# Patient Record
Sex: MALE | Race: WHITE | Employment: FULL TIME | URBAN - METROPOLITAN AREA
[De-identification: names, ages, dates, MRNs, and addresses within clinical notes are randomized per-mention and may not be internally consistent; named-entity substitution may affect disease eponyms.]

---

## 2017-12-14 ENCOUNTER — HOSPITAL ENCOUNTER (OUTPATIENT)
Age: 57
Discharge: HOME | End: 2017-12-14
Attending: EMERGENCY MEDICINE
Payer: COMMERCIAL

## 2017-12-14 VITALS
OXYGEN SATURATION: 93 % | RESPIRATION RATE: 16 BRPM | HEART RATE: 96 BPM | TEMPERATURE: 98 F | DIASTOLIC BLOOD PRESSURE: 95 MMHG | WEIGHT: 315 LBS | SYSTOLIC BLOOD PRESSURE: 170 MMHG

## 2017-12-14 DIAGNOSIS — J40 BRONCHITIS: Primary | ICD-10-CM

## 2017-12-14 PROCEDURE — 99203 OFFICE O/P NEW LOW 30 MIN: CPT

## 2017-12-14 PROCEDURE — 99204 OFFICE O/P NEW MOD 45 MIN: CPT

## 2017-12-14 RX ORDER — AZITHROMYCIN 250 MG/1
TABLET, FILM COATED ORAL
Qty: 1 PACKAGE | Refills: 0 | Status: SHIPPED | OUTPATIENT
Start: 2017-12-14 | End: 2019-10-11 | Stop reason: ALTCHOICE

## 2017-12-14 RX ORDER — ALBUTEROL SULFATE 90 UG/1
2 AEROSOL, METERED RESPIRATORY (INHALATION) EVERY 4 HOURS PRN
Qty: 1 INHALER | Refills: 0 | Status: SHIPPED | OUTPATIENT
Start: 2017-12-14 | End: 2018-01-13

## 2017-12-14 RX ORDER — LEVOTHYROXINE SODIUM 0.05 MG/1
50 TABLET ORAL
COMMUNITY
End: 2022-01-25

## 2017-12-15 NOTE — ED PROVIDER NOTES
Patient Seen in: [facility] Immediate Care In [address] [place] [address]    History   Patient presents with:  Cough/URI    Stated Complaint: cough    HPI    Patient is a 53-year-old male he is a ex-smoker who complains of cough for the last 10-12 days.   States the Soft. Bowel sounds are normal. Exhibits no distension and no mass. There is no tenderness. There is no rebound and no guarding. Musculoskeletal: Normal range of motion. Exhibits no edema or tenderness. Lymphadenopathy: No cervical adenopathy.    Neurolo

## 2019-11-18 PROBLEM — K57.92 DIVERTICULITIS: Status: ACTIVE | Noted: 2019-11-18

## 2019-11-18 PROBLEM — Z90.49 STATUS POST LAPAROSCOPIC COLECTOMY: Status: ACTIVE | Noted: 2019-11-18

## 2019-12-05 PROBLEM — E66.01 MORBID OBESITY (HCC): Status: ACTIVE | Noted: 2019-12-05

## 2021-08-10 ENCOUNTER — HOSPITAL ENCOUNTER (OUTPATIENT)
Dept: GENERAL RADIOLOGY | Age: 61
Discharge: HOME | End: 2021-08-10
Attending: INTERNAL MEDICINE
Payer: COMMERCIAL

## 2021-08-10 ENCOUNTER — LAB ENCOUNTER (OUTPATIENT)
Dept: LAB | Age: 61
End: 2021-08-10
Attending: INTERNAL MEDICINE
Payer: COMMERCIAL

## 2021-08-10 ENCOUNTER — OFFICE VISIT (OUTPATIENT)
Dept: INTERNAL MEDICINE CLINIC | Facility: CLINIC | Age: 61
End: 2021-08-10
Payer: COMMERCIAL

## 2021-08-10 VITALS
SYSTOLIC BLOOD PRESSURE: 123 MMHG | BODY MASS INDEX: 38.63 KG/M2 | DIASTOLIC BLOOD PRESSURE: 70 MMHG | HEART RATE: 76 BPM | WEIGHT: 301 LBS | HEIGHT: 74 IN | TEMPERATURE: 98 F

## 2021-08-10 DIAGNOSIS — Z23 NEED FOR SHINGLES VACCINE: ICD-10-CM

## 2021-08-10 DIAGNOSIS — K21.00 GASTROESOPHAGEAL REFLUX DISEASE WITH ESOPHAGITIS, UNSPECIFIED WHETHER HEMORRHAGE: ICD-10-CM

## 2021-08-10 DIAGNOSIS — E11.9 TYPE 2 DIABETES MELLITUS WITHOUT COMPLICATION, WITHOUT LONG-TERM CURRENT USE OF INSULIN (HCC): ICD-10-CM

## 2021-08-10 DIAGNOSIS — E78.5 HYPERLIPIDEMIA ASSOCIATED WITH TYPE 2 DIABETES MELLITUS (HCC): ICD-10-CM

## 2021-08-10 DIAGNOSIS — M17.11 PRIMARY OSTEOARTHRITIS OF RIGHT KNEE: ICD-10-CM

## 2021-08-10 DIAGNOSIS — E03.9 HYPOTHYROIDISM, UNSPECIFIED TYPE: ICD-10-CM

## 2021-08-10 DIAGNOSIS — Z98.890 HISTORY OF CAROTID ENDARTERECTOMY: ICD-10-CM

## 2021-08-10 DIAGNOSIS — I15.2 HYPERTENSION ASSOCIATED WITH TYPE 2 DIABETES MELLITUS (HCC): Primary | ICD-10-CM

## 2021-08-10 DIAGNOSIS — E11.59 HYPERTENSION ASSOCIATED WITH TYPE 2 DIABETES MELLITUS (HCC): ICD-10-CM

## 2021-08-10 DIAGNOSIS — N32.81 DETRUSOR OVERACTIVITY: ICD-10-CM

## 2021-08-10 DIAGNOSIS — E11.69 HYPERLIPIDEMIA ASSOCIATED WITH TYPE 2 DIABETES MELLITUS (HCC): ICD-10-CM

## 2021-08-10 DIAGNOSIS — I15.2 HYPERTENSION ASSOCIATED WITH TYPE 2 DIABETES MELLITUS (HCC): ICD-10-CM

## 2021-08-10 DIAGNOSIS — E11.59 HYPERTENSION ASSOCIATED WITH TYPE 2 DIABETES MELLITUS (HCC): Primary | ICD-10-CM

## 2021-08-10 PROBLEM — K57.92 DIVERTICULITIS: Status: RESOLVED | Noted: 2019-11-18 | Resolved: 2021-08-10

## 2021-08-10 PROBLEM — Z90.49 STATUS POST LAPAROSCOPIC COLECTOMY: Status: RESOLVED | Noted: 2019-11-18 | Resolved: 2021-08-10

## 2021-08-10 LAB
ALBUMIN SERPL-MCNC: 3.5 G/DL (ref 3.4–5)
ALBUMIN/GLOB SERPL: 0.9 {RATIO} (ref 1–2)
ALP LIVER SERPL-CCNC: 91 U/L
ALT SERPL-CCNC: 37 U/L
ANION GAP SERPL CALC-SCNC: 6 MMOL/L (ref 0–18)
AST SERPL-CCNC: 21 U/L (ref 15–37)
BILIRUB SERPL-MCNC: 0.4 MG/DL (ref 0.1–2)
BUN BLD-MCNC: 17 MG/DL (ref 7–18)
BUN/CREAT SERPL: 18.9 (ref 10–20)
CALCIUM BLD-MCNC: 9.1 MG/DL (ref 8.5–10.1)
CHLORIDE SERPL-SCNC: 107 MMOL/L (ref 98–112)
CHOLEST SMN-MCNC: 146 MG/DL (ref ?–200)
CO2 SERPL-SCNC: 29 MMOL/L (ref 21–32)
CREAT BLD-MCNC: 0.9 MG/DL
CREAT UR-SCNC: 77.1 MG/DL
EST. AVERAGE GLUCOSE BLD GHB EST-MCNC: 192 MG/DL (ref 68–126)
GLOBULIN PLAS-MCNC: 3.8 G/DL (ref 2.8–4.4)
GLUCOSE BLD-MCNC: 112 MG/DL (ref 70–99)
HBA1C MFR BLD HPLC: 8.3 % (ref ?–5.7)
HDLC SERPL-MCNC: 47 MG/DL (ref 40–59)
LDLC SERPL CALC-MCNC: 81 MG/DL (ref ?–100)
M PROTEIN MFR SERPL ELPH: 7.3 G/DL (ref 6.4–8.2)
MICROALBUMIN UR-MCNC: 1.55 MG/DL
MICROALBUMIN/CREAT 24H UR-RTO: 20.1 UG/MG (ref ?–30)
NONHDLC SERPL-MCNC: 99 MG/DL (ref ?–130)
OSMOLALITY SERPL CALC.SUM OF ELEC: 296 MOSM/KG (ref 275–295)
PATIENT FASTING Y/N/NP: YES
PATIENT FASTING Y/N/NP: YES
POTASSIUM SERPL-SCNC: 4 MMOL/L (ref 3.5–5.1)
SODIUM SERPL-SCNC: 142 MMOL/L (ref 136–145)
TRIGL SERPL-MCNC: 99 MG/DL (ref 30–149)
TSI SER-ACNC: 2.36 MIU/ML (ref 0.36–3.74)
VLDLC SERPL CALC-MCNC: 16 MG/DL (ref 0–30)

## 2021-08-10 PROCEDURE — 84443 ASSAY THYROID STIM HORMONE: CPT

## 2021-08-10 PROCEDURE — 3052F HG A1C>EQUAL 8.0%<EQUAL 9.0%: CPT | Performed by: INTERNAL MEDICINE

## 2021-08-10 PROCEDURE — 83036 HEMOGLOBIN GLYCOSYLATED A1C: CPT

## 2021-08-10 PROCEDURE — 90472 IMMUNIZATION ADMIN EACH ADD: CPT | Performed by: INTERNAL MEDICINE

## 2021-08-10 PROCEDURE — 80061 LIPID PANEL: CPT

## 2021-08-10 PROCEDURE — 3078F DIAST BP <80 MM HG: CPT | Performed by: INTERNAL MEDICINE

## 2021-08-10 PROCEDURE — 36415 COLL VENOUS BLD VENIPUNCTURE: CPT

## 2021-08-10 PROCEDURE — 73560 X-RAY EXAM OF KNEE 1 OR 2: CPT | Performed by: INTERNAL MEDICINE

## 2021-08-10 PROCEDURE — 3074F SYST BP LT 130 MM HG: CPT | Performed by: INTERNAL MEDICINE

## 2021-08-10 PROCEDURE — 90471 IMMUNIZATION ADMIN: CPT | Performed by: INTERNAL MEDICINE

## 2021-08-10 PROCEDURE — 99205 OFFICE O/P NEW HI 60 MIN: CPT | Performed by: INTERNAL MEDICINE

## 2021-08-10 PROCEDURE — 3061F NEG MICROALBUMINURIA REV: CPT | Performed by: INTERNAL MEDICINE

## 2021-08-10 PROCEDURE — 3008F BODY MASS INDEX DOCD: CPT | Performed by: INTERNAL MEDICINE

## 2021-08-10 PROCEDURE — 82043 UR ALBUMIN QUANTITATIVE: CPT

## 2021-08-10 PROCEDURE — 82570 ASSAY OF URINE CREATININE: CPT

## 2021-08-10 PROCEDURE — 90750 HZV VACC RECOMBINANT IM: CPT | Performed by: INTERNAL MEDICINE

## 2021-08-10 PROCEDURE — 90732 PPSV23 VACC 2 YRS+ SUBQ/IM: CPT | Performed by: INTERNAL MEDICINE

## 2021-08-10 PROCEDURE — 80053 COMPREHEN METABOLIC PANEL: CPT

## 2021-08-10 RX ORDER — LEVOTHYROXINE SODIUM 175 MCG
TABLET ORAL
COMMUNITY
Start: 2021-07-31 | End: 2021-08-10

## 2021-08-10 RX ORDER — GLYBURIDE-METFORMIN HYDROCHLORIDE 5; 500 MG/1; MG/1
1 TABLET ORAL 2 TIMES DAILY WITH MEALS
Qty: 90 TABLET | Refills: 3 | Status: SHIPPED | OUTPATIENT
Start: 2021-08-10 | End: 2021-11-30

## 2021-08-10 RX ORDER — PANTOPRAZOLE SODIUM 20 MG/1
TABLET, DELAYED RELEASE ORAL
Qty: 104 TABLET | Refills: 0 | Status: SHIPPED | OUTPATIENT
Start: 2021-08-10 | End: 2021-09-10 | Stop reason: ALTCHOICE

## 2021-08-10 RX ORDER — ATORVASTATIN CALCIUM 40 MG/1
40 TABLET, FILM COATED ORAL NIGHTLY
Qty: 90 TABLET | Refills: 3 | Status: SHIPPED | OUTPATIENT
Start: 2021-08-10

## 2021-08-10 RX ORDER — TROSPIUM CHLORIDE ER 60 MG/1
CAPSULE ORAL
COMMUNITY
Start: 2021-07-27 | End: 2021-09-29 | Stop reason: ALTCHOICE

## 2021-08-10 RX ORDER — EMPAGLIFLOZIN AND LINAGLIPTIN 25; 5 MG/1; MG/1
TABLET, FILM COATED ORAL
COMMUNITY
End: 2021-08-10

## 2021-08-10 RX ORDER — LEVOTHYROXINE SODIUM 175 MCG
175 TABLET ORAL
Qty: 90 TABLET | Refills: 3 | Status: SHIPPED | OUTPATIENT
Start: 2021-08-10

## 2021-08-10 RX ORDER — ATORVASTATIN CALCIUM 40 MG/1
TABLET, FILM COATED ORAL
COMMUNITY
Start: 2021-07-31 | End: 2021-08-10

## 2021-08-10 RX ORDER — VALSARTAN 160 MG/1
160 TABLET ORAL DAILY
Qty: 90 TABLET | Refills: 3 | Status: SHIPPED | OUTPATIENT
Start: 2021-08-10

## 2021-08-10 RX ORDER — EMPAGLIFLOZIN AND LINAGLIPTIN 25; 5 MG/1; MG/1
1 TABLET, FILM COATED ORAL DAILY
Qty: 90 TABLET | Refills: 3 | Status: SHIPPED | OUTPATIENT
Start: 2021-08-10 | End: 2021-11-30

## 2021-08-10 RX ORDER — VALSARTAN 160 MG/1
TABLET ORAL
COMMUNITY
End: 2021-08-10

## 2021-08-10 NOTE — PATIENT INSTRUCTIONS
Gastritis (Adult)    Gastritis is inflammation and irritation of the stomach lining. You can have it for a short time (acute) or be long lasting (chronic). Infection with bacteria called H pylori most often causes gastritis.  More than a third of people i with your healthcare provider, or as advised by our staff. You may need testing to check for inflammation or an ulcer.   When to seek medical advice  Call your healthcare provider for any of the following:  · Stomach pain that gets worse or moves to the low Relieving your discomfort  You and your healthcare provider can work together to find the treatment options that best ease your symptoms. These may include lifestyle changes, medicine, and possibly surgery.   Many people find their GERD symptoms decrease wh tomatoes  · Peppermint  · Any other foods that seem to irritate your stomach or cause you pain  Relieve the pressure  Tips include the following:  · Eat smaller meals, even if you have to eat more often. · Don’t lie down right after you eat.  Wait a few ho control GERD symptoms. Side effects: Belly (abdominal) pain, diarrhea, upset stomach (nausea). Possible other side effects linked to long-term use and high doses. Prokinetics  These medicines affect the movement of the digestive tract.  They may be recomm regarding the use of this medicine in children. Special care may be needed. What side effects may I notice from receiving this medicine?   Side effects that you should report to your doctor or health care professional as soon as possible:  · allergic react degrees C (68 and 77 degrees F). Protect from light and moisture. Throw away any unused medicine after the expiration date. What should I tell my health care provider before I take this medicine?   They need to know if you have any of these conditions:  ·

## 2021-08-10 NOTE — ASSESSMENT & PLAN NOTE
New problem. Continue with Glyxambi and glyburide/metformin pending repeat A1c, may benefit from endocrine evaluation depending on A1c.

## 2021-08-10 NOTE — ASSESSMENT & PLAN NOTE
Has not had any formal testing, states Vesicare and trospium do not work, recommend he follow-up with urology to discuss further. He can stop trospium for now, if he notices worsening of his symptoms then resume pending evaluation.

## 2021-08-10 NOTE — PROGRESS NOTES
History of Present Illness   Patient ID: Eri Valle is a 63year old male. Today's Date: 08/10/21  Chief Complaint: Establish Care      HPI   Pleasant 65-year-old gentleman who presents for establish care. He is new to our system.   I am only able Vitals and nursing note reviewed. Constitutional:       General: He is not in acute distress. Appearance: Normal appearance. HENT:      Head: Normocephalic.       Right Ear: External ear normal.      Left Ear: External ear normal.   Eyes:      Extra before meals for 14 days, THEN 1 tablet (20 mg total) every morning before breakfast. Before meal. 104 tablet 0   • Levothyroxine Sodium 50 MCG Oral Tab Take 50 mcg by mouth before breakfast.         Assessment & Plan    1.  Hypertension associated with typ Hypothyroidism, unspecified type  Assessment & Plan:  New problem. Continue with levothyroxine pending repeat TSH. Orders:  -     Assay, Thyroid Stim Hormone; Future; Expected date: 08/10/2021  -     Synthroid;  Take 1 tablet (175 mcg total) by mouth befo meal.  Dispense: 104 tablet; Refill: 0  9.  Need for shingles vaccine  -     ZOSTER VACC RECOMBINANT IM NJX    I spent 65 minutes obtaining pertitent medical history, reviewing pertinent imaging/labs and specialists notes, evaluating patient, discussing dif Infection with bacteria called H pylori most often causes gastritis. More than a third of people in the [place] have these bacteria in their bodies. In many cases, H pylori causes no problems or symptoms.  In some people, though, the infection irritates the stom healthcare provider for any of the following:  · Stomach pain that gets worse or moves to the lower right belly (appendix area)  · Chest pain that appears or gets worse, or spreads to the back, neck, shoulder, or arm  · Frequent vomiting (can’t keep down l lifestyle changes, medicine, and possibly surgery. Many people find their GERD symptoms decrease when they eat small frequent meals instead of 3 large ones. Reducing the amount of fatty foods in your diet will also help.    The following foods tend to caus smaller meals, even if you have to eat more often. · Don’t lie down right after you eat. Wait a few hours for your stomach to empty. · Avoid tight belts and tight-fitting clothes. · Lose excess weight.   Tobacco and alcohol  Avoid smoking tobacco and dri high doses. Prokinetics  These medicines affect the movement of the digestive tract. They may be recommended if your stomach is emptying too slowly. But in most cases they are not recommended for treating GERD.    Side effects: Tiredness, depression, anxie you should report to your doctor or health care professional as soon as possible:  · allergic reactions like skin rash, itching or hives, swelling of the face, lips, or tongue  · bone, muscle or joint pain  · breathing problems  · chest pain or chest tight care provider before I take this medicine?   They need to know if you have any of these conditions:  · black or bloody stools  · chest pain  · difficulty swallowing  · have had heartburn for over 3 months  · have heartburn with dizziness, lightheadedness or

## 2021-08-10 NOTE — ASSESSMENT & PLAN NOTE
Plan  Chronic, well controlled on current medications, denies adverse effects, continue with present management.

## 2021-08-10 NOTE — ASSESSMENT & PLAN NOTE
Stable. Continue with aspirin and atorvastatin, follow-up with [facility] cardiology in November, for any further cardiac issues we will have him follow-up with [facility] cardiology.

## 2021-08-12 ENCOUNTER — TELEPHONE (OUTPATIENT)
Dept: INTERNAL MEDICINE CLINIC | Facility: CLINIC | Age: 61
End: 2021-08-12

## 2021-08-24 ENCOUNTER — HOSPITAL ENCOUNTER (OUTPATIENT)
Age: 61
Discharge: HOME | End: 2021-08-24
Attending: PHYSICIAN ASSISTANT
Payer: COMMERCIAL

## 2021-08-24 ENCOUNTER — TELEPHONE (OUTPATIENT)
Dept: INTERNAL MEDICINE CLINIC | Facility: CLINIC | Age: 61
End: 2021-08-24

## 2021-08-24 VITALS
HEART RATE: 84 BPM | BODY MASS INDEX: 38.12 KG/M2 | HEIGHT: 74 IN | DIASTOLIC BLOOD PRESSURE: 69 MMHG | WEIGHT: 297 LBS | OXYGEN SATURATION: 95 % | RESPIRATION RATE: 18 BRPM | TEMPERATURE: 97 F | SYSTOLIC BLOOD PRESSURE: 147 MMHG

## 2021-08-24 DIAGNOSIS — L03.116 CELLULITIS OF LEFT LOWER LEG: Primary | ICD-10-CM

## 2021-08-24 DIAGNOSIS — R03.0 ELEVATED BLOOD PRESSURE READING: ICD-10-CM

## 2021-08-24 DIAGNOSIS — S81.802A OPEN WOUND OF LEFT LOWER LEG, INITIAL ENCOUNTER: ICD-10-CM

## 2021-08-24 DIAGNOSIS — L03.119 CELLULITIS OF LOWER EXTREMITY, UNSPECIFIED LATERALITY: Primary | ICD-10-CM

## 2021-08-24 PROCEDURE — 99203 OFFICE O/P NEW LOW 30 MIN: CPT | Performed by: PHYSICIAN ASSISTANT

## 2021-08-24 RX ORDER — SULFAMETHOXAZOLE AND TRIMETHOPRIM 800; 160 MG/1; MG/1
1 TABLET ORAL 2 TIMES DAILY
Qty: 14 TABLET | Refills: 0 | Status: SHIPPED | OUTPATIENT
Start: 2021-08-24 | End: 2021-08-31

## 2021-08-24 RX ORDER — CLINDAMYCIN HYDROCHLORIDE 150 MG/1
450 CAPSULE ORAL 3 TIMES DAILY
Qty: 90 CAPSULE | Refills: 0 | Status: SHIPPED | OUTPATIENT
Start: 2021-08-24 | End: 2021-09-03

## 2021-08-24 NOTE — ED INITIAL ASSESSMENT (HPI)
C/o redness and swelling Lt lower leg started yesterday  Has abrasion Lt lower leg 8 days ago  Denies fever

## 2021-08-24 NOTE — TELEPHONE ENCOUNTER
Advised patient of Dr. Angeli Rios note. Patient verbalized understanding  Patient states he already took 3 capsules of clindamycin today. Patient wants to know if he should still take Bactrim today. Spoke to Dr. Brady Ponce.    Per Dr. Brady Ponce, okay for patient to

## 2021-08-24 NOTE — ED PROVIDER NOTES
Patient Seen in: [facility] [place]    History   Patient presents with:  Cellulitis    Stated Complaint: inflammation lt lower leg    HPI    44-year-old male presents with chief complaint of redness and swelling of left lower leg. Onset yesterday. Sodium 1 % External Gel,  Apply 2 g topically 4 (four) times daily. Apply thin layer to affected joint.    pantoprazole 20 MG Oral Tab EC,  Take 1 tablet (20 mg total) by mouth 2 (two) times daily before meals for 14 days, THEN 1 tablet (20 mg total) every normal.  There is no rales, wheezes, or rhonchi. There is no stridor. Air entry is equal.  Cardiovascular: Regular rate and rhythm, no murmurs, gallops, rubs. Capillary refill is brisk. No extremity edema.   Gastrointestinal: Abdomen soft, nontender, no of 18 minutes during chart review, obtaining history, performing a physical exam, bedside monitoring of interventions, collecting and independently interpreting tests, discussion with consultants, patient communication/counseling, and chart documentation b

## 2021-08-24 NOTE — TELEPHONE ENCOUNTER
Dr. Yamilet Akers, please see patient's message below and images and please advise. Patient has appointment to see you tomorrow morning.

## 2021-08-24 NOTE — TELEPHONE ENCOUNTER
Chart reviewed. Stop clindamycin, this is a high incidence of C. difficile. We will switch him to Bactrim. I will also send in topical mupirocin which he should use 3 times daily for the next 5 to 7 days.   Purchase nonadherent dressing call Bhakti Kincaid from t

## 2021-08-24 NOTE — TELEPHONE ENCOUNTER
----- Message from Sue Burnett sent at 8/24/2021  1:02 PM CDT -----  Regarding: Other  Contact: [phone]  Went to immediate care re:infection on ankle. Attached: Photo taken 9am and photo taken 12:30pm today. It seems to be getting worse.  Maged Esparza

## 2021-08-25 ENCOUNTER — HOSPITAL ENCOUNTER (OUTPATIENT)
Dept: ULTRASOUND IMAGING | Facility: HOSPITAL | Age: 61
Discharge: HOME | End: 2021-08-25
Attending: INTERNAL MEDICINE
Payer: COMMERCIAL

## 2021-08-25 ENCOUNTER — OFFICE VISIT (OUTPATIENT)
Dept: INTERNAL MEDICINE CLINIC | Facility: CLINIC | Age: 61
End: 2021-08-25
Payer: COMMERCIAL

## 2021-08-25 VITALS
DIASTOLIC BLOOD PRESSURE: 65 MMHG | SYSTOLIC BLOOD PRESSURE: 114 MMHG | HEIGHT: 74 IN | BODY MASS INDEX: 38.2 KG/M2 | HEART RATE: 78 BPM | WEIGHT: 297.63 LBS | TEMPERATURE: 99 F

## 2021-08-25 DIAGNOSIS — M79.89 SWELLING OF CALF: ICD-10-CM

## 2021-08-25 DIAGNOSIS — L03.116 CELLULITIS OF LEFT ANKLE: Primary | ICD-10-CM

## 2021-08-25 DIAGNOSIS — L03.116 CELLULITIS OF LEFT ANKLE: ICD-10-CM

## 2021-08-25 DIAGNOSIS — I83.223 VARICOSE VEINS OF LEFT LOWER EXTREMITY WITH INFLAMMATION, WITH ULCER OF ANKLE LIMITED TO BREAKDOWN OF SKIN (HCC): ICD-10-CM

## 2021-08-25 DIAGNOSIS — L97.321 VARICOSE VEINS OF LEFT LOWER EXTREMITY WITH INFLAMMATION, WITH ULCER OF ANKLE LIMITED TO BREAKDOWN OF SKIN (HCC): ICD-10-CM

## 2021-08-25 PROCEDURE — 3074F SYST BP LT 130 MM HG: CPT | Performed by: INTERNAL MEDICINE

## 2021-08-25 PROCEDURE — 3078F DIAST BP <80 MM HG: CPT | Performed by: INTERNAL MEDICINE

## 2021-08-25 PROCEDURE — 3008F BODY MASS INDEX DOCD: CPT | Performed by: INTERNAL MEDICINE

## 2021-08-25 PROCEDURE — 93971 EXTREMITY STUDY: CPT | Performed by: INTERNAL MEDICINE

## 2021-08-25 PROCEDURE — 99214 OFFICE O/P EST MOD 30 MIN: CPT | Performed by: INTERNAL MEDICINE

## 2021-08-25 NOTE — PROGRESS NOTES
History of Present Illness   Patient ID: Fan Solis is a 63year old male.   Today's Date: 08/25/21  Chief Complaint: Infection (L ankle) and Pain (b/l feet when working 8hrs a day)      HPI  Very pleasant 82-year-old gentleman who presents for urge General: He is not in acute distress. Appearance: Normal appearance. HENT:      Head: Normocephalic. Right Ear: External ear normal.      Left Ear: External ear normal.   Eyes:      Extraocular Movements: Extraocular movements intact.       Conju total) by mouth before breakfast. 90 tablet 3   • Diclofenac Sodium 1 % External Gel Apply 2 g topically 4 (four) times daily. Apply thin layer to affected joint.  100 g 3   • pantoprazole 20 MG Oral Tab EC Take 1 tablet (20 mg total) by mouth 2 (two) times of ankle limited to breakdown of skin ([address]      Hypertension associated with type 2 diabetes mellitus ([address]      Hyperlipidemia associated with type 2 diabetes mellitus ([address]      Type 2 diabetes mellitus without complication, without long-term current us

## 2021-08-26 ENCOUNTER — TELEPHONE (OUTPATIENT)
Dept: INTERNAL MEDICINE CLINIC | Facility: CLINIC | Age: 61
End: 2021-08-26

## 2021-08-26 NOTE — TELEPHONE ENCOUNTER
Pt requesting for information regarding Podiatrist Dr. Brady Ponce had referred him to. Chart reviewed with Pt. Provided him with Podiatrist phone number and provider name.       To Vendor Referred By By Location/Place of Service By Department   TAQUERIA Chávez

## 2021-08-27 ENCOUNTER — TELEPHONE (OUTPATIENT)
Dept: INTERNAL MEDICINE CLINIC | Facility: CLINIC | Age: 61
End: 2021-08-27

## 2021-08-27 NOTE — TELEPHONE ENCOUNTER
Patient states that he is calling back, states that someone called him few minutes ago and left a message to his voice mail,did not check his voice mail yet but he calls back right away.   Informed that  per Dr Sally Garcia, his ultrasound was negative for any bl

## 2021-08-27 NOTE — TELEPHONE ENCOUNTER
The patient returned the call and stated he was aware of the results. He will send a picture of his leg later today as instructed by Dr. Joey Woody later today when his son comes home.     Casimiro Carpenter MD   8/25/2021  2:43 PM CDT       Please let patient kn

## 2021-09-10 ENCOUNTER — OFFICE VISIT (OUTPATIENT)
Dept: PODIATRY CLINIC | Facility: CLINIC | Age: 61
End: 2021-09-10
Payer: COMMERCIAL

## 2021-09-10 DIAGNOSIS — I87.2 VENOUS INSUFFICIENCY OF BOTH LOWER EXTREMITIES: ICD-10-CM

## 2021-09-10 DIAGNOSIS — E08.42 DIABETES MELLITUS DUE TO UNDERLYING CONDITION WITH DIABETIC POLYNEUROPATHY, UNSPECIFIED WHETHER LONG TERM INSULIN USE (HCC): Primary | ICD-10-CM

## 2021-09-10 DIAGNOSIS — M20.41 HAMMERTOE, BILATERAL: ICD-10-CM

## 2021-09-10 DIAGNOSIS — M20.42 HAMMERTOE, BILATERAL: ICD-10-CM

## 2021-09-10 DIAGNOSIS — I83.12 VARICOSE VEINS OF BOTH LOWER EXTREMITIES WITH INFLAMMATION: ICD-10-CM

## 2021-09-10 DIAGNOSIS — I83.11 VARICOSE VEINS OF BOTH LOWER EXTREMITIES WITH INFLAMMATION: ICD-10-CM

## 2021-09-10 PROCEDURE — 99204 OFFICE O/P NEW MOD 45 MIN: CPT | Performed by: PODIATRIST

## 2021-09-10 RX ORDER — GABAPENTIN 300 MG/1
300 CAPSULE ORAL NIGHTLY
Qty: 30 CAPSULE | Refills: 2 | Status: SHIPPED | OUTPATIENT
Start: 2021-09-10 | End: 2021-11-03

## 2021-09-10 NOTE — PROGRESS NOTES
[address] [facility] Podiatry  Progress Note    Sean Cortes is a 63year old male. Patient presents with:  Consult: Patient c/o R foot pain. Pain is across foot and on the side. Pain scale 7-8/10. Also states pain on L foot, pain scale 1-2/10.  Denies any HISTORY  2017    Small bowel Res.    • OTHER SURGICAL HISTORY  11/12/2019    Colectomy      Family History   Problem Relation Age of Onset   • Cancer Father    • Heart Disorder Father    • Cancer Mother       Social History    Socioeconomic History      Mar ANIONGAP 6 08/10/2021    GFRAA 108 08/10/2021    GFRNAA 93 08/10/2021    CA 9.1 08/10/2021     08/10/2021    K 4.0 08/10/2021     08/10/2021    CO2 29.0 08/10/2021    OSMOCALC 296 (H) 08/10/2021        Lab Results   Component Value Date    EAG

## 2021-09-14 ENCOUNTER — OFFICE VISIT (OUTPATIENT)
Dept: PHYSICAL MEDICINE AND REHAB | Facility: CLINIC | Age: 61
End: 2021-09-14
Payer: COMMERCIAL

## 2021-09-14 ENCOUNTER — TELEPHONE (OUTPATIENT)
Dept: NEUROLOGY | Facility: CLINIC | Age: 61
End: 2021-09-14

## 2021-09-14 VITALS
WEIGHT: 298.63 LBS | BODY MASS INDEX: 38.33 KG/M2 | DIASTOLIC BLOOD PRESSURE: 68 MMHG | SYSTOLIC BLOOD PRESSURE: 140 MMHG | HEART RATE: 77 BPM | OXYGEN SATURATION: 95 % | HEIGHT: 74 IN

## 2021-09-14 DIAGNOSIS — E11.59 HYPERTENSION ASSOCIATED WITH TYPE 2 DIABETES MELLITUS (HCC): ICD-10-CM

## 2021-09-14 DIAGNOSIS — E11.42 DIABETIC POLYNEUROPATHY ASSOCIATED WITH TYPE 2 DIABETES MELLITUS (HCC): ICD-10-CM

## 2021-09-14 DIAGNOSIS — M22.2X9 PATELLOFEMORAL PAIN SYNDROME, UNSPECIFIED LATERALITY: Primary | ICD-10-CM

## 2021-09-14 DIAGNOSIS — E03.9 HYPOTHYROIDISM, UNSPECIFIED TYPE: ICD-10-CM

## 2021-09-14 DIAGNOSIS — I15.2 HYPERTENSION ASSOCIATED WITH TYPE 2 DIABETES MELLITUS (HCC): ICD-10-CM

## 2021-09-14 DIAGNOSIS — E78.5 HYPERLIPIDEMIA ASSOCIATED WITH TYPE 2 DIABETES MELLITUS (HCC): ICD-10-CM

## 2021-09-14 DIAGNOSIS — M17.11 PRIMARY OSTEOARTHRITIS OF RIGHT KNEE: ICD-10-CM

## 2021-09-14 DIAGNOSIS — E11.69 HYPERLIPIDEMIA ASSOCIATED WITH TYPE 2 DIABETES MELLITUS (HCC): ICD-10-CM

## 2021-09-14 DIAGNOSIS — E66.01 CLASS 2 SEVERE OBESITY DUE TO EXCESS CALORIES WITH SERIOUS COMORBIDITY AND BODY MASS INDEX (BMI) OF 38.0 TO 38.9 IN ADULT (HCC): ICD-10-CM

## 2021-09-14 PROCEDURE — 20611 DRAIN/INJ JOINT/BURSA W/US: CPT | Performed by: PHYSICAL MEDICINE & REHABILITATION

## 2021-09-14 PROCEDURE — 3078F DIAST BP <80 MM HG: CPT | Performed by: PHYSICAL MEDICINE & REHABILITATION

## 2021-09-14 PROCEDURE — 99204 OFFICE O/P NEW MOD 45 MIN: CPT | Performed by: PHYSICAL MEDICINE & REHABILITATION

## 2021-09-14 PROCEDURE — 3077F SYST BP >= 140 MM HG: CPT | Performed by: PHYSICAL MEDICINE & REHABILITATION

## 2021-09-14 PROCEDURE — 3008F BODY MASS INDEX DOCD: CPT | Performed by: PHYSICAL MEDICINE & REHABILITATION

## 2021-09-14 RX ORDER — TRIAMCINOLONE ACETONIDE 40 MG/ML
40 INJECTION, SUSPENSION INTRA-ARTICULAR; INTRAMUSCULAR ONCE
Status: COMPLETED | OUTPATIENT
Start: 2021-09-14 | End: 2021-09-14

## 2021-09-14 RX ORDER — LIDOCAINE HYDROCHLORIDE 10 MG/ML
1 INJECTION, SOLUTION INFILTRATION; PERINEURAL ONCE
Status: COMPLETED | OUTPATIENT
Start: 2021-09-14 | End: 2021-09-14

## 2021-09-14 RX ADMIN — TRIAMCINOLONE ACETONIDE 40 MG: 40 INJECTION, SUSPENSION INTRA-ARTICULAR; INTRAMUSCULAR at 16:55:00

## 2021-09-14 RX ADMIN — LIDOCAINE HYDROCHLORIDE 1 ML: 10 INJECTION, SOLUTION INFILTRATION; PERINEURAL at 16:55:00

## 2021-09-14 NOTE — H&P
[address] H&P    Requesting Physician: Tiffanie Pan MD    Chief Complaint (Reason for Visit):  Patient presents with:  Knee Pain: New right handed patient comes in for localized R knee pain, denies Occupational History      Not on file    Tobacco Use      Smoking status: Former Smoker        Quit date:         Years since quittin.7      Smokeless tobacco: Never Used    Vaping Use      Vaping Use: Never used    Substance and Sexual Activity and are negative. Pertinent positives and negatives noted in the HPI. PHYSICAL EXAM:   /68   Pulse 77   Ht 74\"   Wt 298 lb 9.6 oz (135.4 kg)   SpO2 95%   BMI 38.34 kg/m²     Body mass index is 38.34 kg/m².       General: No immediate distress 18 mmol/L Final   • BUN 08/10/2021 17  7 - 18 mg/dL Final   • Creatinine 08/10/2021 0.90  0.70 - 1.30 mg/dL Final   • BUN/CREA Ratio 08/10/2021 18.9  10.0 - 20.0 Final   • Calcium, Total 08/10/2021 9.1  8.5 - 10.1 mg/dL Final   • Calculated Osmolality 08/1 minimal osteophytic spurring along the lateral compartment.  Minimal osteophytic spurring noted along the posterior superior    and inferior patella.  No evidence of fractures or erosions. SOFT TISSUES: Negative. No visible soft tissue swelling.    EFFUSI type 2 diabetes mellitus (HCC)  Class 2 severe obesity due to excess calories with serious comorbidity and body mass index (BMI) of 38.0 to 38.9 in adult [facility])    Brandan Cantrell MD, [facility]  Physical Medicine and Rehabilitation/Sports Medicine  [facility]

## 2021-09-14 NOTE — PROCEDURES
[address]   Knee Joint Injection Procedure Note    CHIEF COMPLAINT:  Patient presents with:  Knee Pain: New right handed patient comes in for localized R knee pain, denies N/T.   Pain started yrs ago with no Final   • HgbA1C 08/10/2021 8.3* <5.7 % Final   • Estimated Average Glucose 08/10/2021 192* 68 - 126 mg/dL Final   • Cholesterol, Total 08/10/2021 146  <200 mg/dL Final   • HDL Cholesterol 08/10/2021 47  40 - 59 mg/dL Final   • Triglycerides 08/10/2021 99 dressing was placed. Patient tolerated the procedure well and no immediate complications noted. The patients pre procedure pain score was 2/10. The post procedure pain score was 0/10. Patient verbalized understanding of assessment and plan.   Patien

## 2021-09-14 NOTE — PATIENT INSTRUCTIONS
1) Tylenol 500-1000 mg every 6-8 hours as needed for pain. No more than 3000 mg daily. 2) I am not recommending NSAIDS given your co-morbidities  3) Start formal physical therapy as soon as possible.   4) Make an appointment to see the weight loss center

## 2021-09-14 NOTE — TELEPHONE ENCOUNTER
RIGHT knee HA and CSI under ultrasound guidance CPT CODE: 23407, ( [employer]) -Approved     Called [employer] spoke to Derrick Grullon who states no authorization is required for   CPT CODE: 09932,.      Call transferred to Medication precert department

## 2021-09-29 ENCOUNTER — LAB ENCOUNTER (OUTPATIENT)
Dept: LAB | Facility: HOSPITAL | Age: 61
End: 2021-09-29
Attending: UROLOGY
Payer: COMMERCIAL

## 2021-09-29 ENCOUNTER — OFFICE VISIT (OUTPATIENT)
Dept: SURGERY | Facility: CLINIC | Age: 61
End: 2021-09-29
Payer: COMMERCIAL

## 2021-09-29 VITALS
DIASTOLIC BLOOD PRESSURE: 68 MMHG | SYSTOLIC BLOOD PRESSURE: 120 MMHG | HEIGHT: 74 IN | HEART RATE: 88 BPM | BODY MASS INDEX: 38.24 KG/M2 | WEIGHT: 298 LBS

## 2021-09-29 DIAGNOSIS — R39.14 BENIGN PROSTATIC HYPERPLASIA WITH INCOMPLETE BLADDER EMPTYING: ICD-10-CM

## 2021-09-29 DIAGNOSIS — N40.1 BENIGN PROSTATIC HYPERPLASIA WITH INCOMPLETE BLADDER EMPTYING: ICD-10-CM

## 2021-09-29 DIAGNOSIS — F15.10 CAFFEINE ABUSE (HCC): ICD-10-CM

## 2021-09-29 DIAGNOSIS — Z87.891 FORMER SMOKER: ICD-10-CM

## 2021-09-29 DIAGNOSIS — R35.1 NOCTURIA: ICD-10-CM

## 2021-09-29 DIAGNOSIS — N52.01 ERECTILE DYSFUNCTION DUE TO ARTERIAL INSUFFICIENCY: ICD-10-CM

## 2021-09-29 DIAGNOSIS — Z12.5 PROSTATE CANCER SCREENING: ICD-10-CM

## 2021-09-29 DIAGNOSIS — N39.41 URGE INCONTINENCE: Primary | ICD-10-CM

## 2021-09-29 DIAGNOSIS — E13.40 OTHER SPECIFIED DIABETES MELLITUS WITH DIABETIC NEUROPATHY, UNSPECIFIED WHETHER LONG TERM INSULIN USE (HCC): ICD-10-CM

## 2021-09-29 DIAGNOSIS — Z80.42 FAMILY HISTORY OF PROSTATE CANCER: ICD-10-CM

## 2021-09-29 DIAGNOSIS — N39.41 URGE INCONTINENCE: ICD-10-CM

## 2021-09-29 PROCEDURE — 84146 ASSAY OF PROLACTIN: CPT

## 2021-09-29 PROCEDURE — 3078F DIAST BP <80 MM HG: CPT | Performed by: UROLOGY

## 2021-09-29 PROCEDURE — 3074F SYST BP LT 130 MM HG: CPT | Performed by: UROLOGY

## 2021-09-29 PROCEDURE — 84403 ASSAY OF TOTAL TESTOSTERONE: CPT

## 2021-09-29 PROCEDURE — 82670 ASSAY OF TOTAL ESTRADIOL: CPT

## 2021-09-29 PROCEDURE — 84402 ASSAY OF FREE TESTOSTERONE: CPT

## 2021-09-29 PROCEDURE — 36415 COLL VENOUS BLD VENIPUNCTURE: CPT

## 2021-09-29 PROCEDURE — 3008F BODY MASS INDEX DOCD: CPT | Performed by: UROLOGY

## 2021-09-29 PROCEDURE — 99244 OFF/OP CNSLTJ NEW/EST MOD 40: CPT | Performed by: UROLOGY

## 2021-09-29 PROCEDURE — 81003 URINALYSIS AUTO W/O SCOPE: CPT

## 2021-09-29 PROCEDURE — 88108 CYTOPATH CONCENTRATE TECH: CPT

## 2021-09-29 RX ORDER — TAMSULOSIN HYDROCHLORIDE 0.4 MG/1
0.4 CAPSULE ORAL DAILY
Qty: 90 CAPSULE | Refills: 3 | Status: SHIPPED | OUTPATIENT
Start: 2021-09-29 | End: 2022-09-29

## 2021-09-29 NOTE — PATIENT INSTRUCTIONS
Donavan Stafford M.D.      1.  Urine specimen  for complete urinalysis and urine for cytology--we will notify you of the results    2.   Blood draw  for PSA prostate cancer screening, especially for your family history

## 2021-09-29 NOTE — PROGRESS NOTES
Christianne Mai is a 63year old male. HPI:   Patient presents with:  Urinary Frequency: New patient here for consult, pt reports urinary frequency and accidental loss of urine, problem for many years          History provided by Patient.  Referred by CARISA years, 2 pack a day; 40 pack year history.  quit 1996    Family History--Father had prostate cancer; mother had uterus cancer         HISTORY:  Past Medical History:   Diagnosis Date   • Diabetes [address]    • Diverticulitis 10/2019   • Obesity    • Thyroid di thin layer to affected joint.  100 g 3   • Levothyroxine Sodium 50 MCG Oral Tab Take 50 mcg by mouth before breakfast.         Allergies:    Penicillins                   ROS:   Genitourinary:  Negative for dysuria and hematuria    Gastrointestinal:  Rosaleen Apgar distribution. INGUINAL no hernia  TESTES  =  descended bilaterally without nodules and no orchitis or epididymitis. EPIDIDYMIS  normal     SCROTUM normal  URETHRAL MEATUS noraml.     PENIS  Circumcised; coronal Hypospadia in the penis   Perineum unrem patient the benefits, risks, and alternatives to this treatment option and I answered patient's questions; patient decides to undergo Bladder US.  I also give patient behavioral measures to improve his urge incontinence-- please see below.    (N40.1,  R39.1 years. He is currently 298 lbs with a BMI of 38.26. I explain to patient that his pronounce pelvic obesity is likely mechanical effecting bladder capacity and is likely indirectly contributing to his urge incontinence.  We discussed bariatric weight loss p [phone]    4. Please start tamsulosin 0.4 mg daily--to improve urination problem with your large prostate on digital rectal exam    5.   Besides your enlarged prostate, the following factors are also contributing to your urinary urge incontinence (za Staci Walker MD.   Electronically Signed: Pedro England, 9/29/2021, 11:25 AM.      I, Camelia Hillman MD,  personally performed the services described in this documentation.  All medical record entries made by the scribe were at my direction and in my prese

## 2021-10-08 ENCOUNTER — TELEPHONE (OUTPATIENT)
Dept: INTERNAL MEDICINE CLINIC | Facility: CLINIC | Age: 61
End: 2021-10-08

## 2021-10-08 ENCOUNTER — OFFICE VISIT (OUTPATIENT)
Dept: PODIATRY CLINIC | Facility: CLINIC | Age: 61
End: 2021-10-08
Payer: COMMERCIAL

## 2021-10-08 ENCOUNTER — TELEPHONE (OUTPATIENT)
Dept: PODIATRY CLINIC | Facility: CLINIC | Age: 61
End: 2021-10-08

## 2021-10-08 DIAGNOSIS — I83.11 VARICOSE VEINS OF BOTH LOWER EXTREMITIES WITH INFLAMMATION: ICD-10-CM

## 2021-10-08 DIAGNOSIS — E11.9 TYPE 2 DIABETES MELLITUS WITHOUT COMPLICATION, WITHOUT LONG-TERM CURRENT USE OF INSULIN (HCC): Primary | ICD-10-CM

## 2021-10-08 DIAGNOSIS — E08.42 DIABETES MELLITUS DUE TO UNDERLYING CONDITION WITH DIABETIC POLYNEUROPATHY, UNSPECIFIED WHETHER LONG TERM INSULIN USE (HCC): Primary | ICD-10-CM

## 2021-10-08 DIAGNOSIS — M20.42 HAMMERTOE, BILATERAL: ICD-10-CM

## 2021-10-08 DIAGNOSIS — M20.41 HAMMERTOE, BILATERAL: ICD-10-CM

## 2021-10-08 DIAGNOSIS — I83.12 VARICOSE VEINS OF BOTH LOWER EXTREMITIES WITH INFLAMMATION: ICD-10-CM

## 2021-10-08 DIAGNOSIS — I87.2 VENOUS INSUFFICIENCY OF BOTH LOWER EXTREMITIES: ICD-10-CM

## 2021-10-08 PROCEDURE — 99213 OFFICE O/P EST LOW 20 MIN: CPT | Performed by: PODIATRIST

## 2021-10-08 NOTE — PROGRESS NOTES
[facility] Podiatry  Progress Note    Kasey Brown is a 63year old male. Patient presents with:  Diabetic Foot Care: FBS this am 110, A1C was done one 8/10 result of 8.3, LOV with PCP was 8/25.  Patient started Gabapentin with success for the righ Diagnosis Date   • Diabetes [facility])    • Diverticulitis 10/2019   • Obesity    • Thyroid disease       Past Surgical History:   Procedure Laterality Date   • OTHER SURGICAL HISTORY  2017    Small bowel Res.    • OTHER SURGICAL HISTORY  11/12/2019    Colecto Component Value Date     (H) 08/10/2021    BUN 17 08/10/2021    CREATSERUM 0.90 08/10/2021    BUNCREA 18.9 08/10/2021    ANIONGAP 6 08/10/2021    GFRAA 108 08/10/2021    GFRNAA 93 08/10/2021    CA 9.1 08/10/2021     08/10/2021    K 4.0 08/10 DPM  10/8/21

## 2021-10-08 NOTE — TELEPHONE ENCOUNTER
Patient wants to know when he should schedule a diabetic follow up visit with Dr. Ashley Estrella. He schedule a visit for 11/3 but stated \"my wife is upset that I did not speak with the office first for clarification on a follow up date\".     Please advise

## 2021-10-10 DIAGNOSIS — R79.89 LOW TESTOSTERONE: Primary | ICD-10-CM

## 2021-10-11 NOTE — TELEPHONE ENCOUNTER
Called pt no answer, left detailed message    Please contact patient and advise to schedule appt with endocrine regarding diabetes. Referral already placed. See below.  If patient has no other concerns and does not need to be seen please cancel appt with D

## 2021-10-11 NOTE — TELEPHONE ENCOUNTER
please see below,  last Hemo A1c done on 8/10 and was 8.3    Germain Parnell MD   8/10/2021  8:28 PM CDT   Labs are normal except for A1c which is uncontrolled, please have him follow-up with next available endocrine for further evaluation via central sche

## 2021-10-12 NOTE — TELEPHONE ENCOUNTER
Just checking on the lab orders. Pt states that he had a normal lipid panel, CMP and urine in Aug 2021. Does he need to repeat these or will the A1c suffice?

## 2021-10-12 NOTE — TELEPHONE ENCOUNTER
Called pt no answer, lvm  Ok to keep appt 11/3, labs have been ordered and to get done few days prior to appt so results will be review at appt.   Will get flu and 2nd dose shingles vaccine at visit as well

## 2021-10-12 NOTE — TELEPHONE ENCOUNTER
Noted.  That is fine, keep appointment with me. We will recheck his A1c and provide recommendations at that time. Labs before visit is preferred.

## 2021-10-12 NOTE — TELEPHONE ENCOUNTER
Patient returning call and has further questions in regards to 11/3 appointment before he wants cancel.

## 2021-10-12 NOTE — TELEPHONE ENCOUNTER
If he is coming to the office that he should come 15 minutes early and we will get a point-of-care A1c.   If he prefers a virtual visit then he only needs the A1c

## 2021-10-13 NOTE — TELEPHONE ENCOUNTER
Spoke with pt, advised on message below  Pt prefers to get labs done instead.  Lab appt already scheduled 10/28

## 2021-10-14 ENCOUNTER — PATIENT MESSAGE (OUTPATIENT)
Dept: PODIATRY CLINIC | Facility: CLINIC | Age: 61
End: 2021-10-14

## 2021-10-14 NOTE — TELEPHONE ENCOUNTER
Marcus Little,     Please see patient's message below. From your last office visit from 10/8/21 you stated the following:      RTC 1 month, will perform [facility] and review reflux US. Will consider referral to IR.         Would you like to refer this galina

## 2021-10-14 NOTE — TELEPHONE ENCOUNTER
From: Everett Molina  To: April Cast DPM  Sent: 10/14/2021 12:28 AM CDT  Subject: Follow Up Question    Hi Doctor,    Per our last visit I thought I recall you saying I should visit a doctor but cannot remember if you specified someone.  I see I a

## 2021-10-14 NOTE — TELEPHONE ENCOUNTER
Spoke to patient. Patient inquiring if he would be seeing a doctor to review US results. Informed patient per 10/8/21 OV Dr. Radha Ambrosio wanted him to follow up in 1 month for diabetic foot care and to review US results with him.  Patient verbalized understand

## 2021-10-28 ENCOUNTER — TELEPHONE (OUTPATIENT)
Dept: PHYSICAL THERAPY | Facility: HOSPITAL | Age: 61
End: 2021-10-28

## 2021-10-28 ENCOUNTER — HOSPITAL ENCOUNTER (OUTPATIENT)
Dept: ULTRASOUND IMAGING | Facility: HOSPITAL | Age: 61
Discharge: HOME | End: 2021-10-28
Attending: UROLOGY
Payer: COMMERCIAL

## 2021-10-28 ENCOUNTER — LAB ENCOUNTER (OUTPATIENT)
Dept: LAB | Age: 61
End: 2021-10-28
Attending: INTERNAL MEDICINE
Payer: COMMERCIAL

## 2021-10-28 DIAGNOSIS — N39.41 URGE INCONTINENCE: ICD-10-CM

## 2021-10-28 DIAGNOSIS — E11.9 TYPE 2 DIABETES MELLITUS WITHOUT COMPLICATION, WITHOUT LONG-TERM CURRENT USE OF INSULIN (HCC): ICD-10-CM

## 2021-10-28 PROCEDURE — 80061 LIPID PANEL: CPT

## 2021-10-28 PROCEDURE — 80053 COMPREHEN METABOLIC PANEL: CPT

## 2021-10-28 PROCEDURE — 3061F NEG MICROALBUMINURIA REV: CPT | Performed by: INTERNAL MEDICINE

## 2021-10-28 PROCEDURE — 36415 COLL VENOUS BLD VENIPUNCTURE: CPT

## 2021-10-28 PROCEDURE — 76857 US EXAM PELVIC LIMITED: CPT | Performed by: UROLOGY

## 2021-10-28 PROCEDURE — 82043 UR ALBUMIN QUANTITATIVE: CPT

## 2021-10-28 PROCEDURE — 83036 HEMOGLOBIN GLYCOSYLATED A1C: CPT

## 2021-10-28 PROCEDURE — 82570 ASSAY OF URINE CREATININE: CPT

## 2021-10-28 PROCEDURE — 3051F HG A1C>EQUAL 7.0%<8.0%: CPT | Performed by: INTERNAL MEDICINE

## 2021-10-29 ENCOUNTER — OFFICE VISIT (OUTPATIENT)
Dept: PHYSICAL THERAPY | Age: 61
End: 2021-10-29
Attending: PHYSICAL MEDICINE & REHABILITATION
Payer: COMMERCIAL

## 2021-10-29 DIAGNOSIS — M17.11 PRIMARY OSTEOARTHRITIS OF RIGHT KNEE: ICD-10-CM

## 2021-10-29 DIAGNOSIS — E11.69 HYPERLIPIDEMIA ASSOCIATED WITH TYPE 2 DIABETES MELLITUS (HCC): ICD-10-CM

## 2021-10-29 DIAGNOSIS — E11.42 DIABETIC POLYNEUROPATHY ASSOCIATED WITH TYPE 2 DIABETES MELLITUS (HCC): ICD-10-CM

## 2021-10-29 DIAGNOSIS — E78.5 HYPERLIPIDEMIA ASSOCIATED WITH TYPE 2 DIABETES MELLITUS (HCC): ICD-10-CM

## 2021-10-29 DIAGNOSIS — E11.59 HYPERTENSION ASSOCIATED WITH TYPE 2 DIABETES MELLITUS (HCC): ICD-10-CM

## 2021-10-29 DIAGNOSIS — I15.2 HYPERTENSION ASSOCIATED WITH TYPE 2 DIABETES MELLITUS (HCC): ICD-10-CM

## 2021-10-29 DIAGNOSIS — E03.9 HYPOTHYROIDISM, UNSPECIFIED TYPE: ICD-10-CM

## 2021-10-29 DIAGNOSIS — M22.2X9 PATELLOFEMORAL PAIN SYNDROME, UNSPECIFIED LATERALITY: ICD-10-CM

## 2021-10-29 PROCEDURE — 97161 PT EVAL LOW COMPLEX 20 MIN: CPT | Performed by: PHYSICAL THERAPIST

## 2021-10-29 PROCEDURE — 97110 THERAPEUTIC EXERCISES: CPT | Performed by: PHYSICAL THERAPIST

## 2021-10-29 NOTE — PROGRESS NOTES
P.T. EVALUATION:   Referring Physician: Dr. Isis Alford  Diagnosis: Patellofemoral pain syndrome, unspecified laterality (M22.2X9)  Primary osteoarthritis of right knee (M17.11)  Hyperlipidemia associated with type 2 diabetes mellitus ([facility] (E11.69,E78.5)  Hypo R knee 0-120, L knee 0-110  Trunk ROM is WFL     Accessory motion: WFL patellar mobility    Flexibility: (+) tightness B hamstrings    Strength/MMT: Trunk strength grossly 3/5. B hip abduction, extension grossly 4/5.     Special tests: (-) SLR, (-) anterior letter via fax as soon as possible to [phone].  If you have any questions, please contact me at Dept: [phone]    Sincerely,  Electronically signed by therapist: Sheila Mccann, PT    Certification From: 48/12/4855  To:1/27/2022

## 2021-11-02 ENCOUNTER — OFFICE VISIT (OUTPATIENT)
Dept: PHYSICAL MEDICINE AND REHAB | Facility: CLINIC | Age: 61
End: 2021-11-02
Payer: COMMERCIAL

## 2021-11-02 VITALS
HEIGHT: 74 IN | WEIGHT: 297 LBS | DIASTOLIC BLOOD PRESSURE: 78 MMHG | SYSTOLIC BLOOD PRESSURE: 122 MMHG | BODY MASS INDEX: 38.12 KG/M2 | HEART RATE: 72 BPM | OXYGEN SATURATION: 94 %

## 2021-11-02 DIAGNOSIS — M17.11 PRIMARY OSTEOARTHRITIS OF RIGHT KNEE: ICD-10-CM

## 2021-11-02 DIAGNOSIS — E78.5 HYPERLIPIDEMIA ASSOCIATED WITH TYPE 2 DIABETES MELLITUS (HCC): ICD-10-CM

## 2021-11-02 DIAGNOSIS — E03.9 HYPOTHYROIDISM, UNSPECIFIED TYPE: ICD-10-CM

## 2021-11-02 DIAGNOSIS — I15.2 HYPERTENSION ASSOCIATED WITH TYPE 2 DIABETES MELLITUS (HCC): ICD-10-CM

## 2021-11-02 DIAGNOSIS — E11.42 DIABETIC POLYNEUROPATHY ASSOCIATED WITH TYPE 2 DIABETES MELLITUS (HCC): ICD-10-CM

## 2021-11-02 DIAGNOSIS — M22.2X9 PATELLOFEMORAL PAIN SYNDROME, UNSPECIFIED LATERALITY: Primary | ICD-10-CM

## 2021-11-02 DIAGNOSIS — E11.69 HYPERLIPIDEMIA ASSOCIATED WITH TYPE 2 DIABETES MELLITUS (HCC): ICD-10-CM

## 2021-11-02 DIAGNOSIS — E11.59 HYPERTENSION ASSOCIATED WITH TYPE 2 DIABETES MELLITUS (HCC): ICD-10-CM

## 2021-11-02 DIAGNOSIS — E66.01 CLASS 2 SEVERE OBESITY DUE TO EXCESS CALORIES WITH SERIOUS COMORBIDITY AND BODY MASS INDEX (BMI) OF 38.0 TO 38.9 IN ADULT (HCC): ICD-10-CM

## 2021-11-02 PROBLEM — E66.812 CLASS 2 SEVERE OBESITY DUE TO EXCESS CALORIES WITH SERIOUS COMORBIDITY AND BODY MASS INDEX (BMI) OF 38.0 TO 38.9 IN ADULT (HCC): Status: ACTIVE | Noted: 2019-12-05

## 2021-11-02 PROCEDURE — 3008F BODY MASS INDEX DOCD: CPT | Performed by: PHYSICAL MEDICINE & REHABILITATION

## 2021-11-02 PROCEDURE — 99214 OFFICE O/P EST MOD 30 MIN: CPT | Performed by: PHYSICAL MEDICINE & REHABILITATION

## 2021-11-02 PROCEDURE — 3074F SYST BP LT 130 MM HG: CPT | Performed by: PHYSICAL MEDICINE & REHABILITATION

## 2021-11-02 PROCEDURE — 3078F DIAST BP <80 MM HG: CPT | Performed by: PHYSICAL MEDICINE & REHABILITATION

## 2021-11-02 NOTE — PROGRESS NOTES
[address]  Progress Note    CHIEF COMPLAINT:  Patient presents with:  Knee Pain: LOV: 9/14/21 Patient f/u on HA/CSI with 100% improvement presently. C/o R knee pain. Denies rx. Rates pain 0/10.       Hist each day 90 capsule 3   • gabapentin 300 MG Oral Cap Take 1 capsule (300 mg total) by mouth nightly. 30 capsule 2   • Multiple Vitamins-Minerals (CENTRUM SILVER ULTRA MENS OR) Take by mouth. • ASPIRIN 81 OR Take by mouth one time.      • Empagliflozin-l Lungs: Non-labored respirations  Abdomen: No abdominal guarding  Extremities: No lower extremity edema bilaterally   Skin: No lesions noted.    Cognition: alert & oriented x 3, attentive, able to follow 2 step commands, comprehention intact, spontaneous s FASTING 10/28/2021 Yes   Final   • Microalbumin, Urine 10/28/2021 1.31  mg/dL Final   • Creatinine Ur Random 10/28/2021 71.30  mg/dL Final   • Malb/Cre Calc 10/28/2021 18.4  <=30.0 ug/mg Final   [facility] Lab Encounter on 09/29/2021   Component Date Value Ref Ra Specimen:    Urine, clean catch                                                                        • General Categorization 09/29/2021 Benign, inflammatory, reactive or reparative changes     Final   • Final Diagnosis: 09/29/2021    Final 08/10/2021 0.9* 1.0 - 2.0 Final   • FASTING 08/10/2021 Yes   Final   • HgbA1C 08/10/2021 8.3* <5.7 % Final   • Estimated Average Glucose 08/10/2021 192* 68 - 126 mg/dL Final   • Cholesterol, Total 08/10/2021 146  <200 mg/dL Final   • HDL Cholesterol 08/10/ syndrome. He has done very well after the right knee hyaluronic acid and corticosteroid injection in September 2021. I am recommending he continue with physical therapy and follow-up with me in about 6 weeks.   If his symptoms return, then I would recomme

## 2021-11-02 NOTE — PATIENT INSTRUCTIONS
1) Continue with physical therapy  2) Continue Advil as needed for pain  3) Use ice around the knee if needed to decrease inflammation  4) Follow up with me in about 6 weeks. We can repeat the steroid component of the injection after 12/14/21.  We can repea

## 2021-11-03 ENCOUNTER — OFFICE VISIT (OUTPATIENT)
Dept: INTERNAL MEDICINE CLINIC | Facility: CLINIC | Age: 61
End: 2021-11-03
Payer: COMMERCIAL

## 2021-11-03 VITALS
SYSTOLIC BLOOD PRESSURE: 120 MMHG | BODY MASS INDEX: 39.27 KG/M2 | HEART RATE: 77 BPM | DIASTOLIC BLOOD PRESSURE: 65 MMHG | WEIGHT: 306 LBS | HEIGHT: 74 IN

## 2021-11-03 DIAGNOSIS — E78.5 HYPERLIPIDEMIA ASSOCIATED WITH TYPE 2 DIABETES MELLITUS (HCC): ICD-10-CM

## 2021-11-03 DIAGNOSIS — E11.59 HYPERTENSION ASSOCIATED WITH TYPE 2 DIABETES MELLITUS (HCC): ICD-10-CM

## 2021-11-03 DIAGNOSIS — E11.69 HYPERLIPIDEMIA ASSOCIATED WITH TYPE 2 DIABETES MELLITUS (HCC): ICD-10-CM

## 2021-11-03 DIAGNOSIS — I15.2 HYPERTENSION ASSOCIATED WITH TYPE 2 DIABETES MELLITUS (HCC): ICD-10-CM

## 2021-11-03 DIAGNOSIS — M17.10 PATELLOFEMORAL ARTHRITIS: ICD-10-CM

## 2021-11-03 DIAGNOSIS — Z13.6 SCREENING, ISCHEMIC HEART DISEASE: ICD-10-CM

## 2021-11-03 DIAGNOSIS — L02.91 ABSCESS: ICD-10-CM

## 2021-11-03 DIAGNOSIS — E11.9 TYPE 2 DIABETES MELLITUS WITHOUT COMPLICATION, WITHOUT LONG-TERM CURRENT USE OF INSULIN (HCC): Primary | ICD-10-CM

## 2021-11-03 DIAGNOSIS — Z23 NEED FOR INFLUENZA VACCINATION: ICD-10-CM

## 2021-11-03 DIAGNOSIS — Z23 NEED FOR SHINGLES VACCINE: ICD-10-CM

## 2021-11-03 DIAGNOSIS — Z12.11 COLON CANCER SCREENING: ICD-10-CM

## 2021-11-03 PROBLEM — M22.2X9 PATELLOFEMORAL PAIN SYNDROME, UNSPECIFIED LATERALITY: Status: RESOLVED | Noted: 2021-11-02 | Resolved: 2021-11-03

## 2021-11-03 PROBLEM — E11.42 DIABETIC POLYNEUROPATHY ASSOCIATED WITH TYPE 2 DIABETES MELLITUS (HCC): Status: RESOLVED | Noted: 2021-11-02 | Resolved: 2021-11-03

## 2021-11-03 PROCEDURE — 90472 IMMUNIZATION ADMIN EACH ADD: CPT | Performed by: INTERNAL MEDICINE

## 2021-11-03 PROCEDURE — 99214 OFFICE O/P EST MOD 30 MIN: CPT | Performed by: INTERNAL MEDICINE

## 2021-11-03 PROCEDURE — 90471 IMMUNIZATION ADMIN: CPT | Performed by: INTERNAL MEDICINE

## 2021-11-03 PROCEDURE — 90686 IIV4 VACC NO PRSV 0.5 ML IM: CPT | Performed by: INTERNAL MEDICINE

## 2021-11-03 PROCEDURE — 90750 HZV VACC RECOMBINANT IM: CPT | Performed by: INTERNAL MEDICINE

## 2021-11-03 PROCEDURE — 3008F BODY MASS INDEX DOCD: CPT | Performed by: INTERNAL MEDICINE

## 2021-11-03 PROCEDURE — 3078F DIAST BP <80 MM HG: CPT | Performed by: INTERNAL MEDICINE

## 2021-11-03 PROCEDURE — 3074F SYST BP LT 130 MM HG: CPT | Performed by: INTERNAL MEDICINE

## 2021-11-03 RX ORDER — SULFAMETHOXAZOLE AND TRIMETHOPRIM 800; 160 MG/1; MG/1
1 TABLET ORAL 2 TIMES DAILY
Qty: 10 TABLET | Refills: 0 | Status: SHIPPED | OUTPATIENT
Start: 2021-11-03 | End: 2021-11-08

## 2021-11-03 RX ORDER — GABAPENTIN 300 MG/1
300 CAPSULE ORAL
Qty: 270 CAPSULE | Refills: 3 | Status: SHIPPED | OUTPATIENT
Start: 2021-11-03 | End: 2021-12-14

## 2021-11-03 NOTE — PROGRESS NOTES
History of Present Illness   Patient ID: Claude Gustin is a 63year old male. Today's Date: 11/03/21  Chief Complaint: Test Results      HPI    Pleasant 72-year-old gentleman who presents for:   1.   He is type 2 diabetes currently on quadruple therap total cholesterol range is 130 to 320 mg/dL    Physical Exam  Vitals and nursing note reviewed. Constitutional:       General: He is not in acute distress. Appearance: Normal appearance. HENT:      Head: Normocephalic.       Right Ear: External ear mouth nightly. 90 tablet 3   • metoprolol tartrate 25 MG Oral Tab Take 1 tablet (25 mg total) by mouth 2 (two) times daily. 90 tablet 3   • valsartan 160 MG Oral Tab Take 1 tablet (160 mg total) by mouth daily.  90 tablet 3   • SYNTHROID 175 MCG Oral Tab Ta Application topically 3 (three) times daily for 5 days. Dispense: 1 g; Refill: 0  -     Sulfamethoxazole-Trimethoprim; Take 1 tablet by mouth 2 (two) times daily for 5 days. TAKE WITH FOOD. Dispense: 10 tablet; Refill: 0  Plan  As above    9.  Screening, every attempt is made to correct errors during dictation discrepancies may still exist.    ----------------------------------------- PATIENT INSTRUCTIONS-----------------------------------------     There are no Patient Instructions on file for this visit.

## 2021-11-05 ENCOUNTER — LAB ENCOUNTER (OUTPATIENT)
Dept: LAB | Age: 61
End: 2021-11-05
Attending: INTERNAL MEDICINE
Payer: COMMERCIAL

## 2021-11-05 ENCOUNTER — OFFICE VISIT (OUTPATIENT)
Dept: PHYSICAL THERAPY | Age: 61
End: 2021-11-05
Attending: PHYSICAL MEDICINE & REHABILITATION
Payer: COMMERCIAL

## 2021-11-05 DIAGNOSIS — M22.2X9 PATELLOFEMORAL PAIN SYNDROME, UNSPECIFIED LATERALITY: ICD-10-CM

## 2021-11-05 DIAGNOSIS — M17.11 PRIMARY OSTEOARTHRITIS OF RIGHT KNEE: ICD-10-CM

## 2021-11-05 DIAGNOSIS — E78.5 HYPERLIPIDEMIA ASSOCIATED WITH TYPE 2 DIABETES MELLITUS (HCC): ICD-10-CM

## 2021-11-05 DIAGNOSIS — E11.69 HYPERLIPIDEMIA ASSOCIATED WITH TYPE 2 DIABETES MELLITUS (HCC): ICD-10-CM

## 2021-11-05 DIAGNOSIS — E03.9 HYPOTHYROIDISM, UNSPECIFIED TYPE: ICD-10-CM

## 2021-11-05 DIAGNOSIS — I15.2 HYPERTENSION ASSOCIATED WITH TYPE 2 DIABETES MELLITUS (HCC): ICD-10-CM

## 2021-11-05 DIAGNOSIS — R79.89 LOW TESTOSTERONE: ICD-10-CM

## 2021-11-05 DIAGNOSIS — E11.59 HYPERTENSION ASSOCIATED WITH TYPE 2 DIABETES MELLITUS (HCC): ICD-10-CM

## 2021-11-05 DIAGNOSIS — E11.42 DIABETIC POLYNEUROPATHY ASSOCIATED WITH TYPE 2 DIABETES MELLITUS (HCC): ICD-10-CM

## 2021-11-05 PROCEDURE — 97110 THERAPEUTIC EXERCISES: CPT | Performed by: PHYSICAL THERAPIST

## 2021-11-05 PROCEDURE — 84402 ASSAY OF FREE TESTOSTERONE: CPT

## 2021-11-05 PROCEDURE — 84403 ASSAY OF TOTAL TESTOSTERONE: CPT

## 2021-11-05 PROCEDURE — 36415 COLL VENOUS BLD VENIPUNCTURE: CPT

## 2021-11-05 PROCEDURE — 84270 ASSAY OF SEX HORMONE GLOBUL: CPT

## 2021-11-05 PROCEDURE — 83002 ASSAY OF GONADOTROPIN (LH): CPT

## 2021-11-05 NOTE — PROGRESS NOTES
Diagnosis: Patellofemoral pain syndrome, unspecified laterality (M22.2X9)  Primary osteoarthritis of right knee (M17.11)  Hyperlipidemia associated with type 2 [facility] ([facility] (E11.69,E78.5)  Hypothyroidism, unspecified type (E03.9)  Hypertension

## 2021-11-08 ENCOUNTER — OFFICE VISIT (OUTPATIENT)
Dept: PODIATRY CLINIC | Facility: CLINIC | Age: 61
End: 2021-11-08
Payer: COMMERCIAL

## 2021-11-08 ENCOUNTER — LAB ENCOUNTER (OUTPATIENT)
Dept: LAB | Age: 61
End: 2021-11-08
Attending: INTERNAL MEDICINE
Payer: COMMERCIAL

## 2021-11-08 DIAGNOSIS — I83.11 VARICOSE VEINS OF BOTH LOWER EXTREMITIES WITH INFLAMMATION: ICD-10-CM

## 2021-11-08 DIAGNOSIS — I87.2 VENOUS INSUFFICIENCY OF BOTH LOWER EXTREMITIES: ICD-10-CM

## 2021-11-08 DIAGNOSIS — I83.12 VARICOSE VEINS OF BOTH LOWER EXTREMITIES WITH INFLAMMATION: ICD-10-CM

## 2021-11-08 DIAGNOSIS — E08.42 DIABETES MELLITUS DUE TO UNDERLYING CONDITION WITH DIABETIC POLYNEUROPATHY, UNSPECIFIED WHETHER LONG TERM INSULIN USE (HCC): Primary | ICD-10-CM

## 2021-11-08 DIAGNOSIS — Z12.11 COLON CANCER SCREENING: ICD-10-CM

## 2021-11-08 DIAGNOSIS — M20.42 HAMMERTOE, BILATERAL: ICD-10-CM

## 2021-11-08 DIAGNOSIS — M20.41 HAMMERTOE, BILATERAL: ICD-10-CM

## 2021-11-08 DIAGNOSIS — B35.1 ONYCHOMYCOSIS: ICD-10-CM

## 2021-11-08 PROCEDURE — 82274 ASSAY TEST FOR BLOOD FECAL: CPT

## 2021-11-08 PROCEDURE — 11721 DEBRIDE NAIL 6 OR MORE: CPT | Performed by: PODIATRIST

## 2021-11-08 NOTE — PROGRESS NOTES
[address] [facility] Podiatry  Progress Note    Deacon Mendosa is a 63year old male.  Patient presents with:  Diabetic Foot Care        HPI:     This is a pleasant poorly controlled Diabetic male with PMH of varicose veins that presents to clinic today for [employer] Medical History:   Diagnosis Date   • Diabetes [facility])    • Diverticulitis 10/2019   • Obesity    • Thyroid disease       Past Surgical History:   Procedure Laterality Date   • OTHER SURGICAL HISTORY  2017    Small bowel Res.    • OTHER SURGICAL HISTORY  11/1 Lab Results   Component Value Date    GLU 98 10/28/2021    BUN 21 (H) 10/28/2021    CREATSERUM 0.76 10/28/2021    BUNCREA 27.6 (H) 10/28/2021    ANIONGAP 7 10/28/2021    GFRAA 115 10/28/2021    GFRNAA 100 10/28/2021    CA 8.9 10/28/2021     10/28 discuss continuation of this medicine with Dr. Ariana Gibson. Prescribed venous reflux US to evaluate the varicose veins since he did have a wound on the left leg which has now healed. He has appt on 11/15/21.     Pt refuses to wear compression stockings, but w

## 2021-11-10 ENCOUNTER — OFFICE VISIT (OUTPATIENT)
Dept: PHYSICAL THERAPY | Age: 61
End: 2021-11-10
Attending: PHYSICAL MEDICINE & REHABILITATION
Payer: COMMERCIAL

## 2021-11-10 DIAGNOSIS — E78.5 HYPERLIPIDEMIA ASSOCIATED WITH TYPE 2 DIABETES MELLITUS (HCC): ICD-10-CM

## 2021-11-10 DIAGNOSIS — I15.2 HYPERTENSION ASSOCIATED WITH TYPE 2 DIABETES MELLITUS (HCC): ICD-10-CM

## 2021-11-10 DIAGNOSIS — M17.11 PRIMARY OSTEOARTHRITIS OF RIGHT KNEE: ICD-10-CM

## 2021-11-10 DIAGNOSIS — M22.2X9 PATELLOFEMORAL PAIN SYNDROME, UNSPECIFIED LATERALITY: ICD-10-CM

## 2021-11-10 DIAGNOSIS — E11.42 DIABETIC POLYNEUROPATHY ASSOCIATED WITH TYPE 2 DIABETES MELLITUS (HCC): ICD-10-CM

## 2021-11-10 DIAGNOSIS — E11.69 HYPERLIPIDEMIA ASSOCIATED WITH TYPE 2 DIABETES MELLITUS (HCC): ICD-10-CM

## 2021-11-10 DIAGNOSIS — E11.59 HYPERTENSION ASSOCIATED WITH TYPE 2 DIABETES MELLITUS (HCC): ICD-10-CM

## 2021-11-10 DIAGNOSIS — E03.9 HYPOTHYROIDISM, UNSPECIFIED TYPE: ICD-10-CM

## 2021-11-10 PROCEDURE — 97110 THERAPEUTIC EXERCISES: CPT | Performed by: PHYSICAL THERAPIST

## 2021-11-10 NOTE — PROGRESS NOTES
Diagnosis: Patellofemoral pain syndrome, unspecified laterality (M22.2X9)  Primary osteoarthritis of right knee (M17.11)  Hyperlipidemia associated with type 2 diabetes mellitus ([facility] (E11.69,E78.5)  Hypothyroidism, unspecified type (E03.9)  Hypertension mechanics, and joint mechanics during household and work activities. Plan: Continue PT. Progress therapeutic exercises to improve mobility.       Charges: EX3       Total Timed Treatment: 40 min  Total Treatment Time: 40 min

## 2021-11-12 ENCOUNTER — OFFICE VISIT (OUTPATIENT)
Dept: PHYSICAL THERAPY | Age: 61
End: 2021-11-12
Attending: PHYSICAL MEDICINE & REHABILITATION
Payer: COMMERCIAL

## 2021-11-12 DIAGNOSIS — E11.69 HYPERLIPIDEMIA ASSOCIATED WITH TYPE 2 DIABETES MELLITUS (HCC): ICD-10-CM

## 2021-11-12 DIAGNOSIS — E03.9 HYPOTHYROIDISM, UNSPECIFIED TYPE: ICD-10-CM

## 2021-11-12 DIAGNOSIS — I15.2 HYPERTENSION ASSOCIATED WITH TYPE 2 DIABETES MELLITUS (HCC): ICD-10-CM

## 2021-11-12 DIAGNOSIS — E11.42 DIABETIC POLYNEUROPATHY ASSOCIATED WITH TYPE 2 DIABETES MELLITUS (HCC): ICD-10-CM

## 2021-11-12 DIAGNOSIS — M22.2X9 PATELLOFEMORAL PAIN SYNDROME, UNSPECIFIED LATERALITY: ICD-10-CM

## 2021-11-12 DIAGNOSIS — E11.59 HYPERTENSION ASSOCIATED WITH TYPE 2 DIABETES MELLITUS (HCC): ICD-10-CM

## 2021-11-12 DIAGNOSIS — E78.5 HYPERLIPIDEMIA ASSOCIATED WITH TYPE 2 DIABETES MELLITUS (HCC): ICD-10-CM

## 2021-11-12 DIAGNOSIS — M17.11 PRIMARY OSTEOARTHRITIS OF RIGHT KNEE: ICD-10-CM

## 2021-11-12 PROCEDURE — 97110 THERAPEUTIC EXERCISES: CPT | Performed by: PHYSICAL THERAPIST

## 2021-11-12 NOTE — PROGRESS NOTES
Diagnosis: Patellofemoral pain syndrome, unspecified laterality (M22.2X9)  Primary osteoarthritis of right knee (M17.11)  Hyperlipidemia associated with type 2 diabetes mellitus ([facility] (E11.69,E78.5)  Hypothyroidism, unspecified type (E03.9)  Hypertension goals are in progress. Goals     • Therapy Goals      1. Patient will be independent with HEP, its progression, and management of residual symptoms. 2. Patient will report knee pain 0/10 during activity.   3. Increase LE strength to 5/5 throughout to be

## 2021-11-15 ENCOUNTER — HOSPITAL ENCOUNTER (OUTPATIENT)
Dept: ULTRASOUND IMAGING | Facility: HOSPITAL | Age: 61
Discharge: HOME | End: 2021-11-15
Attending: PODIATRIST
Payer: COMMERCIAL

## 2021-11-15 DIAGNOSIS — I87.2 VENOUS INSUFFICIENCY OF BOTH LOWER EXTREMITIES: ICD-10-CM

## 2021-11-15 DIAGNOSIS — I83.12 VARICOSE VEINS OF BOTH LOWER EXTREMITIES WITH INFLAMMATION: ICD-10-CM

## 2021-11-15 DIAGNOSIS — I83.11 VARICOSE VEINS OF BOTH LOWER EXTREMITIES WITH INFLAMMATION: ICD-10-CM

## 2021-11-15 PROCEDURE — 93970 EXTREMITY STUDY: CPT | Performed by: PODIATRIST

## 2021-11-17 ENCOUNTER — OFFICE VISIT (OUTPATIENT)
Dept: PHYSICAL THERAPY | Age: 61
End: 2021-11-17
Attending: PHYSICAL MEDICINE & REHABILITATION
Payer: COMMERCIAL

## 2021-11-17 DIAGNOSIS — E11.69 HYPERLIPIDEMIA ASSOCIATED WITH TYPE 2 DIABETES MELLITUS (HCC): ICD-10-CM

## 2021-11-17 DIAGNOSIS — M17.11 PRIMARY OSTEOARTHRITIS OF RIGHT KNEE: ICD-10-CM

## 2021-11-17 DIAGNOSIS — E11.59 HYPERTENSION ASSOCIATED WITH TYPE 2 DIABETES MELLITUS (HCC): ICD-10-CM

## 2021-11-17 DIAGNOSIS — E11.42 DIABETIC POLYNEUROPATHY ASSOCIATED WITH TYPE 2 DIABETES MELLITUS (HCC): ICD-10-CM

## 2021-11-17 DIAGNOSIS — E78.5 HYPERLIPIDEMIA ASSOCIATED WITH TYPE 2 DIABETES MELLITUS (HCC): ICD-10-CM

## 2021-11-17 DIAGNOSIS — I15.2 HYPERTENSION ASSOCIATED WITH TYPE 2 DIABETES MELLITUS (HCC): ICD-10-CM

## 2021-11-17 DIAGNOSIS — E03.9 HYPOTHYROIDISM, UNSPECIFIED TYPE: ICD-10-CM

## 2021-11-17 DIAGNOSIS — M22.2X9 PATELLOFEMORAL PAIN SYNDROME, UNSPECIFIED LATERALITY: ICD-10-CM

## 2021-11-17 PROCEDURE — 97110 THERAPEUTIC EXERCISES: CPT | Performed by: PHYSICAL THERAPIST

## 2021-11-17 NOTE — PROGRESS NOTES
Diagnosis: Patellofemoral pain syndrome, unspecified laterality (M22.2X9)  Primary osteoarthritis of right knee (M17.11)  Hyperlipidemia associated with type 2 diabetes mellitus ([facility] (E11.69,E78.5)  Hypothyroidism, unspecified type (E03.9)  Hypertension end range R knee flexion. Patient and PT goals are in progress. Goals     • Therapy Goals      1. Patient will be independent with HEP, its progression, and management of residual symptoms. 2. Patient will report knee pain 0/10 during activity.   [address] [facility] [address] [place]

## 2021-11-19 ENCOUNTER — APPOINTMENT (OUTPATIENT)
Dept: PHYSICAL THERAPY | Age: 61
End: 2021-11-19
Attending: PHYSICAL MEDICINE & REHABILITATION
Payer: COMMERCIAL

## 2021-11-24 ENCOUNTER — OFFICE VISIT (OUTPATIENT)
Dept: PHYSICAL THERAPY | Age: 61
End: 2021-11-24
Attending: PHYSICAL MEDICINE & REHABILITATION
Payer: COMMERCIAL

## 2021-11-24 ENCOUNTER — TELEPHONE (OUTPATIENT)
Dept: INTERNAL MEDICINE CLINIC | Facility: CLINIC | Age: 61
End: 2021-11-24

## 2021-11-24 DIAGNOSIS — E11.59 HYPERTENSION ASSOCIATED WITH TYPE 2 DIABETES MELLITUS (HCC): ICD-10-CM

## 2021-11-24 DIAGNOSIS — E78.5 HYPERLIPIDEMIA ASSOCIATED WITH TYPE 2 DIABETES MELLITUS (HCC): ICD-10-CM

## 2021-11-24 DIAGNOSIS — M22.2X9 PATELLOFEMORAL PAIN SYNDROME, UNSPECIFIED LATERALITY: ICD-10-CM

## 2021-11-24 DIAGNOSIS — M17.11 PRIMARY OSTEOARTHRITIS OF RIGHT KNEE: ICD-10-CM

## 2021-11-24 DIAGNOSIS — E11.69 HYPERLIPIDEMIA ASSOCIATED WITH TYPE 2 DIABETES MELLITUS (HCC): ICD-10-CM

## 2021-11-24 DIAGNOSIS — I15.2 HYPERTENSION ASSOCIATED WITH TYPE 2 DIABETES MELLITUS (HCC): ICD-10-CM

## 2021-11-24 DIAGNOSIS — E03.9 HYPOTHYROIDISM, UNSPECIFIED TYPE: ICD-10-CM

## 2021-11-24 DIAGNOSIS — E11.42 DIABETIC POLYNEUROPATHY ASSOCIATED WITH TYPE 2 DIABETES MELLITUS (HCC): ICD-10-CM

## 2021-11-24 PROCEDURE — 97110 THERAPEUTIC EXERCISES: CPT | Performed by: PHYSICAL THERAPIST

## 2021-11-24 NOTE — PROGRESS NOTES
Progress Summary  Pt has attended 6 visits in Physical Therapy.        Diagnosis: Patellofemoral pain syndrome, unspecified laterality (M22.2X9)  Primary osteoarthritis of right knee (M17.11)  Hyperlipidemia associated with type 2 diabetes mellitus ([facility] (E prolonged standing and walking activities. R knee and L knee ROM is WFL with pain at end range of R knee flexion. Patient recently strained his back while trying to lift at work. Presents with decreased trunk mobility.  Patient is independent with his home

## 2021-11-24 NOTE — TELEPHONE ENCOUNTER
Continue daily iron supplementation  Is having BMs   Pt dropped off parking placard renewal form.  Please call pt when ready for

## 2021-11-26 NOTE — TELEPHONE ENCOUNTER
Spoke with pt name and  veriied  Parking placard ready for  at [facility] 2nd floor recpetion  Pt needs to sign his signature on form pt verbalized understanding

## 2021-11-29 ENCOUNTER — OFFICE VISIT (OUTPATIENT)
Dept: SURGERY | Facility: CLINIC | Age: 61
End: 2021-11-29
Payer: COMMERCIAL

## 2021-11-29 DIAGNOSIS — R35.1 NOCTURIA: ICD-10-CM

## 2021-11-29 DIAGNOSIS — N52.01 ERECTILE DYSFUNCTION DUE TO ARTERIAL INSUFFICIENCY: ICD-10-CM

## 2021-11-29 DIAGNOSIS — N39.41 URGE INCONTINENCE: Primary | ICD-10-CM

## 2021-11-29 DIAGNOSIS — Z87.891 FORMER SMOKER: ICD-10-CM

## 2021-11-29 DIAGNOSIS — N40.1 BENIGN PROSTATIC HYPERPLASIA WITH INCOMPLETE BLADDER EMPTYING: ICD-10-CM

## 2021-11-29 DIAGNOSIS — R39.14 BENIGN PROSTATIC HYPERPLASIA WITH INCOMPLETE BLADDER EMPTYING: ICD-10-CM

## 2021-11-29 DIAGNOSIS — E13.40 OTHER SPECIFIED DIABETES MELLITUS WITH DIABETIC NEUROPATHY, UNSPECIFIED WHETHER LONG TERM INSULIN USE (HCC): ICD-10-CM

## 2021-11-29 DIAGNOSIS — Z12.5 PROSTATE CANCER SCREENING: ICD-10-CM

## 2021-11-29 DIAGNOSIS — F15.10 CAFFEINE ABUSE (HCC): ICD-10-CM

## 2021-11-29 DIAGNOSIS — Z80.42 FAMILY HISTORY OF PROSTATE CANCER: ICD-10-CM

## 2021-11-29 PROCEDURE — 99215 OFFICE O/P EST HI 40 MIN: CPT | Performed by: UROLOGY

## 2021-11-29 RX ORDER — FINASTERIDE 5 MG/1
5 TABLET, FILM COATED ORAL DAILY
Qty: 90 TABLET | Refills: 3 | Status: SHIPPED | OUTPATIENT
Start: 2021-11-29 | End: 2022-11-29

## 2021-11-29 NOTE — PATIENT INSTRUCTIONS
Ector Larsen M.D.    . 1.    At least some of your urinating problems are due to the  [facility] (  [address] [facility] ) that  you are taking for your diabetes because the medication works by causing dumping of urine an

## 2021-11-29 NOTE — PROGRESS NOTES
HPI:    Patient ID: Lise Perkins is a 63year old male. HPI        Urge Incontinence   Problem started 10 years ago, gradually getting worse. Denies nocturnal incontinence, dysuria, gross hematuria, or stool incontinence.  The patient changes his u mother had uterus cancer; On STEVEN, prostate 4+ enlarged, 50-75g, no palpable nodules or indurations;  Patient complains of urge incontinence, 2x pads daily;  drinks >3.3 L of fluid a day including >80 oz of caffeine; diabetes neuropathy (08/10/87864 HgbA1C = • metoprolol tartrate 25 MG Oral Tab Take 1 tablet (25 mg total) by mouth 2 (two) times daily. 90 tablet 3   • valsartan 160 MG Oral Tab Take 1 tablet (160 mg total) by mouth daily.  90 tablet 3   • SYNTHROID 175 MCG Oral Tab Take 1 tablet (175 mcg total) month, how many times per night did you most typically get up to urinate from the time you went to bed at night until the time you got up in the morning?: Less than half the time  Total Symptom Score: 11         PHYSICAL EXAM:    Physical Exam  Nursing not diagnosis)  Benign prostatic hyperplasia with incomplete bladder emptying  Erectile dysfunction due to arterial insufficiency  Other specified diabetes mellitus with diabetic neuropathy, unspecified whether long term insulin use (hcc)  Nocturia  Bmi 38.0-3 improvment; denies side effect.  We discussed stopping tamsulosin 0.4 mg vs continuing tamsulosin 0.4 mg. I fully explained to patient the benefits, risks, and alternatives to this treatment option and I answered patient's questions; patient decides to stop mykel.     (Z12.5) Prostate cancer screening  09/29/2021 PSA = 1.60. Patient will continue yearly PSA screening with his PCP, Dr. Tessa Gomez.              I explained to patient the risks, side effects, and alternatives, and I answered questions concerning any urge incontinence (leaking urine because you do not make it to the washroom in time) worse. I recommend that you decrease the consumption of Powerade by at least 20 ounces--drink 20 ounces less    6. Visit  in 6 months.   Please submit urine specimen

## 2021-11-30 ENCOUNTER — OFFICE VISIT (OUTPATIENT)
Dept: INTERNAL MEDICINE CLINIC | Facility: CLINIC | Age: 61
End: 2021-11-30
Payer: COMMERCIAL

## 2021-11-30 ENCOUNTER — TELEPHONE (OUTPATIENT)
Dept: INTERNAL MEDICINE CLINIC | Facility: CLINIC | Age: 61
End: 2021-11-30

## 2021-11-30 VITALS
SYSTOLIC BLOOD PRESSURE: 135 MMHG | HEIGHT: 74 IN | DIASTOLIC BLOOD PRESSURE: 73 MMHG | HEART RATE: 73 BPM | BODY MASS INDEX: 39.78 KG/M2 | WEIGHT: 310 LBS

## 2021-11-30 DIAGNOSIS — R35.0 FREQUENT URINATION: ICD-10-CM

## 2021-11-30 DIAGNOSIS — L03.115 CELLULITIS OF RIGHT LOWER EXTREMITY: ICD-10-CM

## 2021-11-30 DIAGNOSIS — E11.9 TYPE 2 DIABETES MELLITUS WITHOUT COMPLICATION, WITHOUT LONG-TERM CURRENT USE OF INSULIN (HCC): Primary | ICD-10-CM

## 2021-11-30 DIAGNOSIS — L98.491 SKIN ULCERATION, LIMITED TO BREAKDOWN OF SKIN (HCC): ICD-10-CM

## 2021-11-30 PROCEDURE — 99214 OFFICE O/P EST MOD 30 MIN: CPT | Performed by: INTERNAL MEDICINE

## 2021-11-30 PROCEDURE — 3078F DIAST BP <80 MM HG: CPT | Performed by: INTERNAL MEDICINE

## 2021-11-30 PROCEDURE — 3008F BODY MASS INDEX DOCD: CPT | Performed by: INTERNAL MEDICINE

## 2021-11-30 PROCEDURE — 3075F SYST BP GE 130 - 139MM HG: CPT | Performed by: INTERNAL MEDICINE

## 2021-11-30 RX ORDER — DOXYCYCLINE HYCLATE 100 MG/1
100 CAPSULE ORAL 2 TIMES DAILY WITH MEALS
Qty: 20 CAPSULE | Refills: 0 | Status: SHIPPED | OUTPATIENT
Start: 2021-11-30 | End: 2021-12-10

## 2021-11-30 RX ORDER — METFORMIN HYDROCHLORIDE 500 MG/1
1000 TABLET, EXTENDED RELEASE ORAL 2 TIMES DAILY WITH MEALS
Qty: 360 TABLET | Refills: 3 | Status: SHIPPED | OUTPATIENT
Start: 2021-11-30 | End: 2022-01-25

## 2021-11-30 RX ORDER — GLYBURIDE 2.5 MG/1
2.5 TABLET ORAL
Qty: 90 TABLET | Refills: 3 | Status: SHIPPED | OUTPATIENT
Start: 2021-11-30 | End: 2022-01-17

## 2021-11-30 RX ORDER — GLYBURIDE 2.5 MG/1
2.5 TABLET ORAL 2 TIMES DAILY WITH MEALS
Qty: 90 TABLET | Refills: 3 | Status: SHIPPED | OUTPATIENT
Start: 2021-11-30 | End: 2021-11-30

## 2021-11-30 NOTE — PATIENT INSTRUCTIONS
Due to the frequent urination and the fact that we cannot find any urologic reason for this I do agree with trying an alternative to empagliflozin which is one of the medications you are taking in [place].   At this time I will redo all of your diabetes me in the office with a point of care test, arrive 15 minutes early. PLEASE STOP GYLXAMBI AND GLYBURIDE-METFORMIN WHICH YOU CURRENTLY HAVE, I HAVE GIVEN YOU NEW MEDICATIONS.

## 2021-11-30 NOTE — TELEPHONE ENCOUNTER
Patient calling, confirmed name and . Patient inquiring about instructions for Metformin and Glyburide. Instructions reviewed with patient. Patient verbalized understanding and agrees.

## 2021-11-30 NOTE — PROGRESS NOTES
History of Present Illness   Patient ID: Dione Patel is a 63year old male. Today's Date: 11/30/21  Chief Complaint: Urinary Symptoms (pt states still having leakage) and Derm Problem (R lower leg, scabing)      HPI   1.   He is type 2 diabetes curr not in acute distress. Appearance: Normal appearance. HENT:      Head: Normocephalic. Right Ear: External ear normal.      Left Ear: External ear normal.   Eyes:      Extraocular Movements: Extraocular movements intact.       Conjunctiva/sclera: capsule (0.4 mg total) by mouth daily. Take 1/2 hour following the same meal each day (Patient not taking: Reported on 11/30/2021) 90 capsule 3   • Multiple Vitamins-Minerals (CENTRUM SILVER ULTRA MENS OR) Take by mouth.      • ASPIRIN 81 OR Take by mouth o Metformin from 500 mg twice daily to 1000 mg twice daily, will separate his glyburide component, will switch linagliptin to sitagliptin as linagliptin is not covered, I will also send Ozempic for insurance coverage as he would benefit from weight loss mike Former Smoker        Quit date:         Years since quittin.9      Smokeless tobacco: Never Used    Vaping Use      Vaping Use: Never used    Alcohol use: Never    Drug use: Never       Liz Pedro MD  Internal Medicine       Call office with an morning as well as anytime you feel unwell or if you think your blood sugars are too high. Your blood sugar goal fasting or random is anywhere from 100-1 50 for now and we will get a tighter control once we see how these medications work.     The medicat

## 2021-12-01 ENCOUNTER — APPOINTMENT (OUTPATIENT)
Dept: PHYSICAL THERAPY | Age: 61
End: 2021-12-01
Attending: PHYSICAL MEDICINE & REHABILITATION
Payer: COMMERCIAL

## 2021-12-02 ENCOUNTER — TELEPHONE (OUTPATIENT)
Dept: INTERNAL MEDICINE CLINIC | Facility: CLINIC | Age: 61
End: 2021-12-02

## 2021-12-02 NOTE — TELEPHONE ENCOUNTER
Prior authorization has been denied for Ozempic. Denial was faxed to onsite clinic by insurance plan.     11/30/2021  9:04 AM  Close reason: Other   Note from payer: Drug is not covered by plan   Payer:  Karen Perry 19    [phone]

## 2021-12-03 NOTE — TELEPHONE ENCOUNTER
Noted, patient should call his insurance and ask for a list of approved diabetes medications, specifically \"GLP-1 agonists\", ie alternative to ozempic, should be provided.

## 2021-12-03 NOTE — TELEPHONE ENCOUNTER
Spoke with pt name and  verified,  Relayed message below. Pt requested mychart msg with information and will call back later with recommendations.     Will look out for incoming fax

## 2021-12-06 ENCOUNTER — TELEPHONE (OUTPATIENT)
Dept: FAMILY MEDICINE CLINIC | Facility: CLINIC | Age: 61
End: 2021-12-06

## 2021-12-06 DIAGNOSIS — E11.9 TYPE 2 DIABETES MELLITUS WITHOUT COMPLICATION, WITHOUT LONG-TERM CURRENT USE OF INSULIN (HCC): ICD-10-CM

## 2021-12-06 NOTE — TELEPHONE ENCOUNTER
Well, the pharmacist would not actually supposed to dispense the medication per the instructions- instructions states this is an insurance inquiry and not to dispense-- there is no way to know the cost without sending them a prescription.       Samir Gonsalves is

## 2021-12-06 NOTE — TELEPHONE ENCOUNTER
Verified name and . Patient states that he was able to  Ozempic, however, that pharmacist advised him that he cannot take Januvia while taking Ozempic. Patient is asking for clarification.     This RN called pharmacy to clarify- spoke with Christy beltran

## 2021-12-07 ENCOUNTER — OFFICE VISIT (OUTPATIENT)
Dept: WOUND CARE | Facility: HOSPITAL | Age: 61
End: 2021-12-07
Attending: NURSE PRACTITIONER
Payer: COMMERCIAL

## 2021-12-07 VITALS
WEIGHT: 300 LBS | TEMPERATURE: 98 F | OXYGEN SATURATION: 94 % | HEART RATE: 92 BPM | BODY MASS INDEX: 38.5 KG/M2 | DIASTOLIC BLOOD PRESSURE: 71 MMHG | SYSTOLIC BLOOD PRESSURE: 124 MMHG | HEIGHT: 74 IN | RESPIRATION RATE: 16 BRPM

## 2021-12-07 DIAGNOSIS — E11.9 TYPE 2 DIABETES MELLITUS WITHOUT COMPLICATION, WITHOUT LONG-TERM CURRENT USE OF INSULIN (HCC): ICD-10-CM

## 2021-12-07 DIAGNOSIS — L97.321 VARICOSE VEINS OF LEFT LOWER EXTREMITY WITH INFLAMMATION, WITH ULCER OF ANKLE LIMITED TO BREAKDOWN OF SKIN (HCC): Primary | ICD-10-CM

## 2021-12-07 DIAGNOSIS — I87.2 VENOUS STASIS DERMATITIS OF BOTH LOWER EXTREMITIES: ICD-10-CM

## 2021-12-07 DIAGNOSIS — L98.491 SKIN ULCERATION, LIMITED TO BREAKDOWN OF SKIN (HCC): ICD-10-CM

## 2021-12-07 DIAGNOSIS — L97.921 NON-PRESSURE CHRONIC ULCER OF LEFT LOWER LEG, LIMITED TO BREAKDOWN OF SKIN (HCC): ICD-10-CM

## 2021-12-07 DIAGNOSIS — L03.115 CELLULITIS OF RIGHT LOWER EXTREMITY: ICD-10-CM

## 2021-12-07 DIAGNOSIS — L97.911 NON-PRESSURE CHRONIC ULCER OF RIGHT LOWER LEG, LIMITED TO BREAKDOWN OF SKIN (HCC): ICD-10-CM

## 2021-12-07 DIAGNOSIS — I83.223 VARICOSE VEINS OF LEFT LOWER EXTREMITY WITH INFLAMMATION, WITH ULCER OF ANKLE LIMITED TO BREAKDOWN OF SKIN (HCC): Primary | ICD-10-CM

## 2021-12-07 PROCEDURE — 99214 OFFICE O/P EST MOD 30 MIN: CPT | Performed by: NURSE PRACTITIONER

## 2021-12-07 NOTE — TELEPHONE ENCOUNTER
Incoming call from patient. Went over details below. NO Horaceuvia. Patient agreed with Option 1.  \"We can use Ozempic plus Metformin plus glyburide and he needs to monitor blood sugars and stop glyburide if blood sugars are less than 100; goal of b

## 2021-12-07 NOTE — TELEPHONE ENCOUNTER
Patient called back. Went over [place] instructions again. He verbalized understanding. He will monitor blood sugars. Remember to hold glyburide if glucose is below 100. Patient to call office if any further questions or concerns.  Patient verbalize

## 2021-12-07 NOTE — PROGRESS NOTES
Subjective   Fan Solis is a 63year old male.     Patient presents with:  Wound Care: right leg    HPI  12/7/2021: Patient is 63year old male with medical history significant for type two diabetes with neuropathy, hypertension, arthritis of right kn back: Normal range of motion and neck supple. Right lower leg: Edema present. Left lower leg: Edema present. Right foot: Deformity present. Left foot: Deformity present.    Feet:      Right foot:      Skin integrity: Callus and dry skin 9705   Wound Granulation Tissue Pink 12/07/21 0803   Wound Bed Granulation (%) 20 % 12/07/21 0803   Wound Bed Epithelium (%) 80 % 12/07/21 0803   Wound Bed Slough (%) 0 % 12/07/21 0803   Wound Bed Eschar (%) 0 % 12/07/21 0803   Wound Odor None 12/07/21 080 ulceration. Bilateral feet, with diabetic neuropathy:  -callus formation on right medial hallux, 3rd left toes, and bilateral heels  -toe deformities related to diabetes    Reviewed recent labs:10/28/2021: A1c 7.6, BUN 21, Creatinine 0.76, albumin 3. 1(L these wounds may including advanced dressing, compression therapy, negative pressure and series of debridement. Discussed lifelong compression therapy as the main management of venous insufficiency and prevention of the ulcerations.   Discussed leg elevati

## 2021-12-14 ENCOUNTER — OFFICE VISIT (OUTPATIENT)
Dept: INTERNAL MEDICINE CLINIC | Facility: CLINIC | Age: 61
End: 2021-12-14
Payer: COMMERCIAL

## 2021-12-14 ENCOUNTER — OFFICE VISIT (OUTPATIENT)
Dept: PHYSICAL MEDICINE AND REHAB | Facility: CLINIC | Age: 61
End: 2021-12-14
Payer: COMMERCIAL

## 2021-12-14 ENCOUNTER — TELEPHONE (OUTPATIENT)
Dept: NEUROLOGY | Facility: CLINIC | Age: 61
End: 2021-12-14

## 2021-12-14 VITALS — DIASTOLIC BLOOD PRESSURE: 64 MMHG | HEART RATE: 65 BPM | SYSTOLIC BLOOD PRESSURE: 107 MMHG

## 2021-12-14 VITALS
BODY MASS INDEX: 38.5 KG/M2 | OXYGEN SATURATION: 95 % | DIASTOLIC BLOOD PRESSURE: 70 MMHG | SYSTOLIC BLOOD PRESSURE: 130 MMHG | WEIGHT: 300 LBS | HEIGHT: 74 IN | HEART RATE: 87 BPM

## 2021-12-14 DIAGNOSIS — E78.5 HYPERLIPIDEMIA ASSOCIATED WITH TYPE 2 DIABETES MELLITUS (HCC): Primary | ICD-10-CM

## 2021-12-14 DIAGNOSIS — E11.59 HYPERTENSION ASSOCIATED WITH TYPE 2 DIABETES MELLITUS (HCC): ICD-10-CM

## 2021-12-14 DIAGNOSIS — I15.2 HYPERTENSION ASSOCIATED WITH TYPE 2 DIABETES MELLITUS (HCC): ICD-10-CM

## 2021-12-14 DIAGNOSIS — M17.11 PRIMARY OSTEOARTHRITIS OF RIGHT KNEE: ICD-10-CM

## 2021-12-14 DIAGNOSIS — E11.69 HYPERLIPIDEMIA ASSOCIATED WITH TYPE 2 DIABETES MELLITUS (HCC): Primary | ICD-10-CM

## 2021-12-14 DIAGNOSIS — E03.9 HYPOTHYROIDISM, UNSPECIFIED TYPE: ICD-10-CM

## 2021-12-14 DIAGNOSIS — E11.9 TYPE 2 DIABETES MELLITUS WITHOUT COMPLICATION, WITHOUT LONG-TERM CURRENT USE OF INSULIN (HCC): Primary | ICD-10-CM

## 2021-12-14 DIAGNOSIS — G62.9 NEUROPATHY: ICD-10-CM

## 2021-12-14 DIAGNOSIS — E11.42 DIABETIC POLYNEUROPATHY ASSOCIATED WITH TYPE 2 DIABETES MELLITUS (HCC): ICD-10-CM

## 2021-12-14 DIAGNOSIS — M22.2X9 PATELLOFEMORAL PAIN SYNDROME, UNSPECIFIED LATERALITY: ICD-10-CM

## 2021-12-14 DIAGNOSIS — E66.01 CLASS 2 SEVERE OBESITY DUE TO EXCESS CALORIES WITH SERIOUS COMORBIDITY AND BODY MASS INDEX (BMI) OF 38.0 TO 38.9 IN ADULT (HCC): ICD-10-CM

## 2021-12-14 PROCEDURE — 3078F DIAST BP <80 MM HG: CPT | Performed by: INTERNAL MEDICINE

## 2021-12-14 PROCEDURE — 99214 OFFICE O/P EST MOD 30 MIN: CPT | Performed by: INTERNAL MEDICINE

## 2021-12-14 PROCEDURE — 20611 DRAIN/INJ JOINT/BURSA W/US: CPT | Performed by: PHYSICAL MEDICINE & REHABILITATION

## 2021-12-14 PROCEDURE — 3074F SYST BP LT 130 MM HG: CPT | Performed by: INTERNAL MEDICINE

## 2021-12-14 PROCEDURE — 83036 HEMOGLOBIN GLYCOSYLATED A1C: CPT | Performed by: INTERNAL MEDICINE

## 2021-12-14 PROCEDURE — 99214 OFFICE O/P EST MOD 30 MIN: CPT | Performed by: PHYSICAL MEDICINE & REHABILITATION

## 2021-12-14 PROCEDURE — 3051F HG A1C>EQUAL 7.0%<8.0%: CPT | Performed by: INTERNAL MEDICINE

## 2021-12-14 PROCEDURE — 3075F SYST BP GE 130 - 139MM HG: CPT | Performed by: PHYSICAL MEDICINE & REHABILITATION

## 2021-12-14 PROCEDURE — 3078F DIAST BP <80 MM HG: CPT | Performed by: PHYSICAL MEDICINE & REHABILITATION

## 2021-12-14 PROCEDURE — 3008F BODY MASS INDEX DOCD: CPT | Performed by: PHYSICAL MEDICINE & REHABILITATION

## 2021-12-14 RX ORDER — LIDOCAINE HYDROCHLORIDE 10 MG/ML
9 INJECTION, SOLUTION INFILTRATION; PERINEURAL ONCE
Status: COMPLETED | OUTPATIENT
Start: 2021-12-14 | End: 2021-12-14

## 2021-12-14 RX ORDER — PREGABALIN 100 MG/1
100 CAPSULE ORAL 3 TIMES DAILY
Qty: 270 CAPSULE | Refills: 3 | Status: SHIPPED | OUTPATIENT
Start: 2021-12-14 | End: 2022-12-09

## 2021-12-14 RX ORDER — TRIAMCINOLONE ACETONIDE 40 MG/ML
40 INJECTION, SUSPENSION INTRA-ARTICULAR; INTRAMUSCULAR ONCE
Status: COMPLETED | OUTPATIENT
Start: 2021-12-14 | End: 2021-12-14

## 2021-12-14 RX ADMIN — TRIAMCINOLONE ACETONIDE 40 MG: 40 INJECTION, SUSPENSION INTRA-ARTICULAR; INTRAMUSCULAR at 16:49:00

## 2021-12-14 RX ADMIN — LIDOCAINE HYDROCHLORIDE 9 ML: 10 INJECTION, SOLUTION INFILTRATION; PERINEURAL at 16:48:00

## 2021-12-14 NOTE — TELEPHONE ENCOUNTER
Right knee CSI under ultrasound guidance CPT CODE: 96144, -approved     Called [place], call transferred to [place] [facility]  spoke to Jose R Pittman  who states no authorization is required. Reference # [id number] Juares 12/14/21     Notified nursing for scheduling

## 2021-12-14 NOTE — PROGRESS NOTES
History of Present Illness   Patient ID: Layo Maharaj is a 63year old male. Today's Date: 12/14/21  Chief Complaint: Diabetes and Rash Skin Problem      HPI   1.   He has type 2 diabetes with an A1c of 7.7 and he was recently started on Ozempic as J Eyes:      Extraocular Movements: Extraocular movements intact. Conjunctiva/sclera: Conjunctivae normal.   Cardiovascular:      Pulses:           Dorsalis pedis pulses are 2+ on the right side and 2+ on the left side.    Pulmonary:      Effort: Pulmo valsartan 160 MG Oral Tab Take 1 tablet (160 mg total) by mouth daily.  90 tablet 3   • SYNTHROID 175 MCG Oral Tab Take 1 tablet (175 mcg total) by mouth before breakfast. 90 tablet 3   • Levothyroxine Sodium 50 MCG Oral Tab Take 50 mcg by mouth before roderick Reviewed Active Problems:  Patient Active Problem List    Venous stasis dermatitis of both lower extremities      Non-pressure chronic ulcer of right lower leg, limited to breakdown of skin (HCC)      Non-pressure chronic ulcer of left lower leg, limit to 0.5 mg weekly and we will see you back in March to repeat your A1c and check your progress.   You will also continue with Metformin and glyburide but if you are fasting or random blood sugar is less than 100 or you feel unwell i.e. hypoglycemic then stop you take. Include both prescription and over-the-counter medicines. Also tell them about any vitamins, herbal medicines, or anything else you take for your health. Do not suddenly stop taking this medicine. Check with your doctor before stopping.   Caution (FDA) is available from your pharmacist. Please review it carefully with your pharmacist to understand the risks associated with this medicine. Side Effects  The following is a list of some common side effects from this medicine.  Please speak with your do

## 2021-12-14 NOTE — PROGRESS NOTES
[address]  Progress Note    CHIEF COMPLAINT:  Patient presents with:  Knee Pain: LOV: 11/2/21 Patient f/u on R knee pain, denies N/T. Patient requesting R knee CSI. Takes Advil. Rates pain 0/10.       His week. 3 each 3   • metFORMIN HCl  MG Oral Tablet 24 Hr Take 2 tablets (1,000 mg total) by mouth 2 (two) times daily with meals.  FOR DIABETES. 360 tablet 3   • glyBURIDE 2.5 MG Oral Tab Take 1 tablet (2.5 mg total) by mouth daily with breakfast. FOR D Psychiatric/Behavioral: Negative. All other systems reviewed and are negative. Pertinent positives and negatives noted in the HPI.         PHYSICAL EXAM:   /70   Pulse 87   Ht 74\"   Wt 300 lb (136.1 kg)   SpO2 95%   BMI 38.52 kg/m²     Body ma Creatinine 10/28/2021 0.76  0.70 - 1.30 mg/dL Final   • BUN/CREA Ratio 10/28/2021 27.6* 10.0 - 20.0 Final   • Calcium, Total 10/28/2021 8.9  8.5 - 10.1 mg/dL Final   • Calculated Osmolality 10/28/2021 297* 275 - 295 mOsm/kg Final   • GFR, Non-African Ameri Urine 09/29/2021 Negative  Negative mg/dL Final   • Blood Urine 09/29/2021 Negative  Negative Final   • pH Urine 09/29/2021 7.0  5.0 - 8.0 Final   • Protein Urine 09/29/2021 Negative  Negative mg/dL Final   • Urobilinogen Urine 09/29/2021 <2.0  <2.0 Final Component Date Value Ref Range Status   • Glucose 08/10/2021 112* 70 - 99 mg/dL Final   • Sodium 08/10/2021 142  136 - 145 mmol/L Final   • Potassium 08/10/2021 4.0  3.5 - 5.1 mmol/L Final   • Chloride 08/10/2021 107  98 - 112 mmol/L Final   • CO2 08/10/ (1 OR 2 VIEWS), RIGHT (CPT=73560)       COMPARISON: None.       INDICATIONS: Chronic anteiror right knee pain.       TECHNIQUE: 2 views were obtained with weightbearing technique.     FINDINGS:   BONES: There is moderate narrowing of the medial compartment laterality  Diabetic polyneuropathy associated with type 2 diabetes mellitus (HCC)  Hypothyroidism, unspecified type  Primary osteoarthritis of right knee  Hypertension associated with type 2 diabetes mellitus (HCC)  Class 2 severe obesity due to excess ca

## 2021-12-14 NOTE — PATIENT INSTRUCTIONS
1.  For your diabetes we will continue with Ozempic but at the beginning of January please make sure you increase your dose to 0.5 mg weekly and we will see you back in March to repeat your A1c and check your progress.   You will also continue with Metformi schedule. Do not take 2 doses of this medicine at one time. Please tell your doctor and pharmacist about all the medicines you take. Include both prescription and over-the-counter medicines.  Also tell them about any vitamins, herbal medicines, or anything medicine can cause serious side effects in some patients. Important information from the [place]  Food and Drug Administration (FDA) is available from your pharmacist. Please review it carefully with your pharmacist to understand the risks associated with this

## 2021-12-14 NOTE — PROCEDURES
[address] [facility]   Knee Joint Injection Procedure Note    CHIEF COMPLAINT:  Patient presents with:  Knee Pain: LOV: 11/2/21 Patient f/u on R knee pain, denies N/T. Patient requesting R knee CSI. Takes Advil.   Ra 297* 275 - 295 mOsm/kg Final   • GFR, Non- 10/28/2021 100  >=60 Final   • GFR, -American 10/28/2021 115  >=60 Final   • ALT 10/28/2021 42  16 - 61 U/L Final   • AST 10/28/2021 28  15 - 37 U/L Final   • Alkaline Phosphatase 10/28/2021 • Urobilinogen Urine 09/29/2021 <2.0  <2.0 Final   • Nitrite Urine 09/29/2021 Negative  Negative Final   • Leukocyte Esterase Urine 09/29/2021 Negative  Negative Final   • Microscopic 09/29/2021 Microscopic not indicated   Final   • Case Report 09/29/202 08/10/2021 107  98 - 112 mmol/L Final   • CO2 08/10/2021 29.0  21.0 - 32.0 mmol/L Final   • Anion Gap 08/10/2021 6  0 - 18 mmol/L Final   • BUN 08/10/2021 17  7 - 18 mg/dL Final   • Creatinine 08/10/2021 0.90  0.70 - 1.30 mg/dL Final   • BUN/CREA Ratio 08/ bursa was visualized. A 18-gauge 1.5 inch needle with a 5 cc syringe containing 5 cc of 1% lidocaine was introduced through the superolateral approach and was seen entering the the joint capsule to anesthetize the skin and soft tissue.  3 cc of 1% lidocaine

## 2021-12-14 NOTE — PATIENT INSTRUCTIONS
1 Continue weight loss program  2) continue home exercise program  3) My office will call you to schedule the RIGHT knee CSI under ultrasound guidance once the procedure is approved by your insurance carrier.     4) Follow up in 2 months

## 2021-12-15 ENCOUNTER — OFFICE VISIT (OUTPATIENT)
Dept: WOUND CARE | Facility: HOSPITAL | Age: 61
End: 2021-12-15
Attending: NURSE PRACTITIONER
Payer: COMMERCIAL

## 2021-12-15 ENCOUNTER — TELEPHONE (OUTPATIENT)
Dept: PHYSICAL MEDICINE AND REHAB | Facility: CLINIC | Age: 61
End: 2021-12-15

## 2021-12-15 VITALS
RESPIRATION RATE: 18 BRPM | SYSTOLIC BLOOD PRESSURE: 168 MMHG | TEMPERATURE: 98 F | DIASTOLIC BLOOD PRESSURE: 78 MMHG | HEART RATE: 83 BPM | OXYGEN SATURATION: 93 %

## 2021-12-15 DIAGNOSIS — I87.2 VENOUS STASIS DERMATITIS OF BOTH LOWER EXTREMITIES: ICD-10-CM

## 2021-12-15 PROCEDURE — 99213 OFFICE O/P EST LOW 20 MIN: CPT | Performed by: NURSE PRACTITIONER

## 2021-12-15 NOTE — TELEPHONE ENCOUNTER
Patient came the office by and said he had injection done yesterday and his legs are buckling while standing at work. Patient is requesting to see Dr. Annelise badillo. He also is asking for a note for work.  [phone]

## 2021-12-15 NOTE — TELEPHONE ENCOUNTER
Spoke with patient states that the injection wasn't working like before, informed patient that the lidocaine works right away and the cortisone takes some time for full effect. Patient states he only went to work for a few hours last night due to his knees buckling. Would like a note to be off yesterday, also requesting a note stating he's able to sit for a few minutes while working. Also wondering what he can do to relief the buckling? Can he wear a brace? Please advise--works at 3pm would like an answer before then.     Note pended

## 2021-12-20 ENCOUNTER — TELEPHONE (OUTPATIENT)
Dept: INTERNAL MEDICINE CLINIC | Facility: CLINIC | Age: 61
End: 2021-12-20

## 2021-12-20 NOTE — TELEPHONE ENCOUNTER
Pt stated BS ranging around 218-227 before medication, after meds 170, Dr goal is 150  Advised to monitor what he's eating regarding Carbs, rice ,pasta,breads    2)Not sure if he's  receiving the semaglutide weekly,plans to go the Pharmacy to see if workin

## 2021-12-20 NOTE — TELEPHONE ENCOUNTER
Agree with checking with pharmacy/make sure working properly; he may not notice too much of a decrease in glucose with 0.25mg semaglutide weekly, typically the 0.5mg weekly dosing provides the glucose reduction however his sugar after meds of 170 is fine,

## 2021-12-21 ENCOUNTER — APPOINTMENT (OUTPATIENT)
Dept: WOUND CARE | Facility: HOSPITAL | Age: 61
End: 2021-12-21
Attending: NURSE PRACTITIONER
Payer: COMMERCIAL

## 2021-12-21 NOTE — TELEPHONE ENCOUNTER
Spoke with patient ( verified) and relayed Dr. Avtar Parker message below--patient verbalizes understanding and agreement. He did speak with pharmacist and was told to call  if pen if not working.  After further discussion, patient was not holding

## 2021-12-22 ENCOUNTER — TELEPHONE (OUTPATIENT)
Dept: INTERNAL MEDICINE CLINIC | Facility: CLINIC | Age: 61
End: 2021-12-22

## 2021-12-22 NOTE — TELEPHONE ENCOUNTER
Pt called to find out when he picked up the parking placard form. Informed pt he was notified to  form on 11/26/21. Pt wanted doctor to know today he was able to use the Ozempic pen properly. Pt is going to continue to monitor his blood sugars.

## 2021-12-29 ENCOUNTER — OFFICE VISIT (OUTPATIENT)
Dept: WOUND CARE | Facility: HOSPITAL | Age: 61
End: 2021-12-29
Attending: NURSE PRACTITIONER
Payer: COMMERCIAL

## 2021-12-29 VITALS
TEMPERATURE: 98 F | HEART RATE: 77 BPM | SYSTOLIC BLOOD PRESSURE: 130 MMHG | RESPIRATION RATE: 17 BRPM | OXYGEN SATURATION: 96 % | DIASTOLIC BLOOD PRESSURE: 67 MMHG

## 2021-12-29 DIAGNOSIS — I87.2 VENOUS STASIS DERMATITIS OF BOTH LOWER EXTREMITIES: ICD-10-CM

## 2021-12-29 DIAGNOSIS — L97.911 NON-PRESSURE CHRONIC ULCER OF RIGHT LOWER LEG, LIMITED TO BREAKDOWN OF SKIN (HCC): ICD-10-CM

## 2021-12-29 DIAGNOSIS — L97.921 NON-PRESSURE CHRONIC ULCER OF LEFT LOWER LEG, LIMITED TO BREAKDOWN OF SKIN (HCC): Primary | ICD-10-CM

## 2021-12-29 PROCEDURE — 99213 OFFICE O/P EST LOW 20 MIN: CPT | Performed by: NURSE PRACTITIONER

## 2021-12-29 NOTE — PROGRESS NOTES
Subjective   Fan Solis is a 63year old male. Patient presents with:  Wound Care: bilateral legs    HPI  12/29/2021: Patient stated he hit his leg after fall at work and acquired left lateral lower leg wound.  Patient received one compression garm Musculoskeletal:      Cervical back: Normal range of motion and neck supple.      Right lower leg: Edema present.      Left lower leg: Edema present.      Right foot: Deformity present.      Left foot: Deformity present.    Feet:      Right foot:      Ski 0653   Peggy-wound Assessment Clean;Dry; Other (Comment) 12/29/21 0843   Wound Granulation Tissue Pink 12/07/21 0803   Wound Bed Granulation (%) 0 % 12/29/21 0843   Wound Bed Epithelium (%) 100 % 12/29/21 0843   Wound Bed Slough (%) 0 % 12/29/21 0843   Wound knee     Gastroesophageal reflux disease with esophagitis     Varicose veins of left lower extremity with inflammation, with ulcer of ankle limited to breakdown of skin (HCC)     Patellofemoral arthritis     Venous stasis dermatitis of both lower extremiti

## 2022-01-04 ENCOUNTER — NURSE TRIAGE (OUTPATIENT)
Dept: INTERNAL MEDICINE CLINIC | Facility: CLINIC | Age: 62
End: 2022-01-04

## 2022-01-04 ENCOUNTER — LAB ENCOUNTER (OUTPATIENT)
Dept: LAB | Age: 62
End: 2022-01-04
Attending: INTERNAL MEDICINE
Payer: COMMERCIAL

## 2022-01-04 ENCOUNTER — TELEPHONE (OUTPATIENT)
Dept: INTERNAL MEDICINE CLINIC | Facility: CLINIC | Age: 62
End: 2022-01-04

## 2022-01-04 DIAGNOSIS — Z20.822 ENCOUNTER FOR LABORATORY TESTING FOR COVID-19 VIRUS: Primary | ICD-10-CM

## 2022-01-04 DIAGNOSIS — Z20.822 ENCOUNTER FOR LABORATORY TESTING FOR COVID-19 VIRUS: ICD-10-CM

## 2022-01-04 NOTE — TELEPHONE ENCOUNTER
Action Requested: Summary for Provider     []  Critical Lab, Recommendations Needed  [] Need Additional Advice  [x]   FYI    []   Need Orders  [] Need Medications Sent to Pharmacy  []  Other     SUMMARY: Patient initially called to give log of blood sugar

## 2022-01-04 NOTE — TELEPHONE ENCOUNTER
Noted, thanks. Assume covid, testing should be performed 5 days after last contact or symptom onset.

## 2022-01-04 NOTE — TELEPHONE ENCOUNTER
Patient calling with blood sugar log.      151 today  1/3/22  164  2 hours after lunch 249     Patient states he can't find other readings, but states highest was 288 at night, lowest was 111, average blood sugar 181      Patient states he is taking insulin

## 2022-01-05 ENCOUNTER — NURSE TRIAGE (OUTPATIENT)
Dept: INTERNAL MEDICINE CLINIC | Facility: CLINIC | Age: 62
End: 2022-01-05

## 2022-01-05 ENCOUNTER — APPOINTMENT (OUTPATIENT)
Dept: WOUND CARE | Facility: HOSPITAL | Age: 62
End: 2022-01-05
Attending: NURSE PRACTITIONER
Payer: COMMERCIAL

## 2022-01-05 LAB — AMB EXT COVID-19 RESULT: DETECTED

## 2022-01-05 NOTE — TELEPHONE ENCOUNTER
Noted, thanks. Friday is fine. Monitor oxygen.  Wife who is also my patient came to the office today with covid symptoms, she has been asked to get tested/quarantine, even if her test is negative she should assume its a false negative; I have given her a wo

## 2022-01-05 NOTE — TELEPHONE ENCOUNTER
Dr. Nick Alvarenga - Patient has productive cough, yellow-green phlegm. Denies wheezing and chest pressure/pain. Appt is not until Friday (does not want to see another provider). Is it okay for patient to wait until then?    Please reply to pool: EM RN TRIAGE      P

## 2022-01-06 LAB — SARS-COV-2 RNA RESP QL NAA+PROBE: DETECTED

## 2022-01-07 ENCOUNTER — TELEMEDICINE (OUTPATIENT)
Dept: INTERNAL MEDICINE CLINIC | Facility: CLINIC | Age: 62
End: 2022-01-07
Payer: COMMERCIAL

## 2022-01-07 DIAGNOSIS — U07.1 RESPIRATORY TRACT INFECTION DUE TO COVID-19 VIRUS: Primary | ICD-10-CM

## 2022-01-07 DIAGNOSIS — J98.8 RESPIRATORY TRACT INFECTION DUE TO COVID-19 VIRUS: Primary | ICD-10-CM

## 2022-01-07 PROCEDURE — 99213 OFFICE O/P EST LOW 20 MIN: CPT | Performed by: INTERNAL MEDICINE

## 2022-01-07 RX ORDER — CODEINE PHOSPHATE AND GUAIFENESIN 10; 100 MG/5ML; MG/5ML
5 SOLUTION ORAL EVERY 6 HOURS PRN
Qty: 240 ML | Refills: 1 | Status: SHIPPED | OUTPATIENT
Start: 2022-01-07

## 2022-01-07 NOTE — PROGRESS NOTES
Telehealth Visit      I spoke with Maddi Fagan by secure [employer] video service on 01/07/22, verified date of birth, patient stated they are in the state of [place], and discussed their concerns as below:     Upper Respiratory Infection   This i appearance. HENT:      Head: Normocephalic and atraumatic.       Right Ear: External ear normal.      Left Ear: External ear normal.      Nose: Nose normal.   Eyes:      Conjunctiva/sclera: Conjunctivae normal.   Pulmonary:      Effort: Pulmonary effort i tablet (40 mg total) by mouth nightly. 90 tablet 3   • metoprolol tartrate 25 MG Oral Tab Take 1 tablet (25 mg total) by mouth 2 (two) times daily. 90 tablet 3   • valsartan 160 MG Oral Tab Take 1 tablet (160 mg total) by mouth daily.  90 tablet 3   • SYNTH esophagitis      Class 2 severe obesity due to excess calories with serious comorbidity and body mass index (BMI) of 38.0 to 38.9 in adult [facility])       Reviewed Social History:  Social History    Tobacco Use      Smoking status: Former [address] [place] [address] telehealth consent form, related consents and the risks discussed. Lastly, the patient confirmed that they were in [place]. Included in this visit, time may have been spent reviewing labs, medications, radiology tests and decision making.  Appropriate med

## 2022-01-14 ENCOUNTER — OFFICE VISIT (OUTPATIENT)
Dept: PODIATRY CLINIC | Facility: CLINIC | Age: 62
End: 2022-01-14
Payer: COMMERCIAL

## 2022-01-14 ENCOUNTER — TELEPHONE (OUTPATIENT)
Dept: INTERNAL MEDICINE CLINIC | Facility: CLINIC | Age: 62
End: 2022-01-14

## 2022-01-14 DIAGNOSIS — I83.11 VARICOSE VEINS OF BOTH LOWER EXTREMITIES WITH INFLAMMATION: ICD-10-CM

## 2022-01-14 DIAGNOSIS — I87.2 VENOUS INSUFFICIENCY OF BOTH LOWER EXTREMITIES: ICD-10-CM

## 2022-01-14 DIAGNOSIS — L84 FOOT CALLUS: ICD-10-CM

## 2022-01-14 DIAGNOSIS — E08.42 DIABETES MELLITUS DUE TO UNDERLYING CONDITION WITH DIABETIC POLYNEUROPATHY, UNSPECIFIED WHETHER LONG TERM INSULIN USE (HCC): Primary | ICD-10-CM

## 2022-01-14 DIAGNOSIS — M20.41 HAMMERTOE, BILATERAL: ICD-10-CM

## 2022-01-14 DIAGNOSIS — E11.9 TYPE 2 DIABETES MELLITUS WITHOUT COMPLICATION, WITHOUT LONG-TERM CURRENT USE OF INSULIN (HCC): ICD-10-CM

## 2022-01-14 DIAGNOSIS — I83.12 VARICOSE VEINS OF BOTH LOWER EXTREMITIES WITH INFLAMMATION: ICD-10-CM

## 2022-01-14 DIAGNOSIS — M20.42 HAMMERTOE, BILATERAL: ICD-10-CM

## 2022-01-14 DIAGNOSIS — B35.1 ONYCHOMYCOSIS: ICD-10-CM

## 2022-01-14 PROCEDURE — 11721 DEBRIDE NAIL 6 OR MORE: CPT | Performed by: PODIATRIST

## 2022-01-14 NOTE — TELEPHONE ENCOUNTER
Current Outpatient Medications:   ••  glyBURIDE 2.5 MG Oral Tab, Take 1 tablet (2.5 mg total) by mouth daily with breakfast. FOR DIABETES.  STOP IF FASTING BLOOD SUGAR IS LESS THAN 100 AND CALL MY OFFICE., Disp: 90 tablet, Rfl: 3

## 2022-01-14 NOTE — PROGRESS NOTES
[facility], [facility] Podiatry  Progress Note    Adriel Kumar is a 63year old male. Patient presents with:  Diabetic Foot Care: Routine DFC. Denies any pain, numbness or tingling associated with feet. BS this morning was 115. A1C 12/14/21, 7.7.         HPI: cough or congestion. (Patient not taking: Reported on 1/14/2022) 240 mL 1   • tamsulosin (FLOMAX) cap Take 1 capsule (0.4 mg total) by mouth daily.  Take 1/2 hour following the same meal each day (Patient not taking: No sig reported) 90 capsule 3   • SYNTHR bilateral  Hemosiderin deposits Present bilateral  Integumentary Examination:   The patient's nails appear incurvated, thickened, elongated, dystrophic, discolored with subungual debris 1-5 right, 1-5  left nails.     Left distal 3rd toe callus    Digital h were reviewed and stressed prevention. Evaluated the patient. Discussed treatment options with the patient. Discussed with patient proper care and hygiene for their feet. Patient tolerated procedures well, without incident.     Instrumentation used

## 2022-01-17 RX ORDER — GLYBURIDE 2.5 MG/1
2.5 TABLET ORAL 2 TIMES DAILY WITH MEALS
Qty: 180 TABLET | Refills: 3 | Status: SHIPPED | OUTPATIENT
Start: 2022-01-17 | End: 2022-01-25

## 2022-01-17 NOTE — TELEPHONE ENCOUNTER
Patient requesting refill of glyburide 2.5mg. He states he is taking it twice a day now. Please advise.

## 2022-01-25 ENCOUNTER — TELEPHONE (OUTPATIENT)
Dept: INTERNAL MEDICINE CLINIC | Facility: CLINIC | Age: 62
End: 2022-01-25

## 2022-01-25 DIAGNOSIS — E03.9 HYPOTHYROIDISM, UNSPECIFIED TYPE: Primary | ICD-10-CM

## 2022-01-25 DIAGNOSIS — E11.9 TYPE 2 DIABETES MELLITUS WITHOUT COMPLICATION, WITHOUT LONG-TERM CURRENT USE OF INSULIN (HCC): ICD-10-CM

## 2022-01-25 RX ORDER — LEVOTHYROXINE SODIUM 175 UG/1
175 TABLET ORAL
Qty: 90 TABLET | Refills: 3 | Status: SHIPPED | OUTPATIENT
Start: 2022-01-25

## 2022-01-25 RX ORDER — METFORMIN HYDROCHLORIDE 500 MG/1
1000 TABLET, EXTENDED RELEASE ORAL 2 TIMES DAILY WITH MEALS
Qty: 360 TABLET | Refills: 3 | Status: SHIPPED | OUTPATIENT
Start: 2022-01-25

## 2022-01-25 RX ORDER — GLYBURIDE 2.5 MG/1
2.5 TABLET ORAL 2 TIMES DAILY WITH MEALS
Qty: 180 TABLET | Refills: 3 | Status: SHIPPED | OUTPATIENT
Start: 2022-01-25

## 2022-01-25 NOTE — TELEPHONE ENCOUNTER
Spoke with pt,  verified. Pt stated he was only given a month supply of med ref from local pharm as he was advised to get his meds [facility] [place].    Pt is looking for semaglutide 0.5 mg weekly, metformin  mg 2 tabs BID, glyburide 2.5

## 2022-01-31 NOTE — TELEPHONE ENCOUNTER
Raven Royal at [facility] needs a verbal order for the [employer]. [phone]. Order was written originally for :Semaglutide, 1 MG/DOSE, 2 MG/1.5ML    Pharmacist said they are fixed doses,  So if patient needs to inject . 5 mg dose under t

## 2022-02-06 ENCOUNTER — HOSPITAL ENCOUNTER (EMERGENCY)
Facility: HOSPITAL | Age: 62
Discharge: HOME | End: 2022-02-06
Attending: EMERGENCY MEDICINE
Payer: COMMERCIAL

## 2022-02-06 ENCOUNTER — APPOINTMENT (OUTPATIENT)
Dept: GENERAL RADIOLOGY | Facility: HOSPITAL | Age: 62
End: 2022-02-06
Attending: EMERGENCY MEDICINE
Payer: COMMERCIAL

## 2022-02-06 VITALS
DIASTOLIC BLOOD PRESSURE: 71 MMHG | OXYGEN SATURATION: 94 % | WEIGHT: 300 LBS | TEMPERATURE: 99 F | SYSTOLIC BLOOD PRESSURE: 135 MMHG | HEART RATE: 80 BPM | RESPIRATION RATE: 18 BRPM | BODY MASS INDEX: 39 KG/M2
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
## 2022-02-06 DIAGNOSIS — S20.212A CONTUSION OF LEFT CHEST WALL, INITIAL ENCOUNTER: Primary | ICD-10-CM

## 2022-02-06 PROCEDURE — 71046 X-RAY EXAM CHEST 2 VIEWS: CPT | Performed by: EMERGENCY MEDICINE

## 2022-02-06 PROCEDURE — 99283 EMERGENCY DEPT VISIT LOW MDM: CPT

## 2022-02-06 RX ORDER — IBUPROFEN 600 MG/1
600 TABLET ORAL ONCE
Status: COMPLETED | OUTPATIENT
Start: 2022-02-06 | End: 2022-02-06

## 2022-02-06 RX ORDER — HYDROCODONE BITARTRATE AND ACETAMINOPHEN 5; 325 MG/1; MG/1
1-2 TABLET ORAL EVERY 6 HOURS PRN
Qty: 15 TABLET | Refills: 0 | Status: SHIPPED | OUTPATIENT
Start: 2022-02-06

## 2022-02-07 NOTE — ED INITIAL ASSESSMENT (HPI)
Patient complains of lower back pain 2ndary to mechanical fall, slipped on ice tonight. Denies head injury/LOC.  Takes aspirin

## 2022-02-08 ENCOUNTER — TELEPHONE (OUTPATIENT)
Dept: SURGERY | Facility: CLINIC | Age: 62
End: 2022-02-08

## 2022-02-08 RX ORDER — FINASTERIDE 5 MG/1
5 TABLET, FILM COATED ORAL DAILY
Qty: 90 TABLET | Refills: 3 | Status: SHIPPED | OUTPATIENT
Start: 2022-02-08 | End: 2023-02-08

## 2022-02-09 ENCOUNTER — NURSE TRIAGE (OUTPATIENT)
Dept: INTERNAL MEDICINE CLINIC | Facility: CLINIC | Age: 62
End: 2022-02-09

## 2022-02-09 ENCOUNTER — OFFICE VISIT (OUTPATIENT)
Dept: INTERNAL MEDICINE CLINIC | Facility: CLINIC | Age: 62
End: 2022-02-09
Payer: COMMERCIAL

## 2022-02-09 VITALS
HEART RATE: 87 BPM | WEIGHT: 300 LBS | SYSTOLIC BLOOD PRESSURE: 144 MMHG | DIASTOLIC BLOOD PRESSURE: 67 MMHG | HEIGHT: 74 IN | BODY MASS INDEX: 38.5 KG/M2

## 2022-02-09 DIAGNOSIS — R09.89 PULMONARY VASCULAR CONGESTION: ICD-10-CM
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
## 2022-02-09 PROCEDURE — 3078F DIAST BP <80 MM HG: CPT | Performed by: INTERNAL MEDICINE

## 2022-02-09 PROCEDURE — 99213 OFFICE O/P EST LOW 20 MIN: CPT | Performed by: INTERNAL MEDICINE

## 2022-02-09 PROCEDURE — 3008F BODY MASS INDEX DOCD: CPT | Performed by: INTERNAL MEDICINE

## 2022-02-09 PROCEDURE — 3077F SYST BP >= 140 MM HG: CPT | Performed by: INTERNAL MEDICINE

## 2022-02-09 NOTE — PATIENT INSTRUCTIONS
Take 10 deep breaths while standing 3 times per day, ie before meals. This helps to open the lungs, allow for better aeration of the lungs, and reduces the risk of developing a pneumonia.

## 2022-02-21 PROBLEM — F32.2 MAJOR DEPRESSIVE DISORDER, SINGLE EPISODE, SEVERE (HCC): Status: ACTIVE | Noted: 2022-02-21

## 2022-02-21 PROBLEM — R45.89 SUICIDAL BEHAVIOR: Status: ACTIVE | Noted: 2022-02-21

## 2022-02-21 NOTE — ED INITIAL ASSESSMENT (HPI)
Pt to ed for psych eval. Pt sts, \"Today is my birthday and my wife and I got into an argument. She was talking about getting a divorce, I'm upset because I haven't talked to my son in years. I was begging her not to get  because we've been together for 26 years. I told her I would take it serious and work on myself. I took 3 extra pills, glyburide and metformin. She was yelling at me to 'kill yourself,' so I took 3 extra pills just to shut her up. It wasn't intentional I just did it to shut her up. \"  Denies SI/HI

## 2022-02-21 NOTE — PROGRESS NOTES
02/21/22 1305   Geriatric Functioning   Dementia Symptoms Observed/Expressed N   Current Living Situation   Current Living Situation Private Residence   Sight and Sound   Is the patient verbal? Yes   Vision or hearing correction? Eye Glasses   Patient able to understand and follow directions? Yes   Patient able to express needs? Yes   Feeding and Swallowing   History of aspiration or choking? No   Feeding assistance needed? No   Patient have any chewing or swallowing problems? No  (pt has dentures, but has no concerns about them and sleeps with them in.)   Special Diet No   Mobility/Activity & Assistive Devices   Current/recent injuries or surgeries that affect mobility? No   Physical Limitations Present None   Independent in ambulation? Yes   Transfer Assist No Assistance Needed   Assistive Device Used: None   History of falls? No   Grooming   Level of independence in dressing Fully Independent   Level of independence to shower/bathe/wash Fully Independent   Level of independence in personal grooming tasks (e.g., brushing teeth, brushing hair, applying hearing aids, shaving, etc.) Fully Independent   Toileting   Patient Incontinence None   Special Considerations   Patient has pressures sores, surgical wounds, bandages/dressings? No   Patient uses any kind of pump? No   Does the patient need a BiPAP or CPAP? No   Intellectual disability reported? Intellectual Disability? No   Reported Social/Emotional Functioning Level   Describe Pt functions idependently. Denies need for assistive devises. Pt is scheduled for vertical vein surgery on his leg on 3/4/22 d/t \"excessive standing. \"

## 2022-02-21 NOTE — BH LEVEL OF CARE ASSESSMENT
Crisis Evaluation Assessment    Inder Esquivel YOB: 1960   Age 63year old MRN [id number]   Location [address] Attending No att. providers found      Patient's legal sex: male  Patient identifies as: male  Patient's birth sex: male  Preferred pronouns: He/him    Date of Service: 2/21/2022    Referral Source:  Referral Source  Referral Source: Legal  Legal: Other  Organization Name: EMS     Reason for Crisis Evaluation   Patient presents to the ED for psychiatric evaluation arriving via EMS after his family had contacted 911. Patient reports that he got into some verbal altercations with his wife and his son which led to further arguments. He states that he had to kick his youngest son out of the house because he thought it was causing a lot more conflict between him and his wife. Patient reports that he has not spoken to his son in the past 3 months. As a result during the argument patient's wife reported that she wanted to get a divorce. Patient reports that he intentionally took extra pills of his diabetes medication but states that \"that is all I can remember \". Patient was asked numerous times what his intent was and he repeatedly stated that he was not sure. \"I just took a couple extra pills - metaformin, glyburide and that's all I can remember. They thought I was committing suicide but I wasn't. The next thing I know I am here. Because I took extra pills at the house, we had an argument. it was my birthday and my wife told me she wanted a divorce, its because of my son. My son wasn't talking to me for 3 months, yesterday afternoon, we got into an argument and led onto an argument with my wife. I told my son to leave, I dont want him here, its causing friction between me and my wife and so he left. I think that's why my wife wants a divorce. I was taking my pills anyways at night, I tried to make it, I just took em, not thinking, not sure why, just I really don't know. \"     Patient states that he intentionally took the medication from of his wife and reports that she had told him \"wider to take everything \". Patient told her that he did not want to commit suicide and then eventually police were called after the patient had gone up to bed to sleep. Nora Wilkerson, spouse, [phone]    Wife reports that there have been some issues at home that eventually led to the patient kicking her son out of the house. She stated that that led to a disagreement and states that the short story is that they have been arguing a lot lately. Wife reported that she decided to tell him that she would divorce him and she stated that the patient was \"trying to talk her out of it \". Patient's wife stated that she had decided. Initially she said that the patient had mentione that he was going to see if he could convince her to not divorce him and she witnessed him taking a double dose of his medication for the night. She said that she has tried to stop him but that he did not want to stop and he put it in his mouth. She states that he wanted to overdose on the medication. However when this writer asked about any specific things that the patient said to her she denied that the patient said anything. Patient's wife had called her other son Sandeep Alfaro. Patient's wife said \"I know that he is upset and he is trying to get my attention. Wife understands that this is not enough medication to actually hurt himself \". She reports that about 15 years ago they had had a similar argument about divorce where the patient's wife had left the house and the patient had attempted to overdose on medication. She states that the patient son had contacted the police because he stated he could not breathe. As a result the patient had to go see a psychiatrist to talk about what had happened. Wife does not specifically endorse a previous hospitalization.   Wife reports that she is concerned about what happened tonight however she is not sure what to do about it anymore. She also states that she does not think that the patient is serious and that he is only doing it for attention to \"get his way \". In general the wife reports that the patient does find that these arguments and fights were just happening from today. Overall patient's wife believes that she saw him take maybe 3 to 4 pills in total.          Risk to Self or Others  Patient denies any history of psychosis, agitation or aggression. Patient denies any issues with completing his ADLs. Suicide Risk Assessments:    Source of information for CSSR: Patient;Collateral  In what setting is the screener performed?:  (Both in person, telephone)  1. Have you wished you were dead or wished you could go to sleep and not wake up? (past 30 days): No  2. Have you actually had any thoughts of killing yourself? (past 30 days): Yes  3. Have you been thinking about how you might kill yourself? (past 30 days): No  4. Have you had these thoughts and had some intention of acting on them? (past 30 days): No  5a. Have you started to work out or worked out the details of how to kill yourself? (past 30 days): No  5b. Do you intend to carry out this plan? (past 30 days): Yes  6. Have you ever done anything, started to do anything, or prepared to do anything to end your life? (lifetime): Yes  7. How long ago did you do any of these?: Within the last three months  Score -  OV: 7 - High Risk   Describe : Patient denies any passive or active thoughts/plans or recent attempts. Patient was asked numerous times to explain his intent for taking the double dose of his medication intentionally. Patient expressed no insight into this behavior but continued to state that he was not trying to harm himself. Eventually the patient stated that he might of been trying to get attention from his wife to stop her from  him.   Patient's wife reports that the patient had overdosed on the medication when asked if the patient had done or said, his wife said that she observed him just taking the double dose of medication. She states that he has done this in the past and was concerned about the behavior. Is your experience of thoughts of dying by suicide: A Solution to a Problem  Protective Factors: Friends  Past Suicidal Ideation: Rehersal/Research;Method/Plan;Intention  Describe: Patient denies any previous suicidal behaviors however patient's wife reports that 15 years ago when they had discussed divorce the first time that the patient had overdosed on medication and had to see a psychiatrist.         Family History or Personal Lived Experience of Loss or Near Loss by Suicide:  (SALMA)        Patient reports that he intentionally took a double dose of his medication for initially an unknown reason but denies that it was trying to harm himself. Patient then stated that he was doing it to possibly get attention from his wife. Patient's wife also reports that this was intentional but that the patient never said anything about wanting to hurt himself or end his life. However the wife took this as a gesture to get her attention as this patient has done this in the past for a previous incident maybe about 15 years ago. While the patient's wife is concerned about what happened tonight in general she denies having overall safety concerns about the patient and again still believes that the patient was doing this for attention. However patient seems to lack insight into the situation and is displaying poor coping skills for difficult situation of pending divorce. Patient denies having any sort of anxiety/depression however reports having a lot of stress recently due to discussing divorce with his wife, not speaking with his son for months and taking him out tonight and having various arguments with his wife and his sons.   He states that he has been in pain due to a recent fall and needs to have surgery in March for a wound on his leg. Patient also reports that he had had COVID recently and missed some time at his jobs which made him fearful. Patient reports that this possible pending divorce that he does not have enough money to move out and is fearful about what he is going to do next. Non-Suicidal Self-Injury:   Denies. Access to Means:  Access to Means  Has access to means to attempt suicide or harm others or property: Yes  Description of Access: Household items  Discussion of Removal of Access to Means: Patient denies active SI/HI  Access to Firearm/Weapon: No  Discussion of Removal of Firearm/Weapon: Patient denies access to guns or weapons  Do you have a firearm owner ID card?: No    Protective Factors:   Protective Factors: Friends    Review of Psychiatric Systems:  Patient denies hallucinations, delusions, juliana, depression, anxiety and trauma. Patient does endorse various stressors including having COVID last month, missing work which led to him being worried about his job due to missing so much work. He reports that there is been various conflict with his wife over the past 26 years of their marriage and recently there is been a lot more arguments/disagreements. Tonight in specific the patient had kicked out of her youngest son which triggered the conversation of divorce. Patient's wife reported that she is final about her decision to divorce the patient. He reports that he is feeling like everyone in his family is against him and was upset about how he has been on his birthday yesterday. He states that he would do anything to get his wife to come back. Patient denies any issues with sleep or appetite. Substance Use:  Denies. 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   Patient denies any issues with completing his ADLs at this time            Current Treatment and Treatment History:  Patient denies formal mental health history including medications, hospitalizations or meeting with a mental health provider. Patient's wife reports that about 15 years ago the patient had a similar incident in which he overdosed on medication intentionally and needed to go see a psychiatrist.  It is unclear if this was on the inpatient side. Relevant Social History:  Denies family mental health history. Patient denies a history of abuse/trauma. Patient is  and has 2 children with his wife whom all live with him as well as his wife's oldest son. Patient is  however reports that there is a likely pending divorce. Patient is working 2 part-time jobs. Patient has 1 previous legal history of domestic with his wife about 4 to 5 years ago. Patient reports that he is emotionally supported by his friend and sister. Hal and Complex (as applicable):                                    EDP Assessment (as applicable):  IBW Calculations  Weight: 300 lb  BMI (Calculated): 38.5  IBW LBS [place]: 190 LBS  IBW %: 157.89 %  IBW + 10%: 209 LBS  IBW - 10%: 171 LBS                                                                    Abuse Assessment:  Abuse Assessment  Physical Abuse: Denies  Verbal Abuse: Denies  Sexual Abuse: Denies  Neglect: Denies  Does anyone say or do something to you that makes you feel unsafe?: No  Have You Ever Been Harmed by a Partner/Caregiver?: No  Health Concerns r/t Abuse: No  Possible Abuse Reportable to[de-identified] Not appropriate for reporting to authorities    Mental Status Exam:   General Appearance  Characteristics: Other (comment); Disheveled (Wearing hospital gown)  Eye Contact: Indirect  Psychomotor Behavior  Gait/Movement: Normal  Abnormal movements: None  Posture: Relaxed  Rate of Movement: Normal  Mood and Affect  Mood or Feelings: Stressed;Irritability  Appropriateness of Affect: Congruent to mood  Range of Affect: Blunted  Stability of Affect: Stable  Attitude toward staff: Guarded;Evasive  Speech  Rate of Speech: Appropriate  Flow of Speech: Hesitant  Intensity of Volume: Ordinary  Clarity: Clear  Cognition  Concentration: Unimpaired  Memory: Recent memory intact; Remote memory intact  Orientation Level: Oriented X4  Insight: Poor  Judgment: Poor  Thought Patterns  Clarity/Relevance: Coherent  Flow: Organized  Content: Ordinary  Level of Consciousness: Alert  Level of Consciousness: Alert  Behavior  Exhibited behavior: Participated      Disposition:    Assessment Summary:   Patient is a 70-year-old male who presents to the ED for psychiatric evaluation arriving via EMS. Patient's family likely was the one who contacted 911 after the patient had intentionally took 2 doses of his medication for diabetes. Patient did not specifically say anything at the time of taking this overdose however the patient's wife was concerned that he was trying to do something to at least get her attention. Patient initially had denied this was an attempt to hurt himself however was unable to give a clear reason to his intent for intentionally taking double the dose of his medication. Eventually the patient did say that he was trying to get her attention. Patient's wife reports that the patient had done this about 15 years ago when there was a discussion of divorce and did have to see a psychiatrist in the past as well. Patient's wife is concerned about the patient's behavior but states that she does not think that he is going to hurt himself. Patient continues to deny SI/HI/AVH/self-harm/substance abuse. Patient denies any previous mental health history, use of medication or meeting with any mental health provider.   Patient is denying all major mental health symptoms including depression, anxiety, PTSD, mood disorder etc.  Patient reports that he has been having a lot of stress recently due to the arguments with his wife and his children in his home, issues with work and with his recent health/surgeries that are coming up. This writer is concerned because while discussing that behavior patient had limited insight into his true intent behind this behavior. Patient also displays some impulsive/risk-taking behavior as a way to cope with divorce. Patient states that he would \"do anything to get his wife to come back to hell \". Patient believes that his family is against him and that all of his sons are wanting his wife to divorce him. Patient displayed poor insight and poor judgment during the situation. Consulted with Dr. Celso Chino. This writer believes that further evaluation from a psychiatrist is needed to determine the final disposition.           Risk/Protective Factors  Protective Factors: Friends    Level of Care Recommendations  Consulted with: Dr. Celso Chino  Level of Care Recommendation: Inpatient Acute Care  Unit: Adult  Reason for Unit Assigned: Age, symptoms  Inpatient Criteria: Suicidal/homicidal risk;24 hr behavior monitoring  Behavioral Precautions: Suicide  Medical Precautions: None  Refused Treatment: Yes  Can an [facility] Staff Call You in 24-72 Hours to discuss care?: No  Refused Treatment Reason: Other  Refused Treatment Other Reason: Is concerned about losing his job           Diagnoses:  Primary Psychiatric Diagnosis  F 43.0 Acute Stress Disorder     Secondary Psychiatric Diagnoses  deferred  Pervasive Diagnoses  Deferred   Pertinent Non-Psychiatric Diagnoses  deferred        Brady BROOKS, LPC

## 2022-02-21 NOTE — ED PROVIDER NOTES
Signout taken with disposition pending crisis evaluation in setting of admitted pill ingestion as seeming retaliatory gesture in setting of familial discord. Patient evaluated by crisis and with now seemingly unclear intentions as described to crisis with family reporting prior history of similar ingestion behavior requiring psychiatric evaluation but without reported hospitalization. Given aforementioned, await formal psychiatric evaluation by Dr. Winton Cushing with disposition pending same and signed out to oncoming physician with patient medically cleared for psychiatric disposition. Boarder orders entered with FOID notification initiated and certificate completed based on aforementioned and ultimate disposition pending formal psychiatric evaluation.

## 2022-02-21 NOTE — CERTIFICATION
Ref: [id number] 5/3-403, 5/3-602, 5/3-607, 5/3-610    5/3-702, 5/3-813, 5/4-306, 5/4-402, 5/4-403    5/4-405, 5/4-501, 5/4-611, 7/8-646   Inpatient Certificate  Re: Inder Esquivel    (name)     I personally informed the above-named individual of the purpose of this examination and that he or she did not have to speak to me, and that any statements made might be related in court as to the individual's clinical condition or need for services. Additionally, if this examination was for the purpose of determining that the above-named individual is developmentally disabled and dangerous, I informed the individual of his or her right to speak with a relative, friend or  before the examination, and of his or her right to have an  appointed for him or her if he or she so desired. Electronically signed by Oracio Aceves MD    Signature of Examiner     On  2/21 , 2022 , at  12: 15  [] a.m.  [x] p.m.,  I personally examined the    (date)  (year)  (time)    above-named individual. The examination was conducted at [facility] AND [facility].  Based on the foregoing examination, it is my opinion that he or she is:  [x]  A person with mental illness who, because of his or her illness is reasonably expected, unless treated on an inpatient basis, to engage in conduct placing such person or another in physical harm or in reasonable expectation of being physically harmed;   []  A person with mental illness who, because of his or her illness is unable to provide for his or her basic physical needs so as to guard himself or herself from serious harm, without the assistance of family or others, unless treated on an inpatient basis;   []  A person with mental illness who: refuses treatment or is not adhering adequately to prescribed treatment; because of the nature of his or her illness is unable to understand his or her need for treatment; and if not treated on an inpatient basis, is reasonably expected based on his or her behavioral history, to suffer mental or emotional deterioration and is reasonably expected, after such deterioration, to meet the criteria of either paragraph one or paragraph two above;   []  An individual who is developmentally disabled and unless treated on an in-patient basis is reasonably expected to inflict serious physical harm upon himself or herself or others in the near future, and/or   [x]  Is in need of immediate hospitalization for the prevention of such harm. I base my opinion on the following (including clinical observations, factual information):  I examined the patient in person. The patient with presentation of severe depression with agitation and anger who has been demonstrating impulsive behavior, hopelessness, and helplessness and recent overdose has been demonstrating high risk indicating the need for inpatient admission for safety and stabilization.   I believe that the individual is subject to: []  Involuntary inpatient admission and is not in need of immediate hospitalization   (check one) [x]  Involuntary inpatient admission and is in need of immediate hospitalization    []  Judicial inpatient admission and is not in need of immediate hospitalization    []  Judicial inpatient admission and is in need of immediate hospitalization     Date: 2/21/2022 Signature: Electronically signed by Daysi Garcia MD   Title: MD Printed Name: Karan Prieto [phone] ([id number]) Inpatient Certificate    Printed by [employer]

## 2022-02-22 PROBLEM — F32.9 MAJOR DEPRESSIVE DISORDER: Status: ACTIVE | Noted: 2022-02-22

## 2022-02-22 NOTE — ED QUICK NOTES
[facility] called at this time  time will be at 1130 to take the pt [facility]

## 2022-02-22 NOTE — ED NOTES
Spoke w/ pt again about voluntary admission. Pt states he thought he signed in earlier, despite protesting. Provided voluntary admission form. Pt completed. Scanned into chart.

## 2022-03-01 ENCOUNTER — TELEPHONE (OUTPATIENT)
Dept: FAMILY MEDICINE CLINIC | Facility: CLINIC | Age: 62
End: 2022-03-01

## 2022-03-01 NOTE — TELEPHONE ENCOUNTER
Spoke to patient. He asked if Dr. Leslie Fuentes knew that he was in [facility] last week. Relayed that writer unsure if doctor was aware or not. He asked for a work letter excusing him for the week he was off work and give ok for him to return. He did return to work yesterday. He said he has tried to call the psychiatrist but they aren't returning his call or sending letter as requested. Relayed that patient should schedule a video visit to thoroughly discuss this because provider. Patient scheduled for video visit today at 2:20 PM with Dr. Leslie Fuentes. Patient advised to complete the e-check in in [employer], if active. Understands to follow the prompts and links to complete the visit. Patient advised that there may be a co-pay involved in this type of visit. Patient agreed to proceed, they understand the provider may be calling from a blocked, or unknown phone number on their caller ID and they know to answer the phone.     Best call back: [phone]    Future Appointments   Date Time Provider Jose R Peacock   3/1/2022  2:20 PM Braden Johnson MD ECADOIM EC ADO   3/4/2022  1:00 PM [facility] IVS RM4  [address]   3/11/2022  1:00 PM [address] [address]   3/15/2022  9:00 AM Braden Johnson MD ECADOIM EC ADO   3/18/2022  1:00 PM [address] [place] [address] IVS RM4  [address]   3/21/2022  9:30 AM Yayo Muse DPM [facility]ADOPOCARISA EC ADO   3/25/2022  1:00 PM [facility] [facility] [address]   6/1/2022  9:00 AM Dominique Eric MD [facility]

## 2022-03-04 ENCOUNTER — HOSPITAL ENCOUNTER (OUTPATIENT)
Dept: INTERVENTIONAL RADIOLOGY/VASCULAR | Facility: HOSPITAL | Age: 62
Discharge: HOME | End: 2022-03-04
Attending: RADIOLOGY | Admitting: RADIOLOGY
Payer: COMMERCIAL

## 2022-03-04 VITALS
TEMPERATURE: 98 F | WEIGHT: 300 LBS | RESPIRATION RATE: 12 BRPM | SYSTOLIC BLOOD PRESSURE: 127 MMHG | HEART RATE: 62 BPM | OXYGEN SATURATION: 100 % | DIASTOLIC BLOOD PRESSURE: 60 MMHG | BODY MASS INDEX: 39 KG/M2

## 2022-03-04 DIAGNOSIS — I83.893 VARICOSE VEINS OF LEG WITH SWELLING, BILATERAL: ICD-10-CM

## 2022-03-04 DIAGNOSIS — I87.2 VENOUS INSUFFICIENCY OF BOTH LOWER EXTREMITIES: ICD-10-CM

## 2022-03-04 LAB — GLUCOSE BLDC GLUCOMTR-MCNC: 132 MG/DL (ref 70–99)

## 2022-03-04 PROCEDURE — 99152 MOD SED SAME PHYS/QHP 5/>YRS: CPT

## 2022-03-04 PROCEDURE — 82962 GLUCOSE BLOOD TEST: CPT

## 2022-03-04 PROCEDURE — 36415 COLL VENOUS BLD VENIPUNCTURE: CPT

## 2022-03-04 PROCEDURE — 99153 MOD SED SAME PHYS/QHP EA: CPT

## 2022-03-04 PROCEDURE — 36482 ENDOVEN THER CHEM ADHES 1ST: CPT

## 2022-03-04 PROCEDURE — 3E033TZ INTRODUCTION OF DESTRUCTIVE AGENT INTO PERIPHERAL VEIN, PERCUTANEOUS APPROACH: ICD-10-PCS | Performed by: RADIOLOGY

## 2022-03-04 RX ORDER — SODIUM TETRADECYL SULFATE 30 MG/ML
INJECTION, SOLUTION INTRAVENOUS
Status: COMPLETED
Start: 2022-03-04 | End: 2022-03-04

## 2022-03-04 RX ORDER — MIDAZOLAM HYDROCHLORIDE 1 MG/ML
INJECTION INTRAMUSCULAR; INTRAVENOUS
Status: DISCONTINUED
Start: 2022-03-04 | End: 2022-03-04 | Stop reason: WASHOUT

## 2022-03-04 RX ORDER — SODIUM CHLORIDE 9 MG/ML
INJECTION, SOLUTION INTRAVENOUS CONTINUOUS
Status: DISCONTINUED | OUTPATIENT
Start: 2022-03-04 | End: 2022-03-04

## 2022-03-04 RX ORDER — MIDAZOLAM HYDROCHLORIDE 1 MG/ML
INJECTION INTRAMUSCULAR; INTRAVENOUS
Status: COMPLETED
Start: 2022-03-04 | End: 2022-03-04

## 2022-03-04 RX ORDER — LIDOCAINE HYDROCHLORIDE 20 MG/ML
INJECTION, SOLUTION EPIDURAL; INFILTRATION; INTRACAUDAL; PERINEURAL
Status: COMPLETED
Start: 2022-03-04 | End: 2022-03-04

## 2022-03-04 NOTE — PRE-SEDATION ASSESSMENT
[facility]  IR Pre-Procedure Sedation Assessment    History of snoring or sleep or apnea? No    History of previous problems with anesthesia or sedation  No    Physical Findings:  Neck: nl ROM  CV: RRR  PULM: normal respiratory rate/effort    Mallampati Score:  II (hard and soft palate, upper portion of tonsils anduvula visible)    ASA Classification:   2.  Patient with mild systemic disease    Plan:   -IV moderate sedation    Paola San MD

## 2022-03-04 NOTE — PROCEDURES
[facility]  Procedure Note    Usama Armand Patient Status:  Outpatient in a Bed    1960 MRN [id number]   Location 1 [facility] Attending Graciela Lyman MD   Hosp Day # 0 PCP Jonel Mario MD     Procedure: Nonthermal ablation of right great and anterior accessory saphenous veins    Pre-Procedure Diagnosis: Varicose veins of leg with swelling, bilateral [I83.893]  Venous insufficiency of both lower extremities [I87.2]      Post-Procedure Diagnosis: Varicose veins of leg with swelling, bilateral [I83.893]  Venous insufficiency of both lower extremities [I87.2]    Anesthesia:  Local and Sedation    Findings:  Right GSV accessed at ankle. Nonthermal ablation performed. Right AAV accessed at mid thigh. Nonthermal ablation performed. Specimens: None    Blood Loss:  5mL      Complications:  None    Drains:  None    Plan:  Observe for 1 hour. F/U ultrasound in one week.     Prince Avitia MD  3/4/2022

## 2022-03-04 NOTE — IVS NOTE
DISCHARGE NOTE     Pt is able to sit up and ambulate without difficulty. Pt voided and tolerated fluids and food. Procedural site remains dry and intact with good circulation, motion, and sensation. No signs and symptoms of bleeding/hematoma noted. IV access removed  Instruction provided, patient/family verbalizes understanding. Dr. Ramirez Roberson spoke with patient/family post procedure. Pt discharge via wheelchair to [address] [place] [address] B       Follow up Appointment: 03/11 US with PABLO. New Prescription: none.

## 2022-03-07 ENCOUNTER — TELEPHONE (OUTPATIENT)
Dept: PHYSICAL MEDICINE AND REHAB | Facility: CLINIC | Age: 62
End: 2022-03-07

## 2022-03-07 ENCOUNTER — OFFICE VISIT (OUTPATIENT)
Dept: PHYSICAL MEDICINE AND REHAB | Facility: CLINIC | Age: 62
End: 2022-03-07
Payer: COMMERCIAL

## 2022-03-07 VITALS
OXYGEN SATURATION: 96 % | SYSTOLIC BLOOD PRESSURE: 144 MMHG | HEART RATE: 94 BPM | BODY MASS INDEX: 38.76 KG/M2 | DIASTOLIC BLOOD PRESSURE: 78 MMHG | HEIGHT: 74 IN | WEIGHT: 302 LBS

## 2022-03-07 DIAGNOSIS — E11.59 HYPERTENSION ASSOCIATED WITH TYPE 2 DIABETES MELLITUS (HCC): ICD-10-CM

## 2022-03-07 DIAGNOSIS — E78.5 HYPERLIPIDEMIA ASSOCIATED WITH TYPE 2 DIABETES MELLITUS (HCC): ICD-10-CM

## 2022-03-07 DIAGNOSIS — M22.2X9 PATELLOFEMORAL PAIN SYNDROME, UNSPECIFIED LATERALITY: Primary | ICD-10-CM

## 2022-03-07 DIAGNOSIS — I15.2 HYPERTENSION ASSOCIATED WITH TYPE 2 DIABETES MELLITUS (HCC): ICD-10-CM

## 2022-03-07 DIAGNOSIS — E03.9 HYPOTHYROIDISM, UNSPECIFIED TYPE: ICD-10-CM

## 2022-03-07 DIAGNOSIS — E11.42 DIABETIC POLYNEUROPATHY ASSOCIATED WITH TYPE 2 DIABETES MELLITUS (HCC): ICD-10-CM

## 2022-03-07 DIAGNOSIS — M17.11 PRIMARY OSTEOARTHRITIS OF RIGHT KNEE: ICD-10-CM

## 2022-03-07 DIAGNOSIS — E11.69 HYPERLIPIDEMIA ASSOCIATED WITH TYPE 2 DIABETES MELLITUS (HCC): ICD-10-CM

## 2022-03-07 DIAGNOSIS — E66.01 CLASS 2 SEVERE OBESITY DUE TO EXCESS CALORIES WITH SERIOUS COMORBIDITY AND BODY MASS INDEX (BMI) OF 38.0 TO 38.9 IN ADULT (HCC): ICD-10-CM

## 2022-03-07 PROCEDURE — 3077F SYST BP >= 140 MM HG: CPT | Performed by: PHYSICAL MEDICINE & REHABILITATION

## 2022-03-07 PROCEDURE — 3008F BODY MASS INDEX DOCD: CPT | Performed by: PHYSICAL MEDICINE & REHABILITATION

## 2022-03-07 PROCEDURE — 99214 OFFICE O/P EST MOD 30 MIN: CPT | Performed by: PHYSICAL MEDICINE & REHABILITATION

## 2022-03-07 PROCEDURE — 3078F DIAST BP <80 MM HG: CPT | Performed by: PHYSICAL MEDICINE & REHABILITATION

## 2022-03-07 NOTE — TELEPHONE ENCOUNTER
Initiated authorization for  RIGHT knee Orthovisc and CSI under ultrasound guidance CPT 11316++ dx:M17.11 with Vini Troy at [place]   Case #[id number] 10:47. 40139+ does not require authorization  Orthovisc requires authorization through [address] at [address] [facility] and completed prior authorization for Orthovisc Case ID [id number]. [facility] states [facility] does not do buy and bill and medication must come from [place] [address]  Transferred to [facility], pharmacy technician and provided [employer]. [facility] will contact patient with consent and copay info. Orthovisc Medication will be delivered to office once consent and copay is obtained from patient.     Status: Approved-valid 2/5/22-4/11/22 however patient is unable to get repeat injection until 3/13/22  fyi-patient had Orthovisc 9/14/2021    Awaiting Orthovisc to be delivered    Preferred HA meds for patients plan are Euflexxa, Orthovisc, Monovisc

## 2022-03-07 NOTE — PATIENT INSTRUCTIONS
My office will call you to schedule the RIGHT knee HA and CSI under ultrasound guidance once the procedure is approved by your insurance carrier. Follow up in 2 months after the injection    Tylenol 500-1000 mg every 6-8 hours as needed for pain. No more than 3000 mg daily.

## 2022-03-08 ENCOUNTER — TELEPHONE (OUTPATIENT)
Dept: INTERNAL MEDICINE CLINIC | Facility: CLINIC | Age: 62
End: 2022-03-08

## 2022-03-10 ENCOUNTER — TELEPHONE (OUTPATIENT)
Dept: PHYSICAL MEDICINE AND REHAB | Facility: CLINIC | Age: 62
End: 2022-03-10

## 2022-03-11 ENCOUNTER — HOSPITAL ENCOUNTER (OUTPATIENT)
Dept: ULTRASOUND IMAGING | Facility: HOSPITAL | Age: 62
Discharge: HOME | End: 2022-03-11
Attending: RADIOLOGY
Payer: COMMERCIAL

## 2022-03-11 DIAGNOSIS — M79.604 LEG PAIN, RIGHT: ICD-10-CM

## 2022-03-11 DIAGNOSIS — M79.89 RIGHT LEG SWELLING: ICD-10-CM

## 2022-03-11 PROCEDURE — 93971 EXTREMITY STUDY: CPT | Performed by: RADIOLOGY

## 2022-03-14 DIAGNOSIS — E11.9 TYPE 2 DIABETES MELLITUS WITHOUT COMPLICATION, WITHOUT LONG-TERM CURRENT USE OF INSULIN (HCC): ICD-10-CM

## 2022-03-14 DIAGNOSIS — G62.9 NEUROPATHY: ICD-10-CM

## 2022-03-14 RX ORDER — PREGABALIN 100 MG/1
100 CAPSULE ORAL 3 TIMES DAILY
Qty: 270 CAPSULE | Refills: 2 | Status: CANCELLED | OUTPATIENT
Start: 2022-03-14

## 2022-03-15 ENCOUNTER — OFFICE VISIT (OUTPATIENT)
Dept: INTERNAL MEDICINE CLINIC | Facility: CLINIC | Age: 62
End: 2022-03-15
Payer: COMMERCIAL

## 2022-03-15 VITALS
DIASTOLIC BLOOD PRESSURE: 62 MMHG | WEIGHT: 315 LBS | HEART RATE: 77 BPM | SYSTOLIC BLOOD PRESSURE: 118 MMHG | HEIGHT: 74 IN | BODY MASS INDEX: 40.43 KG/M2

## 2022-03-15 DIAGNOSIS — F32.A ANXIETY AND DEPRESSION: ICD-10-CM

## 2022-03-15 DIAGNOSIS — Z98.890 HISTORY OF CAROTID ENDARTERECTOMY: ICD-10-CM

## 2022-03-15 DIAGNOSIS — F41.9 ANXIETY AND DEPRESSION: ICD-10-CM

## 2022-03-15 DIAGNOSIS — E78.5 HYPERLIPIDEMIA ASSOCIATED WITH TYPE 2 DIABETES MELLITUS (HCC): ICD-10-CM

## 2022-03-15 DIAGNOSIS — I15.2 HYPERTENSION ASSOCIATED WITH TYPE 2 DIABETES MELLITUS (HCC): ICD-10-CM

## 2022-03-15 DIAGNOSIS — E11.59 HYPERTENSION ASSOCIATED WITH TYPE 2 DIABETES MELLITUS (HCC): ICD-10-CM

## 2022-03-15 DIAGNOSIS — E11.69 HYPERLIPIDEMIA ASSOCIATED WITH TYPE 2 DIABETES MELLITUS (HCC): ICD-10-CM

## 2022-03-15 DIAGNOSIS — E11.9 TYPE 2 DIABETES MELLITUS WITHOUT COMPLICATION, WITHOUT LONG-TERM CURRENT USE OF INSULIN (HCC): Primary | ICD-10-CM

## 2022-03-15 LAB
CARTRIDGE EXPIRATION DATE: ABNORMAL DATE
CARTRIDGE LOT#: ABNORMAL NUMERIC
HEMOGLOBIN A1C: 7.8 % (ref 4.3–5.6)

## 2022-03-15 PROCEDURE — 3078F DIAST BP <80 MM HG: CPT | Performed by: INTERNAL MEDICINE

## 2022-03-15 PROCEDURE — 3008F BODY MASS INDEX DOCD: CPT | Performed by: INTERNAL MEDICINE

## 2022-03-15 PROCEDURE — 83036 HEMOGLOBIN GLYCOSYLATED A1C: CPT | Performed by: INTERNAL MEDICINE

## 2022-03-15 PROCEDURE — 3051F HG A1C>EQUAL 7.0%<8.0%: CPT | Performed by: INTERNAL MEDICINE

## 2022-03-15 PROCEDURE — 3074F SYST BP LT 130 MM HG: CPT | Performed by: INTERNAL MEDICINE

## 2022-03-15 PROCEDURE — 99214 OFFICE O/P EST MOD 30 MIN: CPT | Performed by: INTERNAL MEDICINE

## 2022-03-15 RX ORDER — VALSARTAN 160 MG/1
160 TABLET ORAL DAILY
Qty: 90 TABLET | Refills: 3 | Status: SHIPPED | OUTPATIENT
Start: 2022-03-15

## 2022-03-15 RX ORDER — GLYBURIDE 2.5 MG/1
2.5 TABLET ORAL 2 TIMES DAILY WITH MEALS
Qty: 180 TABLET | Refills: 3 | Status: SHIPPED | OUTPATIENT
Start: 2022-03-15

## 2022-03-15 RX ORDER — ATORVASTATIN CALCIUM 40 MG/1
40 TABLET, FILM COATED ORAL NIGHTLY
Qty: 90 TABLET | Refills: 3 | Status: SHIPPED | OUTPATIENT
Start: 2022-03-15

## 2022-03-15 RX ORDER — ASPIRIN 81 MG/1
81 TABLET ORAL DAILY
Qty: 90 TABLET | Refills: 3 | Status: SHIPPED | OUTPATIENT
Start: 2022-03-15

## 2022-03-15 RX ORDER — METFORMIN HYDROCHLORIDE 500 MG/1
1000 TABLET, EXTENDED RELEASE ORAL 2 TIMES DAILY WITH MEALS
Qty: 360 TABLET | Refills: 3 | Status: SHIPPED | OUTPATIENT
Start: 2022-03-15

## 2022-03-15 NOTE — PATIENT INSTRUCTIONS
1.  We will continue with metoprolol and valsartan for your blood pressure  2. We will continue with atorvastatin for your cholesterol, if you are not taking this please start. 3.  We will continue with Ozempic, we will continue with Metformin but over the next few weeks as you start to lose weight you will notice that your blood sugars are less than 100 fasting in the morning. This may occur more frequently and may result in you feeling a bit unwell. As soon as your blood sugars fasting are below 100 do not take the morning dose of glyburide. Check periodically throughout the day and you can always take 1 dose of glyburide if needed for blood sugar greater than 180. Ultimately I do not think you will need to go this route and over the next few weeks you will probably stop glyburide entirely. Keep some hard candy such as where there is originals or anything made with real sugar in your pockets or close by in case you have low blood sugars. If you stop taking glyburide send me a message so I know.

## 2022-03-17 NOTE — TELEPHONE ENCOUNTER
Contacted Lilian SOLARES at [facility] who states estimated delivery date is 3/18/22 for Orthovisc

## 2022-03-18 ENCOUNTER — HOSPITAL ENCOUNTER (OUTPATIENT)
Dept: INTERVENTIONAL RADIOLOGY/VASCULAR | Facility: HOSPITAL | Age: 62
Discharge: HOME | End: 2022-03-18
Attending: RADIOLOGY | Admitting: RADIOLOGY
Payer: COMMERCIAL

## 2022-03-18 VITALS
TEMPERATURE: 98 F | DIASTOLIC BLOOD PRESSURE: 83 MMHG | WEIGHT: 315 LBS | RESPIRATION RATE: 16 BRPM | BODY MASS INDEX: 40.43 KG/M2 | HEIGHT: 74 IN | HEART RATE: 62 BPM | SYSTOLIC BLOOD PRESSURE: 141 MMHG | OXYGEN SATURATION: 92 %

## 2022-03-18 DIAGNOSIS — I87.2 VENOUS INSUFFICIENCY OF BOTH LOWER EXTREMITIES: ICD-10-CM

## 2022-03-18 LAB — GLUCOSE BLDC GLUCOMTR-MCNC: 105 MG/DL (ref 70–99)

## 2022-03-18 PROCEDURE — 82962 GLUCOSE BLOOD TEST: CPT

## 2022-03-18 PROCEDURE — 99152 MOD SED SAME PHYS/QHP 5/>YRS: CPT

## 2022-03-18 PROCEDURE — 99153 MOD SED SAME PHYS/QHP EA: CPT

## 2022-03-18 PROCEDURE — 76942 ECHO GUIDE FOR BIOPSY: CPT

## 2022-03-18 PROCEDURE — 36470 NJX SCLRSNT 1 INCMPTNT VEIN: CPT

## 2022-03-18 PROCEDURE — 36482 ENDOVEN THER CHEM ADHES 1ST: CPT

## 2022-03-18 PROCEDURE — 3E033TZ INTRODUCTION OF DESTRUCTIVE AGENT INTO PERIPHERAL VEIN, PERCUTANEOUS APPROACH: ICD-10-PCS | Performed by: RADIOLOGY

## 2022-03-18 PROCEDURE — 36415 COLL VENOUS BLD VENIPUNCTURE: CPT

## 2022-03-18 PROCEDURE — 36483 ENDOVEN THER CHEM ADHES SBSQ: CPT

## 2022-03-18 RX ORDER — LIDOCAINE HYDROCHLORIDE 20 MG/ML
INJECTION, SOLUTION EPIDURAL; INFILTRATION; INTRACAUDAL; PERINEURAL
Status: COMPLETED
Start: 2022-03-18 | End: 2022-03-18

## 2022-03-18 RX ORDER — MIDAZOLAM HYDROCHLORIDE 1 MG/ML
INJECTION INTRAMUSCULAR; INTRAVENOUS
Status: COMPLETED
Start: 2022-03-18 | End: 2022-03-18

## 2022-03-18 RX ORDER — SODIUM CHLORIDE 9 MG/ML
INJECTION, SOLUTION INTRAVENOUS CONTINUOUS
Status: DISCONTINUED | OUTPATIENT
Start: 2022-03-18 | End: 2022-03-18

## 2022-03-18 RX ORDER — SODIUM TETRADECYL SULFATE 30 MG/ML
INJECTION, SOLUTION INTRAVENOUS
Status: COMPLETED
Start: 2022-03-18 | End: 2022-03-18

## 2022-03-18 RX ADMIN — SODIUM CHLORIDE: 9 INJECTION, SOLUTION INTRAVENOUS at 11:45:00

## 2022-03-18 NOTE — PRE-SEDATION ASSESSMENT
[facility]  IR Pre-Procedure Sedation Assessment    History of snoring or sleep or apnea? No    History of previous problems with anesthesia or sedation  No    Physical Findings:  Neck: nl ROM  CV: RRR  PULM: normal respiratory rate/effort    Mallampati Score:  II (hard and soft palate, upper portion of tonsils anduvula visible)    ASA Classification:   2.  Patient with mild systemic disease    Plan:   -IV moderate sedation    Ivett Tirado MD

## 2022-03-18 NOTE — IVS NOTE
DISCHARGE NOTE     Pt is able to sit up and ambulate without difficulty. Pt voided and tolerated fluids and food. Procedural site remains dry and intact with no signs and symptoms of bleeding/hematoma noted. IV access removed  Instruction provided, patient/family verbalizes understanding. Dr. Carmencita Putnam spoke with patient/family post procedure. Pt discharge via wheelchair to [address] [place] [address] B   Follow up Appointment: as scheduled. New Prescription: None.

## 2022-03-18 NOTE — PROCEDURES
[facility]  Procedure Note    Lena Coulter Patient Status:  Outpatient in a Bed    1960 MRN [id number]   Location [facility] Attending Lexis Camarillo MD   Hosp Day # 0 PCP Marijean Harada, MD     Procedure: Nonthermal ablation of left great saphenous vein and posterior accessory saphenous vein    Pre-Procedure Diagnosis: Venous insufficiency of both lower extremities [I87.2]      Post-Procedure Diagnosis: Venous insufficiency of both lower extremities [I87.2]    Anesthesia:  Local and Sedation    Findings:  Left GSV accessed at ankle. Nonthermal ablation over 73cm to junction. Left PASV accessed at distal calf. Ablation performed over 10cm. 1mL 3% STS injected into 5mm right medial calf  deep to prior ulcer bed. Specimens: None    Blood Loss:  2mL      Complications:  None    Drains:  None    Plan:  Observe for 1 hour. F/U ultrasound in one week.     Lara Roberson MD  3/18/2022

## 2022-03-22 NOTE — TELEPHONE ENCOUNTER
Orthovisc is a series of 3 injections. If his insurance does not approve Monovisc, then he might schedule III consecutive weeks for the Orthovisc injection and the last one will also include corticosteroids. We can try to get him in sooner as he is having severe right-sided knee pain. Cam Barba.  Sudhakar Holder MD, [facility]  Physical Medicine and Rehabilitation/Sports Medicine  MEDICAL [facility]

## 2022-03-22 NOTE — TELEPHONE ENCOUNTER
We received 3 boxes of the Orthovisc, however the order only says \"RIGHT knee Orthovisc and CSI under ultrasound guidance. \"

## 2022-03-24 DIAGNOSIS — E11.9 TYPE 2 DIABETES MELLITUS WITHOUT COMPLICATION, WITHOUT LONG-TERM CURRENT USE OF INSULIN (HCC): ICD-10-CM

## 2022-03-24 DIAGNOSIS — G62.9 NEUROPATHY: ICD-10-CM

## 2022-03-24 NOTE — TELEPHONE ENCOUNTER
Protocol failed / No protocol. Patient is requesting refills through [facility]. Requested Prescriptions   Pending Prescriptions Disp Refills    pregabalin (LYRICA) 100 MG Oral Cap 270 capsule 1     Sig: Take 1 capsule (100 mg total) by mouth 3 (three) times daily. STOP GABAPENTIN.         There is no refill protocol information for this order          Recent Outpatient Visits              1 week ago Type 2 diabetes mellitus without complication, without long-term current use of insulin [facility])    [facility], Ritesh Germain MD    Office Visit    2 weeks ago Patellofemoral pain syndrome, unspecified laterality    [facility] [place] Luis Sanchez MD    Office Visit    3 weeks ago Anxiety and depression    [facility], Gudelia Sky, Jw Brown MD    Telemedicine    1 month ago Fall due to slipping on ice or snow, initial encounter    [facility], Gudelia Sky, Jw Brown MD    Office Visit    2 months ago Diabetes mellitus due to underlying condition with diabetic polyneuropathy, unspecified whether long term insulin use [facility])    [facility], Darinbelinda , [address] [place] [address]    Office Visit           Future Appointments         Provider Department Appt Notes    Tomorrow Katya Nava, APRN; [address] Ultrasound QA-KW  Benefits met    In 4 days Luis Sanchez  [facility] [place] Right knee Orthovisc 1/3    In 1 week Luis Sanchez  [facility] [place] Right knee Orthovisc 2/3    In [facility], [address] [place] [address], [facility], Ritesh [facility]     In 2 weeks Luis Sanchez  [facility] [place] Right knee Orthovisc and CSI 3/3    In 2 months Adeline Luong MD [facility] for [facility], [address], [place] 6 month      In 2 months Rob Wilson MD [address], [place] [address], Luana POC A1c 3m diabetes fu

## 2022-03-25 ENCOUNTER — HOSPITAL ENCOUNTER (OUTPATIENT)
Dept: ULTRASOUND IMAGING | Facility: HOSPITAL | Age: 62
Discharge: HOME | End: 2022-03-25
Attending: RADIOLOGY
Payer: COMMERCIAL

## 2022-03-25 DIAGNOSIS — M79.89 LEFT LEG SWELLING: ICD-10-CM

## 2022-03-25 DIAGNOSIS — M79.605 LEFT LEG PAIN: ICD-10-CM

## 2022-03-25 PROCEDURE — 93971 EXTREMITY STUDY: CPT | Performed by: RADIOLOGY

## 2022-03-25 RX ORDER — PREGABALIN 100 MG/1
100 CAPSULE ORAL 3 TIMES DAILY
Qty: 270 CAPSULE | Refills: 3 | Status: SHIPPED | OUTPATIENT
Start: 2022-03-25 | End: 2022-09-19

## 2022-03-28 ENCOUNTER — OFFICE VISIT (OUTPATIENT)
Dept: PHYSICAL MEDICINE AND REHAB | Facility: CLINIC | Age: 62
End: 2022-03-28
Payer: COMMERCIAL

## 2022-03-28 DIAGNOSIS — M17.11 PRIMARY OSTEOARTHRITIS OF RIGHT KNEE: ICD-10-CM

## 2022-03-28 DIAGNOSIS — E11.69 HYPERLIPIDEMIA ASSOCIATED WITH TYPE 2 DIABETES MELLITUS (HCC): ICD-10-CM

## 2022-03-28 DIAGNOSIS — I15.2 HYPERTENSION ASSOCIATED WITH TYPE 2 DIABETES MELLITUS (HCC): ICD-10-CM

## 2022-03-28 DIAGNOSIS — E03.9 HYPOTHYROIDISM, UNSPECIFIED TYPE: ICD-10-CM

## 2022-03-28 DIAGNOSIS — M22.2X9 PATELLOFEMORAL PAIN SYNDROME, UNSPECIFIED LATERALITY: Primary | ICD-10-CM

## 2022-03-28 DIAGNOSIS — E11.59 HYPERTENSION ASSOCIATED WITH TYPE 2 DIABETES MELLITUS (HCC): ICD-10-CM

## 2022-03-28 DIAGNOSIS — E66.01 CLASS 2 SEVERE OBESITY DUE TO EXCESS CALORIES WITH SERIOUS COMORBIDITY AND BODY MASS INDEX (BMI) OF 38.0 TO 38.9 IN ADULT (HCC): ICD-10-CM

## 2022-03-28 DIAGNOSIS — E11.42 DIABETIC POLYNEUROPATHY ASSOCIATED WITH TYPE 2 DIABETES MELLITUS (HCC): ICD-10-CM

## 2022-03-28 DIAGNOSIS — E78.5 HYPERLIPIDEMIA ASSOCIATED WITH TYPE 2 DIABETES MELLITUS (HCC): ICD-10-CM

## 2022-03-28 PROCEDURE — 20611 DRAIN/INJ JOINT/BURSA W/US: CPT | Performed by: PHYSICAL MEDICINE & REHABILITATION

## 2022-03-28 NOTE — PATIENT INSTRUCTIONS
Post Injection Instructions     1. Please do not do anything strenuous over the next two days (if you had a knee injection do not walk more than 2 city blocks, do not attend any aerobic classes, do not run, no heavy lifting, no prolong standing). 2. You may resume your day to day activities after your injection. 3. You may experience some mild amount of swelling after the procedure. 4. Please ice your joint that was injected at least 5-6 times a day (15 minutes) for two days after (this will help prevent worsening pain that sometimes occurs after an injection). 5. Only take tylenol if needed for pain for the first few days. 6. Watch for signs of infection which include redness, warmth, worsening pain, fevers or chills. If you develop any of these signs call the office immediately at [phone]    Everyone responds differently to injections, but you can expect your peak effects a few weeks after your last injection. Lupillo Milan.  Martha Peralta MD  Physical Medicine and Rehabilitation/Sports Medicine  [facility]

## 2022-04-04 ENCOUNTER — OFFICE VISIT (OUTPATIENT)
Dept: PHYSICAL MEDICINE AND REHAB | Facility: CLINIC | Age: 62
End: 2022-04-04
Payer: COMMERCIAL

## 2022-04-04 DIAGNOSIS — E11.69 HYPERLIPIDEMIA ASSOCIATED WITH TYPE 2 DIABETES MELLITUS (HCC): ICD-10-CM

## 2022-04-04 DIAGNOSIS — M22.2X9 PATELLOFEMORAL PAIN SYNDROME, UNSPECIFIED LATERALITY: Primary | ICD-10-CM

## 2022-04-04 DIAGNOSIS — E11.59 HYPERTENSION ASSOCIATED WITH TYPE 2 DIABETES MELLITUS (HCC): ICD-10-CM

## 2022-04-04 DIAGNOSIS — M17.11 PRIMARY OSTEOARTHRITIS OF RIGHT KNEE: ICD-10-CM

## 2022-04-04 DIAGNOSIS — E66.01 CLASS 2 SEVERE OBESITY DUE TO EXCESS CALORIES WITH SERIOUS COMORBIDITY AND BODY MASS INDEX (BMI) OF 38.0 TO 38.9 IN ADULT (HCC): ICD-10-CM

## 2022-04-04 DIAGNOSIS — I15.2 HYPERTENSION ASSOCIATED WITH TYPE 2 DIABETES MELLITUS (HCC): ICD-10-CM

## 2022-04-04 DIAGNOSIS — E11.42 DIABETIC POLYNEUROPATHY ASSOCIATED WITH TYPE 2 DIABETES MELLITUS (HCC): ICD-10-CM

## 2022-04-04 DIAGNOSIS — E03.9 HYPOTHYROIDISM, UNSPECIFIED TYPE: ICD-10-CM

## 2022-04-04 DIAGNOSIS — E78.5 HYPERLIPIDEMIA ASSOCIATED WITH TYPE 2 DIABETES MELLITUS (HCC): ICD-10-CM

## 2022-04-04 PROCEDURE — 20611 DRAIN/INJ JOINT/BURSA W/US: CPT | Performed by: PHYSICAL MEDICINE & REHABILITATION

## 2022-04-04 RX ORDER — LIDOCAINE HYDROCHLORIDE 10 MG/ML
5 INJECTION, SOLUTION INFILTRATION; PERINEURAL ONCE
Status: COMPLETED | OUTPATIENT
Start: 2022-04-04 | End: 2022-04-04

## 2022-04-04 RX ADMIN — LIDOCAINE HYDROCHLORIDE 5 ML: 10 INJECTION, SOLUTION INFILTRATION; PERINEURAL at 17:40:00

## 2022-04-08 ENCOUNTER — OFFICE VISIT (OUTPATIENT)
Dept: PODIATRY CLINIC | Facility: CLINIC | Age: 62
End: 2022-04-08
Payer: COMMERCIAL

## 2022-04-08 DIAGNOSIS — E08.42 DIABETES MELLITUS DUE TO UNDERLYING CONDITION WITH DIABETIC POLYNEUROPATHY, UNSPECIFIED WHETHER LONG TERM INSULIN USE (HCC): ICD-10-CM

## 2022-04-08 DIAGNOSIS — L84 FOOT CALLUS: ICD-10-CM

## 2022-04-08 DIAGNOSIS — B35.3 TINEA PEDIS OF BOTH FEET: Primary | ICD-10-CM

## 2022-04-08 DIAGNOSIS — M20.42 HAMMERTOE, BILATERAL: ICD-10-CM

## 2022-04-08 DIAGNOSIS — B35.1 ONYCHOMYCOSIS: ICD-10-CM

## 2022-04-08 DIAGNOSIS — M20.41 HAMMERTOE, BILATERAL: ICD-10-CM

## 2022-04-08 DIAGNOSIS — I87.2 VENOUS INSUFFICIENCY OF BOTH LOWER EXTREMITIES: ICD-10-CM

## 2022-04-08 PROCEDURE — 99213 OFFICE O/P EST LOW 20 MIN: CPT | Performed by: PODIATRIST

## 2022-04-08 PROCEDURE — 11721 DEBRIDE NAIL 6 OR MORE: CPT | Performed by: PODIATRIST

## 2022-04-08 RX ORDER — KETOCONAZOLE 20 MG/G
CREAM TOPICAL
Qty: 60 G | Refills: 1 | Status: SHIPPED | OUTPATIENT
Start: 2022-04-08

## 2022-04-11 ENCOUNTER — OFFICE VISIT (OUTPATIENT)
Dept: PHYSICAL MEDICINE AND REHAB | Facility: CLINIC | Age: 62
End: 2022-04-11
Payer: COMMERCIAL

## 2022-04-11 DIAGNOSIS — M22.2X9 PATELLOFEMORAL PAIN SYNDROME, UNSPECIFIED LATERALITY: Primary | ICD-10-CM

## 2022-04-11 DIAGNOSIS — M17.11 PRIMARY OSTEOARTHRITIS OF RIGHT KNEE: ICD-10-CM

## 2022-04-11 DIAGNOSIS — E03.9 HYPOTHYROIDISM, UNSPECIFIED TYPE: ICD-10-CM

## 2022-04-11 DIAGNOSIS — E78.5 HYPERLIPIDEMIA ASSOCIATED WITH TYPE 2 DIABETES MELLITUS (HCC): ICD-10-CM

## 2022-04-11 DIAGNOSIS — I15.2 HYPERTENSION ASSOCIATED WITH TYPE 2 DIABETES MELLITUS (HCC): ICD-10-CM

## 2022-04-11 DIAGNOSIS — E11.42 DIABETIC POLYNEUROPATHY ASSOCIATED WITH TYPE 2 DIABETES MELLITUS (HCC): ICD-10-CM

## 2022-04-11 DIAGNOSIS — E11.69 HYPERLIPIDEMIA ASSOCIATED WITH TYPE 2 DIABETES MELLITUS (HCC): ICD-10-CM

## 2022-04-11 DIAGNOSIS — E66.01 CLASS 2 SEVERE OBESITY DUE TO EXCESS CALORIES WITH SERIOUS COMORBIDITY AND BODY MASS INDEX (BMI) OF 38.0 TO 38.9 IN ADULT (HCC): ICD-10-CM

## 2022-04-11 DIAGNOSIS — E11.59 HYPERTENSION ASSOCIATED WITH TYPE 2 DIABETES MELLITUS (HCC): ICD-10-CM

## 2022-04-11 PROCEDURE — 20611 DRAIN/INJ JOINT/BURSA W/US: CPT | Performed by: PHYSICAL MEDICINE & REHABILITATION

## 2022-04-11 RX ORDER — LIDOCAINE HYDROCHLORIDE 10 MG/ML
7 INJECTION, SOLUTION INFILTRATION; PERINEURAL ONCE
Status: COMPLETED | OUTPATIENT
Start: 2022-04-11 | End: 2022-04-11

## 2022-04-11 RX ORDER — TRIAMCINOLONE ACETONIDE 40 MG/ML
40 INJECTION, SUSPENSION INTRA-ARTICULAR; INTRAMUSCULAR ONCE
Status: COMPLETED | OUTPATIENT
Start: 2022-04-11 | End: 2022-04-11

## 2022-04-11 NOTE — PATIENT INSTRUCTIONS
Post Injection Instructions     1. Please do not do anything strenuous over the next two days (if you had a knee injection do not walk more than 2 city blocks, do not attend any aerobic classes, do not run, no heavy lifting, no prolong standing). 2. You may resume your day to day activities after your injection. 3. You may experience some mild amount of swelling after the procedure. 4. Please ice your joint that was injected at least 5-6 times a day (15 minutes) for two days after (this will help prevent worsening pain that sometimes occurs after an injection). 5. Only take tylenol if needed for pain for the first few days. 6. Watch for signs of infection which include redness, warmth, worsening pain, fevers or chills. If you develop any of these signs call the office immediately at [phone]    Everyone responds differently to injections, but you can expect your peak effects a few weeks after your last injection. Flaquita Simms.  Tyronne Spatz MD  Physical Medicine and Rehabilitation/Sports Medicine  MEDICAL CENTER [facility]

## 2022-05-05 ENCOUNTER — TELEMEDICINE (OUTPATIENT)
Dept: INTERNAL MEDICINE CLINIC | Facility: CLINIC | Age: 62
End: 2022-05-05

## 2022-05-05 DIAGNOSIS — R25.2 SPASM: Primary | ICD-10-CM

## 2022-05-05 DIAGNOSIS — M16.0 BILATERAL HIP JOINT ARTHRITIS: ICD-10-CM

## 2022-05-05 PROBLEM — F32.2 MAJOR DEPRESSIVE DISORDER, SINGLE EPISODE, SEVERE (HCC): Status: RESOLVED | Noted: 2022-02-21 | Resolved: 2022-05-05

## 2022-05-05 PROCEDURE — 99213 OFFICE O/P EST LOW 20 MIN: CPT | Performed by: INTERNAL MEDICINE

## 2022-05-05 NOTE — PATIENT INSTRUCTIONS
1. Start OTC magnesium 400mg daily for muscle spasms. 2. Look up Ema Boas and Edwin Speaker the physical therapy guys on youtube and please perform their exercises for hip and knee pain as well as hip flexor stretches.

## 2022-05-13 ENCOUNTER — APPOINTMENT (OUTPATIENT)
Dept: GENERAL RADIOLOGY | Age: 62
End: 2022-05-13
Attending: PHYSICIAN ASSISTANT
Payer: COMMERCIAL

## 2022-05-13 ENCOUNTER — HOSPITAL ENCOUNTER (OUTPATIENT)
Age: 62
Discharge: HOME | End: 2022-05-13
Payer: COMMERCIAL

## 2022-05-13 VITALS
HEIGHT: 74 IN | TEMPERATURE: 98 F | RESPIRATION RATE: 18 BRPM | DIASTOLIC BLOOD PRESSURE: 40 MMHG | SYSTOLIC BLOOD PRESSURE: 119 MMHG | HEART RATE: 91 BPM | OXYGEN SATURATION: 96 % | BODY MASS INDEX: 40.43 KG/M2 | WEIGHT: 315 LBS

## 2022-05-13 DIAGNOSIS — M79.672 LEFT FOOT PAIN: Primary | ICD-10-CM

## 2022-05-13 PROCEDURE — 73630 X-RAY EXAM OF FOOT: CPT | Performed by: PHYSICIAN ASSISTANT

## 2022-05-13 PROCEDURE — 99213 OFFICE O/P EST LOW 20 MIN: CPT | Performed by: PHYSICIAN ASSISTANT

## 2022-06-01 ENCOUNTER — OFFICE VISIT (OUTPATIENT)
Dept: SURGERY | Facility: CLINIC | Age: 62
End: 2022-06-01
Payer: COMMERCIAL

## 2022-06-01 DIAGNOSIS — F15.10 CAFFEINE ABUSE (HCC): ICD-10-CM

## 2022-06-01 DIAGNOSIS — Z87.891 FORMER SMOKER: ICD-10-CM

## 2022-06-01 DIAGNOSIS — Z80.42 FAMILY HISTORY OF PROSTATE CANCER: ICD-10-CM

## 2022-06-01 DIAGNOSIS — N52.01 ERECTILE DYSFUNCTION DUE TO ARTERIAL INSUFFICIENCY: ICD-10-CM

## 2022-06-01 DIAGNOSIS — N39.41 URGE INCONTINENCE: ICD-10-CM

## 2022-06-01 DIAGNOSIS — R39.14 BENIGN PROSTATIC HYPERPLASIA WITH INCOMPLETE BLADDER EMPTYING: Primary | ICD-10-CM

## 2022-06-01 DIAGNOSIS — N40.1 BENIGN PROSTATIC HYPERPLASIA WITH INCOMPLETE BLADDER EMPTYING: Primary | ICD-10-CM

## 2022-06-01 DIAGNOSIS — E13.40 OTHER SPECIFIED DIABETES MELLITUS WITH DIABETIC NEUROPATHY, UNSPECIFIED WHETHER LONG TERM INSULIN USE (HCC): ICD-10-CM

## 2022-06-01 DIAGNOSIS — Z12.5 PROSTATE CANCER SCREENING: ICD-10-CM

## 2022-06-01 DIAGNOSIS — R35.1 NOCTURIA: ICD-10-CM

## 2022-06-01 LAB
BILIRUB UR QL: NEGATIVE
CLARITY UR: CLEAR
COLOR UR: YELLOW
GLUCOSE UR-MCNC: >=500 MG/DL
HGB UR QL STRIP.AUTO: NEGATIVE
LEUKOCYTE ESTERASE UR QL STRIP.AUTO: NEGATIVE
NITRITE UR QL STRIP.AUTO: NEGATIVE
PH UR: 6 [PH] (ref 5–8)
PROT UR-MCNC: NEGATIVE MG/DL
SP GR UR STRIP: 1.03 (ref 1–1.03)
UROBILINOGEN UR STRIP-ACNC: <2
VIT C UR-MCNC: NEGATIVE MG/DL

## 2022-06-01 PROCEDURE — 99214 OFFICE O/P EST MOD 30 MIN: CPT | Performed by: UROLOGY

## 2022-06-01 RX ORDER — TAMSULOSIN HYDROCHLORIDE 0.4 MG/1
0.4 CAPSULE ORAL DAILY
Qty: 90 CAPSULE | Refills: 3 | Status: SHIPPED | OUTPATIENT
Start: 2022-06-01 | End: 2023-06-01

## 2022-06-01 NOTE — PATIENT INSTRUCTIONS
Tasha Kline M.D.    1.  Continue finasteride 5 mg daily-very important to take it because your prostate is very large  There is no reason to take 2 of these finasteride--1 is more than enough    2. Please cut back on consumption of water by at least 1 bottle 16.9 fluid ounces. This is because you are still very much bothered by urinary urge and leakage of urine when it will make it to the bathroom in time; presently you are consuming about 3 L of liquids a day which will cause frequent urination. 3.  Urine specimen today for complete urinalysis and urine for cytology--we will notify you of the results. 4.  Restart tamsulosin 0.4 mg daily to try to improve the way you urinate; works completely differently than finasteride    5. Return visit in 6 months with urology nurse practitioner RAHEEL Haywood. Please get blood draw for PSA prostate cancer screening and also submit another urine specimen 1--10 days just before the visit.   Remember NO sexual stimulation whatsoever for  5 days before the blood test

## 2022-06-03 ENCOUNTER — OFFICE VISIT (OUTPATIENT)
Dept: PODIATRY CLINIC | Facility: CLINIC | Age: 62
End: 2022-06-03
Payer: COMMERCIAL

## 2022-06-03 VITALS — HEART RATE: 73 BPM | DIASTOLIC BLOOD PRESSURE: 69 MMHG | SYSTOLIC BLOOD PRESSURE: 124 MMHG

## 2022-06-03 DIAGNOSIS — M79.672 LEFT FOOT PAIN: ICD-10-CM

## 2022-06-03 DIAGNOSIS — L84 FOOT CALLUS: ICD-10-CM

## 2022-06-03 DIAGNOSIS — S90.852A FOREIGN BODY IN LEFT FOOT, INITIAL ENCOUNTER: Primary | ICD-10-CM

## 2022-06-03 DIAGNOSIS — E08.42 DIABETES MELLITUS DUE TO UNDERLYING CONDITION WITH DIABETIC POLYNEUROPATHY, UNSPECIFIED WHETHER LONG TERM INSULIN USE (HCC): ICD-10-CM

## 2022-06-03 NOTE — PROGRESS NOTES
Per Verbal orders from Dr. Patito Singh, draw up 3ml of 0.5% Marcaine with Epinephrine for injection to Left foot.  Yan Mchugh

## 2022-06-13 ENCOUNTER — TELEPHONE (OUTPATIENT)
Dept: NEUROLOGY | Facility: CLINIC | Age: 62
End: 2022-06-13

## 2022-06-13 ENCOUNTER — OFFICE VISIT (OUTPATIENT)
Dept: PHYSICAL MEDICINE AND REHAB | Facility: CLINIC | Age: 62
End: 2022-06-13
Payer: COMMERCIAL

## 2022-06-13 VITALS
WEIGHT: 315 LBS | OXYGEN SATURATION: 94 % | HEIGHT: 74 IN | HEART RATE: 79 BPM | DIASTOLIC BLOOD PRESSURE: 64 MMHG | BODY MASS INDEX: 40.43 KG/M2 | SYSTOLIC BLOOD PRESSURE: 112 MMHG

## 2022-06-13 DIAGNOSIS — E11.59 HYPERTENSION ASSOCIATED WITH TYPE 2 DIABETES MELLITUS (HCC): ICD-10-CM

## 2022-06-13 DIAGNOSIS — E11.42 DIABETIC POLYNEUROPATHY ASSOCIATED WITH TYPE 2 DIABETES MELLITUS (HCC): ICD-10-CM

## 2022-06-13 DIAGNOSIS — E11.69 HYPERLIPIDEMIA ASSOCIATED WITH TYPE 2 DIABETES MELLITUS (HCC): ICD-10-CM

## 2022-06-13 DIAGNOSIS — M17.11 PRIMARY OSTEOARTHRITIS OF RIGHT KNEE: ICD-10-CM

## 2022-06-13 DIAGNOSIS — M22.2X9 PATELLOFEMORAL PAIN SYNDROME, UNSPECIFIED LATERALITY: Primary | ICD-10-CM

## 2022-06-13 DIAGNOSIS — E78.5 HYPERLIPIDEMIA ASSOCIATED WITH TYPE 2 DIABETES MELLITUS (HCC): ICD-10-CM

## 2022-06-13 DIAGNOSIS — E03.9 HYPOTHYROIDISM, UNSPECIFIED TYPE: ICD-10-CM

## 2022-06-13 DIAGNOSIS — E66.01 CLASS 2 SEVERE OBESITY DUE TO EXCESS CALORIES WITH SERIOUS COMORBIDITY AND BODY MASS INDEX (BMI) OF 38.0 TO 38.9 IN ADULT (HCC): ICD-10-CM

## 2022-06-13 DIAGNOSIS — I15.2 HYPERTENSION ASSOCIATED WITH TYPE 2 DIABETES MELLITUS (HCC): ICD-10-CM

## 2022-06-13 PROCEDURE — 3074F SYST BP LT 130 MM HG: CPT | Performed by: PHYSICAL MEDICINE & REHABILITATION

## 2022-06-13 PROCEDURE — 3008F BODY MASS INDEX DOCD: CPT | Performed by: PHYSICAL MEDICINE & REHABILITATION

## 2022-06-13 PROCEDURE — 3078F DIAST BP <80 MM HG: CPT | Performed by: PHYSICAL MEDICINE & REHABILITATION

## 2022-06-13 PROCEDURE — 99214 OFFICE O/P EST MOD 30 MIN: CPT | Performed by: PHYSICAL MEDICINE & REHABILITATION

## 2022-06-13 RX ORDER — NAPROXEN 500 MG/1
500 TABLET ORAL 2 TIMES DAILY WITH MEALS
Qty: 60 TABLET | Refills: 0 | Status: SHIPPED | OUTPATIENT
Start: 2022-06-13

## 2022-06-13 NOTE — PATIENT INSTRUCTIONS
1 My office will call you to schedule the RIGHT knee CSI under ultrasound guidance once the procedure is approved by your insurance carrier. This can be done after jUly 11, 2022  2) Take Naprosyn 500 mg 1 tablet twice per day with food for the next two weeks and then as needed but no more than 2 tablets per day. Do not take with any other NSAIDS (Ibuprofen, Advil, Aleve, etc). OK to take Tylenol 500 mg every 6 hours as needed for pain. If you develop any side effects including stomach aches, nausea, vomiting, or other gastrointestinal symptoms, stop the medication and call my office. 3) Tylenol 500-1000 mg every 6-8 hours as needed for pain. No more than 3000 mg daily.   4) Continue home exercises and weight loss program

## 2022-06-13 NOTE — TELEPHONE ENCOUNTER
Called [facility] for authorization of approval of RIGHT knee CSI under ultrasound guidance after July 11, 2022 cpt codes 52163, . Dx: M17.11, M22.2X9. S/w Helga Loco. Authorization is not required. Reference # [id number]. Will  inform Nursing.

## 2022-06-16 ENCOUNTER — LAB ENCOUNTER (OUTPATIENT)
Dept: LAB | Age: 62
End: 2022-06-16
Attending: INTERNAL MEDICINE
Payer: COMMERCIAL

## 2022-06-16 ENCOUNTER — OFFICE VISIT (OUTPATIENT)
Dept: INTERNAL MEDICINE CLINIC | Facility: CLINIC | Age: 62
End: 2022-06-16
Payer: COMMERCIAL

## 2022-06-16 VITALS
DIASTOLIC BLOOD PRESSURE: 76 MMHG | WEIGHT: 315 LBS | HEIGHT: 74 IN | HEART RATE: 75 BPM | SYSTOLIC BLOOD PRESSURE: 154 MMHG | BODY MASS INDEX: 40.43 KG/M2

## 2022-06-16 DIAGNOSIS — E11.51 TYPE 2 DIABETES MELLITUS WITH DIABETIC PERIPHERAL ANGIOPATHY WITHOUT GANGRENE, WITHOUT LONG-TERM CURRENT USE OF INSULIN (HCC): ICD-10-CM

## 2022-06-16 DIAGNOSIS — E11.59 HYPERTENSION ASSOCIATED WITH TYPE 2 DIABETES MELLITUS (HCC): ICD-10-CM

## 2022-06-16 DIAGNOSIS — E11.59 HYPERTENSION ASSOCIATED WITH TYPE 2 DIABETES MELLITUS (HCC): Primary | ICD-10-CM

## 2022-06-16 DIAGNOSIS — E78.5 HYPERLIPIDEMIA ASSOCIATED WITH TYPE 2 DIABETES MELLITUS (HCC): ICD-10-CM

## 2022-06-16 DIAGNOSIS — E11.69 HYPERLIPIDEMIA ASSOCIATED WITH TYPE 2 DIABETES MELLITUS (HCC): ICD-10-CM

## 2022-06-16 DIAGNOSIS — I15.2 HYPERTENSION ASSOCIATED WITH TYPE 2 DIABETES MELLITUS (HCC): ICD-10-CM

## 2022-06-16 DIAGNOSIS — I15.2 HYPERTENSION ASSOCIATED WITH TYPE 2 DIABETES MELLITUS (HCC): Primary | ICD-10-CM

## 2022-06-16 DIAGNOSIS — R25.2 SPASM: ICD-10-CM

## 2022-06-16 DIAGNOSIS — L24.A9 WOUND DRAINAGE: ICD-10-CM

## 2022-06-16 DIAGNOSIS — Z98.890 HISTORY OF CAROTID ENDARTERECTOMY: ICD-10-CM

## 2022-06-16 LAB
ALBUMIN SERPL-MCNC: 3.3 G/DL (ref 3.4–5)
ALBUMIN/GLOB SERPL: 0.8 {RATIO} (ref 1–2)
ALP LIVER SERPL-CCNC: 125 U/L
ALT SERPL-CCNC: 43 U/L
ANION GAP SERPL CALC-SCNC: 6 MMOL/L (ref 0–18)
AST SERPL-CCNC: 21 U/L (ref 15–37)
BILIRUB SERPL-MCNC: 0.4 MG/DL (ref 0.1–2)
BUN BLD-MCNC: 20 MG/DL (ref 7–18)
BUN/CREAT SERPL: 21.7 (ref 10–20)
CALCIUM BLD-MCNC: 9.4 MG/DL (ref 8.5–10.1)
CHLORIDE SERPL-SCNC: 105 MMOL/L (ref 98–112)
CHOLEST SERPL-MCNC: 127 MG/DL (ref ?–200)
CO2 SERPL-SCNC: 30 MMOL/L (ref 21–32)
CREAT BLD-MCNC: 0.92 MG/DL
CREAT UR-SCNC: 83.5 MG/DL
EST. AVERAGE GLUCOSE BLD GHB EST-MCNC: 200 MG/DL (ref 68–126)
FASTING PATIENT LIPID ANSWER: NO
FASTING STATUS PATIENT QL REPORTED: NO
GLOBULIN PLAS-MCNC: 3.9 G/DL (ref 2.8–4.4)
GLUCOSE BLD-MCNC: 285 MG/DL (ref 70–99)
HBA1C MFR BLD: 8.6 % (ref ?–5.7)
HDLC SERPL-MCNC: 45 MG/DL (ref 40–59)
LDLC SERPL CALC-MCNC: 55 MG/DL (ref ?–100)
MAGNESIUM SERPL-MCNC: 2.2 MG/DL (ref 1.6–2.6)
MICROALBUMIN UR-MCNC: 3.52 MG/DL
MICROALBUMIN/CREAT 24H UR-RTO: 42.2 UG/MG (ref ?–30)
NONHDLC SERPL-MCNC: 82 MG/DL (ref ?–130)
OSMOLALITY SERPL CALC.SUM OF ELEC: 305 MOSM/KG (ref 275–295)
POTASSIUM SERPL-SCNC: 3.9 MMOL/L (ref 3.5–5.1)
PROT SERPL-MCNC: 7.2 G/DL (ref 6.4–8.2)
SODIUM SERPL-SCNC: 141 MMOL/L (ref 136–145)
TRIGL SERPL-MCNC: 158 MG/DL (ref 30–149)
VLDLC SERPL CALC-MCNC: 23 MG/DL (ref 0–30)

## 2022-06-16 PROCEDURE — 83036 HEMOGLOBIN GLYCOSYLATED A1C: CPT

## 2022-06-16 PROCEDURE — 3052F HG A1C>EQUAL 8.0%<EQUAL 9.0%: CPT | Performed by: INTERNAL MEDICINE

## 2022-06-16 PROCEDURE — 82570 ASSAY OF URINE CREATININE: CPT

## 2022-06-16 PROCEDURE — 99214 OFFICE O/P EST MOD 30 MIN: CPT | Performed by: INTERNAL MEDICINE

## 2022-06-16 PROCEDURE — 82043 UR ALBUMIN QUANTITATIVE: CPT

## 2022-06-16 PROCEDURE — 36415 COLL VENOUS BLD VENIPUNCTURE: CPT

## 2022-06-16 PROCEDURE — 80061 LIPID PANEL: CPT

## 2022-06-16 PROCEDURE — 3008F BODY MASS INDEX DOCD: CPT | Performed by: INTERNAL MEDICINE

## 2022-06-16 PROCEDURE — 3078F DIAST BP <80 MM HG: CPT | Performed by: INTERNAL MEDICINE

## 2022-06-16 PROCEDURE — 83735 ASSAY OF MAGNESIUM: CPT

## 2022-06-16 PROCEDURE — 3077F SYST BP >= 140 MM HG: CPT | Performed by: INTERNAL MEDICINE

## 2022-06-16 PROCEDURE — 80053 COMPREHEN METABOLIC PANEL: CPT

## 2022-06-16 RX ORDER — ATORVASTATIN CALCIUM 40 MG/1
40 TABLET, FILM COATED ORAL NIGHTLY
Qty: 90 TABLET | Refills: 3 | Status: SHIPPED | OUTPATIENT
Start: 2022-06-16

## 2022-06-16 RX ORDER — AMLODIPINE BESYLATE 5 MG/1
5 TABLET ORAL DAILY
Qty: 90 TABLET | Refills: 3 | Status: SHIPPED | OUTPATIENT
Start: 2022-06-16 | End: 2023-06-11

## 2022-06-16 RX ORDER — VALSARTAN 160 MG/1
160 TABLET ORAL DAILY
Qty: 90 TABLET | Refills: 3 | Status: SHIPPED | OUTPATIENT
Start: 2022-06-16

## 2022-06-16 RX ORDER — GLYBURIDE 2.5 MG/1
2.5 TABLET ORAL
Qty: 90 TABLET | Refills: 3 | Status: SHIPPED | OUTPATIENT
Start: 2022-06-16

## 2022-06-16 RX ORDER — METFORMIN HYDROCHLORIDE 500 MG/1
1000 TABLET, EXTENDED RELEASE ORAL 2 TIMES DAILY WITH MEALS
Qty: 360 TABLET | Refills: 3 | Status: SHIPPED | OUTPATIENT
Start: 2022-06-16

## 2022-06-17 ENCOUNTER — OFFICE VISIT (OUTPATIENT)
Dept: PODIATRY CLINIC | Facility: CLINIC | Age: 62
End: 2022-06-17
Payer: COMMERCIAL

## 2022-06-17 ENCOUNTER — TELEPHONE (OUTPATIENT)
Dept: INTERNAL MEDICINE CLINIC | Facility: CLINIC | Age: 62
End: 2022-06-17

## 2022-06-17 DIAGNOSIS — M20.41 HAMMERTOE, BILATERAL: ICD-10-CM

## 2022-06-17 DIAGNOSIS — L84 FOOT CALLUS: ICD-10-CM

## 2022-06-17 DIAGNOSIS — E08.42 DIABETES MELLITUS DUE TO UNDERLYING CONDITION WITH DIABETIC POLYNEUROPATHY, UNSPECIFIED WHETHER LONG TERM INSULIN USE (HCC): ICD-10-CM

## 2022-06-17 DIAGNOSIS — I87.2 VENOUS INSUFFICIENCY OF BOTH LOWER EXTREMITIES: ICD-10-CM

## 2022-06-17 DIAGNOSIS — M20.42 HAMMERTOE, BILATERAL: ICD-10-CM

## 2022-06-17 DIAGNOSIS — B35.1 ONYCHOMYCOSIS: ICD-10-CM

## 2022-06-17 DIAGNOSIS — S90.852D FOREIGN BODY IN LEFT FOOT, SUBSEQUENT ENCOUNTER: Primary | ICD-10-CM

## 2022-06-17 NOTE — TELEPHONE ENCOUNTER
Called [facility] on [place] for Semaglutide,0.25 or 0.5MG/DOS, 2 MG/1.5ML Subcutaneous Solution Pen-injector. After being transferred to multiple people, was informed that medication does not need a PA. Total phone time was 46 minutes.

## 2022-06-21 RX ORDER — KETOCONAZOLE 20 MG/G
CREAM TOPICAL
Qty: 60 G | Refills: 1 | Status: SHIPPED | OUTPATIENT
Start: 2022-06-21

## 2022-06-29 ENCOUNTER — TELEPHONE (OUTPATIENT)
Dept: SURGERY | Facility: CLINIC | Age: 62
End: 2022-06-29

## 2022-06-29 DIAGNOSIS — N13.8 BPH WITH OBSTRUCTION/LOWER URINARY TRACT SYMPTOMS: Primary | ICD-10-CM

## 2022-06-29 DIAGNOSIS — N40.1 BPH WITH OBSTRUCTION/LOWER URINARY TRACT SYMPTOMS: Primary | ICD-10-CM

## 2022-07-01 NOTE — TELEPHONE ENCOUNTER
Spoke with pt and pt states he has been taking tamsulosin BID and states he has noticed improvement in urge incontinence. Denies side effects such as dizziness, lightheadness. Pharmacy requesting updated order. Notified pt that dr is out of office today but will check with him next week to verify that it is ok to increase dose. Verbalized understanding.

## 2022-07-03 ENCOUNTER — HOSPITAL ENCOUNTER (EMERGENCY)
Facility: HOSPITAL | Age: 62
Discharge: HOME | End: 2022-07-03
Attending: EMERGENCY MEDICINE
Payer: COMMERCIAL

## 2022-07-03 VITALS
SYSTOLIC BLOOD PRESSURE: 124 MMHG | RESPIRATION RATE: 20 BRPM | OXYGEN SATURATION: 97 % | DIASTOLIC BLOOD PRESSURE: 69 MMHG | HEART RATE: 63 BPM | TEMPERATURE: 99 F

## 2022-07-03 DIAGNOSIS — E11.622 DIABETIC ULCER OF LOWER LEG (HCC): Primary | ICD-10-CM

## 2022-07-03 DIAGNOSIS — L97.909 DIABETIC ULCER OF LOWER LEG (HCC): Primary | ICD-10-CM

## 2022-07-03 LAB
ANION GAP SERPL CALC-SCNC: 2 MMOL/L (ref 0–18)
BASOPHILS # BLD AUTO: 0.06 X10(3) UL (ref 0–0.2)
BASOPHILS NFR BLD AUTO: 0.6 %
BUN BLD-MCNC: 12 MG/DL (ref 7–18)
BUN/CREAT SERPL: 13.5 (ref 10–20)
CALCIUM BLD-MCNC: 9.3 MG/DL (ref 8.5–10.1)
CHLORIDE SERPL-SCNC: 105 MMOL/L (ref 98–112)
CO2 SERPL-SCNC: 34 MMOL/L (ref 21–32)
CREAT BLD-MCNC: 0.89 MG/DL
DEPRECATED RDW RBC AUTO: 48.5 FL (ref 35.1–46.3)
EOSINOPHIL # BLD AUTO: 0.37 X10(3) UL (ref 0–0.7)
EOSINOPHIL NFR BLD AUTO: 3.8 %
ERYTHROCYTE [DISTWIDTH] IN BLOOD BY AUTOMATED COUNT: 13.9 % (ref 11–15)
GLUCOSE BLD-MCNC: 123 MG/DL (ref 70–99)
HCT VFR BLD AUTO: 46.6 %
HGB BLD-MCNC: 14.2 G/DL
IMM GRANULOCYTES # BLD AUTO: 0.03 X10(3) UL (ref 0–1)
IMM GRANULOCYTES NFR BLD: 0.3 %
LYMPHOCYTES # BLD AUTO: 2.52 X10(3) UL (ref 1–4)
LYMPHOCYTES NFR BLD AUTO: 25.6 %
MCH RBC QN AUTO: 28.9 PG (ref 26–34)
MCHC RBC AUTO-ENTMCNC: 30.5 G/DL (ref 31–37)
MCV RBC AUTO: 94.9 FL
MONOCYTES # BLD AUTO: 1.08 X10(3) UL (ref 0.1–1)
MONOCYTES NFR BLD AUTO: 11 %
NEUTROPHILS # BLD AUTO: 5.8 X10 (3) UL (ref 1.5–7.7)
NEUTROPHILS # BLD AUTO: 5.8 X10(3) UL (ref 1.5–7.7)
NEUTROPHILS NFR BLD AUTO: 58.7 %
OSMOLALITY SERPL CALC.SUM OF ELEC: 293 MOSM/KG (ref 275–295)
PLATELET # BLD AUTO: 257 10(3)UL (ref 150–450)
POTASSIUM SERPL-SCNC: 4.2 MMOL/L (ref 3.5–5.1)
RBC # BLD AUTO: 4.91 X10(6)UL
SODIUM SERPL-SCNC: 141 MMOL/L (ref 136–145)
WBC # BLD AUTO: 9.9 X10(3) UL (ref 4–11)

## 2022-07-03 PROCEDURE — 96365 THER/PROPH/DIAG IV INF INIT: CPT

## 2022-07-03 PROCEDURE — 99284 EMERGENCY DEPT VISIT MOD MDM: CPT

## 2022-07-03 PROCEDURE — 87070 CULTURE OTHR SPECIMN AEROBIC: CPT | Performed by: EMERGENCY MEDICINE

## 2022-07-03 PROCEDURE — 87077 CULTURE AEROBIC IDENTIFY: CPT | Performed by: EMERGENCY MEDICINE

## 2022-07-03 PROCEDURE — 80048 BASIC METABOLIC PNL TOTAL CA: CPT | Performed by: EMERGENCY MEDICINE

## 2022-07-03 PROCEDURE — 87205 SMEAR GRAM STAIN: CPT | Performed by: EMERGENCY MEDICINE

## 2022-07-03 PROCEDURE — 85025 COMPLETE CBC W/AUTO DIFF WBC: CPT | Performed by: EMERGENCY MEDICINE

## 2022-07-03 RX ORDER — DOXYCYCLINE HYCLATE 100 MG/1
100 CAPSULE ORAL 2 TIMES DAILY
Qty: 14 CAPSULE | Refills: 0 | Status: SHIPPED | OUTPATIENT
Start: 2022-07-03 | End: 2022-07-03

## 2022-07-03 RX ORDER — LEVOFLOXACIN 750 MG/1
750 TABLET ORAL ONCE
Status: COMPLETED | OUTPATIENT
Start: 2022-07-03 | End: 2022-07-03

## 2022-07-03 RX ORDER — LEVOFLOXACIN 750 MG/1
750 TABLET ORAL DAILY
Qty: 7 TABLET | Refills: 0 | Status: SHIPPED | OUTPATIENT
Start: 2022-07-03 | End: 2022-07-03

## 2022-07-03 RX ORDER — DOXYCYCLINE HYCLATE 100 MG/1
100 CAPSULE ORAL 2 TIMES DAILY
Qty: 14 CAPSULE | Refills: 0 | Status: SHIPPED | OUTPATIENT
Start: 2022-07-03 | End: 2022-07-10

## 2022-07-03 RX ORDER — LEVOFLOXACIN 750 MG/1
750 TABLET ORAL DAILY
Qty: 7 TABLET | Refills: 0 | Status: SHIPPED | OUTPATIENT
Start: 2022-07-03 | End: 2022-07-10

## 2022-07-03 NOTE — ED INITIAL ASSESSMENT (HPI)
Pt to ED by self with concerns for bilateral lower leg redness/weeping for about one month. Pt states he saw PCP and given RX for bacitracin ointment. Pt denies fever. Pt with hx of diabetes. Pt ambulating by self with steady gait.

## 2022-07-05 RX ORDER — TAMSULOSIN HYDROCHLORIDE 0.4 MG/1
0.8 CAPSULE ORAL DAILY
Qty: 180 CAPSULE | Refills: 3 | Status: SHIPPED | OUTPATIENT
Start: 2022-07-05 | End: 2022-09-20

## 2022-07-05 NOTE — TELEPHONE ENCOUNTER
Urology nurses,    I electronically signed and sent order to [facility] [id number], refill 3.     Thank you,    Dr. Mobley

## 2022-07-11 ENCOUNTER — OFFICE VISIT (OUTPATIENT)
Dept: PHYSICAL MEDICINE AND REHAB | Facility: CLINIC | Age: 62
End: 2022-07-11
Payer: COMMERCIAL

## 2022-07-11 ENCOUNTER — TELEPHONE (OUTPATIENT)
Dept: NEUROLOGY | Facility: CLINIC | Age: 62
End: 2022-07-11

## 2022-07-11 VITALS
WEIGHT: 315 LBS | SYSTOLIC BLOOD PRESSURE: 128 MMHG | BODY MASS INDEX: 40.43 KG/M2 | HEIGHT: 74 IN | DIASTOLIC BLOOD PRESSURE: 72 MMHG

## 2022-07-11 DIAGNOSIS — E11.59 HYPERTENSION ASSOCIATED WITH TYPE 2 DIABETES MELLITUS (HCC): ICD-10-CM

## 2022-07-11 DIAGNOSIS — E11.42 DIABETIC POLYNEUROPATHY ASSOCIATED WITH TYPE 2 DIABETES MELLITUS (HCC): ICD-10-CM

## 2022-07-11 DIAGNOSIS — M22.2X9 PATELLOFEMORAL PAIN SYNDROME, UNSPECIFIED LATERALITY: Primary | ICD-10-CM

## 2022-07-11 DIAGNOSIS — E11.69 HYPERLIPIDEMIA ASSOCIATED WITH TYPE 2 DIABETES MELLITUS (HCC): ICD-10-CM

## 2022-07-11 DIAGNOSIS — E66.01 CLASS 2 SEVERE OBESITY DUE TO EXCESS CALORIES WITH SERIOUS COMORBIDITY AND BODY MASS INDEX (BMI) OF 38.0 TO 38.9 IN ADULT (HCC): ICD-10-CM

## 2022-07-11 DIAGNOSIS — I15.2 HYPERTENSION ASSOCIATED WITH TYPE 2 DIABETES MELLITUS (HCC): ICD-10-CM

## 2022-07-11 DIAGNOSIS — E78.5 HYPERLIPIDEMIA ASSOCIATED WITH TYPE 2 DIABETES MELLITUS (HCC): ICD-10-CM

## 2022-07-11 DIAGNOSIS — M17.11 PRIMARY OSTEOARTHRITIS OF RIGHT KNEE: ICD-10-CM

## 2022-07-11 DIAGNOSIS — E03.9 HYPOTHYROIDISM, UNSPECIFIED TYPE: ICD-10-CM

## 2022-07-11 RX ORDER — TRIAMCINOLONE ACETONIDE 40 MG/ML
40 INJECTION, SUSPENSION INTRA-ARTICULAR; INTRAMUSCULAR ONCE
Status: COMPLETED | OUTPATIENT
Start: 2022-07-11 | End: 2022-07-11

## 2022-07-11 RX ORDER — LIDOCAINE HYDROCHLORIDE 10 MG/ML
9 INJECTION, SOLUTION INFILTRATION; PERINEURAL ONCE
Status: COMPLETED | OUTPATIENT
Start: 2022-07-11 | End: 2022-07-11

## 2022-07-11 NOTE — TELEPHONE ENCOUNTER
RIGHT knee CSi under ultrasound guidance cpt code: 01750, j3301. STATUS: APPROVED -NO PRE-CERTIFICAITON IS REQUIRED     Called [employer] for authorization of approval for above. Spoke to [address] [place] [address] who states no authorization is required for CPT CODE: 75963. Call transferred to [address] [place] [address] to verify benefits for CPT CDE     Spoke to Juan who states, patient does not have [place] pharmacy benefits under [place], call transferred to  [employer] Reference call # [id number]       Spoke to Kieran estrada who state PA is not required     Reference call# Nan Gary 12:44p 07/11/22.     Notified KAMALJIT Approved Referral for scheduling

## 2022-07-11 NOTE — PATIENT INSTRUCTIONS
1) My office will call you to schedule the RIGHT knee CSI under ultrasound guidance once the procedure is approved by your insurance carrier. 2) Start home exercise program   3) Make an appointment with the wound clinic and Dr. Clint Looney to discuss next steps on veins   4) Follow up with me in 2 months       Post Injection Instructions     1. Please do not do anything strenuous over the next two days (if you had a knee injection do not walk more than 2 city blocks, do not attend any aerobic classes, do not run, no heavy lifting, no prolong standing). 2. You may resume your day to day activities after your injection. 3. You may experience some mild amount of swelling after the procedure. 4. Please ice your joint that was injected at least 5-6 times a day (15 minutes) for two days after (this will help prevent worsening pain that sometimes occurs after an injection). 5. Only take tylenol if needed for pain for the first few days. 6. Watch for signs of infection which include redness, warmth, worsening pain, fevers or chills. If you develop any of these signs call the office immediately at [phone]    Everyone responds differently to injections, but you can expect your peak effects a few weeks after your last injection. Kvng Knutson.  Wu Rees MD  Physical Medicine and Rehabilitation/Sports Medicine  MEDICAL CENTER [facility]

## 2022-07-20 ENCOUNTER — OFFICE VISIT (OUTPATIENT)
Dept: INTERNAL MEDICINE CLINIC | Facility: CLINIC | Age: 62
End: 2022-07-20
Payer: COMMERCIAL

## 2022-07-20 VITALS
WEIGHT: 315 LBS | HEIGHT: 74 IN | SYSTOLIC BLOOD PRESSURE: 124 MMHG | HEART RATE: 73 BPM | DIASTOLIC BLOOD PRESSURE: 72 MMHG | BODY MASS INDEX: 40.43 KG/M2

## 2022-07-20 DIAGNOSIS — E78.5 HYPERLIPIDEMIA ASSOCIATED WITH TYPE 2 DIABETES MELLITUS (HCC): ICD-10-CM

## 2022-07-20 DIAGNOSIS — I15.2 HYPERTENSION ASSOCIATED WITH TYPE 2 DIABETES MELLITUS (HCC): ICD-10-CM

## 2022-07-20 DIAGNOSIS — E11.59 HYPERTENSION ASSOCIATED WITH TYPE 2 DIABETES MELLITUS (HCC): ICD-10-CM

## 2022-07-20 DIAGNOSIS — M19.90 ARTHRITIS: Primary | ICD-10-CM

## 2022-07-20 DIAGNOSIS — E11.51 TYPE 2 DIABETES MELLITUS WITH DIABETIC PERIPHERAL ANGIOPATHY WITHOUT GANGRENE, WITHOUT LONG-TERM CURRENT USE OF INSULIN (HCC): ICD-10-CM

## 2022-07-20 DIAGNOSIS — E11.69 HYPERLIPIDEMIA ASSOCIATED WITH TYPE 2 DIABETES MELLITUS (HCC): ICD-10-CM

## 2022-07-20 PROBLEM — R45.89 SUICIDAL BEHAVIOR: Status: RESOLVED | Noted: 2022-02-21 | Resolved: 2022-07-20

## 2022-07-20 PROBLEM — R09.89 PULMONARY VASCULAR CONGESTION: Status: RESOLVED | Noted: 2022-02-09 | Resolved: 2022-07-20

## 2022-07-20 PROCEDURE — 99214 OFFICE O/P EST MOD 30 MIN: CPT | Performed by: INTERNAL MEDICINE

## 2022-07-20 PROCEDURE — 3078F DIAST BP <80 MM HG: CPT | Performed by: INTERNAL MEDICINE

## 2022-07-20 PROCEDURE — 3074F SYST BP LT 130 MM HG: CPT | Performed by: INTERNAL MEDICINE

## 2022-07-20 PROCEDURE — 3008F BODY MASS INDEX DOCD: CPT | Performed by: INTERNAL MEDICINE

## 2022-07-20 RX ORDER — DULOXETIN HYDROCHLORIDE 30 MG/1
30 CAPSULE, DELAYED RELEASE ORAL DAILY
Qty: 90 CAPSULE | Refills: 3 | Status: SHIPPED | OUTPATIENT
Start: 2022-07-20

## 2022-07-20 NOTE — PATIENT INSTRUCTIONS
We will continue with your current diabetes, cholesterol, blood pressure medications. For your arthritis pain we will start you on duloxetine 30 mg which is considered an antidepressant but works to reduce pain, use this for the next month but if you do not have any relief we can go up to 60 mg. Do not take sertraline i.e. Zoloft, this has the same effect as duloxetine. If you feel unwell on duloxetine or you have any suicidal or homicidal thoughts stop immediately and go to the ER.

## 2022-07-25 ENCOUNTER — OFFICE VISIT (OUTPATIENT)
Dept: WOUND CARE | Facility: HOSPITAL | Age: 62
End: 2022-07-25
Attending: CLINICAL NURSE SPECIALIST
Payer: COMMERCIAL

## 2022-07-25 VITALS
DIASTOLIC BLOOD PRESSURE: 76 MMHG | HEIGHT: 74 IN | HEART RATE: 77 BPM | WEIGHT: 300 LBS | RESPIRATION RATE: 17 BRPM | OXYGEN SATURATION: 95 % | SYSTOLIC BLOOD PRESSURE: 148 MMHG | TEMPERATURE: 99 F | BODY MASS INDEX: 38.5 KG/M2

## 2022-07-25 DIAGNOSIS — L97.221 NON-PRESSURE CHRONIC ULCER OF LEFT CALF, LIMITED TO BREAKDOWN OF SKIN (HCC): ICD-10-CM

## 2022-07-25 DIAGNOSIS — L97.911 NON-PRESSURE CHRONIC ULCER OF RIGHT LOWER LEG, LIMITED TO BREAKDOWN OF SKIN (HCC): Primary | ICD-10-CM

## 2022-07-25 DIAGNOSIS — L03.115 CELLULITIS OF RIGHT LEG: ICD-10-CM

## 2022-07-25 LAB — AMB EXT GLUCOSE: 198 MG/DL

## 2022-07-25 PROCEDURE — 99215 OFFICE O/P EST HI 40 MIN: CPT | Performed by: NURSE PRACTITIONER

## 2022-07-25 RX ORDER — LEVOFLOXACIN 750 MG/1
750 TABLET ORAL DAILY
Qty: 7 TABLET | Refills: 0 | Status: SHIPPED | OUTPATIENT
Start: 2022-07-25 | End: 2022-08-01

## 2022-08-01 ENCOUNTER — OFFICE VISIT (OUTPATIENT)
Dept: WOUND CARE | Facility: HOSPITAL | Age: 62
End: 2022-08-01
Attending: NURSE PRACTITIONER
Payer: COMMERCIAL

## 2022-08-01 VITALS
DIASTOLIC BLOOD PRESSURE: 75 MMHG | OXYGEN SATURATION: 93 % | TEMPERATURE: 97 F | RESPIRATION RATE: 18 BRPM | HEART RATE: 84 BPM | SYSTOLIC BLOOD PRESSURE: 154 MMHG

## 2022-08-01 DIAGNOSIS — L97.921 NON-PRESSURE CHRONIC ULCER OF LEFT LOWER LEG, LIMITED TO BREAKDOWN OF SKIN (HCC): ICD-10-CM

## 2022-08-01 DIAGNOSIS — L97.911 NON-PRESSURE CHRONIC ULCER OF RIGHT LOWER LEG, LIMITED TO BREAKDOWN OF SKIN (HCC): Primary | ICD-10-CM

## 2022-08-01 LAB — AMB EXT GLUCOSE: 130 MG/DL

## 2022-08-01 PROCEDURE — 99213 OFFICE O/P EST LOW 20 MIN: CPT | Performed by: NURSE PRACTITIONER

## 2022-08-09 ENCOUNTER — NURSE ONLY (OUTPATIENT)
Dept: WOUND CARE | Facility: HOSPITAL | Age: 62
End: 2022-08-09
Attending: NURSE PRACTITIONER
Payer: COMMERCIAL

## 2022-08-09 VITALS
TEMPERATURE: 98 F | RESPIRATION RATE: 18 BRPM | SYSTOLIC BLOOD PRESSURE: 156 MMHG | DIASTOLIC BLOOD PRESSURE: 78 MMHG | HEART RATE: 82 BPM | OXYGEN SATURATION: 94 %

## 2022-08-09 DIAGNOSIS — L97.911 NON-PRESSURE CHRONIC ULCER OF RIGHT LOWER LEG, LIMITED TO BREAKDOWN OF SKIN (HCC): ICD-10-CM

## 2022-08-09 DIAGNOSIS — L03.032 CELLULITIS OF GREAT TOE, LEFT: Primary | ICD-10-CM

## 2022-08-09 DIAGNOSIS — I87.2 VENOUS STASIS DERMATITIS OF BOTH LOWER EXTREMITIES: ICD-10-CM

## 2022-08-09 DIAGNOSIS — L97.921 NON-PRESSURE CHRONIC ULCER OF LEFT LOWER LEG, LIMITED TO BREAKDOWN OF SKIN (HCC): ICD-10-CM

## 2022-08-09 PROCEDURE — 99213 OFFICE O/P EST LOW 20 MIN: CPT | Performed by: NURSE PRACTITIONER

## 2022-08-09 RX ORDER — LEVOFLOXACIN 750 MG/1
750 TABLET ORAL DAILY
Qty: 7 TABLET | Refills: 0 | Status: SHIPPED | OUTPATIENT
Start: 2022-08-09 | End: 2022-08-09 | Stop reason: RX

## 2022-08-09 RX ORDER — LEVOFLOXACIN 750 MG/1
750 TABLET ORAL DAILY
Qty: 7 TABLET | Refills: 0 | Status: SHIPPED | OUTPATIENT
Start: 2022-08-09 | End: 2022-08-16

## 2022-08-15 ENCOUNTER — HOSPITAL ENCOUNTER (OUTPATIENT)
Dept: GENERAL RADIOLOGY | Facility: HOSPITAL | Age: 62
Discharge: HOME | End: 2022-08-15
Attending: INTERNAL MEDICINE
Payer: COMMERCIAL

## 2022-08-15 ENCOUNTER — NURSE ONLY (OUTPATIENT)
Dept: WOUND CARE | Facility: HOSPITAL | Age: 62
End: 2022-08-15
Attending: NURSE PRACTITIONER
Payer: COMMERCIAL

## 2022-08-15 VITALS
SYSTOLIC BLOOD PRESSURE: 149 MMHG | DIASTOLIC BLOOD PRESSURE: 74 MMHG | OXYGEN SATURATION: 95 % | TEMPERATURE: 98 F | RESPIRATION RATE: 17 BRPM | HEART RATE: 91 BPM

## 2022-08-15 DIAGNOSIS — M16.0 BILATERAL HIP JOINT ARTHRITIS: ICD-10-CM

## 2022-08-15 DIAGNOSIS — L97.911 NON-PRESSURE CHRONIC ULCER OF RIGHT LOWER LEG, LIMITED TO BREAKDOWN OF SKIN (HCC): ICD-10-CM

## 2022-08-15 DIAGNOSIS — L97.921 NON-PRESSURE CHRONIC ULCER OF LEFT LOWER LEG, LIMITED TO BREAKDOWN OF SKIN (HCC): Primary | ICD-10-CM

## 2022-08-15 PROCEDURE — 99212 OFFICE O/P EST SF 10 MIN: CPT | Performed by: NURSE PRACTITIONER

## 2022-08-15 PROCEDURE — 72190 X-RAY EXAM OF PELVIS: CPT | Performed by: INTERNAL MEDICINE

## 2022-08-22 ENCOUNTER — APPOINTMENT (OUTPATIENT)
Dept: WOUND CARE | Facility: HOSPITAL | Age: 62
End: 2022-08-22
Attending: NURSE PRACTITIONER
Payer: COMMERCIAL

## 2022-08-26 ENCOUNTER — OFFICE VISIT (OUTPATIENT)
Dept: PODIATRY CLINIC | Facility: CLINIC | Age: 62
End: 2022-08-26
Payer: COMMERCIAL

## 2022-08-26 DIAGNOSIS — M20.42 HAMMERTOE, BILATERAL: ICD-10-CM

## 2022-08-26 DIAGNOSIS — B35.1 ONYCHOMYCOSIS: ICD-10-CM

## 2022-08-26 DIAGNOSIS — L84 FOOT CALLUS: ICD-10-CM

## 2022-08-26 DIAGNOSIS — E08.42 DIABETES MELLITUS DUE TO UNDERLYING CONDITION WITH DIABETIC POLYNEUROPATHY, UNSPECIFIED WHETHER LONG TERM INSULIN USE (HCC): ICD-10-CM

## 2022-08-26 DIAGNOSIS — M79.672 LEFT FOOT PAIN: ICD-10-CM

## 2022-08-26 DIAGNOSIS — M20.41 HAMMERTOE, BILATERAL: ICD-10-CM

## 2022-08-26 DIAGNOSIS — B07.0 PLANTAR WART OF LEFT FOOT: Primary | ICD-10-CM

## 2022-08-26 PROCEDURE — 11721 DEBRIDE NAIL 6 OR MORE: CPT | Performed by: PODIATRIST

## 2022-08-26 PROCEDURE — 17110 DESTRUCTION B9 LES UP TO 14: CPT | Performed by: PODIATRIST

## 2022-08-29 ENCOUNTER — APPOINTMENT (OUTPATIENT)
Dept: WOUND CARE | Facility: HOSPITAL | Age: 62
End: 2022-08-29
Attending: NURSE PRACTITIONER
Payer: COMMERCIAL

## 2022-09-07 ENCOUNTER — HOSPITAL ENCOUNTER (OUTPATIENT)
Age: 62
Discharge: HOME | End: 2022-09-07
Payer: COMMERCIAL

## 2022-09-07 ENCOUNTER — NURSE TRIAGE (OUTPATIENT)
Dept: INTERNAL MEDICINE CLINIC | Facility: CLINIC | Age: 62
End: 2022-09-07

## 2022-09-07 VITALS
HEART RATE: 94 BPM | SYSTOLIC BLOOD PRESSURE: 160 MMHG | HEIGHT: 74 IN | DIASTOLIC BLOOD PRESSURE: 59 MMHG | OXYGEN SATURATION: 95 % | TEMPERATURE: 100 F | BODY MASS INDEX: 38.5 KG/M2 | RESPIRATION RATE: 22 BRPM | WEIGHT: 300 LBS

## 2022-09-07 DIAGNOSIS — J06.9 VIRAL URI WITH COUGH: ICD-10-CM

## 2022-09-07 DIAGNOSIS — Z20.822 ENCOUNTER FOR LABORATORY TESTING FOR COVID-19 VIRUS: Primary | ICD-10-CM

## 2022-09-07 LAB — SARS-COV-2 RNA RESP QL NAA+PROBE: NOT DETECTED

## 2022-09-07 PROCEDURE — U0002 COVID-19 LAB TEST NON-CDC: HCPCS | Performed by: PHYSICIAN ASSISTANT

## 2022-09-07 PROCEDURE — 99213 OFFICE O/P EST LOW 20 MIN: CPT | Performed by: PHYSICIAN ASSISTANT

## 2022-09-07 RX ORDER — BENZONATATE 100 MG/1
100 CAPSULE ORAL 3 TIMES DAILY PRN
Qty: 21 CAPSULE | Refills: 0 | Status: SHIPPED | OUTPATIENT
Start: 2022-09-07 | End: 2022-09-14

## 2022-09-12 ENCOUNTER — OFFICE VISIT (OUTPATIENT)
Dept: PHYSICAL MEDICINE AND REHAB | Facility: CLINIC | Age: 62
End: 2022-09-12
Payer: COMMERCIAL

## 2022-09-12 ENCOUNTER — TELEPHONE (OUTPATIENT)
Dept: PHYSICAL MEDICINE AND REHAB | Facility: CLINIC | Age: 62
End: 2022-09-12

## 2022-09-12 VITALS — BODY MASS INDEX: 42 KG/M2 | WEIGHT: 315 LBS | HEART RATE: 75 BPM | OXYGEN SATURATION: 91 %

## 2022-09-12 DIAGNOSIS — E11.69 HYPERLIPIDEMIA ASSOCIATED WITH TYPE 2 DIABETES MELLITUS (HCC): ICD-10-CM

## 2022-09-12 DIAGNOSIS — E03.9 HYPOTHYROIDISM, UNSPECIFIED TYPE: ICD-10-CM

## 2022-09-12 DIAGNOSIS — M22.2X9 PATELLOFEMORAL PAIN SYNDROME, UNSPECIFIED LATERALITY: Primary | ICD-10-CM

## 2022-09-12 DIAGNOSIS — E11.42 DIABETIC POLYNEUROPATHY ASSOCIATED WITH TYPE 2 DIABETES MELLITUS (HCC): ICD-10-CM

## 2022-09-12 DIAGNOSIS — E11.59 HYPERTENSION ASSOCIATED WITH TYPE 2 DIABETES MELLITUS (HCC): ICD-10-CM

## 2022-09-12 DIAGNOSIS — M17.11 PRIMARY OSTEOARTHRITIS OF RIGHT KNEE: ICD-10-CM

## 2022-09-12 DIAGNOSIS — E66.01 CLASS 2 SEVERE OBESITY DUE TO EXCESS CALORIES WITH SERIOUS COMORBIDITY AND BODY MASS INDEX (BMI) OF 38.0 TO 38.9 IN ADULT (HCC): ICD-10-CM

## 2022-09-12 DIAGNOSIS — E78.5 HYPERLIPIDEMIA ASSOCIATED WITH TYPE 2 DIABETES MELLITUS (HCC): ICD-10-CM

## 2022-09-12 DIAGNOSIS — I15.2 HYPERTENSION ASSOCIATED WITH TYPE 2 DIABETES MELLITUS (HCC): ICD-10-CM

## 2022-09-12 NOTE — PATIENT INSTRUCTIONS
1) Start Duloxetine 30 mg nightly  2) Tylenol 500-1000 mg every 6-8 hours as needed for pain. No more than 3000 mg daily. 3) Continue Ibuprofen 600 mg every 8 hours as needed for pain  4) My office will call you to schedule the RIGHT knee Orthovisc series of 3 and CSI under ultrasound guidance once the procedure is approved by your insurance carrier.   This can be performed after 9/27/22  5) Follow up with me for injection  6) Continue weight loss program and home exercise program

## 2022-09-12 NOTE — TELEPHONE ENCOUNTER
Initiated authorization for RIGHT Orthovisc series of 3 and CSI under ultrasound guidance CPT [id number] dx:M17.11 with Venkata Ty at [facility] reference #[id number]  Authorization is not required for [address]+  Authorization is required for Orthovisc through [facility] at [address] who states precertification form must be completed for Orthovisc-Reference #[id number]  Form completed and faxed to [facility] at [phone]  Coverage is active  Status: pending      Last Orthovisc series x3 on 3/28/22, 4/4/22+4/11/22

## 2022-09-13 NOTE — TELEPHONE ENCOUNTER
Received notification from [facility] that patient has carve out plan and [facility] will initiate authorization for Germania Benicia at [address] and initiated authorization for Orthovisc x3 Case #[id number]  Status: Orthovisc Approved with Case #[id number] valid 9/28/22-12/13/22  Annika Patton, pharmacist at [address] [facility] who states there is one refill of Orthovisc remaining for quantity of 3 syringes which can be delivered anytime after 9/17/22-Anjelica transferred call to schedulers.   Delivery scheduled by Guillermina Carty for 9/27/22 via UPS w/ signature required   Written approval sent to scanning    Awaiting medication delivery

## 2022-09-18 DIAGNOSIS — E66.01 CLASS 2 SEVERE OBESITY DUE TO EXCESS CALORIES WITH SERIOUS COMORBIDITY AND BODY MASS INDEX (BMI) OF 38.0 TO 38.9 IN ADULT (HCC): ICD-10-CM

## 2022-09-18 DIAGNOSIS — M17.11 PRIMARY OSTEOARTHRITIS OF RIGHT KNEE: ICD-10-CM

## 2022-09-18 DIAGNOSIS — E11.9 TYPE 2 DIABETES MELLITUS WITHOUT COMPLICATION, WITHOUT LONG-TERM CURRENT USE OF INSULIN (HCC): ICD-10-CM

## 2022-09-18 DIAGNOSIS — G62.9 NEUROPATHY: ICD-10-CM

## 2022-09-18 DIAGNOSIS — E11.42 DIABETIC POLYNEUROPATHY ASSOCIATED WITH TYPE 2 DIABETES MELLITUS (HCC): ICD-10-CM

## 2022-09-18 DIAGNOSIS — E11.69 HYPERLIPIDEMIA ASSOCIATED WITH TYPE 2 DIABETES MELLITUS (HCC): ICD-10-CM

## 2022-09-18 DIAGNOSIS — E78.5 HYPERLIPIDEMIA ASSOCIATED WITH TYPE 2 DIABETES MELLITUS (HCC): ICD-10-CM

## 2022-09-18 DIAGNOSIS — E11.59 HYPERTENSION ASSOCIATED WITH TYPE 2 DIABETES MELLITUS (HCC): ICD-10-CM

## 2022-09-18 DIAGNOSIS — M22.2X9 PATELLOFEMORAL PAIN SYNDROME, UNSPECIFIED LATERALITY: ICD-10-CM

## 2022-09-18 DIAGNOSIS — E03.9 HYPOTHYROIDISM, UNSPECIFIED TYPE: ICD-10-CM

## 2022-09-18 DIAGNOSIS — I15.2 HYPERTENSION ASSOCIATED WITH TYPE 2 DIABETES MELLITUS (HCC): ICD-10-CM

## 2022-09-19 RX ORDER — PREGABALIN 100 MG/1
100 CAPSULE ORAL 3 TIMES DAILY
Qty: 90 CAPSULE | Refills: 0 | Status: SHIPPED | OUTPATIENT
Start: 2022-09-19

## 2022-09-19 RX ORDER — NAPROXEN 500 MG/1
500 TABLET ORAL 2 TIMES DAILY WITH MEALS
Qty: 60 TABLET | Refills: 0 | OUTPATIENT
Start: 2022-09-19

## 2022-09-19 NOTE — TELEPHONE ENCOUNTER
Patient has not received his mail order of pregabalin. Out of medication. Requesting a 30 days supply to [facility] pharmacy until gets his mail order. Rx pended. Please advise.

## 2022-09-19 NOTE — TELEPHONE ENCOUNTER
Dr. Ang Bocanegra discontinued Naproxen at [facility] (09/12/2022). Per Dr. Morton Sees instructions: \"1) Start Duloxetine 30 mg nightly  2) Tylenol 500-1000 mg every 6-8 hours as needed for pain. No more than 3000 mg daily. 3) Continue Ibuprofen 600 mg every 8 hours as needed for pain  4) My office will call you to schedule the RIGHT knee Orthovisc series of 3 and CSI under ultrasound guidance once the procedure is approved by your insurance carrier. This can be performed after 9/27/22  5) Follow up with me for injection. \"    No mention of discontinuing Naproxen - only the order for Naproxen was discontinued at this time.

## 2022-09-20 ENCOUNTER — OFFICE VISIT (OUTPATIENT)
Dept: INTERNAL MEDICINE CLINIC | Facility: CLINIC | Age: 62
End: 2022-09-20

## 2022-09-20 VITALS
DIASTOLIC BLOOD PRESSURE: 75 MMHG | HEART RATE: 80 BPM | SYSTOLIC BLOOD PRESSURE: 135 MMHG | BODY MASS INDEX: 40.43 KG/M2 | WEIGHT: 315 LBS | HEIGHT: 74 IN

## 2022-09-20 DIAGNOSIS — I15.2 HYPERTENSION ASSOCIATED WITH TYPE 2 DIABETES MELLITUS (HCC): ICD-10-CM

## 2022-09-20 DIAGNOSIS — E78.5 HYPERLIPIDEMIA ASSOCIATED WITH TYPE 2 DIABETES MELLITUS (HCC): ICD-10-CM

## 2022-09-20 DIAGNOSIS — N13.8 BPH WITH OBSTRUCTION/LOWER URINARY TRACT SYMPTOMS: ICD-10-CM

## 2022-09-20 DIAGNOSIS — E11.69 HYPERLIPIDEMIA ASSOCIATED WITH TYPE 2 DIABETES MELLITUS (HCC): ICD-10-CM

## 2022-09-20 DIAGNOSIS — E11.51 TYPE 2 DIABETES MELLITUS WITH DIABETIC PERIPHERAL ANGIOPATHY WITHOUT GANGRENE, WITHOUT LONG-TERM CURRENT USE OF INSULIN (HCC): Primary | ICD-10-CM

## 2022-09-20 DIAGNOSIS — N40.1 BPH WITH OBSTRUCTION/LOWER URINARY TRACT SYMPTOMS: ICD-10-CM

## 2022-09-20 DIAGNOSIS — E11.59 HYPERTENSION ASSOCIATED WITH TYPE 2 DIABETES MELLITUS (HCC): ICD-10-CM

## 2022-09-20 DIAGNOSIS — J45.998 POST VIRAL ASTHMA: ICD-10-CM

## 2022-09-20 DIAGNOSIS — Z98.890 HISTORY OF CAROTID ENDARTERECTOMY: ICD-10-CM

## 2022-09-20 LAB
CARTRIDGE EXPIRATION DATE: ABNORMAL DATE
CARTRIDGE LOT#: ABNORMAL NUMERIC
HEMOGLOBIN A1C: 8.8 % (ref 4.3–5.6)

## 2022-09-20 PROCEDURE — 3078F DIAST BP <80 MM HG: CPT | Performed by: INTERNAL MEDICINE

## 2022-09-20 PROCEDURE — 99214 OFFICE O/P EST MOD 30 MIN: CPT | Performed by: INTERNAL MEDICINE

## 2022-09-20 PROCEDURE — 83036 HEMOGLOBIN GLYCOSYLATED A1C: CPT | Performed by: INTERNAL MEDICINE

## 2022-09-20 PROCEDURE — 3008F BODY MASS INDEX DOCD: CPT | Performed by: INTERNAL MEDICINE

## 2022-09-20 PROCEDURE — 3075F SYST BP GE 130 - 139MM HG: CPT | Performed by: INTERNAL MEDICINE

## 2022-09-20 PROCEDURE — 3052F HG A1C>EQUAL 8.0%<EQUAL 9.0%: CPT | Performed by: INTERNAL MEDICINE

## 2022-09-20 RX ORDER — SEMAGLUTIDE 2.68 MG/ML
2 INJECTION, SOLUTION SUBCUTANEOUS WEEKLY
Qty: 12 PEN | Refills: 3 | Status: SHIPPED | OUTPATIENT
Start: 2022-09-20

## 2022-09-20 RX ORDER — METHYLPREDNISOLONE 4 MG/1
TABLET ORAL
Qty: 1 EACH | Refills: 0 | Status: SHIPPED | OUTPATIENT
Start: 2022-09-20

## 2022-09-20 RX ORDER — ASPIRIN 81 MG/1
81 TABLET ORAL DAILY
Qty: 90 TABLET | Refills: 3 | Status: SHIPPED | OUTPATIENT
Start: 2022-09-20

## 2022-09-20 RX ORDER — ATORVASTATIN CALCIUM 40 MG/1
40 TABLET, FILM COATED ORAL NIGHTLY
Qty: 90 TABLET | Refills: 3 | Status: SHIPPED | OUTPATIENT
Start: 2022-09-20

## 2022-09-20 RX ORDER — VALSARTAN 160 MG/1
160 TABLET ORAL DAILY
Qty: 90 TABLET | Refills: 3 | Status: SHIPPED | OUTPATIENT
Start: 2022-09-20

## 2022-09-20 RX ORDER — METFORMIN HYDROCHLORIDE 500 MG/1
1000 TABLET, EXTENDED RELEASE ORAL 2 TIMES DAILY WITH MEALS
Qty: 360 TABLET | Refills: 3 | Status: SHIPPED | OUTPATIENT
Start: 2022-09-20

## 2022-09-20 RX ORDER — HYDROCHLOROTHIAZIDE 12.5 MG/1
12.5 TABLET ORAL DAILY
Qty: 90 TABLET | Refills: 3 | Status: SHIPPED | OUTPATIENT
Start: 2022-09-20

## 2022-09-20 RX ORDER — TAMSULOSIN HYDROCHLORIDE 0.4 MG/1
0.8 CAPSULE ORAL DAILY
Qty: 180 CAPSULE | Refills: 3 | Status: SHIPPED | OUTPATIENT
Start: 2022-09-20 | End: 2023-09-20

## 2022-09-20 RX ORDER — FINASTERIDE 5 MG/1
5 TABLET, FILM COATED ORAL DAILY
Qty: 90 TABLET | Refills: 3 | Status: SHIPPED | OUTPATIENT
Start: 2022-09-20 | End: 2023-09-20

## 2022-09-27 NOTE — TELEPHONE ENCOUNTER
Received message from [facility] stating medication was received and patient has been scheduled

## 2022-09-27 NOTE — TELEPHONE ENCOUNTER
Contacted June head island, PSR at [facility] who states as of now there have been no deliveries  Awaiting medication

## 2022-09-29 ENCOUNTER — TELEPHONE (OUTPATIENT)
Dept: INTERNAL MEDICINE CLINIC | Facility: CLINIC | Age: 62
End: 2022-09-29

## 2022-09-29 DIAGNOSIS — E11.51 TYPE 2 DIABETES MELLITUS WITH DIABETIC PERIPHERAL ANGIOPATHY WITHOUT GANGRENE, WITHOUT LONG-TERM CURRENT USE OF INSULIN (HCC): ICD-10-CM

## 2022-09-29 NOTE — TELEPHONE ENCOUNTER
Per patient he has not been able to find a pharmacy that has the 2mg dose pens. He would like to know if we can send 1 mg pens and he takes 2? Pended for review.      Triage: Okay to leave detailed message or mychart

## 2022-09-29 NOTE — TELEPHONE ENCOUNTER
Recommend he discuss this with the pharmacist.  The only issue is if he is double dosing a 1 mg pen that he is going to run out sooner than appropriate and the insurance will potentially deny due to quantity limits. He should look around for other pharmacies or [facility] can check other areas. They may need to order it for him, I have had patients do that. He can double up in the meantime but it is better to find a supply of 2 mg dosage pens.

## 2022-10-10 ENCOUNTER — HOSPITAL ENCOUNTER (OUTPATIENT)
Age: 62
Discharge: HOME | End: 2022-10-10
Payer: COMMERCIAL

## 2022-10-10 ENCOUNTER — APPOINTMENT (OUTPATIENT)
Dept: GENERAL RADIOLOGY | Age: 62
End: 2022-10-10
Attending: NURSE PRACTITIONER
Payer: COMMERCIAL

## 2022-10-10 VITALS
RESPIRATION RATE: 20 BRPM | DIASTOLIC BLOOD PRESSURE: 73 MMHG | TEMPERATURE: 97 F | SYSTOLIC BLOOD PRESSURE: 147 MMHG | HEART RATE: 88 BPM | OXYGEN SATURATION: 94 %

## 2022-10-10 DIAGNOSIS — L03.116 CELLULITIS OF LEFT LOWER LEG: ICD-10-CM

## 2022-10-10 DIAGNOSIS — S81.802A LEG WOUND, LEFT, INITIAL ENCOUNTER: Primary | ICD-10-CM

## 2022-10-10 DIAGNOSIS — E11.65 TYPE 2 DIABETES MELLITUS WITH HYPERGLYCEMIA, UNSPECIFIED WHETHER LONG TERM INSULIN USE (HCC): ICD-10-CM

## 2022-10-10 LAB
#MXD IC: 0.9 X10ˆ3/UL (ref 0.1–1)
BUN BLD-MCNC: 17 MG/DL (ref 7–18)
CHLORIDE BLD-SCNC: 101 MMOL/L (ref 98–112)
CO2 BLD-SCNC: 28 MMOL/L (ref 21–32)
CREAT BLD-MCNC: 0.8 MG/DL
GFR SERPLBLD BASED ON 1.73 SQ M-ARVRAT: 101 ML/MIN/1.73M2 (ref 60–?)
GLUCOSE BLD-MCNC: 231 MG/DL (ref 70–99)
HCT VFR BLD AUTO: 41.7 %
HCT VFR BLD CALC: 43 %
HGB BLD-MCNC: 13 G/DL
ISTAT IONIZED CALCIUM FOR CHEM 8: 1.17 MMOL/L (ref 1.12–1.32)
LYMPHOCYTES # BLD AUTO: 1.9 X10ˆ3/UL (ref 1–4)
LYMPHOCYTES NFR BLD AUTO: 23 %
MCH RBC QN AUTO: 28.5 PG (ref 26–34)
MCHC RBC AUTO-ENTMCNC: 31.2 G/DL (ref 31–37)
MCV RBC AUTO: 91.4 FL (ref 80–100)
MIXED CELL %: 11.1 %
NEUTROPHILS # BLD AUTO: 5.6 X10ˆ3/UL (ref 1.5–7.7)
NEUTROPHILS NFR BLD AUTO: 65.9 %
PLATELET # BLD AUTO: 177 X10ˆ3/UL (ref 150–450)
POTASSIUM BLD-SCNC: 4 MMOL/L (ref 3.6–5.1)
RBC # BLD AUTO: 4.56 X10ˆ6/UL
SODIUM BLD-SCNC: 140 MMOL/L (ref 136–145)
WBC # BLD AUTO: 8.4 X10ˆ3/UL (ref 4–11)

## 2022-10-10 PROCEDURE — 99214 OFFICE O/P EST MOD 30 MIN: CPT | Performed by: NURSE PRACTITIONER

## 2022-10-10 PROCEDURE — 73590 X-RAY EXAM OF LOWER LEG: CPT | Performed by: NURSE PRACTITIONER

## 2022-10-10 PROCEDURE — 85025 COMPLETE CBC W/AUTO DIFF WBC: CPT | Performed by: NURSE PRACTITIONER

## 2022-10-10 PROCEDURE — 36415 COLL VENOUS BLD VENIPUNCTURE: CPT | Performed by: NURSE PRACTITIONER

## 2022-10-10 PROCEDURE — 80047 BASIC METABLC PNL IONIZED CA: CPT | Performed by: NURSE PRACTITIONER

## 2022-10-10 RX ORDER — HYDROCODONE BITARTRATE AND ACETAMINOPHEN 5; 325 MG/1; MG/1
1 TABLET ORAL ONCE
Status: COMPLETED | OUTPATIENT
Start: 2022-10-10 | End: 2022-10-10

## 2022-10-10 RX ORDER — TRAMADOL HYDROCHLORIDE 50 MG/1
50 TABLET ORAL EVERY 6 HOURS PRN
Qty: 10 TABLET | Refills: 0 | Status: SHIPPED | OUTPATIENT
Start: 2022-10-10

## 2022-10-10 RX ORDER — CEPHALEXIN 500 MG/1
500 CAPSULE ORAL 4 TIMES DAILY
Qty: 28 CAPSULE | Refills: 0 | Status: SHIPPED | OUTPATIENT
Start: 2022-10-10 | End: 2022-10-17

## 2022-10-10 NOTE — ED INITIAL ASSESSMENT (HPI)
Pt has diabetes. Wound to left leg. Pt has appt with wound doctor tomorrow, but has work tonight and doesn't think he can go. Denies fevers.  C/o burning pain 8/10

## 2022-10-11 ENCOUNTER — OFFICE VISIT (OUTPATIENT)
Dept: WOUND CARE | Facility: HOSPITAL | Age: 62
End: 2022-10-11
Attending: NURSE PRACTITIONER
Payer: COMMERCIAL

## 2022-10-11 ENCOUNTER — OFFICE VISIT (OUTPATIENT)
Dept: ENDOCRINOLOGY CLINIC | Facility: CLINIC | Age: 62
End: 2022-10-11
Payer: COMMERCIAL

## 2022-10-11 VITALS
WEIGHT: 315 LBS | DIASTOLIC BLOOD PRESSURE: 67 MMHG | HEART RATE: 75 BPM | SYSTOLIC BLOOD PRESSURE: 110 MMHG | BODY MASS INDEX: 41 KG/M2

## 2022-10-11 VITALS
BODY MASS INDEX: 40.43 KG/M2 | HEIGHT: 74 IN | SYSTOLIC BLOOD PRESSURE: 122 MMHG | OXYGEN SATURATION: 92 % | WEIGHT: 315 LBS | HEART RATE: 82 BPM | DIASTOLIC BLOOD PRESSURE: 72 MMHG | RESPIRATION RATE: 16 BRPM | TEMPERATURE: 97 F

## 2022-10-11 DIAGNOSIS — I87.2 VENOUS STASIS DERMATITIS OF BOTH LOWER EXTREMITIES: Primary | ICD-10-CM

## 2022-10-11 DIAGNOSIS — L97.321 VARICOSE VEINS OF LEFT LOWER EXTREMITY WITH INFLAMMATION, WITH ULCER OF ANKLE LIMITED TO BREAKDOWN OF SKIN (HCC): ICD-10-CM

## 2022-10-11 DIAGNOSIS — L97.921 NON-PRESSURE CHRONIC ULCER OF LEFT LOWER LEG, LIMITED TO BREAKDOWN OF SKIN (HCC): ICD-10-CM

## 2022-10-11 DIAGNOSIS — E11.65 UNCONTROLLED TYPE 2 DIABETES MELLITUS WITH HYPERGLYCEMIA (HCC): Primary | ICD-10-CM

## 2022-10-11 DIAGNOSIS — I83.223 VARICOSE VEINS OF LEFT LOWER EXTREMITY WITH INFLAMMATION, WITH ULCER OF ANKLE LIMITED TO BREAKDOWN OF SKIN (HCC): ICD-10-CM

## 2022-10-11 PROBLEM — L97.911 NON-PRESSURE CHRONIC ULCER OF RIGHT LOWER LEG, LIMITED TO BREAKDOWN OF SKIN (HCC): Status: RESOLVED | Noted: 2021-12-07 | Resolved: 2022-10-11

## 2022-10-11 LAB
GLUCOSE BLOOD: 151
TEST STRIP LOT #: NORMAL NUMERIC

## 2022-10-11 PROCEDURE — 82947 ASSAY GLUCOSE BLOOD QUANT: CPT | Performed by: NURSE PRACTITIONER

## 2022-10-11 PROCEDURE — 3078F DIAST BP <80 MM HG: CPT | Performed by: NURSE PRACTITIONER

## 2022-10-11 PROCEDURE — 3074F SYST BP LT 130 MM HG: CPT | Performed by: NURSE PRACTITIONER

## 2022-10-11 PROCEDURE — 99213 OFFICE O/P EST LOW 20 MIN: CPT | Performed by: NURSE PRACTITIONER

## 2022-10-11 PROCEDURE — 99204 OFFICE O/P NEW MOD 45 MIN: CPT | Performed by: NURSE PRACTITIONER

## 2022-10-11 RX ORDER — GLIPIZIDE 5 MG/1
2.5 TABLET ORAL
Qty: 45 TABLET | Refills: 0 | Status: SHIPPED | OUTPATIENT
Start: 2022-10-11 | End: 2023-01-09

## 2022-10-11 RX ORDER — TIRZEPATIDE 10 MG/.5ML
10 INJECTION, SOLUTION SUBCUTANEOUS WEEKLY
Qty: 6 ML | Refills: 0 | Status: SHIPPED | OUTPATIENT
Start: 2022-10-11 | End: 2022-10-17

## 2022-10-11 NOTE — PATIENT INSTRUCTIONS
A1C: 8.8% on 9/20/2022  Blood glucose: 151 in clinic today    Medications:   -continue with Ozempic 2mg once weekly for another 2 weeks, then start Mounjaro   -continue with Metformin ER 1,000mg twice daily   -stop Glyburide   -start Glipizide 2.5mg once daily with breakfast   -start Moujaro 10mg once weekly   Common side effects:    Nausea or diarrhea    These effects usually go away over time as your body gets used to the medicine      Here are some things that might help your nausea go away:   Eat small amounts of food instrad of few large meals   Eat plain, bland non greasy foods    Drink plenty of fluids (sugar free)    Avoid foods and smells that might make you sick    Eat slowly and listen to your hunger    -follow up with Radha De La Cruz (diabetes educator) as soon as able     -lets work on limiting carbohydrates to 45-60gm per meal/ max 180gm per day  -avoid eating snacks between meals  -avoid eating sweets or soda/fruit juices or Gatorade or energy drinks   -eat dinner at least 2 hours prior to going to bed at night    Weight:  Wt Readings from Last 6 Encounters:  10/11/22 : (!) 320 lb (145.2 kg)  10/11/22 : (!) 325 lb (147.4 kg)  09/20/22 : (!) 328 lb (148.8 kg)  09/12/22 : (!) 325 lb (147.4 kg)  09/07/22 : 300 lb (136.1 kg)  07/25/22 : 300 lb (136.1 kg)    A1C goal:   <7.0%    Blood sugar testing:  Test your blood sugar 1 time daily   Recommended times to test: Before breakfast (fasting) or 2hrs after meals     Blood sugar targets:  Before breakfast:   (preferably < 110)  2 hours after meals: <180 (preferably <150)     Call for persistent blood sugars < 75 or > 200.

## 2022-10-12 ENCOUNTER — TELEPHONE (OUTPATIENT)
Dept: ENDOCRINOLOGY CLINIC | Facility: CLINIC | Age: 62
End: 2022-10-12

## 2022-10-12 NOTE — TELEPHONE ENCOUNTER
Patient is calling to get the coupon code for mounjaro.  Please call or submit it through [facility]

## 2022-10-14 NOTE — PROGRESS NOTES
Nutrition    Start Time: 9:24am  End Time: 10:20am    Indication: Pt is T2D followed by Danny Han. At last appointment with MI 10/11/2022, advised to continue Ozempic for 2 weeks then switch to [facility] 10mg/weekly, stop glyburide and start glipizide, and reviewed nutrition. Appointment today to follow up on nutrition. Other:  - Pt has not started regimen that Danny Han advised; states insurance needs Prior Authorization  - Pt has started cutting back on carbohydrates since appointment (fries, sweets)    A1C: 8.8% (2022)    Current Diabetes Medication  Ozempic 2mg/weekly --> Mounjaro 10mg/weekly (has not started yet)  Glipizide 2.5mg daily with breakfast (has not started yet)  Metformin 1000mg BID    Blood Sugar Intake  Checking 2-3 times daily    Fastin-140 mg/dl  2Hr Dinner: <150 mg/dl    Nutrition Intake  Eating 3 meals with 1-2 snacks  Breakfast (10am): Pandasel with 15 peanut-butter pretzel or cup of special K with 2% milk  Lunch (1pm): [facility]) sandwich or PBJ sandwich with no sides  Dinner (7:30pm): Chicken/Pork adoba; spaghetti; chicken sandwich; subway or [employer]: 6-7 chocolate chip cookies (sugar free); sugar free jello/pudding   Beverages: Water; sugar free powerade; unsweetened ice tea with splenda    Nutrition Topics Discussed   [x] Discussed healthy weight management, glucose management, lipid management, and BP management as related to diabetes   [x] Discussed basic meal planning guidelines for diabetes regular mealtime, limited concentrated sweets.  Worked on establishing eating pattern/timing of meals and snacks    [x] Discussed in depth meal planning using the healthy eating with diabetes plate method with focus on balanced macronutrient consumption, including identifying foods that are carbohydrates, lean protein, non-starchy vegetables, and heart healthy fats   [x] Stressed importance of carbohydrate consistency in each meal   [x] Recommended carbohydrate targets of 30-45 grams at meals and <45 grams at snacks   [x] Educated on label reading   [x] Educated on portion control; including use of measuring cups, spoons, or food scale   [x] Provided suggestions for lower carb, healthy snacks   [x] Educated on importance of food monitoring and how to use food logs   [x] Discussed how to handle special occasions, dining out, and eating on the go   [x] Discussed menu planning, healthy food shopping, cooking tips, and need to pre prepare foods    [x] Educated on emotional eating and being mindful at meals   [x] Reviewed other behavior modification strategies    [x] Emphasized the need for small, sustainable changes and working on [employer] goals to encourage sustainable behaviors    [x] Instructions on how to use helpful websites or nutrition/tracking apps reviewed    [x] Discussed barriers and overcoming barriers to best achieve meal planning goals    [x] Discussed Mediterranean diet/DASH diet, as appropriate, to address lipids or BP   [x] Reviewed renal diet components and how to incorporate this with diabetes meal planning     Recommendations  - Continue to cut back down on carbohydrates  - Pt is hesitant to make food changes due to his pickiness in eating, diverticulitis, and willingness  - Encouraged pt that in order to see changes (blood sugar improvement, weight loss), pt would have to make changes   - Discussed meal by meal options   --> Breakfast: eggs, breakfast sandwich on low carb bread or low carb wrap, greek yogurt   --> Lunch/Dinner: crock pot meals (veggies with meat and small amount of potatoes/rice)   --> Reviewed healthier fast food options (avoid hasbrown at [employer] if consuming breakfast sandwich)   --> Limit the amount of cookies to 1-2 if having it as a snack  - Reviewed in depth healthy and low carb snacks to have if pt is hungry in between meals  - Reinforced for food that sugar free does not carb free  - Will follow up on Mounjaro (see TE 10/17/2022)  - Continue working on weight loss and activity; pt has lost 10 lbs already    Follow Up  12/14/2022 Jason

## 2022-10-17 ENCOUNTER — NURSE ONLY (OUTPATIENT)
Dept: ENDOCRINOLOGY CLINIC | Facility: CLINIC | Age: 62
End: 2022-10-17
Payer: COMMERCIAL

## 2022-10-17 ENCOUNTER — TELEPHONE (OUTPATIENT)
Dept: ENDOCRINOLOGY CLINIC | Facility: CLINIC | Age: 62
End: 2022-10-17

## 2022-10-17 VITALS — BODY MASS INDEX: 41 KG/M2 | WEIGHT: 315 LBS

## 2022-10-17 DIAGNOSIS — E11.65 UNCONTROLLED TYPE 2 DIABETES MELLITUS WITH HYPERGLYCEMIA (HCC): Primary | ICD-10-CM

## 2022-10-17 RX ORDER — TIRZEPATIDE 10 MG/.5ML
10 INJECTION, SOLUTION SUBCUTANEOUS WEEKLY
Qty: 6 ML | Refills: 0 | Status: SHIPPED | OUTPATIENT
Start: 2022-10-17

## 2022-10-17 NOTE — TELEPHONE ENCOUNTER
Pt was seen for [address] appointment today where he stated he has not started his [employer] yet because insurance was requiring a PA    Called and spoke with Viji Stephenson. States because it is [facility] (mail order), there are two options. First option, pt can pay the out of pocket cost upfront for 90 day supply (around $3k) and then submit for reimbursement for [facility] where they would reimburse $2,925 for 90 days. Copay card cannot be used for [facility]. Second option, prescription can be sent to local pharmacy where copay card can be used and pt can pay $25 a month. Called and spoke to pt. Would like to go with option 2. Mounjaro sent in to [facility] off of [address] in [place]. Pt will use copay card attached to Turn message sent on 10/13. Should only pay $75 for 3 months. Reviewed side effects and administration with pt.

## 2022-10-18 ENCOUNTER — OFFICE VISIT (OUTPATIENT)
Dept: PHYSICAL MEDICINE AND REHAB | Facility: CLINIC | Age: 62
End: 2022-10-18
Payer: COMMERCIAL

## 2022-10-18 DIAGNOSIS — M22.2X9 PATELLOFEMORAL PAIN SYNDROME, UNSPECIFIED LATERALITY: Primary | ICD-10-CM

## 2022-10-18 DIAGNOSIS — E11.42 DIABETIC POLYNEUROPATHY ASSOCIATED WITH TYPE 2 DIABETES MELLITUS (HCC): ICD-10-CM

## 2022-10-18 DIAGNOSIS — E11.59 HYPERTENSION ASSOCIATED WITH TYPE 2 DIABETES MELLITUS (HCC): ICD-10-CM

## 2022-10-18 DIAGNOSIS — E03.9 HYPOTHYROIDISM, UNSPECIFIED TYPE: ICD-10-CM

## 2022-10-18 DIAGNOSIS — E66.01 CLASS 2 SEVERE OBESITY DUE TO EXCESS CALORIES WITH SERIOUS COMORBIDITY AND BODY MASS INDEX (BMI) OF 38.0 TO 38.9 IN ADULT (HCC): ICD-10-CM

## 2022-10-18 DIAGNOSIS — E11.69 HYPERLIPIDEMIA ASSOCIATED WITH TYPE 2 DIABETES MELLITUS (HCC): ICD-10-CM

## 2022-10-18 DIAGNOSIS — I15.2 HYPERTENSION ASSOCIATED WITH TYPE 2 DIABETES MELLITUS (HCC): ICD-10-CM

## 2022-10-18 DIAGNOSIS — M17.11 PRIMARY OSTEOARTHRITIS OF RIGHT KNEE: ICD-10-CM

## 2022-10-18 DIAGNOSIS — E78.5 HYPERLIPIDEMIA ASSOCIATED WITH TYPE 2 DIABETES MELLITUS (HCC): ICD-10-CM

## 2022-10-18 PROCEDURE — 20611 DRAIN/INJ JOINT/BURSA W/US: CPT | Performed by: PHYSICAL MEDICINE & REHABILITATION

## 2022-10-18 NOTE — TELEPHONE ENCOUNTER
[id number] Linda Matamoros noted. Thank you for looking into [facility] and resending to a local pharmacy.

## 2022-10-18 NOTE — PATIENT INSTRUCTIONS
Post Injection Instructions     1. Please do not do anything strenuous over the next two days (if you had a knee injection do not walk more than 2 city blocks, do not attend any aerobic classes, do not run, no heavy lifting, no prolong standing). 2. You may resume your day to day activities after your injection. 3. You may experience some mild amount of swelling after the procedure. 4. Please ice your joint that was injected at least 5-6 times a day (15 minutes) for two days after (this will help prevent worsening pain that sometimes occurs after an injection). 5. Only take tylenol if needed for pain for the first few days. 6. Watch for signs of infection which include redness, warmth, worsening pain, fevers or chills. If you develop any of these signs call the office immediately at [phone]    Everyone responds differently to injections, but you can expect your peak effects a few weeks after your last injection. Mando Pham.  Dom Estrada MD  Physical Medicine and Rehabilitation/Sports Medicine  [facility]

## 2022-10-19 ENCOUNTER — APPOINTMENT (OUTPATIENT)
Dept: CT IMAGING | Facility: HOSPITAL | Age: 62
End: 2022-10-19
Attending: EMERGENCY MEDICINE
Payer: COMMERCIAL

## 2022-10-19 ENCOUNTER — TELEPHONE (OUTPATIENT)
Dept: ENDOCRINOLOGY CLINIC | Facility: CLINIC | Age: 62
End: 2022-10-19

## 2022-10-19 ENCOUNTER — OFFICE VISIT (OUTPATIENT)
Dept: WOUND CARE | Facility: HOSPITAL | Age: 62
End: 2022-10-19
Attending: NURSE PRACTITIONER
Payer: COMMERCIAL

## 2022-10-19 ENCOUNTER — HOSPITAL ENCOUNTER (OUTPATIENT)
Facility: HOSPITAL | Age: 62
Setting detail: OBSERVATION
Discharge: HOME | End: 2022-10-21
Attending: EMERGENCY MEDICINE | Admitting: HOSPITALIST
Payer: COMMERCIAL

## 2022-10-19 VITALS
OXYGEN SATURATION: 93 % | RESPIRATION RATE: 17 BRPM | SYSTOLIC BLOOD PRESSURE: 109 MMHG | DIASTOLIC BLOOD PRESSURE: 64 MMHG | TEMPERATURE: 98 F | HEART RATE: 81 BPM

## 2022-10-19 DIAGNOSIS — L97.921 NON-PRESSURE CHRONIC ULCER OF LEFT LOWER LEG, LIMITED TO BREAKDOWN OF SKIN (HCC): ICD-10-CM

## 2022-10-19 DIAGNOSIS — G45.9 TRANSIENT ISCHEMIC ATTACK (TIA): Primary | ICD-10-CM

## 2022-10-19 DIAGNOSIS — I87.2 VENOUS STASIS DERMATITIS OF BOTH LOWER EXTREMITIES: Primary | ICD-10-CM

## 2022-10-19 PROBLEM — Z98.890 HISTORY OF RIGHT-SIDED CAROTID ENDARTERECTOMY: Status: ACTIVE | Noted: 2021-08-10

## 2022-10-19 LAB
ANION GAP SERPL CALC-SCNC: 6 MMOL/L (ref 0–18)
BASOPHILS # BLD AUTO: 0.04 X10(3) UL (ref 0–0.2)
BASOPHILS NFR BLD AUTO: 0.4 %
BUN BLD-MCNC: 31 MG/DL (ref 7–18)
BUN/CREAT SERPL: 22.8 (ref 10–20)
CALCIUM BLD-MCNC: 9.3 MG/DL (ref 8.5–10.1)
CHLORIDE SERPL-SCNC: 105 MMOL/L (ref 98–112)
CO2 SERPL-SCNC: 31 MMOL/L (ref 21–32)
CREAT BLD-MCNC: 1.36 MG/DL
DEPRECATED RDW RBC AUTO: 48.4 FL (ref 35.1–46.3)
EOSINOPHIL # BLD AUTO: 0.19 X10(3) UL (ref 0–0.7)
EOSINOPHIL NFR BLD AUTO: 1.9 %
ERYTHROCYTE [DISTWIDTH] IN BLOOD BY AUTOMATED COUNT: 14.2 % (ref 11–15)
GFR SERPLBLD BASED ON 1.73 SQ M-ARVRAT: 60 ML/MIN/1.73M2 (ref 60–?)
GLUCOSE BLD-MCNC: 87 MG/DL (ref 70–99)
HCT VFR BLD AUTO: 42.8 %
HGB BLD-MCNC: 13.1 G/DL
IMM GRANULOCYTES # BLD AUTO: 0.02 X10(3) UL (ref 0–1)
IMM GRANULOCYTES NFR BLD: 0.2 %
LYMPHOCYTES # BLD AUTO: 2.96 X10(3) UL (ref 1–4)
LYMPHOCYTES NFR BLD AUTO: 29.2 %
MCH RBC QN AUTO: 28.4 PG (ref 26–34)
MCHC RBC AUTO-ENTMCNC: 30.6 G/DL (ref 31–37)
MCV RBC AUTO: 92.6 FL
MONOCYTES # BLD AUTO: 0.99 X10(3) UL (ref 0.1–1)
MONOCYTES NFR BLD AUTO: 9.8 %
NEUTROPHILS # BLD AUTO: 5.93 X10 (3) UL (ref 1.5–7.7)
NEUTROPHILS # BLD AUTO: 5.93 X10(3) UL (ref 1.5–7.7)
NEUTROPHILS NFR BLD AUTO: 58.5 %
OSMOLALITY SERPL CALC.SUM OF ELEC: 300 MOSM/KG (ref 275–295)
PLATELET # BLD AUTO: 226 10(3)UL (ref 150–450)
POTASSIUM SERPL-SCNC: 3.7 MMOL/L (ref 3.5–5.1)
RBC # BLD AUTO: 4.62 X10(6)UL
SARS-COV-2 RNA RESP QL NAA+PROBE: NOT DETECTED
SODIUM SERPL-SCNC: 142 MMOL/L (ref 136–145)
TROPONIN I HIGH SENSITIVITY: 12 NG/L
WBC # BLD AUTO: 10.1 X10(3) UL (ref 4–11)

## 2022-10-19 PROCEDURE — 99204 OFFICE O/P NEW MOD 45 MIN: CPT | Performed by: OTHER

## 2022-10-19 PROCEDURE — 70498 CT ANGIOGRAPHY NECK: CPT | Performed by: EMERGENCY MEDICINE

## 2022-10-19 PROCEDURE — 99213 OFFICE O/P EST LOW 20 MIN: CPT | Performed by: NURSE PRACTITIONER

## 2022-10-19 PROCEDURE — 70496 CT ANGIOGRAPHY HEAD: CPT | Performed by: EMERGENCY MEDICINE

## 2022-10-19 PROCEDURE — 99220 INITIAL OBSERVATION CARE,LEVL III: CPT | Performed by: HOSPITALIST

## 2022-10-19 RX ORDER — ASPIRIN 81 MG/1
224 TABLET, CHEWABLE ORAL ONCE
Status: COMPLETED | OUTPATIENT
Start: 2022-10-19 | End: 2022-10-19

## 2022-10-19 NOTE — TELEPHONE ENCOUNTER
Patient is calling to ask about the glipizide medication and patient was wondering if he could just take the 5mg .

## 2022-10-19 NOTE — ED INITIAL ASSESSMENT (HPI)
Pt reports blurry vision to both eyes last night, woke today w/o complaints then had another episode of blurriness to both eyes (now resolved). Denies total loss of vision. Denies focal weakness, numbness. No further complaints. A/ox4, respirations unlabored, speech full/clear, gait steady, no acute distress noted.

## 2022-10-20 ENCOUNTER — TELEPHONE (OUTPATIENT)
Dept: NEUROLOGY | Facility: CLINIC | Age: 62
End: 2022-10-20

## 2022-10-20 ENCOUNTER — APPOINTMENT (OUTPATIENT)
Dept: CV DIAGNOSTICS | Facility: HOSPITAL | Age: 62
End: 2022-10-20
Attending: Other
Payer: COMMERCIAL

## 2022-10-20 ENCOUNTER — APPOINTMENT (OUTPATIENT)
Dept: MRI IMAGING | Facility: HOSPITAL | Age: 62
End: 2022-10-20
Attending: Other
Payer: COMMERCIAL

## 2022-10-20 ENCOUNTER — TELEPHONE (OUTPATIENT)
Dept: ENDOCRINOLOGY CLINIC | Facility: CLINIC | Age: 62
End: 2022-10-20

## 2022-10-20 LAB
CHOLEST SERPL-MCNC: 110 MG/DL (ref ?–200)
GLUCOSE BLDC GLUCOMTR-MCNC: 111 MG/DL (ref 70–99)
GLUCOSE BLDC GLUCOMTR-MCNC: 115 MG/DL (ref 70–99)
GLUCOSE BLDC GLUCOMTR-MCNC: 148 MG/DL (ref 70–99)
GLUCOSE BLDC GLUCOMTR-MCNC: 155 MG/DL (ref 70–99)
HDLC SERPL-MCNC: 41 MG/DL (ref 40–59)
LDLC SERPL CALC-MCNC: 46 MG/DL (ref ?–100)
NONHDLC SERPL-MCNC: 69 MG/DL (ref ?–130)
TRIGL SERPL-MCNC: 134 MG/DL (ref 30–149)
VLDLC SERPL CALC-MCNC: 19 MG/DL (ref 0–30)

## 2022-10-20 PROCEDURE — 93306 TTE W/DOPPLER COMPLETE: CPT | Performed by: OTHER

## 2022-10-20 PROCEDURE — 99217 OBSERVATION CARE DISCHARGE: CPT | Performed by: HOSPITALIST

## 2022-10-20 PROCEDURE — 99213 OFFICE O/P EST LOW 20 MIN: CPT | Performed by: OTHER

## 2022-10-20 RX ORDER — CLOPIDOGREL BISULFATE 75 MG/1
300 TABLET ORAL ONCE
Status: COMPLETED | OUTPATIENT
Start: 2022-10-20 | End: 2022-10-20

## 2022-10-20 RX ORDER — PROCHLORPERAZINE EDISYLATE 5 MG/ML
5 INJECTION INTRAMUSCULAR; INTRAVENOUS EVERY 8 HOURS PRN
Status: DISCONTINUED | OUTPATIENT
Start: 2022-10-20 | End: 2022-10-21

## 2022-10-20 RX ORDER — NICOTINE POLACRILEX 4 MG
15 LOZENGE BUCCAL
Status: DISCONTINUED | OUTPATIENT
Start: 2022-10-20 | End: 2022-10-21

## 2022-10-20 RX ORDER — CLOPIDOGREL BISULFATE 75 MG/1
300 TABLET ORAL DAILY
Status: DISCONTINUED | OUTPATIENT
Start: 2022-10-20 | End: 2022-10-20

## 2022-10-20 RX ORDER — PREGABALIN 50 MG/1
100 CAPSULE ORAL 3 TIMES DAILY
Status: DISCONTINUED | OUTPATIENT
Start: 2022-10-20 | End: 2022-10-21

## 2022-10-20 RX ORDER — DEXTROSE MONOHYDRATE 25 G/50ML
50 INJECTION, SOLUTION INTRAVENOUS
Status: DISCONTINUED | OUTPATIENT
Start: 2022-10-20 | End: 2022-10-21

## 2022-10-20 RX ORDER — LORAZEPAM 1 MG/1
2 TABLET ORAL ONCE
Status: DISCONTINUED | OUTPATIENT
Start: 2022-10-20 | End: 2022-10-20

## 2022-10-20 RX ORDER — DIAZEPAM 5 MG/ML
2.5 INJECTION, SOLUTION INTRAMUSCULAR; INTRAVENOUS ONCE
Status: COMPLETED | OUTPATIENT
Start: 2022-10-20 | End: 2022-10-20

## 2022-10-20 RX ORDER — ACETAMINOPHEN 325 MG/1
650 TABLET ORAL EVERY 4 HOURS PRN
Status: DISCONTINUED | OUTPATIENT
Start: 2022-10-20 | End: 2022-10-21

## 2022-10-20 RX ORDER — SODIUM CHLORIDE 9 MG/ML
INJECTION, SOLUTION INTRAVENOUS CONTINUOUS
Status: DISCONTINUED | OUTPATIENT
Start: 2022-10-20 | End: 2022-10-20

## 2022-10-20 RX ORDER — LORAZEPAM 1 MG/1
2 TABLET ORAL ONCE AS NEEDED
Status: ACTIVE | OUTPATIENT
Start: 2022-10-20 | End: 2022-10-20

## 2022-10-20 RX ORDER — ATORVASTATIN CALCIUM 80 MG/1
80 TABLET, FILM COATED ORAL NIGHTLY
Status: DISCONTINUED | OUTPATIENT
Start: 2022-10-20 | End: 2022-10-21

## 2022-10-20 RX ORDER — ONDANSETRON 2 MG/ML
4 INJECTION INTRAMUSCULAR; INTRAVENOUS EVERY 6 HOURS PRN
Status: DISCONTINUED | OUTPATIENT
Start: 2022-10-20 | End: 2022-10-21

## 2022-10-20 RX ORDER — ASPIRIN 300 MG/1
300 SUPPOSITORY RECTAL DAILY
Status: DISCONTINUED | OUTPATIENT
Start: 2022-10-20 | End: 2022-10-21

## 2022-10-20 RX ORDER — HEPARIN SODIUM 5000 [USP'U]/ML
5000 INJECTION, SOLUTION INTRAVENOUS; SUBCUTANEOUS EVERY 12 HOURS SCHEDULED
Status: DISCONTINUED | OUTPATIENT
Start: 2022-10-20 | End: 2022-10-21

## 2022-10-20 RX ORDER — ASPIRIN 325 MG
325 TABLET ORAL DAILY
Status: DISCONTINUED | OUTPATIENT
Start: 2022-10-20 | End: 2022-10-21

## 2022-10-20 RX ORDER — FINASTERIDE 5 MG/1
5 TABLET, FILM COATED ORAL DAILY
Status: DISCONTINUED | OUTPATIENT
Start: 2022-10-20 | End: 2022-10-21

## 2022-10-20 RX ORDER — NICOTINE POLACRILEX 4 MG
30 LOZENGE BUCCAL
Status: DISCONTINUED | OUTPATIENT
Start: 2022-10-20 | End: 2022-10-21

## 2022-10-20 RX ORDER — TAMSULOSIN HYDROCHLORIDE 0.4 MG/1
0.8 CAPSULE ORAL DAILY
Status: DISCONTINUED | OUTPATIENT
Start: 2022-10-20 | End: 2022-10-21

## 2022-10-20 RX ORDER — LABETALOL HYDROCHLORIDE 5 MG/ML
10 INJECTION, SOLUTION INTRAVENOUS EVERY 10 MIN PRN
Status: DISCONTINUED | OUTPATIENT
Start: 2022-10-20 | End: 2022-10-21

## 2022-10-20 RX ORDER — HYDRALAZINE HYDROCHLORIDE 20 MG/ML
10 INJECTION INTRAMUSCULAR; INTRAVENOUS EVERY 2 HOUR PRN
Status: DISCONTINUED | OUTPATIENT
Start: 2022-10-20 | End: 2022-10-21

## 2022-10-20 RX ORDER — ACETAMINOPHEN 650 MG/1
650 SUPPOSITORY RECTAL EVERY 4 HOURS PRN
Status: DISCONTINUED | OUTPATIENT
Start: 2022-10-20 | End: 2022-10-21

## 2022-10-20 RX ORDER — TETRACAINE HYDROCHLORIDE 5 MG/ML
1 SOLUTION OPHTHALMIC ONCE
Status: COMPLETED | OUTPATIENT
Start: 2022-10-20 | End: 2022-10-20

## 2022-10-20 NOTE — DISCHARGE PLANNING
Patient chart reviewed for discharge: Medication Reconciliation completed, Specialist/PCP follow up listed, and disease specific Instructions/Education included in After Visit Summary. MRI called, estimated for 3pm. Discharge pending MRI results and neurology clearance. Primary RN updated on AVS completion and MRI estimated time.     Isabela De La Rosa RN, Discharge Leader [id number]

## 2022-10-20 NOTE — TELEPHONE ENCOUNTER
Alessandra from In patient ED call to [facility] a f/u  pt with  . ( TIA F/U)  Did explain there is nothing available until January but we can ask him if he can double book pt .     Her ext is [phone] , but pt  will be discharge today so is best to contact pt

## 2022-10-20 NOTE — PLAN OF CARE
RA. Afebrile. NC/VS Q4H. Echo pending. MRI pending, iv valium given prior to test. Slp recommending regular/thin no straw. Ivf stopped. Plan for home possibly later today or tonight pending test results. Problem: PAIN - ADULT  Goal: Verbalizes/displays adequate comfort level or patient's stated pain goal  Description: INTERVENTIONS:  - Encourage pt to monitor pain and request assistance  - Assess pain using appropriate pain scale  - Administer analgesics based on type and severity of pain and evaluate response  - Implement non-pharmacological measures as appropriate and evaluate response  - Consider cultural and social influences on pain and pain management  - Manage/alleviate anxiety  - Utilize distraction and/or relaxation techniques  - Monitor for opioid side effects  - Notify MD/LIP if interventions unsuccessful or patient reports new pain  - Anticipate increased pain with activity and pre-medicate as appropriate  Outcome: Progressing     Problem: SAFETY ADULT - FALL  Goal: Free from fall injury  Description: INTERVENTIONS:  - Assess pt frequently for physical needs  - Identify cognitive and physical deficits and behaviors that affect risk of falls.   - [place] fall precautions as indicated by assessment.  - Educate pt/family on patient safety including physical limitations  - Instruct pt to call for assistance with activity based on assessment  - Modify environment to reduce risk of injury  - Provide assistive devices as appropriate  - Consider OT/PT consult to assist with strengthening/mobility  - Encourage toileting schedule  Outcome: Progressing     Problem: DISCHARGE PLANNING  Goal: Discharge to home or other facility with appropriate resources  Description: INTERVENTIONS:  - Identify barriers to discharge w/pt and caregiver  - Include patient/family/discharge partner in discharge planning  - Arrange for needed discharge resources and transportation as appropriate  - Identify discharge learning needs (meds, wound care, etc)  - Arrange for interpreters to assist at discharge as needed  - Consider post-discharge preferences of patient/family/discharge partner  - Complete POLST form as appropriate  - Assess patient's ability to be responsible for managing their own health  - Refer to Case Management Department for coordinating discharge planning if the patient needs post-hospital services based on physician/LIP order or complex needs related to functional status, cognitive ability or social support system  Outcome: Progressing     Problem: NEUROLOGICAL - ADULT  Goal: Achieves stable or improved neurological status  Description: INTERVENTIONS  - Assess for and report changes in neurological status  - Initiate measures to prevent increased intracranial pressure  - Maintain blood pressure and fluid volume within ordered parameters to optimize cerebral perfusion and minimize risk of hemorrhage  - Monitor temperature, glucose, and sodium.  Initiate appropriate interventions as ordered  Outcome: Progressing  Goal: Absence of seizures  Description: INTERVENTIONS  - Monitor for seizure activity  - Administer anti-seizure medications as ordered  - Monitor neurological status  Outcome: Progressing  Goal: Remains free of injury related to seizure activity  Description: INTERVENTIONS:  - Maintain airway, patient safety  and administer oxygen as ordered  - Monitor patient for seizure activity, document and report duration and description of seizure to MD/LIP  - If seizure occurs, turn patient to side and suction secretions as needed  - Reorient patient post seizure  - Seizure pads on all 4 side rails  - Instruct patient/family to notify RN of any seizure activity  - Instruct patient/family to call for assistance with activity based on assessment  Outcome: Progressing  Goal: Achieves maximal functionality and self care  Description: INTERVENTIONS  - Monitor swallowing and airway patency with patient fatigue and changes in neurological status  - Encourage and assist patient to increase activity and self care with guidance from PT/OT  - Encourage visually impaired, hearing impaired and aphasic patients to use assistive/communication devices  Outcome: Progressing

## 2022-10-20 NOTE — PLAN OF CARE
Pt resting comfortably in bed. No complaints over night. Fluids started. Plan for MRI, 2D echo, PT/OT/SLP eval. Pt is aware of plan of care. Fall precautions in place, call light within reach.         Problem: Patient Centered Care  Goal: Patient preferences are identified and integrated in the patient's plan of care  Description: Interventions:  - What would you like us to know as we care for you?   - Provide timely, complete, and accurate information to patient/family  - Incorporate patient and family knowledge, values, beliefs, and cultural backgrounds into the planning and delivery of care  - Encourage patient/family to participate in care and decision-making at the level they choose  - Honor patient and family perspectives and choices  Outcome: Progressing     Problem: Patient/Family Goals  Goal: Patient/Family Long Term Goal  Description: Patient's Long Term Goal:     Interventions:  -   - See additional Care Plan goals for specific interventions  Outcome: Progressing  Goal: Patient/Family Short Term Goal  Description: Patient's Short Term Goal:     Interventions:   -   - See additional Care Plan goals for specific interventions  Outcome: Progressing     Problem: PAIN - ADULT  Goal: Verbalizes/displays adequate comfort level or patient's stated pain goal  Description: INTERVENTIONS:  - Encourage pt to monitor pain and request assistance  - Assess pain using appropriate pain scale  - Administer analgesics based on type and severity of pain and evaluate response  - Implement non-pharmacological measures as appropriate and evaluate response  - Consider cultural and social influences on pain and pain management  - Manage/alleviate anxiety  - Utilize distraction and/or relaxation techniques  - Monitor for opioid side effects  - Notify MD/LIP if interventions unsuccessful or patient reports new pain  - Anticipate increased pain with activity and pre-medicate as appropriate  Outcome: Progressing     Problem: SAFETY ADULT - FALL  Goal: Free from fall injury  Description: INTERVENTIONS:  - Assess pt frequently for physical needs  - Identify cognitive and physical deficits and behaviors that affect risk of falls. - [place] fall precautions as indicated by assessment.  - Educate pt/family on patient safety including physical limitations  - Instruct pt to call for assistance with activity based on assessment  - Modify environment to reduce risk of injury  - Provide assistive devices as appropriate  - Consider OT/PT consult to assist with strengthening/mobility  - Encourage toileting schedule  Outcome: Progressing     Problem: DISCHARGE PLANNING  Goal: Discharge to home or other facility with appropriate resources  Description: INTERVENTIONS:  - Identify barriers to discharge w/pt and caregiver  - Include patient/family/discharge partner in discharge planning  - Arrange for needed discharge resources and transportation as appropriate  - Identify discharge learning needs (meds, wound care, etc)  - Arrange for interpreters to assist at discharge as needed  - Consider post-discharge preferences of patient/family/discharge partner  - Complete POLST form as appropriate  - Assess patient's ability to be responsible for managing their own health  - Refer to Case Management Department for coordinating discharge planning if the patient needs post-hospital services based on physician/LIP order or complex needs related to functional status, cognitive ability or social support system  Outcome: Progressing     Problem: NEUROLOGICAL - ADULT  Goal: Achieves stable or improved neurological status  Description: INTERVENTIONS  - Assess for and report changes in neurological status  - Initiate measures to prevent increased intracranial pressure  - Maintain blood pressure and fluid volume within ordered parameters to optimize cerebral perfusion and minimize risk of hemorrhage  - Monitor temperature, glucose, and sodium.  Initiate appropriate interventions as ordered  Outcome: Progressing  Goal: Absence of seizures  Description: INTERVENTIONS  - Monitor for seizure activity  - Administer anti-seizure medications as ordered  - Monitor neurological status  Outcome: Progressing  Goal: Remains free of injury related to seizure activity  Description: INTERVENTIONS:  - Maintain airway, patient safety  and administer oxygen as ordered  - Monitor patient for seizure activity, document and report duration and description of seizure to MD/LIP  - If seizure occurs, turn patient to side and suction secretions as needed  - Reorient patient post seizure  - Seizure pads on all 4 side rails  - Instruct patient/family to notify RN of any seizure activity  - Instruct patient/family to call for assistance with activity based on assessment  Outcome: Progressing  Goal: Achieves maximal functionality and self care  Description: INTERVENTIONS  - Monitor swallowing and airway patency with patient fatigue and changes in neurological status  - Encourage and assist patient to increase activity and self care with guidance from PT/OT  - Encourage visually impaired, hearing impaired and aphasic patients to use assistive/communication devices  Outcome: Progressing

## 2022-10-20 NOTE — TELEPHONE ENCOUNTER
Prior Authorization     Not Listed - Mounjaro 10g/0.5ml Pen Injectors    KEY:  [id number]  Patient last name:  Suhas Lo  :  1960      No current outpatient medications on file.

## 2022-10-20 NOTE — DISCHARGE SUMMARY
[facility]   Patient ID:  Michael Antonio  [id number]  77 year old  2/20/1960    Admit date: 10/19/2022  Discharge date: 10/21/2022  Primary Care Physician: Guanako Mcdonnell MD   Attending Physician: Donn Vogel MD   Consults:   Consultants  Chat With All Active Members    Provider Role Specialty    Jennifer Ramesh DO  Consulting Physician  NEUROLOGY          Discharge Diagnoses:   Transient ischemic attack (TIA)    Reason for admission  As per H&P: Per Dr Knox Asmita is a(n) 63year old male,with a past medical history significant for hypertension hyperlipidemia presents with a complaint of intermittent blurry vision over the past 2 days and prior to that numbness and tingling in his right lower extremity lasted some time before spontaneously resolving. He denies any significant weakness slurred speech or dizziness. Denies any palpitations or chest pain. Denies having experienced similar symptoms in the past.    Hospital Course:    Recurrent transient visual obscurations   Possible TIA  - Neuro on consult  - ASA 325mg given in ER. Plavix load being given per neuro. - MRI brain pending if negative ok to discharage on ASA 81mg daily   - LDL < 100, Hba1c 8.8 needs better control goal < 7.0   - CTA brain neck reviewed. - ECHO pending   - Seen by PT/OT rec outpt PT   - symptoms resolved on admit.   - needs to see opthalmology outpt     Essential HTN   - cont home meds     DM II  - Hba1c 8.8 goal < 7.0  - Novolog CF here.      Morbid obesity   - counseled on diet exercise and weight loss     EXAM:   GENERAL: no apparent distress, comfortable  NEURO: A/A Ox3, no focal deficits  RESP: non labored, CTAB/L  CARDIO: Regular, no murmur  ABD: soft, NT, ND  EXTREMITIES: no edema, no calf tenderness    Operative Procedures:     Discharge Instructions     Medication List      CHANGE how you take these medications    Mounjaro 10 MG/0.5ML Sopn  Generic drug: Tirzepatide  Inject 10 mg into the skin once a week. What changed: additional instructions        CONTINUE taking these medications    aspirin 81 MG Tbec  Commonly known as: Aspirin 81  Take 1 tablet (81 mg total) by mouth daily. FOR HEART. atorvastatin 40 MG Tabs  Commonly known as: Lipitor  Take 1 tablet (40 mg total) by mouth nightly. FOR CHOLESTEROL. CENTRUM SILVER ULTRA MENS OR     DULoxetine 30 MG Cpep  Commonly known as: Cymbalta  Take 1 capsule (30 mg total) by mouth daily. FOR ANXIETY/DEPRESSION/ARTHRITIS PAIN. finasteride 5 MG Tabs  Commonly known as: Proscar  Take 1 tablet (5 mg total) by mouth daily. FOR PROSTATE.     glipiZIDE 5 MG Tabs  Commonly known as: Glucotrol  Take 0.5 tablets (2.5 mg total) by mouth every morning before breakfast.     hydroCHLOROthiazide 12.5 MG Tabs  Commonly known as: HYDRODIURIL  Take 1 tablet (12.5 mg total) by mouth daily. FOR BLOOD PRESSURE.     ketoconazole 2 % Crea  Commonly known as: Nizoral  APPLY THIN FILM TO BOTTOM OF FEET TWICE DAILY. DO NOT APPLY IN BETWEEN TOES.     metFORMIN  MG Tb24  Commonly known as: Glucophage XR  Take 2 tablets (1,000 mg total) by mouth 2 (two) times daily with meals. FOR DIABETES.     metoprolol tartrate 25 MG Tabs  Commonly known as: Lopressor  Take 1 tablet (25 mg total) by mouth 2 (two) times daily. FOR HEART. pregabalin 100 MG Caps  Commonly known as: Lyrica  TAKE 1 CAPSULE (100 MG TOTAL) BY MOUTH 3 (THREE) TIMES DAILY. STOP GABAPENTIN. Synthroid 175 MCG Tabs  Generic drug: levothyroxine  Take 1 tablet (175 mcg total) by mouth before breakfast.     tamsulosin 0.4 MG Caps  Commonly known as: Flomax  Take 2 capsules (0.8 mg total) by mouth daily. Take 1/2 hour following the same meal each day     traMADol 50 MG Tabs  Commonly known as: Ultram  Take 1 tablet (50 mg total) by mouth every 6 (six) hours as needed for Pain.     valsartan 160 MG Tabs  Commonly known as: Diovan  Take 1 tablet (160 mg total) by mouth daily. FOR BLOOD PRESSURE. STOP taking these medications    benzonatate 100 MG Caps  Commonly known as: Tessalon     cephalexin 500 MG Caps  Commonly known as: Keflex     levoFLOXacin 750 MG Tabs  Commonly known as: Levaquin     methylPREDNISolone 4 MG Tbpk  Commonly known as: Medrol            Activity: activity as tolerated  Diet: cardiac diet  Wound Care: NA  Code Status: Full Code        Discharge Instructions       FU with PCP in 1 week  FU with Dr Toño Kim in 1 month   outpt physical therapy   Cont to take ASA           Important follow up: Follow-up Information     Gabriela Morrison MD In 1 week. Specialty: Internal Medicine  Contact information:  [address]  [phone]             Naz Canales DO In 1 month. Specialty: NEUROLOGY  Contact information:  [address] [place] [address]  [phone]                         -PCP in [] within 7 days [] within 14 days [] other     Disposition: home  Discharged Condition: good    Hospital Discharge Diagnoses: possible TIA    Lace+ Score: 66  59-90 High Risk  29-58 Medium Risk  0-28   Low Risk. TCM Follow-Up Recommendation:  LACE > 58:  High Risk of readmission after discharge from the hospital.            Total Time Coordinating Care: Greater than 30 minutes    Patient had opportunity to ask questions, state understanding, and agree with therapeutic plan as outlined    Eddie Cano MD  Hospitalist  10/20/2022

## 2022-10-20 NOTE — ED QUICK NOTES
Orders for admission, patient is aware of plan and ready to go upstairs. Any questions, please call ED RN Indu HARTMAN at extension [phone].      Patient Covid vaccination status: Fully vaccinated     COVID Test Ordered in ED: Rapid SARS-CoV-2 by PCR    COVID Suspicion at Admission: N/A    Running Infusions:  None    Mental Status/LOC at time of transport: A&O x 4    Other pertinent information:   CIWA score: N/A   NIH score:  N/A

## 2022-10-20 NOTE — CM/SW NOTE
10/20/22 1000   CM/SW Referral Data   Referral Source Physician;   Pertinent Medical Hx   Does patient have an established PCP? Yes   Patient Info   Number of Stair in Home   (6 stairs tri-level)   Patient lives with Spouse/Significant other; Son   Patient Status Prior to Admission   Independent with ADLs and Mobility No     Do not anticipate post-acute service needs at [facility]. Possible home today. / to remain available for support and/or discharge planning.      Geovanna DAWNN RN [address]  RN Case Manager  [phone]

## 2022-10-20 NOTE — OCCUPATIONAL THERAPY NOTE
Spoke to nurse- pt is off floor for multiple tests. Plan is for pt to be discharged pending results of MRI.

## 2022-10-21 ENCOUNTER — APPOINTMENT (OUTPATIENT)
Dept: MRI IMAGING | Facility: HOSPITAL | Age: 62
End: 2022-10-21
Attending: Other
Payer: COMMERCIAL

## 2022-10-21 VITALS
TEMPERATURE: 97 F | DIASTOLIC BLOOD PRESSURE: 63 MMHG | RESPIRATION RATE: 19 BRPM | OXYGEN SATURATION: 93 % | WEIGHT: 311 LBS | SYSTOLIC BLOOD PRESSURE: 140 MMHG | HEART RATE: 78 BPM | BODY MASS INDEX: 40 KG/M2

## 2022-10-21 LAB
GLUCOSE BLDC GLUCOMTR-MCNC: 135 MG/DL (ref 70–99)
GLUCOSE BLDC GLUCOMTR-MCNC: 137 MG/DL (ref 70–99)
GLUCOSE BLDC GLUCOMTR-MCNC: 157 MG/DL (ref 70–99)

## 2022-10-21 PROCEDURE — 99225 SUBSEQUENT OBSERVATION CARE: CPT | Performed by: HOSPITALIST

## 2022-10-21 PROCEDURE — 70551 MRI BRAIN STEM W/O DYE: CPT | Performed by: OTHER

## 2022-10-21 RX ORDER — CLOPIDOGREL BISULFATE 75 MG/1
75 TABLET ORAL DAILY
Qty: 21 TABLET | Refills: 0 | Status: SHIPPED | OUTPATIENT
Start: 2022-10-21 | End: 2022-11-11

## 2022-10-21 RX ORDER — LORAZEPAM 2 MG/ML
2 INJECTION INTRAMUSCULAR ONCE
Status: COMPLETED | OUTPATIENT
Start: 2022-10-21 | End: 2022-10-21

## 2022-10-21 RX ORDER — CLOPIDOGREL BISULFATE 75 MG/1
75 TABLET ORAL DAILY
Status: DISCONTINUED | OUTPATIENT
Start: 2022-10-21 | End: 2022-10-21

## 2022-10-21 NOTE — PLAN OF CARE
Problem: Patient Centered Care  Goal: Patient preferences are identified and integrated in the patient's plan of care  Description: Interventions:  - What would you like us to know as we care for you?  My wife is my poa  - Provide timely, complete, and accurate information to patient/family  - Incorporate patient and family knowledge, values, beliefs, and cultural backgrounds into the planning and delivery of care  - Encourage patient/family to participate in care and decision-making at the level they choose  - Honor patient and family perspectives and choices  Outcome: Progressing     Problem: Patient/Family Goals  Goal: Patient/Family Long Term Goal  Description: Patient's Long Term Goal: to not have TIA again     Interventions:  - medication  - See additional Care Plan goals for specific interventions  Outcome: Progressing  Goal: Patient/Family Short Term Goal  Description: Patient's Short Term Goal: go home safetly     Interventions:   - walker provided  - See additional Care Plan goals for specific interventions  Outcome: Progressing     Problem: PAIN - ADULT  Goal: Verbalizes/displays adequate comfort level or patient's stated pain goal  Description: INTERVENTIONS:  - Encourage pt to monitor pain and request assistance  - Assess pain using appropriate pain scale  - Administer analgesics based on type and severity of pain and evaluate response  - Implement non-pharmacological measures as appropriate and evaluate response  - Consider cultural and social influences on pain and pain management  - Manage/alleviate anxiety  - Utilize distraction and/or relaxation techniques  - Monitor for opioid side effects  - Notify MD/LIP if interventions unsuccessful or patient reports new pain  - Anticipate increased pain with activity and pre-medicate as appropriate  Outcome: Progressing     Problem: SAFETY ADULT - FALL  Goal: Free from fall injury  Description: INTERVENTIONS:  - Assess pt frequently for physical needs  - Identify cognitive and physical deficits and behaviors that affect risk of falls. - [place] fall precautions as indicated by assessment.  - Educate pt/family on patient safety including physical limitations  - Instruct pt to call for assistance with activity based on assessment  - Modify environment to reduce risk of injury  - Provide assistive devices as appropriate  - Consider OT/PT consult to assist with strengthening/mobility  - Encourage toileting schedule  Outcome: Progressing     Problem: DISCHARGE PLANNING  Goal: Discharge to home or other facility with appropriate resources  Description: INTERVENTIONS:  - Identify barriers to discharge w/pt and caregiver  - Include patient/family/discharge partner in discharge planning  - Arrange for needed discharge resources and transportation as appropriate  - Identify discharge learning needs (meds, wound care, etc)  - Arrange for interpreters to assist at discharge as needed  - Consider post-discharge preferences of patient/family/discharge partner  - Complete POLST form as appropriate  - Assess patient's ability to be responsible for managing their own health  - Refer to Case Management Department for coordinating discharge planning if the patient needs post-hospital services based on physician/LIP order or complex needs related to functional status, cognitive ability or social support system  Outcome: Progressing     Problem: NEUROLOGICAL - ADULT  Goal: Achieves stable or improved neurological status  Description: INTERVENTIONS  - Assess for and report changes in neurological status  - Initiate measures to prevent increased intracranial pressure  - Maintain blood pressure and fluid volume within ordered parameters to optimize cerebral perfusion and minimize risk of hemorrhage  - Monitor temperature, glucose, and sodium.  Initiate appropriate interventions as ordered  Outcome: Progressing  Goal: Absence of seizures  Description: INTERVENTIONS  - Monitor for seizure activity  - Administer anti-seizure medications as ordered  - Monitor neurological status  Outcome: Progressing  Goal: Remains free of injury related to seizure activity  Description: INTERVENTIONS:  - Maintain airway, patient safety  and administer oxygen as ordered  - Monitor patient for seizure activity, document and report duration and description of seizure to MD/LIP  - If seizure occurs, turn patient to side and suction secretions as needed  - Reorient patient post seizure  - Seizure pads on all 4 side rails  - Instruct patient/family to notify RN of any seizure activity  - Instruct patient/family to call for assistance with activity based on assessment  Outcome: Progressing  Goal: Achieves maximal functionality and self care  Description: INTERVENTIONS  - Monitor swallowing and airway patency with patient fatigue and changes in neurological status  - Encourage and assist patient to increase activity and self care with guidance from PT/OT  - Encourage visually impaired, hearing impaired and aphasic patients to use assistive/communication devices  Outcome: Progressing     Patient denies pain or discomfort. Mri results in patient ok to dc home. Walker ordered.  Patient to discharge home

## 2022-10-21 NOTE — DISCHARGE PLANNING
Patient chart reviewed for discharge: Medication details verified for accuracy, Specialist/PCP follow up listed, and disease specific Instructions/Education included in After Visit Summary. Discharge pending MRI and neurology clearance. MRI called--patient scheduled for 12:30pm. Patient's RN to notify DC RN if discharge status changes.         Lacey Elliott RN, Discharge Leader [id number]

## 2022-10-21 NOTE — SLP NOTE
SLP f/u for ongoing meal assessment. RN reports patient will receive valium and go for MRI. Will defer meal assessment at this time as patient will be sedated and laying flat for MRI exam within the next hour. Will monitor MRI results for potential need for SLE. RN alerted to attempt. Thank you.   Jose Tovar M.S [id number] [facility]   Speech Language Pathologist   Ext #[phone]

## 2022-10-21 NOTE — CONSULTS
[facility]    PATIENT'S NAME: LAURA ARANDA   ATTENDING PHYSICIAN: Maria Elena James MD   CONSULTING PHYSICIAN: Gee Chavarria. Tess Edwards MD   PATIENT ACCOUNT#:   [id number]    LOCATION:  [address] [place] [address] RECORD #:   [id number]       YOB: 1960  ADMISSION DATE:       10/19/2022      CONSULT DATE:  10/20/2022    REPORT OF CONSULTATION    ####EDITING MAY BE REQUIRED#####    HISTORY OF PRESENT ILLNESS:  This 82-year-old gentleman was seen by me after the call from Dr. Daniel Penn, the neurologist.  This gentleman is a known diabetic for 25 years and he is on oral hypoglycemics. He has past history of high blood pressure also and right carotid endarterectomy 2 years ago, which was 90% blocked. Recently, he had 2 episodes of blurred vision. Last Tuesday, he had blurred vision for 4 to 5 minutes. The vision was blurred. He could see the lights and other things, but could not make out details, so yesterday he had another episode similar, lasting for 5 minutes. He is on also baby aspirin. When I examined him, he does wear glasses. His visual acuity was 20/40 in each eye with glasses. PHYSICAL EXAMINATION:  The examination of the eyes, the eyelids were normal.  The conjunctivae was normal.  There was a corneal fluorescein staining both corneas, left more than the right eye. Positive fluorescein staining, so he has dry eyes. His anterior chambers were clear. Pupils were round and reacting to direct and indirect light. He has lenticular opacities both eyes. I dilated his pupil with 1% Mydriacyl and after dilation, the optic discs appeared to be normal.  The nasal margins were clear. The temporal margins were a little hazy, but there was no definite evidence of papilledema. His arterioles were narrow. His veins were very dilated. There were AV crossing changes, AV nicking. The macular areas did not show any microaneurysms.   There were occasional microaneurysms in the periphery, but there is no classic background diabetic retinopathy _____. His intra-ocular pressure was 17 mmHg in each eye with Goldmann applanation tonometer. The visual fields on confrontation were normal.     ASSESSMENT AND PLAN:  It is difficult to explain what was the cause of his attacks of blurred vision,was it that he had very severe dry eyes and after blinking it gradually cleared, or was it TIAs. He had studies of the circulation studies in the neck. He is scheduled for MRI scan tomorrow. I advised him to use Artificial Tears 4 times a day, either Systane Refresh or Blink eye drops, and use the gel drops at night. I talked to Dr. Bang Garcia and apprised him of my findings. If you discharge tomorrow, then I will arrange for a retina consult with Dr. Sameer leigh in the hospital.    Thanks for the consult. Dictated By Anirudh Clarke MD  d: 10/20/2022 17:47:36  t: 10/20/2022 19:01:52  [facility] [id number]  WGQ/

## 2022-10-21 NOTE — PLAN OF CARE
A&O x4. Room air. No c/o pain throughout the night. Q4hr neuro checks. Plan for MRI in the morning. Call light within reach, bed is locked and in lowest position.      Problem: Patient Centered Care  Goal: Patient preferences are identified and integrated in the patient's plan of care  Description: Interventions:  - What would you like us to know as we care for you?   - Provide timely, complete, and accurate information to patient/family  - Incorporate patient and family knowledge, values, beliefs, and cultural backgrounds into the planning and delivery of care  - Encourage patient/family to participate in care and decision-making at the level they choose  - Honor patient and family perspectives and choices  Outcome: Progressing     Problem: Patient/Family Goals  Goal: Patient/Family Long Term Goal  Description: Patient's Long Term Goal: to feel better     Interventions:  - follow medication regimen, follow treatment plan    - See additional Care Plan goals for specific interventions  Outcome: Progressing  Goal: Patient/Family Short Term Goal  Description: Patient's Short Term Goal: to return home     Interventions:   - follow medication regimen, follow treatment plan   - See additional Care Plan goals for specific interventions  Outcome: Progressing     Problem: PAIN - ADULT  Goal: Verbalizes/displays adequate comfort level or patient's stated pain goal  Description: INTERVENTIONS:  - Encourage pt to monitor pain and request assistance  - Assess pain using appropriate pain scale  - Administer analgesics based on type and severity of pain and evaluate response  - Implement non-pharmacological measures as appropriate and evaluate response  - Consider cultural and social influences on pain and pain management  - Manage/alleviate anxiety  - Utilize distraction and/or relaxation techniques  - Monitor for opioid side effects  - Notify MD/LIP if interventions unsuccessful or patient reports new pain  - Anticipate increased pain with activity and pre-medicate as appropriate  Outcome: Progressing     Problem: SAFETY ADULT - FALL  Goal: Free from fall injury  Description: INTERVENTIONS:  - Assess pt frequently for physical needs  - Identify cognitive and physical deficits and behaviors that affect risk of falls.   - [place] fall precautions as indicated by assessment.  - Educate pt/family on patient safety including physical limitations  - Instruct pt to call for assistance with activity based on assessment  - Modify environment to reduce risk of injury  - Provide assistive devices as appropriate  - Consider OT/PT consult to assist with strengthening/mobility  - Encourage toileting schedule  Outcome: Progressing     Problem: DISCHARGE PLANNING  Goal: Discharge to home or other facility with appropriate resources  Description: INTERVENTIONS:  - Identify barriers to discharge w/pt and caregiver  - Include patient/family/discharge partner in discharge planning  - Arrange for needed discharge resources and transportation as appropriate  - Identify discharge learning needs (meds, wound care, etc)  - Arrange for interpreters to assist at discharge as needed  - Consider post-discharge preferences of patient/family/discharge partner  - Complete POLST form as appropriate  - Assess patient's ability to be responsible for managing their own health  - Refer to Case Management Department for coordinating discharge planning if the patient needs post-hospital services based on physician/LIP order or complex needs related to functional status, cognitive ability or social support system  Outcome: Progressing     Problem: NEUROLOGICAL - ADULT  Goal: Achieves stable or improved neurological status  Description: INTERVENTIONS  - Assess for and report changes in neurological status  - Initiate measures to prevent increased intracranial pressure  - Maintain blood pressure and fluid volume within ordered parameters to optimize cerebral perfusion and minimize risk of hemorrhage  - Monitor temperature, glucose, and sodium.  Initiate appropriate interventions as ordered  Outcome: Progressing  Goal: Absence of seizures  Description: INTERVENTIONS  - Monitor for seizure activity  - Administer anti-seizure medications as ordered  - Monitor neurological status  Outcome: Progressing  Goal: Remains free of injury related to seizure activity  Description: INTERVENTIONS:  - Maintain airway, patient safety  and administer oxygen as ordered  - Monitor patient for seizure activity, document and report duration and description of seizure to MD/LIP  - If seizure occurs, turn patient to side and suction secretions as needed  - Reorient patient post seizure  - Seizure pads on all 4 side rails  - Instruct patient/family to notify RN of any seizure activity  - Instruct patient/family to call for assistance with activity based on assessment  Outcome: Progressing  Goal: Achieves maximal functionality and self care  Description: INTERVENTIONS  - Monitor swallowing and airway patency with patient fatigue and changes in neurological status  - Encourage and assist patient to increase activity and self care with guidance from PT/OT  - Encourage visually impaired, hearing impaired and aphasic patients to use assistive/communication devices  Outcome: Progressing

## 2022-10-24 ENCOUNTER — PATIENT OUTREACH (OUTPATIENT)
Dept: CASE MANAGEMENT | Age: 62
End: 2022-10-24

## 2022-10-24 DIAGNOSIS — Z02.9 ENCOUNTERS FOR UNSPECIFIED ADMINISTRATIVE PURPOSE: ICD-10-CM

## 2022-10-24 RX ORDER — POLYETHYLENE GLYCOL 400 AND PROPYLENE GLYCOL 4; 3 MG/ML; MG/ML
1 SOLUTION/ DROPS OPHTHALMIC
COMMUNITY

## 2022-10-24 RX ORDER — MAGNESIUM OXIDE 400 MG/1
400 TABLET ORAL DAILY
COMMUNITY

## 2022-10-24 RX ORDER — POLYETHYLENE GLYCOL 400 AND PROPYLENE GLYCOL 4; 3 MG/ML; MG/ML
GEL OPHTHALMIC NIGHTLY
COMMUNITY

## 2022-10-25 ENCOUNTER — OFFICE VISIT (OUTPATIENT)
Dept: PHYSICAL MEDICINE AND REHAB | Facility: CLINIC | Age: 62
End: 2022-10-25
Payer: COMMERCIAL

## 2022-10-25 DIAGNOSIS — I15.2 HYPERTENSION ASSOCIATED WITH TYPE 2 DIABETES MELLITUS (HCC): ICD-10-CM

## 2022-10-25 DIAGNOSIS — M22.2X9 PATELLOFEMORAL PAIN SYNDROME, UNSPECIFIED LATERALITY: Primary | ICD-10-CM

## 2022-10-25 DIAGNOSIS — M17.11 PRIMARY OSTEOARTHRITIS OF RIGHT KNEE: ICD-10-CM

## 2022-10-25 DIAGNOSIS — E11.42 DIABETIC POLYNEUROPATHY ASSOCIATED WITH TYPE 2 DIABETES MELLITUS (HCC): ICD-10-CM

## 2022-10-25 DIAGNOSIS — E78.5 HYPERLIPIDEMIA ASSOCIATED WITH TYPE 2 DIABETES MELLITUS (HCC): ICD-10-CM

## 2022-10-25 DIAGNOSIS — E11.59 HYPERTENSION ASSOCIATED WITH TYPE 2 DIABETES MELLITUS (HCC): ICD-10-CM

## 2022-10-25 DIAGNOSIS — E03.9 HYPOTHYROIDISM, UNSPECIFIED TYPE: ICD-10-CM

## 2022-10-25 DIAGNOSIS — E11.69 HYPERLIPIDEMIA ASSOCIATED WITH TYPE 2 DIABETES MELLITUS (HCC): ICD-10-CM

## 2022-10-25 DIAGNOSIS — E66.01 CLASS 2 SEVERE OBESITY DUE TO EXCESS CALORIES WITH SERIOUS COMORBIDITY AND BODY MASS INDEX (BMI) OF 38.0 TO 38.9 IN ADULT (HCC): ICD-10-CM

## 2022-10-25 PROCEDURE — 20611 DRAIN/INJ JOINT/BURSA W/US: CPT | Performed by: PHYSICAL MEDICINE & REHABILITATION

## 2022-10-25 RX ORDER — LIDOCAINE HYDROCHLORIDE 10 MG/ML
5 INJECTION, SOLUTION INFILTRATION; PERINEURAL ONCE
Status: COMPLETED | OUTPATIENT
Start: 2022-10-25 | End: 2022-10-25

## 2022-10-25 NOTE — TELEPHONE ENCOUNTER
Patient had trouble splitting medication. Patient bought pill cutter from store and it taking the prescribed dose 2.5mg of glipizide.

## 2022-10-25 NOTE — PATIENT INSTRUCTIONS
Post Injection Instructions     1. Please do not do anything strenuous over the next two days (if you had a knee injection do not walk more than 2 city blocks, do not attend any aerobic classes, do not run, no heavy lifting, no prolong standing). 2. You may resume your day to day activities after your injection. 3. You may experience some mild amount of swelling after the procedure. 4. Please ice your joint that was injected at least 5-6 times a day (15 minutes) for two days after (this will help prevent worsening pain that sometimes occurs after an injection). 5. Only take tylenol if needed for pain for the first few days. 6. Watch for signs of infection which include redness, warmth, worsening pain, fevers or chills. If you develop any of these signs call the office immediately at [phone]    Everyone responds differently to injections, but you can expect your peak effects a few weeks after your last injection. Erica Wheat.  Madonna Perdue MD  Physical Medicine and Rehabilitation/Sports Medicine  [facility]

## 2022-10-26 ENCOUNTER — APPOINTMENT (OUTPATIENT)
Dept: WOUND CARE | Facility: HOSPITAL | Age: 62
End: 2022-10-26
Attending: NURSE PRACTITIONER
Payer: COMMERCIAL

## 2022-10-27 ENCOUNTER — OFFICE VISIT (OUTPATIENT)
Dept: INTERNAL MEDICINE CLINIC | Facility: CLINIC | Age: 62
End: 2022-10-27
Payer: COMMERCIAL

## 2022-10-27 VITALS
HEIGHT: 74 IN | SYSTOLIC BLOOD PRESSURE: 110 MMHG | WEIGHT: 315 LBS | BODY MASS INDEX: 40.43 KG/M2 | DIASTOLIC BLOOD PRESSURE: 63 MMHG | HEART RATE: 85 BPM

## 2022-10-27 DIAGNOSIS — E11.51 TYPE 2 DIABETES MELLITUS WITH DIABETIC PERIPHERAL ANGIOPATHY WITHOUT GANGRENE, WITHOUT LONG-TERM CURRENT USE OF INSULIN (HCC): ICD-10-CM

## 2022-10-27 DIAGNOSIS — I65.22 LEFT CAROTID ARTERY STENOSIS: ICD-10-CM

## 2022-10-27 DIAGNOSIS — G45.9 TRANSIENT ISCHEMIC ATTACK (TIA): ICD-10-CM

## 2022-10-27 DIAGNOSIS — E11.69 HYPERLIPIDEMIA ASSOCIATED WITH TYPE 2 DIABETES MELLITUS (HCC): ICD-10-CM

## 2022-10-27 DIAGNOSIS — Z98.890 HISTORY OF RIGHT-SIDED CAROTID ENDARTERECTOMY: ICD-10-CM

## 2022-10-27 DIAGNOSIS — I63.9 CEREBELLAR INFARCT (HCC): ICD-10-CM

## 2022-10-27 DIAGNOSIS — E78.5 HYPERLIPIDEMIA ASSOCIATED WITH TYPE 2 DIABETES MELLITUS (HCC): ICD-10-CM

## 2022-10-27 DIAGNOSIS — I15.2 HYPERTENSION ASSOCIATED WITH TYPE 2 DIABETES MELLITUS (HCC): ICD-10-CM

## 2022-10-27 DIAGNOSIS — I95.2 HYPOTENSION DUE TO DRUGS: ICD-10-CM

## 2022-10-27 DIAGNOSIS — E11.59 HYPERTENSION ASSOCIATED WITH TYPE 2 DIABETES MELLITUS (HCC): ICD-10-CM

## 2022-10-27 DIAGNOSIS — Z09 HOSPITAL DISCHARGE FOLLOW-UP: Primary | ICD-10-CM

## 2022-10-27 PROCEDURE — 3078F DIAST BP <80 MM HG: CPT | Performed by: INTERNAL MEDICINE

## 2022-10-27 PROCEDURE — 3008F BODY MASS INDEX DOCD: CPT | Performed by: INTERNAL MEDICINE

## 2022-10-27 PROCEDURE — 3074F SYST BP LT 130 MM HG: CPT | Performed by: INTERNAL MEDICINE

## 2022-10-27 PROCEDURE — 99496 TRANSJ CARE MGMT HIGH F2F 7D: CPT | Performed by: INTERNAL MEDICINE

## 2022-10-27 NOTE — PATIENT INSTRUCTIONS
We will continue with all of your current medications however as you start to lose weight please be aware that your blood pressure will also come down. He will continue with metoprolol and valsartan but if your blood pressure is consistently less than 110/60 or you feel lightheaded/dizzy then please stop your hydrochlorothiazide and let me know. As you lose weight the medications for blood pressure will need to be reduced. Be aware that low blood pressure can cause very similar symptoms to a \"stroke \". I will order a Holter monitor, this is a 3 to 7-day electronic EKG that you will wear continuously to determine if you are having any arrhythmias that may be causing your symptoms.

## 2022-10-28 ENCOUNTER — OFFICE VISIT (OUTPATIENT)
Dept: PODIATRY CLINIC | Facility: CLINIC | Age: 62
End: 2022-10-28
Payer: COMMERCIAL

## 2022-10-28 DIAGNOSIS — M20.41 HAMMERTOE, BILATERAL: ICD-10-CM

## 2022-10-28 DIAGNOSIS — M20.42 HAMMERTOE, BILATERAL: ICD-10-CM

## 2022-10-28 DIAGNOSIS — L84 FOOT CALLUS: ICD-10-CM

## 2022-10-28 DIAGNOSIS — E11.42 TYPE 2 DIABETES MELLITUS WITH POLYNEUROPATHY (HCC): Primary | ICD-10-CM

## 2022-10-28 DIAGNOSIS — B35.1 ONYCHOMYCOSIS: ICD-10-CM

## 2022-10-28 PROCEDURE — 11721 DEBRIDE NAIL 6 OR MORE: CPT | Performed by: PODIATRIST

## 2022-10-28 PROCEDURE — 11056 PARNG/CUTG B9 HYPRKR LES 2-4: CPT | Performed by: PODIATRIST

## 2022-11-01 ENCOUNTER — OFFICE VISIT (OUTPATIENT)
Dept: PHYSICAL MEDICINE AND REHAB | Facility: CLINIC | Age: 62
End: 2022-11-01
Payer: COMMERCIAL

## 2022-11-01 DIAGNOSIS — E78.5 HYPERLIPIDEMIA ASSOCIATED WITH TYPE 2 DIABETES MELLITUS (HCC): ICD-10-CM

## 2022-11-01 DIAGNOSIS — M22.2X9 PATELLOFEMORAL PAIN SYNDROME, UNSPECIFIED LATERALITY: Primary | ICD-10-CM

## 2022-11-01 DIAGNOSIS — I15.2 HYPERTENSION ASSOCIATED WITH TYPE 2 DIABETES MELLITUS (HCC): ICD-10-CM

## 2022-11-01 DIAGNOSIS — E11.42 DIABETIC POLYNEUROPATHY ASSOCIATED WITH TYPE 2 DIABETES MELLITUS (HCC): ICD-10-CM

## 2022-11-01 DIAGNOSIS — E11.69 HYPERLIPIDEMIA ASSOCIATED WITH TYPE 2 DIABETES MELLITUS (HCC): ICD-10-CM

## 2022-11-01 DIAGNOSIS — M17.11 PRIMARY OSTEOARTHRITIS OF RIGHT KNEE: ICD-10-CM

## 2022-11-01 DIAGNOSIS — E03.9 HYPOTHYROIDISM, UNSPECIFIED TYPE: ICD-10-CM

## 2022-11-01 DIAGNOSIS — E11.59 HYPERTENSION ASSOCIATED WITH TYPE 2 DIABETES MELLITUS (HCC): ICD-10-CM

## 2022-11-01 DIAGNOSIS — E66.01 CLASS 2 SEVERE OBESITY DUE TO EXCESS CALORIES WITH SERIOUS COMORBIDITY AND BODY MASS INDEX (BMI) OF 38.0 TO 38.9 IN ADULT (HCC): ICD-10-CM

## 2022-11-01 PROCEDURE — 20611 DRAIN/INJ JOINT/BURSA W/US: CPT | Performed by: PHYSICAL MEDICINE & REHABILITATION

## 2022-11-01 NOTE — PATIENT INSTRUCTIONS
Post Injection Instructions     Please do not do anything strenuous over the next two days (if you had a knee injection do not walk more than 2 city blocks, do not attend any aerobic classes, do not run, no heavy lifting, no prolong standing). You may resume your day to day activities after your injection. You may experience some mild amount of swelling after the procedure. Please ice your joint that was injected at least 5-6 times a day (15 minutes) for two days after (this will help prevent worsening pain that sometimes occurs after an injection). Only take tylenol if needed for pain for the first few days. Watch for signs of infection which include redness, warmth, worsening pain, fevers or chills. If you develop any of these signs call the office immediately at [phone]    Everyone responds differently to injections, but you can expect your peak effects a few weeks after your last injection. Yovani Ovalle.  Kandice Anne MD  Physical Medicine and Rehabilitation/Sports Medicine  [facility]

## 2022-11-02 ENCOUNTER — HOSPITAL ENCOUNTER (OUTPATIENT)
Dept: CV DIAGNOSTICS | Facility: HOSPITAL | Age: 62
Discharge: HOME | End: 2022-11-02
Attending: INTERNAL MEDICINE
Payer: COMMERCIAL

## 2022-11-02 DIAGNOSIS — I63.9 CEREBELLAR INFARCT (HCC): ICD-10-CM

## 2022-11-02 DIAGNOSIS — Z98.890 HISTORY OF RIGHT-SIDED CAROTID ENDARTERECTOMY: ICD-10-CM

## 2022-11-02 DIAGNOSIS — G45.9 TRANSIENT ISCHEMIC ATTACK (TIA): ICD-10-CM

## 2022-11-02 PROCEDURE — 93243 EXT ECG>48HR<7D SCAN A/R: CPT | Performed by: INTERNAL MEDICINE

## 2022-11-02 PROCEDURE — 93242 EXT ECG>48HR<7D RECORDING: CPT | Performed by: INTERNAL MEDICINE

## 2022-11-03 ENCOUNTER — OFFICE VISIT (OUTPATIENT)
Dept: ENDOCRINOLOGY CLINIC | Facility: CLINIC | Age: 62
End: 2022-11-03
Payer: COMMERCIAL

## 2022-11-03 VITALS
DIASTOLIC BLOOD PRESSURE: 72 MMHG | HEART RATE: 65 BPM | WEIGHT: 312 LBS | SYSTOLIC BLOOD PRESSURE: 123 MMHG | BODY MASS INDEX: 40 KG/M2

## 2022-11-03 DIAGNOSIS — E11.65 UNCONTROLLED TYPE 2 DIABETES MELLITUS WITH HYPERGLYCEMIA (HCC): Primary | ICD-10-CM

## 2022-11-03 LAB
CARTRIDGE LOT#: NORMAL NUMERIC
GLUCOSE BLOOD: 130
TEST STRIP LOT #: NORMAL NUMERIC

## 2022-11-03 PROCEDURE — 3074F SYST BP LT 130 MM HG: CPT | Performed by: NURSE PRACTITIONER

## 2022-11-03 PROCEDURE — 82947 ASSAY GLUCOSE BLOOD QUANT: CPT | Performed by: NURSE PRACTITIONER

## 2022-11-03 PROCEDURE — 3078F DIAST BP <80 MM HG: CPT | Performed by: NURSE PRACTITIONER

## 2022-11-03 PROCEDURE — 99214 OFFICE O/P EST MOD 30 MIN: CPT | Performed by: NURSE PRACTITIONER

## 2022-11-03 PROCEDURE — 3044F HG A1C LEVEL LT 7.0%: CPT | Performed by: NURSE PRACTITIONER

## 2022-11-03 PROCEDURE — 83036 HEMOGLOBIN GLYCOSYLATED A1C: CPT | Performed by: NURSE PRACTITIONER

## 2022-11-03 RX ORDER — TIRZEPATIDE 12.5 MG/.5ML
12.5 INJECTION, SOLUTION SUBCUTANEOUS WEEKLY
Qty: 2 ML | Refills: 0 | Status: SHIPPED | OUTPATIENT
Start: 2022-11-03

## 2022-11-03 NOTE — PATIENT INSTRUCTIONS
A1C:7.6% today -->decrease from 8.8% on 9/20/2022  Blood glucose: 130 in clinic today   Endocrinology fax# [phone]    Medications:   -continue with Metformin ER 1,000mg twice daily   - increase Mounjaro 10-->12.5mg once weekly on wednesdays   -stop Glipizide 2.5mg once daily with breakfast (once starting higher dose of Mounjaro)    Please give me an update on your blood glucose readings in 3 weeks    Weight:  Wt Readings from Last 6 Encounters:  11/03/22 : (!) 312 lb (141.5 kg)  10/27/22 : (!) 317 lb (143.8 kg)  10/21/22 : (!) 311 lb (141.1 kg)  10/20/22 : (!) 317 lb 7.4 oz (144 kg)  10/17/22 : (!) 315 lb 9.6 oz (143.2 kg)  10/11/22 : (!) 320 lb (145.2 kg)    A1C goal:  <7.0%    Blood sugar testing:  Test your blood sugar 1 time daily   Recommended times to test: Before breakfast (fasting) or before dinner or bedtime     Blood sugar targets:  Before breakfast:   (preferably < 110)  Before meals OR 2 hours after meals: <150    Call for persistent blood sugars < 75 or > 200

## 2022-11-07 ENCOUNTER — TELEPHONE (OUTPATIENT)
Dept: INTERNAL MEDICINE CLINIC | Facility: CLINIC | Age: 62
End: 2022-11-07

## 2022-11-07 DIAGNOSIS — I15.2 HYPERTENSION ASSOCIATED WITH TYPE 2 DIABETES MELLITUS (HCC): ICD-10-CM

## 2022-11-07 DIAGNOSIS — E11.51 TYPE 2 DIABETES MELLITUS WITH DIABETIC PERIPHERAL ANGIOPATHY WITHOUT GANGRENE, WITHOUT LONG-TERM CURRENT USE OF INSULIN (HCC): ICD-10-CM

## 2022-11-07 DIAGNOSIS — M19.90 ARTHRITIS: ICD-10-CM

## 2022-11-07 DIAGNOSIS — E11.59 HYPERTENSION ASSOCIATED WITH TYPE 2 DIABETES MELLITUS (HCC): ICD-10-CM

## 2022-11-07 RX ORDER — DULOXETIN HYDROCHLORIDE 30 MG/1
30 CAPSULE, DELAYED RELEASE ORAL DAILY
Qty: 90 CAPSULE | Refills: 2 | Status: SHIPPED | OUTPATIENT
Start: 2022-11-07

## 2022-11-07 RX ORDER — HYDROCHLOROTHIAZIDE 12.5 MG/1
12.5 TABLET ORAL DAILY
Qty: 90 TABLET | Refills: 3 | Status: SHIPPED | OUTPATIENT
Start: 2022-11-07

## 2022-11-07 NOTE — TELEPHONE ENCOUNTER
Pt [facility]  Rx's sent to pt preferred pharmacy.

## 2022-11-08 RX ORDER — CLOPIDOGREL BISULFATE 75 MG/1
75 TABLET ORAL DAILY
Qty: 21 TABLET | Refills: 0 | Status: SHIPPED | OUTPATIENT
Start: 2022-11-08 | End: 2022-11-08

## 2022-11-08 NOTE — TELEPHONE ENCOUNTER
Medication: CLOPIDOGREL 75 MG Oral Tab     Date of last refill: 10/21/22-11/11/22 (#21/0)     Last office visit: N/A  Date next office visit scheduled:    Future Appointments   Date Time Provider Jose R Peacock   11/18/2022  9:00 AM DO ALENA GamboaCHACHO [facility] OF THE [facility]       Medication states no refills after 21 days. Will route to covering provider to review and advise.

## 2022-11-08 NOTE — TELEPHONE ENCOUNTER
According to Dr. Dana Fritz note, there was no Plavix to be continued. Can you clarify how the Plavix was prescribed?

## 2022-11-10 ENCOUNTER — PATIENT MESSAGE (OUTPATIENT)
Dept: INTERNAL MEDICINE CLINIC | Facility: CLINIC | Age: 62
End: 2022-11-10

## 2022-11-10 NOTE — TELEPHONE ENCOUNTER
From: Ej Milan  To: Long Perez MD  Sent: 11/10/2022 12:25 PM CST  Subject: Signing up for Disability    Hello Doctor,    You once mentioned you would approve any paperwork related to me signing up for disability. I wanted your office to know that I will be starting the process soon. Could you please provide me with any assistance on what we will need to turn in related to my medical records? Please let me know if there is anything you have which will be a resource to me at this time and moving forward.  Thank you for your time,    Gina Christensen

## 2022-11-15 ENCOUNTER — TELEPHONE (OUTPATIENT)
Dept: INTERNAL MEDICINE CLINIC | Facility: CLINIC | Age: 62
End: 2022-11-15

## 2022-11-15 NOTE — TELEPHONE ENCOUNTER
Spoke with pt,  verified. Pt was informed 2d echo result is seen on file, test was ordered by Dr Naty Palacio (cardio). Pt has appt with cardio on  to discuss results and recommendations.      FYI    Future Appointments   Date Time Provider Jose R Peacock   2022  9:00 AM DO [facility]   2022 10:00 AM Ja [facility]ICKERING [facility]   2022 10:00 AM Sonja Barron MD [facility] ADO   2023  9:45 AM Anisa Muse  [facility] 150   1/10/2023 10:40 AM Kamilla Stein MD [facility] ADO  [facility] [place]   2023 11:00 AM Marylynn Duverney, APRN [facility]

## 2022-11-18 ENCOUNTER — OFFICE VISIT (OUTPATIENT)
Dept: NEUROLOGY | Facility: CLINIC | Age: 62
End: 2022-11-18
Payer: COMMERCIAL

## 2022-11-18 VITALS
HEIGHT: 74 IN | DIASTOLIC BLOOD PRESSURE: 72 MMHG | BODY MASS INDEX: 39.91 KG/M2 | SYSTOLIC BLOOD PRESSURE: 120 MMHG | WEIGHT: 311 LBS

## 2022-11-18 DIAGNOSIS — Z86.69 HISTORY OF BLURRED VISION: Primary | ICD-10-CM

## 2022-11-18 PROCEDURE — 3074F SYST BP LT 130 MM HG: CPT | Performed by: OTHER

## 2022-11-18 PROCEDURE — 3078F DIAST BP <80 MM HG: CPT | Performed by: OTHER

## 2022-11-18 PROCEDURE — 3008F BODY MASS INDEX DOCD: CPT | Performed by: OTHER

## 2022-11-18 PROCEDURE — 99214 OFFICE O/P EST MOD 30 MIN: CPT | Performed by: OTHER

## 2022-11-18 NOTE — PATIENT INSTRUCTIONS
I think  your symptoms were most likely related to disease in the eye (?macular degeneration) which you are getting shots for. You did not have a stroke. I think it is unlikely these were TIAs (transient ischemic attacks). A Stroke is when part of your brain does not get blood because of blockage in an artery, leading to that part of the brain dying. This is known as an ischemic stroke, because the brain does not get enough blood. To lower your risk of another stroke, you need to take your  Aspirin 81mg daily as previously prescribed. If for any reason you have difficulty taking the medication as directed, please call our office and explain your difficulty. Do not stop taking your medications until you have been instructed to do so as this can increase your stroke risk. Risk factors that can be controlled with medications and lifestyle changes include:     High blood pressure (hypertension): It is recommended that your blood pressure be less than  130/80 to lower your risk of stroke. Check your blood pressure twice a day. Once in the morning, right when you wake up and before you take your morning medications. Check it again once in the evening after you have taken any evening medications. Write down these values for 2 weeks. Bring them to your primary care physician and your next stroke follow-up visit. High cholesterol: It is recommended that your low-density lipoprotein cholesterol (LDL-C) should be less than 70. You have been prescribed Atorvastatin to be taken at bedtime to manage your cholesterol. Diabetes: To prevent further strokes, your fasting blood sugar should be , and your HbA1c should be less than 6.5. Smoking: It is recommended that you ABSTAIN from smoking. If necessary, there are resources available to assist you with smoking cessation. If you smoke or use tobacco products, discuss alternatives with your doctor.   [facility] Tobacco Quit Line: [phone]  [place] Tobacco Quit Line: [phone]     Diet: We recommend the Mediterranean/DASH diet -- high in fresh fruits (apples, blueberries) and vegetables (dark green such as spinach, kale). Fish and nuts (walnuts, almonds), olive oil or canola oil. Reduced red meat, cheese/dairy, and eggs. Also recommended is moderate sodium intake. Sedentary lifestyle: Try to engage in routine exercise (3-5 sessions of aerobic exercise per week, 30 minutes per session). Talk with your primary care physician about what type of exercise might be best for you. Snoring: If snoring, consider referral for possible obstructive sleep apnea (JUAN A). Treating obstructive sleep apnea will decrease your risk of developing dementia, can lower your blood pressure, will improve your energy levels, may treat headache, and may decrease your risk of stroke. Stroke symptoms include the following, and 911 should be called immediately if you experience any of these:       B.E. F.A.S.T.    B: Balance-- sudden loss  of balance, staggering gait, severe vertigo        E: Eyes-- sudden loss of vision in one or both eyes, onset of double vision        F: Face-- uneven or drooping face, drooling, ask the patient to smile        A: Arm (leg)-- loss of strength or sensation on one side of the body in the arm and/or leg        S: Speech-- slurring of speech, difficulty  saying words or understanding what is being said, sudden confusion        T: Terrible headache (time*)-- very severe headache which has maximum intensity within seconds to a minute        * Time of symptom onset and last known well times are important when determining what treatment is appropriate for an individual's stroke, particularly since treatment is time limited. Time is not a symptom or sign of stroke. Traditionally, Time was used for the \"T\" in the FAST and BEFAST acronyms for stroke awareness.  Since terrible headache can be a symptom of stroke this is substituted. However, as the acronym B.E. F.A.S.T. suggests, acting quickly is of critical importance after stroke is suspected. Knowing the symptom onset time or time when last well will have an impact on what treatments can be offered safely.

## 2022-12-08 ENCOUNTER — OFFICE VISIT (OUTPATIENT)
Dept: SURGERY | Facility: CLINIC | Age: 62
End: 2022-12-08
Payer: COMMERCIAL

## 2022-12-08 VITALS — SYSTOLIC BLOOD PRESSURE: 121 MMHG | DIASTOLIC BLOOD PRESSURE: 71 MMHG | HEART RATE: 78 BPM

## 2022-12-08 DIAGNOSIS — N39.41 URGE INCONTINENCE: ICD-10-CM

## 2022-12-08 DIAGNOSIS — Z12.5 PROSTATE CANCER SCREENING: ICD-10-CM

## 2022-12-08 DIAGNOSIS — R35.1 BENIGN PROSTATIC HYPERPLASIA WITH NOCTURIA: Primary | ICD-10-CM

## 2022-12-08 DIAGNOSIS — N40.1 BENIGN PROSTATIC HYPERPLASIA WITH NOCTURIA: Primary | ICD-10-CM

## 2022-12-08 PROCEDURE — 3074F SYST BP LT 130 MM HG: CPT | Performed by: NURSE PRACTITIONER

## 2022-12-08 PROCEDURE — 99213 OFFICE O/P EST LOW 20 MIN: CPT | Performed by: NURSE PRACTITIONER

## 2022-12-08 PROCEDURE — 3078F DIAST BP <80 MM HG: CPT | Performed by: NURSE PRACTITIONER

## 2022-12-08 RX ORDER — TADALAFIL 10 MG/1
TABLET ORAL
Qty: 30 TABLET | Refills: 0 | Status: SHIPPED | OUTPATIENT
Start: 2022-12-08

## 2022-12-09 RX ORDER — TIRZEPATIDE 12.5 MG/.5ML
12.5 INJECTION, SOLUTION SUBCUTANEOUS WEEKLY
Qty: 6 ML | Refills: 0 | Status: SHIPPED | OUTPATIENT
Start: 2022-12-09

## 2022-12-09 NOTE — TELEPHONE ENCOUNTER
LOV: 11/03/22    Future Appointments   Date Time Provider Jose R Peacock   2/2/2023 11:00 AM RAHEEL Richardson [facility]     Refilled per protocol.

## 2022-12-13 ENCOUNTER — TELEPHONE (OUTPATIENT)
Dept: INTERNAL MEDICINE CLINIC | Facility: CLINIC | Age: 62
End: 2022-12-13

## 2022-12-13 NOTE — TELEPHONE ENCOUNTER
Onsite Clinical Staff/ =   Can you Please print copy of COVID vaccination Record for patient to ? Tomorrow 12/14/22 - sometime after 9:30am.       Patient called office. Patient's date of birth and full name both confirmed. Asking if COVID Vaccination Record Card can be provided. Chart reviewed and IDPH Query utilized. RN advised that we have a record of his immunizations. Advised to print it from his [employer]. Instructions provided in detail. But then patient asking if he can  a copy at the office.    He will be at the [place] location tomorrow , sometimes after 9:30am.     Future Appointments   Date Time Provider Jose R Peacock   12/20/2022 10:00 AM Britt Casas MD [facility] ADO   1/6/2023  9:45 AM Brissa Muse DPM [facility] OF THE [facility]   1/10/2023 10:40 AM [facility]   2/2/2023 11:00 AM RAHEEL [facility]

## 2022-12-20 ENCOUNTER — LAB ENCOUNTER (OUTPATIENT)
Dept: LAB | Age: 62
End: 2022-12-20
Attending: INTERNAL MEDICINE
Payer: COMMERCIAL

## 2022-12-20 ENCOUNTER — OFFICE VISIT (OUTPATIENT)
Dept: INTERNAL MEDICINE CLINIC | Facility: CLINIC | Age: 62
End: 2022-12-20
Payer: COMMERCIAL

## 2022-12-20 VITALS
SYSTOLIC BLOOD PRESSURE: 126 MMHG | DIASTOLIC BLOOD PRESSURE: 73 MMHG | BODY MASS INDEX: 38.63 KG/M2 | WEIGHT: 301 LBS | HEART RATE: 98 BPM | HEIGHT: 74 IN

## 2022-12-20 DIAGNOSIS — R94.31 ABNORMAL HOLTER MONITOR FINDING: ICD-10-CM

## 2022-12-20 DIAGNOSIS — Z00.00 ANNUAL PHYSICAL EXAM: Primary | ICD-10-CM

## 2022-12-20 DIAGNOSIS — Z23 NEED FOR VACCINATION: ICD-10-CM

## 2022-12-20 DIAGNOSIS — Z12.5 ENCOUNTER FOR SCREENING FOR MALIGNANT NEOPLASM OF PROSTATE: ICD-10-CM

## 2022-12-20 DIAGNOSIS — E55.9 VITAMIN D DEFICIENCY: ICD-10-CM

## 2022-12-20 DIAGNOSIS — M16.0 PRIMARY OSTEOARTHRITIS OF BOTH HIPS: ICD-10-CM

## 2022-12-20 LAB
ALBUMIN SERPL-MCNC: 3.4 G/DL (ref 3.4–5)
ALBUMIN/GLOB SERPL: 1 {RATIO} (ref 1–2)
ALP LIVER SERPL-CCNC: 104 U/L
ALT SERPL-CCNC: 32 U/L
ANION GAP SERPL CALC-SCNC: 5 MMOL/L (ref 0–18)
AST SERPL-CCNC: 17 U/L (ref 15–37)
BILIRUB SERPL-MCNC: 0.3 MG/DL (ref 0.1–2)
BUN BLD-MCNC: 23 MG/DL (ref 7–18)
BUN/CREAT SERPL: 19.8 (ref 10–20)
CALCIUM BLD-MCNC: 9.7 MG/DL (ref 8.5–10.1)
CHLORIDE SERPL-SCNC: 106 MMOL/L (ref 98–112)
CHOLEST SERPL-MCNC: 137 MG/DL (ref ?–200)
CO2 SERPL-SCNC: 30 MMOL/L (ref 21–32)
COMPLEXED PSA SERPL-MCNC: 1.44 NG/ML (ref ?–4)
CREAT BLD-MCNC: 1.16 MG/DL
DEPRECATED RDW RBC AUTO: 46.6 FL (ref 35.1–46.3)
ERYTHROCYTE [DISTWIDTH] IN BLOOD BY AUTOMATED COUNT: 13.8 % (ref 11–15)
EST. AVERAGE GLUCOSE BLD GHB EST-MCNC: 186 MG/DL (ref 68–126)
FASTING PATIENT LIPID ANSWER: YES
FASTING STATUS PATIENT QL REPORTED: YES
GFR SERPLBLD BASED ON 1.73 SQ M-ARVRAT: 72 ML/MIN/1.73M2 (ref 60–?)
GLOBULIN PLAS-MCNC: 3.4 G/DL (ref 2.8–4.4)
GLUCOSE BLD-MCNC: 212 MG/DL (ref 70–99)
HBA1C MFR BLD: 8.1 % (ref ?–5.7)
HCT VFR BLD AUTO: 43.6 %
HDLC SERPL-MCNC: 44 MG/DL (ref 40–59)
HGB BLD-MCNC: 13.5 G/DL
LDLC SERPL CALC-MCNC: 68 MG/DL (ref ?–100)
MCH RBC QN AUTO: 28.2 PG (ref 26–34)
MCHC RBC AUTO-ENTMCNC: 31 G/DL (ref 31–37)
MCV RBC AUTO: 91 FL
NONHDLC SERPL-MCNC: 93 MG/DL (ref ?–130)
OSMOLALITY SERPL CALC.SUM OF ELEC: 302 MOSM/KG (ref 275–295)
PLATELET # BLD AUTO: 181 10(3)UL (ref 150–450)
POTASSIUM SERPL-SCNC: 4.4 MMOL/L (ref 3.5–5.1)
PROT SERPL-MCNC: 6.8 G/DL (ref 6.4–8.2)
RBC # BLD AUTO: 4.79 X10(6)UL
SODIUM SERPL-SCNC: 141 MMOL/L (ref 136–145)
T3FREE SERPL-MCNC: 2.34 PG/ML (ref 2.4–4.2)
T4 FREE SERPL-MCNC: 1.3 NG/DL (ref 0.8–1.7)
TRIGL SERPL-MCNC: 144 MG/DL (ref 30–149)
TSI SER-ACNC: 0.17 MIU/ML (ref 0.36–3.74)
VIT D+METAB SERPL-MCNC: 23 NG/ML (ref 30–100)
VLDLC SERPL CALC-MCNC: 22 MG/DL (ref 0–30)
WBC # BLD AUTO: 9.2 X10(3) UL (ref 4–11)

## 2022-12-20 PROCEDURE — 83036 HEMOGLOBIN GLYCOSYLATED A1C: CPT | Performed by: INTERNAL MEDICINE

## 2022-12-20 PROCEDURE — 85027 COMPLETE CBC AUTOMATED: CPT | Performed by: INTERNAL MEDICINE

## 2022-12-20 PROCEDURE — 80053 COMPREHEN METABOLIC PANEL: CPT | Performed by: INTERNAL MEDICINE

## 2022-12-20 PROCEDURE — 84443 ASSAY THYROID STIM HORMONE: CPT | Performed by: INTERNAL MEDICINE

## 2022-12-20 PROCEDURE — 3074F SYST BP LT 130 MM HG: CPT | Performed by: INTERNAL MEDICINE

## 2022-12-20 PROCEDURE — 36415 COLL VENOUS BLD VENIPUNCTURE: CPT | Performed by: INTERNAL MEDICINE

## 2022-12-20 PROCEDURE — 80061 LIPID PANEL: CPT | Performed by: INTERNAL MEDICINE

## 2022-12-20 PROCEDURE — 82306 VITAMIN D 25 HYDROXY: CPT | Performed by: INTERNAL MEDICINE

## 2022-12-20 PROCEDURE — 3008F BODY MASS INDEX DOCD: CPT | Performed by: INTERNAL MEDICINE

## 2022-12-20 PROCEDURE — 84481 FREE ASSAY (FT-3): CPT | Performed by: INTERNAL MEDICINE

## 2022-12-20 PROCEDURE — 84439 ASSAY OF FREE THYROXINE: CPT | Performed by: INTERNAL MEDICINE

## 2022-12-20 PROCEDURE — 3078F DIAST BP <80 MM HG: CPT | Performed by: INTERNAL MEDICINE

## 2022-12-20 PROCEDURE — 99396 PREV VISIT EST AGE 40-64: CPT | Performed by: INTERNAL MEDICINE

## 2023-01-05 DIAGNOSIS — E11.9 TYPE 2 DIABETES MELLITUS WITHOUT COMPLICATION, WITHOUT LONG-TERM CURRENT USE OF INSULIN (HCC): ICD-10-CM

## 2023-01-05 DIAGNOSIS — G62.9 NEUROPATHY: ICD-10-CM

## 2023-01-05 NOTE — TELEPHONE ENCOUNTER
Dr. Veronica Alonzo - Pregabalin Refill  Request:   30 day supply to  [address] . Pended. 90-day supply to [facility]. Pended. He says he was using some leftover pregabalin , to explain reason for 9/19/22 30-day supply lasting so long.

## 2023-01-06 ENCOUNTER — OFFICE VISIT (OUTPATIENT)
Dept: PODIATRY CLINIC | Facility: CLINIC | Age: 63
End: 2023-01-06
Payer: COMMERCIAL

## 2023-01-06 DIAGNOSIS — L84 FOOT CALLUS: ICD-10-CM

## 2023-01-06 DIAGNOSIS — M20.42 HAMMERTOE, BILATERAL: ICD-10-CM

## 2023-01-06 DIAGNOSIS — B35.1 ONYCHOMYCOSIS: ICD-10-CM

## 2023-01-06 DIAGNOSIS — M20.41 HAMMERTOE, BILATERAL: ICD-10-CM

## 2023-01-06 DIAGNOSIS — E11.42 TYPE 2 DIABETES MELLITUS WITH POLYNEUROPATHY (HCC): Primary | ICD-10-CM

## 2023-01-06 PROCEDURE — 11056 PARNG/CUTG B9 HYPRKR LES 2-4: CPT | Performed by: PODIATRIST

## 2023-01-06 PROCEDURE — 11721 DEBRIDE NAIL 6 OR MORE: CPT | Performed by: PODIATRIST

## 2023-01-07 RX ORDER — PREGABALIN 100 MG/1
100 CAPSULE ORAL 3 TIMES DAILY
Qty: 90 CAPSULE | Refills: 0 | Status: SHIPPED | OUTPATIENT
Start: 2023-01-07

## 2023-01-07 RX ORDER — PREGABALIN 100 MG/1
100 CAPSULE ORAL 3 TIMES DAILY
Qty: 270 CAPSULE | Refills: 0 | Status: SHIPPED | OUTPATIENT
Start: 2023-01-07

## 2023-01-10 ENCOUNTER — OFFICE VISIT (OUTPATIENT)
Dept: PHYSICAL MEDICINE AND REHAB | Facility: CLINIC | Age: 63
End: 2023-01-10
Payer: COMMERCIAL

## 2023-01-10 VITALS — SYSTOLIC BLOOD PRESSURE: 130 MMHG | HEART RATE: 52 BPM | OXYGEN SATURATION: 94 % | DIASTOLIC BLOOD PRESSURE: 86 MMHG

## 2023-01-10 DIAGNOSIS — E11.42 DIABETIC POLYNEUROPATHY ASSOCIATED WITH TYPE 2 DIABETES MELLITUS (HCC): ICD-10-CM

## 2023-01-10 DIAGNOSIS — E03.9 HYPOTHYROIDISM, UNSPECIFIED TYPE: ICD-10-CM

## 2023-01-10 DIAGNOSIS — M17.11 PRIMARY OSTEOARTHRITIS OF RIGHT KNEE: ICD-10-CM

## 2023-01-10 DIAGNOSIS — I15.2 HYPERTENSION ASSOCIATED WITH TYPE 2 DIABETES MELLITUS (HCC): ICD-10-CM

## 2023-01-10 DIAGNOSIS — E11.59 HYPERTENSION ASSOCIATED WITH TYPE 2 DIABETES MELLITUS (HCC): ICD-10-CM

## 2023-01-10 DIAGNOSIS — E78.5 HYPERLIPIDEMIA ASSOCIATED WITH TYPE 2 DIABETES MELLITUS (HCC): ICD-10-CM

## 2023-01-10 DIAGNOSIS — E66.01 CLASS 2 SEVERE OBESITY DUE TO EXCESS CALORIES WITH SERIOUS COMORBIDITY AND BODY MASS INDEX (BMI) OF 38.0 TO 38.9 IN ADULT (HCC): ICD-10-CM

## 2023-01-10 DIAGNOSIS — M22.2X9 PATELLOFEMORAL PAIN SYNDROME, UNSPECIFIED LATERALITY: Primary | ICD-10-CM

## 2023-01-10 DIAGNOSIS — Z74.09 LIMITED MOBILITY: ICD-10-CM

## 2023-01-10 DIAGNOSIS — E11.69 HYPERLIPIDEMIA ASSOCIATED WITH TYPE 2 DIABETES MELLITUS (HCC): ICD-10-CM

## 2023-01-10 PROCEDURE — 3079F DIAST BP 80-89 MM HG: CPT | Performed by: PHYSICAL MEDICINE & REHABILITATION

## 2023-01-10 PROCEDURE — 99214 OFFICE O/P EST MOD 30 MIN: CPT | Performed by: PHYSICAL MEDICINE & REHABILITATION

## 2023-01-10 PROCEDURE — 3075F SYST BP GE 130 - 139MM HG: CPT | Performed by: PHYSICAL MEDICINE & REHABILITATION

## 2023-01-10 NOTE — PATIENT INSTRUCTIONS
1) Continue weight loss program  2) My office will call you to schedule the RIGHT knee Csi under ultrasound guidance once the procedure is approved by your insurance carrier. 3) Continue home exercise program  4) Continue Ibuprofen 600 mg twice per day  5) Tylenol 500-1000 mg every 6-8 hours as needed for pain. No more than 3000 mg daily. 6) OK to send me disability paperwork.

## 2023-01-12 ENCOUNTER — TELEPHONE (OUTPATIENT)
Dept: NEUROLOGY | Facility: CLINIC | Age: 63
End: 2023-01-12

## 2023-01-12 NOTE — TELEPHONE ENCOUNTER
RIGHT knee CSI under ultrasound guidance    CPT RYIE:[id number],     Status:NO PA required     Called [employer]  for authorization for above. Spoke to Marita Abad who states no authorization is required. Reference call# Isabell Blizzard s 01/12/2023 11:52.     Notified KAMALJIT Approved Referral for scheduling

## 2023-01-17 RX ORDER — CHLORHEXIDINE GLUCONATE 4 G/100ML
30 SOLUTION TOPICAL
OUTPATIENT
Start: 2023-01-18 | End: 2023-01-18

## 2023-01-17 RX ORDER — SODIUM CHLORIDE 9 MG/ML
INJECTION, SOLUTION INTRAVENOUS
OUTPATIENT
Start: 2023-01-18 | End: 2023-01-18

## 2023-01-23 VITALS — WEIGHT: 293 LBS | BODY MASS INDEX: 37.6 KG/M2 | HEIGHT: 74 IN

## 2023-01-27 ENCOUNTER — ORDER TRANSCRIPTION (OUTPATIENT)
Dept: ADMINISTRATIVE | Facility: HOSPITAL | Age: 63
End: 2023-01-27

## 2023-01-27 DIAGNOSIS — E11.9 DM (DIABETES MELLITUS) (HCC): ICD-10-CM

## 2023-01-27 DIAGNOSIS — I47.1 PAROXYSMAL ATRIAL TACHYCARDIA (HCC): ICD-10-CM

## 2023-01-27 DIAGNOSIS — R94.39 ABNORMAL NUCLEAR STRESS TEST: Primary | ICD-10-CM

## 2023-01-27 DIAGNOSIS — I47.29 NSVT (NONSUSTAINED VENTRICULAR TACHYCARDIA): ICD-10-CM

## 2023-01-27 DIAGNOSIS — E78.00 HYPERCHOLESTEREMIA: ICD-10-CM

## 2023-01-27 DIAGNOSIS — I49.3 PVC'S (PREMATURE VENTRICULAR CONTRACTIONS): ICD-10-CM

## 2023-01-30 ENCOUNTER — TELEPHONE (OUTPATIENT)
Dept: INTERNAL MEDICINE CLINIC | Facility: CLINIC | Age: 63
End: 2023-01-30

## 2023-01-30 RX ORDER — TIRZEPATIDE 12.5 MG/.5ML
12.5 INJECTION, SOLUTION SUBCUTANEOUS
Qty: 6 ML | Refills: 0 | Status: SHIPPED | OUTPATIENT
Start: 2023-02-01

## 2023-01-31 ENCOUNTER — HOSPITAL ENCOUNTER (OUTPATIENT)
Dept: INTERVENTIONAL RADIOLOGY/VASCULAR | Facility: HOSPITAL | Age: 63
Discharge: HOME | End: 2023-01-31
Attending: INTERNAL MEDICINE
Payer: COMMERCIAL

## 2023-01-31 DIAGNOSIS — Z01.818 PRE-OP TESTING: Primary | ICD-10-CM

## 2023-02-09 ENCOUNTER — HOSPITAL ENCOUNTER (OUTPATIENT)
Dept: CT IMAGING | Facility: HOSPITAL | Age: 63
Discharge: HOME | End: 2023-02-09
Attending: INTERNAL MEDICINE
Payer: COMMERCIAL

## 2023-02-09 VITALS
BODY MASS INDEX: 40.04 KG/M2 | HEIGHT: 74 IN | WEIGHT: 312 LBS | HEART RATE: 68 BPM | RESPIRATION RATE: 13 BRPM | SYSTOLIC BLOOD PRESSURE: 110 MMHG | DIASTOLIC BLOOD PRESSURE: 62 MMHG

## 2023-02-09 DIAGNOSIS — E11.9 DM (DIABETES MELLITUS) (HCC): ICD-10-CM

## 2023-02-09 DIAGNOSIS — I47.1 PAROXYSMAL ATRIAL TACHYCARDIA (HCC): ICD-10-CM

## 2023-02-09 DIAGNOSIS — I47.29 NSVT (NONSUSTAINED VENTRICULAR TACHYCARDIA): ICD-10-CM

## 2023-02-09 DIAGNOSIS — R94.39 ABNORMAL NUCLEAR STRESS TEST: ICD-10-CM

## 2023-02-09 DIAGNOSIS — I49.3 PVC'S (PREMATURE VENTRICULAR CONTRACTIONS): ICD-10-CM

## 2023-02-09 DIAGNOSIS — R00.0 TACHYCARDIA: ICD-10-CM

## 2023-02-09 DIAGNOSIS — E78.00 HYPERCHOLESTEREMIA: ICD-10-CM

## 2023-02-09 DIAGNOSIS — I49.3 PVC (PREMATURE VENTRICULAR CONTRACTION): ICD-10-CM

## 2023-02-09 LAB
CREAT BLD-MCNC: 1.3 MG/DL
GFR SERPLBLD BASED ON 1.73 SQ M-ARVRAT: 63 ML/MIN/1.73M2 (ref 60–?)

## 2023-02-09 PROCEDURE — 0503T CTA FRACTIONAL FLOW RESERVE ANALYSIS (CPT=0503T/0502T): CPT | Performed by: INTERNAL MEDICINE

## 2023-02-09 PROCEDURE — 75574 CT ANGIO HRT W/3D IMAGE: CPT | Performed by: INTERNAL MEDICINE

## 2023-02-09 PROCEDURE — 82565 ASSAY OF CREATININE: CPT

## 2023-02-09 PROCEDURE — 0502T CTA FRACTIONAL FLOW RESERVE ANALYSIS (CPT=0503T/0502T): CPT | Performed by: INTERNAL MEDICINE

## 2023-02-09 RX ORDER — METOPROLOL TARTRATE 5 MG/5ML
INJECTION INTRAVENOUS
Status: COMPLETED
Start: 2023-02-09 | End: 2023-02-09

## 2023-02-09 RX ORDER — NITROGLYCERIN 0.4 MG/1
TABLET SUBLINGUAL
Status: COMPLETED
Start: 2023-02-09 | End: 2023-02-09

## 2023-02-09 RX ORDER — SODIUM CHLORIDE 9 MG/ML
250 INJECTION, SOLUTION INTRAVENOUS ONCE
Status: COMPLETED | OUTPATIENT
Start: 2023-02-09 | End: 2023-02-09

## 2023-02-09 RX ORDER — NITROGLYCERIN 0.4 MG/1
0.4 TABLET SUBLINGUAL ONCE
Status: COMPLETED | OUTPATIENT
Start: 2023-02-09 | End: 2023-02-09

## 2023-02-09 RX ORDER — DILTIAZEM HYDROCHLORIDE 5 MG/ML
5 INJECTION INTRAVENOUS SEE ADMIN INSTRUCTIONS
Status: DISCONTINUED | OUTPATIENT
Start: 2023-02-09 | End: 2023-02-11

## 2023-02-09 RX ORDER — METOPROLOL TARTRATE 5 MG/5ML
5 INJECTION INTRAVENOUS SEE ADMIN INSTRUCTIONS
Status: DISCONTINUED | OUTPATIENT
Start: 2023-02-09 | End: 2023-02-11

## 2023-02-09 RX ORDER — METOPROLOL TARTRATE 100 MG/1
1 TABLET ORAL AS DIRECTED
COMMUNITY
Start: 2023-01-30

## 2023-02-09 RX ORDER — ALPRAZOLAM 0.25 MG/1
0.25 TABLET ORAL ONCE AS NEEDED
Status: COMPLETED | OUTPATIENT
Start: 2023-02-09 | End: 2023-02-09

## 2023-02-09 RX ORDER — ALPRAZOLAM 0.25 MG/1
TABLET ORAL
Status: COMPLETED
Start: 2023-02-09 | End: 2023-02-09

## 2023-02-09 RX ADMIN — METOPROLOL TARTRATE 5 MG: 5 INJECTION INTRAVENOUS at 11:42:00

## 2023-02-09 RX ADMIN — METOPROLOL TARTRATE 5 MG: 5 INJECTION INTRAVENOUS at 11:47:00

## 2023-02-09 RX ADMIN — Medication 50 MG: at 09:31:00

## 2023-02-09 RX ADMIN — NITROGLYCERIN 0.4 MG: 0.4 TABLET SUBLINGUAL at 11:27:00

## 2023-02-09 RX ADMIN — METOPROLOL TARTRATE 5 MG: 5 INJECTION INTRAVENOUS at 11:37:00

## 2023-02-09 RX ADMIN — METOPROLOL TARTRATE 5 MG: 5 INJECTION INTRAVENOUS at 11:55:00

## 2023-02-09 RX ADMIN — SODIUM CHLORIDE 250 ML: 9 INJECTION, SOLUTION INTRAVENOUS at 10:31:00

## 2023-02-09 RX ADMIN — ALPRAZOLAM 0.25 MG: 0.25 TABLET ORAL at 09:31:00

## 2023-02-09 NOTE — IMAGING NOTE
TO RAD HOLDING AT 80     HX TAKEN:PT STATES HE HAD AN ABNORMAL HEART MONITOR READING, MD ORDERED A CARDIAC PET SCAN, REFERRED TO DR MEDLEY, CARDIAC CATH RECOMMENDED BUT INSURANCE DICTATED HAVING CTA CORONARY ARTERY TEST 1ST. PER [facility] NOTE PT HAD VT ON MONITOR. PT CONSENTED AT 0921     BASELINE VITAL SIGNS   HR 68  /72 BMI 40/ LB    CTA ORDERED BY KIARA MEDLEY, ME WAS PT GIVEN CTA  PREMEDS  YES METOPROLOL 100 MG X2, LAST DOSE AT 0800, AT WHICH TIME HE ALSO TOOK HIS USUAL 25 MG METOPROLOL TARTRATE DOSE. METOPROLOL PO GIVEN 50 MILLIGRAMS AND 0.25 MG XANAX AT 0931    18 GAUGE IV STARTED AT 0942, POC TESTING COMPLETED GFR = 63   CREATINE = 1.3    1018 VS HR 64 BP 85/34 (50), PT ASYMPTOMATIC, HAS BEEN SLEEPING.  GIVEN BOTTLE OF WATER TO DRINK    1025 VS HR 64 BP 99/46 (63), 250 ML NS STARTED, PT GIVEN ADDITIONAL BOTTLE OF WATER    1040 VS HR 61 /51 (70)    1102 VS HR 63, /63    1115 VS HR 59, /59    TO CT TABLE @ 1122    CONNECT TO MONITOR  VS HR 59 /60; HR UP TO MID 70'S, PT DENIES DISCOMFORT OR ANXIETY      NITROGLYCERIN 0.4 MILLIGRAMS SUBLINGUAL GIVEN AT 1127     CALCIUM SCORE COMPLETED AT  1133; HR PERSISTENTLY >65; PT AGAIN DENIES DISCOMFORT OR ANXIETY    1136 HR 68 /57, METOPROLOL 5 MILLIGRAMS GIVEN IV PUSH  SEE  PROTOCOL    1142 HR 66 /60, METOPROLOL 5 MILLIGRAMS GIVEN IV PUSH     1147 HR 62 /51, METOPROLOL 5 MILLIGRAMS  GIVEN IV PUSH    1155 HR 62 /58, METOPROLOL 5 MILLIGRAMS  GIVEN IV PUSH    INJECTION STARTED AT 1202 HR 57 (AVG) DURING SCAN, PROCEDURE COMPLETE    POST SCAN VS HR 62 /62 AT 1209    PT TO HOLDING AREA  VS HR 61 /63 AT 1215    VS HR 63 /72 AT 1230     AVS  PROVIDED       1248 DISCHARGED HOME

## 2023-03-08 ENCOUNTER — OFFICE VISIT (OUTPATIENT)
Dept: ENDOCRINOLOGY CLINIC | Facility: CLINIC | Age: 63
End: 2023-03-08

## 2023-03-08 VITALS
DIASTOLIC BLOOD PRESSURE: 63 MMHG | HEART RATE: 69 BPM | WEIGHT: 306 LBS | SYSTOLIC BLOOD PRESSURE: 100 MMHG | BODY MASS INDEX: 39 KG/M2

## 2023-03-08 DIAGNOSIS — E11.65 UNCONTROLLED TYPE 2 DIABETES MELLITUS WITH HYPERGLYCEMIA (HCC): Primary | ICD-10-CM

## 2023-03-08 LAB
CARTRIDGE LOT#: ABNORMAL NUMERIC
GLUCOSE BLOOD: 104
HEMOGLOBIN A1C: 8 % (ref 4.3–5.6)
TEST STRIP LOT #: NORMAL NUMERIC

## 2023-03-08 PROCEDURE — 82947 ASSAY GLUCOSE BLOOD QUANT: CPT | Performed by: NURSE PRACTITIONER

## 2023-03-08 PROCEDURE — 3078F DIAST BP <80 MM HG: CPT | Performed by: NURSE PRACTITIONER

## 2023-03-08 PROCEDURE — 3052F HG A1C>EQUAL 8.0%<EQUAL 9.0%: CPT | Performed by: NURSE PRACTITIONER

## 2023-03-08 PROCEDURE — 83036 HEMOGLOBIN GLYCOSYLATED A1C: CPT | Performed by: NURSE PRACTITIONER

## 2023-03-08 PROCEDURE — 99214 OFFICE O/P EST MOD 30 MIN: CPT | Performed by: NURSE PRACTITIONER

## 2023-03-08 PROCEDURE — 3074F SYST BP LT 130 MM HG: CPT | Performed by: NURSE PRACTITIONER

## 2023-03-08 RX ORDER — TIRZEPATIDE 15 MG/.5ML
15 INJECTION, SOLUTION SUBCUTANEOUS WEEKLY
Qty: 6 ML | Refills: 1 | Status: SHIPPED | OUTPATIENT
Start: 2023-03-08

## 2023-03-08 RX ORDER — TIRZEPATIDE 15 MG/.5ML
15 INJECTION, SOLUTION SUBCUTANEOUS WEEKLY
Qty: 6 ML | Refills: 1 | Status: SHIPPED
Start: 2023-03-08 | End: 2023-03-08

## 2023-03-08 NOTE — PATIENT INSTRUCTIONS
A1C: 8.0% today --> increased from 7.6% on 11/3/2022  Blood glucose: 104 in clinic today    Medications:   -increase Mounjaro 12.5 -->15mg once weekly  -continue with Metformin ER 2,000mg twice daily     Chandler -- sugar free chocolate     Weight:  Wt Readings from Last 6 Encounters:  03/08/23 : (!) 306 lb (138.8 kg)  02/09/23 : (!) 312 lb (141.5 kg)  01/23/23 : 293 lb (132.9 kg)  12/20/22 : (!) 301 lb (136.5 kg)  11/18/22 : (!) 311 lb (141.1 kg)  11/03/22 : (!) 312 lb (141.5 kg)    A1C goal:   <7.0%    Blood sugar testing:  Test your blood sugar 1 time daily   Recommended times to test: alternate with before breakfast (fasting) or before dinner     Blood sugar targets:  Before breakfast:   (preferably < 110)  Before meals OR 2 hours after meals: <150    Call for persistent blood sugars < 75 or > 200 Principal Discharge DX:	Flu-like symptoms

## 2023-03-14 ENCOUNTER — TELEPHONE (OUTPATIENT)
Dept: PHYSICAL MEDICINE AND REHAB | Facility: CLINIC | Age: 63
End: 2023-03-14

## 2023-03-15 ENCOUNTER — TELEPHONE (OUTPATIENT)
Dept: ENDOCRINOLOGY CLINIC | Facility: CLINIC | Age: 63
End: 2023-03-15

## 2023-03-18 ENCOUNTER — NURSE ONLY (OUTPATIENT)
Dept: LAB | Facility: HOSPITAL | Age: 63
End: 2023-03-18
Attending: INTERNAL MEDICINE
Payer: COMMERCIAL

## 2023-03-18 DIAGNOSIS — Z01.818 PRE-OP TESTING: ICD-10-CM

## 2023-03-18 LAB
ANION GAP SERPL CALC-SCNC: 6 MMOL/L (ref 0–18)
ATRIAL RATE: 70 BPM
BUN BLD-MCNC: 25 MG/DL (ref 7–18)
BUN/CREAT SERPL: 19.7 (ref 10–20)
CALCIUM BLD-MCNC: 9.1 MG/DL (ref 8.5–10.1)
CHLORIDE SERPL-SCNC: 107 MMOL/L (ref 98–112)
CO2 SERPL-SCNC: 30 MMOL/L (ref 21–32)
CREAT BLD-MCNC: 1.27 MG/DL
DEPRECATED RDW RBC AUTO: 49.1 FL (ref 35.1–46.3)
ERYTHROCYTE [DISTWIDTH] IN BLOOD BY AUTOMATED COUNT: 14.5 % (ref 11–15)
GFR SERPLBLD BASED ON 1.73 SQ M-ARVRAT: 64 ML/MIN/1.73M2 (ref 60–?)
GLUCOSE BLD-MCNC: 178 MG/DL (ref 70–99)
HCT VFR BLD AUTO: 39.4 %
HGB BLD-MCNC: 12.2 G/DL
INR BLD: 0.96 (ref 0.85–1.16)
INR BLD: 0.99 (ref 0.85–1.16)
MCH RBC QN AUTO: 28.6 PG (ref 26–34)
MCHC RBC AUTO-ENTMCNC: 31 G/DL (ref 31–37)
MCV RBC AUTO: 92.5 FL
OSMOLALITY SERPL CALC.SUM OF ELEC: 305 MOSM/KG (ref 275–295)
P AXIS: 3 DEGREES
P-R INTERVAL: 138 MS
PLATELET # BLD AUTO: 214 10(3)UL (ref 150–450)
POTASSIUM SERPL-SCNC: 4 MMOL/L (ref 3.5–5.1)
PROTHROMBIN TIME: 12.7 SECONDS (ref 11.6–14.8)
PROTHROMBIN TIME: 13 SECONDS (ref 11.6–14.8)
Q-T INTERVAL: 388 MS
QRS DURATION: 104 MS
QTC CALCULATION (BEZET): 419 MS
R AXIS: -8 DEGREES
RBC # BLD AUTO: 4.26 X10(6)UL
SARS-COV-2 RNA RESP QL NAA+PROBE: NOT DETECTED
SODIUM SERPL-SCNC: 143 MMOL/L (ref 136–145)
T AXIS: 0 DEGREES
VENTRICULAR RATE: 70 BPM
WBC # BLD AUTO: 9.3 X10(3) UL (ref 4–11)

## 2023-03-18 PROCEDURE — 85610 PROTHROMBIN TIME: CPT

## 2023-03-18 PROCEDURE — 80048 BASIC METABOLIC PNL TOTAL CA: CPT

## 2023-03-18 PROCEDURE — 93010 ELECTROCARDIOGRAM REPORT: CPT | Performed by: INTERNAL MEDICINE

## 2023-03-18 PROCEDURE — 93005 ELECTROCARDIOGRAM TRACING: CPT

## 2023-03-18 PROCEDURE — 85027 COMPLETE CBC AUTOMATED: CPT

## 2023-03-18 PROCEDURE — 36415 COLL VENOUS BLD VENIPUNCTURE: CPT

## 2023-03-21 ENCOUNTER — HOSPITAL ENCOUNTER (OUTPATIENT)
Dept: INTERVENTIONAL RADIOLOGY/VASCULAR | Facility: HOSPITAL | Age: 63
Discharge: HOME | End: 2023-03-21
Attending: INTERNAL MEDICINE | Admitting: INTERNAL MEDICINE
Payer: COMMERCIAL

## 2023-03-21 VITALS
OXYGEN SATURATION: 96 % | DIASTOLIC BLOOD PRESSURE: 54 MMHG | WEIGHT: 307 LBS | HEIGHT: 74 IN | TEMPERATURE: 97 F | RESPIRATION RATE: 11 BRPM | BODY MASS INDEX: 39.4 KG/M2 | SYSTOLIC BLOOD PRESSURE: 135 MMHG | HEART RATE: 65 BPM

## 2023-03-21 DIAGNOSIS — R94.39 ABNORMAL STRESS TEST: ICD-10-CM

## 2023-03-21 DIAGNOSIS — I47.29 VENTRICULAR TACHYCARDIA, NON-SUSTAINED (HCC): ICD-10-CM

## 2023-03-21 DIAGNOSIS — R93.1 ABNORMAL CARDIAC CT ANGIOGRAPHY: ICD-10-CM

## 2023-03-21 DIAGNOSIS — Z01.818 PRE-OP TESTING: Primary | ICD-10-CM

## 2023-03-21 DIAGNOSIS — R06.09 DOE (DYSPNEA ON EXERTION): ICD-10-CM

## 2023-03-21 LAB
ATRIAL RATE: 52 BPM
ATRIAL RATE: 52 BPM
GLUCOSE BLDC GLUCOMTR-MCNC: 194 MG/DL (ref 70–99)
ISTAT ACTIVATED CLOTTING TIME: 227 SECONDS (ref 125–137)
ISTAT ACTIVATED CLOTTING TIME: 245 SECONDS (ref 125–137)
ISTAT ACTIVATED CLOTTING TIME: 293 SECONDS (ref 125–137)
P AXIS: 26 DEGREES
P AXIS: 26 DEGREES
P-R INTERVAL: 136 MS
P-R INTERVAL: 136 MS
Q-T INTERVAL: 450 MS
Q-T INTERVAL: 450 MS
QRS DURATION: 106 MS
QRS DURATION: 106 MS
QTC CALCULATION (BEZET): 418 MS
QTC CALCULATION (BEZET): 418 MS
R AXIS: -2 DEGREES
R AXIS: -2 DEGREES
T AXIS: 3 DEGREES
T AXIS: 3 DEGREES
VENTRICULAR RATE: 52 BPM
VENTRICULAR RATE: 52 BPM

## 2023-03-21 PROCEDURE — 82962 GLUCOSE BLOOD TEST: CPT

## 2023-03-21 PROCEDURE — B2111ZZ FLUOROSCOPY OF MULTIPLE CORONARY ARTERIES USING LOW OSMOLAR CONTRAST: ICD-10-PCS | Performed by: INTERNAL MEDICINE

## 2023-03-21 PROCEDURE — 02F03ZZ FRAGMENTATION IN CORONARY ARTERY, ONE ARTERY, PERCUTANEOUS APPROACH: ICD-10-PCS | Performed by: INTERNAL MEDICINE

## 2023-03-21 PROCEDURE — 93458 L HRT ARTERY/VENTRICLE ANGIO: CPT | Performed by: INTERNAL MEDICINE

## 2023-03-21 PROCEDURE — 92978 ENDOLUMINL IVUS OCT C 1ST: CPT | Performed by: INTERNAL MEDICINE

## 2023-03-21 PROCEDURE — 93010 ELECTROCARDIOGRAM REPORT: CPT | Performed by: STUDENT IN AN ORGANIZED HEALTH CARE EDUCATION/TRAINING PROGRAM

## 2023-03-21 PROCEDURE — 99152 MOD SED SAME PHYS/QHP 5/>YRS: CPT | Performed by: INTERNAL MEDICINE

## 2023-03-21 PROCEDURE — B240ZZ3 ULTRASONOGRAPHY OF SINGLE CORONARY ARTERY, INTRAVASCULAR: ICD-10-PCS | Performed by: INTERNAL MEDICINE

## 2023-03-21 PROCEDURE — 36415 COLL VENOUS BLD VENIPUNCTURE: CPT

## 2023-03-21 PROCEDURE — 85347 COAGULATION TIME ACTIVATED: CPT

## 2023-03-21 PROCEDURE — 0715T HC PERCUTANEOUS TRANSLUMINAL CORONARY LITHOTRIPSY: CPT | Performed by: INTERNAL MEDICINE

## 2023-03-21 PROCEDURE — 93005 ELECTROCARDIOGRAM TRACING: CPT

## 2023-03-21 PROCEDURE — 99153 MOD SED SAME PHYS/QHP EA: CPT | Performed by: INTERNAL MEDICINE

## 2023-03-21 PROCEDURE — 4A023N7 MEASUREMENT OF CARDIAC SAMPLING AND PRESSURE, LEFT HEART, PERCUTANEOUS APPROACH: ICD-10-PCS | Performed by: INTERNAL MEDICINE

## 2023-03-21 PROCEDURE — 92979 ENDOLUMINL IVUS OCT C EA: CPT | Performed by: INTERNAL MEDICINE

## 2023-03-21 PROCEDURE — 027135Z DILATION OF CORONARY ARTERY, TWO ARTERIES WITH TWO DRUG-ELUTING INTRALUMINAL DEVICES, PERCUTANEOUS APPROACH: ICD-10-PCS | Performed by: INTERNAL MEDICINE

## 2023-03-21 RX ORDER — ROSUVASTATIN CALCIUM 20 MG/1
20 TABLET, COATED ORAL NIGHTLY
Qty: 90 TABLET | Refills: 3 | Status: SHIPPED | OUTPATIENT
Start: 2023-03-21

## 2023-03-21 RX ORDER — MIDAZOLAM HYDROCHLORIDE 1 MG/ML
INJECTION INTRAMUSCULAR; INTRAVENOUS
Status: COMPLETED
Start: 2023-03-21 | End: 2023-03-21

## 2023-03-21 RX ORDER — SODIUM CHLORIDE 9 MG/ML
INJECTION, SOLUTION INTRAVENOUS
Status: COMPLETED | OUTPATIENT
Start: 2023-03-21 | End: 2023-03-21

## 2023-03-21 RX ORDER — HEPARIN SODIUM 1000 [USP'U]/ML
INJECTION, SOLUTION INTRAVENOUS; SUBCUTANEOUS
Status: COMPLETED
Start: 2023-03-21 | End: 2023-03-21

## 2023-03-21 RX ORDER — ASPIRIN 81 MG/1
81 TABLET ORAL DAILY
Status: DISCONTINUED | OUTPATIENT
Start: 2023-03-22 | End: 2023-03-21

## 2023-03-21 RX ORDER — SODIUM CHLORIDE 9 MG/ML
INJECTION, SOLUTION INTRAVENOUS CONTINUOUS
Status: ACTIVE | OUTPATIENT
Start: 2023-03-21 | End: 2023-03-21

## 2023-03-21 RX ORDER — NITROGLYCERIN 20 MG/100ML
INJECTION INTRAVENOUS
Status: COMPLETED
Start: 2023-03-21 | End: 2023-03-21

## 2023-03-21 RX ORDER — LIDOCAINE HYDROCHLORIDE 20 MG/ML
INJECTION, SOLUTION EPIDURAL; INFILTRATION; INTRACAUDAL; PERINEURAL
Status: COMPLETED
Start: 2023-03-21 | End: 2023-03-21

## 2023-03-21 RX ADMIN — SODIUM CHLORIDE: 9 INJECTION, SOLUTION INTRAVENOUS at 10:15:00

## 2023-03-21 RX ADMIN — SODIUM CHLORIDE: 9 INJECTION, SOLUTION INTRAVENOUS at 12:15:00

## 2023-03-21 NOTE — IVS NOTE
Hand-Off     Procedure hand off report given to [facility]. Pt's vital signs are stable. Right radial and right femoral procedural access sites are dry and intact with no signs and symptoms of bleeding and hematoma.

## 2023-03-21 NOTE — PROCEDURES
[facility]    Cardiac Cath Procedure Note  Claudio Smith [facility] Patient Status:  Outpatient    1960 MRN [id number]   Location [facility] Attending Adams Randall MD   Hosp Day # 0 PCP Darlene Contreras MD       Cardiologist: Clya Mejia MD  Primary Proceduralist: Clay Mejia MD  Procedure Performed: LHC, LV and IVUS, shockwave and PCI LAD, PCI PDA  Date of Procedure: 3/21/2023   Indication: positive stress test    Summary of procedure:     Successful IVUS and PCI, shockwave mid LAD. Residual compressed stent based on intravascular ultrasound despite aggressive post dilation. Successful PCI distal RCA into PDA      Assessment:  CAD: Successful IVUS guided PCI heavily calcified mid LAD, despite shockwave and aggressive post dilation still with compressed stent from eccentric calcium. Successful PCI PDA. Vascular disease: History of TIA, right carotid endarterectomy . Nonobstructive peripheral vascular disease to the legs. Ventricular tachycardia, followed by Dr. Rene Gottron      Recommendations:  DAPT: Aspirin and Brilinta  Aggressive risk factor modification, start Crestor 20 mg and titrate aggressively      Left Ventriculography and hemodynamics:   LV EF not done  LV EDP 12 mmHg  No gradient across aortic valve        Coronary Angiography  RCA:  Dominant and free of obstructive disease, supplies PDA and PL. Ostial PDA disease 95%. Left main:  Free of obstructive disease    Left anterior descending: Eccentric, heavily calcified 95% mid LAD stenosis. Remainder of the LAD is free of obstructive disease, supplies large diagonal to the lateral wall which is nonobstructive. Circumflex:  Free of obstructive disease, supplies multiple OM branches which are patent      LAD intervention  Lesion Characteristics-not torturous, heavily calcified. Type non-C lesion. Pre-intervention stenosis 95%, Post intervention stenosis 0%. Pre SUZAN 3, Post SUZAN 3. Guide Catheter: EBU 3.75  Wire: Palomo blue  Pre-dil: None  Stent: 3.5 x 16 mm Synergy LEE  Proximal portion underexpanded due to eccentric calcium  Post-dil: 3.5 x 12 mm NC balloon at 20 axel, unchanged angiographically  Intravascular ultrasound: Compressed stent from eccentric calcium  Shockwave lithotripsy: 3.5 x 12 mm shockwave, multiple treatments to the proximal portion of the stent  Post dilation: 3.5 and 4 mm NC balloon at 22 axel  Intravascular ultrasound: Mildly improved compress stent but still residual compression with ovoid appearing stent. RCA intervention  Lesion Characteristics-not torturous, not calcified. Type non-C lesion. Pre-intervention stenosis 90%, Post intervention stenosis 0%. Pre SUZAN 3, Post SUZAN 3.      Guide Catheter: JR4  Wire: Palomo blue  Pre-dil: None  Stent: 3.5 x 20 mm Synergy LEE at 14 axel  Post-dil: None  Dampening with JR4 guide, improved with nitro, no proximal or ostial vessel disease        Summary of Case: After written informed consent was obtained from the patient, patient was brought to the cardiac catheterization laboratory. Patient was prepped and draped in the usual sterile fashion. Lidocaine 1% was used to infiltrate the right radial artery for local anesthesia and a 6 Congolese introducer sheath was inserted into the right radial artery. Selective coronary angiography performed with JR4 catheter for RCA and JL3.0 catheter for LCA. Angiography performed in standard projections. Difficult to engage in maneuver guide from the radial approach, switched to femoral approach. Lidocaine used to infiltrate the right femoral space, access gained via palpation and micropuncture kit, 6 Congolese sheath placed for intervention above. 6 Cypriot JR4 catheter placed in LV for hemodynamics.     Specimen sent to: No specimen collected  Estimated blood loss: 10 cc  Closure:  TR band      IV was maintained by RN and moderate conscious sedation of versed and fentanyl was given. Patient was assessed and monitoring of oxygen, heart rate and blood pressure by nurse and myself during the exam 60 minutes.       Elizabeth Crooks MD  03/21/23

## 2023-03-21 NOTE — CARDIAC REHAB
Cardiac Rehab Phase I    Activity:  Distance   Assistance needed   Patient tolerated activity . Education:  Handouts provided and reviewed: [address] Diet: Healthy Cardiac diet reviewed. Disease Process: Disease process reviewed. Reviewed the following:       RISK FACTORS: Reviewed      SMOKING CESSATION: Reviewed      HOME EXERCISE ACTIVITY: Reviewed      OUTPATIENT CARDIAC REHAB: Referred to Cardiac Rehabilitation Phase 2.

## 2023-03-21 NOTE — DIETARY NOTE
NUTRITION EDUCATION NOTE     Received consult for cardiac nutrition education. Verbally reviewed basic cardiac diet restrictions. Provided with Eating Heart-Healthy and NCM handout to reinforce. Receptive to instruction. Would benefit from outpt f/u. Expect fair compliance.         Jana Galvez RD, LDN  Clinical Dietitian  P: [phone]

## 2023-03-21 NOTE — IVS NOTE
DISCHARGE NOTE     Pt is able to sit up and ambulate without difficulty. Pt voided and tolerated fluids and food. Procedural site remains dry and intact with good circulation, motion, and sensation. No signs and symptoms of bleeding/hematoma noted. IV access removed  Instruction provided, patient/family verbalizes understanding. Dr. Angelia England spoke with patient/family post procedure.      Pt discharge via wheelchair to [address] [place] [address] B     Follow up Appointment: 03/28 at 11am      New Prescription: brilinta 180mg, crestor 20mg,

## 2023-03-22 ENCOUNTER — TELEPHONE (OUTPATIENT)
Dept: PHYSICAL MEDICINE AND REHAB | Facility: CLINIC | Age: 63
End: 2023-03-22

## 2023-03-27 ENCOUNTER — TELEPHONE (OUTPATIENT)
Dept: PHYSICAL MEDICINE AND REHAB | Facility: CLINIC | Age: 63
End: 2023-03-27

## 2023-03-27 DIAGNOSIS — M17.11 PRIMARY OSTEOARTHRITIS OF RIGHT KNEE: Primary | ICD-10-CM

## 2023-03-27 DIAGNOSIS — M22.2X9 PATELLOFEMORAL PAIN SYNDROME, UNSPECIFIED LATERALITY: ICD-10-CM

## 2023-03-27 NOTE — TELEPHONE ENCOUNTER
[email or web address] Authorization is calling to initiate authorization for Orthovisc cpt code . Authorization/Case ID # [id number] valid 02/25/23 - 05/01/23. Next dose Due on 5/1/2023 - 3 series. Call [address] [place] [address] to request medication. Order pended for RIGHT knee Orthovisc series of 3 and CSI injection under ultrasound guidance.  Will inform Jayde Blackwell Referral Specialist.

## 2023-03-31 ENCOUNTER — TELEPHONE (OUTPATIENT)
Dept: INTERNAL MEDICINE CLINIC | Facility: CLINIC | Age: 63
End: 2023-03-31

## 2023-03-31 NOTE — TELEPHONE ENCOUNTER
The patient calling asking if the Metformin can be changed to 1000 mg. He takes Metformin 500 mg 2 tablets 2 times a day. I stated to call us back when he needs a new script and we can at that time. He also called to ask if Dr. Dariel Gonzalez can send a new [facility] to [facility] for a cardiology medication ordered by Dr. Reny Perez. I instructed to contact Dr. Catalina Kovacs office and they will need to send the scrip.

## 2023-04-03 NOTE — TELEPHONE ENCOUNTER
Approved from 02/25/23-05/01/23. [facility]: [id number]    Paperwork sent to scanning.

## 2023-04-14 ENCOUNTER — OFFICE VISIT (OUTPATIENT)
Dept: PODIATRY CLINIC | Facility: CLINIC | Age: 63
End: 2023-04-14

## 2023-04-14 DIAGNOSIS — M20.41 HAMMERTOE, BILATERAL: ICD-10-CM

## 2023-04-14 DIAGNOSIS — B35.1 ONYCHOMYCOSIS: ICD-10-CM

## 2023-04-14 DIAGNOSIS — M20.42 HAMMERTOE, BILATERAL: ICD-10-CM

## 2023-04-14 DIAGNOSIS — L84 FOOT CALLUS: ICD-10-CM

## 2023-04-14 DIAGNOSIS — E11.42 TYPE 2 DIABETES MELLITUS WITH POLYNEUROPATHY (HCC): Primary | ICD-10-CM

## 2023-04-14 PROCEDURE — 99213 OFFICE O/P EST LOW 20 MIN: CPT | Performed by: PODIATRIST

## 2023-04-19 ENCOUNTER — TELEPHONE (OUTPATIENT)
Dept: PHYSICAL MEDICINE AND REHAB | Facility: CLINIC | Age: 63
End: 2023-04-19

## 2023-04-19 NOTE — TELEPHONE ENCOUNTER
fax from [facility] stating that pt cannot stop ASA or Brilinta due to previous cardiac stent in march

## 2023-04-24 DIAGNOSIS — E03.9 HYPOTHYROIDISM, UNSPECIFIED TYPE: Primary | ICD-10-CM

## 2023-04-24 DIAGNOSIS — E03.9 HYPOTHYROIDISM, UNSPECIFIED TYPE: ICD-10-CM

## 2023-04-24 RX ORDER — PREGABALIN 100 MG/1
CAPSULE ORAL
Qty: 270 CAPSULE | Refills: 0 | Status: SHIPPED | OUTPATIENT
Start: 2023-04-24

## 2023-04-24 RX ORDER — LEVOTHYROXINE SODIUM 175 UG/1
TABLET ORAL
Qty: 90 TABLET | Refills: 3 | Status: SHIPPED | OUTPATIENT
Start: 2023-04-24

## 2023-04-24 NOTE — TELEPHONE ENCOUNTER
Please review. Protocol failed/ No protocol      Requested Prescriptions   Pending Prescriptions Disp Refills    LEVOTHYROXINE 175 MCG Oral Tab [Pharmacy Med Name: L-THYROXINE (SYNTHROID) TABS 175MCG] 90 tablet 3     Sig: TAKE 1 TABLET DAILY       Thyroid Medication Protocol Failed - 4/24/2023  1:57 PM        Failed - Last TSH value is normal     Lab Results   Component Value Date    TSH 0.169 (L) 12/20/2022                 Passed - TSH in past 12 months        Passed - In person appointment or virtual visit in the past 12 mos or appointment in next 3 mos     Recent Outpatient Visits              1 week ago Type 2 diabetes mellitus with polyneuropathy ([facility] [address]    Pernell Costello [place], Rudi Persons, DPM    Office Visit    1 month ago Pre-op testing    [facility]    Nurse Only    1 month ago Pre-op testing    [address] Only    1 month ago Uncontrolled type 2 diabetes mellitus with hyperglycemia ([facility] [address]    [address] [facility]    Office Visit    3 months ago Patellofemoral pain syndrome, unspecified laterality    [facility] Pernell Decker Jolan Hasty, MD    Office Visit          Future Appointments         Provider Department Appt Notes    In 1 month [place], APRN Lyanne Harrow, [place] fu 3 months    In 2 months Neto Muse DPM [facility] [place] [facility]                 PREGABALIN 100 MG Oral Cap [facility] Med Name: PREGABALIN CAPS 100MG] 270 capsule 0     Sig: TAKE 1 CAPSULE THREE TIMES A DAY       There is no refill protocol information for this order          Future Appointments         Provider Department Appt Notes    In 1 month Saint F[place], [address], Ritesh fu 3 months    In 2 months Sterling Red Luis, DPM [facility], [address], [place] [address] [facility]             Recent Outpatient Visits              1 week ago Type 2 diabetes mellitus with polyneuropathy [facility]    [address], [address], [place] Brissa Espinosa, LUIS    Office Visit    1 month ago Pre-op testing    N[facility] Chang    Nurse Only    1 month ago Pre-op testing    [address]uart [address]    Nurse Only    1 month ago Uncontrolled type 2 diabetes mellitus with hyperglycemia [facility]    [address], [address], [place] Earl Cervantes, APRN    Office Visit    3 months ago Patellofemoral pain syndrome, unspecified laterality    [address] [facility] Kev, Ju Dennison MD    Office Visit

## 2023-05-01 ENCOUNTER — HOSPITAL ENCOUNTER (OUTPATIENT)
Age: 63
Discharge: HOME | End: 2023-05-01
Payer: COMMERCIAL

## 2023-05-01 ENCOUNTER — NURSE TRIAGE (OUTPATIENT)
Dept: INTERNAL MEDICINE CLINIC | Facility: CLINIC | Age: 63
End: 2023-05-01

## 2023-05-01 VITALS
HEART RATE: 88 BPM | RESPIRATION RATE: 18 BRPM | OXYGEN SATURATION: 96 % | TEMPERATURE: 98 F | DIASTOLIC BLOOD PRESSURE: 64 MMHG | SYSTOLIC BLOOD PRESSURE: 142 MMHG

## 2023-05-01 DIAGNOSIS — S30.1XXA CONTUSION OF ABDOMINAL WALL, INITIAL ENCOUNTER: Primary | ICD-10-CM

## 2023-05-01 LAB
#MXD IC: 1 X10ˆ3/UL (ref 0.1–1)
HCT VFR BLD AUTO: 40.2 %
HGB BLD-MCNC: 12.9 G/DL
INR BLDC: 1 (ref 0.9–1.1)
LYMPHOCYTES # BLD AUTO: 2.3 X10ˆ3/UL (ref 1–4)
LYMPHOCYTES NFR BLD AUTO: 22.4 %
MCH RBC QN AUTO: 29.2 PG (ref 26–34)
MCHC RBC AUTO-ENTMCNC: 32.1 G/DL (ref 31–37)
MCV RBC AUTO: 91 FL (ref 80–100)
MIXED CELL %: 9.8 %
NEUTROPHILS # BLD AUTO: 6.8 X10ˆ3/UL (ref 1.5–7.7)
NEUTROPHILS NFR BLD AUTO: 67.8 %
PLATELET # BLD AUTO: 204 X10ˆ3/UL (ref 150–450)
RBC # BLD AUTO: 4.42 X10ˆ6/UL
WBC # BLD AUTO: 10.1 X10ˆ3/UL (ref 4–11)

## 2023-05-01 PROCEDURE — 99213 OFFICE O/P EST LOW 20 MIN: CPT | Performed by: NURSE PRACTITIONER

## 2023-05-01 PROCEDURE — 85025 COMPLETE CBC W/AUTO DIFF WBC: CPT | Performed by: NURSE PRACTITIONER

## 2023-05-01 PROCEDURE — 36415 COLL VENOUS BLD VENIPUNCTURE: CPT | Performed by: NURSE PRACTITIONER

## 2023-05-01 PROCEDURE — 85610 PROTHROMBIN TIME: CPT | Performed by: NURSE PRACTITIONER

## 2023-05-01 RX ORDER — TAMSULOSIN HYDROCHLORIDE 0.4 MG/1
CAPSULE ORAL
COMMUNITY
Start: 2023-03-31

## 2023-05-01 RX ORDER — GLIPIZIDE 5 MG/1
TABLET ORAL
COMMUNITY
Start: 2022-12-16

## 2023-05-01 RX ORDER — ATORVASTATIN CALCIUM 40 MG/1
TABLET, FILM COATED ORAL
COMMUNITY
Start: 2023-02-10

## 2023-05-19 ENCOUNTER — HOSPITAL ENCOUNTER (EMERGENCY)
Facility: HOSPITAL | Age: 63
Discharge: HOME | End: 2023-05-19
Attending: EMERGENCY MEDICINE
Payer: COMMERCIAL

## 2023-05-19 ENCOUNTER — APPOINTMENT (OUTPATIENT)
Dept: GENERAL RADIOLOGY | Facility: HOSPITAL | Age: 63
End: 2023-05-19
Payer: COMMERCIAL

## 2023-05-19 ENCOUNTER — NURSE TRIAGE (OUTPATIENT)
Dept: INTERNAL MEDICINE CLINIC | Facility: CLINIC | Age: 63
End: 2023-05-19

## 2023-05-19 ENCOUNTER — HOSPITAL ENCOUNTER (OUTPATIENT)
Age: 63
Discharge: ANOTHER HOSPITAL | End: 2023-05-19
Payer: COMMERCIAL

## 2023-05-19 ENCOUNTER — TELEPHONE (OUTPATIENT)
Dept: PHYSICAL MEDICINE AND REHAB | Facility: CLINIC | Age: 63
End: 2023-05-19

## 2023-05-19 VITALS
BODY MASS INDEX: 39 KG/M2 | WEIGHT: 307.13 LBS | DIASTOLIC BLOOD PRESSURE: 45 MMHG | TEMPERATURE: 98 F | SYSTOLIC BLOOD PRESSURE: 113 MMHG | RESPIRATION RATE: 19 BRPM | HEART RATE: 69 BPM | OXYGEN SATURATION: 92 %

## 2023-05-19 VITALS
SYSTOLIC BLOOD PRESSURE: 125 MMHG | OXYGEN SATURATION: 96 % | RESPIRATION RATE: 18 BRPM | DIASTOLIC BLOOD PRESSURE: 59 MMHG | TEMPERATURE: 98 F | HEART RATE: 80 BPM

## 2023-05-19 DIAGNOSIS — R05.1 ACUTE COUGH: ICD-10-CM

## 2023-05-19 DIAGNOSIS — R07.9 CHEST PAIN OF UNCERTAIN ETIOLOGY: Primary | ICD-10-CM

## 2023-05-19 DIAGNOSIS — R06.2 WHEEZING: ICD-10-CM

## 2023-05-19 LAB
ALBUMIN SERPL-MCNC: 3.1 G/DL (ref 3.4–5)
ALBUMIN/GLOB SERPL: 0.9 {RATIO} (ref 1–2)
ALP LIVER SERPL-CCNC: 87 U/L
ALT SERPL-CCNC: 30 U/L
ANION GAP SERPL CALC-SCNC: 5 MMOL/L (ref 0–18)
AST SERPL-CCNC: 21 U/L (ref 15–37)
BASOPHILS # BLD AUTO: 0.07 X10(3) UL (ref 0–0.2)
BASOPHILS NFR BLD AUTO: 0.5 %
BILIRUB SERPL-MCNC: 0.3 MG/DL (ref 0.1–2)
BUN BLD-MCNC: 30 MG/DL (ref 7–18)
BUN/CREAT SERPL: 24.2 (ref 10–20)
CALCIUM BLD-MCNC: 9.2 MG/DL (ref 8.5–10.1)
CHLORIDE SERPL-SCNC: 106 MMOL/L (ref 98–112)
CO2 SERPL-SCNC: 29 MMOL/L (ref 21–32)
CREAT BLD-MCNC: 1.24 MG/DL
DEPRECATED RDW RBC AUTO: 45.5 FL (ref 35.1–46.3)
EOSINOPHIL # BLD AUTO: 0.32 X10(3) UL (ref 0–0.7)
EOSINOPHIL NFR BLD AUTO: 2.4 %
ERYTHROCYTE [DISTWIDTH] IN BLOOD BY AUTOMATED COUNT: 13.8 % (ref 11–15)
FLUAV + FLUBV RNA SPEC NAA+PROBE: NEGATIVE
FLUAV + FLUBV RNA SPEC NAA+PROBE: NEGATIVE
GFR SERPLBLD BASED ON 1.73 SQ M-ARVRAT: 66 ML/MIN/1.73M2 (ref 60–?)
GLOBULIN PLAS-MCNC: 3.5 G/DL (ref 2.8–4.4)
GLUCOSE BLD-MCNC: 154 MG/DL (ref 70–99)
HCT VFR BLD AUTO: 37.2 %
HGB BLD-MCNC: 11.9 G/DL
IMM GRANULOCYTES # BLD AUTO: 0.05 X10(3) UL (ref 0–1)
IMM GRANULOCYTES NFR BLD: 0.4 %
LYMPHOCYTES # BLD AUTO: 2.35 X10(3) UL (ref 1–4)
LYMPHOCYTES NFR BLD AUTO: 17.3 %
MCH RBC QN AUTO: 28.8 PG (ref 26–34)
MCHC RBC AUTO-ENTMCNC: 32 G/DL (ref 31–37)
MCV RBC AUTO: 90.1 FL
MONOCYTES # BLD AUTO: 1.12 X10(3) UL (ref 0.1–1)
MONOCYTES NFR BLD AUTO: 8.2 %
NEUTROPHILS # BLD AUTO: 9.67 X10 (3) UL (ref 1.5–7.7)
NEUTROPHILS # BLD AUTO: 9.67 X10(3) UL (ref 1.5–7.7)
NEUTROPHILS NFR BLD AUTO: 71.2 %
NT-PROBNP SERPL-MCNC: 159 PG/ML (ref ?–125)
OSMOLALITY SERPL CALC.SUM OF ELEC: 299 MOSM/KG (ref 275–295)
PLATELET # BLD AUTO: 229 10(3)UL (ref 150–450)
POTASSIUM SERPL-SCNC: 4.1 MMOL/L (ref 3.5–5.1)
PROT SERPL-MCNC: 6.6 G/DL (ref 6.4–8.2)
RBC # BLD AUTO: 4.13 X10(6)UL
RSV RNA SPEC NAA+PROBE: NEGATIVE
SARS-COV-2 RNA RESP QL NAA+PROBE: NOT DETECTED
SODIUM SERPL-SCNC: 140 MMOL/L (ref 136–145)
TROPONIN I HIGH SENSITIVITY: 13 NG/L
TROPONIN I HIGH SENSITIVITY: 14 NG/L
WBC # BLD AUTO: 13.6 X10(3) UL (ref 4–11)

## 2023-05-19 PROCEDURE — 85025 COMPLETE CBC W/AUTO DIFF WBC: CPT | Performed by: EMERGENCY MEDICINE

## 2023-05-19 PROCEDURE — 80053 COMPREHEN METABOLIC PANEL: CPT | Performed by: EMERGENCY MEDICINE

## 2023-05-19 PROCEDURE — 99285 EMERGENCY DEPT VISIT HI MDM: CPT

## 2023-05-19 PROCEDURE — 93010 ELECTROCARDIOGRAM REPORT: CPT

## 2023-05-19 PROCEDURE — 85025 COMPLETE CBC W/AUTO DIFF WBC: CPT

## 2023-05-19 PROCEDURE — 84484 ASSAY OF TROPONIN QUANT: CPT

## 2023-05-19 PROCEDURE — 84484 ASSAY OF TROPONIN QUANT: CPT | Performed by: EMERGENCY MEDICINE

## 2023-05-19 PROCEDURE — 83880 ASSAY OF NATRIURETIC PEPTIDE: CPT | Performed by: EMERGENCY MEDICINE

## 2023-05-19 PROCEDURE — 99284 EMERGENCY DEPT VISIT MOD MDM: CPT

## 2023-05-19 PROCEDURE — 71045 X-RAY EXAM CHEST 1 VIEW: CPT | Performed by: EMERGENCY MEDICINE

## 2023-05-19 PROCEDURE — 80053 COMPREHEN METABOLIC PANEL: CPT

## 2023-05-19 PROCEDURE — 93005 ELECTROCARDIOGRAM TRACING: CPT

## 2023-05-19 PROCEDURE — 94640 AIRWAY INHALATION TREATMENT: CPT

## 2023-05-19 PROCEDURE — 36415 COLL VENOUS BLD VENIPUNCTURE: CPT

## 2023-05-19 PROCEDURE — 0241U SARS-COV-2/FLU A AND B/RSV BY PCR (GENEXPERT): CPT | Performed by: EMERGENCY MEDICINE

## 2023-05-19 RX ORDER — ALBUTEROL SULFATE 90 UG/1
2 AEROSOL, METERED RESPIRATORY (INHALATION) EVERY 4 HOURS PRN
Qty: 1 EACH | Refills: 0 | Status: SHIPPED | OUTPATIENT
Start: 2023-05-19 | End: 2023-06-18

## 2023-05-19 RX ORDER — BENZONATATE 100 MG/1
100 CAPSULE ORAL ONCE
Status: COMPLETED | OUTPATIENT
Start: 2023-05-19 | End: 2023-05-19

## 2023-05-19 RX ORDER — IPRATROPIUM BROMIDE AND ALBUTEROL SULFATE 2.5; .5 MG/3ML; MG/3ML
3 SOLUTION RESPIRATORY (INHALATION) ONCE
Status: COMPLETED | OUTPATIENT
Start: 2023-05-19 | End: 2023-05-19

## 2023-05-19 NOTE — ED INITIAL ASSESSMENT (HPI)
Pt here w c/o cough and wheezing that started last night. States had a cough 2 wks ago but resolved 1 wk ago. Also w complaint of heartburn in throat x 2-3 wks on/off, Prilosec for it that helps for it but did last night. No fever.  No N/V.

## 2023-05-19 NOTE — ED INITIAL ASSESSMENT (HPI)
Pt to ED from [facility] with c/o cough and heartburn. Pt states intermittent cough for about one month. Pt denies fall or trauma. Pt able to speak in full sentences. +dyspnea with exertion per patient. Pt had cardiac stents placed 3/2023 here. Pt on asa and ticagrelor. Pt states chest pressure started on ride over to ED from [facility]. Pt skin parameters WNL. EKG done at [facility] but will repeat here due to changes in chest pain.

## 2023-05-20 LAB
ATRIAL RATE: 70 BPM
ATRIAL RATE: 77 BPM
P AXIS: 44 DEGREES
P AXIS: 9 DEGREES
P-R INTERVAL: 134 MS
P-R INTERVAL: 166 MS
Q-T INTERVAL: 374 MS
Q-T INTERVAL: 380 MS
QRS DURATION: 102 MS
QRS DURATION: 94 MS
QTC CALCULATION (BEZET): 410 MS
QTC CALCULATION (BEZET): 423 MS
R AXIS: -5 DEGREES
R AXIS: -7 DEGREES
T AXIS: 4 DEGREES
T AXIS: 5 DEGREES
VENTRICULAR RATE: 70 BPM
VENTRICULAR RATE: 77 BPM

## 2023-06-06 NOTE — TELEPHONE ENCOUNTER
[facility] stated [facility] atorvastatin because  another statin was ordered, advised should only be on one , rosuvastatin was ordered by the cardiologist, he thought he should have been on both, verbalized understanding

## 2023-06-12 ENCOUNTER — TELEPHONE (OUTPATIENT)
Dept: INTERNAL MEDICINE CLINIC | Facility: CLINIC | Age: 63
End: 2023-06-12

## 2023-06-12 ENCOUNTER — OFFICE VISIT (OUTPATIENT)
Dept: INTERNAL MEDICINE CLINIC | Facility: CLINIC | Age: 63
End: 2023-06-12

## 2023-06-12 ENCOUNTER — OFFICE VISIT (OUTPATIENT)
Dept: ENDOCRINOLOGY CLINIC | Facility: CLINIC | Age: 63
End: 2023-06-12

## 2023-06-12 VITALS
SYSTOLIC BLOOD PRESSURE: 109 MMHG | DIASTOLIC BLOOD PRESSURE: 70 MMHG | BODY MASS INDEX: 38 KG/M2 | WEIGHT: 295 LBS | HEART RATE: 90 BPM

## 2023-06-12 VITALS
WEIGHT: 295.38 LBS | HEIGHT: 74 IN | DIASTOLIC BLOOD PRESSURE: 68 MMHG | BODY MASS INDEX: 37.91 KG/M2 | HEART RATE: 92 BPM | SYSTOLIC BLOOD PRESSURE: 115 MMHG

## 2023-06-12 DIAGNOSIS — E11.65 UNCONTROLLED TYPE 2 DIABETES MELLITUS WITH HYPERGLYCEMIA (HCC): Primary | ICD-10-CM

## 2023-06-12 DIAGNOSIS — S81.801A WOUND OF RIGHT LOWER EXTREMITY, INITIAL ENCOUNTER: Primary | ICD-10-CM

## 2023-06-12 LAB
CARTRIDGE LOT#: ABNORMAL NUMERIC
GLUCOSE BLOOD: 204
HEMOGLOBIN A1C: 8.4 % (ref 4.3–5.6)
TEST STRIP LOT #: NORMAL NUMERIC

## 2023-06-12 PROCEDURE — 82947 ASSAY GLUCOSE BLOOD QUANT: CPT | Performed by: NURSE PRACTITIONER

## 2023-06-12 PROCEDURE — 3078F DIAST BP <80 MM HG: CPT | Performed by: INTERNAL MEDICINE

## 2023-06-12 PROCEDURE — 3052F HG A1C>EQUAL 8.0%<EQUAL 9.0%: CPT | Performed by: INTERNAL MEDICINE

## 2023-06-12 PROCEDURE — 3078F DIAST BP <80 MM HG: CPT | Performed by: NURSE PRACTITIONER

## 2023-06-12 PROCEDURE — 99213 OFFICE O/P EST LOW 20 MIN: CPT | Performed by: INTERNAL MEDICINE

## 2023-06-12 PROCEDURE — 3008F BODY MASS INDEX DOCD: CPT | Performed by: INTERNAL MEDICINE

## 2023-06-12 PROCEDURE — 83036 HEMOGLOBIN GLYCOSYLATED A1C: CPT | Performed by: NURSE PRACTITIONER

## 2023-06-12 PROCEDURE — 99214 OFFICE O/P EST MOD 30 MIN: CPT | Performed by: NURSE PRACTITIONER

## 2023-06-12 PROCEDURE — 3074F SYST BP LT 130 MM HG: CPT | Performed by: NURSE PRACTITIONER

## 2023-06-12 PROCEDURE — 3074F SYST BP LT 130 MM HG: CPT | Performed by: INTERNAL MEDICINE

## 2023-06-12 RX ORDER — GLIPIZIDE 5 MG/1
TABLET ORAL
Qty: 135 TABLET | Refills: 0 | Status: SHIPPED | OUTPATIENT
Start: 2023-06-12 | End: 2023-09-10

## 2023-06-12 NOTE — PATIENT INSTRUCTIONS
Please apply Neosporin daily with a dry covering to your right leg wound. Try to avoid irritating the scab. Keep your Wound Clinic appointment if not healed by June 26.

## 2023-06-12 NOTE — TELEPHONE ENCOUNTER
Verified name and . Patient requesting that Metformin prescription be written as 1,000 mg pills instead of 500 mg pills so that he only needs to take 2 pills a day as opposed to 4 pills a day. Medication pended for your review and approval.    Routing to  on call, Sybil Shin as Dr. Kush Pedro is out of office. Patient requesting that [employer] message be sent or phone call be made to notify him once new prescription has been approved.

## 2023-06-12 NOTE — PATIENT INSTRUCTIONS
A1C: 8.4% today --> increased from 8.0% on 3/8/2023  Blood glucose: 204 in clinic today    Medications:   -continue with Mounjaro 15mg once weekly   - start glipizide 2.5mg with breakfast      5mg with dinner   -continue with Metformin ER 2,000mg twice daily     - call RAHEEL Ya (wound care clinic) [phone] to be seen within this week    A1C goal:   <7.0%    Blood sugar testing:  Test your blood sugar 1 time daily   Recommended times to test: alternate with before breakfast (fasting) or before lunch or before dinner     Blood sugar targets:  Before breakfast:   (preferably < 110)  Before meals OR 2 hours after meals: <180 (preferably <150)     Call for persistent blood sugars < 75 or > 200.

## 2023-06-16 ENCOUNTER — TELEPHONE (OUTPATIENT)
Dept: ENDOCRINOLOGY CLINIC | Facility: CLINIC | Age: 63
End: 2023-06-16

## 2023-06-16 NOTE — TELEPHONE ENCOUNTER
Patient is calling states the home delivery pharmacy states that [facility] is out of stock.  Please call

## 2023-06-19 RX ORDER — TIRZEPATIDE 7.5 MG/.5ML
7.5 INJECTION, SOLUTION SUBCUTANEOUS WEEKLY
Qty: 6 ML | Refills: 0 | Status: SHIPPED | OUTPATIENT
Start: 2023-06-19

## 2023-06-19 NOTE — TELEPHONE ENCOUNTER
Please see if Mounjaro 7.5mg dose is available. If yes, then on to send rx for 90 day supply. Patient should also give me an update on his BG readings after 2 weeks of this lowered dose. Thank you!

## 2023-06-22 ENCOUNTER — OFFICE VISIT (OUTPATIENT)
Dept: PHYSICAL MEDICINE AND REHAB | Facility: CLINIC | Age: 63
End: 2023-06-22
Payer: COMMERCIAL

## 2023-06-22 DIAGNOSIS — E11.69 HYPERLIPIDEMIA ASSOCIATED WITH TYPE 2 DIABETES MELLITUS (HCC): ICD-10-CM

## 2023-06-22 DIAGNOSIS — M22.2X9 PATELLOFEMORAL PAIN SYNDROME, UNSPECIFIED LATERALITY: ICD-10-CM

## 2023-06-22 DIAGNOSIS — I15.2 HYPERTENSION ASSOCIATED WITH TYPE 2 DIABETES MELLITUS (HCC): ICD-10-CM

## 2023-06-22 DIAGNOSIS — E11.59 HYPERTENSION ASSOCIATED WITH TYPE 2 DIABETES MELLITUS (HCC): ICD-10-CM

## 2023-06-22 DIAGNOSIS — M17.11 PRIMARY OSTEOARTHRITIS OF RIGHT KNEE: Primary | ICD-10-CM

## 2023-06-22 DIAGNOSIS — E66.01 CLASS 2 SEVERE OBESITY DUE TO EXCESS CALORIES WITH SERIOUS COMORBIDITY AND BODY MASS INDEX (BMI) OF 38.0 TO 38.9 IN ADULT (HCC): ICD-10-CM

## 2023-06-22 DIAGNOSIS — E03.9 HYPOTHYROIDISM, UNSPECIFIED TYPE: ICD-10-CM

## 2023-06-22 DIAGNOSIS — E78.5 HYPERLIPIDEMIA ASSOCIATED WITH TYPE 2 DIABETES MELLITUS (HCC): ICD-10-CM

## 2023-06-22 DIAGNOSIS — Z74.09 LIMITED MOBILITY: ICD-10-CM

## 2023-06-22 DIAGNOSIS — E11.42 DIABETIC POLYNEUROPATHY ASSOCIATED WITH TYPE 2 DIABETES MELLITUS (HCC): ICD-10-CM

## 2023-06-22 RX ORDER — LIDOCAINE HYDROCHLORIDE 10 MG/ML
5 INJECTION, SOLUTION INFILTRATION; PERINEURAL ONCE
Status: COMPLETED | OUTPATIENT
Start: 2023-06-22 | End: 2023-06-22

## 2023-06-22 NOTE — PATIENT INSTRUCTIONS
Post Injection Instructions     Please do not do anything strenuous over the next two days (if you had a knee injection do not walk more than 2 city blocks, do not attend any aerobic classes, do not run, no heavy lifting, no prolong standing). You may resume your day to day activities after your injection. You may experience some mild amount of swelling after the procedure. Please ice your joint that was injected at least 5-6 times a day (15 minutes) for two days after (this will help prevent worsening pain that sometimes occurs after an injection). Only take tylenol if needed for pain for the first few days. Watch for signs of infection which include redness, warmth, worsening pain, fevers or chills. If you develop any of these signs call the office immediately at [phone]    Everyone responds differently to injections, but you can expect your peak effects a few weeks after your last injection. Rozina Andersen.  Ata Wilkinson MD  Physical Medicine and Rehabilitation/Sports Medicine  MEDICAL CENTER [facility]

## 2023-06-26 ENCOUNTER — APPOINTMENT (OUTPATIENT)
Dept: WOUND CARE | Facility: HOSPITAL | Age: 63
End: 2023-06-26
Payer: COMMERCIAL

## 2023-06-29 ENCOUNTER — NURSE TRIAGE (OUTPATIENT)
Dept: INTERNAL MEDICINE CLINIC | Facility: CLINIC | Age: 63
End: 2023-06-29

## 2023-06-29 ENCOUNTER — OFFICE VISIT (OUTPATIENT)
Dept: INTERNAL MEDICINE CLINIC | Facility: CLINIC | Age: 63
End: 2023-06-29

## 2023-06-29 ENCOUNTER — OFFICE VISIT (OUTPATIENT)
Dept: PHYSICAL MEDICINE AND REHAB | Facility: CLINIC | Age: 63
End: 2023-06-29
Payer: COMMERCIAL

## 2023-06-29 ENCOUNTER — HOSPITAL ENCOUNTER (OUTPATIENT)
Dept: GENERAL RADIOLOGY | Facility: HOSPITAL | Age: 63
Discharge: HOME | End: 2023-06-29
Attending: NURSE PRACTITIONER
Payer: COMMERCIAL

## 2023-06-29 VITALS
HEIGHT: 74 IN | WEIGHT: 295 LBS | SYSTOLIC BLOOD PRESSURE: 106 MMHG | HEART RATE: 74 BPM | DIASTOLIC BLOOD PRESSURE: 64 MMHG | BODY MASS INDEX: 37.86 KG/M2

## 2023-06-29 DIAGNOSIS — B99.9 INFECTION: Primary | ICD-10-CM

## 2023-06-29 DIAGNOSIS — S81.802A WOUND OF LEFT LOWER EXTREMITY, INITIAL ENCOUNTER: ICD-10-CM

## 2023-06-29 DIAGNOSIS — B99.9 INFECTION: ICD-10-CM

## 2023-06-29 DIAGNOSIS — M17.11 PRIMARY OSTEOARTHRITIS OF RIGHT KNEE: Primary | ICD-10-CM

## 2023-06-29 PROCEDURE — 3008F BODY MASS INDEX DOCD: CPT | Performed by: NURSE PRACTITIONER

## 2023-06-29 PROCEDURE — 3074F SYST BP LT 130 MM HG: CPT | Performed by: NURSE PRACTITIONER

## 2023-06-29 PROCEDURE — 73590 X-RAY EXAM OF LOWER LEG: CPT | Performed by: NURSE PRACTITIONER

## 2023-06-29 PROCEDURE — 3078F DIAST BP <80 MM HG: CPT | Performed by: NURSE PRACTITIONER

## 2023-06-29 PROCEDURE — 99213 OFFICE O/P EST LOW 20 MIN: CPT | Performed by: NURSE PRACTITIONER

## 2023-06-29 PROCEDURE — 20611 DRAIN/INJ JOINT/BURSA W/US: CPT | Performed by: PHYSICAL MEDICINE & REHABILITATION

## 2023-06-29 RX ORDER — LIDOCAINE HYDROCHLORIDE 10 MG/ML
5 INJECTION, SOLUTION INFILTRATION; PERINEURAL ONCE
Status: COMPLETED | OUTPATIENT
Start: 2023-06-29 | End: 2023-06-29

## 2023-06-29 RX ORDER — DOXYCYCLINE 100 MG/1
100 CAPSULE ORAL 2 TIMES DAILY
Qty: 20 CAPSULE | Refills: 0 | Status: SHIPPED | OUTPATIENT
Start: 2023-06-29 | End: 2023-07-09

## 2023-07-06 ENCOUNTER — OFFICE VISIT (OUTPATIENT)
Dept: INTERNAL MEDICINE CLINIC | Facility: CLINIC | Age: 63
End: 2023-07-06

## 2023-07-06 ENCOUNTER — OFFICE VISIT (OUTPATIENT)
Dept: PHYSICAL MEDICINE AND REHAB | Facility: CLINIC | Age: 63
End: 2023-07-06
Payer: COMMERCIAL

## 2023-07-06 VITALS
OXYGEN SATURATION: 96 % | BODY MASS INDEX: 38.24 KG/M2 | DIASTOLIC BLOOD PRESSURE: 68 MMHG | HEART RATE: 76 BPM | SYSTOLIC BLOOD PRESSURE: 108 MMHG | WEIGHT: 298 LBS | RESPIRATION RATE: 14 BRPM | HEIGHT: 74 IN

## 2023-07-06 DIAGNOSIS — S81.802D WOUND OF LEFT LOWER EXTREMITY, SUBSEQUENT ENCOUNTER: Primary | ICD-10-CM

## 2023-07-06 DIAGNOSIS — M22.2X9 PATELLOFEMORAL PAIN SYNDROME, UNSPECIFIED LATERALITY: ICD-10-CM

## 2023-07-06 DIAGNOSIS — M17.11 PRIMARY OSTEOARTHRITIS OF RIGHT KNEE: Primary | ICD-10-CM

## 2023-07-06 PROCEDURE — 3074F SYST BP LT 130 MM HG: CPT | Performed by: NURSE PRACTITIONER

## 2023-07-06 PROCEDURE — 3008F BODY MASS INDEX DOCD: CPT | Performed by: NURSE PRACTITIONER

## 2023-07-06 PROCEDURE — 99213 OFFICE O/P EST LOW 20 MIN: CPT | Performed by: NURSE PRACTITIONER

## 2023-07-06 PROCEDURE — 20611 DRAIN/INJ JOINT/BURSA W/US: CPT | Performed by: PHYSICAL MEDICINE & REHABILITATION

## 2023-07-06 PROCEDURE — 3078F DIAST BP <80 MM HG: CPT | Performed by: NURSE PRACTITIONER

## 2023-07-06 RX ORDER — TRIAMCINOLONE ACETONIDE 40 MG/ML
40 INJECTION, SUSPENSION INTRA-ARTICULAR; INTRAMUSCULAR ONCE
Status: COMPLETED | OUTPATIENT
Start: 2023-07-06 | End: 2023-07-06

## 2023-07-06 RX ORDER — LIDOCAINE HYDROCHLORIDE 10 MG/ML
7 INJECTION, SOLUTION INFILTRATION; PERINEURAL ONCE
Status: COMPLETED | OUTPATIENT
Start: 2023-07-06 | End: 2023-07-06

## 2023-07-06 NOTE — PATIENT INSTRUCTIONS
Post Injection Instructions     Please do not do anything strenuous over the next two days (if you had a knee injection do not walk more than 2 city blocks, do not attend any aerobic classes, do not run, no heavy lifting, no prolong standing). You may resume your day to day activities after your injection. You may experience some mild amount of swelling after the procedure. Please ice your joint that was injected at least 5-6 times a day (15 minutes) for two days after (this will help prevent worsening pain that sometimes occurs after an injection). Only take tylenol if needed for pain for the first few days. Watch for signs of infection which include redness, warmth, worsening pain, fevers or chills. If you develop any of these signs call the office immediately at [phone]    Everyone responds differently to injections, but you can expect your peak effects a few weeks after your last injection. Lupillo Milan.  Martha Peralta MD  Physical Medicine and Rehabilitation/Sports Medicine  MEDICAL CENTER [facility]

## 2023-07-06 NOTE — ASSESSMENT & PLAN NOTE
Reassessment of left leg wound with improvement. Wound has a new scab and has decreased in size. His leg looks less red and is completed the doxycycline. Plan   instructed patient not to pick or touch the scab. Continue mupirocin ointment for 1 more week. If no improvement patient is to call. If symptoms worsen patient is to call.

## 2023-07-14 ENCOUNTER — OFFICE VISIT (OUTPATIENT)
Dept: PODIATRY CLINIC | Facility: CLINIC | Age: 63
End: 2023-07-14

## 2023-07-14 DIAGNOSIS — M20.41 HAMMERTOE, BILATERAL: ICD-10-CM

## 2023-07-14 DIAGNOSIS — M20.42 HAMMERTOE, BILATERAL: ICD-10-CM

## 2023-07-14 DIAGNOSIS — L84 FOOT CALLUS: ICD-10-CM

## 2023-07-14 DIAGNOSIS — E11.42 TYPE 2 DIABETES MELLITUS WITH POLYNEUROPATHY (HCC): Primary | ICD-10-CM

## 2023-07-14 DIAGNOSIS — B35.1 ONYCHOMYCOSIS: ICD-10-CM

## 2023-07-14 PROCEDURE — 99213 OFFICE O/P EST LOW 20 MIN: CPT | Performed by: PODIATRIST

## 2023-07-14 NOTE — PROGRESS NOTES
[facility], [facility] Podiatry  Progress Note    Radha Araujo is a 66year old male. Patient presents with:  Diabetic Foot Care: Did not check AM BS, A1C 6/12/23 was 8.4, denies pain        HPI:     This is a pleasant poorly controlled Diabetic male with PMH of Left venous stasis ulcer and B/L LE venous ablation. He is taking lyrica for the diabetic neuropathy which is not really helping. He presents to clinic for [facility]. He complains of long thick toenails and calluses which he is unable to trim himself. Allergies: Penicillins   Current Outpatient Medications   Medication Sig Dispense Refill    mupirocin 2 % External Ointment Apply 1 Application topically 3 (three) times daily. 15 g 1    Tirzepatide (MOUNJARO) 7.5 MG/0.5ML Subcutaneous Solution Pen-injector Inject 7.5 mg into the skin once a week. 6 mL 0    metFORMIN HCl 1000 MG Oral Tab Take 1 tablet (1,000 mg total) by mouth 2 (two) times daily with meals. 180 tablet 3    glipiZIDE 5 MG Oral Tab Take 0.5 tablets (2.5 mg total) by mouth daily with breakfast AND 1 tablet (5 mg total) daily with dinner. 135 tablet 0    tamsulosin 0.4 MG Oral Cap       LEVOTHYROXINE 175 MCG Oral Tab TAKE 1 TABLET DAILY 90 tablet 3    PREGABALIN 100 MG Oral Cap TAKE 1 CAPSULE THREE TIMES A  capsule 0    ticagrelor 90 MG Oral Tab Take 1 tablet (90 mg total) by mouth every 12 (twelve) hours. 180 tablet 3    rosuvastatin 20 MG Oral Tab Take 1 tablet (20 mg total) by mouth nightly. 90 tablet 3    Tirzepatide (MOUNJARO) 15 MG/0.5ML Subcutaneous Solution Pen-injector Inject 15 mg into the skin once a week. 6 mL 1    hydroCHLOROthiazide 12.5 MG Oral Tab Take 1 tablet (12.5 mg total) by mouth in the morning. FOR BLOOD PRESSURE. . 90 tablet 3    DULoxetine 30 MG Oral Cap DR Particles Take 1 capsule (30 mg total) by mouth in the morning. FOR ANXIETY/DEPRESSION/ARTHRITIS PAIN. Bonifacio Silvestre 90 capsule 2    magnesium oxide 400 MG Oral Tab Take 1 tablet (400 mg total) by mouth at bedtime. traMADol 50 MG Oral Tab Take 1 tablet (50 mg total) by mouth every 6 (six) hours as needed for Pain. (Patient taking differently: Take 1 tablet (50 mg total) by mouth every 6 (six) hours as needed for Pain. Takes 100 mg every morning) 10 tablet 0    aspirin (ASPIRIN 81) 81 MG Oral Tab EC Take 1 tablet (81 mg total) by mouth daily. FOR HEART. 90 tablet 3    valsartan 160 MG Oral Tab Take 1 tablet (160 mg total) by mouth daily. FOR BLOOD PRESSURE. 90 tablet 3    metoprolol tartrate 25 MG Oral Tab Take 1 tablet (25 mg total) by mouth 2 (two) times daily. FOR HEART. 90 tablet 3    finasteride 5 MG Oral Tab Take 1 tablet (5 mg total) by mouth daily. FOR PROSTATE. 90 tablet 3    Multiple Vitamins-Minerals (CENTRUM SILVER ULTRA MENS OR) Take by mouth Noon. Past Medical History:   Diagnosis Date    Benign head tremor     BPH (benign prostatic hyperplasia)     Diabetes ([address]     Diverticulitis 10/2019    Former smoker     Hyperlipidemia     Hypertension     Neuropathic pain     Non-pressure chronic ulcer of right lower leg, limited to breakdown of skin ([address] 2021    Obesity     Snoring     Thyroid disease       Past Surgical History:   Procedure Laterality Date    CAROTID ENDARTERECTOMY Right 2021    Surgeon: Dr. Shilo Menjivar at [facility]    NECK/CHEST [address]      per patient, a blockage was removed from the right side of his neck in 2021    OTHER SURGICAL HISTORY  2017    Small bowel Res.     OTHER SURGICAL HISTORY  2019    Colectomy      Family History   Problem Relation Age of Onset    Cancer Father         Prostate    Heart Disorder Father     Cancer Mother       Social History    Socioeconomic History      Marital status:       Number of children: 4    Tobacco Use      Smoking status: Former        Types: Cigarettes        Quit date:         Years since quittin.5      Smokeless tobacco: Never    Vaping Use      Vaping Use: Never used    Substance and Sexual Activity Alcohol use: Never      Drug use: Never    Other Topics      Concerns:        Caffeine Concern: Yes          coffee daily        Exercise: No          REVIEW OF SYSTEMS:   Denies nausea, fever, chills  No calf pain  No other muscle or joint aches  Denies chest pain or shortness of breath. EXAM:   There were no vitals taken for this visit. Constitutional:   Patient in no apparent distress. Well kept. Of normal body habitus. Alert and oriented to person, place, and time. Vascular Examination:  DP pulse is 2/4  PT pulse is 2/4  Capillary refill is immediate  Edema is present bilateral  Severe varicosities present bilateral  Temperature warm proximally to warm distally bilateral  Hemosiderin deposits Present bilateral  Integumentary Examination:   The patient's nails appear incurvated, thickened, elongated, dystrophic, discolored with subungual debris 1-5 right, 1-5  left nails. Left distal 2nd and 3rd toe callus    Digital hair growth is present left and is present right. Neurological Examination:  Monofilament (10-g) sensation is 2/5 to right and 2/5 to left. Parasthesias present  Musculoskeletal Examination:  Muscle Strength is 5/5. Hammer digit deformity present digits 2-5  bilateral.        LABS & IMAGING:     Lab Results   Component Value Date     (H) 05/19/2023    BUN 30 (H) 05/19/2023    CREATSERUM 1.24 05/19/2023    BUNCREA 24.2 (H) 05/19/2023    ANIONGAP 5 05/19/2023    GFRAA 107 07/03/2022    GFRNAA 93 07/03/2022    CA 9.2 05/19/2023     05/19/2023    K 4.1 05/19/2023     05/19/2023    CO2 29.0 05/19/2023    OSMOCALC 299 (H) 05/19/2023        Lab Results   Component Value Date     (H) 12/20/2022    A1C 8.4 (A) 06/12/2023        No results found.      ASSESSMENT AND PLAN:   Diagnoses and all orders for this visit:    Type 2 diabetes mellitus with polyneuropathy [address]    Foot callus    Onychomycosis    Hammertoe, bilateral          Plan:     Diabetic education and instructions have been provided. We reviewed and discussed the following:    -risk categories related to pts with diabetes and foot or lower extremity complications per ADA. -adherence to medication regimen and close monitoring or blood sugar control.   -daily monitoring/inspection of feet and shoes.   -proper management of diet and weight   -regular follow up with PCP and specialty providers as recommended   -Lower extremity complications related to DM were reviewed and stressed prevention. Evaluated the patient. Discussed treatment options with the patient. Discussed with patient proper care and hygiene for their feet. Patient tolerated procedures well, without incident. Instrumentation used includes nail nippers and electric  where appropriate. Procedure: (27731 Debridement of toenails 6-10) Surgically debrided and mechanically reduced 6 or more toenails. Hemmorhage occurred none. Nails that were debrided appeared dystrophic and caused the patient pain in shoe gear. Nails 5 Left & 5 Right. Evaluated patient. Discussed treatment options with patient. Discussed proper hygiene and care for feet as well as use of emollient creams (ie Urea based creams)  Answered all patient questions. Discussed offloading hyperkeratotic lesions with proper shoe gear, offloading pads, and insoles. Procedures: (CPT 18 Paring or cutting of benign hyperkeratotic lesion 2-4)  2 lesions pared utilizing #15 blade to Left foot without incident. Pt refuses to wear compression stockings  Pt did have B/L GSV ablation by Dr. Guillermina Walton in March of 2022. Pt cont wearing DM shoes with inserts. RTC  3 months for [facility]    No follow-ups on file.     Allie Terrazas DPM  7/14/23

## 2023-07-18 RX ORDER — GLIPIZIDE 5 MG/1
TABLET ORAL
Qty: 135 TABLET | Refills: 0 | Status: SHIPPED | OUTPATIENT
Start: 2023-07-18 | End: 2023-10-16

## 2023-07-18 RX ORDER — GLIPIZIDE 5 MG/1
2.5 TABLET ORAL
Qty: 45 TABLET | Refills: 3 | OUTPATIENT
Start: 2023-07-18

## 2023-07-18 NOTE — TELEPHONE ENCOUNTER
Future Appointments   Date Time Provider Jose R Peacock   8/21/2023 10:45 AM RAHEEL Richardson [facility]

## 2023-07-28 RX ORDER — PREGABALIN 100 MG/1
100 CAPSULE ORAL 3 TIMES DAILY
Qty: 270 CAPSULE | Refills: 3 | Status: SHIPPED | OUTPATIENT
Start: 2023-07-28

## 2023-07-28 NOTE — TELEPHONE ENCOUNTER
Please review. Protocol failed / No protocol. Requested Prescriptions   Pending Prescriptions Disp Refills    pregabalin 100 MG Oral Cap 270 capsule 3     Sig: Take 1 capsule (100 mg total) by mouth 3 (three) times daily.        There is no refill protocol information for this order           Recent Outpatient Visits              2 weeks ago Type 2 diabetes mellitus with polyneuropathy [facility])    Debra Vasquez, [address] [facility], [place], [place]    Office Visit    3 weeks ago Wound of left lower extremity, subsequent encounter    Debra Vasquez, [address] [facility], [facility]    Office Visit    3 weeks ago Primary osteoarthritis of right knee    Debra Vasquez, [address] [facility], Valentina Taylor MD    Office Visit    4 weeks ago Infection    [address] [facility], [facility], [facility]    Office Visit    4 weeks ago Primary osteoarthritis of right knee    Debra Vasquez, [address] [facility], Valentina Taylor MD    Office Visit             Future Appointments         Provider Department Appt Notes    In 3 weeks Ambika [address], [place] 2mo fu Bexarotene Counseling:  I discussed with the patient the risks of bexarotene including but not limited to hair loss, dry lips/skin/eyes, liver abnormalities, hyperlipidemia, pancreatitis, depression/suicidal ideation, photosensitivity, drug rash/allergic reactions, hypothyroidism, anemia, leukopenia, infection, cataracts, and teratogenicity.  Patient understands that they will need regular blood tests to check lipid profile, liver function tests, white blood cell count, thyroid function tests and pregnancy test if applicable.

## 2023-08-03 ENCOUNTER — MED REC SCAN ONLY (OUTPATIENT)
Dept: INTERNAL MEDICINE CLINIC | Facility: CLINIC | Age: 63
End: 2023-08-03

## 2023-08-03 DIAGNOSIS — M19.90 ARTHRITIS: ICD-10-CM

## 2023-08-03 RX ORDER — PREGABALIN 100 MG/1
100 CAPSULE ORAL 3 TIMES DAILY
Qty: 30 CAPSULE | Refills: 0 | Status: SHIPPED | OUTPATIENT
Start: 2023-08-03

## 2023-08-03 NOTE — TELEPHONE ENCOUNTER
Spoke to patient (name and  of patient verified). He is calling to request 1 week supply of pregabalin to local pharmacy. Patient reports mail-order supply should arrive Saturday, but he is going to [place]  and does not want to have an issue getting his medication. Requested Prescriptions     Pending Prescriptions Disp Refills    pregabalin 100 MG Oral Cap 30 capsule 0     Sig: Take 1 capsule (100 mg total) by mouth 3 (three) times daily. Last Office Visit with PCP: 2022      **patient requesting [employer] message with update**    Dr. Pedroza Kidney, please advise on 10-day prescription of pregabalin to local pharmacy, pended for review. Please route back to RN triage to notify patient. Thank you.

## 2023-08-04 RX ORDER — DULOXETIN HYDROCHLORIDE 30 MG/1
CAPSULE, DELAYED RELEASE ORAL
Qty: 90 CAPSULE | Refills: 3 | Status: SHIPPED | OUTPATIENT
Start: 2023-08-04

## 2023-08-04 NOTE — TELEPHONE ENCOUNTER
Please review. Protocol Failed or has No Protocol. Due for physical in December, has been seen for acute visits. Please advise on qty and refills. Thanks.     Requested Prescriptions   Pending Prescriptions Disp Refills    DULOXETINE 30 MG Oral Cap DR Particles [Pharmacy Med Name: DULOXETINE HCL DR CAPS 30MG] 90 capsule 3     Sig: TAKE 1 CAPSULE IN THE MORNING FOR ANXIETY/DEPRESSION/ARTHRITIS PAIN       Psychiatric Non-Scheduled (Anti-Anxiety) Failed - 8/3/2023  2:03 PM        Failed - In person appointment or virtual visit in the past 6 mos or appointment in next 3 mos     Recent Outpatient Visits              3 weeks ago Type 2 diabetes mellitus with polyneuropathy ([facility]    [address], [address], Jovita Almodovar [place]    Office Visit    4 weeks ago Wound of left lower extremity, subsequent encounter    Nishi Saldivar, RAHEEL    Office Visit    4 weeks ago Primary osteoarthritis of right knee    [employer], [address], Mike Colin MD    Office Visit    1 month ago Infection    [address], [address], [place], Rudy Ramal, APRN    Office Visit    1 month ago Primary osteoarthritis of right knee    [employer], [address], Mike Colin MD    Office Visit          Future Appointments         Provider Department Appt Notes    In 2 weeks Sonya Schafer, APRN [address], [address], [place] 2mo fu                    Recent Outpatient Visits              3 weeks ago Type 2 diabetes mellitus with polyneuropathy [facility])    [address], [address], Jovita Caceres [place]    Office Visit    4 weeks ago Wound of left lower extremity, subsequent encounter    Nishi Saldivar APRN    Office Visit    4 weeks ago Primary osteoarthritis of right knee Kei Jolley MD    Office Visit    1 month ago Infection    [facility], [address], Bhupendra, APRN    Office Visit    1 month ago Primary osteoarthritis of right knee    [facility], [address], Maddi Ponce MD    Office Visit            Future Appointments         Provider Department Appt Notes    In 2 weeks Jackie Anders, [address] [facility], [place] 2mo fu

## 2023-08-12 ENCOUNTER — HOSPITAL ENCOUNTER (EMERGENCY)
Facility: HOSPITAL | Age: 63
Discharge: HOME | End: 2023-08-12
Attending: EMERGENCY MEDICINE
Payer: COMMERCIAL

## 2023-08-12 VITALS
BODY MASS INDEX: 38.5 KG/M2 | RESPIRATION RATE: 20 BRPM | SYSTOLIC BLOOD PRESSURE: 105 MMHG | DIASTOLIC BLOOD PRESSURE: 56 MMHG | HEART RATE: 64 BPM | TEMPERATURE: 99 F | HEIGHT: 74 IN | OXYGEN SATURATION: 92 % | WEIGHT: 300 LBS

## 2023-08-12 DIAGNOSIS — R10.9 ACUTE RIGHT FLANK PAIN: Primary | ICD-10-CM

## 2023-08-12 LAB
ALBUMIN SERPL-MCNC: 3.2 G/DL (ref 3.4–5)
ALP LIVER SERPL-CCNC: 80 U/L
ALT SERPL-CCNC: 27 U/L
ANION GAP SERPL CALC-SCNC: 4 MMOL/L (ref 0–18)
AST SERPL-CCNC: 18 U/L (ref 15–37)
BASOPHILS # BLD AUTO: 0.04 X10(3) UL (ref 0–0.2)
BASOPHILS NFR BLD AUTO: 0.4 %
BILIRUB DIRECT SERPL-MCNC: <0.1 MG/DL (ref 0–0.2)
BILIRUB SERPL-MCNC: 0.2 MG/DL (ref 0.1–2)
BILIRUB UR QL: NEGATIVE
BUN BLD-MCNC: 27 MG/DL (ref 7–18)
BUN/CREAT SERPL: 18.2 (ref 10–20)
CALCIUM BLD-MCNC: 9.1 MG/DL (ref 8.5–10.1)
CHLORIDE SERPL-SCNC: 110 MMOL/L (ref 98–112)
CLARITY UR: CLEAR
CO2 SERPL-SCNC: 30 MMOL/L (ref 21–32)
COLOR UR: YELLOW
CREAT BLD-MCNC: 1.48 MG/DL
D DIMER PPP FEU-MCNC: 0.99 UG/ML FEU (ref ?–0.61)
DEPRECATED RDW RBC AUTO: 50.4 FL (ref 35.1–46.3)
EGFRCR SERPLBLD CKD-EPI 2021: 53 ML/MIN/1.73M2 (ref 60–?)
EOSINOPHIL # BLD AUTO: 0.14 X10(3) UL (ref 0–0.7)
EOSINOPHIL NFR BLD AUTO: 1.6 %
ERYTHROCYTE [DISTWIDTH] IN BLOOD BY AUTOMATED COUNT: 14.9 % (ref 11–15)
GLUCOSE BLD-MCNC: 164 MG/DL (ref 70–99)
GLUCOSE UR-MCNC: NORMAL MG/DL
HCT VFR BLD AUTO: 36.9 %
HGB BLD-MCNC: 11.6 G/DL
HGB UR QL STRIP.AUTO: NEGATIVE
HYALINE CASTS #/AREA URNS AUTO: PRESENT /LPF
IMM GRANULOCYTES # BLD AUTO: 0.02 X10(3) UL (ref 0–1)
IMM GRANULOCYTES NFR BLD: 0.2 %
LEUKOCYTE ESTERASE UR QL STRIP.AUTO: NEGATIVE
LYMPHOCYTES # BLD AUTO: 1.7 X10(3) UL (ref 1–4)
LYMPHOCYTES NFR BLD AUTO: 19.1 %
MCH RBC QN AUTO: 28.9 PG (ref 26–34)
MCHC RBC AUTO-ENTMCNC: 31.4 G/DL (ref 31–37)
MCV RBC AUTO: 91.8 FL
MONOCYTES # BLD AUTO: 0.83 X10(3) UL (ref 0.1–1)
MONOCYTES NFR BLD AUTO: 9.3 %
NEUTROPHILS # BLD AUTO: 6.19 X10 (3) UL (ref 1.5–7.7)
NEUTROPHILS # BLD AUTO: 6.19 X10(3) UL (ref 1.5–7.7)
NEUTROPHILS NFR BLD AUTO: 69.4 %
NITRITE UR QL STRIP.AUTO: NEGATIVE
OSMOLALITY SERPL CALC.SUM OF ELEC: 307 MOSM/KG (ref 275–295)
PH UR: 5.5 [PH] (ref 5–8)
PLATELET # BLD AUTO: 225 10(3)UL (ref 150–450)
POTASSIUM SERPL-SCNC: 4.3 MMOL/L (ref 3.5–5.1)
PROT SERPL-MCNC: 6.8 G/DL (ref 6.4–8.2)
PROT UR-MCNC: 20 MG/DL
RBC # BLD AUTO: 4.02 X10(6)UL
SODIUM SERPL-SCNC: 144 MMOL/L (ref 136–145)
SP GR UR STRIP: 1.03 (ref 1–1.03)
UROBILINOGEN UR STRIP-ACNC: 2
WBC # BLD AUTO: 8.9 X10(3) UL (ref 4–11)

## 2023-08-12 PROCEDURE — 80076 HEPATIC FUNCTION PANEL: CPT | Performed by: EMERGENCY MEDICINE

## 2023-08-12 PROCEDURE — 96374 THER/PROPH/DIAG INJ IV PUSH: CPT

## 2023-08-12 PROCEDURE — 85025 COMPLETE CBC W/AUTO DIFF WBC: CPT | Performed by: EMERGENCY MEDICINE

## 2023-08-12 PROCEDURE — 99284 EMERGENCY DEPT VISIT MOD MDM: CPT

## 2023-08-12 PROCEDURE — 80048 BASIC METABOLIC PNL TOTAL CA: CPT | Performed by: EMERGENCY MEDICINE

## 2023-08-12 PROCEDURE — 85379 FIBRIN DEGRADATION QUANT: CPT | Performed by: EMERGENCY MEDICINE

## 2023-08-12 PROCEDURE — 81001 URINALYSIS AUTO W/SCOPE: CPT | Performed by: EMERGENCY MEDICINE

## 2023-08-12 RX ORDER — KETOROLAC TROMETHAMINE 15 MG/ML
15 INJECTION, SOLUTION INTRAMUSCULAR; INTRAVENOUS ONCE
Status: COMPLETED | OUTPATIENT
Start: 2023-08-12 | End: 2023-08-12

## 2023-08-12 RX ORDER — HYDROCODONE BITARTRATE AND ACETAMINOPHEN 5; 325 MG/1; MG/1
1 TABLET ORAL ONCE
Status: COMPLETED | OUTPATIENT
Start: 2023-08-12 | End: 2023-08-12

## 2023-08-12 NOTE — ED QUICK NOTES
Patient reports right mid-back pain \"worse today, I was just dealing with it\", spouse states patient has been using Advil with some relief.   No respiratory distress

## 2023-08-12 NOTE — ED INITIAL ASSESSMENT (HPI)
Patient reports R sided flank pain x 1 month. Denies urinary complaints, states pain increased one hour ago after coming home from the store.  Denies n/v/d/f.

## 2023-08-13 NOTE — ED QUICK NOTES
Patient ambulated steady gait to bathroom, reports improvement in right side back pain following medications

## 2023-08-21 ENCOUNTER — OFFICE VISIT (OUTPATIENT)
Dept: ENDOCRINOLOGY CLINIC | Facility: CLINIC | Age: 63
End: 2023-08-21

## 2023-08-21 VITALS
DIASTOLIC BLOOD PRESSURE: 76 MMHG | WEIGHT: 300 LBS | BODY MASS INDEX: 39 KG/M2 | HEART RATE: 73 BPM | SYSTOLIC BLOOD PRESSURE: 128 MMHG

## 2023-08-21 DIAGNOSIS — E11.65 UNCONTROLLED TYPE 2 DIABETES MELLITUS WITH HYPERGLYCEMIA (HCC): Primary | ICD-10-CM

## 2023-08-21 LAB
CARTRIDGE LOT#: ABNORMAL NUMERIC
GLUCOSE BLOOD: 262
HEMOGLOBIN A1C: 6.6 % (ref 4.3–5.6)
TEST STRIP LOT #: NORMAL NUMERIC

## 2023-08-21 PROCEDURE — 82947 ASSAY GLUCOSE BLOOD QUANT: CPT | Performed by: NURSE PRACTITIONER

## 2023-08-21 PROCEDURE — 3044F HG A1C LEVEL LT 7.0%: CPT | Performed by: NURSE PRACTITIONER

## 2023-08-21 PROCEDURE — 83036 HEMOGLOBIN GLYCOSYLATED A1C: CPT | Performed by: NURSE PRACTITIONER

## 2023-08-21 PROCEDURE — 99213 OFFICE O/P EST LOW 20 MIN: CPT | Performed by: NURSE PRACTITIONER

## 2023-08-21 PROCEDURE — 3078F DIAST BP <80 MM HG: CPT | Performed by: NURSE PRACTITIONER

## 2023-08-21 PROCEDURE — 3074F SYST BP LT 130 MM HG: CPT | Performed by: NURSE PRACTITIONER

## 2023-08-21 NOTE — PATIENT INSTRUCTIONS
A1C: 6.6% today -->decreased from 8.4% on 6/12/2023  Blood glucose: 262 in clinic today    Medications:   -continue with Mounjaro 7.5mg once weekly   -continue with Metformin ER 1,000mg twice daily   - continue with Glipizide 2.5mg with breakfast        5mg with dinner     - increase water intake to 60 oz of water per day   - start eating 1 apple with peanut butter as a mid-day snack while at work     Weight:  Wt Readings from Last 6 Encounters:  08/21/23 : 300 lb (136.1 kg)  08/12/23 : 300 lb (136.1 kg)  07/06/23 : 298 lb (135.2 kg)  06/29/23 : 295 lb (133.8 kg)  06/12/23 : 295 lb 6.4 oz (134 kg)  06/12/23 : 295 lb (133.8 kg)    A1C goal:  <7.0%    Blood sugar testing:  Test your blood sugar 1 time daily   Recommended times to test: Before breakfast (fasting) or 2hrs after meals     Blood sugar targets:  Before breakfast:   (preferably < 110)  Before meals OR 2 hours after meals: <180 (preferably <150)     Call for persistent blood sugars < 75 or > 200

## 2023-08-27 DIAGNOSIS — I15.2 HYPERTENSION ASSOCIATED WITH TYPE 2 DIABETES MELLITUS: ICD-10-CM

## 2023-08-27 DIAGNOSIS — Z98.890 HISTORY OF CAROTID ENDARTERECTOMY: ICD-10-CM

## 2023-08-27 DIAGNOSIS — E11.59 HYPERTENSION ASSOCIATED WITH TYPE 2 DIABETES MELLITUS: ICD-10-CM

## 2023-08-28 ENCOUNTER — OFFICE VISIT (OUTPATIENT)
Dept: INTERNAL MEDICINE CLINIC | Facility: CLINIC | Age: 63
End: 2023-08-28

## 2023-08-28 VITALS
HEIGHT: 74 IN | HEART RATE: 86 BPM | DIASTOLIC BLOOD PRESSURE: 74 MMHG | SYSTOLIC BLOOD PRESSURE: 123 MMHG | WEIGHT: 301 LBS | BODY MASS INDEX: 38.63 KG/M2

## 2023-08-28 DIAGNOSIS — E11.59 HYPERTENSION ASSOCIATED WITH TYPE 2 DIABETES MELLITUS: ICD-10-CM

## 2023-08-28 DIAGNOSIS — F40.240 CLAUSTROPHOBIA: ICD-10-CM

## 2023-08-28 DIAGNOSIS — N13.8 BPH WITH OBSTRUCTION/LOWER URINARY TRACT SYMPTOMS: ICD-10-CM

## 2023-08-28 DIAGNOSIS — M19.90 ARTHRITIS: ICD-10-CM

## 2023-08-28 DIAGNOSIS — L98.9 SKIN LESION: ICD-10-CM

## 2023-08-28 DIAGNOSIS — E11.51 TYPE 2 DIABETES MELLITUS WITH DIABETIC PERIPHERAL ANGIOPATHY WITHOUT GANGRENE, WITHOUT LONG-TERM CURRENT USE OF INSULIN (HCC): ICD-10-CM

## 2023-08-28 DIAGNOSIS — E03.9 HYPOTHYROIDISM, UNSPECIFIED TYPE: ICD-10-CM

## 2023-08-28 DIAGNOSIS — I15.2 HYPERTENSION ASSOCIATED WITH TYPE 2 DIABETES MELLITUS: ICD-10-CM

## 2023-08-28 DIAGNOSIS — N40.1 BPH WITH OBSTRUCTION/LOWER URINARY TRACT SYMPTOMS: ICD-10-CM

## 2023-08-28 DIAGNOSIS — E78.5 HYPERLIPIDEMIA ASSOCIATED WITH TYPE 2 DIABETES MELLITUS: ICD-10-CM

## 2023-08-28 DIAGNOSIS — Z98.890 HISTORY OF CAROTID ENDARTERECTOMY: ICD-10-CM

## 2023-08-28 DIAGNOSIS — R29.818 NEUROGENIC CLAUDICATION: Primary | ICD-10-CM

## 2023-08-28 DIAGNOSIS — E11.69 HYPERLIPIDEMIA ASSOCIATED WITH TYPE 2 DIABETES MELLITUS: ICD-10-CM

## 2023-08-28 DIAGNOSIS — Z95.5 S/P DRUG ELUTING CORONARY STENT PLACEMENT: ICD-10-CM

## 2023-08-28 PROCEDURE — 99214 OFFICE O/P EST MOD 30 MIN: CPT | Performed by: INTERNAL MEDICINE

## 2023-08-28 PROCEDURE — 3078F DIAST BP <80 MM HG: CPT | Performed by: INTERNAL MEDICINE

## 2023-08-28 PROCEDURE — 3074F SYST BP LT 130 MM HG: CPT | Performed by: INTERNAL MEDICINE

## 2023-08-28 PROCEDURE — 3008F BODY MASS INDEX DOCD: CPT | Performed by: INTERNAL MEDICINE

## 2023-08-28 RX ORDER — HYDROCHLOROTHIAZIDE 12.5 MG/1
12.5 TABLET ORAL DAILY
Qty: 90 TABLET | Refills: 9 | Status: SHIPPED | OUTPATIENT
Start: 2023-08-28

## 2023-08-28 RX ORDER — LEVOTHYROXINE SODIUM 175 UG/1
175 TABLET ORAL DAILY
Qty: 90 TABLET | Refills: 9 | Status: SHIPPED | OUTPATIENT
Start: 2023-08-28

## 2023-08-28 RX ORDER — ROSUVASTATIN CALCIUM 20 MG/1
20 TABLET, COATED ORAL NIGHTLY
Qty: 90 TABLET | Refills: 9 | Status: SHIPPED | OUTPATIENT
Start: 2023-08-28

## 2023-08-28 RX ORDER — ASPIRIN 81 MG/1
81 TABLET ORAL DAILY
Qty: 90 TABLET | Refills: 9 | Status: SHIPPED | OUTPATIENT
Start: 2023-08-28

## 2023-08-28 RX ORDER — TAMSULOSIN HYDROCHLORIDE 0.4 MG/1
0.4 CAPSULE ORAL DAILY
Qty: 90 CAPSULE | Refills: 9 | Status: SHIPPED | OUTPATIENT
Start: 2023-08-28

## 2023-08-28 RX ORDER — VALSARTAN 160 MG/1
160 TABLET ORAL DAILY
Qty: 90 TABLET | Refills: 9 | Status: SHIPPED | OUTPATIENT
Start: 2023-08-28

## 2023-08-28 RX ORDER — DULOXETIN HYDROCHLORIDE 30 MG/1
30 CAPSULE, DELAYED RELEASE ORAL DAILY
Qty: 90 CAPSULE | Refills: 9 | Status: SHIPPED | OUTPATIENT
Start: 2023-08-28

## 2023-08-28 RX ORDER — LORAZEPAM 0.5 MG/1
TABLET ORAL 2 TIMES DAILY PRN
Qty: 10 TABLET | Refills: 0 | Status: SHIPPED | OUTPATIENT
Start: 2023-08-28

## 2023-08-28 RX ORDER — PREGABALIN 100 MG/1
100 CAPSULE ORAL 3 TIMES DAILY
Qty: 270 CAPSULE | Refills: 9 | Status: SHIPPED | OUTPATIENT
Start: 2023-08-28

## 2023-08-28 RX ORDER — FINASTERIDE 5 MG/1
5 TABLET, FILM COATED ORAL DAILY
Qty: 90 TABLET | Refills: 9 | Status: SHIPPED | OUTPATIENT
Start: 2023-08-28

## 2023-08-30 RX ORDER — TIRZEPATIDE 7.5 MG/.5ML
7.5 INJECTION, SOLUTION SUBCUTANEOUS WEEKLY
Qty: 6 ML | Refills: 3 | Status: SHIPPED | OUTPATIENT
Start: 2023-08-30

## 2023-09-11 ENCOUNTER — TELEPHONE (OUTPATIENT)
Dept: INTERNAL MEDICINE CLINIC | Facility: CLINIC | Age: 63
End: 2023-09-11

## 2023-09-11 RX ORDER — LORAZEPAM 1 MG/1
TABLET ORAL 2 TIMES DAILY PRN
Qty: 14 TABLET | Refills: 0 | Status: SHIPPED | OUTPATIENT
Start: 2023-09-11

## 2023-09-11 RX ORDER — LORAZEPAM 1 MG/1
1 TABLET ORAL EVERY 4 HOURS PRN
Refills: 0 | Status: CANCELLED | OUTPATIENT
Start: 2023-09-11

## 2023-09-11 NOTE — TELEPHONE ENCOUNTER
LORazepam 0.5 MG Oral Tab 10 tablet 0 8/28/2023     Sig - Route: Take 1-2 tablets (0.5-1 mg total) by mouth 2 (two) times daily as needed for Anxiety (1 hour prior to MRI. ). - Oral    Sent to pharmacy as: LORazepam 0.5 MG Oral Tablet (Ativan)    E-Prescribing Status: Receipt confirmed by pharmacy (8/28/2023 11:25 AM CDT)      Rimpal/pharmacist with [facility] called states they do not have dosage above, but do have 1 mg. Sig would have to be changed accordingly. I made her aware I will convey the above to Dr. Darlene Gentile .      Dr. Darlene Gentile to review and advise/ pended Rx - please complete accordingly, if appropriate  Rx Pended, authorize if appropriate

## 2023-09-21 ENCOUNTER — TELEPHONE (OUTPATIENT)
Dept: ADMINISTRATIVE | Age: 63
End: 2023-09-21

## 2023-09-21 NOTE — TELEPHONE ENCOUNTER
MRI SPINE LUMBAR ([id number])     Referral #: [id number]     Scheduled For: 10/03/2023    Status: DENIED     The reason is,       Use Reference Case Number: [id number]    Notified clinical staff for follow-up, a copy of the denial letter is filed under the MEDIA tab, un-check \" Clinical Info Only \" to view the determination letter for denial rational and appeal process. Patient notified of adverse determination via [employer].      Appt Desk > Noted

## 2023-10-01 ENCOUNTER — APPOINTMENT (OUTPATIENT)
Dept: GENERAL RADIOLOGY | Facility: HOSPITAL | Age: 63
End: 2023-10-01
Attending: EMERGENCY MEDICINE

## 2023-10-01 ENCOUNTER — HOSPITAL ENCOUNTER (EMERGENCY)
Facility: HOSPITAL | Age: 63
Discharge: HOME | End: 2023-10-01
Attending: EMERGENCY MEDICINE

## 2023-10-01 VITALS
TEMPERATURE: 99 F | HEIGHT: 74 IN | RESPIRATION RATE: 16 BRPM | SYSTOLIC BLOOD PRESSURE: 143 MMHG | DIASTOLIC BLOOD PRESSURE: 71 MMHG | OXYGEN SATURATION: 96 % | BODY MASS INDEX: 38.5 KG/M2 | HEART RATE: 88 BPM | WEIGHT: 300 LBS

## 2023-10-01 DIAGNOSIS — R07.9 CHEST PAIN OF UNCERTAIN ETIOLOGY: Primary | ICD-10-CM

## 2023-10-01 LAB
ANION GAP SERPL CALC-SCNC: 4 MMOL/L (ref 0–18)
ATRIAL RATE: 101 BPM
BASOPHILS # BLD AUTO: 0.06 X10(3) UL (ref 0–0.2)
BASOPHILS NFR BLD AUTO: 0.6 %
BUN BLD-MCNC: 16 MG/DL (ref 7–18)
BUN/CREAT SERPL: 11.9 (ref 10–20)
CALCIUM BLD-MCNC: 9.4 MG/DL (ref 8.5–10.1)
CHLORIDE SERPL-SCNC: 108 MMOL/L (ref 98–112)
CO2 SERPL-SCNC: 31 MMOL/L (ref 21–32)
CREAT BLD-MCNC: 1.34 MG/DL
DEPRECATED RDW RBC AUTO: 48.6 FL (ref 35.1–46.3)
EGFRCR SERPLBLD CKD-EPI 2021: 60 ML/MIN/1.73M2 (ref 60–?)
EOSINOPHIL # BLD AUTO: 0.16 X10(3) UL (ref 0–0.7)
EOSINOPHIL NFR BLD AUTO: 1.7 %
ERYTHROCYTE [DISTWIDTH] IN BLOOD BY AUTOMATED COUNT: 14.3 % (ref 11–15)
FLUAV + FLUBV RNA SPEC NAA+PROBE: NEGATIVE
FLUAV + FLUBV RNA SPEC NAA+PROBE: NEGATIVE
GLUCOSE BLD-MCNC: 168 MG/DL (ref 70–99)
HCT VFR BLD AUTO: 39.2 %
HGB BLD-MCNC: 12.5 G/DL
IMM GRANULOCYTES # BLD AUTO: 0.02 X10(3) UL (ref 0–1)
IMM GRANULOCYTES NFR BLD: 0.2 %
LYMPHOCYTES # BLD AUTO: 1.33 X10(3) UL (ref 1–4)
LYMPHOCYTES NFR BLD AUTO: 14.3 %
MCH RBC QN AUTO: 29.3 PG (ref 26–34)
MCHC RBC AUTO-ENTMCNC: 31.9 G/DL (ref 31–37)
MCV RBC AUTO: 91.8 FL
MONOCYTES # BLD AUTO: 1.07 X10(3) UL (ref 0.1–1)
MONOCYTES NFR BLD AUTO: 11.5 %
NEUTROPHILS # BLD AUTO: 6.63 X10 (3) UL (ref 1.5–7.7)
NEUTROPHILS # BLD AUTO: 6.63 X10(3) UL (ref 1.5–7.7)
NEUTROPHILS NFR BLD AUTO: 71.7 %
OSMOLALITY SERPL CALC.SUM OF ELEC: 301 MOSM/KG (ref 275–295)
P AXIS: 32 DEGREES
P-R INTERVAL: 176 MS
PLATELET # BLD AUTO: 213 10(3)UL (ref 150–450)
POTASSIUM SERPL-SCNC: 4.1 MMOL/L (ref 3.5–5.1)
Q-T INTERVAL: 338 MS
QRS DURATION: 96 MS
QTC CALCULATION (BEZET): 438 MS
R AXIS: 3 DEGREES
RBC # BLD AUTO: 4.27 X10(6)UL
RSV RNA SPEC NAA+PROBE: NEGATIVE
SARS-COV-2 RNA RESP QL NAA+PROBE: NOT DETECTED
SODIUM SERPL-SCNC: 143 MMOL/L (ref 136–145)
T AXIS: 23 DEGREES
TROPONIN I HIGH SENSITIVITY: 24 NG/L
VENTRICULAR RATE: 101 BPM
WBC # BLD AUTO: 9.3 X10(3) UL (ref 4–11)

## 2023-10-01 PROCEDURE — 36415 COLL VENOUS BLD VENIPUNCTURE: CPT

## 2023-10-01 PROCEDURE — 80048 BASIC METABOLIC PNL TOTAL CA: CPT | Performed by: EMERGENCY MEDICINE

## 2023-10-01 PROCEDURE — 93005 ELECTROCARDIOGRAM TRACING: CPT

## 2023-10-01 PROCEDURE — 99284 EMERGENCY DEPT VISIT MOD MDM: CPT

## 2023-10-01 PROCEDURE — 85025 COMPLETE CBC W/AUTO DIFF WBC: CPT | Performed by: EMERGENCY MEDICINE

## 2023-10-01 PROCEDURE — 84484 ASSAY OF TROPONIN QUANT: CPT | Performed by: EMERGENCY MEDICINE

## 2023-10-01 PROCEDURE — 93010 ELECTROCARDIOGRAM REPORT: CPT

## 2023-10-01 PROCEDURE — 71045 X-RAY EXAM CHEST 1 VIEW: CPT | Performed by: EMERGENCY MEDICINE

## 2023-10-01 PROCEDURE — 0241U SARS-COV-2/FLU A AND B/RSV BY PCR (GENEXPERT): CPT | Performed by: EMERGENCY MEDICINE

## 2023-10-01 RX ORDER — ACETAMINOPHEN 500 MG
1000 TABLET ORAL ONCE
Status: COMPLETED | OUTPATIENT
Start: 2023-10-01 | End: 2023-10-01

## 2023-10-01 RX ORDER — ASPIRIN 81 MG/1
324 TABLET, CHEWABLE ORAL ONCE
Status: COMPLETED | OUTPATIENT
Start: 2023-10-01 | End: 2023-10-01

## 2023-10-01 NOTE — ED INITIAL ASSESSMENT (HPI)
Patient presents to ER with complaints of chest and back pain x a few days and notes new cough/congestion.

## 2023-10-17 ENCOUNTER — OFFICE VISIT (OUTPATIENT)
Dept: PODIATRY CLINIC | Facility: CLINIC | Age: 63
End: 2023-10-17

## 2023-10-17 ENCOUNTER — TELEPHONE (OUTPATIENT)
Dept: PEDIATRICS CLINIC | Facility: CLINIC | Age: 63
End: 2023-10-17

## 2023-10-17 DIAGNOSIS — E11.42 TYPE 2 DIABETES MELLITUS WITH POLYNEUROPATHY (HCC): Primary | ICD-10-CM

## 2023-10-17 DIAGNOSIS — M20.41 HAMMERTOE, BILATERAL: ICD-10-CM

## 2023-10-17 DIAGNOSIS — B35.1 ONYCHOMYCOSIS: ICD-10-CM

## 2023-10-17 DIAGNOSIS — L84 FOOT CALLUS: ICD-10-CM

## 2023-10-17 DIAGNOSIS — M20.42 HAMMERTOE, BILATERAL: ICD-10-CM

## 2023-10-17 PROCEDURE — 99213 OFFICE O/P EST LOW 20 MIN: CPT | Performed by: PODIATRIST

## 2023-10-17 NOTE — TELEPHONE ENCOUNTER
Patient concerned that he is a diabetic and his toes are turning black/blue. Please call at [phone],thanks.

## 2023-10-17 NOTE — TELEPHONE ENCOUNTER
S/w patient- states that he noticed discoloration in 4 toes on his right foot. States that they have turned blue on his right foot. Denies trauma to foot or any open ulcer. Patient was seen on 7/14/23 for DFC. States that he feels that sensation is the same in the foot as it was yesterday, it is just the discoloration that he is concerned about. Patient is due for DFC and there was cancellation for this afternoon at 2:45. He accepted that appointment and will come to office this afternoon.     BONNY

## 2023-10-17 NOTE — PROGRESS NOTES
[facility] Podiatry  Progress Note    Neri Chavarria is a 66year old male. Patient presents with:  Diabetic Foot Care: 3 mo f/u - last [id number]=1.6 from 8/21/23 - on the R foot he has 4 black toes - dose not recall injury - has some pain on the top of the foot rated  6/10 with the shoes on - he states he noticed it today and he came in - this AM his [id number]        HPI:     This is a pleasant poorly controlled Diabetic male with PMH of Left venous stasis ulcer and B/L LE venous ablation. He is taking lyrica for the diabetic neuropathy which is not really helping. He presents to clinic for [facility]. He complains of long thick toenails and calluses which he is unable to trim himself. Allergies: Empagliflozin, Penicillins, and Other   Current Outpatient Medications   Medication Sig Dispense Refill    LORazepam 1 MG Oral Tab Take 0.5-1 tablets (0.5-1 mg total) by mouth 2 (two) times daily as needed for Anxiety. 14 tablet 0    Tirzepatide (MOUNJARO) 7.5 MG/0.5ML Subcutaneous Solution Pen-injector Inject 7.5 mg into the skin once a week. 6 mL 3    LORazepam 0.5 MG Oral Tab Take 1-2 tablets (0.5-1 mg total) by mouth 2 (two) times daily as needed for Anxiety (1 hour prior to MRI. ). 10 tablet 0    aspirin (ASPIRIN 81) 81 MG Oral Tab EC Take 1 tablet (81 mg total) by mouth daily. FOR HEART. 90 tablet 9    metoprolol tartrate 25 MG Oral Tab Take 1 tablet (25 mg total) by mouth 2 (two) times daily. FOR HEART. 90 tablet 9    valsartan 160 MG Oral Tab Take 1 tablet (160 mg total) by mouth daily. FOR BLOOD PRESSURE. 90 tablet 9    hydroCHLOROthiazide 12.5 MG Oral Tab Take 1 tablet (12.5 mg total) by mouth daily. FOR BLOOD PRESSURE. 90 tablet 9    rosuvastatin 20 MG Oral Tab Take 1 tablet (20 mg total) by mouth nightly. FOR CHOLESTEROL. 90 tablet 9    DULoxetine 30 MG Oral Cap DR Particles Take 1 capsule (30 mg total) by mouth daily.  TAKE 1 CAPSULE IN THE MORNING FOR ANXIETY/DEPRESSION/ARTHRITIS PAIN 90 capsule 9 levothyroxine 175 MCG Oral Tab Take 1 tablet (175 mcg total) by mouth daily. FOR THYROID. 90 tablet 9    finasteride 5 MG Oral Tab Take 1 tablet (5 mg total) by mouth daily. FOR PROSTATE. 90 tablet 9    tamsulosin 0.4 MG Oral Cap Take 1 capsule (0.4 mg total) by mouth daily. FOR PROSTATE. 90 capsule 9    ticagrelor 90 MG Oral Tab Take 1 tablet (90 mg total) by mouth every 12 (twelve) hours. FOR HEART STENTS. 180 tablet 9    pregabalin 100 MG Oral Cap Take 1 capsule (100 mg total) by mouth 3 (three) times daily. FOR NERVE PAIN. 270 capsule 9    mupirocin 2 % External Ointment Apply 1 Application topically 3 (three) times daily. 15 g 1    metFORMIN HCl 1000 MG Oral Tab Take 1 tablet (1,000 mg total) by mouth 2 (two) times daily with meals. 180 tablet 3    magnesium oxide 400 MG Oral Tab Take 1 tablet (400 mg total) by mouth at bedtime. traMADol 50 MG Oral Tab Take 1 tablet (50 mg total) by mouth every 6 (six) hours as needed for Pain. 10 tablet 0      Past Medical History:   Diagnosis Date    Benign head tremor     BPH (benign prostatic hyperplasia)     Diabetes ([address]     Diverticulitis 10/2019    Former smoker     Hyperlipidemia     Hypertension     Neuropathic pain     Non-pressure chronic ulcer of right lower leg, limited to breakdown of skin ([address] 12/7/2021    Obesity     Snoring     Thyroid disease       Past Surgical History:   Procedure Laterality Date    CAROTID ENDARTERECTOMY Right 04/29/2021    Surgeon: Dr. Shilo Menjivar at [facility]    NECK/CHEST [address]      per patient, a blockage was removed from the right side of his neck in 8/2021    OTHER SURGICAL HISTORY  2017    Small bowel Res.     OTHER SURGICAL HISTORY  11/12/2019    Colectomy      Family History   Problem Relation Age of Onset    Cancer Father         Prostate    Heart Disorder Father     Cancer Mother       Social History    Socioeconomic History      Marital status:       Number of children: 4    Tobacco Use      Smoking status: Former        Types: Cigarettes        Quit date:         Years since quittin.8        Passive exposure: Past      Smokeless tobacco: Never    Vaping Use      Vaping Use: Never used    Substance and Sexual Activity      Alcohol use: Never      Drug use: Never    Other Topics      Concerns:        Caffeine Concern: Yes          coffee daily        Exercise: No          REVIEW OF SYSTEMS:   Denies nausea, fever, chills  No calf pain  No other muscle or joint aches  Denies chest pain or shortness of breath. EXAM:   There were no vitals taken for this visit. Constitutional:   Patient in no apparent distress. Well kept. Of normal body habitus. Alert and oriented to person, place, and time. Vascular Examination:  DP pulse is 2/4  PT pulse is 2/4  Capillary refill is immediate  Edema is present bilateral  Severe varicosities present bilateral  Temperature warm proximally to warm distally bilateral  Hemosiderin deposits Present bilateral  Integumentary Examination:   The patient's nails appear incurvated, thickened, elongated, dystrophic, discolored with subungual debris 1-5 right, 1-5  left nails. Left distal 2nd and 3rd toe callus    Digital hair growth is present left and is present right. Neurological Examination:  Monofilament (10-g) sensation is 2/5 to right and 2/5 to left. Parasthesias present  Musculoskeletal Examination:  Muscle Strength is 5/5.   Hammer digit deformity present digits 2-5  bilateral.        LABS & IMAGING:     Lab Results   Component Value Date     (H) 10/01/2023    BUN 16 10/01/2023    CREATSERUM 1.34 (H) 10/01/2023    BUNCREA 11.9 10/01/2023    ANIONGAP 4 10/01/2023    GFRAA 107 2022    GFRNAA 93 2022    CA 9.4 10/01/2023     10/01/2023    K 4.1 10/01/2023     10/01/2023    CO2 31.0 10/01/2023    OSMOCALC 301 (H) 10/01/2023        Lab Results   Component Value Date     (H) 2022    A1C 6.6 (A) 2023        No results found.     ASSESSMENT AND PLAN:   Diagnoses and all orders for this visit:    Type 2 diabetes mellitus with polyneuropathy [address] [address] [facility]    Foot callus    Onychomycosis    Hammertoe, bilateral            Plan:     Diabetic education and instructions have been provided. We reviewed and discussed the following:    -risk categories related to pts with diabetes and foot or lower extremity complications per ADA. -adherence to medication regimen and close monitoring or blood sugar control.   -daily monitoring/inspection of feet and shoes.   -proper management of diet and weight   -regular follow up with PCP and specialty providers as recommended   -Lower extremity complications related to DM were reviewed and stressed prevention. Evaluated the patient. Discussed treatment options with the patient. Discussed with patient proper care and hygiene for their feet. Patient tolerated procedures well, without incident. Instrumentation used includes nail nippers and electric  where appropriate. Procedure: (49676 Debridement of toenails 6-10) Surgically debrided and mechanically reduced 6 or more toenails. Hemmorhage occurred none. Nails that were debrided appeared dystrophic and caused the patient pain in shoe gear. Nails 5 Left & 5 Right. Evaluated patient. Discussed treatment options with patient. Discussed proper hygiene and care for feet as well as use of emollient creams (ie Urea based creams)  Answered all patient questions. Discussed offloading hyperkeratotic lesions with proper shoe gear, offloading pads, and insoles. Procedures: (CPT 18 Paring or cutting of benign hyperkeratotic lesion 2-4)  2 lesions pared utilizing #15 blade to Left foot without incident. Pt refuses to wear compression stockings  Pt did have B/L GSV ablation by Dr. Colleen Menjivar in March of 2022. Pt cont wearing DM shoes with inserts. RTC  3 months for [facility]    No follow-ups on file.     Job Frances DPM  10/17/23

## 2023-10-18 DIAGNOSIS — E11.51 TYPE 2 DIABETES MELLITUS WITH DIABETIC PERIPHERAL ANGIOPATHY WITHOUT GANGRENE, WITHOUT LONG-TERM CURRENT USE OF INSULIN (HCC): Primary | ICD-10-CM

## 2023-10-23 RX ORDER — GLIPIZIDE 5 MG/1
TABLET ORAL
Qty: 135 TABLET | Refills: 0 | Status: SHIPPED | OUTPATIENT
Start: 2023-10-23

## 2023-11-01 ENCOUNTER — TELEPHONE (OUTPATIENT)
Dept: PHYSICAL MEDICINE AND REHAB | Facility: CLINIC | Age: 63
End: 2023-11-01

## 2023-11-01 NOTE — TELEPHONE ENCOUNTER
Completed & signed by Dr. Tyronne Spatz. Faxed to [address] [facility].    Placed into scanning

## 2023-12-06 ENCOUNTER — TELEPHONE (OUTPATIENT)
Dept: PHYSICAL MEDICINE AND REHAB | Facility: CLINIC | Age: 63
End: 2023-12-06

## 2023-12-06 NOTE — TELEPHONE ENCOUNTER
Contacted Eligio at [employer] who states Orthovisc series of 3 was Approved valid 10/22/23-23 Case #[id number]  [facility] Prim states [facility] is awaiting patient consent and copay before they can deliver Orthovisc    Sent mychart [facility] and [facility] and copay details    Patient last had right knee Orthovisc 23, 23, 23  Next series due to start around 23    Since valid dates will have , new authorization needed to be initiated since [facility] do not provide extensions    Initiated new authorization for RIGHT knee Orthovisc series of 3 and CSI injection under ultrasound guidance CPT/HCPCS 20611x3, [id number], M1322r2 with [facility] at [address] [place] [address]  valid 23-1/10/2024  Case #[id number]      Awaiting medication delivery from [facility]

## 2023-12-07 ENCOUNTER — TELEPHONE (OUTPATIENT)
Dept: PHYSICAL MEDICINE AND REHAB | Facility: CLINIC | Age: 63
End: 2023-12-07

## 2023-12-20 NOTE — TELEPHONE ENCOUNTER
Kofi Osborne at [address] to check status of Orthovisc delivery  Sarah Walker states the patient still has not provided consent for delivery. Attempt was made to contact patient again by [facility] and myself, no answer, left voicemail.

## 2023-12-27 ENCOUNTER — TELEPHONE (OUTPATIENT)
Dept: ENDOCRINOLOGY CLINIC | Facility: CLINIC | Age: 63
End: 2023-12-27

## 2023-12-27 ENCOUNTER — OFFICE VISIT (OUTPATIENT)
Facility: LOCATION | Age: 63
End: 2023-12-27

## 2023-12-27 VITALS
BODY MASS INDEX: 39.4 KG/M2 | DIASTOLIC BLOOD PRESSURE: 63 MMHG | HEIGHT: 74 IN | WEIGHT: 307 LBS | HEART RATE: 72 BPM | SYSTOLIC BLOOD PRESSURE: 88 MMHG | OXYGEN SATURATION: 97 %

## 2023-12-27 DIAGNOSIS — Z98.890 HISTORY OF CAROTID ENDARTERECTOMY: ICD-10-CM

## 2023-12-27 DIAGNOSIS — I95.2 HYPOTENSION DUE TO DRUGS: ICD-10-CM

## 2023-12-27 DIAGNOSIS — E11.59 HYPERTENSION ASSOCIATED WITH TYPE 2 DIABETES MELLITUS  (HCC): ICD-10-CM

## 2023-12-27 DIAGNOSIS — M19.90 ARTHRITIS: ICD-10-CM

## 2023-12-27 DIAGNOSIS — E11.51 TYPE 2 DIABETES MELLITUS WITH DIABETIC PERIPHERAL ANGIOPATHY WITHOUT GANGRENE, WITHOUT LONG-TERM CURRENT USE OF INSULIN (HCC): ICD-10-CM

## 2023-12-27 DIAGNOSIS — Z23 ENCOUNTER FOR IMMUNIZATION: ICD-10-CM

## 2023-12-27 DIAGNOSIS — I15.2 HYPERTENSION ASSOCIATED WITH TYPE 2 DIABETES MELLITUS  (HCC): ICD-10-CM

## 2023-12-27 DIAGNOSIS — Z95.5 S/P DRUG ELUTING CORONARY STENT PLACEMENT: ICD-10-CM

## 2023-12-27 DIAGNOSIS — N13.8 BPH WITH OBSTRUCTION/LOWER URINARY TRACT SYMPTOMS: ICD-10-CM

## 2023-12-27 DIAGNOSIS — E03.9 HYPOTHYROIDISM, UNSPECIFIED TYPE: ICD-10-CM

## 2023-12-27 DIAGNOSIS — E78.5 HYPERLIPIDEMIA ASSOCIATED WITH TYPE 2 DIABETES MELLITUS  (HCC): ICD-10-CM

## 2023-12-27 DIAGNOSIS — Z12.5 ENCOUNTER FOR SCREENING FOR MALIGNANT NEOPLASM OF PROSTATE: ICD-10-CM

## 2023-12-27 DIAGNOSIS — Z00.00 ANNUAL PHYSICAL EXAM: Primary | ICD-10-CM

## 2023-12-27 DIAGNOSIS — E11.69 HYPERLIPIDEMIA ASSOCIATED WITH TYPE 2 DIABETES MELLITUS  (HCC): ICD-10-CM

## 2023-12-27 DIAGNOSIS — R29.818 NEUROGENIC CLAUDICATION: ICD-10-CM

## 2023-12-27 DIAGNOSIS — N40.1 BPH WITH OBSTRUCTION/LOWER URINARY TRACT SYMPTOMS: ICD-10-CM

## 2023-12-27 DIAGNOSIS — E55.9 VITAMIN D DEFICIENCY: ICD-10-CM

## 2023-12-27 PROCEDURE — 3078F DIAST BP <80 MM HG: CPT | Performed by: INTERNAL MEDICINE

## 2023-12-27 PROCEDURE — 3008F BODY MASS INDEX DOCD: CPT | Performed by: INTERNAL MEDICINE

## 2023-12-27 PROCEDURE — 99213 OFFICE O/P EST LOW 20 MIN: CPT | Performed by: INTERNAL MEDICINE

## 2023-12-27 PROCEDURE — 3074F SYST BP LT 130 MM HG: CPT | Performed by: INTERNAL MEDICINE

## 2023-12-27 PROCEDURE — 99396 PREV VISIT EST AGE 40-64: CPT | Performed by: INTERNAL MEDICINE

## 2023-12-27 RX ORDER — LEVOTHYROXINE SODIUM 175 UG/1
175 TABLET ORAL DAILY
Qty: 90 TABLET | Refills: 9 | Status: SHIPPED | OUTPATIENT
Start: 2023-12-27

## 2023-12-27 RX ORDER — DULOXETIN HYDROCHLORIDE 30 MG/1
30 CAPSULE, DELAYED RELEASE ORAL DAILY
Qty: 90 CAPSULE | Refills: 9 | Status: SHIPPED | OUTPATIENT
Start: 2023-12-27

## 2023-12-27 RX ORDER — TIRZEPATIDE 10 MG/.5ML
10 INJECTION, SOLUTION SUBCUTANEOUS WEEKLY
Qty: 3 ML | Refills: 1 | Status: SHIPPED | OUTPATIENT
Start: 2023-12-27 | End: 2023-12-28

## 2023-12-27 RX ORDER — ROSUVASTATIN CALCIUM 20 MG/1
20 TABLET, COATED ORAL NIGHTLY
Qty: 90 TABLET | Refills: 9 | Status: SHIPPED | OUTPATIENT
Start: 2023-12-27

## 2023-12-27 RX ORDER — HYDROCHLOROTHIAZIDE 12.5 MG/1
12.5 TABLET ORAL DAILY
Qty: 90 TABLET | Refills: 9 | Status: SHIPPED | OUTPATIENT
Start: 2023-12-27 | End: 2023-12-27

## 2023-12-27 RX ORDER — VALSARTAN 160 MG/1
160 TABLET ORAL DAILY
Qty: 90 TABLET | Refills: 9 | Status: SHIPPED | OUTPATIENT
Start: 2023-12-27

## 2023-12-27 RX ORDER — ASPIRIN 81 MG/1
81 TABLET ORAL DAILY
Qty: 90 TABLET | Refills: 9 | Status: SHIPPED | OUTPATIENT
Start: 2023-12-27

## 2023-12-27 RX ORDER — TAMSULOSIN HYDROCHLORIDE 0.4 MG/1
0.4 CAPSULE ORAL DAILY
Qty: 90 CAPSULE | Refills: 9 | Status: SHIPPED | OUTPATIENT
Start: 2023-12-27

## 2023-12-27 RX ORDER — FINASTERIDE 5 MG/1
5 TABLET, FILM COATED ORAL DAILY
Qty: 90 TABLET | Refills: 9 | Status: SHIPPED | OUTPATIENT
Start: 2023-12-27

## 2023-12-27 RX ORDER — PREGABALIN 100 MG/1
100 CAPSULE ORAL 3 TIMES DAILY
Qty: 270 CAPSULE | Refills: 9 | Status: SHIPPED | OUTPATIENT
Start: 2023-12-27

## 2023-12-27 RX ORDER — TIRZEPATIDE 7.5 MG/.5ML
7.5 INJECTION, SOLUTION SUBCUTANEOUS
Qty: 6 ML | Refills: 0 | Status: CANCELLED | OUTPATIENT
Start: 2023-12-27

## 2023-12-27 NOTE — TELEPHONE ENCOUNTER
Received call from patient stating he spoke with Neha Sim who states that pharmacy said the medication will be delivered on 12/29/2023. Pt informed that once medication has been received, we will call to schedule. Pt wanted this RN to make a note that patient will be leaving for the [place] in a couple of weeks and will be gone for 2 weeks. Informed pt that this RN will make this note but cannot guarantee any sooner appointments; also informed pt that this injection will need 3 weeks to complete. Pt verbalized understanding.

## 2023-12-27 NOTE — TELEPHONE ENCOUNTER
Pt asking for refill for Mounjaro 7.5 to be sent to mail order pharmacy - please call when sent - he has f/u appt on 4/3

## 2023-12-27 NOTE — TELEPHONE ENCOUNTER
LOV: 8/21/23    Future Appointments   Date Time Provider Jose R Larkini   4/3/2024 10:15 AM RAHEEL Baer     Mounjaro 7.5 mg dose isn't on med list anymore, but the 10 mg dose is from PCP. RN called patient and states he didn't know that the dosage was changed. Per patient, he was on 15 mg weekly before until there was a shortage. Pt states he is doing fine with the 7,5 mg weekly dose and when he visited APRN in August, it was decided that he will stay on the 7.5 mg dose because of his improved A1C. RN pended mounjaro 7.5 mg dose. Berlin Allen - please route back to RN pool once RX is sent as he is asking for a call back (NO MYCHART). He only has 2 pens left and wants to make sure it gets sent soon. ENDO RN: per patient, if he does not answer, please leave a detailed message once RX was sent to [facility]. Thanks.

## 2023-12-27 NOTE — TELEPHONE ENCOUNTER
[id number] Linda Matamoros noted. Due to elevated BMI, I agree with Dr. Kush Pedro, we can go ahead and increase Mounjaro dose to 10mg weekly. Since he is also taking Metformin ER 1,000mg twice daily and Glipizide 2.5mg with breakfast and 5mg with dinner     Please review current BG readings to be able to make dose adjustments to allow for Mounjaro 10mg dose. Also confirm if he needs new rx sent to his pharmacy, given that Dr. Kush Pedro has already sent an rx. Thank you!

## 2023-12-27 NOTE — TELEPHONE ENCOUNTER
Patient called to check status of medication arrival.  Patient states he gave his consent to Sam Ascencio at [address] a couple of weeks ago. This RN advised that based on our notes, our referral specialist called Sam Ascencio on 12/20/23 and they stated that they were still waiting on consent from patient. This RN recommended the patient call Sam Ascencio at [address] (provided patient with phone number) to confirm if they still required anything from him.

## 2023-12-28 RX ORDER — TIRZEPATIDE 10 MG/.5ML
10 INJECTION, SOLUTION SUBCUTANEOUS WEEKLY
Qty: 2 ML | Refills: 0 | Status: SHIPPED | OUTPATIENT
Start: 2023-12-28

## 2023-12-28 NOTE — TELEPHONE ENCOUNTER
Lolis Laughter to patient to relay message below - patient stated understanding to increase mounjaro to 10mg weekly - RX sent to [facility] (RX from Dr. Kush Pedro was printed, not sent - patient stated he did not receive copy of RX)  Patient stated fasting BG today and yesterday was 120 - he was not at home to access meter for additional readings - he stated usually 2hrs after lunch BG is around 130-140 and the highest it's been in last week was 240 but wasn't sure what time of day that was.   Patient confirms:  MTF ER 1000mg BID  Glipizide 2.5mg with breakfast and 5mg with dinner    Please advise -thanks

## 2023-12-28 NOTE — TELEPHONE ENCOUNTER
[id number] Linda Matamoros noted. Rx for mounjaro 10mg weekly dose was sent to [facility] pharmacy. Once patient starts Mounjaro 10mg weekly dose, he should stop glipizide in AM and continue taking 5mg with dinner. Thank you!

## 2023-12-29 ENCOUNTER — TELEPHONE (OUTPATIENT)
Dept: PHYSICAL MEDICINE AND REHAB | Facility: CLINIC | Age: 63
End: 2023-12-29

## 2023-12-29 NOTE — TELEPHONE ENCOUNTER
3 Boxes of 2 ml syringes of Orthovisc delivered from [facility] - placed in medication Room- Please call to schedule when next injection is due

## 2023-12-29 NOTE — TELEPHONE ENCOUNTER
Contacted pt, and informed him of the message below from Herson Rodgesr:     Rx for mounjaro 10mg weekly dose was sent to [facility] pharmacy. Once patient starts Mounjaro 10mg weekly dose, he should stop glipizide in AM and continue taking 5mg with dinner. Pt stated understanding, and had no additional questions or concerns at this time. Pt was encouraged to call back with any questions or concerns re: these instructions.

## 2024-01-02 ENCOUNTER — TELEPHONE (OUTPATIENT)
Dept: INTERNAL MEDICINE CLINIC | Facility: CLINIC | Age: 64
End: 2024-01-02

## 2024-01-02 NOTE — TELEPHONE ENCOUNTER
Patient calling ( identified name and  ) patient is calling ,     1.need letter stating Dr. Wallace is his PCP and cares for him , writes the RX and a list of his medication    Letter pended for your approval     Leaving for the [facility] on       ( Patient will come to the office to  the letter)     2. Also Pregabalin is out of stock at [facility]     Asking if he can take Gabapentin in the mean times   ( needs 30 day supply )_   [id number] - [place], [place] - [address] [place] [address] AT [facility], [phone], [phone] [[id number]]       Please advise and thank you.    Please reply to pool: EM RN TRIAGE        Best call back number: Mando  at [phone]

## 2024-01-03 RX ORDER — GABAPENTIN 300 MG/1
300 CAPSULE ORAL 3 TIMES DAILY
Qty: 90 CAPSULE | Refills: 0 | Status: SHIPPED | OUTPATIENT
Start: 2024-01-03 | End: 2024-02-02

## 2024-01-03 NOTE — TELEPHONE ENCOUNTER
Routed back for clarification.  Dr. Alcantara, patient stated he would need rx for gabapentin of ok to take in interim.  Unsure if dose needs to be adjusted, please advise on rx.  Also, please advise if ok to provide patient the letter he requested.  Pended letter viewable under letters tab.

## 2024-01-03 NOTE — TELEPHONE ENCOUNTER
Gabapentin 300mg po TID PRN prescribed and letter should be ok. Please relay to patient, thank you very much

## 2024-01-03 NOTE — TELEPHONE ENCOUNTER
Per SANDOR, detailed message left of identified voice mail and mychart message sent.  Encouraged to call with questions or concerns.

## 2024-01-04 DIAGNOSIS — E11.51 TYPE 2 DIABETES MELLITUS WITH DIABETIC PERIPHERAL ANGIOPATHY WITHOUT GANGRENE, WITHOUT LONG-TERM CURRENT USE OF INSULIN (HCC): ICD-10-CM

## 2024-01-04 RX ORDER — TIRZEPATIDE 10 MG/.5ML
10 INJECTION, SOLUTION SUBCUTANEOUS WEEKLY
Qty: 2 ML | Refills: 0 | Status: SHIPPED | OUTPATIENT
Start: 2024-01-04 | End: 2024-01-04

## 2024-01-04 NOTE — TELEPHONE ENCOUNTER
Spoke with patient - states will be going to the [facility], at this time unable to determine return date.     Will reach out to office once ready to schedule.

## 2024-01-04 NOTE — TELEPHONE ENCOUNTER
Patient is unable to get Mounjaro from mail order pharmacy prior to leaving the country this weekend. Called mail order and he will receive 1/16/24    Patient calling around to a few pharmacies locally and no one seems to have the 10mg dose.     Patient is asking if he can stay on 7.5mg Monjaro for now, [facility] in [place] has this in stock and will hold it for the patient.     Pended if agreeable.     They have one box, has 4 doses in it.     Please advise

## 2024-01-05 RX ORDER — TIRZEPATIDE 7.5 MG/.5ML
7.5 INJECTION, SOLUTION SUBCUTANEOUS WEEKLY
Qty: 6 ML | Refills: 0 | Status: SHIPPED | OUTPATIENT
Start: 2024-01-05

## 2024-01-29 DIAGNOSIS — E11.51 TYPE 2 DIABETES MELLITUS WITH DIABETIC PERIPHERAL ANGIOPATHY WITHOUT GANGRENE, WITHOUT LONG-TERM CURRENT USE OF INSULIN (HCC): ICD-10-CM

## 2024-01-29 RX ORDER — TIRZEPATIDE 10 MG/.5ML
10 INJECTION, SOLUTION SUBCUTANEOUS WEEKLY
Qty: 6 ML | Refills: 0 | Status: SHIPPED | OUTPATIENT
Start: 2024-01-29

## 2024-02-05 DIAGNOSIS — E11.51 TYPE 2 DIABETES MELLITUS WITH DIABETIC PERIPHERAL ANGIOPATHY WITHOUT GANGRENE, WITHOUT LONG-TERM CURRENT USE OF INSULIN (HCC): ICD-10-CM

## 2024-02-06 ENCOUNTER — OFFICE VISIT (OUTPATIENT)
Facility: LOCATION | Age: 64
End: 2024-02-06

## 2024-02-06 VITALS
BODY MASS INDEX: 38.63 KG/M2 | SYSTOLIC BLOOD PRESSURE: 128 MMHG | DIASTOLIC BLOOD PRESSURE: 64 MMHG | WEIGHT: 301 LBS | OXYGEN SATURATION: 96 % | HEART RATE: 72 BPM | HEIGHT: 74 IN

## 2024-02-06 DIAGNOSIS — N40.1 BPH WITH OBSTRUCTION/LOWER URINARY TRACT SYMPTOMS: ICD-10-CM

## 2024-02-06 DIAGNOSIS — E03.9 HYPOTHYROIDISM, UNSPECIFIED TYPE: ICD-10-CM

## 2024-02-06 DIAGNOSIS — Z98.890 HISTORY OF CAROTID ENDARTERECTOMY: ICD-10-CM

## 2024-02-06 DIAGNOSIS — N13.8 BPH WITH OBSTRUCTION/LOWER URINARY TRACT SYMPTOMS: ICD-10-CM

## 2024-02-06 DIAGNOSIS — E11.65 HYPERGLYCEMIA DUE TO DIABETES MELLITUS (HCC): ICD-10-CM

## 2024-02-06 DIAGNOSIS — E11.51 TYPE 2 DIABETES MELLITUS WITH DIABETIC PERIPHERAL ANGIOPATHY WITHOUT GANGRENE, WITHOUT LONG-TERM CURRENT USE OF INSULIN (HCC): ICD-10-CM

## 2024-02-06 DIAGNOSIS — E11.65 UNCONTROLLED TYPE 2 DIABETES MELLITUS WITH HYPERGLYCEMIA (HCC): ICD-10-CM

## 2024-02-06 DIAGNOSIS — E11.59 HYPERTENSION ASSOCIATED WITH TYPE 2 DIABETES MELLITUS  (HCC): ICD-10-CM

## 2024-02-06 DIAGNOSIS — R73.09 HIGH GLUCOSE LEVEL: ICD-10-CM

## 2024-02-06 DIAGNOSIS — E11.51 TYPE 2 DIABETES MELLITUS WITH DIABETIC PERIPHERAL ANGIOPATHY WITHOUT GANGRENE, WITHOUT LONG-TERM CURRENT USE OF INSULIN (HCC): Primary | ICD-10-CM

## 2024-02-06 DIAGNOSIS — Z95.5 S/P DRUG ELUTING CORONARY STENT PLACEMENT: ICD-10-CM

## 2024-02-06 DIAGNOSIS — I10 PRIMARY HYPERTENSION: ICD-10-CM

## 2024-02-06 DIAGNOSIS — G45.9 TRANSIENT ISCHEMIC ATTACK (TIA): ICD-10-CM

## 2024-02-06 DIAGNOSIS — R79.89 LOW VITAMIN D LEVEL: ICD-10-CM

## 2024-02-06 DIAGNOSIS — E78.5 HYPERLIPIDEMIA ASSOCIATED WITH TYPE 2 DIABETES MELLITUS  (HCC): ICD-10-CM

## 2024-02-06 DIAGNOSIS — I15.2 HYPERTENSION ASSOCIATED WITH TYPE 2 DIABETES MELLITUS  (HCC): ICD-10-CM

## 2024-02-06 DIAGNOSIS — E11.69 HYPERLIPIDEMIA ASSOCIATED WITH TYPE 2 DIABETES MELLITUS  (HCC): ICD-10-CM

## 2024-02-06 DIAGNOSIS — I65.22 LEFT CAROTID ARTERY STENOSIS: ICD-10-CM

## 2024-02-06 LAB
CARTRIDGE EXPIRATION DATE: ABNORMAL DATE
CARTRIDGE LOT#: ABNORMAL NUMERIC
GLUCOSE BLOOD: 188
HEMOGLOBIN A1C: 6.8 % (ref 4.3–5.6)
TEST STRIP EXPIRATION DATE: NORMAL DATE
TEST STRIP LOT #: NORMAL NUMERIC

## 2024-02-06 RX ORDER — TAMSULOSIN HYDROCHLORIDE 0.4 MG/1
0.8 CAPSULE ORAL DAILY
Qty: 180 CAPSULE | Refills: 9 | Status: SHIPPED | OUTPATIENT
Start: 2024-02-06

## 2024-02-06 RX ORDER — BLOOD-GLUCOSE SENSOR
1 EACH MISCELLANEOUS
Qty: 6 EACH | Refills: 1 | Status: SHIPPED | OUTPATIENT
Start: 2024-02-06 | End: 2024-05-06

## 2024-02-06 RX ORDER — GLIPIZIDE 5 MG/1
TABLET ORAL
Qty: 135 TABLET | Refills: 3 | OUTPATIENT
Start: 2024-02-06

## 2024-02-06 RX ORDER — METFORMIN HYDROCHLORIDE 500 MG/1
1000 TABLET, EXTENDED RELEASE ORAL 2 TIMES DAILY WITH MEALS
Qty: 360 TABLET | Refills: 1 | Status: SHIPPED | OUTPATIENT
Start: 2024-02-06 | End: 2024-08-04

## 2024-02-06 RX ORDER — LEVOTHYROXINE SODIUM 112 UG/1
112 TABLET ORAL NIGHTLY
Qty: 30 TABLET | Refills: 5 | Status: SHIPPED | OUTPATIENT
Start: 2024-02-06 | End: 2024-08-04

## 2024-02-06 RX ORDER — ROSUVASTATIN CALCIUM 40 MG/1
40 TABLET, COATED ORAL NIGHTLY
Qty: 90 TABLET | Refills: 1 | Status: SHIPPED | OUTPATIENT
Start: 2024-02-06 | End: 2024-08-04

## 2024-02-06 RX ORDER — BLOOD-GLUCOSE,RECEIVER,CONT
1 EACH MISCELLANEOUS ONCE
Qty: 1 EACH | Refills: 0 | Status: SHIPPED | OUTPATIENT
Start: 2024-02-06 | End: 2024-02-06

## 2024-02-06 RX ORDER — TIRZEPATIDE 12.5 MG/.5ML
12.5 INJECTION, SOLUTION SUBCUTANEOUS
Qty: 2 ML | Refills: 0 | Status: SHIPPED | OUTPATIENT
Start: 2024-02-06 | End: 2024-03-05

## 2024-02-06 RX ORDER — GLIPIZIDE 5 MG/1
5 TABLET ORAL DAILY
Qty: 90 TABLET | Refills: 2 | Status: SHIPPED | OUTPATIENT
Start: 2024-02-06

## 2024-02-06 RX ORDER — TIRZEPATIDE 15 MG/.5ML
15 INJECTION, SOLUTION SUBCUTANEOUS
Qty: 6 ML | Refills: 0 | Status: SHIPPED | OUTPATIENT
Start: 2024-03-05 | End: 2024-06-03

## 2024-02-06 NOTE — PROGRESS NOTES
New Patient Evaluation - History and Physical    CONSULT - Reason for Visit:  DM.  Requesting Physician:   Macario Wallace MD      CHIEF COMPLAINT:    Chief Complaint   Patient presents with    Consult     Diabetes         HISTORY OF PRESENT ILLNESS:   Mando Puri is a 63 year old male who presents with complicated DM, CAD s/p 2 stents, carotid stenosis post surgery, obese, htn, dlp and hypothyroid     Was seen with his wife who helped in obtaining detailed history     2/6/2024 A1c 6.8%     DM Dx 1998  On metformin 1000 mg bid   Glipizide 5 daily   Mounjaro 10 mg  just increased last week from 7.5, started 2023  Was on ozempic before   Tried Jardiance but stopped d/t polyuria no uti or yeast infection    DLP on Crestor 20     Hypothyroid   Levothryoxine 175 mcg for many yrs   Takes it with other meds     HTN on valsartan       Lab Results   Component Value Date    A1C 6.8 (A) 02/06/2024    A1C 6.6 (A) 08/21/2023    A1C 8.4 (A) 06/12/2023    A1C 8.0 (A) 03/08/2023    A1C 8.1 (H) 12/20/2022        DM quality measures:  A1C/Blood pressure: as reported above   Nephropathy screening: needs labs , continue ace /arb rx.   LIPID screening: needs labs  . On statin rx.   Last dilated eye exam: No data recorded 2023  Exam shows retinopathy? No data recorded no  Last diabetic foot exam: No data recorded 2023  Dentist : recommend every 6m  Neuropathy: yes in LE   CAD/ASCVD/PAD/CVA: 2 stents, carotid stenosis s/p procedure,     GLP-1 agonists:  No personal or family history of MEN syndrome   No personal history pancreatitis   Patient counselled regarding side effects including injection site reactions, nausea, vomiting, diarrhea, pancreatitis, gastroparesis and rare side effect cynthia Reno syndrome.    SGLT2 inhibitors  No UTI or yeast infection   Discussed side effects including UTI and fungal infections.   Discussed the importance of hydration.    ssx     Door dash  No smoking  Etoh no   No  drugs        ASSESSMENT AND PLAN:  62 yo man with complicated DM, CAD s/p 2 stents, carotid stenosis post surgery, obese, htn, dlp and hypothyroid       Discussed plan with his wife   Will rechallenge SGLT2i since he did not get complications from it - he had polyuria only no infection   He does not have HF but has CAD so will benefit from SGLT2i and GLP-1 . Will stop SCHUMACHER when we add them     Will change LT4 to bedtime and decrease the dose - he is on high doses from taking it with other meds     plan  Fasting AM labs soon     Labs and f/u in 1 mo       Change levothyroxine from morning with other meds to at bedtime and by iteselt, no food , no other meds, water only, 2 hrs at least from last meal   Thyroid medication dose levothyroxine 175-->112 mcg   Take you medication on empty stomach, not with any other medication or food. Wait 60 minutes before eating. Wait 4 hours before taking Vitamins, Calcium or iron.  Wait 60 minutes before eating. Do not take the medication on the morning of the lab test. Do not take Biotin/Turmeric 1 week before the test.    This is a very helpful website [email or web address]      Will consult CDE, podiatry, and ophtho  Fasting AM labs   CGM - will send Rx  . Insurance might not cover it   Will give maren pro    Will send Rx Farxiga - tried Jardiance and had frequent urination.  When starting  Farxiga, stop glipizide     After 4 doses of mounjaro 10 mg increaser to 12.5 mg weekly     Continue  metformin 1000 mg twice a day     Continue valsartan     Increase  rosuvastatin 20->40 mg       Check blood sugar fasting, before meals and before bedtime.   If you have low blood sugar <70, take 15 grams of carb (8 oz juice or regular soda) and recheck in 15 minutes.    Follow up with podiatry and eye doctor annually.   Patients need to wear covered shoes all the time and check feet daily.   Bring your meter/BG log to the next visit.       We discussed these in  detail with the patient and negotiated which of numerous possible changes in the in the treatment plan that would be acceptable to them. The patient remains at ongoing is at high risk for complications related to uncontrolled diabetes and treatment.  The patient requires a great deal of self-management and support. We expect the patient's risk to be reduced with the changes to the treatment plan that we recommended today.     GLP-1 agonists:  No personal or family history of MEN syndrome   No personal history pancreatitis   Patient counselled regarding side effects including injection site reactions, nausea, vomiting, diarrhea, pancreatitis, gastroparesis and rare side effect cynthia Reno syndrome.    SGLT2 inhibitors  No UTI or yeast infection   Discussed side effects including UTI and fungal infections.   Discussed the importance of hydration.        PAST MEDICAL HISTORY:   Past Medical History:   Diagnosis Date    Benign head tremor     BPH (benign prostatic hyperplasia)     Diabetes (HCC)     Diverticulitis 10/2019    Former smoker     Hyperlipidemia     Hypertension     Neuropathic pain     Non-pressure chronic ulcer of right lower leg, limited to breakdown of skin (HCC) 12/7/2021    Obesity     Snoring     Thyroid disease        PAST SURGICAL HISTORY:   Past Surgical History:   Procedure Laterality Date    CAROTID ENDARTERECTOMY Right 04/29/2021    Surgeon: Dr. Rajiv Sol[facility] at [facility]    NECK/CHEST PROCEDURE UNLISTED      per patient, a blockage was removed from the right side of his neck in 8/2021    OTHER SURGICAL HISTORY  2017    Small bowel Res.    OTHER SURGICAL HISTORY  11/12/2019    Colectomy            CURRENT MEDICATIONS:    Current Outpatient Medications   Medication Sig Dispense Refill    Continuous Blood Gluc Sensor (FREESTYLE MARSHA 3 SENSOR) Does not apply Misc 1 each every 14 (fourteen) days. 6 each 1    Continuous Blood Gluc  (FREESTYLE MARSHA 3 READER) Does not apply Misc 1 each once for 1  dose. 1 each 0    metFORMIN  MG Oral Tablet 24 Hr Take 2 tablets (1,000 mg total) by mouth 2 (two) times daily with meals. 360 tablet 1    dapagliflozin (FARXIGA) 10 MG Oral Tab Take 1 tablet (10 mg total) by mouth daily. 30 tablet 2    Tirzepatide (MOUNJARO) 12.5 MG/0.5ML Subcutaneous Solution Pen-injector Inject 12.5 mg into the skin every 7 days for 28 days. 2 mL 0    [START ON 3/5/2024] Tirzepatide (MOUNJARO) 15 MG/0.5ML Subcutaneous Solution Pen-injector Inject 15 mg into the skin every 7 days. 6 mL 0    levothyroxine 112 MCG Oral Tab Take 1 tablet (112 mcg total) by mouth at bedtime. 30 tablet 5    rosuvastatin 40 MG Oral Tab Take 1 tablet (40 mg total) by mouth nightly. FOR CHOLESTEROL. 90 tablet 1    tamsulosin 0.4 MG Oral Cap Take 2 capsules (0.8 mg total) by mouth daily. FOR PROSTATE. 180 capsule 9    glipiZIDE 5 MG Oral Tab Take 1 tablet (5 mg total) by mouth daily. FOR DIABETES. STOP/DO NOT TAKE IF FASTING SUGAR IS LESS THAN 100. 90 tablet 2    aspirin (ASPIRIN 81) 81 MG Oral Tab EC Take 1 tablet (81 mg total) by mouth daily. FOR HEART. 90 tablet 9    DULoxetine 30 MG Oral Cap DR Particles Take 1 capsule (30 mg total) by mouth daily. TAKE 1 CAPSULE IN THE MORNING FOR ANXIETY/DEPRESSION/ARTHRITIS PAIN 90 capsule 9    finasteride 5 MG Oral Tab Take 1 tablet (5 mg total) by mouth daily. FOR PROSTATE. 90 tablet 9    metoprolol tartrate 25 MG Oral Tab Take 1 tablet (25 mg total) by mouth 2 (two) times daily. FOR HEART. 90 tablet 9    valsartan 160 MG Oral Tab Take 1 tablet (160 mg total) by mouth daily. FOR BLOOD PRESSURE. 90 tablet 9    LORazepam 1 MG Oral Tab Take 0.5-1 tablets (0.5-1 mg total) by mouth 2 (two) times daily as needed for Anxiety. 14 tablet 0    LORazepam 0.5 MG Oral Tab Take 1-2 tablets (0.5-1 mg total) by mouth 2 (two) times daily as needed for Anxiety (1 hour prior to MRI.). 10 tablet 0    ticagrelor 90 MG Oral Tab Take 1 tablet (90 mg total) by mouth every 12 (twelve) hours. FOR  HEART STENTS. 180 tablet 9    mupirocin 2 % External Ointment Apply 1 Application topically 3 (three) times daily. 15 g 1    magnesium oxide 400 MG Oral Tab Take 1 tablet (400 mg total) by mouth at bedtime.      traMADol 50 MG Oral Tab Take 1 tablet (50 mg total) by mouth every 6 (six) hours as needed for Pain. 10 tablet 0       ALLERGIES:  Allergies   Allergen Reactions    Empagliflozin OTHER (SEE COMMENTS)     Frequent urination, unbearable.    Penicillins UNKNOWN     Patient does not recall reaction    Other UNKNOWN       SOCIAL HISTORY:    Social History     Socioeconomic History    Marital status:     Number of children: 4   Tobacco Use    Smoking status: Former     Types: Cigarettes     Quit date:      Years since quittin.1     Passive exposure: Past    Smokeless tobacco: Never   Vaping Use    Vaping Use: Never used   Substance and Sexual Activity    Alcohol use: Never    Drug use: Never   Other Topics Concern    Caffeine Concern Yes     Comment: coffee daily    Exercise No       FAMILY HISTORY:   Family History   Problem Relation Age of Onset    Cancer Father         Prostate    Heart Disorder Father     Cancer Mother           REVIEW OF SYSTEMS:  All negative other than HPI      PHYSICAL EXAM:   Height: 6' 2\" (188 cm) (3)  Weight: 301 lb (136.5 kg) (1203)  BSA (Calculated - sq m): 2.59 sq meters ( 1203)  Pulse: 72 ( 1203)  BP: 128/64 ( 1203)  Temp: --  Do Not Use - Resp Rate: --  SpO2: 96 % (1203)    Body mass index is 38.65 kg/m².      No goiter   Scar on the neck from carotid surgery  CONSTITUTIONAL:  Awake and alert. Age appropriate, good hygiene not in acute distress. Well nourished and well developed. no acute distress   PSYCH:   Orientated to time, place, person & situation, Normal mood and affect, memory intact, normal insight and judgment, cooperative  Neuro: speech is clear. Awake, alert, no aphasia, no facial asymmetry, no nuchal rigidity  EYES:   No proptosis, no ptosis, conjunctiva normal  ENT:  Normocephalic, atraumatic  Eye: EOMI, normal lids, no discharge, no conjunctival erythema. No exophthalmos/proptosis, Ptosis negative   No rhinorrhea, moist oral mucosa  Neck: full range of motion  Neck/Thyroid: neck inspection: normal, No scar, No goiter   LUNGS:  No acute respiratory distress, non-labored respiration. Speaking full sentences  CARDIOVASCULAR:  regular rate   ABDOMEN:  Obese   Nontender,   SKIN:  no bruising or bleeding, no rashes and no lesions, Skin is dry, no obvious rashes or lesions  EXTREMITIES: no gross abnormality   MSK: Moves extremities spontaneously. full range of motion in all major joints      DATA:     Pertinent data reviewed           Latest Reference Range & Units 12/20/22 10:43   T4,Free (Direct) 0.8 - 1.7 ng/dL 1.3   TSH 0.358 - 3.740 mIU/mL 0.169 (L)   T3 FREE 2.40 - 4.20 pg/mL 2.34 (L)   (L): Data is abnormally low    Ct 2022    No evidence of acute intracranial abnormality.      Chronic right cerebellar lacunar infarct.      Nonspecific small metallic foreign density in the region of the right eyelid.      No evidence of intracranial large vessel arterial occlusion or proximal flow limiting stenosis.      Patent bilateral cervical carotid arteries without evidence of hemodynamically significant stenosis or evidence of dissection.  Diffusely diminutive vertebrobasilar system which which may reflect anatomic variation.          ASSAY QUANTITATIVE, GLUCOSE        Component Value Flag Ref Range Units Status    GLUCOSE BLOOD 188        Final    Test Strip Lot # [id number]       Numeric Final    Test Strip Expiration Date 80,424       Date Final                  HEMOGLOBIN A1C        Component Value Flag Ref Range Units Status    HEMOGLOBIN A1C 6.8      4.3 - 5.6 % Final    Cartridge Lot# [id number]       Numeric Final    Cartridge Expiration Date 113,025       Date Final                    Orders Placed This Encounter   Procedures     ASSAY QUANTITATIVE, GLUCOSE    HEMOGLOBIN A1C    Comp Metabolic Panel [E]    Lipid Panel [E]    Microalb/Creat Ratio, Random Urine [E]    TSH W Reflex To Free T4    TSH and Free T4     Orders Placed This Encounter    ASSAY QUANTITATIVE, GLUCOSE     Order Specific Question:   Release to patient     Answer:   Immediate    HEMOGLOBIN A1C     Order Specific Question:   Release to patient     Answer:   Immediate    Comp Metabolic Panel [E]     Order Specific Question:   Release to patient     Answer:   Immediate    Lipid Panel [E]     Order Specific Question:   Release to patient     Answer:   Immediate    Microalb/Creat Ratio, Random Urine [E]     Order Specific Question:   Release to patient     Answer:   Immediate    TSH W Reflex To Free T4     Order Specific Question:   Release to patient     Answer:   Immediate    TSH and Free T4     Standing Status:   Future     Number of Occurrences:   1     Standing Expiration Date:   2025    Continuous Blood Gluc Sensor (FREESTYLE MARSHA 3 SENSOR) Does not apply Misc     Si each every 14 (fourteen) days.     Dispense:  6 each     Refill:  1    Continuous Blood Gluc  (FREESTYLE MARSHA 3 READER) Does not apply Misc     Si each once for 1 dose.     Dispense:  1 each     Refill:  0    metFORMIN  MG Oral Tablet 24 Hr     Sig: Take 2 tablets (1,000 mg total) by mouth 2 (two) times daily with meals.     Dispense:  360 tablet     Refill:  1    dapagliflozin (FARXIGA) 10 MG Oral Tab     Sig: Take 1 tablet (10 mg total) by mouth daily.     Dispense:  30 tablet     Refill:  2    Tirzepatide (MOUNJARO) 12.5 MG/0.5ML Subcutaneous Solution Pen-injector     Sig: Inject 12.5 mg into the skin every 7 days for 28 days.     Dispense:  2 mL     Refill:  0    Tirzepatide (MOUNJARO) 15 MG/0.5ML Subcutaneous Solution Pen-injector     Sig: Inject 15 mg into the skin every 7 days.     Dispense:  6 mL     Refill:  0    levothyroxine 112 MCG Oral Tab     Sig: Take 1 tablet (112 mcg  total) by mouth at bedtime.     Dispense:  30 tablet     Refill:  5    rosuvastatin 40 MG Oral Tab     Sig: Take 1 tablet (40 mg total) by mouth nightly. FOR CHOLESTEROL.     Dispense:  90 tablet     Refill:  1          This is a specialized patient consultation in endocrinology and required comprehensive review of prior records, as well as current evaluation, with time required for consideration of complex endocrine issues and consultation. For this visit, I personally interviewed the patient, and family member if accompanied, performed the pertinent parts of the history and physical examination. ROS included screening for appropriate endocrine conditions.   Today's diagnosis and plan were reviewed in detail with the patient who states understanding and agrees with plan. I discussed with the patient possible diagnosis, differential diagnosis, need for work up , treatment options, alternatives and side effects.     Please see note for details about time spent which includes:   · pre-visit preparation  · reviewing records  · face to face time with the patient   · timely documentation of the encounter  · ordering medications/tests  · communication with care team  · care coordination    I appreciate the opportunity to be part of your patient's medical care and will keep you, as the referring and primary physicians, informed about the care of your patient, including possible future surgery and pathology findings. Please feel free to contact me should you have any questions.      Brittany Shah MD

## 2024-02-06 NOTE — PATIENT INSTRUCTIONS
Fasting AM labs soon     Labs and f/u in 1 mo       Change levothyroxine from morning with other meds to at bedtime and by iteselt, no food , no other meds, water only, 2 hrs at least from last meal   Thyroid medication dose levothyroxine 175-->112 mcg   Take you medication on empty stomach, not with any other medication or food. Wait 60 minutes before eating. Wait 4 hours before taking Vitamins, Calcium or iron.  Wait 60 minutes before eating. Do not take the medication on the morning of the lab test. Do not take Biotin/Turmeric 1 week before the test.    This is a very helpful website   [email or web address]      Will consult CDE, podiatry, and ophtho  Fasting AM labs   CGM - will send Rx  . Insurance might not cover it   Will give maren pro    Will send Rx Farxiga - tried Jardiance and had frequent urination.  When starting  Farxiga, stop glipizide     After 4 doses of mounjaro 10 mg increaser to 12.5 mg weekly     Continue  metformin 1000 mg twice a day     Continue valsartan     Increase  rosuvastatin 20->40 mg         GLP-1 agonists:  No personal or family history of MEN syndrome   No personal history pancreatitis   Patient counselled regarding side effects including injection site reactions, nausea, vomiting, diarrhea, pancreatitis, gastroparesis and rare side effect cynthia Reno syndrome.    SGLT2 inhibitors  No UTI or yeast infection   Discussed side effects including UTI and fungal infections.   Discussed the importance of hydration.

## 2024-02-07 ENCOUNTER — TELEPHONE (OUTPATIENT)
Dept: ENDOCRINOLOGY CLINIC | Facility: CLINIC | Age: 64
End: 2024-02-07

## 2024-02-07 NOTE — TELEPHONE ENCOUNTER
Pt is requesting to speak to Rn did not give much details please call before 2pm if possible thanks

## 2024-02-08 ENCOUNTER — TELEPHONE (OUTPATIENT)
Dept: ENDOCRINOLOGY CLINIC | Facility: CLINIC | Age: 64
End: 2024-02-08

## 2024-02-08 NOTE — TELEPHONE ENCOUNTER
Patient states that he is at [facility] in [place], and is requesting to get lab orders sent electronically to them right away.

## 2024-02-08 NOTE — TELEPHONE ENCOUNTER
Spoke with [place] (states patient is at [facility] in [place] by [address]) and asked her to tell patient that we're faxing order manually as the orders have been sent to [facility] electronically. RN faxed lab orders to [phone].

## 2024-02-09 ENCOUNTER — OFFICE VISIT (OUTPATIENT)
Dept: PODIATRY CLINIC | Facility: CLINIC | Age: 64
End: 2024-02-09

## 2024-02-09 DIAGNOSIS — M20.41 HAMMERTOE, BILATERAL: ICD-10-CM

## 2024-02-09 DIAGNOSIS — E11.42 TYPE 2 DIABETES MELLITUS WITH POLYNEUROPATHY (HCC): Primary | ICD-10-CM

## 2024-02-09 DIAGNOSIS — M20.42 HAMMERTOE, BILATERAL: ICD-10-CM

## 2024-02-09 DIAGNOSIS — B35.1 ONYCHOMYCOSIS: ICD-10-CM

## 2024-02-09 DIAGNOSIS — L84 FOOT CALLUS: ICD-10-CM

## 2024-02-09 PROCEDURE — 99213 OFFICE O/P EST LOW 20 MIN: CPT | Performed by: PODIATRIST

## 2024-02-09 NOTE — TELEPHONE ENCOUNTER
Patient returned call. He wanted to confirm appt date, time, location for CGM download. He already ha an appt scheduled with Lizette JONES. Provided appt date, time, [id number] location, parking directions.     Booked 1 month follow up with Dr. Shah. Provided appt date, time, location, parking directions.

## 2024-02-09 NOTE — PROGRESS NOTES
[facility] Podiatry  Progress Note    Mando Puri is a 63 year old male.   Chief Complaint   Patient presents with    Diabetic Foot Care     RNC, did not check AM BS, A1C 2/6 was 6.8, 8/10 pain scale         HPI:     This is a pleasant poorly controlled Diabetic male with PMH of Left venous stasis ulcer and B/L LE venous ablation.  He is taking lyrica for the diabetic neuropathy which is not really helping.     He presents to clinic for DFC.  He complains of long thick toenails and calluses which he is unable to trim himself.          Allergies: Empagliflozin, Penicillins, and Other   Current Outpatient Medications   Medication Sig Dispense Refill    tamsulosin 0.4 MG Oral Cap Take 2 capsules (0.8 mg total) by mouth daily. FOR PROSTATE. 180 capsule 9    glipiZIDE 5 MG Oral Tab Take 1 tablet (5 mg total) by mouth daily. FOR DIABETES. STOP/DO NOT TAKE IF FASTING SUGAR IS LESS THAN 100. 90 tablet 2    Continuous Blood Gluc Sensor (FREESTYLE MARSHA 3 SENSOR) Does not apply Misc 1 each every 14 (fourteen) days. 6 each 1    metFORMIN  MG Oral Tablet 24 Hr Take 2 tablets (1,000 mg total) by mouth 2 (two) times daily with meals. 360 tablet 1    dapagliflozin (FARXIGA) 10 MG Oral Tab Take 1 tablet (10 mg total) by mouth daily. 30 tablet 2    Tirzepatide (MOUNJARO) 12.5 MG/0.5ML Subcutaneous Solution Pen-injector Inject 12.5 mg into the skin every 7 days for 28 days. 2 mL 0    [START ON 3/5/2024] Tirzepatide (MOUNJARO) 15 MG/0.5ML Subcutaneous Solution Pen-injector Inject 15 mg into the skin every 7 days. 6 mL 0    levothyroxine 112 MCG Oral Tab Take 1 tablet (112 mcg total) by mouth at bedtime. 30 tablet 5    rosuvastatin 40 MG Oral Tab Take 1 tablet (40 mg total) by mouth nightly. FOR CHOLESTEROL. 90 tablet 1    aspirin (ASPIRIN 81) 81 MG Oral Tab EC Take 1 tablet (81 mg total) by mouth daily. FOR HEART. 90 tablet 9    DULoxetine 30 MG Oral Cap DR Particles Take 1 capsule (30 mg total) by mouth daily.  TAKE 1 CAPSULE IN THE MORNING FOR ANXIETY/DEPRESSION/ARTHRITIS PAIN 90 capsule 9    finasteride 5 MG Oral Tab Take 1 tablet (5 mg total) by mouth daily. FOR PROSTATE. 90 tablet 9    metoprolol tartrate 25 MG Oral Tab Take 1 tablet (25 mg total) by mouth 2 (two) times daily. FOR HEART. 90 tablet 9    valsartan 160 MG Oral Tab Take 1 tablet (160 mg total) by mouth daily. FOR BLOOD PRESSURE. 90 tablet 9    LORazepam 1 MG Oral Tab Take 0.5-1 tablets (0.5-1 mg total) by mouth 2 (two) times daily as needed for Anxiety. 14 tablet 0    LORazepam 0.5 MG Oral Tab Take 1-2 tablets (0.5-1 mg total) by mouth 2 (two) times daily as needed for Anxiety (1 hour prior to MRI.). 10 tablet 0    ticagrelor 90 MG Oral Tab Take 1 tablet (90 mg total) by mouth every 12 (twelve) hours. FOR HEART STENTS. 180 tablet 9    mupirocin 2 % External Ointment Apply 1 Application topically 3 (three) times daily. 15 g 1    magnesium oxide 400 MG Oral Tab Take 1 tablet (400 mg total) by mouth at bedtime.      traMADol 50 MG Oral Tab Take 1 tablet (50 mg total) by mouth every 6 (six) hours as needed for Pain. 10 tablet 0      Past Medical History:   Diagnosis Date    Benign head tremor     BPH (benign prostatic hyperplasia)     Diabetes (HCC)     Diverticulitis 10/2019    Former smoker     Hyperlipidemia     Hypertension     Neuropathic pain     Non-pressure chronic ulcer of right lower leg, limited to breakdown of skin (HCC) 12/7/2021    Obesity     Snoring     Thyroid disease       Past Surgical History:   Procedure Laterality Date    CAROTID ENDARTERECTOMY Right 04/29/2021    Surgeon: Dr. Rajiv Solorio at [facility]    NECK/CHEST PROCEDURE UNLISTED      per patient, a blockage was removed from the right side of his neck in 8/2021    OTHER SURGICAL HISTORY  2017    Small bowel Res.    OTHER SURGICAL HISTORY  11/12/2019    Colectomy      Family History   Problem Relation Age of Onset    Cancer Father         Prostate    Heart Disorder Father     Cancer Mother        Social History     Socioeconomic History    Marital status:     Number of children: 4   Tobacco Use    Smoking status: Former     Types: Cigarettes     Quit date:      Years since quittin.1     Passive exposure: Past    Smokeless tobacco: Never   Vaping Use    Vaping Use: Never used   Substance and Sexual Activity    Alcohol use: Never    Drug use: Never   Other Topics Concern    Caffeine Concern Yes     Comment: coffee daily    Exercise No           REVIEW OF SYSTEMS:   Denies nausea, fever, chills  No calf pain  No other muscle or joint aches  Denies chest pain or shortness of breath.      EXAM:   There were no vitals taken for this visit.    Constitutional:   Patient in no apparent distress. Well kept. Of normal body habitus. Alert and oriented to person, place, and time.  Vascular Examination:  DP pulse is 2/4  PT pulse is 2/4  Capillary refill is immediate  Edema is present bilateral  Severe varicosities present bilateral  Temperature warm proximally to warm distally bilateral  Hemosiderin deposits Present bilateral  Integumentary Examination:   The patient's nails appear incurvated, thickened, elongated, dystrophic, discolored with subungual debris 1-5 right, 1-5  left nails.    Left distal 2nd and 3rd toe callus    Digital hair growth is present left and is present right.    Neurological Examination:  Monofilament (10-g) sensation is 2/5 to right and 2/5 to left.  Parasthesias present  Musculoskeletal Examination:  Muscle Strength is 5/5.  Hammer digit deformity present digits 2-5  bilateral.        LABS & IMAGING:     Lab Results   Component Value Date     (H) 10/01/2023    BUN 16 10/01/2023    CREATSERUM 1.34 (H) 10/01/2023    BUNCREA 11.9 10/01/2023    ANIONGAP 4 10/01/2023    GFRAA 107 2022    GFRNAA 93 2022    CA 9.4 10/01/2023     10/01/2023    K 4.1 10/01/2023     10/01/2023    CO2 31.0 10/01/2023    OSMOCALC 301 (H) 10/01/2023        Lab Results    Component Value Date     (H) 12/20/2022    A1C 6.8 (A) 02/06/2024        No results found.     ASSESSMENT AND PLAN:   Diagnoses and all orders for this visit:    Type 2 diabetes mellitus with polyneuropathy (HCC)    Foot callus    Onychomycosis    Hammertoe, bilateral              Plan:     Diabetic education and instructions have been provided. We reviewed and discussed the following:    -risk categories related to pts with diabetes and foot or lower extremity complications per ADA.    -adherence to medication regimen and close monitoring or blood sugar control.   -daily monitoring/inspection of feet and shoes.   -proper management of diet and weight   -regular follow up with PCP and specialty providers as recommended   -Lower extremity complications related to DM were reviewed and stressed prevention.     Evaluated the patient. Discussed treatment options with the patient.  Discussed with patient proper care and hygiene for their feet.  Patient tolerated procedures well, without incident.    Instrumentation used includes nail nippers and electric  where appropriate.  Procedure: (87634 Debridement of toenails 6-10) Surgically debrided and mechanically reduced 6 or more toenails.Hemmorhage occurred none.Nails that were debrided appeared dystrophic and caused the patient pain in shoe gear.Nails 5 Left & 5 Right.      Evaluated patient. Discussed treatment options with patient.  Discussed proper hygiene and care for feet as well as use of emollient creams (ie Urea based creams)  Answered all patient questions. Discussed offloading hyperkeratotic lesions with proper shoe gear, offloading pads, and insoles.    Procedures: (CPT 08883 Paring or cutting of benign hyperkeratotic lesion 2-4)  2 lesions pared utilizing #15 blade to Left foot without incident.          Pt refuses to wear compression stockings  Pt did have B/L GSV ablation by Dr. Sheets in March of 2022.      Prescribed new DM shoes with  inserts.        RTC  3 months for DFC    No follow-ups on file.    Cisco Muse, LUIS  2/9/24

## 2024-02-14 NOTE — PROGRESS NOTES
Continuous Glucose Monitor Download    Start Time: 1:07pm  End Time: 2:00pm    Indication: Pt is T2D followed by Dr Shah. At last appt MA 2/6/2024, advised to increase Mounjaro and start Farxiga. Once Farxiga is started, to stop Glipizide. Additionally, LibrePro placed on pt. Appointment today for download.    A1c: 6.8% (2/14/2024)    Current Diabetes Medication:  Glipizide 5mg daily (9pm - works second shift)   --> At LOV, advised to start Farxiga 10mg and stop Glipizide   Metformin 1000mg BID  Mounjaro 10mg/weekly for 1mo then 12.5mg/weekly   --> Tomorrow will be the 4 dose (Weds)    Sensor Type: LibrePro    Reviewed CGMS download and patient logs:  Days of sensor use: 2/6/24-2/20/24  Average glucose (mg/dL): 140 mg/dl  Estimated GMI: 6.7%  Standard deviation =/- 32 (mg/dL)  Target Ranges:  mg/dL          74% of time in range  5% of time below range (3% low, 2% low)  21% of time above range      Recommended medication changes/Plan:  - Reviewed LibrePro download with pt which demonstrates stable blood sugar levels. Fasting blood sugars in 100's mg/dl and post prandials for the majority in range. A few occurrences of low blood sugars which appear to be compression lows  - We reviewed days with higher blood sugars which were related to food choices (ie cereal)  - Pt has not started Farxiga yet; unaware he was supposed to start it  - He has one more dose of Mounjaro 10mg/weekly at home and states he only has 15mg at home (not the 12.5mg dose). Tolerating well  - Advised to continue current regimen  - When he increases to Mounjaro 12.5mg or 15mg (depending on supply at home/[facility]), to stop Glipizide and continue Metformin. Okay to also start Farxiga if insurance is covering  - Advised to check blood sugar 1-2x daily alternating  - Plan reviewed with Brittney PICKERING at time of appointment  - Report placed in Dr Shah's for final review    Updated Diabetes Medication:  Current:  Glipizide 5mg  daily  Metformin 1000mg BID  Mounjaro 10mg/weekly (Weds)    When transition to higher dose of Mounjaro:  Stop Glipizide  (Start Farxiga 10mg if covered)  Metformin 1000mg BID  Moujnaro 12.5mg/weekly or 15mg/weekly depending on supply    Follow up:  3/8/2024 Dr Pablo Gamino  [facility]

## 2024-02-20 ENCOUNTER — NURSE ONLY (OUTPATIENT)
Dept: ENDOCRINOLOGY CLINIC | Facility: CLINIC | Age: 64
End: 2024-02-20

## 2024-02-20 ENCOUNTER — APPOINTMENT (OUTPATIENT)
Dept: ENDOCRINOLOGY | Facility: HOSPITAL | Age: 64
End: 2024-02-20
Attending: INTERNAL MEDICINE
Payer: COMMERCIAL

## 2024-02-20 DIAGNOSIS — E11.51 TYPE 2 DIABETES MELLITUS WITH DIABETIC PERIPHERAL ANGIOPATHY WITHOUT GANGRENE, WITHOUT LONG-TERM CURRENT USE OF INSULIN (HCC): Primary | ICD-10-CM

## 2024-02-20 PROCEDURE — 99211 OFF/OP EST MAY X REQ PHY/QHP: CPT | Performed by: INTERNAL MEDICINE

## 2024-02-20 NOTE — PATIENT INSTRUCTIONS
Diabetes Medication    Current:  Glipizide 5mg daily  Metformin 1000mg twice daily  Mounjaro 10mg/weekly    After 4 doses of Mounjaro 10mg/weekly (around 2/28):  - Increase to Mounjaro 15mg (or 12.5mg)  - Stop Glipizide  - Continue Metformin 1000mg twice daily  - OK to take Farxiga 10mg with higher dose of Mounjaro (just don't want to take it with Glipizide)    Check blood sugar 1-2x daily    Follow Up  3/8/2024 Dr Shah 10:45am ([place])

## 2024-03-07 ENCOUNTER — OFFICE VISIT (OUTPATIENT)
Dept: PHYSICAL MEDICINE AND REHAB | Facility: CLINIC | Age: 64
End: 2024-03-07
Payer: COMMERCIAL

## 2024-03-07 DIAGNOSIS — M22.2X9 PATELLOFEMORAL PAIN SYNDROME, UNSPECIFIED LATERALITY: ICD-10-CM

## 2024-03-07 DIAGNOSIS — M17.11 PRIMARY OSTEOARTHRITIS OF RIGHT KNEE: Primary | ICD-10-CM

## 2024-03-07 PROCEDURE — 20611 DRAIN/INJ JOINT/BURSA W/US: CPT | Performed by: PHYSICAL MEDICINE & REHABILITATION

## 2024-03-07 RX ORDER — LIDOCAINE HYDROCHLORIDE 10 MG/ML
7 INJECTION, SOLUTION INFILTRATION; PERINEURAL ONCE
Status: COMPLETED | OUTPATIENT
Start: 2024-03-07 | End: 2024-03-07

## 2024-03-07 RX ORDER — TRIAMCINOLONE ACETONIDE 40 MG/ML
40 INJECTION, SUSPENSION INTRA-ARTICULAR; INTRAMUSCULAR ONCE
Status: COMPLETED | OUTPATIENT
Start: 2024-03-07 | End: 2024-03-07

## 2024-03-07 RX ADMIN — LIDOCAINE HYDROCHLORIDE 7 ML: 10 INJECTION, SOLUTION INFILTRATION; PERINEURAL at 18:01:00

## 2024-03-07 RX ADMIN — TRIAMCINOLONE ACETONIDE 40 MG: 40 INJECTION, SUSPENSION INTRA-ARTICULAR; INTRAMUSCULAR at 18:01:00

## 2024-03-07 NOTE — PROCEDURES
[facility]   Knee Joint Injection Procedure Note    CHIEF COMPLAINT:    Chief Complaint   Patient presents with    Injection     Patient comes in for right knee Orthovisc and aspiration.       PROCEDURE PERFORMED:  Right knee intra-articular orthovisc (#1/3) under ultrasound guidance    INDICATIONS:  Right knee pain and osteoarthritis    PRIMARY PROCEDURALIST:  Alex Behar, MD    INFORMED CONSENT & TIME OUT:   As documented in the Time Out and Pre-Procedure Check Lists.  Verbal consent was obtained    Vitals: [unfilled]  Labs (document last wbc, plts, hgb, and PT/INR):    Office Visit on 02/06/2024   Component Date Value Ref Range Status    GLUCOSE BLOOD 02/06/2024 188   Final    Test Strip Lot # 02/06/2024 [id number]  Numeric Final    Test Strip Expiration Date 02/06/2024 [id number]  Date Final    HEMOGLOBIN A1C 02/06/2024 6.8 (A)  4.3 - 5.6 % Final    Cartridge Lot# 02/06/2024 [id number]  Numeric Final    Cartridge Expiration Date 02/06/2024 113,025  Date Final   Admission on 10/01/2023, Discharged on 10/01/2023   Component Date Value Ref Range Status    Ventricular rate 10/01/2023 101  BPM Final    Atrial rate 10/01/2023 101  BPM Final    P-R Interval 10/01/2023 176  ms Final    QRS Duration 10/01/2023 96  ms Final    Q-T Interval 10/01/2023 338  ms Final    QTC Calculation (Bezet) 10/01/2023 438  ms Final    P Axis 10/01/2023 32  degrees Final    R Axis 10/01/2023 3  degrees Final    T Axis 10/01/2023 23  degrees Final    Hold Lavender 10/01/2023 Auto Resulted   Final    Hold Lt Green 10/01/2023 Auto Resulted   Final    Hold Blue 10/01/2023 Auto Resulted   Final    SARS-CoV-2 (COVID-19) - (GeneXpert) 10/01/2023 Not Detected  Not Detected Final    Influenza A by PCR 10/01/2023 Negative  Negative Final    Influenza B by PCR 10/01/2023 Negative  Negative Final    RSV by PCR 10/01/2023 Negative  Negative Final    Glucose 10/01/2023 168 (H)  70 - 99 mg/dL Final    Sodium  10/01/2023 143  136 - 145 mmol/L Final    Potassium 10/01/2023 4.1  3.5 - 5.1 mmol/L Final    Chloride 10/01/2023 108  98 - 112 mmol/L Final    CO2 10/01/2023 31.0  21.0 - 32.0 mmol/L Final    Anion Gap 10/01/2023 4  0 - 18 mmol/L Final    BUN 10/01/2023 16  7 - 18 mg/dL Final    Creatinine 10/01/2023 1.34 (H)  0.70 - 1.30 mg/dL Final    BUN/CREA Ratio 10/01/2023 11.9  10.0 - 20.0 Final    Calcium, Total 10/01/2023 9.4  8.5 - 10.1 mg/dL Final    Calculated Osmolality 10/01/2023 301 (H)  275 - 295 mOsm/kg Final    eGFR-Cr 10/01/2023 60  >=60 mL/min/1.73m2 Final    Troponin I (High Sensitivity) 10/01/2023 24  <=79 ng/L Final    WBC 10/01/2023 9.3  4.0 - 11.0 x10(3) uL Final    RBC 10/01/2023 4.27 (L)  4.30 - 5.70 x10(6)uL Final    HGB 10/01/2023 12.5 (L)  13.0 - 17.5 g/dL Final    HCT 10/01/2023 39.2  39.0 - 53.0 % Final    MCV 10/01/2023 91.8  80.0 - 100.0 fL Final    MCH 10/01/2023 29.3  26.0 - 34.0 pg Final    MCHC 10/01/2023 31.9  31.0 - 37.0 g/dL Final    RDW-SD 10/01/2023 48.6 (H)  35.1 - 46.3 fL Final    RDW 10/01/2023 14.3  11.0 - 15.0 % Final    PLT 10/01/2023 213.0  150.0 - 450.0 10(3)uL Final    Neutrophil Absolute Prelim 10/01/2023 6.63  1.50 - 7.70 x10 (3) uL Final    Neutrophil Absolute 10/01/2023 6.63  1.50 - 7.70 x10(3) uL Final    Lymphocyte Absolute 10/01/2023 1.33  1.00 - 4.00 x10(3) uL Final    Monocyte Absolute 10/01/2023 1.07 (H)  0.10 - 1.00 x10(3) uL Final    Eosinophil Absolute 10/01/2023 0.16  0.00 - 0.70 x10(3) uL Final    Basophil Absolute 10/01/2023 0.06  0.00 - 0.20 x10(3) uL Final    Immature Granulocyte Absolute 10/01/2023 0.02  0.00 - 1.00 x10(3) uL Final    Neutrophil % 10/01/2023 71.7  % Final    Lymphocyte % 10/01/2023 14.3  % Final    Monocyte % 10/01/2023 11.5  % Final    Eosinophil % 10/01/2023 1.7  % Final    Basophil % 10/01/2023 0.6  % Final    Immature Granulocyte % 10/01/2023 0.2  % Final   ]    PROCEDURE:  The procedure, risks, benefits and alternatives were discussed with  the patient.  Patient verbalized understanding and gave consent.  The Right knee was prepped and draped in the usual sterile fashion. Using a linear ultrasound probe, the superolateral patella-femoral joint space was visualized. Using a 30-gauge, 1/2-inch needle, 1 cc of 1% lidocaine was used to numb the skin and soft tissues in the supero-lateral approach.  The intra-articular space was then accessed using an 18-gauge, 1-1/2-inch needle, which was visualized on ultrasound, using approximately 3 cc of 1% lidocaine to numb the soft tissue.  Once the intra-articular space was visualized on ultrasound, with the needle accessing the space, the syringe was traded for an empty 10 cc syringe and aspiration was attempted. 2 cc of serous straw colored fluid was removed from the Right knee. The Orthovisc injection was attached to the needle and injected. During the injection, the Orthovisc was noted to enter the intra-articular space. The needle was then removed and hemostasis was achieved.  Skin was cleansed and a dry dressing was placed.    Patient tolerated the procedure well and no immediate complications noted.       Patient verbalized understanding of assessment and plan.  Patient is in agreement with the plan.  All questions were answered.  No barriers to learning identified. Permanent pictures were saved in our PACS systeme.       INSTRUCTIONS GIVEN TO PATIENT:    \"You will see an effect in the next 2-3 days.  Please contact me if you have fevers, worsening swelling, worsening pain, decreased range of motion, increased redness, chills, or anything that makes you concerned about how the joint we injected feels/looks.  If you do not reach me in a reasonable time, please report directly to the emergency room for further evaluation\"    1 week for orthovisc #2/3    Alex B. Behar MD, [facility]  Physical Medicine and Rehabilitation/Sports Medicine  [facility]

## 2024-03-07 NOTE — PATIENT INSTRUCTIONS
Post Injection Instructions     Please do not do anything strenuous over the next two days (if you had a knee injection do not walk more than 2 city blocks, do not attend any aerobic classes, do not run, no heavy lifting, no prolong standing).  You may resume your day to day activities after your injection.  You may experience some mild amount of swelling after the procedure.  Please ice your joint that was injected at least 5-6 times a day (15 minutes) for two days after (this will help prevent worsening pain that sometimes occurs after an injection).  Only take tylenol if needed for pain for the first few days.  Watch for signs of infection which include redness, warmth, worsening pain, fevers or chills.  If you develop any of these signs call the office immediately at [phone]    Everyone responds differently to injections, but you can expect your peak effects a few weeks after your last injection.  Alex B. Behar MD  Physical Medicine and Rehabilitation/Sports Medicine  [facility]

## 2024-03-08 ENCOUNTER — MED REC SCAN ONLY (OUTPATIENT)
Dept: ENDOCRINOLOGY CLINIC | Facility: CLINIC | Age: 64
End: 2024-03-08

## 2024-03-08 ENCOUNTER — TELEPHONE (OUTPATIENT)
Dept: ENDOCRINOLOGY CLINIC | Facility: CLINIC | Age: 64
End: 2024-03-08

## 2024-03-08 ENCOUNTER — TELEPHONE (OUTPATIENT)
Facility: CLINIC | Age: 64
End: 2024-03-08

## 2024-03-08 ENCOUNTER — OFFICE VISIT (OUTPATIENT)
Facility: CLINIC | Age: 64
End: 2024-03-08

## 2024-03-08 VITALS
OXYGEN SATURATION: 93 % | BODY MASS INDEX: 40.04 KG/M2 | DIASTOLIC BLOOD PRESSURE: 70 MMHG | HEART RATE: 80 BPM | WEIGHT: 312 LBS | HEIGHT: 74 IN | SYSTOLIC BLOOD PRESSURE: 130 MMHG

## 2024-03-08 DIAGNOSIS — Z95.5 S/P DRUG ELUTING CORONARY STENT PLACEMENT: ICD-10-CM

## 2024-03-08 DIAGNOSIS — I65.22 LEFT CAROTID ARTERY STENOSIS: ICD-10-CM

## 2024-03-08 DIAGNOSIS — E78.5 HYPERLIPIDEMIA ASSOCIATED WITH TYPE 2 DIABETES MELLITUS (HCC): ICD-10-CM

## 2024-03-08 DIAGNOSIS — I10 PRIMARY HYPERTENSION: ICD-10-CM

## 2024-03-08 DIAGNOSIS — E11.65 HYPERGLYCEMIA DUE TO DIABETES MELLITUS (HCC): ICD-10-CM

## 2024-03-08 DIAGNOSIS — E11.69 HYPERLIPIDEMIA ASSOCIATED WITH TYPE 2 DIABETES MELLITUS (HCC): ICD-10-CM

## 2024-03-08 DIAGNOSIS — G45.9 TRANSIENT ISCHEMIC ATTACK (TIA): ICD-10-CM

## 2024-03-08 DIAGNOSIS — R73.09 HIGH GLUCOSE LEVEL: ICD-10-CM

## 2024-03-08 DIAGNOSIS — R79.89 LOW VITAMIN D LEVEL: ICD-10-CM

## 2024-03-08 DIAGNOSIS — E11.65 UNCONTROLLED TYPE 2 DIABETES MELLITUS WITH HYPERGLYCEMIA (HCC): ICD-10-CM

## 2024-03-08 DIAGNOSIS — E03.9 HYPOTHYROIDISM, UNSPECIFIED TYPE: ICD-10-CM

## 2024-03-08 DIAGNOSIS — E11.51 TYPE 2 DIABETES MELLITUS WITH DIABETIC PERIPHERAL ANGIOPATHY WITHOUT GANGRENE, WITHOUT LONG-TERM CURRENT USE OF INSULIN (HCC): Primary | ICD-10-CM

## 2024-03-08 DIAGNOSIS — E11.59 HYPERTENSION ASSOCIATED WITH TYPE 2 DIABETES MELLITUS (HCC): ICD-10-CM

## 2024-03-08 DIAGNOSIS — I15.2 HYPERTENSION ASSOCIATED WITH TYPE 2 DIABETES MELLITUS (HCC): ICD-10-CM

## 2024-03-08 LAB
GLUCOSE BLOOD: 162
TEST STRIP EXPIRATION DATE: NORMAL DATE
TEST STRIP LOT #: NORMAL NUMERIC

## 2024-03-08 PROCEDURE — 82947 ASSAY GLUCOSE BLOOD QUANT: CPT | Performed by: INTERNAL MEDICINE

## 2024-03-08 PROCEDURE — 99214 OFFICE O/P EST MOD 30 MIN: CPT | Performed by: INTERNAL MEDICINE

## 2024-03-08 RX ORDER — TIRZEPATIDE 15 MG/.5ML
15 INJECTION, SOLUTION SUBCUTANEOUS
Qty: 6 ML | Refills: 0 | Status: SHIPPED | OUTPATIENT
Start: 2024-07-26 | End: 2024-10-24

## 2024-03-08 RX ORDER — LEVOTHYROXINE SODIUM 112 UG/1
112 TABLET ORAL NIGHTLY
Qty: 90 TABLET | Refills: 1 | Status: SHIPPED | OUTPATIENT
Start: 2024-03-08 | End: 2024-09-04

## 2024-03-08 RX ORDER — DAPAGLIFLOZIN 10 MG/1
10 TABLET, FILM COATED ORAL DAILY
Qty: 30 TABLET | Refills: 2 | Status: SHIPPED | OUTPATIENT
Start: 2024-03-08 | End: 2024-06-06

## 2024-03-08 NOTE — TELEPHONE ENCOUNTER
Received fax from [facility] lab results for patient above,   Results were placed in providers desk for review.

## 2024-03-08 NOTE — PROGRESS NOTES
Follow up Visit:  DM.  Requesting Physician:   Macario Wallace MD      CHIEF COMPLAINT:    Chief Complaint   Patient presents with    Diabetes     Diabetes last A1c 6.8 on 24        HISTORY OF PRESENT ILLNESS:   Mando Puri is a 64 year old male who presents with complicated DM, CAD s/p 2 stents, carotid stenosis post surgery, obese, htn, dlp and hypothyroid       He changed LT4 to HS and started low doses   Has not started CGM - will fill the preAuth   Has not started Jardaince so still on glipizide, metformin, mounjaro 12.5 mg  On ARBs and statin   Will get eye exam end of this month     Reviewed and discussed labs from 2024  Labs in 2024 TSH 0.02 FT4 1.3  LDL 46, LFTs normal.         2024 A1c 6.8%   3/8/2024     DM Dx   On metformin 1000 mg bid   Glipizide 5 daily   Mounjaro 12.5 mg      Tried Jardiance but stopped d/t polyuria no uti or yeast infection    DLP on Crestor 40     Hypothyroid Levothryoxine 112 mcg  at night as rec'      HTN on valsartan       Lab Results   Component Value Date    A1C 6.8 (A) 2024    A1C 6.6 (A) 2023    A1C 8.4 (A) 2023    A1C 8.0 (A) 2023    A1C 8.1 (H) 2022        DM quality measures:  A1C/Blood pressure: as reported above   Nephropathy screenin2024 , continue ace /arb rx.   LIPID screenin2024  . On statin rx.   Last dilated eye exam: No data recorded 3/2024  Exam shows retinopathy? No data recorded no  Last diabetic foot exam: No data recorded   Dentist : recommend every 6m  Neuropathy: yes in LE   CAD/ASCVD/PAD/CVA: 2 stents, carotid stenosis s/p procedure,          ssx     Door dash  No smoking  Etoh no   No drugs        ASSESSMENT AND PLAN:  64 yo man with complicated DM, CAD s/p 2 stents, carotid stenosis post surgery, obese, htn, dlp and hypothyroid       Will rechallenge SGLT2i since he did not get complications from it - he had polyuria only no infection   He does not have HF but has CAD  so will benefit from SGLT2i and GLP-1 . Will stop SCHUMACHER when we add SGLT2i     plan  Labs and f/u in 2 mo     Do not Glipizide at bedtime, take it with your meal.     When starting  Farxiga, stop glipizide . Sent Rx again to correct pharmacy   CGM - insurance did not cover it . Will get preAuth if possible    After 4 doses of mounjaro 12.5 mg increaser to 15 mg weekly     metformin 1000 mg twice a day     Thyroid medication dose Levothyroxine 112 mcg  at bedtime. Take you medication on empty stomach, not with any other medication or food. Wait 60 minutes before eating. Wait 4 hours before taking Vitamins, Calcium or iron.  Wait 60 minutes before eating. Do not take the medication on the morning of the lab test. Do not take Biotin/Turmeric 1 week before the test.      Continue Valsartan  and Rosuvastatin  40 mg     If you have low blood sugar <70, take 15 grams of carb (8 oz juice or regular soda) and recheck in 15 minutes.    Follow up with podiatry and eye doctor annually.   Patients need to wear covered shoes all the time and check feet daily.   Bring your meter/BG log to the next visit.      PAST MEDICAL HISTORY:   Past Medical History:   Diagnosis Date    Benign head tremor     BPH (benign prostatic hyperplasia)     Diabetes (HCC)     Diverticulitis 10/2019    Former smoker     Hyperlipidemia     Hypertension     Neuropathic pain     Non-pressure chronic ulcer of right lower leg, limited to breakdown of skin (HCC) 12/7/2021    Obesity     Snoring     Thyroid disease        PAST SURGICAL HISTORY:   Past Surgical History:   Procedure Laterality Date    CAROTID ENDARTERECTOMY Right 04/29/2021    Surgeon: Dr. Rajiv Solorio at [facility]    NECK/CHEST PROCEDURE UNLISTED      per patient, a blockage was removed from the right side of his neck in 8/2021    OTHER SURGICAL HISTORY  2017    Small bowel Res.    OTHER SURGICAL HISTORY  11/12/2019    Colectomy            CURRENT MEDICATIONS:    Current Outpatient Medications    Medication Sig Dispense Refill    dapagliflozin (FARXIGA) 10 MG Oral Tab Take 1 tablet (10 mg total) by mouth daily. 30 tablet 2    [START ON 7/26/2024] Tirzepatide (MOUNJARO) 15 MG/0.5ML Subcutaneous Solution Pen-injector Inject 15 mg into the skin every 7 days. 6 mL 0    levothyroxine 112 MCG Oral Tab Take 1 tablet (112 mcg total) by mouth at bedtime. 90 tablet 1    tamsulosin 0.4 MG Oral Cap Take 2 capsules (0.8 mg total) by mouth daily. FOR PROSTATE. 180 capsule 9    glipiZIDE 5 MG Oral Tab Take 1 tablet (5 mg total) by mouth daily. FOR DIABETES. STOP/DO NOT TAKE IF FASTING SUGAR IS LESS THAN 100. 90 tablet 2    Continuous Blood Gluc Sensor (FREESTYLE MARSHA 3 SENSOR) Does not apply Misc 1 each every 14 (fourteen) days. 6 each 1    metFORMIN  MG Oral Tablet 24 Hr Take 2 tablets (1,000 mg total) by mouth 2 (two) times daily with meals. 360 tablet 1    Tirzepatide (MOUNJARO) 15 MG/0.5ML Subcutaneous Solution Pen-injector Inject 15 mg into the skin every 7 days. 6 mL 0    rosuvastatin 40 MG Oral Tab Take 1 tablet (40 mg total) by mouth nightly. FOR CHOLESTEROL. 90 tablet 1    aspirin (ASPIRIN 81) 81 MG Oral Tab EC Take 1 tablet (81 mg total) by mouth daily. FOR HEART. 90 tablet 9    DULoxetine 30 MG Oral Cap DR Particles Take 1 capsule (30 mg total) by mouth daily. TAKE 1 CAPSULE IN THE MORNING FOR ANXIETY/DEPRESSION/ARTHRITIS PAIN 90 capsule 9    finasteride 5 MG Oral Tab Take 1 tablet (5 mg total) by mouth daily. FOR PROSTATE. 90 tablet 9    metoprolol tartrate 25 MG Oral Tab Take 1 tablet (25 mg total) by mouth 2 (two) times daily. FOR HEART. 90 tablet 9    valsartan 160 MG Oral Tab Take 1 tablet (160 mg total) by mouth daily. FOR BLOOD PRESSURE. 90 tablet 9    LORazepam 1 MG Oral Tab Take 0.5-1 tablets (0.5-1 mg total) by mouth 2 (two) times daily as needed for Anxiety. 14 tablet 0    LORazepam 0.5 MG Oral Tab Take 1-2 tablets (0.5-1 mg total) by mouth 2 (two) times daily as needed for Anxiety (1 hour  prior to MRI.). 10 tablet 0    ticagrelor 90 MG Oral Tab Take 1 tablet (90 mg total) by mouth every 12 (twelve) hours. FOR HEART STENTS. 180 tablet 9    mupirocin 2 % External Ointment Apply 1 Application topically 3 (three) times daily. 15 g 1    magnesium oxide 400 MG Oral Tab Take 1 tablet (400 mg total) by mouth at bedtime.      traMADol 50 MG Oral Tab Take 1 tablet (50 mg total) by mouth every 6 (six) hours as needed for Pain. 10 tablet 0       ALLERGIES:  Allergies   Allergen Reactions    Empagliflozin OTHER (SEE COMMENTS)     Frequent urination, unbearable.    Penicillins UNKNOWN     Patient does not recall reaction    Other UNKNOWN       SOCIAL HISTORY:    Social History     Socioeconomic History    Marital status:     Number of children: 4   Tobacco Use    Smoking status: Former     Types: Cigarettes     Quit date:      Years since quittin.2     Passive exposure: Past    Smokeless tobacco: Never   Vaping Use    Vaping Use: Never used   Substance and Sexual Activity    Alcohol use: Never    Drug use: Never   Other Topics Concern    Caffeine Concern Yes     Comment: coffee daily    Exercise No       FAMILY HISTORY:   Family History   Problem Relation Age of Onset    Cancer Father         Prostate    Heart Disorder Father     Cancer Mother             PHYSICAL EXAM:   Height: 6' 2\" (188 cm) ( 102)  Weight: 312 lb (141.5 kg) ( 102)  BSA (Calculated - sq m): 2.63 sq meters (1021)  Pulse: 80 ( 1021)  BP: 130/70 ( 102)  Temp: --  Do Not Use - Resp Rate: --  SpO2: 93 % (1021)    Body mass index is 40.06 kg/m².  Obese   Uses cane   Scar on the neck from carotid surgery  CONSTITUTIONAL:  Awake and alert. Age appropriate, good hygiene not in acute distress. Well nourished and well developed. no acute distress   PSYCH:   Orientated to time, place, person & situation, Normal mood and affect, memory intact, normal insight and judgment, cooperative  Neuro: speech is  clear. Awake, alert, no aphasia, no facial asymmetry, no nuchal rigidity  EYES:  No proptosis, no ptosis, conjunctiva normal  ENT:  Normocephalic, atraumatic  Eye: EOMI, normal lids, no discharge, no conjunctival erythema. No exophthalmos/proptosis, Ptosis negative   No rhinorrhea, moist oral mucosa  Neck: full range of motion  Neck/Thyroid: neck inspection: normal, No scar, No goiter   LUNGS:  No acute respiratory distress, non-labored respiration. Speaking full sentences    DATA:     Pertinent data reviewed           Latest Reference Range & Units 12/20/22 10:43   T4,Free (Direct) 0.8 - 1.7 ng/dL 1.3   TSH 0.358 - 3.740 mIU/mL 0.169 (L)   T3 FREE 2.40 - 4.20 pg/mL 2.34 (L)   (L): Data is abnormally low    Ct 2022    No evidence of acute intracranial abnormality.      Chronic right cerebellar lacunar infarct.      Nonspecific small metallic foreign density in the region of the right eyelid.      No evidence of intracranial large vessel arterial occlusion or proximal flow limiting stenosis.      Patent bilateral cervical carotid arteries without evidence of hemodynamically significant stenosis or evidence of dissection.  Diffusely diminutive vertebrobasilar system which which may reflect anatomic variation.          POC HemoCue Glucose 201 (Finger stick glucose)        Component Value Flag Ref Range Units Status    GLUCOSE BLOOD 162        Final    Test Strip Lot # [id number]       Numeric Final    Test Strip Expiration Date 81,624       Date Final                    Orders Placed This Encounter   Procedures    POC HemoCue Glucose 201 (Finger stick glucose)    TSH W Reflex To Free T4    Hemoglobin A1C [E]     Orders Placed This Encounter    POC HemoCue Glucose 201 (Finger stick glucose)     Order Specific Question:   Release to patient     Answer:   Immediate    TSH W Reflex To Free T4     Standing Status:   Future     Number of Occurrences:   1     Standing Expiration Date:   3/8/2025     Order Specific Question:    Release to patient     Answer:   Immediate    Hemoglobin A1C [E]     Standing Status:   Future     Number of Occurrences:   1     Standing Expiration Date:   3/8/2025     Order Specific Question:   Release to patient     Answer:   Immediate    dapagliflozin (FARXIGA) 10 MG Oral Tab     Sig: Take 1 tablet (10 mg total) by mouth daily.     Dispense:  30 tablet     Refill:  2    Tirzepatide (MOUNJARO) 15 MG/0.5ML Subcutaneous Solution Pen-injector     Sig: Inject 15 mg into the skin every 7 days.     Dispense:  6 mL     Refill:  0    levothyroxine 112 MCG Oral Tab     Sig: Take 1 tablet (112 mcg total) by mouth at bedtime.     Dispense:  90 tablet     Refill:  1          This is a specialized patient consultation in endocrinology and required comprehensive review of prior records, as well as current evaluation, with time required for consideration of complex endocrine issues and consultation. For this visit, I personally interviewed the patient, and family member if accompanied, performed the pertinent parts of the history and physical examination. ROS included screening for appropriate endocrine conditions.   Today's diagnosis and plan were reviewed in detail with the patient who states understanding and agrees with plan. I discussed with the patient possible diagnosis, differential diagnosis, need for work up , treatment options, alternatives and side effects.     Please see note for details about time spent which includes:   · pre-visit preparation  · reviewing records  · face to face time with the patient   · timely documentation of the encounter  · ordering medications/tests  · communication with care team  · care coordination    I appreciate the opportunity to be part of your patient's medical care and will keep you, as the referring and primary physicians, informed about the care of your patient, including possible future surgery and pathology findings. Please feel free to contact me should you have any  questions.      Brittany Shah MD

## 2024-03-08 NOTE — PATIENT INSTRUCTIONS
Labs and f/u in 2 mo     Do not Glipizide at bedtime, take it with your meal.     When starting  Farxiga, stop glipizide . Sent Rx again to correct pharmacy   CGM - insurance did not cover it . Will get preAuth if possible    After 4 doses of mounjaro 12.5 mg increaser to 15 mg weekly     metformin 1000 mg twice a day     Thyroid medication dose Levothyroxine 112 mcg  at bedtime. Take you medication on empty stomach, not with any other medication or food. Wait 60 minutes before eating. Wait 4 hours before taking Vitamins, Calcium or iron.  Wait 60 minutes before eating. Do not take the medication on the morning of the lab test. Do not take Biotin/Turmeric 1 week before the test.      Continue Valsartan  and Rosuvastatin  40 mg     If you have low blood sugar <70, take 15 grams of carb (8 oz juice or regular soda) and recheck in 15 minutes.    Follow up with podiatry and eye doctor annually.   Patients need to wear covered shoes all the time and check feet daily.   Bring your meter/BG log to the next visit.

## 2024-03-08 NOTE — TELEPHONE ENCOUNTER
Patient had f/u with Dr. Shah today - brought letter from [employer] that freestyle libre3:   We are unable to approve this request for the following reasons:  We were unable to reach your prescriber in order to complete the coverage review process.      Per chart review - no message from insurance/[employer] -no PA done for libre3 sensors - PA done via [employer] - patient not on insulin

## 2024-03-11 ENCOUNTER — TELEPHONE (OUTPATIENT)
Dept: ENDOCRINOLOGY CLINIC | Facility: CLINIC | Age: 64
End: 2024-03-11

## 2024-03-11 ENCOUNTER — OFFICE VISIT (OUTPATIENT)
Facility: LOCATION | Age: 64
End: 2024-03-11

## 2024-03-11 VITALS
WEIGHT: 300 LBS | OXYGEN SATURATION: 95 % | BODY MASS INDEX: 38.5 KG/M2 | DIASTOLIC BLOOD PRESSURE: 63 MMHG | HEART RATE: 80 BPM | HEIGHT: 74 IN | SYSTOLIC BLOOD PRESSURE: 116 MMHG

## 2024-03-11 DIAGNOSIS — E11.51 TYPE 2 DIABETES MELLITUS WITH DIABETIC PERIPHERAL ANGIOPATHY WITHOUT GANGRENE, WITHOUT LONG-TERM CURRENT USE OF INSULIN (HCC): ICD-10-CM

## 2024-03-11 DIAGNOSIS — I15.2 HYPERTENSION ASSOCIATED WITH TYPE 2 DIABETES MELLITUS (HCC): ICD-10-CM

## 2024-03-11 DIAGNOSIS — E78.5 HYPERLIPIDEMIA ASSOCIATED WITH TYPE 2 DIABETES MELLITUS (HCC): ICD-10-CM

## 2024-03-11 DIAGNOSIS — E11.69 HYPERLIPIDEMIA ASSOCIATED WITH TYPE 2 DIABETES MELLITUS (HCC): ICD-10-CM

## 2024-03-11 DIAGNOSIS — N40.1 BPH WITH OBSTRUCTION/LOWER URINARY TRACT SYMPTOMS: ICD-10-CM

## 2024-03-11 DIAGNOSIS — M19.90 ARTHRITIS: ICD-10-CM

## 2024-03-11 DIAGNOSIS — Z98.890 HISTORY OF CAROTID ENDARTERECTOMY: ICD-10-CM

## 2024-03-11 DIAGNOSIS — N13.8 BPH WITH OBSTRUCTION/LOWER URINARY TRACT SYMPTOMS: ICD-10-CM

## 2024-03-11 DIAGNOSIS — R29.818 NEUROGENIC CLAUDICATION: ICD-10-CM

## 2024-03-11 DIAGNOSIS — E11.59 HYPERTENSION ASSOCIATED WITH TYPE 2 DIABETES MELLITUS (HCC): ICD-10-CM

## 2024-03-11 DIAGNOSIS — Z95.5 S/P DRUG ELUTING CORONARY STENT PLACEMENT: ICD-10-CM

## 2024-03-11 PROCEDURE — 99214 OFFICE O/P EST MOD 30 MIN: CPT | Performed by: INTERNAL MEDICINE

## 2024-03-11 RX ORDER — ROSUVASTATIN CALCIUM 40 MG/1
40 TABLET, COATED ORAL NIGHTLY
Qty: 90 TABLET | Refills: 9 | Status: SHIPPED | OUTPATIENT
Start: 2024-03-11

## 2024-03-11 RX ORDER — PREGABALIN 100 MG/1
100 CAPSULE ORAL 3 TIMES DAILY
Qty: 30 CAPSULE | Refills: 0 | OUTPATIENT
Start: 2024-03-11

## 2024-03-11 RX ORDER — ASPIRIN 81 MG/1
81 TABLET ORAL DAILY
Qty: 90 TABLET | Refills: 9 | Status: SHIPPED | OUTPATIENT
Start: 2024-03-11

## 2024-03-11 RX ORDER — PREGABALIN 100 MG/1
100 CAPSULE ORAL 3 TIMES DAILY
Qty: 270 CAPSULE | Refills: 9 | Status: SHIPPED | OUTPATIENT
Start: 2024-03-11 | End: 2024-03-11

## 2024-03-11 RX ORDER — FINASTERIDE 5 MG/1
5 TABLET, FILM COATED ORAL DAILY
Qty: 90 TABLET | Refills: 9 | Status: SHIPPED | OUTPATIENT
Start: 2024-03-11

## 2024-03-11 RX ORDER — VALSARTAN 160 MG/1
160 TABLET ORAL DAILY
Qty: 90 TABLET | Refills: 9 | Status: SHIPPED | OUTPATIENT
Start: 2024-03-11

## 2024-03-11 RX ORDER — DULOXETIN HYDROCHLORIDE 30 MG/1
30 CAPSULE, DELAYED RELEASE ORAL DAILY
Qty: 90 CAPSULE | Refills: 9 | Status: SHIPPED | OUTPATIENT
Start: 2024-03-11

## 2024-03-11 RX ORDER — PREGABALIN 100 MG/1
100 CAPSULE ORAL 3 TIMES DAILY
Qty: 270 CAPSULE | Refills: 9 | Status: SHIPPED | OUTPATIENT
Start: 2024-03-11

## 2024-03-11 RX ORDER — TAMSULOSIN HYDROCHLORIDE 0.4 MG/1
0.8 CAPSULE ORAL DAILY
Qty: 180 CAPSULE | Refills: 9 | Status: SHIPPED | OUTPATIENT
Start: 2024-03-11

## 2024-03-11 NOTE — PROGRESS NOTES
INTERNAL MEDICINE OFFICE NOTE     Patient ID: Mando Puri is a 64 year old male.  Today's Date: 03/11/24  Chief Complaint: Medication Request (Pt here for Meds Refills )    HPI  1.  He has type 2 diabetes currently managed by endocrine, notes reviewed and appreciated, he is going to be rechallenge with SGLT2 inhibitors and been titrated up on GLP-1 agonist with the plans to stop the glipizide and metformin depending on his progress.    2.  He is hypertension associate with type 2 diabetes, blood pressure is well-controlled on valsartan and metoprolol, no complaints.    3.  He has dyslipidemia currently on rosuvastatin 40 mg, LDL well-controlled, no complaints.    4.  He has BPH currently on finasteride and tamsulosin, he is doing well, no complaints.    5.  He is currently on pregabalin 100 mg every 8 for neurogenic claudication, no complaints and is doing well on this medication.    Vitals:    03/11/24 1704   BP: 116/63   Pulse: 80   SpO2: 95%   Weight: 300 lb (136.1 kg)   Height: 6' 2\" (1.88 m)     body mass index is 38.52 kg/m².  BP Readings from Last 3 Encounters:   03/11/24 116/63   03/08/24 130/70   02/06/24 128/64     The 10-year ASCVD risk score (Tej GARNICA, et al., 2019) is: 14.8%    Values used to calculate the score:      Age: 64 years      Sex: Male      Is Non- : No      Diabetic: Yes      Tobacco smoker: No      Systolic Blood Pressure: 116 mmHg      Is BP treated: Yes      HDL Cholesterol: 44 mg/dL      Total Cholesterol: 137 mg/dL  Medications reviewed:  Current Outpatient Medications   Medication Sig Dispense Refill    aspirin (ASPIRIN 81) 81 MG Oral Tab EC Take 1 tablet (81 mg total) by mouth daily. FOR HEART. 90 tablet 9    metoprolol tartrate 25 MG Oral Tab Take 1 tablet (25 mg total) by mouth 2 (two) times daily. FOR HEART. 90 tablet 9    valsartan 160 MG Oral Tab Take 1 tablet (160 mg total) by mouth daily. FOR BLOOD PRESSURE. 90 tablet 9     rosuvastatin 40 MG Oral Tab Take 1 tablet (40 mg total) by mouth nightly. FOR CHOLESTEROL. 90 tablet 9    DULoxetine 30 MG Oral Cap DR Particles Take 1 capsule (30 mg total) by mouth daily. TAKE 1 CAPSULE IN THE MORNING FOR ANXIETY/DEPRESSION/ARTHRITIS PAIN 90 capsule 9    finasteride 5 MG Oral Tab Take 1 tablet (5 mg total) by mouth daily. FOR PROSTATE. 90 tablet 9    tamsulosin 0.4 MG Oral Cap Take 2 capsules (0.8 mg total) by mouth daily. FOR PROSTATE. 180 capsule 9    ticagrelor 90 MG Oral Tab Take 1 tablet (90 mg total) by mouth every 12 (twelve) hours. FOR HEART STENTS. 180 tablet 9    pregabalin 100 MG Oral Cap Take 1 capsule (100 mg total) by mouth 3 (three) times daily. FOR NERVE PAIN. 270 capsule 9    dapagliflozin (FARXIGA) 10 MG Oral Tab Take 1 tablet (10 mg total) by mouth daily. 30 tablet 2    [START ON 7/26/2024] Tirzepatide (MOUNJARO) 15 MG/0.5ML Subcutaneous Solution Pen-injector Inject 15 mg into the skin every 7 days. 6 mL 0    levothyroxine 112 MCG Oral Tab Take 1 tablet (112 mcg total) by mouth at bedtime. 90 tablet 1    glipiZIDE 5 MG Oral Tab Take 1 tablet (5 mg total) by mouth daily. FOR DIABETES. STOP/DO NOT TAKE IF FASTING SUGAR IS LESS THAN 100. 90 tablet 2    Continuous Blood Gluc Sensor (FREESTYLE MARSHA 3 SENSOR) Does not apply Misc 1 each every 14 (fourteen) days. 6 each 1    metFORMIN  MG Oral Tablet 24 Hr Take 2 tablets (1,000 mg total) by mouth 2 (two) times daily with meals. 360 tablet 1    Tirzepatide (MOUNJARO) 15 MG/0.5ML Subcutaneous Solution Pen-injector Inject 15 mg into the skin every 7 days. 6 mL 0    magnesium oxide 400 MG Oral Tab Take 1 tablet (400 mg total) by mouth at bedtime.      traMADol 50 MG Oral Tab Take 1 tablet (50 mg total) by mouth every 6 (six) hours as needed for Pain. 10 tablet 0         Assessment & Plan    1. Hypertension associated with type 2 diabetes mellitus (HCC)  -     Aspirin; Take 1 tablet (81 mg total) by mouth daily. FOR HEART.  Dispense:  90 tablet; Refill: 9  -     Metoprolol Tartrate; Take 1 tablet (25 mg total) by mouth 2 (two) times daily. FOR HEART.  Dispense: 90 tablet; Refill: 9  -     Valsartan; Take 1 tablet (160 mg total) by mouth daily. FOR BLOOD PRESSURE.  Dispense: 90 tablet; Refill: 9  2. History of carotid endarterectomy  -     Aspirin; Take 1 tablet (81 mg total) by mouth daily. FOR HEART.  Dispense: 90 tablet; Refill: 9  -     Metoprolol Tartrate; Take 1 tablet (25 mg total) by mouth 2 (two) times daily. FOR HEART.  Dispense: 90 tablet; Refill: 9  -     Valsartan; Take 1 tablet (160 mg total) by mouth daily. FOR BLOOD PRESSURE.  Dispense: 90 tablet; Refill: 9  -     Rosuvastatin Calcium; Take 1 tablet (40 mg total) by mouth nightly. FOR CHOLESTEROL.  Dispense: 90 tablet; Refill: 9  3. Hyperlipidemia associated with type 2 diabetes mellitus (HCC)  -     Aspirin; Take 1 tablet (81 mg total) by mouth daily. FOR HEART.  Dispense: 90 tablet; Refill: 9  -     Rosuvastatin Calcium; Take 1 tablet (40 mg total) by mouth nightly. FOR CHOLESTEROL.  Dispense: 90 tablet; Refill: 9  4. S/P drug eluting coronary stent placement  -     Aspirin; Take 1 tablet (81 mg total) by mouth daily. FOR HEART.  Dispense: 90 tablet; Refill: 9  -     Rosuvastatin Calcium; Take 1 tablet (40 mg total) by mouth nightly. FOR CHOLESTEROL.  Dispense: 90 tablet; Refill: 9  -     Ticagrelor; Take 1 tablet (90 mg total) by mouth every 12 (twelve) hours. FOR HEART STENTS.  Dispense: 180 tablet; Refill: 9  5. Type 2 diabetes mellitus with diabetic peripheral angiopathy without gangrene, without long-term current use of insulin (HCC)  -     Valsartan; Take 1 tablet (160 mg total) by mouth daily. FOR BLOOD PRESSURE.  Dispense: 90 tablet; Refill: 9  6. Arthritis  -     DULoxetine HCl; Take 1 capsule (30 mg total) by mouth daily. TAKE 1 CAPSULE IN THE MORNING FOR ANXIETY/DEPRESSION/ARTHRITIS PAIN  Dispense: 90 capsule; Refill: 9  7. BPH with obstruction/lower urinary tract  symptoms  -     Finasteride; Take 1 tablet (5 mg total) by mouth daily. FOR PROSTATE.  Dispense: 90 tablet; Refill: 9  -     Tamsulosin HCl; Take 2 capsules (0.8 mg total) by mouth daily. FOR PROSTATE.  Dispense: 180 capsule; Refill: 9  8. Neurogenic claudication  -     Discontinue: Pregabalin; Take 1 capsule (100 mg total) by mouth 3 (three) times daily. FOR NERVE PAIN.  Dispense: 270 capsule; Refill: 9  -     Pregabalin; Take 1 capsule (100 mg total) by mouth 3 (three) times daily. FOR NERVE PAIN.  Dispense: 270 capsule; Refill: 9    Overall he is doing well today.  We reviewed all of his medications and I provided refills long-term except for the diabetes meds which are being managed by endocrine, he will continue follow-up with endocrine and see me back for annual, sooner for any other issues that may arise.    Follow Up: Return in about 9 months (around 12/11/2024) for MEDICARE ANNUAL. ARRIVE 15 MINUTES EARLY.; sooner for any other issues. ..         Objective: Results:   Physical Exam  Vitals and nursing note reviewed.   Constitutional:       General: He is not in acute distress.     Appearance: Normal appearance.   HENT:      Head: Normocephalic.      Right Ear: External ear normal.      Left Ear: External ear normal.   Eyes:      Extraocular Movements: Extraocular movements intact.      Conjunctiva/sclera: Conjunctivae normal.   Pulmonary:      Effort: Pulmonary effort is normal.   Musculoskeletal:         General: Normal range of motion.      Cervical back: Normal range of motion and neck supple.   Skin:     Coloration: Skin is not jaundiced.   Neurological:      General: No focal deficit present.      Mental Status: He is alert and oriented to person, place, and time. Mental status is at baseline.   Psychiatric:         Mood and Affect: Mood normal.         Behavior: Behavior normal.        Reviewed:    Patient Active Problem List    Diagnosis    S/P drug eluting coronary stent placement    Neurogenic  claudication    Leg wound, left    Abnormal Holter monitor finding    Left carotid artery stenosis    Cerebellar infarct ([facility])    Transient ischemic attack (TIA)    BPH with obstruction/lower urinary tract symptoms    Major depressive disorder    Venous stasis dermatitis of both lower extremities    Non-pressure chronic ulcer of left lower leg, limited to breakdown of skin ([facility])    Patellofemoral arthritis    Varicose veins of left lower extremity with inflammation, with ulcer of ankle limited to breakdown of skin ([facility])    Hypertension associated with type 2 diabetes mellitus ([facility])    Hyperlipidemia associated with type 2 diabetes mellitus ([facility])    Type 2 diabetes mellitus with diabetic peripheral angiopathy without gangrene, without long-term current use of insulin ([facility])    Hypothyroidism    History of right-sided carotid endarterectomy    Detrusor overactivity    Primary osteoarthritis of right knee    Gastroesophageal reflux disease with esophagitis    Class 2 severe obesity due to excess calories with serious comorbidity and body mass index (BMI) of 38.0 to 38.9 in adult ([facility])      Allergies   Allergen Reactions    Empagliflozin OTHER (SEE COMMENTS)     Frequent urination, unbearable.    Penicillins UNKNOWN     Patient does not recall reaction    Other UNKNOWN        Social History     Socioeconomic History    Marital status:     Number of children: 4   Tobacco Use    Smoking status: Former     Types: Cigarettes     Quit date:      Years since quittin.2     Passive exposure: Past    Smokeless tobacco: Never   Vaping Use    Vaping Use: Never used   Substance and Sexual Activity    Alcohol use: Never    Drug use: Never   Other Topics Concern    Caffeine Concern Yes     Comment: coffee daily    Exercise No   Social History Narrative    The patient does use an assistive device..  Brace    The patient does live in a home with stairs.      Review of Systems  All other systems negative unless otherwise  stated in ROS or HPI above.       Alvino Wallace MD  Internal Medicine       Call office with any questions or seek emergency care if necessary.   Patient understands and agrees to follow directions and advice.      ----------------------------------------- PATIENT INSTRUCTIONS-----------------------------------------   .    Patient Instructions   Please look up plantar fasciitis exercises, this typically involves keeping your foot in warm water and then using a hard ball to rub your foot and release the tissues.

## 2024-03-11 NOTE — TELEPHONE ENCOUNTER
Patient calling, checking on status of medication refill for generic lyrica    Patient says it was denied and he would like to talk with doctor about this.     Scheduled for below to review and discuss today     Future Appointments   Date Time Provider Department Center   3/11/2024  5:20 PM Alvino Wallace MD [facility]    3/14/2024  9:40 AM Behar, Alex, MD [facility]   3/21/2024 10:00 AM Behar, Alex, MD [facility] [place] [facility]   3/27/2024 10:20 AM Alvino Wallace MD [facility]   5/10/2024  9:30 AM [facility]

## 2024-03-11 NOTE — TELEPHONE ENCOUNTER
Pregabalin marked as stop taking this medication-cancelled at pharmacy on 01/03/2024.    Refill Encounter note01/02/2024    2. Also Pregabalin is out of stock at [facility] and [facility]      Asking if he can take Gabapentin in the mean times   ( needs 30 day supply )_         Pollo Alcantara MD    1/3/24  1:09 PM  Note  Ok to take gabapentin in the interm, please relay to pt

## 2024-03-11 NOTE — TELEPHONE ENCOUNTER
Patient called to follow up on refill request.  Patient states he is out of the medication.     [facility]/pharmacy #[id number] - [place], [place] - [address] AT [address], [phone], [phone]

## 2024-03-11 NOTE — TELEPHONE ENCOUNTER
Called [facility] at [phone]  To follow up on formulary for patient.   Per representative, RX is needed. Waiting on doctors approval for home delivery.  Per Betzaida  a prescription needs to be called in or faxed to the following.  [facility]   [phone]   Prescription called in

## 2024-03-11 NOTE — TELEPHONE ENCOUNTER
Please review; protocol failed/No Protocol    Please disregard high priority.     Pregabalin marked as stop taking this medication by Dr. Alcantara-cancelled at pharmacy on 01/03/2024. All refills were cancelled.      Refill Encounter note 01/02/2024     2. Also Pregabalin is out of stock at "[facility]" and [facility]      Asking if he can take Gabapentin in the mean times   ( needs 30 day supply )_          Pollo Alcantara MD    1/3/24  1:09 PM  Note  Ok to take gabapentin in the interm, please relay to pt           Requested Prescriptions   Pending Prescriptions Disp Refills    PREGABALIN 100 MG Oral Cap [Pharmacy Med Name: PREGABALIN 100 MG CAPSULE] 30 capsule 0     Sig: TAKE 1 CAPSULE (100 MG TOTAL) BY MOUTH THREE TIMES DAILY.       Controlled Substance Medication Failed - 3/11/2024  9:37 AM        Failed - This medication is a controlled substance - forward to provider to refill       Neurology Medications Passed - 3/11/2024  9:37 AM        Passed - In person appointment or virtual visit in the past 6 mos or appointment in next 3 mos     Recent Outpatient Visits              3 days ago Type 2 diabetes mellitus with diabetic peripheral angiopathy without gangrene, without long-term current use of insulin ([facility])    [facility] Brittany Shah MD    Office Visit    4 days ago Primary osteoarthritis of right knee    [facility] Behar, Alex, MD    Office Visit    2 weeks ago Type 2 diabetes mellitus with diabetic peripheral angiopathy without gangrene, without long-term current use of insulin ([facility])    [facility]    Nurse Only    1 month ago Type 2 diabetes mellitus with polyneuropathy ([facility])    [facility] Cisco Muse DPM    Office Visit    1 month ago Type 2 diabetes mellitus with diabetic peripheral angiopathy without gangrene, without  long-term current use of insulin (HCC)    [facility] Brittany Shah MD    Office Visit          Future Appointments         Provider Department Appt Notes    In 3 days Behar, Alex, MD [facility] RIGHT knee Orthovisc series of 2/3 us guidance    In 1 week Behar, Alex, MD [facility] RIGHT knee Orthovisc series of 3/3 us guidance w/ CSI    In 2 weeks Alvino Wallace MD [facility] 3 mo fu    In 2 months Brittany Shah MD [facility] 2 mon f/u                  Future Appointments         Provider Department Appt Notes    In 3 days Behar, Alex, MD [facility] RIGHT knee Orthovisc series of 2/3 us guidance    In 1 week Behar, Alex, MD [facility] RIGHT knee Orthovisc series of 3/3 us guidance w/ CSI    In 2 weeks Alvino Wallace MD [facility] 3 mo fu    In 2 months Brittany Shah MD [facility] 2 mon f/u          Recent Outpatient Visits              3 days ago Type 2 diabetes mellitus with diabetic peripheral angiopathy without gangrene, without long-term current use of insulin ([facility])    [facility] Brittany Shah MD    Office Visit    4 days ago Primary osteoarthritis of right knee    [facility] Behar, Alex, MD    Office Visit    2 weeks ago Type 2 diabetes mellitus with diabetic peripheral angiopathy without gangrene, without long-term current use of insulin ([facility])    [facility]    Nurse Only    1 month ago Type 2 diabetes mellitus with polyneuropathy ([facility])    [facility] Cisco Muse DPM    Office Visit    1 month ago Type 2 diabetes mellitus with diabetic peripheral angiopathy without gangrene, without long-term current use of insulin (HCC)    [facility] Brittany Shah MD    Office Visit

## 2024-03-11 NOTE — PATIENT INSTRUCTIONS
Please look up plantar fasciitis exercises, this typically involves keeping your foot in warm water and then using a hard ball to rub your foot and release the tissues.

## 2024-03-11 NOTE — TELEPHONE ENCOUNTER
Received a denial letter from  [employer] that Dapagliflozin 10mg.     Denial reason:  Patent was directed to [employer] (brand) PA may be required. Coverage cannot be authorized at this time.   ID number- [id number]      Denial letter placed in denial folder.

## 2024-03-12 ENCOUNTER — TELEPHONE (OUTPATIENT)
Facility: LOCATION | Age: 64
End: 2024-03-12

## 2024-03-12 NOTE — TELEPHONE ENCOUNTER
Pharmacy sent a message saying asking for an alternative as 9 refills ar not possible due to state law... Please advise      pregabalin 100 MG Oral Cap, Take 1 capsule (100 mg total) by mouth 3 (three) times daily. FOR NERVE PAIN., Disp: 270 capsule, Rfl: 9

## 2024-03-12 NOTE — TELEPHONE ENCOUNTER
Verified with [facility] and she was not sure why this message was sent to us. [facility] said to disregard as pt picked it up up yesterday 03/11/2024.

## 2024-03-14 ENCOUNTER — OFFICE VISIT (OUTPATIENT)
Dept: PHYSICAL MEDICINE AND REHAB | Facility: CLINIC | Age: 64
End: 2024-03-14
Payer: COMMERCIAL

## 2024-03-14 DIAGNOSIS — M17.11 PRIMARY OSTEOARTHRITIS OF RIGHT KNEE: Primary | ICD-10-CM

## 2024-03-14 DIAGNOSIS — M22.2X9 PATELLOFEMORAL PAIN SYNDROME, UNSPECIFIED LATERALITY: ICD-10-CM

## 2024-03-14 NOTE — PATIENT INSTRUCTIONS
Post Injection Instructions     Please do not do anything strenuous over the next two days (if you had a knee injection do not walk more than 2 city blocks, do not attend any aerobic classes, do not run, no heavy lifting, no prolong standing).  You may resume your day to day activities after your injection.  You may experience some mild amount of swelling after the procedure.  Please ice your joint that was injected at least 5-6 times a day (15 minutes) for two days after (this will help prevent worsening pain that sometimes occurs after an injection).  Only take tylenol if needed for pain for the first few days.  Watch for signs of infection which include redness, warmth, worsening pain, fevers or chills.  If you develop any of these signs call the office immediately at [phone]    Everyone responds differently to injections, but you can expect your peak effects a few weeks after your last injection.  Alex B. Behar MD  Physical Medicine and Rehabilitation/Sports Medicine  [facility]

## 2024-03-14 NOTE — PROCEDURES
[facility]   Knee Joint Injection Procedure Note    CHIEF COMPLAINT:  No chief complaint on file.      PROCEDURE PERFORMED:  Right knee intra-articular orthovisc #2/3 under ultrasound guidance    INDICATIONS:  Right knee pain and osteoarthritis    PRIMARY PROCEDURALIST:  Alex Behar, MD    INFORMED CONSENT & TIME OUT:   As documented in the Time Out and Pre-Procedure Check Lists.  Verbal consent was obtained    Vitals: [unfilled]  Labs (document last wbc, plts, hgb, and PT/INR):    Office Visit on 03/08/2024   Component Date Value Ref Range Status    GLUCOSE BLOOD 03/08/2024 162   Final    Test Strip Lot # 03/08/2024 [id number]  Numeric Final    Test Strip Expiration Date 03/08/2024 81,624  Date Final   Office Visit on 02/06/2024   Component Date Value Ref Range Status    GLUCOSE BLOOD 02/06/2024 188   Final    Test Strip Lot # 02/06/2024 [id number]  Numeric Final    Test Strip Expiration Date 02/06/2024 80,424  Date Final    HEMOGLOBIN A1C 02/06/2024 6.8 (A)  4.3 - 5.6 % Final    Cartridge Lot# 02/06/2024 [id number]  Numeric Final    Cartridge Expiration Date 02/06/2024 113,025  Date Final   Admission on 10/01/2023, Discharged on 10/01/2023   Component Date Value Ref Range Status    Ventricular rate 10/01/2023 101  BPM Final    Atrial rate 10/01/2023 101  BPM Final    P-R Interval 10/01/2023 176  ms Final    QRS Duration 10/01/2023 96  ms Final    Q-T Interval 10/01/2023 338  ms Final    QTC Calculation (Be[employer]) 10/01/2023 438  ms Final    P Axis 10/01/2023 32  degrees Final    R Axis 10/01/2023 3  degrees Final    T Axis 10/01/2023 23  degrees Final    Hold Lavender 10/01/2023 Auto Resulted   Final    Hold Lt Green 10/01/2023 Auto Resulted   Final    Hold Blue 10/01/2023 Auto Resulted   Final    SARS-CoV-2 (COVID-19) - (Gene[employer]pert) 10/01/2023 Not Detected  Not Detected Final    Influenza A by PCR 10/01/2023 Negative  Negative Final    Influenza B by PCR 10/01/2023 Negative   Negative Final    RSV by PCR 10/01/2023 Negative  Negative Final    Glucose 10/01/2023 168 (H)  70 - 99 mg/dL Final    Sodium 10/01/2023 143  136 - 145 mmol/L Final    Potassium 10/01/2023 4.1  3.5 - 5.1 mmol/L Final    Chloride 10/01/2023 108  98 - 112 mmol/L Final    CO2 10/01/2023 31.0  21.0 - 32.0 mmol/L Final    Anion Gap 10/01/2023 4  0 - 18 mmol/L Final    BUN 10/01/2023 16  7 - 18 mg/dL Final    Creatinine 10/01/2023 1.34 (H)  0.70 - 1.30 mg/dL Final    BUN/CREA Ratio 10/01/2023 11.9  10.0 - 20.0 Final    Calcium, Total 10/01/2023 9.4  8.5 - 10.1 mg/dL Final    Calculated Osmolality 10/01/2023 301 (H)  275 - 295 mOsm/kg Final    eGFR-Cr 10/01/2023 60  >=60 mL/min/1.73m2 Final    Troponin I (High Sensitivity) 10/01/2023 24  <=79 ng/L Final    WBC 10/01/2023 9.3  4.0 - 11.0 x10(3) uL Final    RBC 10/01/2023 4.27 (L)  4.30 - 5.70 x10(6)uL Final    HGB 10/01/2023 12.5 (L)  13.0 - 17.5 g/dL Final    HCT 10/01/2023 39.2  39.0 - 53.0 % Final    MCV 10/01/2023 91.8  80.0 - 100.0 fL Final    MCH 10/01/2023 29.3  26.0 - 34.0 pg Final    MCHC 10/01/2023 31.9  31.0 - 37.0 g/dL Final    RDW-SD 10/01/2023 48.6 (H)  35.1 - 46.3 fL Final    RDW 10/01/2023 14.3  11.0 - 15.0 % Final    PLT 10/01/2023 213.0  150.0 - 450.0 10(3)uL Final    Neutrophil Absolute Prelim 10/01/2023 6.63  1.50 - 7.70 x10 (3) uL Final    Neutrophil Absolute 10/01/2023 6.63  1.50 - 7.70 x10(3) uL Final    Lymphocyte Absolute 10/01/2023 1.33  1.00 - 4.00 x10(3) uL Final    Monocyte Absolute 10/01/2023 1.07 (H)  0.10 - 1.00 x10(3) uL Final    Eosinophil Absolute 10/01/2023 0.16  0.00 - 0.70 x10(3) uL Final    Basophil Absolute 10/01/2023 0.06  0.00 - 0.20 x10(3) uL Final    Immature Granulocyte Absolute 10/01/2023 0.02  0.00 - 1.00 x10(3) uL Final    Neutrophil % 10/01/2023 71.7  % Final    Lymphocyte % 10/01/2023 14.3  % Final    Monocyte % 10/01/2023 11.5  % Final    Eosinophil % 10/01/2023 1.7  % Final    Basophil % 10/01/2023 0.6  % Final    Immature  Granulocyte % 10/01/2023 0.2  % Final   ]    PROCEDURE:  The procedure, risks, benefits and alternatives were discussed with the patient.  Patient verbalized understanding and gave consent.  The Right knee was prepped and draped in the usual sterile fashion. Using a linear ultrasound probe, the superolateral patella-femoral joint space was visualized. Using a 30-gauge, 1/2-inch needle, 1 cc of 1% lidocaine was used to numb the skin and soft tissues in the supero-lateral approach.  The intra-articular space was then accessed using an 18-gauge, 1-1/2-inch needle, which was visualized on ultrasound, using approximately 3 cc of 1% lidocaine to numb the soft tissue.  Once the intra-articular space was visualized on ultrasound, The Orthovisc injection was attached to the needle and injected. During the injection, the Orthovisc was noted to enter the intra-articular space. The needle was then removed and hemostasis was achieved.  Skin was cleansed and a dry dressing was placed.     Patient tolerated the procedure well and no immediate complications noted.         Patient verbalized understanding of assessment and plan.  Patient is in agreement with the plan.  All questions were answered.  No barriers to learning identified. Permanent pictures were saved in our PACS systeme.       INSTRUCTIONS GIVEN TO PATIENT:    \"You will see an effect in the next 2-3 days.  Please contact me if you have fevers, worsening swelling, worsening pain, decreased range of motion, increased redness, chills, or anything that makes you concerned about how the joint we injected feels/looks.  If you do not reach me in a reasonable time, please report directly to the emergency room for further evaluation\"    1 week for #3/3 with CSI Alex B. Behar MD, [facility]  Physical Medicine and Rehabilitation/Sports Medicine  [facility]

## 2024-03-20 ENCOUNTER — OFFICE VISIT (OUTPATIENT)
Dept: PODIATRY CLINIC | Facility: CLINIC | Age: 64
End: 2024-03-20

## 2024-03-20 ENCOUNTER — HOSPITAL ENCOUNTER (OUTPATIENT)
Dept: GENERAL RADIOLOGY | Age: 64
Discharge: HOME | End: 2024-03-20
Attending: PODIATRIST
Payer: COMMERCIAL

## 2024-03-20 DIAGNOSIS — M79.672 PAIN OF LEFT HEEL: ICD-10-CM

## 2024-03-20 DIAGNOSIS — B07.0 PLANTAR WART OF LEFT FOOT: ICD-10-CM

## 2024-03-20 DIAGNOSIS — L84 FOOT CALLUS: ICD-10-CM

## 2024-03-20 DIAGNOSIS — M79.672 PAIN OF LEFT HEEL: Primary | ICD-10-CM

## 2024-03-20 DIAGNOSIS — E11.42 TYPE 2 DIABETES MELLITUS WITH POLYNEUROPATHY (HCC): ICD-10-CM

## 2024-03-20 PROCEDURE — 17110 DESTRUCTION B9 LES UP TO 14: CPT | Performed by: PODIATRIST

## 2024-03-20 PROCEDURE — 99213 OFFICE O/P EST LOW 20 MIN: CPT | Performed by: PODIATRIST

## 2024-03-20 PROCEDURE — 73650 X-RAY EXAM OF HEEL: CPT | Performed by: PODIATRIST

## 2024-03-20 NOTE — PROGRESS NOTES
[facility] Podiat[facility]  Progress Note    Mando Puri is a 64 year old male.   Chief Complaint   Patient presents with    Callus     Left heel - onset about 1 week ago and is painful when he walks on it - rates pain as 8-9/10 at all the time - this AM his HT=204         HPI:     This is a pleasant well controlled Diabetic male with PMH of Left venous stasis ulcer and B/L LE venous ablation.  He is taking lyrica for the diabetic neuropathy which is not really helping.     He presents to clinic today due to left heel pain which started about 1 week ago.  He believes there is a callus but denies any drainage.  He denies stepping on anything.        Allergies: Empagliflozin, Penicillins, and Other   Current Outpatient Medications   Medication Sig Dispense Refill    aspirin (ASPIRIN 81) 81 MG Oral Tab EC Take 1 tablet (81 mg total) by mouth daily. FOR HEART. 90 tablet 9    metoprolol tartrate 25 MG Oral Tab Take 1 tablet (25 mg total) by mouth 2 (two) times daily. FOR HEART. 90 tablet 9    valsartan 160 MG Oral Tab Take 1 tablet (160 mg total) by mouth daily. FOR BLOOD PRESSURE. 90 tablet 9    rosuvastatin 40 MG Oral Tab Take 1 tablet (40 mg total) by mouth nightly. FOR CHOLESTEROL. 90 tablet 9    DULoxetine 30 MG Oral Cap DR Particles Take 1 capsule (30 mg total) by mouth daily. TAKE 1 CAPSULE IN THE MORNING FOR ANXIETY/DEPRESSION/ARTHRITIS PAIN 90 capsule 9    finasteride 5 MG Oral Tab Take 1 tablet (5 mg total) by mouth daily. FOR PROSTATE. 90 tablet 9    tamsulosin 0.4 MG Oral Cap Take 2 capsules (0.8 mg total) by mouth daily. FOR PROSTATE. 180 capsule 9    ticagrelor 90 MG Oral Tab Take 1 tablet (90 mg total) by mouth every 12 (twelve) hours. FOR HEART STENTS. 180 tablet 9    pregabalin 100 MG Oral Cap Take 1 capsule (100 mg total) by mouth 3 (three) times daily. FOR NERVE PAIN. 270 capsule 9    dapagliflozin (FARXIGA) 10 MG Oral Tab Take 1 tablet (10 mg total) by mouth daily. 30 tablet 2    [START ON  7/26/2024] Tirzepatide (MOUNJARO) 15 MG/0.5ML Subcutaneous Solution Pen-injector Inject 15 mg into the skin every 7 days. 6 mL 0    levothyroxine 112 MCG Oral Tab Take 1 tablet (112 mcg total) by mouth at bedtime. 90 tablet 1    glipiZIDE 5 MG Oral Tab Take 1 tablet (5 mg total) by mouth daily. FOR DIABETES. STOP/DO NOT TAKE IF FASTING SUGAR IS LESS THAN 100. 90 tablet 2    Continuous Blood Gluc Sensor (FREESTYLE MARSHA 3 SENSOR) Does not apply Misc 1 each every 14 (fourteen) days. 6 each 1    metFORMIN  MG Oral Tablet 24 Hr Take 2 tablets (1,000 mg total) by mouth 2 (two) times daily with meals. 360 tablet 1    Tirzepatide (MOUNJARO) 15 MG/0.5ML Subcutaneous Solution Pen-injector Inject 15 mg into the skin every 7 days. 6 mL 0    magnesium oxide 400 MG Oral Tab Take 1 tablet (400 mg total) by mouth at bedtime.      traMADol 50 MG Oral Tab Take 1 tablet (50 mg total) by mouth every 6 (six) hours as needed for Pain. 10 tablet 0      Past Medical History:   Diagnosis Date    Benign head tremor     BPH (benign prostatic hyperplasia)     Diabetes (HCC)     Diverticulitis 10/2019    Former smoker     Hyperlipidemia     Hypertension     Neuropathic pain     Non-pressure chronic ulcer of right lower leg, limited to breakdown of skin (HCC) 12/7/2021    Obesity     Snoring     Thyroid disease       Past Surgical History:   Procedure Laterality Date    CAROTID ENDARTERECTOMY Right 04/29/2021    Surgeon: Dr. Rajiv Solorio at [facility]    NECK/CHEST PROCEDURE UNLISTED      per patient, a blockage was removed from the right side of his neck in 8/2021    OTHER SURGICAL HISTORY  2017    Small bowel Res.    OTHER SURGICAL HISTORY  11/12/2019    Colectomy      Family History   Problem Relation Age of Onset    Cancer Father         Prostate    Heart Disorder Father     Cancer Mother       Social History     Socioeconomic History    Marital status:     Number of children: 4   Tobacco Use    Smoking status: Former     Types:  Cigarettes     Quit date:      Years since quittin.2     Passive exposure: Past    Smokeless tobacco: Never   Vaping Use    Vaping Use: Never used   Substance and Sexual Activity    Alcohol use: Never    Drug use: Never   Other Topics Concern    Caffeine Concern Yes     Comment: coffee daily    Exercise No           REVIEW OF SYSTEMS:   Denies nausea, fever, chills  No calf pain  No other muscle or joint aches  Denies chest pain or shortness of breath.      EXAM:   There were no vitals taken for this visit.    Constitutional:   Patient in no apparent distress. Well kept. Of normal body habitus. Alert and oriented to person, place, and time.  Vascular Examination:  DP pulse is 2/4  PT pulse is 2/4  Capillary refill is immediate  Edema is present bilateral  Severe varicosities present bilateral  Temperature warm proximally to warm distally bilateral  Hemosiderin deposits Present bilateral  Integumentary Examination:   The patient's nails appear incurvated, thickened, elongated, dystrophic, discolored with subungual debris 1-5 right, 1-5  left nails.    Left distal 2nd and 3rd toe callus    Digital hair growth is present left and is present right.    Nucleated keratotic verruca type lesion left plantar heel measuring approx 0.2cmx0.2cm    Neurological Examination:  Monofilament (10-g) sensation is 2/5 to right and 2/5 to left.  Parasthesias present  Musculoskeletal Examination:  Muscle Strength is 5/5.  Hammer digit deformity present digits 2-5  bilateral.    Severe POP to left plantar heel callus        LABS & IMAGING:     Lab Results   Component Value Date     (H) 10/01/2023    BUN 16 10/01/2023    CREATSERUM 1.34 (H) 10/01/2023    BUNCREA 11.9 10/01/2023    ANIONGAP 4 10/01/2023    GFRAA 107 2022    GFRNAA 93 2022    CA 9.4 10/01/2023     10/01/2023    K 4.1 10/01/2023     10/01/2023    CO2 31.0 10/01/2023    OSMOCALC 301 (H) 10/01/2023        Lab Results   Component Value  Date     (H) 12/20/2022    A1C 6.8 (A) 02/06/2024        No results found.     ASSESSMENT AND PLAN:   Diagnoses and all orders for this visit:    Pain of left heel  -     XR HEEL (CALCANEUS) (MIN 2 VIEWS), LEFT (CPT=73650); Future    Plantar wart of left foot    Foot callus    Type 2 diabetes mellitus with polyneuropathy (HCC)                Plan:     Evaluated Left plantar heel which did reveal a deep seated callus which could be due to a plantar wart, but may also be due to a possible underlying FB.  No SOI and no drainage appreciated.        Recommended chemocautery acid treatment, pt would like to proceed.  Procedure: (38597) Verrucca Chemo Sx < 15 lesions   Discussed the etiology of plantar verruca and that this is of viral origin.   Reviewed the various treatment options including topical medications, freezing agents, cauterization and surgical excision.  Discussed warts are due to a virus.  Discussed potential for return specifically to prior location.  Verruca lesions were sharply pared with #15 blade to pinpoint bleeding.  Pt elects conservative treatment as listed below:  Phenol and salicylic acid pad was applied and covered with an occlusive dressing.           Pt to remove dressings tomorrow      Cont wearing DM shoes with inserts    Ordered left heel xrays to r/o FB.        RTC  2 weeks for xray review and possible 2nd left plantar heel acid treatment.  If no improvement will consider removal of possible FB.  When healed with have pt schedule his [facility] appt.      No follow-ups on file.    Cisco Muse DPM  3/20/24

## 2024-03-21 ENCOUNTER — OFFICE VISIT (OUTPATIENT)
Dept: PHYSICAL MEDICINE AND REHAB | Facility: CLINIC | Age: 64
End: 2024-03-21
Payer: COMMERCIAL

## 2024-03-21 DIAGNOSIS — M17.11 PRIMARY OSTEOARTHRITIS OF RIGHT KNEE: Primary | ICD-10-CM

## 2024-03-21 DIAGNOSIS — M22.2X9 PATELLOFEMORAL PAIN SYNDROME, UNSPECIFIED LATERALITY: ICD-10-CM

## 2024-03-21 PROCEDURE — 20611 DRAIN/INJ JOINT/BURSA W/US: CPT | Performed by: PHYSICAL MEDICINE & REHABILITATION

## 2024-03-21 RX ORDER — TRIAMCINOLONE ACETONIDE 40 MG/ML
40 INJECTION, SUSPENSION INTRA-ARTICULAR; INTRAMUSCULAR ONCE
Status: COMPLETED | OUTPATIENT
Start: 2024-03-21 | End: 2024-03-21

## 2024-03-21 RX ORDER — LIDOCAINE HYDROCHLORIDE 10 MG/ML
5 INJECTION, SOLUTION INFILTRATION; PERINEURAL ONCE
Status: COMPLETED | OUTPATIENT
Start: 2024-03-21 | End: 2024-03-21

## 2024-03-21 NOTE — PROCEDURES
CHIEF COMPLAINT:  No chief complaint on file.        PROCEDURE PERFORMED:  Right knee intra-articular orthovisc #3/3 with corticosteroid under ultrasound guidance     INDICATIONS:  Right knee pain and osteoarthritis     PRIMARY PROCEDURALIST:  Alex Behar, MD     INFORMED CONSENT & TIME OUT:   As documented in the Time Out and Pre-Procedure Check Lists.  Verbal consent was obtained     Vitals: [unfilled]  Labs (document last wbc, plts, hgb, and PT/INR):            Office Visit on 03/08/2024   Component Date Value Ref Range Status    GLUCOSE BLOOD 03/08/2024 162    Final    Test Strip Lot # 03/08/2024 [id number]  Numeric Final    Test Strip Expiration Date 03/08/2024 81,624  Date Final   Office Visit on 02/06/2024   Component Date Value Ref Range Status    GLUCOSE BLOOD 02/06/2024 188    Final    Test Strip Lot # 02/06/2024 [id number]  Numeric Final    Test Strip Expiration Date 02/06/2024 80,424  Date Final    HEMOGLOBIN A1C 02/06/2024 6.8 (A)  4.3 - 5.6 % Final    Cartridge Lot# 02/06/2024 [id number]  Numeric Final    Cartridge Expiration Date 02/06/2024 113,025  Date Final   Admission on 10/01/2023, Discharged on 10/01/2023   Component Date Value Ref Range Status    Ventricular rate 10/01/2023 101  BPM Final    Atrial rate 10/01/2023 101  BPM Final    P-R Interval 10/01/2023 176  ms Final    QRS Duration 10/01/2023 96  ms Final    Q-T Interval 10/01/2023 338  ms Final    QTC Calculation (Bezet) 10/01/2023 438  ms Final    P Axis 10/01/2023 32  degrees Final    R Axis 10/01/2023 3  degrees Final    T Axis 10/01/2023 23  degrees Final    Hold Lavender 10/01/2023 Auto Resulted    Final    Hold Lt Green 10/01/2023 Auto Resulted    Final    Hold Blue 10/01/2023 Auto Resulted    Final    SARS-CoV-2 (COVID-19) - (GeneXpert) 10/01/2023 Not Detected  Not Detected Final    Influenza A by PCR 10/01/2023 Negative  Negative Final    Influenza B by PCR 10/01/2023 Negative  Negative Final    RSV by PCR 10/01/2023 Negative  Negative Final     Glucose 10/01/2023 168 (H)  70 - 99 mg/dL Final    Sodium 10/01/2023 143  136 - 145 mmol/L Final    Potassium 10/01/2023 4.1  3.5 - 5.1 mmol/L Final    Chloride 10/01/2023 108  98 - 112 mmol/L Final    CO2 10/01/2023 31.0  21.0 - 32.0 mmol/L Final    Anion Gap 10/01/2023 4  0 - 18 mmol/L Final    BUN 10/01/2023 16  7 - 18 mg/dL Final    Creatinine 10/01/2023 1.34 (H)  0.70 - 1.30 mg/dL Final    BUN/CREA Ratio 10/01/2023 11.9  10.0 - 20.0 Final    Calcium, Total 10/01/2023 9.4  8.5 - 10.1 mg/dL Final    Calculated Osmolality 10/01/2023 301 (H)  275 - 295 mOsm/kg Final    eGFR-Cr 10/01/2023 60  >=60 mL/min/1.73m2 Final    Troponin I (High Sensitivity) 10/01/2023 24  <=79 ng/L Final    WBC 10/01/2023 9.3  4.0 - 11.0 x10(3) uL Final    RBC 10/01/2023 4.27 (L)  4.30 - 5.70 x10(6)uL Final    HGB 10/01/2023 12.5 (L)  13.0 - 17.5 g/dL Final    HCT 10/01/2023 39.2  39.0 - 53.0 % Final    MCV 10/01/2023 91.8  80.0 - 100.0 fL Final    MCH 10/01/2023 29.3  26.0 - 34.0 pg Final    MCHC 10/01/2023 31.9  31.0 - 37.0 g/dL Final    RDW-SD 10/01/2023 48.6 (H)  35.1 - 46.3 fL Final    RDW 10/01/2023 14.3  11.0 - 15.0 % Final    PLT 10/01/2023 213.0  150.0 - 450.0 10(3)uL Final    Neutrophil Absolute Prelim 10/01/2023 6.63  1.50 - 7.70 x10 (3) uL Final    Neutrophil Absolute 10/01/2023 6.63  1.50 - 7.70 x10(3) uL Final    Lymphocyte Absolute 10/01/2023 1.33  1.00 - 4.00 x10(3) uL Final    Monocyte Absolute 10/01/2023 1.07 (H)  0.10 - 1.00 x10(3) uL Final    Eosinophil Absolute 10/01/2023 0.16  0.00 - 0.70 x10(3) uL Final    Basophil Absolute 10/01/2023 0.06  0.00 - 0.20 x10(3) uL Final    Immature Granulocyte Absolute 10/01/2023 0.02  0.00 - 1.00 x10(3) uL Final    Neutrophil % 10/01/2023 71.7  % Final    Lymphocyte % 10/01/2023 14.3  % Final    Monocyte % 10/01/2023 11.5  % Final    Eosinophil % 10/01/2023 1.7  % Final    Basophil % 10/01/2023 0.6  % Final    Immature Granulocyte % 10/01/2023 0.2  % Final   ]     PROCEDURE:  The  procedure, risks, benefits and alternatives were discussed with the patient.  Patient verbalized understanding and gave consent.  The Right knee was prepped and draped in the usual sterile fashion. Using a linear ultrasound probe, the superolateral patella-femoral joint space was visualized. Using a 30-gauge, 1/2-inch needle, 1 cc of 1% lidocaine was used to numb the skin and soft tissues in the supero-lateral approach.  The intra-articular space was then accessed using an 18-gauge, 1-1/2-inch needle, which was visualized on ultrasound, using approximately 4 cc of 1% lidocaine to numb the soft tissue.  Once the intra-articular space was visualized on ultrasound, The Orthovisc injection was attached to the needle and injected. During the injection, the Orthovisc was noted to enter the intra-articular space.  Then, the Orthovisc syringe was switched out for 1 containing 4 cc of 1% lidocaine and 40 mg/cc of Kenalog.  This was injected in the same space under ultrasound.  The needle was then removed and hemostasis was achieved.  Skin was cleansed and a dry dressing was placed.     Patient tolerated the procedure well and no immediate complications noted.         Patient verbalized understanding of assessment and plan.  Patient is in agreement with the plan.  All questions were answered.  No barriers to learning identified. Permanent pictures were saved in our PACS systeme.         INSTRUCTIONS GIVEN TO PATIENT:    \"You will see an effect in the next 2-3 days.  Please contact me if you have fevers, worsening swelling, worsening pain, decreased range of motion, increased redness, chills, or anything that makes you concerned about how the joint we injected feels/looks.  If you do not reach me in a reasonable time, please report directly to the emergency room for further evaluation\"     2 months     Alex B. Behar MD, [facility]  Physical Medicine and Rehabilitation/Sports Medicine  [facility]

## 2024-03-21 NOTE — PATIENT INSTRUCTIONS
Post Injection Instructions     Please do not do anything strenuous over the next two days (if you had a knee injection do not walk more than 2 city blocks, do not attend any aerobic classes, do not run, no heavy lifting, no prolong standing).  You may resume your day to day activities after your injection.  You may experience some mild amount of swelling after the procedure.  Please ice your joint that was injected at least 5-6 times a day (15 minutes) for two days after (this will help prevent worsening pain that sometimes occurs after an injection).  Only take tylenol if needed for pain for the first few days.  Watch for signs of infection which include redness, warmth, worsening pain, fevers or chills.  If you develop any of these signs call the office immediately at [phone]    Everyone responds differently to injections, but you can expect your peak effects a few weeks after your last injection.  Alex B. Behar MD  Physical Medicine and Rehabilitation/Sports Medicine  [facility]

## 2024-03-22 ENCOUNTER — TELEPHONE (OUTPATIENT)
Dept: PODIATRY CLINIC | Facility: CLINIC | Age: 64
End: 2024-03-22

## 2024-03-22 NOTE — TELEPHONE ENCOUNTER
S/w patient- Informed him of xray results and booked him for 9am at [facility] with Dr Muse

## 2024-03-22 NOTE — TELEPHONE ENCOUNTER
Please call patient and inform him that his xray did now a foreign body in his heel.  Please schedule him for appt for early next week preferably on Monday to discuss the xrays and treatment options.

## 2024-03-25 ENCOUNTER — OFFICE VISIT (OUTPATIENT)
Dept: PODIATRY CLINIC | Facility: CLINIC | Age: 64
End: 2024-03-25

## 2024-03-25 DIAGNOSIS — E11.42 TYPE 2 DIABETES MELLITUS WITH POLYNEUROPATHY (HCC): ICD-10-CM

## 2024-03-25 DIAGNOSIS — L84 FOOT CALLUS: ICD-10-CM

## 2024-03-25 DIAGNOSIS — S90.852S FOREIGN BODY IN LEFT FOOT, SEQUELA: Primary | ICD-10-CM

## 2024-03-25 NOTE — PROGRESS NOTES
[facility] Podiatry  Progress Note    Mando Puri is a 64 year old male.   Chief Complaint   Patient presents with    Heel Pain     F/u Left heel declines pain today- here for XR review.  yesterday AM.         HPI:     This is a pleasant well controlled Diabetic male with PMH of Left venous stasis ulcer and B/L LE venous ablation.  He is taking lyrica for the diabetic neuropathy which is not really helping.     He presents to clinic today due to left heel pain/callus f/u.  He states the acid treatment helped greatly and denies any pain.  He is here to go over xray results.          Allergies: Empagliflozin, Penicillins, and Other   Current Outpatient Medications   Medication Sig Dispense Refill    aspirin (ASPIRIN 81) 81 MG Oral Tab EC Take 1 tablet (81 mg total) by mouth daily. FOR HEART. 90 tablet 9    metoprolol tartrate 25 MG Oral Tab Take 1 tablet (25 mg total) by mouth 2 (two) times daily. FOR HEART. 90 tablet 9    valsartan 160 MG Oral Tab Take 1 tablet (160 mg total) by mouth daily. FOR BLOOD PRESSURE. 90 tablet 9    rosuvastatin 40 MG Oral Tab Take 1 tablet (40 mg total) by mouth nightly. FOR CHOLESTEROL. 90 tablet 9    DULoxetine 30 MG Oral Cap DR Particles Take 1 capsule (30 mg total) by mouth daily. TAKE 1 CAPSULE IN THE MORNING FOR ANXIETY/DEPRESSION/ARTHRITIS PAIN 90 capsule 9    finasteride 5 MG Oral Tab Take 1 tablet (5 mg total) by mouth daily. FOR PROSTATE. 90 tablet 9    tamsulosin 0.4 MG Oral Cap Take 2 capsules (0.8 mg total) by mouth daily. FOR PROSTATE. 180 capsule 9    ticagrelor 90 MG Oral Tab Take 1 tablet (90 mg total) by mouth every 12 (twelve) hours. FOR HEART STENTS. 180 tablet 9    pregabalin 100 MG Oral Cap Take 1 capsule (100 mg total) by mouth 3 (three) times daily. FOR NERVE PAIN. 270 capsule 9    dapagliflozin (FARXIGA) 10 MG Oral Tab Take 1 tablet (10 mg total) by mouth daily. 30 tablet 2    [START ON 7/26/2024] Tirzepatide (MOUNJARO) 15 MG/0.5ML Subcutaneous  Solution Pen-injector Inject 15 mg into the skin every 7 days. 6 mL 0    levothyroxine 112 MCG Oral Tab Take 1 tablet (112 mcg total) by mouth at bedtime. 90 tablet 1    glipiZIDE 5 MG Oral Tab Take 1 tablet (5 mg total) by mouth daily. FOR DIABETES. STOP/DO NOT TAKE IF FASTING SUGAR IS LESS THAN 100. 90 tablet 2    Continuous Blood Gluc Sensor (FREESTYLE MARSHA 3 SENSOR) Does not apply Misc 1 each every 14 (fourteen) days. 6 each 1    metFORMIN  MG Oral Tablet 24 Hr Take 2 tablets (1,000 mg total) by mouth 2 (two) times daily with meals. 360 tablet 1    Tirzepatide (MOUNJARO) 15 MG/0.5ML Subcutaneous Solution Pen-injector Inject 15 mg into the skin every 7 days. 6 mL 0    magnesium oxide 400 MG Oral Tab Take 1 tablet (400 mg total) by mouth at bedtime.      traMADol 50 MG Oral Tab Take 1 tablet (50 mg total) by mouth every 6 (six) hours as needed for Pain. 10 tablet 0      Past Medical History:   Diagnosis Date    Benign head tremor     BPH (benign prostatic hyperplasia)     Diabetes (HCC)     Diverticulitis 10/2019    Former smoker     Hyperlipidemia     Hypertension     Neuropathic pain     Non-pressure chronic ulcer of right lower leg, limited to breakdown of skin (HCC) 2021    Obesity     Snoring     Thyroid disease       Past Surgical History:   Procedure Laterality Date    CAROTID ENDARTERECTOMY Right 2021    Surgeon: Dr. Rajiv Solorio at [facility]    NECK/CHEST PROCEDURE UNLISTED      per patient, a blockage was removed from the right side of his neck in 2021    OTHER SURGICAL HISTORY  2017    Small bowel Res.    OTHER SURGICAL HISTORY  2019    Colectomy      Family History   Problem Relation Age of Onset    Cancer Father         Prostate    Heart Disorder Father     Cancer Mother       Social History     Socioeconomic History    Marital status:     Number of children: 4   Tobacco Use    Smoking status: Former     Types: Cigarettes     Quit date:      Years since quittin.2      Passive exposure: Past    Smokeless tobacco: Never   Vaping Use    Vaping Use: Never used   Substance and Sexual Activity    Alcohol use: Never    Drug use: Never   Other Topics Concern    Caffeine Concern Yes     Comment: coffee daily    Exercise No           REVIEW OF SYSTEMS:   Denies nausea, fever, chills  No calf pain  No other muscle or joint aches  Denies chest pain or shortness of breath.      EXAM:   There were no vitals taken for this visit.    Constitutional:   Patient in no apparent distress. Well kept. Of normal body habitus. Alert and oriented to person, place, and time.  Vascular Examination:  DP pulse is 2/4  PT pulse is 2/4  Capillary refill is immediate  Edema is present bilateral  Severe varicosities present bilateral  Temperature warm proximally to warm distally bilateral  Hemosiderin deposits Present bilateral  Integumentary Examination:   The patient's nails appear incurvated, thickened, elongated, dystrophic, discolored with subungual debris 1-5 right, 1-5  left nails.    Left distal 2nd and 3rd toe callus    Digital hair growth is present left and is present right.    Nucleated keratotic verruca type lesion left plantar heel measuring approx 0.2cmx0.2cm, resolved    Neurological Examination:  Monofilament (10-g) sensation is 2/5 to right and 2/5 to left.  Parasthesias present  Musculoskeletal Examination:  Muscle Strength is 5/5.  Hammer digit deformity present digits 2-5  bilateral.    Severe POP to left plantar heel callus, resolved        LABS & IMAGING:     Lab Results   Component Value Date     (H) 10/01/2023    BUN 16 10/01/2023    CREATSERUM 1.34 (H) 10/01/2023    BUNCREA 11.9 10/01/2023    ANIONGAP 4 10/01/2023    GFRAA 107 07/03/2022    GFRNAA 93 07/03/2022    CA 9.4 10/01/2023     10/01/2023    K 4.1 10/01/2023     10/01/2023    CO2 31.0 10/01/2023    OSMOCALC 301 (H) 10/01/2023        Lab Results   Component Value Date     (H) 12/20/2022    A1C 6.8 (A)  02/06/2024        No results found.     ASSESSMENT AND PLAN:   Diagnoses and all orders for this visit:    Foreign body in left foot, sequela    Foot callus    Type 2 diabetes mellitus with polyneuropathy (HCC)                  Plan:     Evaluated Left plantar heel which did revealed resolution of the callus/wart.  No SOI and no drainage appreciated.        Xray Left heel: 3/20/24:  2-3 small radiopaque densities along the plantar aspect of the heel may represent soft tissue calcification or foreign bodies.  No osteomyelitis       Evaluated left foot xrays from 2022 which showed the same radiopaque densities along the plantar heel.      Discussed with patient that I do not believe the foreign bodies are the cause of the callus/wart which has since resolved since the acid treatment.  Since there is no SOI and no residual pain will hold off on any surgery to remove the FB.        Cont wearing DM shoes with inserts        RTC  3 months for DFC    No follow-ups on file.    Cisco Muse DPM  3/25/24

## 2024-04-09 ENCOUNTER — TELEPHONE (OUTPATIENT)
Dept: ENDOCRINOLOGY CLINIC | Facility: CLINIC | Age: 64
End: 2024-04-09

## 2024-04-09 DIAGNOSIS — R35.0 URINARY FREQUENCY: ICD-10-CM

## 2024-04-09 DIAGNOSIS — R29.818 NEUROGENIC CLAUDICATION: ICD-10-CM

## 2024-04-09 DIAGNOSIS — R35.89 POLYURIA: Primary | ICD-10-CM

## 2024-04-09 NOTE — TELEPHONE ENCOUNTER
Medication was sent on 3/11/24 to [facility]. Called [facility] to cancel prescription. [facility] states that patient called to fill medication at [facility] and they will fill a 90 day supply for $5. Called patient to clarify which pharmacy he would like. He states that his insurance wants him to use [facility] but he called [facility] for a one time refill because he is out of medication. Medication pended for your review and approval to [facility].

## 2024-04-09 NOTE — TELEPHONE ENCOUNTER
Patient calling regards states has been having frequent urination, states has no control or hold it and fatigue. States has been a week or so after taking new medication and is concerned. Please call before 2:30pm due to work.   (Farxiga)

## 2024-04-09 NOTE — TELEPHONE ENCOUNTER
Patient called requesting refill on medication below     [facility] - [place], [place] -   [address]       Medication Detail    Medication Quantity Refills Start End   pregabalin 100 MG Oral Cap 270 capsule 9 3/11/2024 --   Sig:   Take 1 capsule (100 mg total) by mouth 3 (three) times daily. FOR NERVE PAIN.     Route:   Oral     Order #:   [id number]

## 2024-04-10 RX ORDER — SEMAGLUTIDE 0.68 MG/ML
0.5 INJECTION, SOLUTION SUBCUTANEOUS WEEKLY
Qty: 9 ML | Refills: 1 | Status: SHIPPED | OUTPATIENT
Start: 2024-04-10 | End: 2024-05-10

## 2024-04-10 NOTE — TELEPHONE ENCOUNTER
Called the patient to discuss. Yes he would like to transition for ozempic for now. Discussed SE and injection technique. He was on this med in the past before he transitioned to mounjaro. No severe SE in the past. Rx sent to mail order per his request.    Discussed that he can have his thyroid labs done early. Faxed orders to [facility].     Will await urine results.

## 2024-04-10 NOTE — TELEPHONE ENCOUNTER
Returned call to the patient. Hew will have urine testing done via [facility]. [facility]     Dr. Shah for review upon return to office, Mr. Puri is unable to fill his Mounjaro prescription due to back order. He has tried several pharmacies. He currently has 2 pens left of the 15 mg dose at home. Would you like him to transition to an alternative?     Also, he reported to me upon call back that he has been very tired lately.  Levothyroxine dose was decreased to 112 mcg daily at LOV with instructions to repeat labs and F/U in 2 months. Patient is inquiring if you would like him to check thyroid labs sooner?    Thanks.

## 2024-04-10 NOTE — TELEPHONE ENCOUNTER
Brittany Shah MD   Endo Clinical Staff2 minutes ago (1:37 PM)       Please send ozempic 0.5 MG weekly if patient is ok with that  Thanks

## 2024-04-10 NOTE — TELEPHONE ENCOUNTER
Noted, would you like the patient to complete thyroid labs now due to fatigue or wait until May as previously instructed?

## 2024-04-10 NOTE — TELEPHONE ENCOUNTER
Dr. Maher (on behalf of Dr. Shah) please advise on patent urinary symptoms after starting new farxiga Rx. Lab order pending for UA/UC if you agree.     Dr. Shah, Mr. Puri is unable to fill his Mounjaro prescription due to back order. He has tried several pharmacies. He currently has 2 pens left of the 15 mg dose at home. Would you like him to transition to an alternative?    Per chart review farxiga was prescribed at LOV 3/8/24. I called the patient. He started farxiga 2 weeks ago. For the past 1 week he has been experiencing urinary frequency and urgency. He wears an incontinence pad because he has had a few accidents. Also has a slight lower back ache. Denies dysuria or hematuria. No fever/muscle aches/chills. He works 3 jobs so only checks fasting sugar via fingerstick. Fasting sugars have been around 150-156.     Confirmed currently taking:  Metformin 1000 mg BID  Farxiga (new) 10 mg daily  Mounjaro 15 mg weekly on Wednesdays.     Discussed that UTI and yeast infections are a common SE of farxiga especially if blood sugars are running high. Discussed mechanism of action of farxiga and risk for UTI/fungal infections.     He gets labs done at [facility] and prefers the following location for lab work. He could go to the lab today or tomorrow if needed.     [facility]  Address: [address] [place] [address] [place], [place] [address]  Hours:   Open ? Closes 3:30?PM  Phone: [phone]

## 2024-04-11 RX ORDER — PREGABALIN 100 MG/1
100 CAPSULE ORAL 3 TIMES DAILY
Qty: 270 CAPSULE | Refills: 3 | Status: SHIPPED | OUTPATIENT
Start: 2024-04-11

## 2024-04-13 LAB
APPEARANCE: CLEAR
BILIRUBIN: NEGATIVE
COLOR: YELLOW
KETONES: NEGATIVE
LEUKOCYTE ESTERASE: NEGATIVE
NITRITE: NEGATIVE
OCCULT BLOOD: NEGATIVE
PH: 6 (ref 5–8)
PROTEIN: NEGATIVE
SPECIFIC GRAVITY: 1.04 (ref 1–1.03)

## 2024-05-07 LAB
HEMOGLOBIN A1C: 8.1 % OF TOTAL HGB
TSH W/REFLEX TO FT4: 3.82 MIU/L (ref 0.4–4.5)

## 2024-05-10 ENCOUNTER — OFFICE VISIT (OUTPATIENT)
Dept: PODIATRY CLINIC | Facility: CLINIC | Age: 64
End: 2024-05-10
Payer: COMMERCIAL

## 2024-05-10 ENCOUNTER — OFFICE VISIT (OUTPATIENT)
Facility: CLINIC | Age: 64
End: 2024-05-10
Payer: COMMERCIAL

## 2024-05-10 VITALS
HEIGHT: 74 IN | OXYGEN SATURATION: 93 % | DIASTOLIC BLOOD PRESSURE: 58 MMHG | WEIGHT: 315 LBS | SYSTOLIC BLOOD PRESSURE: 128 MMHG | HEART RATE: 76 BPM | BODY MASS INDEX: 40.43 KG/M2

## 2024-05-10 DIAGNOSIS — R79.89 LOW VITAMIN D LEVEL: ICD-10-CM

## 2024-05-10 DIAGNOSIS — E03.9 HYPOTHYROIDISM, UNSPECIFIED TYPE: ICD-10-CM

## 2024-05-10 DIAGNOSIS — B35.1 ONYCHOMYCOSIS: ICD-10-CM

## 2024-05-10 DIAGNOSIS — G45.9 TRANSIENT ISCHEMIC ATTACK (TIA): ICD-10-CM

## 2024-05-10 DIAGNOSIS — E11.51 TYPE 2 DIABETES MELLITUS WITH DIABETIC PERIPHERAL ANGIOPATHY WITHOUT GANGRENE, WITHOUT LONG-TERM CURRENT USE OF INSULIN (HCC): Primary | ICD-10-CM

## 2024-05-10 DIAGNOSIS — E78.5 HYPERLIPIDEMIA ASSOCIATED WITH TYPE 2 DIABETES MELLITUS (HCC): ICD-10-CM

## 2024-05-10 DIAGNOSIS — M79.675 GREAT TOE PAIN, LEFT: ICD-10-CM

## 2024-05-10 DIAGNOSIS — E11.65 UNCONTROLLED TYPE 2 DIABETES MELLITUS WITH HYPERGLYCEMIA (HCC): ICD-10-CM

## 2024-05-10 DIAGNOSIS — R35.0 URINARY FREQUENCY: ICD-10-CM

## 2024-05-10 DIAGNOSIS — R73.09 HIGH GLUCOSE LEVEL: ICD-10-CM

## 2024-05-10 DIAGNOSIS — E11.59 HYPERTENSION ASSOCIATED WITH TYPE 2 DIABETES MELLITUS (HCC): ICD-10-CM

## 2024-05-10 DIAGNOSIS — I65.22 LEFT CAROTID ARTERY STENOSIS: ICD-10-CM

## 2024-05-10 DIAGNOSIS — L60.0 INGROWN LEFT BIG TOENAIL: ICD-10-CM

## 2024-05-10 DIAGNOSIS — R35.89 POLYURIA: ICD-10-CM

## 2024-05-10 DIAGNOSIS — E11.42 TYPE 2 DIABETES MELLITUS WITH POLYNEUROPATHY (HCC): Primary | ICD-10-CM

## 2024-05-10 DIAGNOSIS — Z95.5 S/P DRUG ELUTING CORONARY STENT PLACEMENT: ICD-10-CM

## 2024-05-10 DIAGNOSIS — I15.2 HYPERTENSION ASSOCIATED WITH TYPE 2 DIABETES MELLITUS (HCC): ICD-10-CM

## 2024-05-10 DIAGNOSIS — E11.65 HYPERGLYCEMIA DUE TO DIABETES MELLITUS (HCC): ICD-10-CM

## 2024-05-10 DIAGNOSIS — I10 PRIMARY HYPERTENSION: ICD-10-CM

## 2024-05-10 DIAGNOSIS — E11.69 HYPERLIPIDEMIA ASSOCIATED WITH TYPE 2 DIABETES MELLITUS (HCC): ICD-10-CM

## 2024-05-10 DIAGNOSIS — Z98.890 HISTORY OF CAROTID ENDARTERECTOMY: ICD-10-CM

## 2024-05-10 LAB
GLUCOSE BLOOD: 302
TEST STRIP EXPIRATION DATE: NORMAL DATE
TEST STRIP LOT #: NORMAL NUMERIC

## 2024-05-10 PROCEDURE — 82947 ASSAY GLUCOSE BLOOD QUANT: CPT | Performed by: INTERNAL MEDICINE

## 2024-05-10 PROCEDURE — 99214 OFFICE O/P EST MOD 30 MIN: CPT | Performed by: INTERNAL MEDICINE

## 2024-05-10 PROCEDURE — 99213 OFFICE O/P EST LOW 20 MIN: CPT | Performed by: PODIATRIST

## 2024-05-10 RX ORDER — TIRZEPATIDE 5 MG/.5ML
5 INJECTION, SOLUTION SUBCUTANEOUS
Qty: 2 ML | Refills: 0 | Status: SHIPPED | OUTPATIENT
Start: 2024-05-10 | End: 2024-06-07

## 2024-05-10 RX ORDER — TIRZEPATIDE 7.5 MG/.5ML
7.5 INJECTION, SOLUTION SUBCUTANEOUS
Qty: 2 ML | Refills: 0 | Status: SHIPPED | OUTPATIENT
Start: 2024-05-24 | End: 2024-06-21

## 2024-05-10 NOTE — PATIENT INSTRUCTIONS
RTC in 1 mo     Stop coffee     Stop Farxiga due to polyuria     Will send Rx mounjaro 5 mg/week and titrate every 4 weeks if well tolerated. If cannot get mounjaro, will increase osempic     metformin 1000 mg twice a day     Thyroid medication dose Levothyroxine 112 mcg  at bedtime. Take you medication on empty stomach, not with any other medication or food. Wait 60 minutes before eating. Wait 4 hours before taking Vitamins, Calcium or iron.  Wait 60 minutes before eating. Do not take the medication on the morning of the lab test. Do not take Biotin/Turmeric 1 week before the test.    Continue Valsartan  and Rosuvastatin  40 mg

## 2024-05-10 NOTE — PROGRESS NOTES
[facility] Podiatry  Progress Note    Mando Puri is a 64 year old male.   Chief Complaint   Patient presents with    Foot Pain     F/uj Left foot hallux lateral border pain 8-/10, swelling . Onset a  week ago. Callus on 3rd toe.  this am.         HPI:     This is a pleasant well controlled Diabetic male with PMH of Left venous stasis ulcer and B/L LE venous ablation.  He is taking lyrica for the diabetic neuropathy which is not really helping.     He presents to clinic today for diabetic foot care.  He does complain of painful left hallux lateral nail border and believes it is ingrown.  He denies any drainage.          Allergies: Empagliflozin, Penicillins, and Other   Current Outpatient Medications   Medication Sig Dispense Refill    Tirzepatide (MOUNJARO) 5 MG/0.5ML Subcutaneous Solution Pen-injector Inject 5 mg into the skin every 7 days for 28 days. 2 mL 0    [START ON 5/24/2024] Tirzepatide (MOUNJARO) 7.5 MG/0.5ML Subcutaneous Solution Pen-injector Inject 7.5 mg into the skin every 7 days for 28 days. 2 mL 0    pregabalin 100 MG Oral Cap Take 1 capsule (100 mg total) by mouth 3 (three) times daily. FOR NERVE PAIN. 270 capsule 3    aspirin (ASPIRIN 81) 81 MG Oral Tab EC Take 1 tablet (81 mg total) by mouth daily. FOR HEART. 90 tablet 9    metoprolol tartrate 25 MG Oral Tab Take 1 tablet (25 mg total) by mouth 2 (two) times daily. FOR HEART. 90 tablet 9    valsartan 160 MG Oral Tab Take 1 tablet (160 mg total) by mouth daily. FOR BLOOD PRESSURE. 90 tablet 9    rosuvastatin 40 MG Oral Tab Take 1 tablet (40 mg total) by mouth nightly. FOR CHOLESTEROL. 90 tablet 9    DULoxetine 30 MG Oral Cap DR Particles Take 1 capsule (30 mg total) by mouth daily. TAKE 1 CAPSULE IN THE MORNING FOR ANXIETY/DEPRESSION/ARTHRITIS PAIN 90 capsule 9    finasteride 5 MG Oral Tab Take 1 tablet (5 mg total) by mouth daily. FOR PROSTATE. 90 tablet 9    tamsulosin 0.4 MG Oral Cap Take 2 capsules (0.8 mg total) by mouth  daily. FOR PROSTATE. 180 capsule 9    ticagrelor 90 MG Oral Tab Take 1 tablet (90 mg total) by mouth every 12 (twelve) hours. FOR HEART STENTS. 180 tablet 9    levothyroxine 112 MCG Oral Tab Take 1 tablet (112 mcg total) by mouth at bedtime. 90 tablet 1    glipiZIDE 5 MG Oral Tab Take 1 tablet (5 mg total) by mouth daily. FOR DIABETES. STOP/DO NOT TAKE IF FASTING SUGAR IS LESS THAN 100. 90 tablet 2    metFORMIN  MG Oral Tablet 24 Hr Take 2 tablets (1,000 mg total) by mouth 2 (two) times daily with meals. 360 tablet 1    magnesium oxide 400 MG Oral Tab Take 1 tablet (400 mg total) by mouth at bedtime.      traMADol 50 MG Oral Tab Take 1 tablet (50 mg total) by mouth every 6 (six) hours as needed for Pain. 10 tablet 0    Continuous Blood Gluc Sensor (FREESTYLE MARSHA 3 SENSOR) Does not apply Misc 1 each every 14 (fourteen) days. 6 each 1      Past Medical History:    Benign head tremor    BPH (benign prostatic hyperplasia)    Diabetes (HCC)    Diverticulitis    Former smoker    Hyperlipidemia    Hypertension    Neuropathic pain    Non-pressure chronic ulcer of right lower leg, limited to breakdown of skin (HCC)    Obesity    Snoring    Thyroid disease      Past Surgical History:   Procedure Laterality Date    Carotid endarterectomy Right 2021    Surgeon: Dr. Rajiv Solorio at [facility]    Neck/chest procedure unlisted      per patient, a blockage was removed from the right side of his neck in 2021    Other surgical history  2017    Small bowel Res.    Other surgical history  2019    Colectomy      Family History   Problem Relation Age of Onset    Cancer Father         Prostate    Heart Disorder Father     Cancer Mother       Social History     Socioeconomic History    Marital status:     Number of children: 4   Tobacco Use    Smoking status: Former     Current packs/day: 0.00     Types: Cigarettes     Quit date:      Years since quittin.3     Passive exposure: Past    Smokeless tobacco:  Never   Vaping Use    Vaping status: Never Used   Substance and Sexual Activity    Alcohol use: Never    Drug use: Never   Other Topics Concern    Caffeine Concern Yes     Comment: coffee daily    Exercise No           REVIEW OF SYSTEMS:   Denies nausea, fever, chills  No calf pain  No other muscle or joint aches  Denies chest pain or shortness of breath.      EXAM:   There were no vitals taken for this visit.    Constitutional:   Patient in no apparent distress. Well kept. Of normal body habitus. Alert and oriented to person, place, and time.  Vascular Examination:  DP pulse is 2/4  PT pulse is 2/4  Capillary refill is immediate  Edema is present bilateral  Severe varicosities present bilateral  Temperature warm proximally to warm distally bilateral  Hemosiderin deposits Present bilateral  Integumentary Examination:   The patient's nails appear incurvated, thickened, elongated, dystrophic, discolored with subungual debris 1-5 right, 1-5  left nails.  Left hallux lateral nail border ingrown with no SOI  Left distal 2nd and 3rd toe callus    Digital hair growth is present left and is present right.    Neurological Examination:  Monofilament (10-g) sensation is 2/5 to right and 2/5 to left.  Parasthesias present  Musculoskeletal Examination:  Muscle Strength is 5/5.  Hammer digit deformity present digits 2-5  bilateral.    POP to left hallux lateral nail border      LABS & IMAGING:     Lab Results   Component Value Date     (H) 10/01/2023    BUN 16 10/01/2023    CREATSERUM 1.34 (H) 10/01/2023    BUNCREA 11.9 10/01/2023    ANIONGAP 4 10/01/2023    GFRAA 107 07/03/2022    GFRNAA 93 07/03/2022    CA 9.4 10/01/2023     10/01/2023    K 4.1 10/01/2023     10/01/2023    CO2 31.0 10/01/2023    OSMOCALC 301 (H) 10/01/2023        Lab Results   Component Value Date     (H) 12/20/2022    A1C 8.1 (H) 05/06/2024        No results found.     ASSESSMENT AND PLAN:   Diagnoses and all orders for this  visit:    Type 2 diabetes mellitus with polyneuropathy (HCC)    Onychomycosis    Ingrown left big toenail    Great toe pain, left                    Plan:     Diabetic education and instructions have been provided. We reviewed and discussed the following:    -risk categories related to pts with diabetes and foot or lower extremity complications per ADA.    -adherence to medication regimen and close monitoring or blood sugar control.   -daily monitoring/inspection of feet and shoes.   -proper management of diet and weight   -regular follow up with PCP and specialty providers as recommended   -Lower extremity complications related to DM were reviewed and stressed prevention.      Evaluated the patient. Discussed treatment options with the patient.  Discussed with patient proper care and hygiene for their feet.  Patient tolerated procedures well, without incident.    Instrumentation used includes nail nippers and electric  where appropriate.  Procedure: (73563 Debridement of toenails 6-10) Surgically debrided and mechanically reduced 6 or more toenails.Hemmorhage occurred none.Nails that were debrided appeared dystrophic and caused the patient pain in shoe gear.Nails 5 Left & 5 Right.       Cont wearing DM shoes with inserts        RTC  3 months for DFC.  If left hallux lateral nail border ingrown is still painful may discuss matrixectomy.      No follow-ups on file.    Cisco Muse DPM  5/10/24

## 2024-05-10 NOTE — PROGRESS NOTES
Follow up Visit:  DM.  Requesting Physician:   Macario Wallace MD      CHIEF COMPLAINT:    Chief Complaint   Patient presents with    Diabetes     Last A1c 8.1        HISTORY OF PRESENT ILLNESS:   Mando Puri is a 64 year old male who presents with complicated DM, CAD s/p 2 stents, carotid stenosis post surgery, obese, htn, dlp and hypothyroid       DM Dx   On metformin 1000 mg bid   Tried Jardiance but stopped d/t polyuria no uti or yeast infection    After last visit,   Stopped glipizide  On Farxiga   Could not get mounjaro 12.5 mg d/t shortage and now on ozempic 2.5mg/wk.     BG is high , A1c is high   Has polyuria from SGLT and possible increase coffee intake.     Will get pod exam today     Reviewed and discussed labs from 2024  Labs in 2024 TSH 0.02 FT4 1.3  LDL 46, LFTs normal.       DLP on Crestor 40     Hypothyroid Levothryoxine 112 mcg  at night as rec'    TSH 3.8 2024     HTN on valsartan       Lab Results   Component Value Date    A1C 8.1 (H) 2024    A1C 6.8 (A) 2024    A1C 6.6 (A) 2023    A1C 8.4 (A) 2023    A1C 8.0 (A) 2023        DM quality measures:  A1C/Blood pressure: as reported above   Nephropathy screenin2024 , continue ace /arb rx.   LIPID screenin2024  . On statin rx.   Last dilated eye exam: Last Dilated Eye Exam: 03/26/24   3/2024  Exam shows retinopathy? Eye Exam shows Diabetic Retinopathy?: No   no  Last diabetic foot exam: No data recorded 5/10/2024  Dentist : recommend every 6m  Neuropathy: yes in LE   CAD/ASCVD/PAD/CVA: 2 stents, carotid stenosis s/p procedure,          ssx     Door dash  No smoking  Etoh no   No drugs         ASSESSMENT AND PLAN:  64 year old man with complicated DM, CAD s/p 2 stents, carotid stenosis post surgery, obese, htn, dlp and hypothyroid   He does not have HF but has CAD so will benefit from SGLT2i and GLP-1 . He idd not tolearate SGLT2i x2  A1c is higher   Has polyruia     plan  Will see  pod today Dr Muse   RTC in 1 mo   Stop coffee   Stop Farxiga due to polyuria   Will send Rx mounjaro 5 mg/week and titrate every 4 weeks if well tolerated. If cannot get mounjaro, will increase osempic   metformin 1000 mg twice a day     Thyroid medication dose Levothyroxine 112 mcg  at bedtime. Take you medication on empty stomach, not with any other medication or food. Wait 60 minutes before eating. Wait 4 hours before taking Vitamins, Calcium or iron.  Wait 60 minutes before eating. Do not take the medication on the morning of the lab test. Do not take Biotin/Turmeric 1 week before the test.      Continue Valsartan  and Rosuvastatin  40 mg     If you have low blood sugar <70, take 15 grams of carb (8 oz juice or regular soda) and recheck in 15 minutes.    Follow up with podiatry and eye doctor annually.   Patients need to wear covered shoes all the time and check feet daily.   Bring your meter/BG log to the next visit.      PAST MEDICAL HISTORY:   Past Medical History:    Benign head tremor    BPH (benign prostatic hyperplasia)    Diabetes (HCC)    Diverticulitis    Former smoker    Hyperlipidemia    Hypertension    Neuropathic pain    Non-pressure chronic ulcer of right lower leg, limited to breakdown of skin (HCC)    Obesity    Snoring    Thyroid disease       PAST SURGICAL HISTORY:   Past Surgical History:   Procedure Laterality Date    Carotid endarterectomy Right 04/29/2021    Surgeon: Dr. Rajiv Solorio at [facility]    Neck/chest procedure unlisted      per patient, a blockage was removed from the right side of his neck in 8/2021    Other surgical history  2017    Small bowel Res.    Other surgical history  11/12/2019    Colectomy            CURRENT MEDICATIONS:    Current Outpatient Medications   Medication Sig Dispense Refill    Tirzepatide (MOUNJARO) 5 MG/0.5ML Subcutaneous Solution Pen-injector Inject 5 mg into the skin every 7 days for 28 days. 2 mL 0    [START ON 5/24/2024] Tirzepatide (MOUNJARO) 7.5  MG/0.5ML Subcutaneous Solution Pen-injector Inject 7.5 mg into the skin every 7 days for 28 days. 2 mL 0    pregabalin 100 MG Oral Cap Take 1 capsule (100 mg total) by mouth 3 (three) times daily. FOR NERVE PAIN. 270 capsule 3    aspirin (ASPIRIN 81) 81 MG Oral Tab EC Take 1 tablet (81 mg total) by mouth daily. FOR HEART. 90 tablet 9    metoprolol tartrate 25 MG Oral Tab Take 1 tablet (25 mg total) by mouth 2 (two) times daily. FOR HEART. 90 tablet 9    valsartan 160 MG Oral Tab Take 1 tablet (160 mg total) by mouth daily. FOR BLOOD PRESSURE. 90 tablet 9    rosuvastatin 40 MG Oral Tab Take 1 tablet (40 mg total) by mouth nightly. FOR CHOLESTEROL. 90 tablet 9    DULoxetine 30 MG Oral Cap DR Particles Take 1 capsule (30 mg total) by mouth daily. TAKE 1 CAPSULE IN THE MORNING FOR ANXIETY/DEPRESSION/ARTHRITIS PAIN 90 capsule 9    finasteride 5 MG Oral Tab Take 1 tablet (5 mg total) by mouth daily. FOR PROSTATE. 90 tablet 9    tamsulosin 0.4 MG Oral Cap Take 2 capsules (0.8 mg total) by mouth daily. FOR PROSTATE. 180 capsule 9    ticagrelor 90 MG Oral Tab Take 1 tablet (90 mg total) by mouth every 12 (twelve) hours. FOR HEART STENTS. 180 tablet 9    levothyroxine 112 MCG Oral Tab Take 1 tablet (112 mcg total) by mouth at bedtime. 90 tablet 1    glipiZIDE 5 MG Oral Tab Take 1 tablet (5 mg total) by mouth daily. FOR DIABETES. STOP/DO NOT TAKE IF FASTING SUGAR IS LESS THAN 100. 90 tablet 2    metFORMIN  MG Oral Tablet 24 Hr Take 2 tablets (1,000 mg total) by mouth 2 (two) times daily with meals. 360 tablet 1    magnesium oxide 400 MG Oral Tab Take 1 tablet (400 mg total) by mouth at bedtime.      traMADol 50 MG Oral Tab Take 1 tablet (50 mg total) by mouth every 6 (six) hours as needed for Pain. 10 tablet 0    Continuous Blood Gluc Sensor (FREESTYLE MARSHA 3 SENSOR) Does not apply Misc 1 each every 14 (fourteen) days. 6 each 1       ALLERGIES:  Allergies   Allergen Reactions    Empagliflozin OTHER (SEE COMMENTS)      Frequent urination, unbearable.    Penicillins UNKNOWN     Patient does not recall reaction    Other UNKNOWN       SOCIAL HISTORY:    Social History     Socioeconomic History    Marital status:     Number of children: 4   Tobacco Use    Smoking status: Former     Current packs/day: 0.00     Types: Cigarettes     Quit date:      Years since quittin.3     Passive exposure: Past    Smokeless tobacco: Never   Vaping Use    Vaping status: Never Used   Substance and Sexual Activity    Alcohol use: Never    Drug use: Never   Other Topics Concern    Caffeine Concern Yes     Comment: coffee daily    Exercise No       FAMILY HISTORY:   Family History   Problem Relation Age of Onset    Cancer Father         Prostate    Heart Disorder Father     Cancer Mother             PHYSICAL EXAM:   Height: 6' 2\" (188 cm) (05/10 0924)  Weight: 318 lb (144.2 kg) (05/10 0924)  BSA (Calculated - sq m): 2.65 sq meters (05/10 0924)  Pulse: 76 (05/10 0924)  BP: 128/58 (05/10 0924)  Temp: --  Do Not Use - Resp Rate: --  SpO2: 93 % (05/10 0924)    Body mass index is 40.83 kg/m².  Obese   Uses cane   Scar on the neck from carotid surgery  CONSTITUTIONAL:  Awake and alert. Age appropriate, good hygiene not in acute distress. Well nourished and well developed. no acute distress   PSYCH:   Orientated to time, place, person & situation, Normal mood and affect, memory intact, normal insight and judgment, cooperative  Neuro: speech is clear. Awake, alert, no aphasia, no facial asymmetry, no nuchal rigidity  EYES:  No proptosis, no ptosis, conjunctiva      DATA:     Pertinent data reviewed      Latest Reference Range & Units 24 10:36   TSH 0.40 - 4.50 mIU/L 3.82       Ct     No evidence of acute intracranial abnormality.      Chronic right cerebellar lacunar infarct.      Nonspecific small metallic foreign density in the region of the right eyelid.      No evidence of intracranial large vessel arterial occlusion or proximal flow  limiting stenosis.      Patent bilateral cervical carotid arteries without evidence of hemodynamically significant stenosis or evidence of dissection.  Diffusely diminutive vertebrobasilar system which which may reflect anatomic variation.          POC HemoCue Glucose 201 (Finger stick glucose)        Component Value Flag Ref Range Units Status    GLUCOSE BLOOD 302        Final    Test Strip Lot # [id number]       Numeric Final    Test Strip Expiration Date 110,124       Date Final                      Orders Placed This Encounter   Procedures    POC HemoCue Glucose 201 (Finger stick glucose)     Orders Placed This Encounter    POC HemoCue Glucose 201 (Finger stick glucose)     Order Specific Question:   Release to patient     Answer:   Immediate    Tirzepatide (MOUNJARO) 5 MG/0.5ML Subcutaneous Solution Pen-injector     Sig: Inject 5 mg into the skin every 7 days for 28 days.     Dispense:  2 mL     Refill:  0    Tirzepatide (MOUNJARO) 7.5 MG/0.5ML Subcutaneous Solution Pen-injector     Sig: Inject 7.5 mg into the skin every 7 days for 28 days.     Dispense:  2 mL     Refill:  0          This is a specialized patient consultation in endocrinology and required comprehensive review of prior records, as well as current evaluation, with time required for consideration of complex endocrine issues and consultation. For this visit, I personally interviewed the patient, and family member if accompanied, performed the pertinent parts of the history and physical examination. ROS included screening for appropriate endocrine conditions.   Today's diagnosis and plan were reviewed in detail with the patient who states understanding and agrees with plan. I discussed with the patient possible diagnosis, differential diagnosis, need for work up , treatment options, alternatives and side effects.     Please see note for details about time spent which includes:   · pre-visit preparation  · reviewing records  · face to face time with  the patient   · timely documentation of the encounter  · ordering medications/tests  · communication with care team  · care coordination    I appreciate the opportunity to be part of your patient's medical care and will keep you, as the referring and primary physicians, informed about the care of your patient, including possible future surgery and pathology findings. Please feel free to contact me should you have any questions.      Brittany Shah MD

## 2024-05-23 ENCOUNTER — TELEPHONE (OUTPATIENT)
Dept: PHYSICAL MEDICINE AND REHAB | Facility: CLINIC | Age: 64
End: 2024-05-23

## 2024-05-23 ENCOUNTER — OFFICE VISIT (OUTPATIENT)
Dept: PHYSICAL MEDICINE AND REHAB | Facility: CLINIC | Age: 64
End: 2024-05-23

## 2024-05-23 VITALS — HEIGHT: 74 IN | WEIGHT: 315 LBS | BODY MASS INDEX: 40.43 KG/M2

## 2024-05-23 DIAGNOSIS — E11.42 DIABETIC POLYNEUROPATHY ASSOCIATED WITH TYPE 2 DIABETES MELLITUS (HCC): ICD-10-CM

## 2024-05-23 DIAGNOSIS — M22.2X9 PATELLOFEMORAL PAIN SYNDROME, UNSPECIFIED LATERALITY: ICD-10-CM

## 2024-05-23 DIAGNOSIS — M17.11 PRIMARY OSTEOARTHRITIS OF RIGHT KNEE: Primary | ICD-10-CM

## 2024-05-23 DIAGNOSIS — E66.01 CLASS 3 SEVERE OBESITY DUE TO EXCESS CALORIES WITH SERIOUS COMORBIDITY AND BODY MASS INDEX (BMI) OF 40.0 TO 44.9 IN ADULT (HCC): ICD-10-CM

## 2024-05-23 DIAGNOSIS — E03.9 HYPOTHYROIDISM, UNSPECIFIED TYPE: ICD-10-CM

## 2024-05-23 DIAGNOSIS — E78.5 HYPERLIPIDEMIA ASSOCIATED WITH TYPE 2 DIABETES MELLITUS (HCC): ICD-10-CM

## 2024-05-23 DIAGNOSIS — E11.59 HYPERTENSION ASSOCIATED WITH TYPE 2 DIABETES MELLITUS (HCC): ICD-10-CM

## 2024-05-23 DIAGNOSIS — Z74.09 LIMITED MOBILITY: ICD-10-CM

## 2024-05-23 DIAGNOSIS — E11.69 HYPERLIPIDEMIA ASSOCIATED WITH TYPE 2 DIABETES MELLITUS (HCC): ICD-10-CM

## 2024-05-23 DIAGNOSIS — I15.2 HYPERTENSION ASSOCIATED WITH TYPE 2 DIABETES MELLITUS (HCC): ICD-10-CM

## 2024-05-23 DIAGNOSIS — M17.11 PRIMARY OSTEOARTHRITIS OF RIGHT KNEE: ICD-10-CM

## 2024-05-23 PROCEDURE — 99214 OFFICE O/P EST MOD 30 MIN: CPT | Performed by: PHYSICAL MEDICINE & REHABILITATION

## 2024-05-23 RX ORDER — ACETAMINOPHEN AND CODEINE PHOSPHATE 300; 30 MG/1; MG/1
1 TABLET ORAL EVERY 6 HOURS PRN
Qty: 90 TABLET | Refills: 0 | Status: SHIPPED | OUTPATIENT
Start: 2024-05-23

## 2024-05-23 RX ORDER — NAPROXEN 500 MG/1
500 TABLET ORAL 2 TIMES DAILY WITH MEALS
Qty: 60 TABLET | Refills: 0 | Status: SHIPPED | OUTPATIENT
Start: 2024-05-23

## 2024-05-23 RX ORDER — ACETAMINOPHEN AND CODEINE PHOSPHATE 300; 30 MG/1; MG/1
1 TABLET ORAL EVERY 6 HOURS PRN
Qty: 90 TABLET | Refills: 0 | Status: SHIPPED | OUTPATIENT
Start: 2024-05-23 | End: 2024-05-23

## 2024-05-23 NOTE — TELEPHONE ENCOUNTER
Initiated authorization for RIGHT knee genicular nerve block under ultrasound CPT 27782, 59168 with [employer]  Status: Approved-authorization is not required per health plan based on medical necessity however is not a guarantee of payment and may be subject to review once claim is submitted

## 2024-05-23 NOTE — TELEPHONE ENCOUNTER
Per Dr Behar \"Can you please call Mando and let him know I placed an order for an x-ray of the right knee as his last x-rays from 2021.  I would like to have this prior to the procedure. \"    Informed patient and he will take the Xray. Patient also mentioned that Tylenol#3 that was ordered by Dr Behar is out of stock in all pharmacies that patient tried. He is asking if Dr Behar has any other recommendations.     Will discuss with Dr Behar and call patient with a response.

## 2024-05-23 NOTE — PATIENT INSTRUCTIONS
1) My office will call you to schedule the RIGHT knee genicular nerve block under ultrasound guidance once the procedure is approved by your insurance carrier.    2) Tylenol 500-1000 mg every 6-8 hours as needed for pain.  No more than 3000 mg daily.  3) Continue Advil  4) Take Naprosyn 500 mg 1 tablet twice per day with food for the next two weeks and then as needed but no more than 2 tablets per day. Do not take with any other NSAIDS (Ibuprofen, Advil, Aleve, etc). OK to take Tylenol 500 mg every 6 hours as needed for pain. If you develop any side effects including stomach aches, nausea, vomiting, or other gastrointestinal symptoms, stop the medication and call my office.   5) Start Tylenol #3 every 6 hours only if needed for breakthrough pain     To ensure the accuracy of condition updates after your procedure, Dr. Behar would like for you to keep a written record of your pain for up to 12 hours after your injection. When you provide office staff with your condition update post injection - please refer back to this document for ease of communication.     Pain level prior to procedure: 0  1   2   3   4   5   6   7   8   9   10  (No Pain) 0  to  10 (Worst Pain); Please [place] corresponding number above.       Pain level immediately following procedure: 0  1   2   3   4   5   6   7   8   9   10  (No Pain) 0  to  10 (Worst Pain); Please [place] corresponding number above.           Please keep a close record of your pain for the next 12 hours to refer back to when speaking with office staff.      Percentage (%) of Relief:   0% - 100%  (0% = No Relief; 100% = Total Relief)   2 Hours After Procedure:     4 Hours After Procedure:     6 Hours After Procedure:     8 Hours After Procedure:     12 Hours After Procedure:

## 2024-05-23 NOTE — PROGRESS NOTES
[facility]  Progress Note    CHIEF COMPLAINT:    Chief Complaint   Patient presents with    Follow - Up     INJ: 3/21/2023 pt comes in to f/u on Right knee intra-articular orthovisc #3/3 with corticosteroid. Denies improvement. Presents with bilateral knee aching pain. Denies T/N. Admits weakness. Rates pain 6/10. Advil PRN.        History of Present Illness:  The patient is a 64 year old male with significant past medical history of diabetes, obesity, thyroid disease presents for follow-up of his right knee pain.  He is status post right knee series of 3 Orthovisc with corticosteroid injection and the last time being 3/21/2024.  He rates his pain today a 6 out of 10 in the right knee and did not find the injections to be very helpful.  He denies any radicular symptoms and is ambulating with a cane and walking stick.  He has been taking ibuprofen with mild improvement.    PAST MEDICAL HISTORY:  Past Medical History:    Benign head tremor    BPH (benign prostatic hyperplasia)    Diabetes (HCC)    Diverticulitis    Former smoker    Hyperlipidemia    Hypertension    Neuropathic pain    Non-pressure chronic ulcer of right lower leg, limited to breakdown of skin (HCC)    Obesity    Snoring    Thyroid disease       SURGICAL HISTORY:  Past Surgical History:   Procedure Laterality Date    Carotid endarterectomy Right 2021    Surgeon: Dr. Rajiv Solorio at [facility]    Neck/chest procedure unlisted      per patient, a blockage was removed from the right side of his neck in 2021    Other surgical history  2017    Small bowel Res.    Other surgical history  2019    Colectomy       SOCIAL HISTORY:   Social History     Occupational History    Not on file   Tobacco Use    Smoking status: Former     Current packs/day: 0.00     Types: Cigarettes     Quit date:      Years since quittin.4     Passive exposure: Past    Smokeless tobacco: Never   Vaping Use    Vaping  status: Never Used   Substance and Sexual Activity    Alcohol use: Never    Drug use: Never    Sexual activity: Not on file       FAMILY HISTORY:   Family History   Problem Relation Age of Onset    Cancer Father         Prostate    Heart Disorder Father     Cancer Mother        CURRENT MEDICATIONS:   Current Outpatient Medications   Medication Sig Dispense Refill    acetaminophen-codeine (TYLENOL WITH CODEINE #3) 300-30 MG Oral Tab Take 1 tablet by mouth every 6 (six) hours as needed for Pain. 90 tablet 0    naproxen (NAPROSYN) 500 MG Oral Tab Take 1 tablet (500 mg total) by mouth 2 (two) times daily with meals. Take for 2 weeks as directed and then as needed. 60 tablet 0    Tirzepatide (MOUNJARO) 5 MG/0.5ML Subcutaneous Solution Pen-injector Inject 5 mg into the skin every 7 days for 28 days. 2 mL 0    [START ON 5/24/2024] Tirzepatide (MOUNJARO) 7.5 MG/0.5ML Subcutaneous Solution Pen-injector Inject 7.5 mg into the skin every 7 days for 28 days. 2 mL 0    pregabalin 100 MG Oral Cap Take 1 capsule (100 mg total) by mouth 3 (three) times daily. FOR NERVE PAIN. 270 capsule 3    aspirin (ASPIRIN 81) 81 MG Oral Tab EC Take 1 tablet (81 mg total) by mouth daily. FOR HEART. 90 tablet 9    metoprolol tartrate 25 MG Oral Tab Take 1 tablet (25 mg total) by mouth 2 (two) times daily. FOR HEART. 90 tablet 9    valsartan 160 MG Oral Tab Take 1 tablet (160 mg total) by mouth daily. FOR BLOOD PRESSURE. 90 tablet 9    rosuvastatin 40 MG Oral Tab Take 1 tablet (40 mg total) by mouth nightly. FOR CHOLESTEROL. 90 tablet 9    DULoxetine 30 MG Oral Cap DR Particles Take 1 capsule (30 mg total) by mouth daily. TAKE 1 CAPSULE IN THE MORNING FOR ANXIETY/DEPRESSION/ARTHRITIS PAIN 90 capsule 9    finasteride 5 MG Oral Tab Take 1 tablet (5 mg total) by mouth daily. FOR PROSTATE. 90 tablet 9    tamsulosin 0.4 MG Oral Cap Take 2 capsules (0.8 mg total) by mouth daily. FOR PROSTATE. 180 capsule 9    ticagrelor 90 MG Oral Tab Take 1 tablet (90  mg total) by mouth every 12 (twelve) hours. FOR HEART STENTS. 180 tablet 9    levothyroxine 112 MCG Oral Tab Take 1 tablet (112 mcg total) by mouth at bedtime. 90 tablet 1    glipiZIDE 5 MG Oral Tab Take 1 tablet (5 mg total) by mouth daily. FOR DIABETES. STOP/DO NOT TAKE IF FASTING SUGAR IS LESS THAN 100. 90 tablet 2    Continuous Blood Gluc Sensor (FREESTYLE MARSHA 3 SENSOR) Does not apply Misc 1 each every 14 (fourteen) days. 6 each 1    metFORMIN  MG Oral Tablet 24 Hr Take 2 tablets (1,000 mg total) by mouth 2 (two) times daily with meals. 360 tablet 1    magnesium oxide 400 MG Oral Tab Take 1 tablet (400 mg total) by mouth at bedtime.         ALLERGIES:   Allergies   Allergen Reactions    Empagliflozin OTHER (SEE COMMENTS)     Frequent urination, unbearable.    Penicillins UNKNOWN     Patient does not recall reaction    Other UNKNOWN       REVIEW OF SYSTEMS:   Review of Systems   Constitutional: Negative.    HENT: Negative.    Eyes: Negative.    Respiratory: Negative.    Cardiovascular: Negative.    Gastrointestinal: Negative.    Genitourinary: Negative.    Musculoskeletal: As per HPI  Skin: Negative.    Neurological: As per HPI  Endo/Heme/Allergies: Negative.    Psychiatric/Behavioral: Negative.      All other systems reviewed and are negative. Pertinent positives and negatives noted in the HPI.        PHYSICAL EXAM:   Ht 74\"   Wt (!) 315 lb (142.9 kg)   BMI 40.44 kg/m²     Body mass index is 40.44 kg/m².      General: No immediate distress  Head: Normocephalic/ Atraumatic  Eyes: Extra-occular movements intact.   Ears: No auricular hematoma or deformities  Mouth: No lesions or ulcerations  Heart: peripheral pulses intact. Normal capillary refill.   Lungs: Non-labored respirations  Abdomen: No abdominal guarding  Extremities: No lower extremity edema bilaterally   Skin: No lesions noted.   Cognition: alert & oriented x 3, attentive, able to follow 2 step commands, comprehention intact, spontaneous speech  intact  Motor:    Musculoskeletal:        Data  Office Visit on 05/10/2024   Component Date Value Ref Range Status    GLUCOSE BLOOD 05/10/2024 302   Final    Test Strip Lot # 05/10/2024 [id number]  Numeric Final    Test Strip Expiration Date 05/10/2024 110,124  Date Final   Orders Only on 05/06/2024   Component Date Value Ref Range Status    TSH W/REFLEX TO FT4 05/06/2024 3.82  0.40 - 4.50 mIU/L Final    HEMOGLOBIN A1c 05/06/2024 8.1 (H)  <5.7 % of total Hgb Final   Orders Only on 04/10/2024   Component Date Value Ref Range Status    COLOR 04/10/2024 YELLOW  YELLOW Final    APPEARANCE 04/10/2024 CLEAR  CLEAR Final    SPECIFIC GRAVITY 04/10/2024 1.036 (H)  1.001 - 1.035 Final    PH 04/10/2024 6.0  5.0 - 8.0 Final    GLUCOSE 04/10/2024 2+ (A)  NEGATIVE Final    BILIRUBIN 04/10/2024 NEGATIVE  NEGATIVE Final    KETONES 04/10/2024 NEGATIVE  NEGATIVE Final    OCCULT BLOOD 04/10/2024 NEGATIVE  NEGATIVE Final    PROTEIN 04/10/2024 NEGATIVE  NEGATIVE Final    NITRITE 04/10/2024 NEGATIVE  NEGATIVE Final    Leukocyte Esterase  04/10/2024 NEGATIVE  NEGATIVE Final    WBC Urine 04/10/2024 NONE SEEN  < OR = 5 /HPF Final    RBC 04/10/2024 NONE SEEN  < OR = 2 /HPF Final    SQUAMOUS EPITHELIAL CELLS 04/10/2024 NONE SEEN  < OR = 5 /HPF Final    BACTERIA 04/10/2024 NONE SEEN  NONE SEEN /HPF Final    HYALINE CAST 04/10/2024 NONE SEEN  NONE SEEN /LPF Final    NOTE 04/10/2024    Final    CULTURE, URINE, ROUTINE 04/10/2024 SEE NOTE (A)   Final   Office Visit on 03/08/2024   Component Date Value Ref Range Status    GLUCOSE BLOOD 03/08/2024 162   Final    Test Strip Lot # 03/08/2024 [id number]  Numeric Final    Test Strip Expiration Date 03/08/2024 81,624  Date Final   Office Visit on 02/06/2024   Component Date Value Ref Range Status    GLUCOSE BLOOD 02/06/2024 188   Final    Test Strip Lot # 02/06/2024 [id number]  Numeric Final    Test Strip Expiration Date 02/06/2024 80,424  Date Final    HEMOGLOBIN A1C 02/06/2024 6.8 (A)  4.3 - 5.6 % Final     Cartridge Lot# 02/06/2024 [id number]  Numeric Final    Cartridge Expiration Date 02/06/2024 113,025  Date Final   ]      Radiology Imaging:  NA  ASSESSMENT AND PLAN:  Mando is a pleasant 64-year-old male who presents with right knee pain due to severe osteoarthritis refractory to hyaluronic acid and corticosteroid injections.  I am recommending a right knee genicular nerve block with an anticipation of performing a right knee genicular nerve radiofrequency ablation.  We will perform the block under ultrasound guidance in the office.  I have also given him a prescription for Naprosyn and Tylenol with codeine for breakthrough pain.  He will follow-up with me for the procedure.       RTC in 2 days after genicular nerve block  Discharge Instructions were provided as documented in AVS summary.  The patient was in agreement with the assessment and plan.  All questions were answered.  There were no barriers to learning.         1. Primary osteoarthritis of right knee    2. Patellofemoral pain syndrome, unspecified laterality    3. Diabetic polyneuropathy associated with type 2 diabetes mellitus (HCC)    4. Class 3 severe obesity due to excess calories with serious comorbidity and body mass index (BMI) of 40.0 to 44.9 in adult (HCC)    5. Hypertension associated with type 2 diabetes mellitus (HCC)    6. Limited mobility    7. Hypothyroidism, unspecified type    8. Hyperlipidemia associated with type 2 diabetes mellitus (HCC)        Alex B. Behar MD  Physical Medicine and Rehabilitation/Sports Medicine  [facility]

## 2024-06-17 ENCOUNTER — HOSPITAL ENCOUNTER (OUTPATIENT)
Dept: GENERAL RADIOLOGY | Age: 64
Discharge: HOME | End: 2024-06-17
Attending: PHYSICAL MEDICINE & REHABILITATION

## 2024-06-17 DIAGNOSIS — E11.69 HYPERLIPIDEMIA ASSOCIATED WITH TYPE 2 DIABETES MELLITUS (HCC): ICD-10-CM

## 2024-06-17 DIAGNOSIS — M22.2X9 PATELLOFEMORAL PAIN SYNDROME, UNSPECIFIED LATERALITY: ICD-10-CM

## 2024-06-17 DIAGNOSIS — E66.01 CLASS 3 SEVERE OBESITY DUE TO EXCESS CALORIES WITH SERIOUS COMORBIDITY AND BODY MASS INDEX (BMI) OF 40.0 TO 44.9 IN ADULT (HCC): ICD-10-CM

## 2024-06-17 DIAGNOSIS — E11.42 DIABETIC POLYNEUROPATHY ASSOCIATED WITH TYPE 2 DIABETES MELLITUS (HCC): ICD-10-CM

## 2024-06-17 DIAGNOSIS — E03.9 HYPOTHYROIDISM, UNSPECIFIED TYPE: ICD-10-CM

## 2024-06-17 DIAGNOSIS — Z74.09 LIMITED MOBILITY: ICD-10-CM

## 2024-06-17 DIAGNOSIS — E78.5 HYPERLIPIDEMIA ASSOCIATED WITH TYPE 2 DIABETES MELLITUS (HCC): ICD-10-CM

## 2024-06-17 DIAGNOSIS — I15.2 HYPERTENSION ASSOCIATED WITH TYPE 2 DIABETES MELLITUS (HCC): ICD-10-CM

## 2024-06-17 DIAGNOSIS — E11.59 HYPERTENSION ASSOCIATED WITH TYPE 2 DIABETES MELLITUS (HCC): ICD-10-CM

## 2024-06-17 DIAGNOSIS — M17.11 PRIMARY OSTEOARTHRITIS OF RIGHT KNEE: ICD-10-CM

## 2024-06-17 PROCEDURE — 73564 X-RAY EXAM KNEE 4 OR MORE: CPT | Performed by: PHYSICAL MEDICINE & REHABILITATION

## 2024-06-19 ENCOUNTER — TELEPHONE (OUTPATIENT)
Dept: PHYSICAL MEDICINE AND REHAB | Facility: CLINIC | Age: 64
End: 2024-06-19

## 2024-06-19 NOTE — TELEPHONE ENCOUNTER
Spoke with patient who was calling regarding his Naproxen. Reviewed directions from prescription that indicate he should take 1 tablet twice a day for 2 weeks and then only as needed. Patient stated he thought he was going to need to be on this forever and didn't know he was supposed to stop this after 2 weeks. Patient still has tablets left,, so he will only take these as needed and he also white Tylenol #3 to use as needed.     Patient has upcoming injection on 6/27/24.     Nothing further needed at this time.

## 2024-06-21 ENCOUNTER — OFFICE VISIT (OUTPATIENT)
Facility: CLINIC | Age: 64
End: 2024-06-21

## 2024-06-21 VITALS
HEIGHT: 74 IN | RESPIRATION RATE: 17 BRPM | WEIGHT: 315 LBS | OXYGEN SATURATION: 94 % | SYSTOLIC BLOOD PRESSURE: 126 MMHG | BODY MASS INDEX: 40.43 KG/M2 | DIASTOLIC BLOOD PRESSURE: 62 MMHG | HEART RATE: 69 BPM

## 2024-06-21 DIAGNOSIS — E03.9 HYPOTHYROIDISM, UNSPECIFIED TYPE: ICD-10-CM

## 2024-06-21 DIAGNOSIS — E11.65 HYPERGLYCEMIA DUE TO DIABETES MELLITUS (HCC): ICD-10-CM

## 2024-06-21 DIAGNOSIS — E78.5 HYPERLIPIDEMIA ASSOCIATED WITH TYPE 2 DIABETES MELLITUS (HCC): ICD-10-CM

## 2024-06-21 DIAGNOSIS — Z98.890 HISTORY OF CAROTID ENDARTERECTOMY: ICD-10-CM

## 2024-06-21 DIAGNOSIS — E11.59 HYPERTENSION ASSOCIATED WITH TYPE 2 DIABETES MELLITUS (HCC): Primary | ICD-10-CM

## 2024-06-21 DIAGNOSIS — E11.51 TYPE 2 DIABETES MELLITUS WITH DIABETIC PERIPHERAL ANGIOPATHY WITHOUT GANGRENE, WITHOUT LONG-TERM CURRENT USE OF INSULIN (HCC): ICD-10-CM

## 2024-06-21 DIAGNOSIS — R79.89 LOW VITAMIN D LEVEL: ICD-10-CM

## 2024-06-21 DIAGNOSIS — I65.22 LEFT CAROTID ARTERY STENOSIS: ICD-10-CM

## 2024-06-21 DIAGNOSIS — I15.2 HYPERTENSION ASSOCIATED WITH TYPE 2 DIABETES MELLITUS (HCC): Primary | ICD-10-CM

## 2024-06-21 DIAGNOSIS — I10 PRIMARY HYPERTENSION: ICD-10-CM

## 2024-06-21 DIAGNOSIS — E11.69 HYPERLIPIDEMIA ASSOCIATED WITH TYPE 2 DIABETES MELLITUS (HCC): ICD-10-CM

## 2024-06-21 DIAGNOSIS — G45.9 TRANSIENT ISCHEMIC ATTACK (TIA): ICD-10-CM

## 2024-06-21 DIAGNOSIS — E11.65 UNCONTROLLED TYPE 2 DIABETES MELLITUS WITH HYPERGLYCEMIA (HCC): ICD-10-CM

## 2024-06-21 DIAGNOSIS — Z95.5 S/P DRUG ELUTING CORONARY STENT PLACEMENT: ICD-10-CM

## 2024-06-21 LAB
GLUCOSE BLOOD: 267
HEMOGLOBIN A1C: 10.5 % (ref 4.3–5.6)
TEST STRIP LOT #: NORMAL NUMERIC

## 2024-06-21 RX ORDER — GLIPIZIDE 5 MG/1
5 TABLET ORAL
Qty: 90 TABLET | Refills: 2 | Status: SHIPPED | OUTPATIENT
Start: 2024-06-21

## 2024-06-21 RX ORDER — LEVOTHYROXINE SODIUM 112 UG/1
112 TABLET ORAL NIGHTLY
Qty: 90 TABLET | Refills: 1 | Status: SHIPPED | OUTPATIENT
Start: 2024-06-21 | End: 2024-12-18

## 2024-06-21 RX ORDER — SEMAGLUTIDE 1.34 MG/ML
1 INJECTION, SOLUTION SUBCUTANEOUS WEEKLY
Qty: 9 ML | Refills: 1 | Status: SHIPPED | OUTPATIENT
Start: 2024-06-21

## 2024-06-21 NOTE — PROGRESS NOTES
Follow up Visit:  DM.  Requesting Physician:   Macario Wallace MD      CHIEF COMPLAINT:    Chief Complaint   Patient presents with    Diabetes     A1C , 8.1.        HISTORY OF PRESENT ILLNESS:   Mando Puri is a 64 year old male who presents with complicated DM, CAD s/p 2 stents, carotid stenosis post surgery, obese, htn, dlp and hypothyroid     \" I am overdoing it on snacks. I am overdoing it on everything\"   BG is high 200- 300 in the last week   BG is high after meals     DM Dx   On metformin 1000 mg bid     Tried Jardiance but stopped d/t polyuria no uti or yeast infection  Switched to ozempic 0.5 due to shortage     After last visit,   Stopped glipizide  Stopped Farxiga d/t polyuria        W/u showed 2024 Last A1c value was 10.5% done 2024. BG is 267 high   Has polyuria from SGLT and possible increase coffee intake. Decreased his coffee intake to 1 cup a day     DLP on Crestor 40     Hypothyroid Levothryoxine 112 mcg  at night as rec'    TSH 3.8 2024     HTN on valsartan     Lab Results   Component Value Date    A1C 10.5 (A) 2024    A1C 8.1 (H) 2024    A1C 6.8 (A) 2024    A1C 6.6 (A) 2023    A1C 8.4 (A) 2023      DM quality measures:  A1C/Blood pressure: as reported above   Nephropathy screenin2024 , continue ace /arb rx.   LIPID screenin2024  . On statin rx.   Last dilated eye exam: Last Dilated Eye Exam: 03/26/24   3/2024  Exam shows retinopathy? Eye Exam shows Diabetic Retinopathy?: No   no  Last diabetic foot exam: No data recorded 5/10/2024  Dentist : recommend every 6m  Neuropathy: yes in LE   CAD/ASCVD/PAD/CVA: 2 stents, carotid stenosis s/p procedure,          SSx     Door dash  No smoking  Etoh no   No drugs    ASSESSMENT AND PLAN:  64 year old man with complicated DM, CAD s/p 2 stents, carotid stenosis post surgery, obese, HTN, DLP and hypothyroid.  He does not have HF but has CAD so will benefit from SGLT2i and GLP-1. He  did not tolearate SGLT2i x2  BG/A1c is higher.  Has polyruia     plan    RTC in 1 mo   metformin 1000 mg twice a day   Will increase ozempic 0.5-> 1 mg /week   Will start glipizide 5 mg twice a day with food     Thyroid medication dose Levothyroxine 112 mcg  at bedtime. Take you medication on empty stomach, not with any other medication or food. Wait 60 minutes before eating. Wait 4 hours before taking Vitamins, Calcium or iron.  Wait 60 minutes before eating. Do not take the medication on the morning of the lab test. Do not take Biotin/Turmeric 1 week before the test.      Continue Valsartan  and Rosuvastatin  40 mg     If you have low blood sugar <70, take 15 grams of carb (8 oz juice or regular soda) and recheck in 15 minutes.    Follow up with podiatry and eye doctor annually.   Patients need to wear covered shoes all the time and check feet daily.   Bring your meter/BG log to the next visit.      PAST MEDICAL HISTORY:   Past Medical History:    Benign head tremor    BPH (benign prostatic hyperplasia)    Diabetes (HCC)    Diverticulitis    Former smoker    Hyperlipidemia    Hypertension    Neuropathic pain    Non-pressure chronic ulcer of right lower leg, limited to breakdown of skin (HCC)    Obesity    Snoring    Thyroid disease       PAST SURGICAL HISTORY:   Past Surgical History:   Procedure Laterality Date    Carotid endarterectomy Right 04/29/2021    Surgeon: Dr. Rajiv Solorio at [facility]    Neck/chest procedure unlisted      per patient, a blockage was removed from the right side of his neck in 8/2021    Other surgical history  2017    Small bowel Res.    Other surgical history  11/12/2019    Colectomy        CURRENT MEDICATIONS:    Current Outpatient Medications   Medication Sig Dispense Refill    glipiZIDE 5 MG Oral Tab Take 1 tablet (5 mg total) by mouth 2 (two) times daily before meals. FOR DIABETES. STOP/DO NOT TAKE IF FASTING SUGAR IS LESS THAN 100. 90 tablet 2    levothyroxine 112 MCG Oral Tab Take 1  tablet (112 mcg total) by mouth at bedtime. 90 tablet 1    semaglutide (OZEMPIC, 1 MG/DOSE,) 4 MG/3ML Subcutaneous Solution Pen-injector Inject 1 mg into the skin once a week. 9 mL 1    naproxen (NAPROSYN) 500 MG Oral Tab Take 1 tablet (500 mg total) by mouth 2 (two) times daily with meals. Take for 2 weeks as directed and then as needed. 60 tablet 0    acetaminophen-codeine (TYLENOL WITH CODEINE #3) 300-30 MG Oral Tab Take 1 tablet by mouth every 6 (six) hours as needed for Pain. 90 tablet 0    pregabalin 100 MG Oral Cap Take 1 capsule (100 mg total) by mouth 3 (three) times daily. FOR NERVE PAIN. 270 capsule 3    aspirin (ASPIRIN 81) 81 MG Oral Tab EC Take 1 tablet (81 mg total) by mouth daily. FOR HEART. 90 tablet 9    metoprolol tartrate 25 MG Oral Tab Take 1 tablet (25 mg total) by mouth 2 (two) times daily. FOR HEART. 90 tablet 9    valsartan 160 MG Oral Tab Take 1 tablet (160 mg total) by mouth daily. FOR BLOOD PRESSURE. 90 tablet 9    rosuvastatin 40 MG Oral Tab Take 1 tablet (40 mg total) by mouth nightly. FOR CHOLESTEROL. 90 tablet 9    DULoxetine 30 MG Oral Cap DR Particles Take 1 capsule (30 mg total) by mouth daily. TAKE 1 CAPSULE IN THE MORNING FOR ANXIETY/DEPRESSION/ARTHRITIS PAIN 90 capsule 9    finasteride 5 MG Oral Tab Take 1 tablet (5 mg total) by mouth daily. FOR PROSTATE. 90 tablet 9    tamsulosin 0.4 MG Oral Cap Take 2 capsules (0.8 mg total) by mouth daily. FOR PROSTATE. 180 capsule 9    ticagrelor 90 MG Oral Tab Take 1 tablet (90 mg total) by mouth every 12 (twelve) hours. FOR HEART STENTS. 180 tablet 9    metFORMIN  MG Oral Tablet 24 Hr Take 2 tablets (1,000 mg total) by mouth 2 (two) times daily with meals. 360 tablet 1    magnesium oxide 400 MG Oral Tab Take 1 tablet (400 mg total) by mouth at bedtime.      Continuous Blood Gluc Sensor (FREESTYLE MARSHA 3 SENSOR) Does not apply Misc 1 each every 14 (fourteen) days. 6 each 1       ALLERGIES:  Allergies   Allergen Reactions     Empagliflozin OTHER (SEE COMMENTS)     Frequent urination, unbearable.    Penicillins UNKNOWN     Patient does not recall reaction    Other UNKNOWN       SOCIAL HISTORY:    Social History     Socioeconomic History    Marital status:     Number of children: 4   Tobacco Use    Smoking status: Former     Current packs/day: 0.00     Types: Cigarettes     Quit date:      Years since quittin.4     Passive exposure: Past    Smokeless tobacco: Never   Vaping Use    Vaping status: Never Used   Substance and Sexual Activity    Alcohol use: Never    Drug use: Never   Other Topics Concern    Caffeine Concern Yes     Comment: coffee daily    Exercise No       FAMILY HISTORY:   Family History   Problem Relation Age of Onset    Cancer Father         Prostate    Heart Disorder Father     Cancer Mother         PHYSICAL EXAM:   Height: 6' 2\" (188 cm) (916)  Weight: 319 lb (144.7 kg) (916)  BSA (Calculated - sq m): 2.65 sq meters (916)  Pulse: 69 (916)  BP: 126/62 (916)  Temp: --  Do Not Use - Resp Rate: --  SpO2: 94 % (916)    Body mass index is 40.96 kg/m².  Obese   Uses cane   Scar on the neck from carotid surgery  CONSTITUTIONAL:  Awake and alert. Age appropriate, good hygiene not in acute distress. Well nourished and well developed. no acute distress   PSYCH:   Orientated to time, place, person & situation, Normal mood and affect, memory intact, normal insight and judgment, cooperative  Neuro: speech is clear. Awake, alert, no aphasia, no facial asymmetry, no nuchal rigidity  EYES:  No proptosis, no ptosis, conjunctiva      DATA:     Pertinent data reviewed      Latest Reference Range & Units 24 10:36   TSH 0.40 - 4.50 mIU/L 3.82      05/10/24 09:42 24 09:25   GLUCOSE BLOOD 302 267     Ct     No evidence of acute intracranial abnormality.      Chronic right cerebellar lacunar infarct.      Nonspecific small metallic foreign density in the region of the  right eyelid.      No evidence of intracranial large vessel arterial occlusion or proximal flow limiting stenosis.      Patent bilateral cervical carotid arteries without evidence of hemodynamically significant stenosis or evidence of dissection.  Diffusely diminutive vertebrobasilar system which which may reflect anatomic variation.          HEMOGLOBIN A1C        Component Value Flag Ref Range Units Status    HEMOGLOBIN A1C 10.5      4.3 - 5.6 % Final    Cartridge Lot# [id number]       Numeric Final    Cartridge Expiration Date 02/27/2026       Date Final                  ASSAY QUANTITATIVE, GLUCOSE        Component Value Flag Ref Range Units Status    GLUCOSE BLOOD 267        Final    Test Strip Lot # [id number]       Numeric Final    Test Strip Expiration Date 12/06/2024       Date Final                        Orders Placed This Encounter   Procedures    HEMOGLOBIN A1C    ASSAY QUANTITATIVE, GLUCOSE     Orders Placed This Encounter    HEMOGLOBIN A1C     Order Specific Question:   Release to patient     Answer:   Immediate    ASSAY QUANTITATIVE, GLUCOSE     Order Specific Question:   Release to patient     Answer:   Immediate    glipiZIDE 5 MG Oral Tab     Sig: Take 1 tablet (5 mg total) by mouth 2 (two) times daily before meals. FOR DIABETES. STOP/DO NOT TAKE IF FASTING SUGAR IS LESS THAN 100.     Dispense:  90 tablet     Refill:  2    levothyroxine 112 MCG Oral Tab     Sig: Take 1 tablet (112 mcg total) by mouth at bedtime.     Dispense:  90 tablet     Refill:  1    semaglutide (OZEMPIC, 1 MG/DOSE,) 4 MG/3ML Subcutaneous Solution Pen-injector     Sig: Inject 1 mg into the skin once a week.     Dispense:  9 mL     Refill:  1          This is a specialized patient consultation in endocrinology and required comprehensive review of prior records, as well as current evaluation, with time required for consideration of complex endocrine issues and consultation. For this visit, I personally interviewed the patient, and  family member if accompanied, performed the pertinent parts of the history and physical examination. ROS included screening for appropriate endocrine conditions.   Today's diagnosis and plan were reviewed in detail with the patient who states understanding and agrees with plan. I discussed with the patient possible diagnosis, differential diagnosis, need for work up , treatment options, alternatives and side effects.     Please see note for details about time spent which includes:   · pre-visit preparation  · reviewing records  · face to face time with the patient   · timely documentation of the encounter  · ordering medications/tests  · communication with care team  · care coordination    I appreciate the opportunity to be part of your patient's medical care and will keep you, as the referring and primary physicians, informed about the care of your patient, including possible future surgery and pathology findings. Please feel free to contact me should you have any questions.      Brittany Shah MD

## 2024-06-21 NOTE — PATIENT INSTRUCTIONS
RTC in 1 mo   metformin 1000 mg twice a day   Will increase ozempic 0.5-> 1 mg /week   Will start glipizide 5 mg twice a day with food     Thyroid medication dose Levothyroxine 112 mcg  at bedtime. Take you medication on empty stomach, not with any other medication or food. Wait 60 minutes before eating. Wait 4 hours before taking Vitamins, Calcium or iron.  Wait 60 minutes before eating. Do not take the medication on the morning of the lab test. Do not take Biotin/Turmeric 1 week before the test.      Continue Valsartan  and Rosuvastatin  40 mg       If you have low blood sugar <70, take 15 grams of carb (8 oz juice or regular soda) and recheck in 15 minutes.    Follow up with podiatry and eye doctor annually.   Patients need to wear covered shoes all the time and check feet daily.   Bring your meter/BG log to the next visit.

## 2024-06-24 ENCOUNTER — NURSE TRIAGE (OUTPATIENT)
Facility: LOCATION | Age: 64
End: 2024-06-24

## 2024-06-24 NOTE — TELEPHONE ENCOUNTER
Action Requested: Summary for Provider     []  Critical Lab, Recommendations Needed  [] Need Additional Advice  [x]   FYI    []   Need Orders  [] Need Medications Sent to Pharmacy  []  Other     SUMMARY: Patient asking to see Dr Wallace to go over depression.  Was at family outing yesterday, overindulged, got into argument, started yelling at them, calling them \"assholes.\"  He thinks his antidepressant needs to be increased.  Denies suicidal ideation, thoughts of self harm, or thoughts of harming others.  Has not been sleeping well.  Has history of snoring.  Not sure if he has sleep apnea.  Blood sugar running in 300s last week.  He saw Dr Shah 2024 who adjusted his diabetic medications.  Blood sugar this morning was 139.  Per protocol, made appointment tomorrow with Dr Wallace.    Routed message to Dr Wallace as FYI.    Advised patient if symptoms worsen prior to appointment, go to the Emergency Department.    Future Appointments   Date Time Provider Department Center   2024  9:40 AM [facility]   2024 10:00 AM Lurdes Baxter DPM [facility]   2024  9:15 AM Brittany Shah MD [facility]   2024  9:20 AM Behar, Alex, MD PM[facility]     Reason for call: Acute Depression getting worse  Onset: past weekend, argument with family     Spoke with patient,  verified.     Reason for Disposition   Depression is worsening (e.g.,sleeping poorly, less able to do activities of daily living)    Protocols used: Depression-A-[place]

## 2024-06-25 ENCOUNTER — TELEPHONE (OUTPATIENT)
Facility: LOCATION | Age: 64
End: 2024-06-25

## 2024-06-25 ENCOUNTER — APPOINTMENT (OUTPATIENT)
Dept: MRI IMAGING | Facility: HOSPITAL | Age: 64
End: 2024-06-25
Attending: INTERNAL MEDICINE

## 2024-06-25 ENCOUNTER — LAB ENCOUNTER (OUTPATIENT)
Dept: LAB | Age: 64
End: 2024-06-25
Attending: INTERNAL MEDICINE

## 2024-06-25 DIAGNOSIS — Z13.29 SCREENING FOR ENDOCRINE, NUTRITIONAL, METABOLIC AND IMMUNITY DISORDER: ICD-10-CM

## 2024-06-25 DIAGNOSIS — Z13.0 SCREENING FOR ENDOCRINE, NUTRITIONAL, METABOLIC AND IMMUNITY DISORDER: ICD-10-CM

## 2024-06-25 DIAGNOSIS — Z13.228 SCREENING FOR ENDOCRINE, NUTRITIONAL, METABOLIC AND IMMUNITY DISORDER: ICD-10-CM

## 2024-06-25 DIAGNOSIS — Z13.21 SCREENING FOR ENDOCRINE, NUTRITIONAL, METABOLIC AND IMMUNITY DISORDER: ICD-10-CM

## 2024-06-25 LAB — VIT B12 SERPL-MCNC: 542 PG/ML (ref 211–911)

## 2024-06-25 PROCEDURE — 82607 VITAMIN B-12: CPT | Performed by: INTERNAL MEDICINE

## 2024-06-25 PROCEDURE — 85045 AUTOMATED RETICULOCYTE COUNT: CPT | Performed by: INTERNAL MEDICINE

## 2024-06-25 PROCEDURE — 84439 ASSAY OF FREE THYROXINE: CPT | Performed by: INTERNAL MEDICINE

## 2024-06-25 PROCEDURE — 85060 BLOOD SMEAR INTERPRETATION: CPT

## 2024-06-25 PROCEDURE — 84443 ASSAY THYROID STIM HORMONE: CPT | Performed by: INTERNAL MEDICINE

## 2024-06-25 PROCEDURE — 83540 ASSAY OF IRON: CPT | Performed by: INTERNAL MEDICINE

## 2024-06-25 PROCEDURE — 84466 ASSAY OF TRANSFERRIN: CPT | Performed by: INTERNAL MEDICINE

## 2024-06-25 PROCEDURE — 80053 COMPREHEN METABOLIC PANEL: CPT | Performed by: INTERNAL MEDICINE

## 2024-06-25 PROCEDURE — 87086 URINE CULTURE/COLONY COUNT: CPT | Performed by: INTERNAL MEDICINE

## 2024-06-25 PROCEDURE — 82728 ASSAY OF FERRITIN: CPT | Performed by: INTERNAL MEDICINE

## 2024-06-25 PROCEDURE — 81001 URINALYSIS AUTO W/SCOPE: CPT | Performed by: INTERNAL MEDICINE

## 2024-06-25 PROCEDURE — 85025 COMPLETE CBC W/AUTO DIFF WBC: CPT | Performed by: INTERNAL MEDICINE

## 2024-06-25 PROCEDURE — 82607 VITAMIN B-12: CPT

## 2024-06-25 PROCEDURE — 36415 COLL VENOUS BLD VENIPUNCTURE: CPT | Performed by: INTERNAL MEDICINE

## 2024-06-25 NOTE — TELEPHONE ENCOUNTER
Patient called, verified Name and . States he was seen in the office today and was ordered to have an MRI of the brain. Imaging scheduled for today but he ended up cancelling the appointment since he was told that no medication will be given to him prior to the test. He recalls getting a shot when MRI was done in the past, and could not do it without any due to claustrophobia. States he will reschedule the appointment but will need medication.    Dr. Wallace please advise.

## 2024-06-26 ENCOUNTER — PATIENT MESSAGE (OUTPATIENT)
Facility: LOCATION | Age: 64
End: 2024-06-26

## 2024-06-26 ENCOUNTER — TELEPHONE (OUTPATIENT)
Facility: LOCATION | Age: 64
End: 2024-06-26

## 2024-06-26 DIAGNOSIS — E11.51 TYPE 2 DIABETES MELLITUS WITH DIABETIC PERIPHERAL ANGIOPATHY WITHOUT GANGRENE, WITHOUT LONG-TERM CURRENT USE OF INSULIN (HCC): ICD-10-CM

## 2024-06-26 DIAGNOSIS — E11.65 UNCONTROLLED TYPE 2 DIABETES MELLITUS WITH HYPERGLYCEMIA (HCC): ICD-10-CM

## 2024-06-26 DIAGNOSIS — R79.89 LOW VITAMIN D LEVEL: ICD-10-CM

## 2024-06-26 DIAGNOSIS — E11.65 HYPERGLYCEMIA DUE TO DIABETES MELLITUS (HCC): ICD-10-CM

## 2024-06-26 DIAGNOSIS — E11.69 HYPERLIPIDEMIA ASSOCIATED WITH TYPE 2 DIABETES MELLITUS (HCC): ICD-10-CM

## 2024-06-26 DIAGNOSIS — G45.9 TRANSIENT ISCHEMIC ATTACK (TIA): ICD-10-CM

## 2024-06-26 DIAGNOSIS — E78.5 HYPERLIPIDEMIA ASSOCIATED WITH TYPE 2 DIABETES MELLITUS (HCC): ICD-10-CM

## 2024-06-26 DIAGNOSIS — Z95.5 S/P DRUG ELUTING CORONARY STENT PLACEMENT: ICD-10-CM

## 2024-06-26 DIAGNOSIS — Z98.890 HISTORY OF CAROTID ENDARTERECTOMY: ICD-10-CM

## 2024-06-26 DIAGNOSIS — E03.9 HYPOTHYROIDISM, UNSPECIFIED TYPE: ICD-10-CM

## 2024-06-26 DIAGNOSIS — I15.2 HYPERTENSION ASSOCIATED WITH TYPE 2 DIABETES MELLITUS (HCC): ICD-10-CM

## 2024-06-26 DIAGNOSIS — I65.22 LEFT CAROTID ARTERY STENOSIS: ICD-10-CM

## 2024-06-26 DIAGNOSIS — I10 PRIMARY HYPERTENSION: ICD-10-CM

## 2024-06-26 DIAGNOSIS — E11.59 HYPERTENSION ASSOCIATED WITH TYPE 2 DIABETES MELLITUS (HCC): ICD-10-CM

## 2024-06-26 RX ORDER — LEVOTHYROXINE SODIUM 0.12 MG/1
125 TABLET ORAL NIGHTLY
Qty: 90 TABLET | Refills: 1 | Status: SHIPPED | OUTPATIENT
Start: 2024-06-26 | End: 2024-12-23

## 2024-06-27 ENCOUNTER — TELEPHONE (OUTPATIENT)
Dept: PHYSICAL MEDICINE AND REHAB | Facility: CLINIC | Age: 64
End: 2024-06-27

## 2024-07-03 ENCOUNTER — TELEMEDICINE (OUTPATIENT)
Facility: LOCATION | Age: 64
End: 2024-07-03
Payer: COMMERCIAL

## 2024-07-03 DIAGNOSIS — K21.00 GASTROESOPHAGEAL REFLUX DISEASE WITH ESOPHAGITIS WITHOUT HEMORRHAGE: Primary | ICD-10-CM

## 2024-07-03 PROCEDURE — 99213 OFFICE O/P EST LOW 20 MIN: CPT | Performed by: INTERNAL MEDICINE

## 2024-07-03 RX ORDER — PANTOPRAZOLE SODIUM 40 MG/1
40 TABLET, DELAYED RELEASE ORAL
Qty: 90 TABLET | Refills: 1 | Status: SHIPPED | OUTPATIENT
Start: 2024-07-03

## 2024-07-03 RX ORDER — SUCRALFATE 1 G/1
1 TABLET ORAL
Qty: 90 TABLET | Refills: 0 | Status: SHIPPED | OUTPATIENT
Start: 2024-07-03

## 2024-07-03 NOTE — PROGRESS NOTES
INTERNAL MEDICINE VIDEO VISIT NOTE     Patient ID: Mando Puri is a 64 year old male.  Today's Date: 07/03/24  Gastro-esophageal Reflux  He complains of abdominal pain, belching, coughing, early satiety, heartburn, nausea and a sore throat. He reports no chest pain. This is a new problem. The current episode started 1 to 4 weeks ago. The problem occurs constantly. The problem has been gradually worsening. The heartburn duration is several minutes. The heartburn is located in the substernum. The heartburn is of moderate intensity. The heartburn changes with position. The symptoms are aggravated by certain foods, lying down and tight clothes. He has tried nothing for the symptoms. The treatment provided no relief.         There were no vitals filed for this visit.  body mass index is unknown because there is no height or weight on file.  BP Readings from Last 3 Encounters:   06/25/24 120/72   06/21/24 126/62   05/10/24 128/58     The 10-year ASCVD risk score (Tej GARNICA, et al., 2019) is: 15.6%    Values used to calculate the score:      Age: 64 years      Sex: Male      Is Non- : No      Diabetic: Yes      Tobacco smoker: No      Systolic Blood Pressure: 120 mmHg      Is BP treated: Yes      HDL Cholesterol: 44 mg/dL      Total Cholesterol: 137 mg/dL  Medications reviewed:  Current Outpatient Medications   Medication Sig Dispense Refill    pantoprazole 40 MG Oral Tab EC Take 1 tablet (40 mg total) by mouth every morning before breakfast. 90 tablet 1    sucralfate 1 g Oral Tab Take 1 tablet (1 g total) by mouth 4 (four) times daily before meals and nightly. As needed for heart burn. 90 tablet 0    levothyroxine 125 MCG Oral Tab Take 1 tablet (125 mcg total) by mouth at bedtime. 90 tablet 1    pregabalin 100 MG Oral Cap Take 1 capsule (100 mg total) by mouth 2 (two) times daily. FOR NERVE PAIN. Dose reduction due to lethargy 180 capsule 3    Tirzepatide (MOUNJARO) 15  MG/0.5ML Subcutaneous Solution Pen-injector Inject 15 mg into the skin once a week. MAXIMUM DOSE. STOP OZEMPIC IF YOU START THIS. 6 mL 9    glipiZIDE 5 MG Oral Tab Take 1 tablet (5 mg total) by mouth 2 (two) times daily before meals. FOR DIABETES. STOP/DO NOT TAKE IF FASTING SUGAR IS LESS THAN 100. 90 tablet 2    naproxen (NAPROSYN) 500 MG Oral Tab Take 1 tablet (500 mg total) by mouth 2 (two) times daily with meals. Take for 2 weeks as directed and then as needed. 60 tablet 0    acetaminophen-codeine (TYLENOL WITH CODEINE #3) 300-30 MG Oral Tab Take 1 tablet by mouth every 6 (six) hours as needed for Pain. 90 tablet 0    aspirin (ASPIRIN 81) 81 MG Oral Tab EC Take 1 tablet (81 mg total) by mouth daily. FOR HEART. 90 tablet 9    metoprolol tartrate 25 MG Oral Tab Take 1 tablet (25 mg total) by mouth 2 (two) times daily. FOR HEART. 90 tablet 9    valsartan 160 MG Oral Tab Take 1 tablet (160 mg total) by mouth daily. FOR BLOOD PRESSURE. 90 tablet 9    rosuvastatin 40 MG Oral Tab Take 1 tablet (40 mg total) by mouth nightly. FOR CHOLESTEROL. 90 tablet 9    DULoxetine 30 MG Oral Cap DR Particles Take 1 capsule (30 mg total) by mouth daily. TAKE 1 CAPSULE IN THE MORNING FOR ANXIETY/DEPRESSION/ARTHRITIS PAIN 90 capsule 9    finasteride 5 MG Oral Tab Take 1 tablet (5 mg total) by mouth daily. FOR PROSTATE. 90 tablet 9    tamsulosin 0.4 MG Oral Cap Take 2 capsules (0.8 mg total) by mouth daily. FOR PROSTATE. 180 capsule 9    ticagrelor 90 MG Oral Tab Take 1 tablet (90 mg total) by mouth every 12 (twelve) hours. FOR HEART STENTS. 180 tablet 9    Continuous Blood Gluc Sensor (FREESTYLE MARSHA 3 SENSOR) Does not apply Misc 1 each every 14 (fourteen) days. 6 each 1    metFORMIN  MG Oral Tablet 24 Hr Take 2 tablets (1,000 mg total) by mouth 2 (two) times daily with meals. 360 tablet 1    magnesium oxide 400 MG Oral Tab Take 1 tablet (400 mg total) by mouth at bedtime.           Assessment & Plan    1. Gastroesophageal  reflux disease with esophagitis without hemorrhage (Primary)  -     Pantoprazole Sodium; Take 1 tablet (40 mg total) by mouth every morning before breakfast.  Dispense: 90 tablet; Refill: 1  -     Sucralfate; Take 1 tablet (1 g total) by mouth 4 (four) times daily before meals and nightly. As needed for heart burn.  Dispense: 90 tablet; Refill: 0  Plan  Trial as above, 14-28 days, depending on response then PRN.     Follow Up: Return for AS NEEDED/ IF SYMPTOMS WORSEN..         Objective: Results:   Physical Exam  Nursing note reviewed.   Constitutional:       General: He is not in acute distress.     Appearance: Normal appearance.   HENT:      Head: Normocephalic and atraumatic.      Right Ear: External ear normal.      Left Ear: External ear normal.      Nose: Nose normal.   Eyes:      Conjunctiva/sclera: Conjunctivae normal.   Pulmonary:      Effort: Pulmonary effort is normal. No respiratory distress.   Musculoskeletal:      Cervical back: Normal range of motion and neck supple.   Skin:     Coloration: Skin is not jaundiced.   Neurological:      General: No focal deficit present.      Mental Status: He is alert and oriented to person, place, and time. Mental status is at baseline.   Psychiatric:         Mood and Affect: Mood normal.         Thought Content: Thought content normal.        Reviewed:  Patient Active Problem List    Diagnosis    S/P drug eluting coronary stent placement    Neurogenic claudication    Leg wound, left    Abnormal Holter monitor finding    Left carotid artery stenosis    Cerebellar infarct (HCC)    Transient ischemic attack (TIA)    BPH with obstruction/lower urinary tract symptoms    Major depressive disorder    Venous stasis dermatitis of both lower extremities    Non-pressure chronic ulcer of left lower leg, limited to breakdown of skin (HCC)    Patellofemoral arthritis    Varicose veins of left lower extremity with inflammation, with ulcer of ankle limited to breakdown of skin (HCC)     Hypertension associated with type 2 diabetes mellitus (HCC)    Hyperlipidemia associated with type 2 diabetes mellitus (HCC)    Type 2 diabetes mellitus with diabetic peripheral angiopathy without gangrene, without long-term current use of insulin (HCC)    Hypothyroidism    History of right-sided carotid endarterectomy    Detrusor overactivity    Primary osteoarthritis of right knee    Gastroesophageal reflux disease with esophagitis    Class 2 severe obesity due to excess calories with serious comorbidity and body mass index (BMI) of 38.0 to 38.9 in adult (HCC)      Allergies   Allergen Reactions    Empagliflozin OTHER (SEE COMMENTS)     Frequent urination, unbearable.    Penicillins UNKNOWN     Patient does not recall reaction    Other UNKNOWN        Social History     Socioeconomic History    Marital status:     Number of children: 4   Tobacco Use    Smoking status: Former     Current packs/day: 0.00     Types: Cigarettes     Quit date:      Years since quittin.     Passive exposure: Past    Smokeless tobacco: Never   Vaping Use    Vaping status: Never Used   Substance and Sexual Activity    Alcohol use: Never    Drug use: Never   Other Topics Concern    Caffeine Concern Yes     Comment: coffee daily    Exercise No   Social History Narrative    The patient does use an assistive device..  Brace    The patient does live in a home with stairs.      Review of Systems   HENT:  Positive for sore throat.    Respiratory:  Positive for cough.    Cardiovascular:  Negative for chest pain.   Gastrointestinal:  Positive for abdominal pain, heartburn and nausea.     All other systems negative unless otherwise stated in ROS or HPI above.       Alvino Wallace MD  Internal Medicine       Call office with any questions or seek emergency care if necessary.   Patient understands and agrees to follow directions and advice.    Telehealth Verbal Consent   I conducted a telehealth visit with Mando Puri  today, 07/03/24, which was completed using two-way, real-time interactive audio and video communication. This has been done in good greg to provide continuity of care in the best interest of the provider-patient relationship, due to the COVID -19 public health crisis/national emergency where restrictions of face-to-face office visits are ongoing. Every conscious effort was taken to allow for sufficient and adequate time to complete the visit.The patient was made aware of the limitations of the telehealth visit, including treatment limitations as no physical exam could be performed.  The patient was advised to call 911 or to go to the ER in case there was an emergency.  The patient was also advised of the potential privacy & security concerns related to the telehealth platform. The patient was made aware of where to find [facility]'s notice of privacy practices, telehealth consent form and other related consent forms and documents.  which are located on the [facility] website. The patient verbally agreed to telehealth consent form, related consents and the risks discussed.  Lastly, the patient confirmed that they were in [place]. Included in this visit, time may have been spent reviewing labs, medications, radiology tests and decision making. Appropriate medical decision-making and tests are ordered as detailed in the plan of care above.  Coding/billing information is submitted for this visit based on complexity of care and/or time spent for the visit.  ----------------------------------------- PATIENT INSTRUCTIONS-----------------------------------------     Patient Instructions     Gastritis (Adult)    Gastritis is inflammation and irritation of the stomach lining. You can have it for a short time (acute) or be long lasting (chronic). Infection with bacteria called H pylori most often causes gastritis. More than a third of people in the  have these bacteria in their bodies. In many cases, H pylori causes no problems or  symptoms. In some people, though, the infection irritates the stomach lining and causes gastritis. H. pylori may be diagnosed through blood, stool, or breath tests, we well as through biopsy during an endoscopy. Other causes of stomach irritation include drinking alcohol, smoking or chewing tobacco, or taking pain-relieving medicines called NSAIDs (such as aspirin or ibuprofen). Certain drugs (such as cocaine) and immune conditions can also cause gastritis.  Symptoms of gastritis can include:  Belly pain or bloating  Feeling full quickly  Loss of appetite  Nausea or vomiting  Vomiting blood or having black stools  Feeling more tired than usual  An inflamed and irritated stomach lining is more likely to develop a sore called an ulcer. To help prevent this, gastritis should be treated.  Home care  If needed, our healthcare provider may prescribe medicines. If you have H pylori infection, treating it will likely relieve your symptoms. Other changes can help reduce stomach irritation and help it heal.  If you have been prescribed medicines for H pylori infection, take them as directed. Take all of the medicine until it is finished or your healthcare provider tells you to stop, even if you feel better.  Your healthcare provider may advise you not to take NSAIDs. If you take daily aspirin for your heart or other medical reasons, do not stop without talking to your healthcare provider first.  Don't drink alcohol.  Stop smoking. Smoking can irritate the stomach and delay healing. As much as possible, stay away from second hand smoke.  Follow-up care  Follow up with your healthcare provider, or as advised by our staff. You may need testing to check for inflammation or an ulcer.  When to seek medical advice  Call your healthcare provider for any of the following:  Stomach pain that gets worse or moves to the lower right belly (appendix area)  Chest pain that appears or gets worse, or spreads to the back, neck, shoulder, or  arm  Frequent vomiting (can’t keep down liquids)  Blood in the stool or vomit (red or black in color)  Feeling weak or dizzy  Shortness of breath  Unexplained weight loss  Fever of 100.4ºF (38ºC) or higher, or as directed by your healthcare provider    What Is GERD?     With GERD, the weak LES allows food and fluids to travel back, or reflux, into the esophagus.      If you often have a painful burning feeling in your chest after you eat, you may have gastroesophageal reflux disease (GERD). Heartburn that keeps coming back is a classic symptom of GERD. But you may have other symptoms as well. A GERD diagnosis is made only after a complete evaluation by your healthcare provider.  Note: Chest pain may also be caused by heart problems. Be sure to have all chest pain evaluated by a healthcare provider.   When you have a reflux problem  After you eat, food travels from your mouth down the esophagus to your stomach. Along the way, food passes through a one-way valve called the lower esophageal sphincter (LES). The LES sits at the opening to your stomach. Normally the LES opens when you swallow. It lets food enter the stomach, then closes quickly. With GERD, the LES doesn’t work normally. It lets food and stomach acid flow back (reflux) into the esophagus.  Some common symptoms  Frequent heartburn or burping  Sour-tasting fluid backing up into your mouth  Symptoms that get worse after you eat, bend over, or lie down  Trouble swallowing or pain when swallowing  A dry, long-term (chronic) cough  Upset stomach (nausea) or vomiting  Relieving your discomfort  You and your healthcare provider can work together to find the treatment options that best ease your symptoms. These may include lifestyle changes, medicine, and possibly surgery.  Many people find their GERD symptoms decrease when they eat small frequent meals instead of 3 large ones. Reducing the amount of fatty foods in your diet will also help.   The following foods  tend to cause problems for people diagnosed with GERD:  Tomatoes and tomato products  Alcohol  Coffee  Peppermint  Greasy or spicy foods  Talk with your provider if you don’t understand how to make the dietary changes needed to control your GERD symptoms. Your provider can refer you to a nutritionist.    Lifestyle Changes for Controlling GERD  When you have GERD, stomach acid feels as if it’s backing up toward your mouth. Whether or not you take medicine to control your GERD, your symptoms can often be improved with lifestyle changes. Talk to your healthcare provider about the following suggestions. These suggestions may help you get relief from your symptoms.      Raise your head  Reflux is more likely to strike when you’re lying down flat, because stomach fluid can flow backward more easily. Raising the head of your bed 4 to 6 inches can help. To do this:  Slide blocks or books under the legs at the head of your bed. Or, place a wedge under the mattress. [employer] can make a suitable wedge for you. The wedge should run from your waist to the top of your head.  Don’t just prop your head on several pillows. This increases pressure on your stomach. It can make GERD worse.  Watch your eating habits  Certain foods may increase the acid in your stomach or relax the lower esophageal sphincter. This makes GERD more likely. It’s best to avoid the following if they cause you symptoms:  Coffee, tea, and carbonated drinks (with and without caffeine)  Fatty, fried, or spicy food  Mint, chocolate, onions, and tomatoes  Peppermint  Any other foods that seem to irritate your stomach or cause you pain  Relieve the pressure  Tips include the following:  Eat smaller meals, even if you have to eat more often.  Don’t lie down right after you eat. Wait a few hours for your stomach to empty.  Avoid tight belts and tight-fitting clothes.  Lose excess weight.  Tobacco and alcohol  Avoid smoking tobacco and drinking alcohol. They  can make GERD symptoms worse.    Medicines for GERD  Gastroesophageal reflux disease (GERD) can be treated with medicine. This may be done with a medicine you can buy over the counter. Or it may be done with a medicine that your healthcare provider has to prescribe. In some cases, both types may be used. Your provider will tell you what is best for your symptoms.  Antacids  Antacids work to weaken the acid in your stomach and can give you quick relief. You can buy many of them with no prescription. Antacids can be high in sodium. This may be a problem if you have high blood pressure. Some antacids also have aluminum. This should be avoided if you have long-term (chronic) kidney disease. So check with your provider first. Take antacids only when you need to, as advised by your provider.  Side effects: Constipation, diarrhea. If you take too much medicine, it can cause calcium to build up.   H-2 blockers  H-2 blockers cause the stomach to make less acid. They are often used both on demand as symptoms occur, and daily to keep symptoms away. Your provider may prescribe them if antacids don’t work for you. You can buy some of them over the counter. These come in a lower dosage.  Side effects: Confusion in older adults  Proton-pump inhibitors  These also cause the stomach to make less acid. They reduce stomach acid more than H-2 blockers. They may be used for a short time, or longer to treat certain conditions. You can buy some of them over the counter. Or your provider may prescribe them. They help control GERD symptoms.  Side effects: Belly (abdominal) pain, diarrhea, upset stomach (nausea). Possible other side effects linked to long-term use and high doses.  Prokinetics  These medicines affect the movement of the digestive tract. They may be recommended if your stomach is emptying too slowly. But in most cases they are not recommended for treating GERD.   Side effects: Tiredness, depression, anxiety, problems with  physical movement, belly cramps, constipation, diarrhea, a jittery feeling  Medicines to avoid  Don’t take aspirin without your provider’s approval. And don’t take a nonsteroidal anti-inflammatory drug (NSAID), such as ibuprofen. These reduce the protective lining of your stomach. This can lead to more GERD symptoms. Check with your provider or pharmacist before taking a new medicine.     Omeprazole tablets (OTC)  Brand Name: Prilosec OTC  What is this medicine?  OMEPRAZOLE (oh ME pray zol) prevents the production of acid in the stomach. It is used to treat the symptoms of heartburn. You can buy this medicine without a prescription. This product is not for long-term use, unless otherwise directed by your doctor or health care professional.  How should I use this medicine?  Take this medicine by mouth. Follow the directions on the product label. If you are taking this medicine without a prescription, take one tablet every day. Do not use for longer than 14 days or repeat a course of treatment more often than every 4 months unless directed by a doctor or healthcare professional. Take your dose at regular intervals every 24 hours. Swallow the tablet whole with a drink of water. Do not crush, break or chew. This medicine works best if taken on an empty stomach 30 minutes before breakfast. If you are using this medicine with the prescription of your doctor or healthcare professional, follow the directions you were given. Do not take your medicine more often than directed.  Talk to your pediatrician regarding the use of this medicine in children. Special care may be needed.  What side effects may I notice from receiving this medicine?  Side effects that you should report to your doctor or health care professional as soon as possible:  allergic reactions like skin rash, itching or hives, swelling of the face, lips, or tongue  bone, muscle or joint pain  breathing problems  chest pain or chest tightness  dark yellow or  brown urine  diarrhea  dizziness  fast, irregular heartbeat  feeling faint or lightheaded  fever or sore throat  muscle spasm  palpitations  rash on cheeks or arms that gets worse in the sun  redness, blistering, peeling or loosening of the skin, including inside the mouth  seizures  stomach polyps  tremors  unusual bleeding or bruising  unusually weak or tired  yellowing of the eyes or skin  Side effects that usually do not require medical attention (report to your doctor or health care professional if they continue or are bothersome):  constipation  dry mouth  headache  loose stools  nausea  What may interact with this medicine?  Do not take this medicine with any of the following medications:  atazanavir  clopidogrel  nelfinavir  This medicine may also interact with the following medications:  ampicillin  certain medicines for anxiety or sleep  certain medicines that treat or prevent blood clots like warfarin  cyclosporine  diazepam  digoxin  disulfiram  iron salts  methotrexate  mycophenolate mofetil  phenytoin  prescription medicine for fungal or yeast infection like itraconazole, ketoconazole, voriconazole  saquinavir  tacrolimus  What if I miss a dose?  If you miss a dose, take it as soon as you can. If it is almost time for your next dose, take only that dose. Do not take double or extra doses.  Where should I keep my medicine?  Keep out of the reach of children.  Store at room temperature between 20 and 25 degrees C (68 and 77 degrees F). Protect from light and moisture. Throw away any unused medicine after the expiration date.  What should I tell my health care provider before I take this medicine?  They need to know if you have any of these conditions:  black or bloody stools  chest pain  difficulty swallowing  have had heartburn for over 3 months  have heartburn with dizziness, lightheadedness or sweating  liver disease  lupus  stomach pain  unexplained weight loss  vomiting with blood  wheezing  an  unusual or allergic reaction to omeprazole, other medicines, foods, dyes, or preservatives  pregnant or trying to get pregnant  breast-feeding  What should I watch for while using this medicine?  It can take several days before your heartburn gets better. Check with your doctor or health care professional if your condition does not start to get better, or if it gets worse.  Do not treat diarrhea with over the counter products. Contact your doctor if you have diarrhea that lasts more than 2 days or if it is severe and watery.  Do not treat yourself for heartburn with this medicine for more than 14 days in a row. You should only use this medicine for a 2-week treatment period once every 4 months. If your symptoms return shortly after your therapy is complete, or within the 4 month time frame, call your doctor or health care professional.  NOTE:This sheet is a summary. It may not cover all possible information. If you have questions about this medicine, talk to your doctor, pharmacist, or health care provider. Copyright© 2019 [employer]  [employer]

## 2024-07-03 NOTE — PATIENT INSTRUCTIONS
Gastritis (Adult)    Gastritis is inflammation and irritation of the stomach lining. You can have it for a short time (acute) or be long lasting (chronic). Infection with bacteria called H pylori most often causes gastritis. More than a third of people in the [place] have these bacteria in their bodies. In many cases, H pylori causes no problems or symptoms. In some people, though, the infection irritates the stomach lining and causes gastritis. H. pylori may be diagnosed through blood, stool, or breath tests, we well as through biopsy during an endoscopy. Other causes of stomach irritation include drinking alcohol, smoking or chewing tobacco, or taking pain-relieving medicines called NSAIDs (such as aspirin or ibuprofen). Certain drugs (such as cocaine) and immune conditions can also cause gastritis.  Symptoms of gastritis can include:  Belly pain or bloating  Feeling full quickly  Loss of appetite  Nausea or vomiting  Vomiting blood or having black stools  Feeling more tired than usual  An inflamed and irritated stomach lining is more likely to develop a sore called an ulcer. To help prevent this, gastritis should be treated.  Home care  If needed, our healthcare provider may prescribe medicines. If you have H pylori infection, treating it will likely relieve your symptoms. Other changes can help reduce stomach irritation and help it heal.  If you have been prescribed medicines for H pylori infection, take them as directed. Take all of the medicine until it is finished or your healthcare provider tells you to stop, even if you feel better.  Your healthcare provider may advise you not to take NSAIDs. If you take daily aspirin for your heart or other medical reasons, do not stop without talking to your healthcare provider first.  Don't drink alcohol.  Stop smoking. Smoking can irritate the stomach and delay healing. As much as possible, stay away from second hand smoke.  Follow-up care  Follow up with your healthcare  provider, or as advised by our staff. You may need testing to check for inflammation or an ulcer.  When to seek medical advice  Call your healthcare provider for any of the following:  Stomach pain that gets worse or moves to the lower right belly (appendix area)  Chest pain that appears or gets worse, or spreads to the back, neck, shoulder, or arm  Frequent vomiting (can’t keep down liquids)  Blood in the stool or vomit (red or black in color)  Feeling weak or dizzy  Shortness of breath  Unexplained weight loss  Fever of 100.4ºF (38ºC) or higher, or as directed by your healthcare provider    What Is GERD?     With GERD, the weak LES allows food and fluids to travel back, or reflux, into the esophagus.      If you often have a painful burning feeling in your chest after you eat, you may have gastroesophageal reflux disease (GERD). Heartburn that keeps coming back is a classic symptom of GERD. But you may have other symptoms as well. A GERD diagnosis is made only after a complete evaluation by your healthcare provider.  Note: Chest pain may also be caused by heart problems. Be sure to have all chest pain evaluated by a healthcare provider.   When you have a reflux problem  After you eat, food travels from your mouth down the esophagus to your stomach. Along the way, food passes through a one-way valve called the lower esophageal sphincter (LES). The LES sits at the opening to your stomach. Normally the LES opens when you swallow. It lets food enter the stomach, then closes quickly. With GERD, the LES doesn’t work normally. It lets food and stomach acid flow back (reflux) into the esophagus.  Some common symptoms  Frequent heartburn or burping  Sour-tasting fluid backing up into your mouth  Symptoms that get worse after you eat, bend over, or lie down  Trouble swallowing or pain when swallowing  A dry, long-term (chronic) cough  Upset stomach (nausea) or vomiting  Relieving your discomfort  You and your healthcare  provider can work together to find the treatment options that best ease your symptoms. These may include lifestyle changes, medicine, and possibly surgery.  Many people find their GERD symptoms decrease when they eat small frequent meals instead of 3 large ones. Reducing the amount of fatty foods in your diet will also help.   The following foods tend to cause problems for people diagnosed with GERD:  Tomatoes and tomato products  Alcohol  Coffee  Peppermint  Greasy or spicy foods  Talk with your provider if you don’t understand how to make the dietary changes needed to control your GERD symptoms. Your provider can refer you to a nutritionist.    Lifestyle Changes for Controlling GERD  When you have GERD, stomach acid feels as if it’s backing up toward your mouth. Whether or not you take medicine to control your GERD, your symptoms can often be improved with lifestyle changes. Talk to your healthcare provider about the following suggestions. These suggestions may help you get relief from your symptoms.      Raise your head  Reflux is more likely to strike when you’re lying down flat, because stomach fluid can flow backward more easily. Raising the head of your bed 4 to 6 inches can help. To do this:  Slide blocks or books under the legs at the head of your bed. Or, place a wedge under the mattress. Many [employer] can make a suitable wedge for you. The wedge should run from your waist to the top of your head.  Don’t just prop your head on several pillows. This increases pressure on your stomach. It can make GERD worse.  Watch your eating habits  Certain foods may increase the acid in your stomach or relax the lower esophageal sphincter. This makes GERD more likely. It’s best to avoid the following if they cause you symptoms:  Coffee, tea, and carbonated drinks (with and without caffeine)  Fatty, fried, or spicy food  Mint, chocolate, onions, and tomatoes  Peppermint  Any other foods that seem to irritate your  stomach or cause you pain  Relieve the pressure  Tips include the following:  Eat smaller meals, even if you have to eat more often.  Don’t lie down right after you eat. Wait a few hours for your stomach to empty.  Avoid tight belts and tight-fitting clothes.  Lose excess weight.  Tobacco and alcohol  Avoid smoking tobacco and drinking alcohol. They can make GERD symptoms worse.    Medicines for GERD  Gastroesophageal reflux disease (GERD) can be treated with medicine. This may be done with a medicine you can buy over the counter. Or it may be done with a medicine that your healthcare provider has to prescribe. In some cases, both types may be used. Your provider will tell you what is best for your symptoms.  Antacids  Antacids work to weaken the acid in your stomach and can give you quick relief. You can buy many of them with no prescription. Antacids can be high in sodium. This may be a problem if you have high blood pressure. Some antacids also have aluminum. This should be avoided if you have long-term (chronic) kidney disease. So check with your provider first. Take antacids only when you need to, as advised by your provider.  Side effects: Constipation, diarrhea. If you take too much medicine, it can cause calcium to build up.   H-2 blockers  H-2 blockers cause the stomach to make less acid. They are often used both on demand as symptoms occur, and daily to keep symptoms away. Your provider may prescribe them if antacids don’t work for you. You can buy some of them over the counter. These come in a lower dosage.  Side effects: Confusion in older adults  Proton-pump inhibitors  These also cause the stomach to make less acid. They reduce stomach acid more than H-2 blockers. They may be used for a short time, or longer to treat certain conditions. You can buy some of them over the counter. Or your provider may prescribe them. They help control GERD symptoms.  Side effects: Belly (abdominal) pain, diarrhea, upset  stomach (nausea). Possible other side effects linked to long-term use and high doses.  Prokinetics  These medicines affect the movement of the digestive tract. They may be recommended if your stomach is emptying too slowly. But in most cases they are not recommended for treating GERD.   Side effects: Tiredness, depression, anxiety, problems with physical movement, belly cramps, constipation, diarrhea, a jittery feeling  Medicines to avoid  Don’t take aspirin without your provider’s approval. And don’t take a nonsteroidal anti-inflammatory drug (NSAID), such as ibuprofen. These reduce the protective lining of your stomach. This can lead to more GERD symptoms. Check with your provider or pharmacist before taking a new medicine.     Omeprazole tablets (OTC)  Brand Name: Prilosec OTC  What is this medicine?  OMEPRAZOLE (oh ME pray zol) prevents the production of acid in the stomach. It is used to treat the symptoms of heartburn. You can buy this medicine without a prescription. This product is not for long-term use, unless otherwise directed by your doctor or health care professional.  How should I use this medicine?  Take this medicine by mouth. Follow the directions on the product label. If you are taking this medicine without a prescription, take one tablet every day. Do not use for longer than 14 days or repeat a course of treatment more often than every 4 months unless directed by a doctor or healthcare professional. Take your dose at regular intervals every 24 hours. Swallow the tablet whole with a drink of water. Do not crush, break or chew. This medicine works best if taken on an empty stomach 30 minutes before breakfast. If you are using this medicine with the prescription of your doctor or healthcare professional, follow the directions you were given. Do not take your medicine more often than directed.  Talk to your pediatrician regarding the use of this medicine in children. Special care may be needed.  What  side effects may I notice from receiving this medicine?  Side effects that you should report to your doctor or health care professional as soon as possible:  allergic reactions like skin rash, itching or hives, swelling of the face, lips, or tongue  bone, muscle or joint pain  breathing problems  chest pain or chest tightness  dark yellow or brown urine  diarrhea  dizziness  fast, irregular heartbeat  feeling faint or lightheaded  fever or sore throat  muscle spasm  palpitations  rash on cheeks or arms that gets worse in the sun  redness, blistering, peeling or loosening of the skin, including inside the mouth  seizures  stomach polyps  tremors  unusual bleeding or bruising  unusually weak or tired  yellowing of the eyes or skin  Side effects that usually do not require medical attention (report to your doctor or health care professional if they continue or are bothersome):  constipation  dry mouth  headache  loose stools  nausea  What may interact with this medicine?  Do not take this medicine with any of the following medications:  atazanavir  clopidogrel  nelfinavir  This medicine may also interact with the following medications:  ampicillin  certain medicines for anxiety or sleep  certain medicines that treat or prevent blood clots like warfarin  cyclosporine  diazepam  digoxin  disulfiram  iron salts  methotrexate  mycophenolate mofetil  phenytoin  prescription medicine for fungal or yeast infection like itraconazole, ketoconazole, voriconazole  saquinavir  tacrolimus  What if I miss a dose?  If you miss a dose, take it as soon as you can. If it is almost time for your next dose, take only that dose. Do not take double or extra doses.  Where should I keep my medicine?  Keep out of the reach of children.  Store at room temperature between 20 and 25 degrees C (68 and 77 degrees F). Protect from light and moisture. Throw away any unused medicine after the expiration date.  What should I tell my health care  provider before I take this medicine?  They need to know if you have any of these conditions:  black or bloody stools  chest pain  difficulty swallowing  have had heartburn for over 3 months  have heartburn with dizziness, lightheadedness or sweating  liver disease  lupus  stomach pain  unexplained weight loss  vomiting with blood  wheezing  an unusual or allergic reaction to omeprazole, other medicines, foods, dyes, or preservatives  pregnant or trying to get pregnant  breast-feeding  What should I watch for while using this medicine?  It can take several days before your heartburn gets better. Check with your doctor or health care professional if your condition does not start to get better, or if it gets worse.  Do not treat diarrhea with over the counter products. Contact your doctor if you have diarrhea that lasts more than 2 days or if it is severe and watery.  Do not treat yourself for heartburn with this medicine for more than 14 days in a row. You should only use this medicine for a 2-week treatment period once every 4 months. If your symptoms return shortly after your therapy is complete, or within the 4 month time frame, call your doctor or health care professional.  NOTE:This sheet is a summary. It may not cover all possible information. If you have questions about this medicine, talk to your doctor, pharmacist, or health care provider. Copyright© 2019 [employer]

## 2024-07-12 DIAGNOSIS — K21.00 GASTROESOPHAGEAL REFLUX DISEASE WITH ESOPHAGITIS WITHOUT HEMORRHAGE: ICD-10-CM

## 2024-07-12 NOTE — TELEPHONE ENCOUNTER
Pharmacy requesting refill of       pantoprazole 40 MG Oral Tab EC, Take 1 tablet (40 mg total) by mouth every morning before breakfast., Disp: 90 tablet, Rfl: 1

## 2024-07-17 RX ORDER — PANTOPRAZOLE SODIUM 40 MG/1
40 TABLET, DELAYED RELEASE ORAL
Qty: 90 TABLET | Refills: 1 | OUTPATIENT
Start: 2024-07-17

## 2024-07-17 NOTE — TELEPHONE ENCOUNTER
Patient states that pantoprazole should be sent to [facility]. Canceled prescription refill request from [facility]. He will follow up with [facility] for .

## 2024-07-17 NOTE — TELEPHONE ENCOUNTER
Nursing Staff,    Please call patient to verify which pharmacy he wants his Pantoprazole at. Medication was sent to [facility] on 07/03/2024 for a 6 month supply. Now a request from [facility].    Thank You.

## 2024-07-25 ENCOUNTER — OFFICE VISIT (OUTPATIENT)
Dept: PODIATRY CLINIC | Facility: CLINIC | Age: 64
End: 2024-07-25

## 2024-07-25 DIAGNOSIS — E11.42 TYPE 2 DIABETES MELLITUS WITH POLYNEUROPATHY (HCC): ICD-10-CM

## 2024-07-25 DIAGNOSIS — L97.522 ULCER OF TOE, LEFT, WITH FAT LAYER EXPOSED (HCC): Primary | ICD-10-CM

## 2024-07-25 DIAGNOSIS — B35.1 ONYCHOMYCOSIS: ICD-10-CM

## 2024-07-25 PROCEDURE — 99214 OFFICE O/P EST MOD 30 MIN: CPT | Performed by: STUDENT IN AN ORGANIZED HEALTH CARE EDUCATION/TRAINING PROGRAM

## 2024-07-25 RX ORDER — CLINDAMYCIN HYDROCHLORIDE 300 MG/1
300 CAPSULE ORAL EVERY 8 HOURS
Qty: 30 CAPSULE | Refills: 0 | Status: SHIPPED | OUTPATIENT
Start: 2024-07-25 | End: 2024-08-04

## 2024-07-25 NOTE — PROGRESS NOTES
[facility]  Progress Note      Mando Puri is a 64 year old male.   Chief Complaint   Patient presents with    Diabetic Foot Care     10 weeks f/u - was seen by EM on 5/10/24 - last HgA1C=10.5 from 6/21/24 - Left 3rd toe is painful and swollen for the past 2 weeks - no drainage - has some redness -  he is with a post-op shoe on - this AM his IJ=491 - needs his feet checked and nails trimmed        HPI:     Patient is a pleasant 64-year-old diabetic male that is uncontrolled that presents to clinic for bilateral foot evaluation.  He admits to a sore on the tip of his left third digit has been painful for the past 2 weeks.  Admits to some mild drainage.  He has been ambulating in a surgical shoe.  Patient is currently still working and he wears steel toe boots.  Denies any nausea, vomiting, diarrhea, shortness of breath or chest pain.    Allergies: Empagliflozin, Penicillins, and Other    Current Outpatient Medications   Medication Sig Dispense Refill    pantoprazole 40 MG Oral Tab EC Take 1 tablet (40 mg total) by mouth every morning before breakfast. 90 tablet 1    sucralfate 1 g Oral Tab Take 1 tablet (1 g total) by mouth 4 (four) times daily before meals and nightly. As needed for heart burn. 90 tablet 0    levothyroxine 125 MCG Oral Tab Take 1 tablet (125 mcg total) by mouth at bedtime. 90 tablet 1    pregabalin 100 MG Oral Cap Take 1 capsule (100 mg total) by mouth 2 (two) times daily. FOR NERVE PAIN. Dose reduction due to lethargy 180 capsule 3    Tirzepatide (MOUNJARO) 15 MG/0.5ML Subcutaneous Solution Pen-injector Inject 15 mg into the skin once a week. MAXIMUM DOSE. STOP OZEMPIC IF YOU START THIS. 6 mL 9    glipiZIDE 5 MG Oral Tab Take 1 tablet (5 mg total) by mouth 2 (two) times daily before meals. FOR DIABETES. STOP/DO NOT TAKE IF FASTING SUGAR IS LESS THAN 100. 90 tablet 2    naproxen (NAPROSYN) 500 MG Oral Tab Take 1 tablet (500 mg total) by mouth 2 (two) times daily with meals.  Take for 2 weeks as directed and then as needed. 60 tablet 0    acetaminophen-codeine (TYLENOL WITH CODEINE #3) 300-30 MG Oral Tab Take 1 tablet by mouth every 6 (six) hours as needed for Pain. 90 tablet 0    aspirin (ASPIRIN 81) 81 MG Oral Tab EC Take 1 tablet (81 mg total) by mouth daily. FOR HEART. 90 tablet 9    metoprolol tartrate 25 MG Oral Tab Take 1 tablet (25 mg total) by mouth 2 (two) times daily. FOR HEART. 90 tablet 9    valsartan 160 MG Oral Tab Take 1 tablet (160 mg total) by mouth daily. FOR BLOOD PRESSURE. 90 tablet 9    rosuvastatin 40 MG Oral Tab Take 1 tablet (40 mg total) by mouth nightly. FOR CHOLESTEROL. 90 tablet 9    DULoxetine 30 MG Oral Cap DR Particles Take 1 capsule (30 mg total) by mouth daily. TAKE 1 CAPSULE IN THE MORNING FOR ANXIETY/DEPRESSION/ARTHRITIS PAIN 90 capsule 9    finasteride 5 MG Oral Tab Take 1 tablet (5 mg total) by mouth daily. FOR PROSTATE. 90 tablet 9    tamsulosin 0.4 MG Oral Cap Take 2 capsules (0.8 mg total) by mouth daily. FOR PROSTATE. 180 capsule 9    ticagrelor 90 MG Oral Tab Take 1 tablet (90 mg total) by mouth every 12 (twelve) hours. FOR HEART STENTS. 180 tablet 9    Continuous Blood Gluc Sensor (FREESTYLE MARSHA 3 SENSOR) Does not apply Misc 1 each every 14 (fourteen) days. 6 each 1    metFORMIN  MG Oral Tablet 24 Hr Take 2 tablets (1,000 mg total) by mouth 2 (two) times daily with meals. 360 tablet 1    magnesium oxide 400 MG Oral Tab Take 1 tablet (400 mg total) by mouth at bedtime.        Past Medical History:    Benign head tremor    BPH (benign prostatic hyperplasia)    Diabetes (HCC)    Diverticulitis    Former smoker    Hyperlipidemia    Hypertension    Neuropathic pain    Non-pressure chronic ulcer of right lower leg, limited to breakdown of skin (HCC)    Obesity    Snoring    Thyroid disease      Past Surgical History:   Procedure Laterality Date    Carotid endarterectomy Right 04/29/2021    Surgeon: Dr. Rajiv Solorio at [facility]    Neck/chest  procedure unlisted      per patient, a blockage was removed from the right side of his neck in 2021    Other surgical history  2017    Small bowel Res.    Other surgical history  2019    Colectomy      Family History   Problem Relation Age of Onset    Cancer Father         Prostate    Heart Disorder Father     Cancer Mother       Social History     Socioeconomic History    Marital status:     Number of children: 4   Tobacco Use    Smoking status: Former     Current packs/day: 0.00     Types: Cigarettes     Quit date:      Years since quittin.5     Passive exposure: Past    Smokeless tobacco: Never   Vaping Use    Vaping status: Never Used   Substance and Sexual Activity    Alcohol use: Never    Drug use: Never   Other Topics Concern    Caffeine Concern Yes     Comment: coffee daily    Exercise No           REVIEW OF SYSTEMS:     Denies nause, fever, chills  No calf pain  Denies chest pain or SOB      EXAM:   There were no vitals taken for this visit.  GENERAL: well developed, well nourished, in no apparent distress  EXTREMITIES:   1. Integument: Normal skin temperature and turgor.  Ulcer at the left distal tuft of third digit with fat layer exposed.  Mild erythema noted to the left third digit.  No purulent drainage or probing to bone appreciated.  2. Vascular: Dorsalis pedis two out of four bilateral and posterior tibial pulses two out of   four bilateral, capillary refill normal.   3. Musculoskeletal: All muscle groups are graded 5 out of 5 in the foot and ankle.   4. Neurological: Normal sharp dull sensation; reflexes normal.             ASSESSMENT AND PLAN:   Diagnoses and all orders for this visit:    Ulcer of toe, left, with fat layer exposed (HCC)    Type 2 diabetes mellitus with polyneuropathy (HCC)    Onychomycosis        Plan:     Patient seen and examined and findings discussed with patient.  Informed patient that he has a wound on the distal tuft of the left third digit and he  will need to perform daily wound care.  Dispensed iodine ointment and Band-Aids to perform daily wound care as instructed.  Rx for oral antibiotics to be taken as directed.  Return to clinic in 2 weeks for reevaluation of left third digit wound.  Toenails x 10 with debrided with sterile nail nippers to healthy nailbed without incident.  educated patient on worsening signs of infection and instructed him to seek immediate medical attention if symptoms arise.    The patient indicates understanding of these issues and agrees to the plan.        Lurdes Baxter DPM

## 2024-08-08 ENCOUNTER — TELEPHONE (OUTPATIENT)
Dept: PODIATRY CLINIC | Facility: CLINIC | Age: 64
End: 2024-08-08

## 2024-08-08 ENCOUNTER — OFFICE VISIT (OUTPATIENT)
Dept: PHYSICAL MEDICINE AND REHAB | Facility: CLINIC | Age: 64
End: 2024-08-08
Payer: COMMERCIAL

## 2024-08-08 ENCOUNTER — OFFICE VISIT (OUTPATIENT)
Dept: PODIATRY CLINIC | Facility: CLINIC | Age: 64
End: 2024-08-08

## 2024-08-08 DIAGNOSIS — M25.561 CHRONIC PAIN OF RIGHT KNEE: ICD-10-CM

## 2024-08-08 DIAGNOSIS — E11.42 TYPE 2 DIABETES MELLITUS WITH POLYNEUROPATHY (HCC): Primary | ICD-10-CM

## 2024-08-08 DIAGNOSIS — Z87.828 HEALED WOUND: ICD-10-CM

## 2024-08-08 DIAGNOSIS — M22.2X9 PATELLOFEMORAL PAIN SYNDROME, UNSPECIFIED LATERALITY: ICD-10-CM

## 2024-08-08 DIAGNOSIS — G89.29 CHRONIC PAIN OF RIGHT KNEE: ICD-10-CM

## 2024-08-08 DIAGNOSIS — M17.11 PRIMARY OSTEOARTHRITIS OF RIGHT KNEE: Primary | ICD-10-CM

## 2024-08-08 PROCEDURE — 64454 NJX AA&/STRD GNCLR NRV BRNCH: CPT | Performed by: PHYSICAL MEDICINE & REHABILITATION

## 2024-08-08 PROCEDURE — 99213 OFFICE O/P EST LOW 20 MIN: CPT | Performed by: STUDENT IN AN ORGANIZED HEALTH CARE EDUCATION/TRAINING PROGRAM

## 2024-08-08 RX ORDER — BUPIVACAINE HYDROCHLORIDE 2.5 MG/ML
3 INJECTION, SOLUTION INFILTRATION; PERINEURAL ONCE
Status: COMPLETED | OUTPATIENT
Start: 2024-08-08 | End: 2024-08-08

## 2024-08-08 RX ORDER — LIDOCAINE HYDROCHLORIDE 10 MG/ML
5 INJECTION, SOLUTION INFILTRATION; PERINEURAL ONCE
Status: COMPLETED | OUTPATIENT
Start: 2024-08-08 | End: 2024-08-08

## 2024-08-08 NOTE — PATIENT INSTRUCTIONS
To ensure the accuracy of condition updates after your procedure, Dr. Behar would like for you to keep a written record of your pain for up to 12 hours after your injection. When you provide office staff with your condition update post injection - please refer back to this document for ease of communication.     Pain level prior to procedure: 0  1   2   3   4   5   6   7   8   9   10  (No Pain) 0  to  10 (Worst Pain); Please [place] corresponding number above.       Pain level immediately following procedure: 0  1   2   3   4   5   6   7   8   9   10  (No Pain) 0  to  10 (Worst Pain); Please [place] corresponding number above.           Please keep a close record of your pain for the next 12 hours to refer back to when speaking with office staff.      Percentage (%) of Relief:   0% - 100%  (0% = No Relief; 100% = Total Relief)   2 Hours After Procedure:     4 Hours After Procedure:     6 Hours After Procedure:     8 Hours After Procedure:     12 Hours After Procedure:        65 yo male c/o of weakness, nausea and vomiting x 4 days.

## 2024-08-08 NOTE — PROCEDURES
[facility]  Genicular Nerve Block Procedure Note    CHIEF COMPLAINT:  No chief complaint on file.      PROCEDURE PERFORMED:  RIGHT Genicular nerve blocks under ultrasound guidance    INDICATIONS:  RIGHT Knee pain    PRIMARY PROCEDURALIST:  Alex Behar, MD    INFORMED CONSENT & TIME OUT:   As documented in the Time Out and Pre-Procedure Check Lists.  Verbal consent was obtained    Vitals: [unfilled]  Labs (document last wbc, plts, hgb, and PT/INR):    [facility] Lab Encounter on 06/25/2024   Component Date Value Ref Range Status    MD Blood Smear Consult 06/25/2024    Final                    Value:Evaluation of the peripheral blood smear demonstrates mild normochromic normocytic anemia. Red blood cells are remarkable for anisopoikilocytosis with some microcytes and occasional ovalocytes.      Overall, the main differential causes for an anemia of this type may include anemia of chronic disease, iron deficiency (most often due to GI or /GYNE blood loss), some hemoglobinopathies (most commonly thalassemia minor, others) and sideroblastic anemias.      Clinical correlation and correlation with serum iron studies is recommended if clinically indicated.    Reviewed by KEVIN Loo M.D.    Vitamin B12 06/25/2024 542  211 - 911 pg/mL Final    HOLD MMA 06/25/2024 Auto Resulted   Final   Office Visit on 06/25/2024   Component Date Value Ref Range Status    Urine Color 06/25/2024 Yellow  Yellow Final    Clarity Urine 06/25/2024 Ex.Turbid (A)  Clear Final    Spec Gravity 06/25/2024 1.026  1.005 - 1.030 Final    Glucose Urine 06/25/2024 Normal  Normal mg/dL Final    Bilirubin Urine 06/25/2024 Negative  Negative Final    Ketones Urine 06/25/2024 Negative  Negative mg/dL Final    Blood Urine 06/25/2024 Trace (A)  Negative Final    pH Urine 06/25/2024 5.5  5.0 - 8.0 Final    Protein Urine 06/25/2024 50 (A)  Negative mg/dL Final    Urobilinogen Urine 06/25/2024 Normal  Normal Final    Nitrite  Urine 06/25/2024 Negative  Negative Final    Leukocyte Esterase Urine 06/25/2024 Negative  Negative Final    WBC Urine 06/25/2024 None Seen  0 - 5 /HPF Final    RBC Urine 06/25/2024 None Seen  0 - 2 /HPF Final    Bacteria Urine 06/25/2024 None Seen  None Seen /HPF Final    Squamous Epi. Cells 06/25/2024 None Seen  None Seen /HPF Final    Renal Tubular Epithelial Cells 06/25/2024 None Seen  None Seen /HPF Final    Transitional Cells 06/25/2024 None Seen  None Seen /HPF Final    Yeast Urine 06/25/2024 None Seen  None Seen /HPF Final    Urine Culture 06/25/2024 No Growth at 18-24 hrs.   Final    Glucose 06/25/2024 116 (H)  70 - 99 mg/dL Final    Sodium 06/25/2024 142  136 - 145 mmol/L Final    Potassium 06/25/2024 4.4  3.5 - 5.1 mmol/L Final    Chloride 06/25/2024 109  98 - 112 mmol/L Final    CO2 06/25/2024 27.0  21.0 - 32.0 mmol/L Final    Anion Gap 06/25/2024 6  0 - 18 mmol/L Final    BUN 06/25/2024 26 (H)  9 - 23 mg/dL Final    Creatinine 06/25/2024 1.51 (H)  0.70 - 1.30 mg/dL Final    BUN/CREA Ratio 06/25/2024 17.2  10.0 - 20.0 Final    Calcium, Total 06/25/2024 9.7  8.7 - 10.4 mg/dL Final    Calculated Osmolality 06/25/2024 300 (H)  275 - 295 mOsm/kg Final    eGFR-Cr 06/25/2024 52 (L)  >=60 mL/min/1.73m2 Final    ALT 06/25/2024 31  10 - 49 U/L Final    AST 06/25/2024 40 (H)  <=34 U/L Final    Alkaline Phosphatase 06/25/2024 92  45 - 117 U/L Final    Bilirubin, Total 06/25/2024 0.3  0.2 - 1.1 mg/dL Final    Total Protein 06/25/2024 7.1  5.7 - 8.2 g/dL Final    Albumin 06/25/2024 4.3  3.2 - 4.8 g/dL Final    Globulin  06/25/2024 2.8  2.0 - 3.5 g/dL Final    A/G Ratio 06/25/2024 1.5  1.0 - 2.0 Final    Patient Fasting for CMP? 06/25/2024 Yes   Final    TSH 06/25/2024 18.669 (H)  0.550 - 4.780 mIU/mL Final    Vitamin B12 06/25/2024 542  211 - 911 pg/mL Final    Ferritin 06/25/2024 32.7  30.0 - 530.0 ng/mL Final    Retic% 06/25/2024 1.4  0.5 - 2.5 % Final    Retic Absolute 06/25/2024 62.3  22.5 - 147.5 x10(3) uL Final     Retic IRF 06/25/2024 0.157  0.100 - 0.300 Ratio Final    Reticulocyte Hemoglobin Equivalent 06/25/2024 33.5  28.2 - 36.6 pg Final    Iron 06/25/2024 75  65 - 175 ug/dL Final    Transferrin 06/25/2024 241  215 - 365 mg/dL Final    Total Iron Binding Capacity 06/25/2024 359  250 - 425 ug/dL Final    % Saturation 06/25/2024 21  20 - 50 % Final    WBC 06/25/2024 8.4  4.0 - 11.0 x10(3) uL Final    RBC 06/25/2024 4.39  4.30 - 5.70 x10(6)uL Final    HGB 06/25/2024 12.8 (L)  13.0 - 17.5 g/dL Final    HCT 06/25/2024 41.0  39.0 - 53.0 % Final    MCV 06/25/2024 93.4  80.0 - 100.0 fL Final    MCH 06/25/2024 29.2  26.0 - 34.0 pg Final    MCHC 06/25/2024 31.2  31.0 - 37.0 g/dL Final    RDW-SD 06/25/2024 50.7 (H)  35.1 - 46.3 fL Final    RDW 06/25/2024 14.6  11.0 - 15.0 % Final    PLT 06/25/2024 190.0  150.0 - 450.0 10(3)uL Final    Neutrophil Absolute Prelim 06/25/2024 5.44  1.50 - 7.70 x10 (3) uL Final    Neutrophil Absolute 06/25/2024 5.44  1.50 - 7.70 x10(3) uL Final    Lymphocyte Absolute 06/25/2024 1.96  1.00 - 4.00 x10(3) uL Final    Monocyte Absolute 06/25/2024 0.74  0.10 - 1.00 x10(3) uL Final    Eosinophil Absolute 06/25/2024 0.18  0.00 - 0.70 x10(3) uL Final    Basophil Absolute 06/25/2024 0.08  0.00 - 0.20 x10(3) uL Final    Immature Granulocyte Absolute 06/25/2024 0.03  0.00 - 1.00 x10(3) uL Final    Neutrophil % 06/25/2024 64.5  % Final    Lymphocyte % 06/25/2024 23.3  % Final    Monocyte % 06/25/2024 8.8  % Final    Eosinophil % 06/25/2024 2.1  % Final    Basophil % 06/25/2024 0.9  % Final    Immature Granulocyte % 06/25/2024 0.4  % Final    Free T4 06/25/2024 0.8  0.8 - 1.7 ng/dL Final   Office Visit on 06/21/2024   Component Date Value Ref Range Status    HEMOGLOBIN A1C 06/21/2024 10.5 (A)  4.3 - 5.6 % Final    Cartridge Lot# 06/21/2024 [id number]  Numeric Final    Cartridge Expiration Date 06/21/2024 02/27/2026  Date Final    GLUCOSE BLOOD 06/21/2024 267   Final    Test Strip Lot # 06/21/2024 [id number]  Numeric  Final    Test Strip Expiration Date 06/21/2024 12/06/2024  Date Final   Office Visit on 05/10/2024   Component Date Value Ref Range Status    GLUCOSE BLOOD 05/10/2024 302   Final    Test Strip Lot # 05/10/2024 [id number]  Numeric Final    Test Strip Expiration Date 05/10/2024 110,124  Date Final   Orders Only on 05/06/2024   Component Date Value Ref Range Status    TSH W/REFLEX TO FT4 05/06/2024 3.82  0.40 - 4.50 mIU/L Final    HEMOGLOBIN A1c 05/06/2024 8.1 (H)  <5.7 % of total Hgb Final   Orders Only on 04/10/2024   Component Date Value Ref Range Status    COLOR 04/10/2024 YELLOW  YELLOW Final    APPEARANCE 04/10/2024 CLEAR  CLEAR Final    SPECIFIC GRAVITY 04/10/2024 1.036 (H)  1.001 - 1.035 Final    PH 04/10/2024 6.0  5.0 - 8.0 Final    GLUCOSE 04/10/2024 2+ (A)  NEGATIVE Final    BILIRUBIN 04/10/2024 NEGATIVE  NEGATIVE Final    KETONES 04/10/2024 NEGATIVE  NEGATIVE Final    OCCULT BLOOD 04/10/2024 NEGATIVE  NEGATIVE Final    PROTEIN 04/10/2024 NEGATIVE  NEGATIVE Final    NITRITE 04/10/2024 NEGATIVE  NEGATIVE Final    Leukocyte Esterase  04/10/2024 NEGATIVE  NEGATIVE Final    WBC Urine 04/10/2024 NONE SEEN  < OR = 5 /HPF Final    RBC 04/10/2024 NONE SEEN  < OR = 2 /HPF Final    SQUAMOUS EPITHELIAL CELLS 04/10/2024 NONE SEEN  < OR = 5 /HPF Final    BACTERIA 04/10/2024 NONE SEEN  NONE SEEN /HPF Final    HYALINE CAST 04/10/2024 NONE SEEN  NONE SEEN /LPF Final    NOTE 04/10/2024    Final    CULTURE, URINE, ROUTINE 04/10/2024 SEE NOTE (A)   Final   Office Visit on 03/08/2024   Component Date Value Ref Range Status    GLUCOSE BLOOD 03/08/2024 162   Final    Test Strip Lot # 03/08/2024 [id number]  Numeric Final    Test Strip Expiration Date 03/08/2024 81,624  Date Final   ]    PROCEDURE:  The procedure, risks, benefits, and alternatives were discussed with the patient.  The patient verbalized understanding and gave verbal consent.     First the RIGHT knee medial femoral condyle, lateral femoral condyle. and tibial plateau  were prepped and draped in the sterile fashion using 3 sticks of Betadine at each location.   Then a high-frequency linear probe was used to identify the genicular arteries and nerves at each of these locations.  Once identified, 1.5 ml of 1% lidocaine was used to anesthetize the skin and soft tissue. Then 1 cc of 0.5% marcaine was injected in the area.  The needle was then removed, Hemostasis was obtained and a Band-Aid was applied.  The patient tolerated the procedure well was able to ambulate independently.  The Following nerves where injected:  RIGHT superolateral genicular nerve  RIGHT supermedial genicular nerve  RIGHT inferomedial genicular nerve      Patient verbalized understanding of assessment and plan.  Patient is in agreement with the plan.  All questions were answered.  No barriers to learning identified. Permanent pictures were saved.       INSTRUCTIONS GIVEN TO PATIENT:    \"You will see an effect in the next 2-3 days.  Please contact me if you have fevers, worsening swelling, worsening pain, decreased range of motion, increased redness, chills, or anything that makes you concerned about how the joint we injected feels/looks.  If you do not reach me in a reasonable time, please report directly to the emergency room for further evaluation\"    The patient was instructed to call me in 24 hours and let me know if she has any improvement, what percentage she has improved, and how long the medication lasted.  At that point we can decide if we will be performing a genicular nerve radiofrequency ablation.    Alex B. Behar MD, [facility]  Physical Medicine and Rehabilitation/Sports Medicine  [facility]

## 2024-08-08 NOTE — TELEPHONE ENCOUNTER
Patient was seen in clinic 8/8/24 and advised to follow up in 1 month. No appointments available currently. Please contact patient to schedule.

## 2024-08-08 NOTE — PROGRESS NOTES
[facility]  Progress Note      Mando Puri is a 64 year old male.   Chief Complaint   Patient presents with    Diabetic Ulcer     L 3rd digit f/u - Pt states some soreness in toe. No drainage, fever or chills.   FBS- 108       HPI:     Patient is a pleasant 64-year-old diabetic male that is uncontrolled that presents to clinic for follow-up of left third digit wound.  Denies any drainage or redness around the ulceration site.  He still admits to some pain with palpation    Allergies: Empagliflozin, Penicillins, and Other    Current Outpatient Medications   Medication Sig Dispense Refill    pantoprazole 40 MG Oral Tab EC Take 1 tablet (40 mg total) by mouth every morning before breakfast. 90 tablet 1    sucralfate 1 g Oral Tab Take 1 tablet (1 g total) by mouth 4 (four) times daily before meals and nightly. As needed for heart burn. 90 tablet 0    levothyroxine 125 MCG Oral Tab Take 1 tablet (125 mcg total) by mouth at bedtime. 90 tablet 1    pregabalin 100 MG Oral Cap Take 1 capsule (100 mg total) by mouth 2 (two) times daily. FOR NERVE PAIN. Dose reduction due to lethargy 180 capsule 3    Tirzepatide (MOUNJARO) 15 MG/0.5ML Subcutaneous Solution Pen-injector Inject 15 mg into the skin once a week. MAXIMUM DOSE. STOP OZEMPIC IF YOU START THIS. 6 mL 9    glipiZIDE 5 MG Oral Tab Take 1 tablet (5 mg total) by mouth 2 (two) times daily before meals. FOR DIABETES. STOP/DO NOT TAKE IF FASTING SUGAR IS LESS THAN 100. 90 tablet 2    naproxen (NAPROSYN) 500 MG Oral Tab Take 1 tablet (500 mg total) by mouth 2 (two) times daily with meals. Take for 2 weeks as directed and then as needed. 60 tablet 0    acetaminophen-codeine (TYLENOL WITH CODEINE #3) 300-30 MG Oral Tab Take 1 tablet by mouth every 6 (six) hours as needed for Pain. 90 tablet 0    aspirin (ASPIRIN 81) 81 MG Oral Tab EC Take 1 tablet (81 mg total) by mouth daily. FOR HEART. 90 tablet 9    metoprolol tartrate 25 MG Oral Tab Take 1 tablet (25  mg total) by mouth 2 (two) times daily. FOR HEART. 90 tablet 9    valsartan 160 MG Oral Tab Take 1 tablet (160 mg total) by mouth daily. FOR BLOOD PRESSURE. 90 tablet 9    rosuvastatin 40 MG Oral Tab Take 1 tablet (40 mg total) by mouth nightly. FOR CHOLESTEROL. 90 tablet 9    DULoxetine 30 MG Oral Cap DR Particles Take 1 capsule (30 mg total) by mouth daily. TAKE 1 CAPSULE IN THE MORNING FOR ANXIETY/DEPRESSION/ARTHRITIS PAIN 90 capsule 9    finasteride 5 MG Oral Tab Take 1 tablet (5 mg total) by mouth daily. FOR PROSTATE. 90 tablet 9    tamsulosin 0.4 MG Oral Cap Take 2 capsules (0.8 mg total) by mouth daily. FOR PROSTATE. 180 capsule 9    ticagrelor 90 MG Oral Tab Take 1 tablet (90 mg total) by mouth every 12 (twelve) hours. FOR HEART STENTS. 180 tablet 9    magnesium oxide 400 MG Oral Tab Take 1 tablet (400 mg total) by mouth at bedtime.      Continuous Blood Gluc Sensor (FREESTYLE MARSHA 3 SENSOR) Does not apply Misc 1 each every 14 (fourteen) days. 6 each 1    metFORMIN  MG Oral Tablet 24 Hr Take 2 tablets (1,000 mg total) by mouth 2 (two) times daily with meals. 360 tablet 1      Past Medical History:    Benign head tremor    BPH (benign prostatic hyperplasia)    Diabetes (HCC)    Diverticulitis    Former smoker    Hyperlipidemia    Hypertension    Neuropathic pain    Non-pressure chronic ulcer of right lower leg, limited to breakdown of skin (HCC)    Obesity    Snoring    Thyroid disease      Past Surgical History:   Procedure Laterality Date    Carotid endarterectomy Right 04/29/2021    Surgeon: Dr. Rajiv Solorio at [facility]    Neck/chest procedure unlisted      per patient, a blockage was removed from the right side of his neck in 8/2021    Other surgical history  2017    Small bowel Res.    Other surgical history  11/12/2019    Colectomy      Family History   Problem Relation Age of Onset    Cancer Father         Prostate    Heart Disorder Father     Cancer Mother       Social History     Socioeconomic  History    Marital status:     Number of children: 4   Tobacco Use    Smoking status: Former     Current packs/day: 0.00     Types: Cigarettes     Quit date:      Years since quittin.6     Passive exposure: Past    Smokeless tobacco: Never   Vaping Use    Vaping status: Never Used   Substance and Sexual Activity    Alcohol use: Never    Drug use: Never   Other Topics Concern    Caffeine Concern Yes     Comment: coffee daily    Exercise No           REVIEW OF SYSTEMS:     Denies nause, fever, chills  No calf pain  Denies chest pain or SOB      EXAM:   There were no vitals taken for this visit.  GENERAL: well developed, well nourished, in no apparent distress  EXTREMITIES:   1. Integument: Normal skin temperature and turgor.  Ulcer at the left distal tuft of third digit is healed with no signs of infection.  2. Vascular: Dorsalis pedis two out of four bilateral and posterior tibial pulses two out of   four bilateral, capillary refill normal.   3. Musculoskeletal: All muscle groups are graded 5 out of 5 in the foot and ankle.   4. Neurological: Normal sharp dull sensation; reflexes normal.             ASSESSMENT AND PLAN:   Diagnoses and all orders for this visit:    Type 2 diabetes mellitus with polyneuropathy (HCC)    Healed wound          Plan:   Patient seen and examined and findings discussed with patient.  Informed patient that the wound to the left third digit is healed with no underlying signs of infection.  I still recommend the patient keeps a Band-Aid on the left third digit to avoid further callus buildup.  Educated patient on signs of infection and instructed him to seek immediate medical attention if symptoms arise.  Return to clinic for follow-up in 1 month.    The patient indicates understanding of these issues and agrees to the plan.        Lurdes Baxter DPM

## 2024-08-09 NOTE — TELEPHONE ENCOUNTER
Called patient and offered several appointments on wk of sept 3rd but patient declined as he needs morning only appointment. Appointment scheduled on 9/12 at 10am at [facility].

## 2024-08-12 ENCOUNTER — OFFICE VISIT (OUTPATIENT)
Dept: SLEEP CENTER | Age: 64
End: 2024-08-12
Attending: INTERNAL MEDICINE
Payer: COMMERCIAL

## 2024-08-12 DIAGNOSIS — R06.81 WITNESSED EPISODE OF APNEA: ICD-10-CM

## 2024-08-12 PROCEDURE — 95806 SLEEP STUDY UNATT&RESP EFFT: CPT

## 2024-08-13 NOTE — TELEPHONE ENCOUNTER
LM to call clinic to reschedule apt from 8/2/24 - once rescheduled, will send message to Dr. Shah regarding lab orders

## 2024-08-14 ENCOUNTER — TELEPHONE (OUTPATIENT)
Dept: PHYSICAL MEDICINE AND REHAB | Facility: CLINIC | Age: 64
End: 2024-08-14

## 2024-08-14 ENCOUNTER — TELEPHONE (OUTPATIENT)
Facility: LOCATION | Age: 64
End: 2024-08-14

## 2024-08-14 DIAGNOSIS — M25.561 CHRONIC PAIN OF RIGHT KNEE: ICD-10-CM

## 2024-08-14 DIAGNOSIS — Z74.09 LIMITED MOBILITY: ICD-10-CM

## 2024-08-14 DIAGNOSIS — G89.29 CHRONIC PAIN OF RIGHT KNEE: ICD-10-CM

## 2024-08-14 DIAGNOSIS — M22.2X9 PATELLOFEMORAL PAIN SYNDROME, UNSPECIFIED LATERALITY: ICD-10-CM

## 2024-08-14 DIAGNOSIS — M17.11 PRIMARY OSTEOARTHRITIS OF RIGHT KNEE: Primary | ICD-10-CM

## 2024-08-14 PROBLEM — G47.33 OSA (OBSTRUCTIVE SLEEP APNEA): Status: ACTIVE | Noted: 2024-08-14

## 2024-08-14 NOTE — TELEPHONE ENCOUNTER
Settings (range) cwp: 4 to 20 cmH2O   Fax to [phone]    Order entered into [employer] portal    CPAP Package    CPAP Machine, [employer] -     PAP Mask, Full Face, React, Fit Upon Setup, 1 per 3 months -     PAP Headgear, 1 per 6 months -     PAP Humidifier, Heated -     PAP Mask Interface Cushion, Full Face, 1 per 1 month -     Disposable PAP Filter, 2 per 1 month -     Non-Disposable PAP Filter, 1 per 6 months -     PAP Machine Tubing, Heated, 1 per 3 months -     Humidifier Water Chamber, 1 per 6 months -     Quantity  1  Prescription Details  Auto Min Pressure - 4 cm  Auto Max Pressure - 20 cm  Oxygen Usage - None  Supplier  [facility]

## 2024-08-14 NOTE — TELEPHONE ENCOUNTER
Patient calling to speak to someone in the clinical team as he has medical questions and instructions on how to proceed after his injection on 8/8/24. Please call him before 3:00 PM as he goes to work after that hour.

## 2024-08-14 NOTE — TELEPHONE ENCOUNTER
Patient called, verified Name and . Following up on sleep study result. Relayed Dr. Wallace's message below. Patient verbalized understanding and had no further questions at this time. Dr. Behar's contact information given patient's request.     Alvino Wallace MD  2024 10:37 AM CDT Back to Top      Please let vanessa know he has sleep apnea, I will send DME For autopap, he should call his insurance next week to confirm. Please fax DME, thankS!     Routed to Triage Support for assistance.

## 2024-08-14 NOTE — TELEPHONE ENCOUNTER
Would you like the patient to complete any blood work prior to upcoming visit?   Thanks    Patient is currently scheduled for follow up 8/20  LOV 6/21/24 for multiple diagnoses.   No lab orders placed at last visit.   Had recent lab workup with PCP on 6/25/24

## 2024-08-15 ENCOUNTER — TELEPHONE (OUTPATIENT)
Dept: PHYSICAL MEDICINE AND REHAB | Facility: CLINIC | Age: 64
End: 2024-08-15

## 2024-08-15 NOTE — TELEPHONE ENCOUNTER
Initiated authorization for RIGHT knee genicular nerve block under ultrasound . CPT/HCPCS 13212 x's 3 [id number] dx:M17.11 with [employer]    Status: Approved- no auth required

## 2024-08-15 NOTE — TELEPHONE ENCOUNTER
Condition update after Procedure.    - Patient had RIGHT Genicular nerve blocks under ultrasound guidance  (specific procedure) on 8/8/24 (date) with Dr.Behar.  - 70% relief.      Patient stated he submitted his condition update via [employer] but for some reason we never received it.    Patient is asking for next steps. Per patient we can leave a detailed voice mail at [phone].    Informed patient that we will ask Dr Behar and call him back once we have more recommendations.

## 2024-08-15 NOTE — TELEPHONE ENCOUNTER
Spoke with Dr Behar who gave verbal order to repeat the block. Order placed.     Left a detail voice mail to call back. My Chart message also sent.

## 2024-08-16 NOTE — TELEPHONE ENCOUNTER
LMTCB 2nd attempt   SICU Daily Progress Note  =====================================================  Interval/Overnight Events:    - POCUS: Hyperdynamic ventricles, IVC 1.3 --> 1L bolus      ALLERGIES:  No Known Allergies      --------------------------------------------------------------------------------------    MEDICATIONS:    Neurologic Medications  clonazePAM  Tablet 1 milliGRAM(s) Oral three times a day    Respiratory Medications    Cardiovascular Medications  amLODIPine   Tablet 10 milliGRAM(s) Oral daily  atenolol  Tablet 50 milliGRAM(s) Oral two times a day  phenylephrine    Infusion 0.5 MICROgram(s)/kG/Min IV Continuous <Continuous>    Gastrointestinal Medications  calcitriol   Capsule 0.25 MICROGram(s) Oral <User Schedule>  lactated ringers. 1000 milliLiter(s) IV Continuous <Continuous>  senna 2 Tablet(s) Oral at bedtime PRN Constipation  sodium bicarbonate 650 milliGRAM(s) Oral three times a day    Genitourinary Medications    Hematologic/Oncologic Medications  heparin   Injectable 5000 Unit(s) SubCutaneous every 8 hours    Antimicrobial/Immunologic Medications  piperacillin/tazobactam IVPB.. 3.375 Gram(s) IV Intermittent every 12 hours    Endocrine/Metabolic Medications    Topical/Other Medications  chlorhexidine 2% Cloths 1 Application(s) Topical daily    --------------------------------------------------------------------------------------    VITAL SIGNS:  ICU Vital Signs Last 24 Hrs  T(C): 36.9 (30 Mar 2023 00:00), Max: 36.9 (29 Mar 2023 09:45)  T(F): 98.4 (30 Mar 2023 00:00), Max: 98.4 (29 Mar 2023 09:45)  HR: 50 (30 Mar 2023 00:00) (50 - 58)  BP: 110/53 (30 Mar 2023 00:00) (107/53 - 122/61)  BP(mean): 70 (30 Mar 2023 00:00) (55 - 83)  ABP: --  ABP(mean): --  RR: 13 (30 Mar 2023 00:00) (11 - 18)  SpO2: 95% (30 Mar 2023 00:00) (91% - 98%)    O2 Parameters below as of 29 Mar 2023 20:40  Patient On (Oxygen Delivery Method): room air    --------------------------------------------------------------------------------------    INS AND OUTS:  I&O's Detail    28 Mar 2023 07:01  -  29 Mar 2023 07:00  --------------------------------------------------------  IN:    IV PiggyBack: 100 mL    Lactated Ringers: 600 mL    Oral Fluid: 150 mL  Total IN: 850 mL    OUT:    Urostomy (mL): 400 mL  Total OUT: 400 mL    Total NET: 450 mL      29 Mar 2023 07:01  -  30 Mar 2023 00:25  --------------------------------------------------------  IN:    Lactated Ringers: 800 mL    Oral Fluid: 60 mL  Total IN: 860 mL    OUT:    Urostomy (mL): 600 mL  Total OUT: 600 mL    Total NET: 260 mL    --------------------------------------------------------------------------------------  PHYSICAL EXAMINATION:  General - NAD, tired, resting comfortably in bed  Neuro - A&O x3, no focal deficits  HEENT - Atraumatic  Lungs - Nonlabored breathing on room air  Heart - Low grade bradycardic when evaluated, regular rhythm on monitor  Abdomen - Soft, nondistended, nontender, ileal conduit with purulent drainage noted in urostomy bag  Extremities - Warm and perfused    METABOLIC / FLUIDS / ELECTROLYTES  calcitriol   Capsule 0.25 MICROGram(s) Oral <User Schedule>  lactated ringers. 1000 milliLiter(s) IV Continuous <Continuous>  sodium bicarbonate 650 milliGRAM(s) Oral three times a day      HEMATOLOGIC  [x] VTE Prophylaxis: heparin   Injectable 5000 Unit(s) SubCutaneous every 8 hours    Transfusions:	[] PRBC	[] Platelets		[] FFP	[] Cryoprecipitate    INFECTIOUS DISEASE  Antimicrobials/Immunologic Medications:  piperacillin/tazobactam IVPB.. 3.375 Gram(s) IV Intermittent every 12 hours    Day #      of     ***    TUBES / LINES / DRAINS  ***  [x] Peripheral IV  [x] Necessity of urinary, arterial, and venous catheters discussed    --------------------------------------------------------------------------------------    LABS                        9.0    17.02 )-----------( 252      ( 29 Mar 2023 21:31 )             27.8   03-29    132<L>  |  100  |  119<H>  ----------------------------<  252<H>  4.7   |  12<L>  |  8.81<H>    Ca    8.2<L>      29 Mar 2023 21:31  Phos  4.5     03-28  Mg     2.70     03-28    TPro  6.6  /  Alb  2.8<L>  /  TBili  0.4  /  DBili  x   /  AST  11  /  ALT  9   /  AlkPhos  83  03-29    --------------------------------------------------------------------------------------    OTHER LABORATORY:     IMAGING STUDIES:   CXR:    SICU Daily Progress Note  =====================================================  Interval/Overnight Events:    - POCUS: Hyperdynamic ventricles, IVC 1.3 --> 1L bolus    --------------------------------------------------------------------------------------  ALLERGIES:  No Known Allergies  --------------------------------------------------------------------------------------    MEDICATIONS:    Neurologic Medications  clonazePAM  Tablet 1 milliGRAM(s) Oral three times a day    Respiratory Medications    Cardiovascular Medications  amLODIPine   Tablet 10 milliGRAM(s) Oral daily  atenolol  Tablet 50 milliGRAM(s) Oral two times a day  phenylephrine    Infusion 0.5 MICROgram(s)/kG/Min IV Continuous <Continuous>    Gastrointestinal Medications  calcitriol   Capsule 0.25 MICROGram(s) Oral <User Schedule>  lactated ringers. 1000 milliLiter(s) IV Continuous <Continuous>  senna 2 Tablet(s) Oral at bedtime PRN Constipation  sodium bicarbonate 650 milliGRAM(s) Oral three times a day    Genitourinary Medications    Hematologic/Oncologic Medications  heparin   Injectable 5000 Unit(s) SubCutaneous every 8 hours    Antimicrobial/Immunologic Medications  piperacillin/tazobactam IVPB.. 3.375 Gram(s) IV Intermittent every 12 hours    Endocrine/Metabolic Medications    Topical/Other Medications  chlorhexidine 2% Cloths 1 Application(s) Topical daily    --------------------------------------------------------------------------------------    VITAL SIGNS:  ICU Vital Signs Last 24 Hrs  T(C): 36.9 (30 Mar 2023 00:00), Max: 36.9 (29 Mar 2023 09:45)  T(F): 98.4 (30 Mar 2023 00:00), Max: 98.4 (29 Mar 2023 09:45)  HR: 50 (30 Mar 2023 00:00) (50 - 58)  BP: 110/53 (30 Mar 2023 00:00) (107/53 - 122/61)  BP(mean): 70 (30 Mar 2023 00:00) (55 - 83)  RR: 13 (30 Mar 2023 00:00) (11 - 18)  SpO2: 95% (30 Mar 2023 00:00) (91% - 98%)    O2 Parameters below as of 29 Mar 2023 20:40  Patient On (Oxygen Delivery Method): room air    --------------------------------------------------------------------------------------    INS AND OUTS:  I&O's Detail    28 Mar 2023 07:01  -  29 Mar 2023 07:00  --------------------------------------------------------  IN:    IV PiggyBack: 100 mL    Lactated Ringers: 600 mL    Oral Fluid: 150 mL  Total IN: 850 mL    OUT:    Urostomy (mL): 400 mL  Total OUT: 400 mL    Total NET: 450 mL      29 Mar 2023 07:01  -  30 Mar 2023 00:25  --------------------------------------------------------  IN:    Lactated Ringers: 800 mL    Oral Fluid: 60 mL  Total IN: 860 mL    OUT:    Urostomy (mL): 600 mL  Total OUT: 600 mL    Total NET: 260 mL    --------------------------------------------------------------------------------------  PHYSICAL EXAMINATION:  General - NAD, tired, resting comfortably in bed  Neuro - A&O x3, no focal deficits  HEENT - Atraumatic  Lungs - Nonlabored breathing on room air  Heart - Low grade bradycardic when evaluated, regular rhythm on monitor  Abdomen - Soft, nondistended, nontender, ileal conduit with purulent drainage noted in urostomy bag  Extremities - Warm and perfused    METABOLIC / FLUIDS / ELECTROLYTES  calcitriol   Capsule 0.25 MICROGram(s) Oral <User Schedule>  lactated ringers. 1000 milliLiter(s) IV Continuous <Continuous>  sodium bicarbonate 650 milliGRAM(s) Oral three times a day      HEMATOLOGIC  [x] VTE Prophylaxis: heparin   Injectable 5000 Unit(s) SubCutaneous every 8 hours    Transfusions:	[] PRBC	[] Platelets		[] FFP	[] Cryoprecipitate    INFECTIOUS DISEASE  Antimicrobials/Immunologic Medications:  piperacillin/tazobactam IVPB.. 3.375 Gram(s) IV Intermittent every 12 hours    Day #      of     ***    TUBES / LINES / DRAINS  ***  [x] Peripheral IV  [x] Necessity of urinary, arterial, and venous catheters discussed    --------------------------------------------------------------------------------------    LABS                                   9.0    17.02 )-----------( 252      ( 29 Mar 2023 21:31 )             27.8     03-29    132<L>  |  100  |  119<H>  ----------------------------<  252<H>  4.7   |  12<L>  |  8.81<H>    Ca    8.2<L>      29 Mar 2023 21:31  Phos  4.5     03-28  Mg     2.70     03-28    TPro  6.6  /  Alb  2.8<L>  /  TBili  0.4  /  DBili  x   /  AST  11  /  ALT  9   /  AlkPhos  83  03-29      --------------------------------------------------------------------------------------

## 2024-08-19 ENCOUNTER — HOSPITAL ENCOUNTER (EMERGENCY)
Facility: HOSPITAL | Age: 64
Discharge: HOME | End: 2024-08-19
Attending: EMERGENCY MEDICINE
Payer: COMMERCIAL

## 2024-08-19 ENCOUNTER — APPOINTMENT (OUTPATIENT)
Dept: CT IMAGING | Facility: HOSPITAL | Age: 64
End: 2024-08-19
Attending: EMERGENCY MEDICINE
Payer: COMMERCIAL

## 2024-08-19 VITALS
RESPIRATION RATE: 18 BRPM | BODY MASS INDEX: 40.43 KG/M2 | TEMPERATURE: 98 F | OXYGEN SATURATION: 92 % | HEIGHT: 74 IN | DIASTOLIC BLOOD PRESSURE: 58 MMHG | SYSTOLIC BLOOD PRESSURE: 103 MMHG | HEART RATE: 75 BPM | WEIGHT: 315 LBS

## 2024-08-19 DIAGNOSIS — S00.93XA CONTUSION OF HEAD, UNSPECIFIED PART OF HEAD, INITIAL ENCOUNTER: Primary | ICD-10-CM

## 2024-08-19 DIAGNOSIS — S01.81XA LACERATION OF FOREHEAD, INITIAL ENCOUNTER: ICD-10-CM

## 2024-08-19 PROCEDURE — 99284 EMERGENCY DEPT VISIT MOD MDM: CPT

## 2024-08-19 PROCEDURE — 70450 CT HEAD/BRAIN W/O DYE: CPT | Performed by: EMERGENCY MEDICINE

## 2024-08-19 RX ORDER — ACETAMINOPHEN 500 MG
1000 TABLET ORAL ONCE
Status: COMPLETED | OUTPATIENT
Start: 2024-08-19 | End: 2024-08-19

## 2024-08-19 NOTE — ED PROVIDER NOTES
Patient Seen in: [facility] Emergency Department    History     Chief Complaint   Patient presents with    Fall     Stated Complaint:     HPI    Patient here with complaint of head injury.  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  Hit head .  no neck pain, no numbness, tingling or weakness.   no vomiting.  no amnesia.  No loc, no abnormal behavior.  Other injuries l eyebrow laceration.  no blood thinners.    Past Medical History:    Benign head tremor    BPH (benign prostatic hyperplasia)    Diabetes (HCC)    Diverticulitis    Former smoker    Hyperlipidemia    Hypertension    Neuropathic pain    Non-pressure chronic ulcer of right lower leg, limited to breakdown of skin (HCC)    Obesity    Snoring    Thyroid disease       Past Surgical History:   Procedure Laterality Date    Carotid endarterectomy Right 2021    Surgeon: Dr. Rajiv Solorio at [facility]    Neck/chest procedure unlisted      per patient, a blockage was removed from the right side of his neck in 2021    Other surgical history  2017    Small bowel Res.    Other surgical history  2019    Colectomy            Family History   Problem Relation Age of Onset    Cancer Father         Prostate    Heart Disorder Father     Cancer Mother        Social History     Socioeconomic History    Marital status:     Number of children: 4   Tobacco Use    Smoking status: Former     Current packs/day: 0.00     Types: Cigarettes     Quit date:      Years since quittin.6     Passive exposure: Past    Smokeless tobacco: Never   Vaping Use    Vaping status: Never Used   Substance and Sexual Activity    Alcohol use: Never    Drug use: Never   Other Topics Concern    Caffeine Concern Yes     Comment: coffee daily    Exercise No   Social History Narrative    The patient does use an assistive device..  Brace    The patient does live in a home with stairs.       Review of Systems    Positive for stated complaint:   Other  systems are as noted in HPI.  Constitutional and vital signs reviewed.      All other systems reviewed and negative except as noted above.    PSFH elements reviewed from today and agreed except as otherwise stated in HPI.    Physical Exam     ED Triage Vitals [08/19/24 1301]   /65   Pulse 73   Resp 18   Temp 98 °F (36.7 °C)   Temp src Temporal   SpO2 92 %   O2 Device None (Room air)       Current:/58   Pulse 75   Temp 98 °F (36.7 °C) (Temporal)   Resp 18   Ht 188 cm (6' 2\")   Wt (!) 142.9 kg   SpO2 92%   BMI 40.44 kg/m²   PULSE OX nl  General: Well appearing in no distress.  Head: l forehead sts and samll 5 mm superficial laceration evidence of injury. no Graves sign/raccoon eyes. No other laceration  Eyes: Pupils equal round and reactive to light and accommodation. EOMI.  ENT: Oropharynx clear and patent without malocclusion of the jaw or dental injury.   Neck: neg NEXXUS   Back: non tender  Resp: no chest tenderness, no resp distres,   CV:  regular rate  Neuro/, A and O x 3.  Normal sensation to light touch in all extremities.  Cranial Nerves: Cranial nerves 2-12 intact  Cerebellar: Normal gait. Normal as tested  Extremities: Normal ROM without apparent trauma. Nontender to palpation.  Strength 5/5 in all extremities.  Reflexes 2+ in all extremitites.   Skin:  noted l eyebrow laceration  Psych: Mood and affect normal          ED Course   Labs Reviewed - No data to display  CT BRAIN OR HEAD (CPT=70450)    Result Date: 8/19/2024  CONCLUSION:  1. No acute intracranial process by noncontrast CT technique. 2. Stable chronic lacunar infarct in the right cerebellum. 3. Intracranial atherosclerosis. 4. Small 4 mm metallic foreign body along the anterior margin the right globe is unchanged since prior CT angiogram of the head from October, 2022.    elm-remote  Dictated by (CST): Sumanth Hayward MD on 8/19/2024 at 2:03 PM     Finalized by (CST): Sumanth Hayward MD on 8/19/2024 at 2:07 PM           I  reviewed CT and noted no intracranial bleeding    MDM     Medical Decision Making  Problems Addressed:  Contusion of head, unspecified part of head, initial encounter: acute illness or injury  Laceration of forehead, initial encounter: acute illness or injury    Amount and/or Complexity of Data Reviewed  Radiology: ordered and independent interpretation performed. Decision-making details documented in ED Course.  Discussion of management or test interpretation with external provider(s): Tylenol, motrin recommended.      Risk  OTC drugs.          Disposition and Plan     Clinical Impression:  1. Contusion of head, unspecified part of head, initial encounter    2. Laceration of forehead, initial encounter        Disposition:  Discharge    Follow-up:  Alvino Wallace MD  [address] [facility] [id number]  [phone]    Follow up        Medications Prescribed:  Discharge Medication List as of 8/19/2024  2:23 PM

## 2024-08-20 ENCOUNTER — OFFICE VISIT (OUTPATIENT)
Facility: LOCATION | Age: 64
End: 2024-08-20

## 2024-08-20 VITALS
DIASTOLIC BLOOD PRESSURE: 50 MMHG | HEART RATE: 77 BPM | HEIGHT: 74 IN | SYSTOLIC BLOOD PRESSURE: 104 MMHG | OXYGEN SATURATION: 99 % | WEIGHT: 305 LBS | BODY MASS INDEX: 39.14 KG/M2

## 2024-08-20 DIAGNOSIS — I15.2 HYPERTENSION ASSOCIATED WITH TYPE 2 DIABETES MELLITUS (HCC): ICD-10-CM

## 2024-08-20 DIAGNOSIS — E11.65 UNCONTROLLED TYPE 2 DIABETES MELLITUS WITH HYPERGLYCEMIA (HCC): Primary | ICD-10-CM

## 2024-08-20 DIAGNOSIS — E11.59 HYPERTENSION ASSOCIATED WITH TYPE 2 DIABETES MELLITUS (HCC): ICD-10-CM

## 2024-08-20 DIAGNOSIS — R79.89 LOW VITAMIN D LEVEL: ICD-10-CM

## 2024-08-20 DIAGNOSIS — I10 PRIMARY HYPERTENSION: ICD-10-CM

## 2024-08-20 DIAGNOSIS — E55.9 VITAMIN D DEFICIENCY: ICD-10-CM

## 2024-08-20 DIAGNOSIS — Z98.890 HISTORY OF CAROTID ENDARTERECTOMY: ICD-10-CM

## 2024-08-20 DIAGNOSIS — G45.9 TRANSIENT ISCHEMIC ATTACK (TIA): ICD-10-CM

## 2024-08-20 DIAGNOSIS — R73.09 HIGH GLUCOSE LEVEL: ICD-10-CM

## 2024-08-20 DIAGNOSIS — Z95.5 S/P DRUG ELUTING CORONARY STENT PLACEMENT: ICD-10-CM

## 2024-08-20 DIAGNOSIS — E78.5 HYPERLIPIDEMIA ASSOCIATED WITH TYPE 2 DIABETES MELLITUS (HCC): ICD-10-CM

## 2024-08-20 DIAGNOSIS — E11.65 HYPERGLYCEMIA DUE TO DIABETES MELLITUS (HCC): ICD-10-CM

## 2024-08-20 DIAGNOSIS — I65.22 LEFT CAROTID ARTERY STENOSIS: ICD-10-CM

## 2024-08-20 DIAGNOSIS — E11.51 TYPE 2 DIABETES MELLITUS WITH DIABETIC PERIPHERAL ANGIOPATHY WITHOUT GANGRENE, WITHOUT LONG-TERM CURRENT USE OF INSULIN (HCC): ICD-10-CM

## 2024-08-20 DIAGNOSIS — E03.9 HYPOTHYROIDISM, UNSPECIFIED TYPE: ICD-10-CM

## 2024-08-20 DIAGNOSIS — E11.69 HYPERLIPIDEMIA ASSOCIATED WITH TYPE 2 DIABETES MELLITUS (HCC): ICD-10-CM

## 2024-08-20 LAB
GLUCOSE BLOOD: 143
HEMOGLOBIN A1C: 7.3 % (ref 4.3–5.6)
TEST STRIP LOT #: NORMAL NUMERIC

## 2024-08-20 PROCEDURE — 82947 ASSAY GLUCOSE BLOOD QUANT: CPT | Performed by: INTERNAL MEDICINE

## 2024-08-20 PROCEDURE — 99214 OFFICE O/P EST MOD 30 MIN: CPT | Performed by: INTERNAL MEDICINE

## 2024-08-20 PROCEDURE — 83036 HEMOGLOBIN GLYCOSYLATED A1C: CPT | Performed by: INTERNAL MEDICINE

## 2024-08-20 RX ORDER — GLIPIZIDE 5 MG/1
5 TABLET ORAL
Qty: 90 TABLET | Refills: 2 | Status: SHIPPED | OUTPATIENT
Start: 2024-08-20

## 2024-08-20 RX ORDER — METFORMIN HCL 500 MG
1000 TABLET, EXTENDED RELEASE 24 HR ORAL 2 TIMES DAILY WITH MEALS
Qty: 360 TABLET | Refills: 1 | Status: SHIPPED | OUTPATIENT
Start: 2024-08-20 | End: 2024-08-20

## 2024-08-20 RX ORDER — ROSUVASTATIN CALCIUM 40 MG/1
40 TABLET, COATED ORAL NIGHTLY
Qty: 90 TABLET | Refills: 9 | Status: SHIPPED | OUTPATIENT
Start: 2024-08-20

## 2024-08-20 RX ORDER — LEVOTHYROXINE SODIUM 125 UG/1
125 TABLET ORAL NIGHTLY
Qty: 90 TABLET | Refills: 1 | Status: SHIPPED | OUTPATIENT
Start: 2024-08-20 | End: 2025-02-16

## 2024-08-20 RX ORDER — VALSARTAN 160 MG/1
160 TABLET ORAL DAILY
Qty: 90 TABLET | Refills: 9 | Status: SHIPPED | OUTPATIENT
Start: 2024-08-20

## 2024-08-20 RX ORDER — TIRZEPATIDE 15 MG/.5ML
15 INJECTION, SOLUTION SUBCUTANEOUS WEEKLY
Qty: 6 ML | Refills: 9 | Status: SHIPPED | OUTPATIENT
Start: 2024-08-20

## 2024-08-20 NOTE — PATIENT INSTRUCTIONS
RTC in 3 mo and labs prior   8/20/2024  [id number] A1c-7.3  Labs today   metformin 1000 mg twice a day   Mounjaro 15 mg /week   Glipizide 5 mg once a a day with food in the morning , ok to take a 2nd dose in the evening if will eat larger dinner     Thyroid medication dose Levothyroxine 125 mcg  at bedtime. Take you medication on empty stomach, not with any other medication or food. Wait 60 minutes before eating. Wait 4 hours before taking Vitamins, Calcium or iron.  Wait 60 minutes before eating. Do not take the medication on the morning of the lab test. Do not take Biotin/Turmeric 1 week before the test.      Continue Valsartan  and Rosuvastatin  40 mg     If you have low blood sugar <70, take 15 grams of carb (8 oz juice or regular soda) and recheck in 15 minutes.    Follow up with podiatry and eye doctor annually.   Patients need to wear covered shoes all the time and check feet daily.   Bring your meter/BG log to the next visit.      Target BG   fasting 100-130mg/dl  2hrs AFTER MEAL less than 180mg/dl

## 2024-08-20 NOTE — PROGRESS NOTES
Follow up Visit:  DM.  Requesting Physician:   Macario Wallace MD      CHIEF COMPLAINT:    Chief Complaint   Patient presents with    Diabetes     Follow-up A1c done 24 was 10.5.  A1c done today was 7.3        HISTORY OF PRESENT ILLNESS:   Mando Puri is a 64 year old male who presents with complicated DM, CAD s/p 2 stents, carotid stenosis post surgery, obese, HTN, dlp and hypothyroid       FBG  86->138   BG HS  140-  Taking Glipizide at bedtime now     DM Dx   On metformin 1000 mg bid   Mounjaro 15 mg/week   Glipizide 5 mg bid    Tried Jardiance but stopped d/t polyuria no uti or yeast infection  Stopped Farxiga d/t polyuria        W/u showed 2024 Last A1c value was 10.5% done 2024. BG is 267 high   Has polyuria from SGLT2i and possible increase coffee intake. Decreased his coffee intake to 1 cup a day     DLP on Crestor 40     Hypothyroid Levothryoxine 125 mcg  at night as rec'    Will do labs soon     HTN on valsartan     Lab Results   Component Value Date    A1C 10.5 (A) 2024    A1C 8.1 (H) 2024    A1C 6.8 (A) 2024    A1C 6.6 (A) 2023    A1C 8.4 (A) 2023      DM quality measures:  A1C/Blood pressure: as reported above   Nephropathy screenin2024 , continue ace /arb rx.   LIPID screenin2024  . On statin rx.   Last dilated eye exam: Last Dilated Eye Exam: 03/26/24   3/2024  Exam shows retinopathy? Eye Exam shows Diabetic Retinopathy?: No   no  Last diabetic foot exam: No data recorded 5/10/2024  Dentist : recommend every 6m  Neuropathy: yes in LE   CAD/ASCVD/PAD/CVA: 2 stents, carotid stenosis s/p procedure,          SSx     Door dash  No smoking  Etoh no   No drugs    Lost wt   Wt Readings from Last 6 Encounters:   24 (!) 305 lb (138.3 kg)   24 (!) 315 lb (142.9 kg)   24 (!) 319 lb (144.7 kg)   24 (!) 319 lb (144.7 kg)   24 (!) 315 lb (142.9 kg)   05/10/24 (!) 318 lb (144.2 kg)       ASSESSMENT AND  PLAN:  64 year old man with complicated DM, CAD s/p 2 stents, carotid stenosis post surgery, obese, HTN, DLP and hypothyroid.  He does not have HF but has CAD so will benefit from SGLT2i and GLP-1. He did not tolearate SGLT2i x2  Happy with the result. Lost wt and A1c improved. Was taking SCHUMACHER at night . He had BG 80s fasting. Denied symptoms. We discussed low BG and how to take SCHUMACHER   No recent thyroid labs. Will get them soon  8/20/2024  BG-143 A1c-7.3  plan  RTC in 3 mo and labs prior     Metformin 1000 mg twice a day   Mounjaro 15 mg /week   Glipizide 5 mg once a a day with food in the morning , ok to take a 2nd dose in the evening if will eat larger dinner     Thyroid medication dose Levothyroxine 125 mcg  at bedtime. Take you medication on empty stomach, not with any other medication or food. Wait 60 minutes before eating. Wait 4 hours before taking Vitamins, Calcium or iron.  Wait 60 minutes before eating. Do not take the medication on the morning of the lab test. Do not take Biotin/Turmeric 1 week before the test.      Continue Valsartan  and Rosuvastatin  40 mg     If you have low blood sugar <70, take 15 grams of carb (8 oz juice or regular soda) and recheck in 15 minutes.    Follow up with podiatry and eye doctor annually.   Patients need to wear covered shoes all the time and check feet daily.   Bring your meter/BG log to the next visit.      PAST MEDICAL HISTORY:   Past Medical History:    Benign head tremor    BPH (benign prostatic hyperplasia)    Diabetes (HCC)    Diverticulitis    Former smoker    Hyperlipidemia    Hypertension    Neuropathic pain    Non-pressure chronic ulcer of right lower leg, limited to breakdown of skin (HCC)    Obesity    Snoring    Thyroid disease       PAST SURGICAL HISTORY:   Past Surgical History:   Procedure Laterality Date    Carotid endarterectomy Right 04/29/2021    Surgeon: Dr. Rajiv Solorio at [facility]    Neck/chest procedure unlisted      per patient, a blockage was removed  from the right side of his neck in 8/2021    Other surgical history  2017    Small bowel Res.    Other surgical history  11/12/2019    Colectomy        CURRENT MEDICATIONS:    Current Outpatient Medications   Medication Sig Dispense Refill    rosuvastatin 40 MG Oral Tab Take 1 tablet (40 mg total) by mouth nightly. FOR CHOLESTEROL. 90 tablet 9    Tirzepatide (MOUNJARO) 15 MG/0.5ML Subcutaneous Solution Pen-injector Inject 15 mg into the skin once a week. MAXIMUM DOSE. STOP OZEMPIC IF YOU START THIS. 6 mL 9    valsartan 160 MG Oral Tab Take 1 tablet (160 mg total) by mouth daily. FOR BLOOD PRESSURE. 90 tablet 9    levothyroxine 125 MCG Oral Tab Take 1 tablet (125 mcg total) by mouth at bedtime. 90 tablet 1    glipiZIDE 5 MG Oral Tab Take 1 tablet (5 mg total) by mouth every morning before breakfast. FOR DIABETES. STOP/DO NOT TAKE IF FASTING SUGAR IS LESS THAN 100. 90 tablet 2    metFORMIN HCl 1000 MG Oral Tab Take 1 tablet (1,000 mg total) by mouth 2 (two) times daily with meals. 180 tablet 0    pantoprazole 40 MG Oral Tab EC Take 1 tablet (40 mg total) by mouth every morning before breakfast. 90 tablet 1    sucralfate 1 g Oral Tab Take 1 tablet (1 g total) by mouth 4 (four) times daily before meals and nightly. As needed for heart burn. 90 tablet 0    pregabalin 100 MG Oral Cap Take 1 capsule (100 mg total) by mouth 2 (two) times daily. FOR NERVE PAIN. Dose reduction due to lethargy 180 capsule 3    naproxen (NAPROSYN) 500 MG Oral Tab Take 1 tablet (500 mg total) by mouth 2 (two) times daily with meals. Take for 2 weeks as directed and then as needed. 60 tablet 0    aspirin (ASPIRIN 81) 81 MG Oral Tab EC Take 1 tablet (81 mg total) by mouth daily. FOR HEART. 90 tablet 9    metoprolol tartrate 25 MG Oral Tab Take 1 tablet (25 mg total) by mouth 2 (two) times daily. FOR HEART. 90 tablet 9    DULoxetine 30 MG Oral Cap DR Particles Take 1 capsule (30 mg total) by mouth daily. TAKE 1 CAPSULE IN THE MORNING FOR  ANXIETY/DEPRESSION/ARTHRITIS PAIN 90 capsule 9    finasteride 5 MG Oral Tab Take 1 tablet (5 mg total) by mouth daily. FOR PROSTATE. 90 tablet 9    tamsulosin 0.4 MG Oral Cap Take 2 capsules (0.8 mg total) by mouth daily. FOR PROSTATE. 180 capsule 9    ticagrelor 90 MG Oral Tab Take 1 tablet (90 mg total) by mouth every 12 (twelve) hours. FOR HEART STENTS. 180 tablet 9    magnesium oxide 400 MG Oral Tab Take 1 tablet (400 mg total) by mouth at bedtime.      acetaminophen-codeine (TYLENOL WITH CODEINE #3) 300-30 MG Oral Tab Take 1 tablet by mouth every 6 (six) hours as needed for Pain. (Patient not taking: Reported on 2024) 90 tablet 0       ALLERGIES:  Allergies   Allergen Reactions    Empagliflozin OTHER (SEE COMMENTS)     Frequent urination, unbearable.    Penicillins UNKNOWN     Patient does not recall reaction    Other UNKNOWN       SOCIAL HISTORY:    Social History     Socioeconomic History    Marital status:     Number of children: 4   Tobacco Use    Smoking status: Former     Current packs/day: 0.00     Types: Cigarettes     Quit date:      Years since quittin.6     Passive exposure: Past    Smokeless tobacco: Never   Vaping Use    Vaping status: Never Used   Substance and Sexual Activity    Alcohol use: Never    Drug use: Never   Other Topics Concern    Caffeine Concern Yes     Comment: coffee daily    Exercise No       FAMILY HISTORY:   Family History   Problem Relation Age of Onset    Cancer Father         Prostate    Heart Disorder Father     Cancer Mother         PHYSICAL EXAM:   Height: 6' 2\" (188 cm) (944)  Weight: 305 lb (138.3 kg) (944)  BSA (Calculated - sq m): 2.6 sq meters (944)  Pulse: 77 (944)  BP: 104/50 (944)  Temp: --  Do Not Use - Resp Rate: --  SpO2: 99 % (944)    Body mass index is 39.16 kg/m².  Obese   Uses cane   Scar on the neck from carotid surgery  CONSTITUTIONAL:  Awake and alert. Age appropriate, good hygiene not in  acute distress. Well nourished and well developed. no acute distress   PSYCH:   Orientated to time, place, person & situation, Normal mood and affect, memory intact, normal insight and judgment, cooperative  Neuro: speech is clear. Awake, alert, no aphasia, no facial asymmetry, no nuchal rigidity  EYES:  No proptosis, no ptosis, conjunctiva      DATA:     Pertinent data reviewed      Latest Reference Range & Units 05/06/24 10:36   TSH 0.40 - 4.50 mIU/L 3.82      05/10/24 09:42 06/21/24 09:25   GLUCOSE BLOOD 302 267      08/19/24 14:01   CT BRAIN OR HEAD (CPT=70450) Rpt      Latest Reference Range & Units 06/25/24 10:16   T4,Free (Direct) 0.8 - 1.7 ng/dL 0.8   TSH 0.550 - 4.780 mIU/mL 18.669 (H)   (H): Data is abnormally high     Latest Reference Range & Units 06/25/24 10:16   BUN 9 - 23 mg/dL 26 (H)   CREATININE 0.70 - 1.30 mg/dL 1.51 (H)   (H): Data is abnormally high     Latest Reference Range & Units 06/21/24 09:26 06/25/24 10:16   Glucose 70 - 99 mg/dL  116 (H)   HEMOGLOBIN A1C 4.3 - 5.6 % 10.5 !    (H): Data is abnormally high  !: Data is abnormal          Orders Placed This Encounter   Procedures    TSH W Reflex To Free T4    TSH W Reflex To Free T4    POC HemoCue Glucose 201 (Finger stick glucose)    POC Glycohemoglobin [05826]     Orders Placed This Encounter    TSH W Reflex To Free T4     Standing Status:   Future     Standing Expiration Date:   8/20/2025     Order Specific Question:   Release to patient     Answer:   Immediate    TSH W Reflex To Free T4     Standing Status:   Future     Standing Expiration Date:   8/20/2025    POC HemoCue Glucose 201 (Finger stick glucose)     Order Specific Question:   Release to patient     Answer:   Immediate    POC Glycohemoglobin [[id number]]     Order Specific Question:   Release to patient     Answer:   Immediate    rosuvastatin 40 MG Oral Tab     Sig: Take 1 tablet (40 mg total) by mouth nightly. FOR CHOLESTEROL.     Dispense:  90 tablet     Refill:  9    Tirzepatide  (MOUNJARO) 15 MG/0.5ML Subcutaneous Solution Pen-injector     Sig: Inject 15 mg into the skin once a week. MAXIMUM DOSE. STOP OZEMPIC IF YOU START THIS.     Dispense:  6 mL     Refill:  9    valsartan 160 MG Oral Tab     Sig: Take 1 tablet (160 mg total) by mouth daily. FOR BLOOD PRESSURE.     Dispense:  90 tablet     Refill:  9    DISCONTD: metFORMIN  MG Oral Tablet 24 Hr     Sig: Take 2 tablets (1,000 mg total) by mouth 2 (two) times daily with meals.     Dispense:  360 tablet     Refill:  1    levothyroxine 125 MCG Oral Tab     Sig: Take 1 tablet (125 mcg total) by mouth at bedtime.     Dispense:  90 tablet     Refill:  1    glipiZIDE 5 MG Oral Tab     Sig: Take 1 tablet (5 mg total) by mouth every morning before breakfast. FOR DIABETES. STOP/DO NOT TAKE IF FASTING SUGAR IS LESS THAN 100.     Dispense:  90 tablet     Refill:  2    metFORMIN HCl 1000 MG Oral Tab     Sig: Take 1 tablet (1,000 mg total) by mouth 2 (two) times daily with meals.     Dispense:  180 tablet     Refill:  0          This is a specialized patient consultation in endocrinology and required comprehensive review of prior records, as well as current evaluation, with time required for consideration of complex endocrine issues and consultation. For this visit, I personally interviewed the patient, and family member if accompanied, performed the pertinent parts of the history and physical examination. ROS included screening for appropriate endocrine conditions.   Today's diagnosis and plan were reviewed in detail with the patient who states understanding and agrees with plan. I discussed with the patient possible diagnosis, differential diagnosis, need for work up , treatment options, alternatives and side effects.     Please see note for details about time spent which includes:   · pre-visit preparation  · reviewing records  · face to face time with the patient   · timely documentation of the encounter  · ordering medications/tests  ·  communication with care team  · care coordination    I appreciate the opportunity to be part of your patient's medical care and will keep you, as the referring and primary physicians, informed about the care of your patient, including possible future surgery and pathology findings. Please feel free to contact me should you have any questions.      Brittany Shah MD

## 2024-09-10 DIAGNOSIS — Z98.890 HISTORY OF CAROTID ENDARTERECTOMY: ICD-10-CM

## 2024-09-10 DIAGNOSIS — I15.2 HYPERTENSION ASSOCIATED WITH TYPE 2 DIABETES MELLITUS (HCC): ICD-10-CM

## 2024-09-10 DIAGNOSIS — E11.59 HYPERTENSION ASSOCIATED WITH TYPE 2 DIABETES MELLITUS (HCC): ICD-10-CM

## 2024-09-10 RX ORDER — METOPROLOL TARTRATE 25 MG/1
25 TABLET, FILM COATED ORAL 2 TIMES DAILY
Qty: 30 TABLET | Refills: 0 | Status: SHIPPED | OUTPATIENT
Start: 2024-09-10

## 2024-09-12 ENCOUNTER — HOSPITAL ENCOUNTER (OUTPATIENT)
Dept: GENERAL RADIOLOGY | Facility: HOSPITAL | Age: 64
Discharge: HOME | End: 2024-09-12
Attending: STUDENT IN AN ORGANIZED HEALTH CARE EDUCATION/TRAINING PROGRAM
Payer: COMMERCIAL

## 2024-09-12 ENCOUNTER — OFFICE VISIT (OUTPATIENT)
Dept: PODIATRY CLINIC | Facility: CLINIC | Age: 64
End: 2024-09-12

## 2024-09-12 DIAGNOSIS — L97.522 ULCER OF TOE OF LEFT FOOT, WITH FAT LAYER EXPOSED (HCC): Primary | ICD-10-CM

## 2024-09-12 DIAGNOSIS — L97.522 ULCER OF TOE OF LEFT FOOT, WITH FAT LAYER EXPOSED (HCC): ICD-10-CM

## 2024-09-12 PROCEDURE — 99213 OFFICE O/P EST LOW 20 MIN: CPT | Performed by: STUDENT IN AN ORGANIZED HEALTH CARE EDUCATION/TRAINING PROGRAM

## 2024-09-12 PROCEDURE — 73660 X-RAY EXAM OF TOE(S): CPT | Performed by: STUDENT IN AN ORGANIZED HEALTH CARE EDUCATION/TRAINING PROGRAM

## 2024-09-12 RX ORDER — MUPIROCIN 20 MG/G
1 OINTMENT TOPICAL 2 TIMES DAILY
Qty: 30 G | Refills: 1 | Status: SHIPPED | OUTPATIENT
Start: 2024-09-12

## 2024-09-12 NOTE — PROGRESS NOTES
[facility] Podiatry  Progress Note      Mando Puri is a 64 year old male.   Chief Complaint   Patient presents with    Diabetic Ulcer     L 3rd digit f/u -  States some soreness.  No drainage. FBS- 110.             HPI:     Patient is a 64-year-old diabetic male presents to clinic for follow-up of left third digit ulceration.  He denies performing dressing changes.  Admits to pain wit pressure.     Allergies: Empagliflozin, Penicillins, and Other    Current Outpatient Medications   Medication Sig Dispense Refill    metoprolol tartrate 25 MG Oral Tab Take 1 tablet (25 mg total) by mouth 2 (two) times daily. FOR HEART. 30 tablet 0    rosuvastatin 40 MG Oral Tab Take 1 tablet (40 mg total) by mouth nightly. FOR CHOLESTEROL. 90 tablet 9    Tirzepatide (MOUNJARO) 15 MG/0.5ML Subcutaneous Solution Pen-injector Inject 15 mg into the skin once a week. MAXIMUM DOSE. STOP OZEMPIC IF YOU START THIS. 6 mL 9    valsartan 160 MG Oral Tab Take 1 tablet (160 mg total) by mouth daily. FOR BLOOD PRESSURE. 90 tablet 9    levothyroxine 125 MCG Oral Tab Take 1 tablet (125 mcg total) by mouth at bedtime. 90 tablet 1    glipiZIDE 5 MG Oral Tab Take 1 tablet (5 mg total) by mouth every morning before breakfast. FOR DIABETES. STOP/DO NOT TAKE IF FASTING SUGAR IS LESS THAN 100. 90 tablet 2    metFORMIN HCl 1000 MG Oral Tab Take 1 tablet (1,000 mg total) by mouth 2 (two) times daily with meals. 180 tablet 0    pantoprazole 40 MG Oral Tab EC Take 1 tablet (40 mg total) by mouth every morning before breakfast. 90 tablet 1    sucralfate 1 g Oral Tab Take 1 tablet (1 g total) by mouth 4 (four) times daily before meals and nightly. As needed for heart burn. 90 tablet 0    pregabalin 100 MG Oral Cap Take 1 capsule (100 mg total) by mouth 2 (two) times daily. FOR NERVE PAIN. Dose reduction due to lethargy 180 capsule 3    naproxen (NAPROSYN) 500 MG Oral Tab Take 1 tablet (500 mg total) by mouth 2 (two) times daily with meals. Take for  2 weeks as directed and then as needed. 60 tablet 0    aspirin (ASPIRIN 81) 81 MG Oral Tab EC Take 1 tablet (81 mg total) by mouth daily. FOR HEART. 90 tablet 9    DULoxetine 30 MG Oral Cap DR Particles Take 1 capsule (30 mg total) by mouth daily. TAKE 1 CAPSULE IN THE MORNING FOR ANXIETY/DEPRESSION/ARTHRITIS PAIN 90 capsule 9    finasteride 5 MG Oral Tab Take 1 tablet (5 mg total) by mouth daily. FOR PROSTATE. 90 tablet 9    tamsulosin 0.4 MG Oral Cap Take 2 capsules (0.8 mg total) by mouth daily. FOR PROSTATE. 180 capsule 9    ticagrelor 90 MG Oral Tab Take 1 tablet (90 mg total) by mouth every 12 (twelve) hours. FOR HEART STENTS. 180 tablet 9    magnesium oxide 400 MG Oral Tab Take 1 tablet (400 mg total) by mouth at bedtime.      acetaminophen-codeine (TYLENOL WITH CODEINE #3) 300-30 MG Oral Tab Take 1 tablet by mouth every 6 (six) hours as needed for Pain. (Patient not taking: Reported on 8/20/2024) 90 tablet 0      Past Medical History:    Benign head tremor    BPH (benign prostatic hyperplasia)    Diabetes (HCC)    Diverticulitis    Former smoker    Hyperlipidemia    Hypertension    Neuropathic pain    Non-pressure chronic ulcer of right lower leg, limited to breakdown of skin (HCC)    Obesity    Snoring    Thyroid disease      Past Surgical History:   Procedure Laterality Date    Carotid endarterectomy Right 04/29/2021    Surgeon: Dr. Rajiv Sol[facility] at [facility]    Neck/chest procedure unlisted      per patient, a blockage was removed from the right side of his neck in 8/2021    Other surgical history  2017    Small bowel Res.    Other surgical history  11/12/2019    Colectomy      Family History   Problem Relation Age of Onset    Cancer Father         Prostate    Heart Disorder Father     Cancer Mother       Social History     Socioeconomic History    Marital status:     Number of children: 4   Tobacco Use    Smoking status: Former     Current packs/day: 0.00     Types: Cigarettes     Quit date: 1996      Years since quittin.7     Passive exposure: Past    Smokeless tobacco: Never   Vaping Use    Vaping status: Never Used   Substance and Sexual Activity    Alcohol use: Never    Drug use: Never   Other Topics Concern    Caffeine Concern Yes     Comment: coffee daily    Exercise No           REVIEW OF SYSTEMS:     Denies nause, fever, chills  No calf pain  Denies chest pain or SOB      EXAM:   There were no vitals taken for this visit.  GENERAL: well developed, well nourished, in no apparent distress  EXTREMITIES:   1. Integument: Normal skin temperature and turgor. Left third digit distal tuft ulceration measuring 0.8cm x 0.5cm x 0.3cm deept with fat layer exposed and HPK borders. No probing to bone. No purulent drianage or erythema.   2. Vascular: Dorsalis pedis two out of four bilateral and posterior tibial pulses two out of   four bilateral, capillary refill normal.   3. Musculoskeletal: All muscle groups are graded 5 out of 5 in the foot and ankle. Flexion contracture of left 3rd digit.    4. Neurological: Normal sharp dull sensation; reflexes normal.             ASSESSMENT AND PLAN:   There are no diagnoses linked to this encounter.    Plan:     Discussed in detail the etiology of ulceration.  Discussed the importance of good hygiene and following conservative care instructions.  Discussed the potential for infection and other risks, including osteomyelitis, if non-compliant. Patient verbalized understanding.  Discussed the potential for loss of limb/life.  Pt instructed on signs and symptoms of infection to watch for including N/F/V/C/SOB/CP, redness, increased drainage, malodor, etc. If any concern patient is to contact our office immediately or go to the Emergency Department. Pt acknowledge understanding.     Procedure Note: Debridement   11042-Debridement, subcutaneous tissue (includes epidermis and dermis, if performed); first 20 square cm or less. (See overall size of wound to calculate debridement  size)  Sharp excisional debridement of wound was performed to the level of and including subcutaneous tissue with #15 blade, dermal curette, or moistened gauze as listed below. Pt tolerated debridement well. Granular tissue/wound base was achieved with healthy bleeding noted. Bleeding was controlled with manual pressure and dry, sterile gauze. Non-viable tissue removed from wound bed with debridement.    Instrumentation Used:  dermal currette  Type of tissue removed: fibrotic, non-viable  Deepest Tissue Excised: Sub-cutaneous  Wound A: Yes  Wound B:  No  Wound C:  No   Was the removal of viable tissue evidenced by active bleeding?  Yes   Percentage of wound requiring debridement (if entire wound is larger than 20 sq cm)  Wound A: 100 % Wound B: _ % Wound C: _ %     Measurements of each wound after debridement (if wound is enlarged): Same as measurements above.    Post debridedment the wound was dressed with iodine and bandaid. Rx for mupirocin ointment for pt to perform daily dressing changes.   Apply mupirocin and bandaid daily. Obtain xray to rule out osteomyelitis. Follow up with Dr. Oquendo in 4 weeks.         The patient indicates understanding of these issues and agrees to the plan.        Lurdes Baxter DPM

## 2024-09-16 ENCOUNTER — MED REC SCAN ONLY (OUTPATIENT)
Facility: LOCATION | Age: 64
End: 2024-09-16

## 2024-09-26 ENCOUNTER — TELEPHONE (OUTPATIENT)
Facility: LOCATION | Age: 64
End: 2024-09-26

## 2024-09-26 ENCOUNTER — OFFICE VISIT (OUTPATIENT)
Dept: PHYSICAL MEDICINE AND REHAB | Facility: CLINIC | Age: 64
End: 2024-09-26
Payer: COMMERCIAL

## 2024-09-26 ENCOUNTER — OFFICE VISIT (OUTPATIENT)
Facility: LOCATION | Age: 64
End: 2024-09-26

## 2024-09-26 VITALS
BODY MASS INDEX: 39 KG/M2 | HEART RATE: 67 BPM | SYSTOLIC BLOOD PRESSURE: 97 MMHG | HEIGHT: 74 IN | OXYGEN SATURATION: 93 % | DIASTOLIC BLOOD PRESSURE: 60 MMHG

## 2024-09-26 DIAGNOSIS — M17.11 PRIMARY OSTEOARTHRITIS OF RIGHT KNEE: Primary | ICD-10-CM

## 2024-09-26 DIAGNOSIS — M22.2X9 PATELLOFEMORAL PAIN SYNDROME, UNSPECIFIED LATERALITY: ICD-10-CM

## 2024-09-26 DIAGNOSIS — M25.561 CHRONIC PAIN OF RIGHT KNEE: ICD-10-CM

## 2024-09-26 DIAGNOSIS — G89.29 CHRONIC PAIN OF RIGHT KNEE: ICD-10-CM

## 2024-09-26 DIAGNOSIS — G47.33 OSA (OBSTRUCTIVE SLEEP APNEA): Primary | ICD-10-CM

## 2024-09-26 PROCEDURE — 64454 NJX AA&/STRD GNCLR NRV BRNCH: CPT | Performed by: PHYSICAL MEDICINE & REHABILITATION

## 2024-09-26 PROCEDURE — 99213 OFFICE O/P EST LOW 20 MIN: CPT | Performed by: NURSE PRACTITIONER

## 2024-09-26 RX ORDER — BUPIVACAINE HYDROCHLORIDE 2.5 MG/ML
3 INJECTION, SOLUTION INFILTRATION; PERINEURAL ONCE
Status: COMPLETED | OUTPATIENT
Start: 2024-09-26 | End: 2024-09-26

## 2024-09-26 RX ORDER — LIDOCAINE HYDROCHLORIDE 10 MG/ML
5 INJECTION, SOLUTION INFILTRATION; PERINEURAL ONCE
Status: COMPLETED | OUTPATIENT
Start: 2024-09-26 | End: 2024-09-26

## 2024-09-26 NOTE — PATIENT INSTRUCTIONS
To ensure the accuracy of condition updates after your procedure, Dr. Behar would like for you to keep a written record of your pain for up to 12 hours after your injection. When you provide office staff with your condition update post injection - please refer back to this document for ease of communication.     Pain level prior to procedure: 0  1   2   3   4   5   6   7   8   9   10  (No Pain) 0  to  10 (Worst Pain); Please Venetie IRA corresponding number above.       Pain level immediately following procedure: 0  1   2   3   4   5   6   7   8   9   10  (No Pain) 0  to  10 (Worst Pain); Please Venetie IRA corresponding number above.           Please keep a close record of your pain for the next 12 hours to refer back to when speaking with office staff.      Percentage (%) of Relief:   0% - 100%  (0% = No Relief; 100% = Total Relief)   2 Hours After Procedure:     4 Hours After Procedure:     6 Hours After Procedure:     8 Hours After Procedure:     12 Hours After Procedure:

## 2024-09-26 NOTE — PROGRESS NOTES
INTERNAL MEDICINE OFFICE NOTE     Patient ID: Mando Puri is a 64 year old male.  Today's Date: 09/26/24  Chief Complaint: Dme/cpap Update (Follow up for CPAP documents)    HPI  Patient here today for cpap compliance follow up.  He completed [facility] sleep [facility] on August [facility], which was consistent with moderately severe obstructive sleep apnea.  Since patient has been using his cpap at home without difficulty, states sleeping well, wakes up feeling better. States he does not wake up during the night at all to use the bathroom anymore, prior to cpap he would get up 4-5 times/night.         Vitals:    09/26/24 1359   BP: 97/60   Pulse: 67   SpO2: 93%   Height: 6' 2\" (1.88 m)     body mass index is 39.16 kg/m².  BP Readings from Last 3 Encounters:   09/26/24 97/60   08/20/24 104/50   08/19/24 103/58     The 10-year ASCVD risk score (Tej GARNICA, et al., 2019) is: 11%    Values used to calculate the score:      Age: 64 years      Sex: Male      Is Non- : No      Diabetic: Yes      Tobacco smoker: No      Systolic Blood Pressure: 97 mmHg      Is BP treated: Yes      HDL Cholesterol: 44 mg/dL      Total Cholesterol: 137 mg/dL  Medications reviewed:  Current Outpatient Medications   Medication Sig Dispense Refill    mupirocin 2 % External Ointment Apply 1 Application topically 2 (two) times daily. 30 g 1    metoprolol tartrate 25 MG Oral Tab Take 1 tablet (25 mg total) by mouth 2 (two) times daily. FOR HEART. 30 tablet 0    rosuvastatin 40 MG Oral Tab Take 1 tablet (40 mg total) by mouth nightly. FOR CHOLESTEROL. 90 tablet 9    Tirzepatide (MOUNJARO) 15 MG/0.5ML Subcutaneous Solution Pen-injector Inject 15 mg into the skin once a week. MAXIMUM DOSE. STOP OZEMPIC IF YOU START THIS. 6 mL 9    valsartan 160 MG Oral Tab Take 1 tablet (160 mg total) by mouth daily. FOR BLOOD PRESSURE. 90 tablet 9    levothyroxine 125 MCG Oral Tab Take 1 tablet (125 mcg total) by mouth  at bedtime. 90 tablet 1    glipiZIDE 5 MG Oral Tab Take 1 tablet (5 mg total) by mouth every morning before breakfast. FOR DIABETES. STOP/DO NOT TAKE IF FASTING SUGAR IS LESS THAN 100. 90 tablet 2    metFORMIN HCl 1000 MG Oral Tab Take 1 tablet (1,000 mg total) by mouth 2 (two) times daily with meals. 180 tablet 0    pantoprazole 40 MG Oral Tab EC Take 1 tablet (40 mg total) by mouth every morning before breakfast. 90 tablet 1    sucralfate 1 g Oral Tab Take 1 tablet (1 g total) by mouth 4 (four) times daily before meals and nightly. As needed for heart burn. 90 tablet 0    pregabalin 100 MG Oral Cap Take 1 capsule (100 mg total) by mouth 2 (two) times daily. FOR NERVE PAIN. Dose reduction due to lethargy 180 capsule 3    naproxen (NAPROSYN) 500 MG Oral Tab Take 1 tablet (500 mg total) by mouth 2 (two) times daily with meals. Take for 2 weeks as directed and then as needed. 60 tablet 0    acetaminophen-codeine (TYLENOL WITH CODEINE #3) 300-30 MG Oral Tab Take 1 tablet by mouth every 6 (six) hours as needed for Pain. (Patient not taking: Reported on 8/20/2024) 90 tablet 0    aspirin (ASPIRIN 81) 81 MG Oral Tab EC Take 1 tablet (81 mg total) by mouth daily. FOR HEART. 90 tablet 9    DULoxetine 30 MG Oral Cap DR Particles Take 1 capsule (30 mg total) by mouth daily. TAKE 1 CAPSULE IN THE MORNING FOR ANXIETY/DEPRESSION/ARTHRITIS PAIN 90 capsule 9    finasteride 5 MG Oral Tab Take 1 tablet (5 mg total) by mouth daily. FOR PROSTATE. 90 tablet 9    tamsulosin 0.4 MG Oral Cap Take 2 capsules (0.8 mg total) by mouth daily. FOR PROSTATE. 180 capsule 9    ticagrelor 90 MG Oral Tab Take 1 tablet (90 mg total) by mouth every 12 (twelve) hours. FOR HEART STENTS. 180 tablet 9    magnesium oxide 400 MG Oral Tab Take 1 tablet (400 mg total) by mouth at bedtime.           Assessment & Plan    1. JUAN A (obstructive sleep apnea) (Primary)    Patient has used their CPAP machine for more than 4 hours per night on 70% of nights during a  consecutive 30-day period during the first 3 months of initial usage.  Patient states they have noted significant improvement in their sleep. We will fax forms and notes for patient.     Follow Up: Return for ANNUAL EXAM, within the next 3 months, follow up sooner as needed..        Objective: Results:   Physical Exam  Vitals and nursing note reviewed.   Constitutional:       General: He is not in acute distress.     Appearance: Normal appearance.   HENT:      Head: Normocephalic.      Right Ear: External ear normal.      Left Ear: External ear normal.   Eyes:      Extraocular Movements: Extraocular movements intact.      Conjunctiva/sclera: Conjunctivae normal.   Cardiovascular:      Rate and Rhythm: Normal rate and regular rhythm.      Pulses: Normal pulses.      Heart sounds: Normal heart sounds.   Pulmonary:      Effort: Pulmonary effort is normal. No respiratory distress.      Breath sounds: Normal breath sounds. No wheezing.   Abdominal:      General: Abdomen is flat. Bowel sounds are normal.      Tenderness: There is no abdominal tenderness.   Musculoskeletal:         General: Normal range of motion.      Cervical back: Normal range of motion and neck supple.   Skin:     Coloration: Skin is not jaundiced.   Neurological:      General: No focal deficit present.      Mental Status: He is alert and oriented to person, place, and time. Mental status is at baseline.   Psychiatric:         Mood and Affect: Mood normal.         Behavior: Behavior normal.        Reviewed:    Patient Active Problem List    Diagnosis    JUAN A (obstructive sleep apnea)    S/P drug eluting coronary stent placement    Neurogenic claudication    Leg wound, left    Abnormal Holter monitor finding    Left carotid artery stenosis    Cerebellar infarct (HCC)    Transient ischemic attack (TIA)    BPH with obstruction/lower urinary tract symptoms    Major depressive disorder    Venous stasis dermatitis of both lower extremities    Non-pressure  chronic ulcer of left lower leg, limited to breakdown of skin (formerly [facility])    Patellofemoral arthritis    Varicose veins of left lower extremity with inflammation, with ulcer of ankle limited to breakdown of skin (formerly [facility])    Hypertension associated with type 2 diabetes mellitus (HCC)    Hyperlipidemia associated with type 2 diabetes mellitus (formerly [facility])    Type 2 diabetes mellitus with diabetic peripheral angiopathy without gangrene, without long-term current use of insulin (formerly [facility])    Hypothyroidism    History of right-sided carotid endarterectomy    Detrusor overactivity    Primary osteoarthritis of right knee    Gastroesophageal reflux disease with esophagitis    Class 2 severe obesity due to excess calories with serious comorbidity and body mass index (BMI) of 38.0 to 38.9 in adult (formerly [facility])      Allergies   Allergen Reactions    Empagliflozin OTHER (SEE COMMENTS)     Frequent urination, unbearable.    Penicillins UNKNOWN     Patient does not recall reaction    Other UNKNOWN        Social History     Socioeconomic History    Marital status:     Number of children: 4   Tobacco Use    Smoking status: Former     Current packs/day: 0.00     Types: Cigarettes     Quit date:      Years since quittin.     Passive exposure: Past    Smokeless tobacco: Never   Vaping Use    Vaping status: Never Used   Substance and Sexual Activity    Alcohol use: Never    Drug use: Never   Other Topics Concern    Caffeine Concern Yes     Comment: coffee daily    Exercise No   Social History Narrative    The patient does use an assistive device..  Brace    The patient does live in a home with stairs.      Review of Systems   Constitutional:  Negative for unexpected weight change.   HENT:  Negative for trouble swallowing.    Eyes:  Negative for pain.   Cardiovascular:  Negative for chest pain.   Gastrointestinal:  Negative for abdominal pain.   Genitourinary:  Negative for dysuria.   Neurological: Negative.    Psychiatric/Behavioral: Negative.        All other systems negative unless otherwise stated in ROS or HPI above.       RAHEEL Cummings  Internal Medicine       Call office with any questions or seek emergency care if necessary.   Patient understands and agrees to follow directions and advice.      ----------------------------------------- PATIENT INSTRUCTIONS-----------------------------------------   .    Patient Instructions   We received your cpap report, this along with today visit will be faxed back.

## 2024-09-26 NOTE — PROCEDURES
[facility]  Genicular Nerve Block Procedure Note    CHIEF COMPLAINT:    Chief Complaint   Patient presents with    Injection       PROCEDURE PERFORMED: Right genicular nerve blocks under ultrasound guidance    INDICATIONS: Right knee pain    PRIMARY PROCEDURALIST:  Alex Behar, MD    INFORMED CONSENT & TIME OUT:   As documented in the Time Out and Pre-Procedure Check Lists.  Verbal consent was obtained    Vitals: [unfilled]  Labs (document last wbc, plts, hgb, and PT/INR):    Office Visit on 08/20/2024   Component Date Value Ref Range Status    GLUCOSE BLOOD 08/20/2024 143   Final    Test Strip Lot # 08/20/2024 [id number]  Numeric Final    Test Strip Expiration Date 08/20/2024 10/19/24  Date Final    HEMOGLOBIN A1C 08/20/2024 7.3 (A)  4.3 - 5.6 % Final    Cartridge Lot# 08/20/2024 [id number]  Numeric Final    Cartridge Expiration Date 08/20/2024 2/26/26  Date Final   [facility] Lab Encounter on 06/25/2024   Component Date Value Ref Range Status    MD Blood Smear Consult 06/25/2024    Final                    Value:Evaluation of the peripheral blood smear demonstrates mild normochromic normocytic anemia. Red blood cells are remarkable for anisopoikilocytosis with some microcytes and occasional ovalocytes.      Overall, the main differential causes for an anemia of this type may include anemia of chronic disease, iron deficiency (most often due to GI or /GYNE blood loss), some hemoglobinopathies (most commonly thalassemia minor, others) and sideroblastic anemias.      Clinical correlation and correlation with serum iron studies is recommended if clinically indicated.    Reviewed by KEVIN Loo M.D.    Vitamin B12 06/25/2024 542  211 - 911 pg/mL Final    HOLD MMA 06/25/2024 Auto Resulted   Final   Office Visit on 06/25/2024   Component Date Value Ref Range Status    Urine Color 06/25/2024 Yellow  Yellow Final    Clarity Urine 06/25/2024 Ex.Turbid (A)  Clear Final    Spec Gravity  06/25/2024 1.026  1.005 - 1.030 Final    Glucose Urine 06/25/2024 Normal  Normal mg/dL Final    Bilirubin Urine 06/25/2024 Negative  Negative Final    Ketones Urine 06/25/2024 Negative  Negative mg/dL Final    Blood Urine 06/25/2024 Trace (A)  Negative Final    pH Urine 06/25/2024 5.5  5.0 - 8.0 Final    Protein Urine 06/25/2024 50 (A)  Negative mg/dL Final    Urobilinogen Urine 06/25/2024 Normal  Normal Final    Nitrite Urine 06/25/2024 Negative  Negative Final    Leukocyte Esterase Urine 06/25/2024 Negative  Negative Final    WBC Urine 06/25/2024 None Seen  0 - 5 /HPF Final    RBC Urine 06/25/2024 None Seen  0 - 2 /HPF Final    Bacteria Urine 06/25/2024 None Seen  None Seen /HPF Final    Squamous Epi. Cells 06/25/2024 None Seen  None Seen /HPF Final    Renal Tubular Epithelial Cells 06/25/2024 None Seen  None Seen /HPF Final    Transitional Cells 06/25/2024 None Seen  None Seen /HPF Final    Yeast Urine 06/25/2024 None Seen  None Seen /HPF Final    Urine Culture 06/25/2024 No Growth at 18-24 hrs.   Final    Glucose 06/25/2024 116 (H)  70 - 99 mg/dL Final    Sodium 06/25/2024 142  136 - 145 mmol/L Final    Potassium 06/25/2024 4.4  3.5 - 5.1 mmol/L Final    Chloride 06/25/2024 109  98 - 112 mmol/L Final    CO2 06/25/2024 27.0  21.0 - 32.0 mmol/L Final    Anion Gap 06/25/2024 6  0 - 18 mmol/L Final    BUN 06/25/2024 26 (H)  9 - 23 mg/dL Final    Creatinine 06/25/2024 1.51 (H)  0.70 - 1.30 mg/dL Final    BUN/CREA Ratio 06/25/2024 17.2  10.0 - 20.0 Final    Calcium, Total 06/25/2024 9.7  8.7 - 10.4 mg/dL Final    Calculated Osmolality 06/25/2024 300 (H)  275 - 295 mOsm/kg Final    eGFR-Cr 06/25/2024 52 (L)  >=60 mL/min/1.73m2 Final    ALT 06/25/2024 31  10 - 49 U/L Final    AST 06/25/2024 40 (H)  <=34 U/L Final    Alkaline Phosphatase 06/25/2024 92  45 - 117 U/L Final    Bilirubin, Total 06/25/2024 0.3  0.2 - 1.1 mg/dL Final    Total Protein 06/25/2024 7.1  5.7 - 8.2 g/dL Final    Albumin 06/25/2024 4.3  3.2 - 4.8  g/dL Final    Globulin  06/25/2024 2.8  2.0 - 3.5 g/dL Final    A/G Ratio 06/25/2024 1.5  1.0 - 2.0 Final    Patient Fasting for CMP? 06/25/2024 Yes   Final    TSH 06/25/2024 18.669 (H)  0.550 - 4.780 mIU/mL Final    Vitamin B12 06/25/2024 542  211 - 911 pg/mL Final    Ferritin 06/25/2024 32.7  30.0 - 530.0 ng/mL Final    Retic% 06/25/2024 1.4  0.5 - 2.5 % Final    Retic Absolute 06/25/2024 62.3  22.5 - 147.5 x10(3) uL Final    Retic IRF 06/25/2024 0.157  0.100 - 0.300 Ratio Final    Reticulocyte Hemoglobin Equivalent 06/25/2024 33.5  28.2 - 36.6 pg Final    Iron 06/25/2024 75  65 - 175 ug/dL Final    Transferrin 06/25/2024 241  215 - 365 mg/dL Final    Total Iron Binding Capacity 06/25/2024 359  250 - 425 ug/dL Final    % Saturation 06/25/2024 21  20 - 50 % Final    WBC 06/25/2024 8.4  4.0 - 11.0 x10(3) uL Final    RBC 06/25/2024 4.39  4.30 - 5.70 x10(6)uL Final    HGB 06/25/2024 12.8 (L)  13.0 - 17.5 g/dL Final    HCT 06/25/2024 41.0  39.0 - 53.0 % Final    MCV 06/25/2024 93.4  80.0 - 100.0 fL Final    MCH 06/25/2024 29.2  26.0 - 34.0 pg Final    MCHC 06/25/2024 31.2  31.0 - 37.0 g/dL Final    RDW-SD 06/25/2024 50.7 (H)  35.1 - 46.3 fL Final    RDW 06/25/2024 14.6  11.0 - 15.0 % Final    PLT 06/25/2024 190.0  150.0 - 450.0 10(3)uL Final    Neutrophil Absolute Prelim 06/25/2024 5.44  1.50 - 7.70 x10 (3) uL Final    Neutrophil Absolute 06/25/2024 5.44  1.50 - 7.70 x10(3) uL Final    Lymphocyte Absolute 06/25/2024 1.96  1.00 - 4.00 x10(3) uL Final    Monocyte Absolute 06/25/2024 0.74  0.10 - 1.00 x10(3) uL Final    Eosinophil Absolute 06/25/2024 0.18  0.00 - 0.70 x10(3) uL Final    Basophil Absolute 06/25/2024 0.08  0.00 - 0.20 x10(3) uL Final    Immature Granulocyte Absolute 06/25/2024 0.03  0.00 - 1.00 x10(3) uL Final    Neutrophil % 06/25/2024 64.5  % Final    Lymphocyte % 06/25/2024 23.3  % Final    Monocyte % 06/25/2024 8.8  % Final    Eosinophil % 06/25/2024 2.1  % Final    Basophil % 06/25/2024 0.9  % Final     Immature Granulocyte % 06/25/2024 0.4  % Final    Free T4 06/25/2024 0.8  0.8 - 1.7 ng/dL Final   Office Visit on 06/21/2024   Component Date Value Ref Range Status    HEMOGLOBIN A1C 06/21/2024 10.5 (A)  4.3 - 5.6 % Final    Cartridge Lot# 06/21/2024 [id number]  Numeric Final    Cartridge Expiration Date 06/21/2024 02/27/2026  Date Final    GLUCOSE BLOOD 06/21/2024 267   Final    Test Strip Lot # 06/21/2024 [id number]  Numeric Final    Test Strip Expiration Date 06/21/2024 12/06/2024  Date Final   Office Visit on 05/10/2024   Component Date Value Ref Range Status    GLUCOSE BLOOD 05/10/2024 302   Final    Test Strip Lot # 05/10/2024 [id number]  Numeric Final    Test Strip Expiration Date 05/10/2024 110,124  Date Final   Orders Only on 05/06/2024   Component Date Value Ref Range Status    TSH W/REFLEX TO FT4 05/06/2024 3.82  0.40 - 4.50 mIU/L Final    HEMOGLOBIN A1c 05/06/2024 8.1 (H)  <5.7 % of total Hgb Final   Orders Only on 04/10/2024   Component Date Value Ref Range Status    COLOR 04/10/2024 YELLOW  YELLOW Final    APPEARANCE 04/10/2024 CLEAR  CLEAR Final    SPECIFIC GRAVITY 04/10/2024 1.036 (H)  1.001 - 1.035 Final    PH 04/10/2024 6.0  5.0 - 8.0 Final    GLUCOSE 04/10/2024 2+ (A)  NEGATIVE Final    BILIRUBIN 04/10/2024 NEGATIVE  NEGATIVE Final    KETONES 04/10/2024 NEGATIVE  NEGATIVE Final    OCCULT BLOOD 04/10/2024 NEGATIVE  NEGATIVE Final    PROTEIN 04/10/2024 NEGATIVE  NEGATIVE Final    NITRITE 04/10/2024 NEGATIVE  NEGATIVE Final    Leukocyte Esterase  04/10/2024 NEGATIVE  NEGATIVE Final    WBC Urine 04/10/2024 NONE SEEN  < OR = 5 /HPF Final    RBC 04/10/2024 NONE SEEN  < OR = 2 /HPF Final    SQUAMOUS EPITHELIAL CELLS 04/10/2024 NONE SEEN  < OR = 5 /HPF Final    BACTERIA 04/10/2024 NONE SEEN  NONE SEEN /HPF Final    HYALINE CAST 04/10/2024 NONE SEEN  NONE SEEN /LPF Final    NOTE 04/10/2024    Final    CULTURE, URINE, ROUTINE 04/10/2024 SEE NOTE (A)   Final   ]    PROCEDURE:  The procedure, risks, benefits, and  alternatives were discussed with the patient.  The patient verbalized understanding and gave verbal consent.     First the RIGHT knee medial femoral condyle and tibial plateau were prepped and draped in the sterile fashion using 2 sticks of Betadine at each location.  The RIGHT lateral femoral condyle was also prepped and draped in the sterile fashion using 3 sticks of Betadine.  Then a high-frequency linear probe was used to identify the genicular arteries at each of these locations.  Once identified, 1 cc of 0.5% Marcaine was injected in the area.  The needle was then removed, Hemostasis was obtained and a Band-Aid was applied.  The patient tolerated the procedure well was able to ambulate independently.       The Following nerves where injected:  RIGHT superolateral genicular nerve  RIGHT supermedial genicular nerve  RIGHT inferomedial genicular nerve      Patient verbalized understanding of assessment and plan.  Patient is in agreement with the plan.  All questions were answered.  No barriers to learning identified. Permanent pictures were saved.       INSTRUCTIONS GIVEN TO PATIENT:    \"You will see an effect in the next 2-3 days.  Please contact me if you have fevers, worsening swelling, worsening pain, decreased range of motion, increased redness, chills, or anything that makes you concerned about how the joint we injected feels/looks.  If you do not reach me in a reasonable time, please report directly to the emergency room for further evaluation\"    The patient was instructed to call me in 24 hours and let me know if she has any improvement, what percentage she has improved, and how long the medication lasted.  At that point we can decide if we will be performing a genicular nerve radiofrequency ablation.    Alex B. Behar MD, [facility]  Physical Medicine and Rehabilitation/Sports Medicine  [facility]

## 2024-09-26 NOTE — TELEPHONE ENCOUNTER
Faxed over last visit notes and Compliance Report to [facility] for CPAP. Awaiting Fax sent confirmation

## 2024-09-26 NOTE — TELEPHONE ENCOUNTER
Patient called (identified name and ),   Asking for an appointment with PCP today, as he has the day off.  States he was told by the CPAP supplier that he needed to see his PCP.  He denies being advised to follow up with a Pulmonologist.    Per patient request for appointment, he was agreeable to see RAHEEL Reddy.  This message routed as FYI to Dr Wallace and Radha PICKERING.

## 2024-09-27 ENCOUNTER — TELEPHONE (OUTPATIENT)
Dept: PHYSICAL MEDICINE AND REHAB | Facility: CLINIC | Age: 64
End: 2024-09-27

## 2024-09-27 DIAGNOSIS — Z74.09 LIMITED MOBILITY: ICD-10-CM

## 2024-09-27 DIAGNOSIS — M22.2X9 PATELLOFEMORAL PAIN SYNDROME, UNSPECIFIED LATERALITY: ICD-10-CM

## 2024-09-27 DIAGNOSIS — M25.561 CHRONIC PAIN OF RIGHT KNEE: ICD-10-CM

## 2024-09-27 DIAGNOSIS — G89.29 CHRONIC PAIN OF RIGHT KNEE: ICD-10-CM

## 2024-09-27 DIAGNOSIS — M17.11 PRIMARY OSTEOARTHRITIS OF RIGHT KNEE: Primary | ICD-10-CM

## 2024-09-27 NOTE — TELEPHONE ENCOUNTER
Patient had Right genicular nerve blocks under ultrasound guidance in-office on 9/26/2024.    Pain level prior to procedure: 0  1   2   3   4   5   6   7   8   9   10  (No Pain) 0  to  10 (Worst Pain); Please [place] corresponding number above.       Pain level immediately following procedure: 0  1   2   3   4   5   6   7   8   9   10  (100% relief for 1st hour)  (No Pain) 0  to  10 (Worst Pain); Please [place] corresponding number above.     Please keep a close record of your pain for the next 12 hours to refer back to when speaking with office staff.      Percentage (%) of Relief:   0% - 100%  (0% = No Relief; 100% = Total Relief)   2 Hours After Procedure:  80%   4 Hours After Procedure:  80%   6 Hours After Procedure:  80%   8 Hours After Procedure:  40%   12 Hours After Procedure: 40%     Patient asked if it is recommended he take the day off the day of the procedure, as he works on his feet.  This RN advised it would be recommended he take that day and possibly day after off, as it could be irritated after the procedure.  Patient was appreciative and no further questions.  RN advised we would update Dr. Behar and let him know what the plan was.  Patient advised this was his second nerve block= RFN order pended and routed to Dr. Behar for review & signature.

## 2024-09-27 NOTE — TELEPHONE ENCOUNTER
Pt called to provide condition update following procedure that was done yesterday. Requesting to speak with clinical staff.

## 2024-09-30 ENCOUNTER — TELEPHONE (OUTPATIENT)
Dept: PHYSICAL MEDICINE AND REHAB | Facility: CLINIC | Age: 64
End: 2024-09-30

## 2024-09-30 DIAGNOSIS — M17.11 PRIMARY OSTEOARTHRITIS OF RIGHT KNEE: Primary | ICD-10-CM

## 2024-09-30 NOTE — TELEPHONE ENCOUNTER
Initiated authorization for Right Genicular Radiofrequency Neurotomy under IV conscious sedation. CPT/HCPCS 16916  dx:M17.11 to be done at [facility] with [facility]    Status: Approved - no auth required  Authorization is not required based on medical necessity when being performed, however is not a guarantee of payment and may be subject to review once claim is submitted-Covered Benefit

## 2024-09-30 NOTE — TELEPHONE ENCOUNTER
Patient has been scheduled for Right knee RFN on 11/15/24 at [facility] with Dr. Behar.   Anesthesia type:  IVCS  Arrival Time: 945am & Procedure Time: 1045am  Patient was advised that if he/she does receive the covid vaccine it needs to be at least 2 weeks before or after the injection.  Medications and allergies reviewed.  Educated to hold NSAIDS (Aleve, Ibuprofen, Motrin, Advil) and anti-inflammatories (Meloxicam, Naproxen, Diclofenac, Celebrex) and for cervical injections must hold Multi-Vitamins, Vitamin E, Fish Oil/Omega-3.  If patient is receiving MAC/IVCS, weight loss oral/injectable medications will need to be held for 7 days prior to injection.  Patient informed to fast 8 hours prior to procedure and 10-12 hours prior to procedure with IVCS/MAC if patient is on a weight loss medication.   If on blood thinner, clearance has been received and approved to hold this medication by provider.   Patient informed of ENDO's  policy:  he/she will need a  to and from procedure.   Endoscopy is located in the [facility] [address], [facility], [address]. 2nd floor to check in.     may park in the blue/green lot.  Patient verbalized understanding and agrees with plan.  Scheduled in Epic: Yes  Scheduled in Surgical Case: Yes  Follow up appointment made: NOV: 11/15/2024 Behar, Alex, MD

## 2024-10-18 ENCOUNTER — TELEPHONE (OUTPATIENT)
Facility: LOCATION | Age: 64
End: 2024-10-18

## 2024-10-18 NOTE — TELEPHONE ENCOUNTER
Spoke with patient and he states he has a callus/wound that Dr Baxter wants to be seen every month. Last seen on 9/12/24. Appointment scheduled on 10/21 at 9am with Dr Woodard at [facility]. Address provided.

## 2024-10-18 NOTE — TELEPHONE ENCOUNTER
Pharmacy requesting refill     pantoprazole sodium 40 MG Oral Tab EC Take 1 tablet (40 mg total) by mouth every morning before breakfast. 90 tablet 1

## 2024-10-18 NOTE — TELEPHONE ENCOUNTER
Patient is supposed to come in every month to see provider for his left foot callus but there isn't any openings till November and his provider over a month ago. 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

## 2024-10-18 NOTE — TELEPHONE ENCOUNTER
Pantoprazole 40m month supply sent to [facility] in [place] on 2024    [facility] requesting refill.

## 2024-10-20 NOTE — TELEPHONE ENCOUNTER
Refill passed per [facility] protocol.    Requested Prescriptions   Pending Prescriptions Disp Refills    pantoprazole 40 MG Oral Tab EC 90 tablet 1     Sig: Take 1 tablet (40 mg total) by mouth every morning before breakfast.       Gastrointestional Medication Protocol Passed - 10/20/2024 10:08 AM        Passed - In person appointment or virtual visit in the past 12 mos or appointment in next 3 mos     Recent Outpatient Visits              3 weeks ago JUAN A (obstructive sleep apnea)    [facility] Radha Reddy APRN    Office Visit    3 weeks ago Primary osteoarthritis of right knee    [facility] Behar, Alex, MD    Office Visit    1 month ago Ulcer of toe of left foot, with fat layer exposed ([facility])    [facility] Lurdes Baxter DPM    Office Visit    2 months ago Uncontrolled type 2 diabetes mellitus with hyperglycemia ([facility])    [facility] Brittany Shah MD    Office Visit    2 months ago Witnessed episode of apnea    [facility]    Office Visit          Future Appointments         Provider Department Appt Notes    Tomorrow Eri Woodard DPM [facility] left foot wound/callus- Dr Campbell pt    In 3 weeks Behar, Alex, MD [facility] OR: Right Genicular Radiofrequency Neurotomy under IV conscious sedation.    In 1 month Brittany Shah MD [facility] 3 MONTH F/UP    In 1 month Alvino Wallace MD [facility] px                         Future Appointments         Provider Department Appt Notes    Tomorrow Eri Woodard DPM [facility] left foot wound/callus-   [facility] pt    In 3 weeks Behar, Alex, MD [facility] OR: Right Genicular Radiofrequency Neurotomy under IV conscious sedation.    In 1 month Brittany Shah MD [facility] 3 MONTH F/UP    In 1 month Alvino Wallace MD [facility] px            Recent Outpatient Visits              3 weeks ago JUAN A (obstructive sleep apnea)    [facility] Radha Reddy APRN    Office Visit    3 weeks ago Primary osteoarthritis of right knee    [facility] Behar, Alex, MD    Office Visit    1 month ago Ulcer of toe of left foot, with fat layer exposed ([facility])    [facility] Lurdes Baxter DPM    Office Visit    2 months ago Uncontrolled type 2 diabetes mellitus with hyperglycemia ([facility])    [facility] Brittany Shah MD    Office Visit    2 months ago Witnessed episode of apnea    [facility]    Office Visit

## 2024-10-21 ENCOUNTER — OFFICE VISIT (OUTPATIENT)
Dept: PODIATRY CLINIC | Facility: CLINIC | Age: 64
End: 2024-10-21

## 2024-10-21 DIAGNOSIS — L97.521 CHRONIC TOE ULCER, LEFT, LIMITED TO BREAKDOWN OF SKIN (HCC): Primary | ICD-10-CM

## 2024-10-21 PROCEDURE — 87075 CULTR BACTERIA EXCEPT BLOOD: CPT | Performed by: PODIATRIST

## 2024-10-21 PROCEDURE — 87070 CULTURE OTHR SPECIMN AEROBIC: CPT | Performed by: PODIATRIST

## 2024-10-21 RX ORDER — SULFAMETHOXAZOLE AND TRIMETHOPRIM 800; 160 MG/1; MG/1
1 TABLET ORAL 2 TIMES DAILY
Qty: 30 TABLET | Refills: 0 | Status: SHIPPED | OUTPATIENT
Start: 2024-10-21

## 2024-10-21 NOTE — PROGRESS NOTES
Reason for Visit      Mando Puri is a 64 year old male presents today complaining of left third digit ulceration.     History of Present Illness     Patient presents to clinic today after last being seen by podiatry on 9/12/2024 for management of the left third digit ulceration.  At that time an x-ray was ordered to rule out osteomyelitis and patient was encouraged to follow-up in 4 weeks.  Radiographs noted questionable subtle age-indeterminate erosive changes of the distal third phalangeal tuft.  It was suggested at this time that could not rule out osteomyelitis and an MRI was recommended.  Patient contacted the office last week requesting a follow-up appointment.    The following portions of the patient's history were reviewed and updated as appropriate: allergies, current medications, past family history, past medical history, past social history, past surgical history and problem list.    Allergies[1]      Current Outpatient Medications:     pantoprazole 40 MG Oral Tab EC, Take 1 tablet (40 mg total) by mouth every morning before breakfast., Disp: 90 tablet, Rfl: 3    mupirocin 2 % External Ointment, Apply 1 Application topically 2 (two) times daily., Disp: 30 g, Rfl: 1    metoprolol tartrate 25 MG Oral Tab, Take 1 tablet (25 mg total) by mouth 2 (two) times daily. FOR HEART., Disp: 30 tablet, Rfl: 0    rosuvastatin 40 MG Oral Tab, Take 1 tablet (40 mg total) by mouth nightly. FOR CHOLESTEROL., Disp: 90 tablet, Rfl: 9    Tirzepatide (MOUNJARO) 15 MG/0.5ML Subcutaneous Solution Pen-injector, Inject 15 mg into the skin once a week. MAXIMUM DOSE. STOP OZEMPIC IF YOU START THIS., Disp: 6 mL, Rfl: 9    valsartan 160 MG Oral Tab, Take 1 tablet (160 mg total) by mouth daily. FOR BLOOD PRESSURE., Disp: 90 tablet, Rfl: 9    levothyroxine 125 MCG Oral Tab, Take 1 tablet (125 mcg total) by mouth at bedtime., Disp: 90 tablet, Rfl: 1    glipiZIDE 5 MG Oral Tab, Take 1 tablet (5 mg total) by mouth every morning  before breakfast. FOR DIABETES. STOP/DO NOT TAKE IF FASTING SUGAR IS LESS THAN 100., Disp: 90 tablet, Rfl: 2    metFORMIN HCl 1000 MG Oral Tab, Take 1 tablet (1,000 mg total) by mouth 2 (two) times daily with meals., Disp: 180 tablet, Rfl: 0    sucralfate 1 g Oral Tab, Take 1 tablet (1 g total) by mouth 4 (four) times daily before meals and nightly. As needed for heart burn., Disp: 90 tablet, Rfl: 0    pregabalin 100 MG Oral Cap, Take 1 capsule (100 mg total) by mouth 2 (two) times daily. FOR NERVE PAIN. Dose reduction due to lethargy, Disp: 180 capsule, Rfl: 3    naproxen (NAPROSYN) 500 MG Oral Tab, Take 1 tablet (500 mg total) by mouth 2 (two) times daily with meals. Take for 2 weeks as directed and then as needed., Disp: 60 tablet, Rfl: 0    acetaminophen-codeine (TYLENOL WITH CODEINE #3) 300-30 MG Oral Tab, Take 1 tablet by mouth every 6 (six) hours as needed for Pain. (Patient not taking: Reported on 8/20/2024), Disp: 90 tablet, Rfl: 0    aspirin (ASPIRIN 81) 81 MG Oral Tab EC, Take 1 tablet (81 mg total) by mouth daily. FOR HEART., Disp: 90 tablet, Rfl: 9    DULoxetine 30 MG Oral Cap DR Particles, Take 1 capsule (30 mg total) by mouth daily. TAKE 1 CAPSULE IN THE MORNING FOR ANXIETY/DEPRESSION/ARTHRITIS PAIN, Disp: 90 capsule, Rfl: 9    finasteride 5 MG Oral Tab, Take 1 tablet (5 mg total) by mouth daily. FOR PROSTATE., Disp: 90 tablet, Rfl: 9    tamsulosin 0.4 MG Oral Cap, Take 2 capsules (0.8 mg total) by mouth daily. FOR PROSTATE., Disp: 180 capsule, Rfl: 9    ticagrelor 90 MG Oral Tab, Take 1 tablet (90 mg total) by mouth every 12 (twelve) hours. FOR HEART STENTS., Disp: 180 tablet, Rfl: 9    magnesium oxide 400 MG Oral Tab, Take 1 tablet (400 mg total) by mouth at bedtime., Disp: , Rfl:     There are no discontinued medications.    Patient Active Problem List   Diagnosis    Class 2 severe obesity due to excess calories with serious comorbidity and body mass index (BMI) of 38.0 to 38.9 in adult (HCC)     Hypertension associated with type 2 diabetes mellitus (HCC)    Hyperlipidemia associated with type 2 diabetes mellitus (HCC)    Type 2 diabetes mellitus with diabetic peripheral angiopathy without gangrene, without long-term current use of insulin (HCC)    Hypothyroidism    History of right-sided carotid endarterectomy    Detrusor overactivity    Primary osteoarthritis of right knee    Gastroesophageal reflux disease with esophagitis    Varicose veins of left lower extremity with inflammation, with ulcer of ankle limited to breakdown of skin (HCC)    Patellofemoral arthritis    Venous stasis dermatitis of both lower extremities    Non-pressure chronic ulcer of left lower leg, limited to breakdown of skin (HCC)    Major depressive disorder    BPH with obstruction/lower urinary tract symptoms    Transient ischemic attack (TIA)    Left carotid artery stenosis    Cerebellar infarct (HCC)    Abnormal Holter monitor finding    Leg wound, left    S/P drug eluting coronary stent placement    Neurogenic claudication    JUAN A (obstructive sleep apnea)       Past Medical History:    Benign head tremor    BPH (benign prostatic hyperplasia)    Diabetes (HCC)    Diverticulitis    Former smoker    Hyperlipidemia    Hypertension    Neuropathic pain    Non-pressure chronic ulcer of right lower leg, limited to breakdown of skin (HCC)    Obesity    Snoring    Thyroid disease       Past Surgical History:   Procedure Laterality Date    Carotid endarterectomy Right 04/29/2021    Surgeon: Dr. Rajiv Solorio at [facility]    Neck/chest procedure unlisted      per patient, a blockage was removed from the right side of his neck in 8/2021    Other surgical history  2017    Small bowel Res.    Other surgical history  11/12/2019    Colectomy       Family History   Problem Relation Age of Onset    Cancer Father         Prostate    Heart Disorder Father     Cancer Mother        Social History     Occupational History    Not on file   Tobacco Use    Smoking  status: Former     Current packs/day: 0.00     Types: Cigarettes     Quit date:      Years since quittin.8     Passive exposure: Past    Smokeless tobacco: Never   Vaping Use    Vaping status: Never Used   Substance and Sexual Activity    Alcohol use: Never    Drug use: Never    Sexual activity: Not on file       ROS      Constitutional: negative for chills, fevers and sweats  Gastrointestinal: negative for abdominal pain, diarrhea, nausea and vomiting  Genitourinary:negative for dysuria and hematuria  Musculoskeletal:negative for arthralgias and muscle weakness  Neurological: negative for paresthesia and weakness  All others reviewed and negative.      Physical Exam     LE PHYSICAL EXAM  Constitution: Well-developed and well-nourished. Gait appears normal. No apparent distress. Alert and oriented to person, place, and time.  Integument: There are no varicosities. Skin appears moist, warm, and supple with positive hair growth. There are no color changes. No open lesions. No macerations, No Hyperkeratotic lesions.  Vascular examination: Dorsalis pedis and posterior tibial pulses are strong bilaterally with capillary filling time less than 3 seconds to all digits. There is no peripheral edema..  Neurological Sensorium: Grossly intact to sharp/dull. Vibratory: Intact.  Musculoskeletal:   5/5 pedal muscle strength b/l         Ulceration: Distal aspect of left third digit  Measurements: 5 mm x 2 mm  Wound periphery: Hyperkeratotic  Wound base: Granular  Drainage: None    Vitals: There were no vitals taken for this visit.    Procedure     Procedure Note: Debridement   11042-Debridement, subcutaneous tissue (includes epidermis and dermis, if performed); first 20 square cm or less. (See overall size of wound to calculate debridement size)  Sharp excisional debridement of wound was performed to the level of and including subcutaneous tissue with #15 blade, dermal curette, or moistened gauze as listed below. Pt  tolerated debridement well. Granular tissue/wound base was achieved with healthy bleeding noted. Bleeding was controlled with manual pressure and dry, sterile gauze. Non-viable tissue removed from wound bed with debridement.     Instrumentation Used:  dermal currette  Type of tissue removed: fibrotic, non-viable  Deepest Tissue Excised: Sub-cutaneous  Wound A: Yes  Wound B:  No  Wound C:  No   Was the removal of viable tissue evidenced by active bleeding?  Yes   Percentage of wound requiring debridement (if entire wound is larger than 20 sq cm)  Wound A: 100 % Wound B: _ % Wound C: _ %      Measurements of each wound after debridement (if wound is enlarged): Same as measurements above.       Assessment and Plan     Encounter Diagnoses   Name Primary?    Chronic toe ulcer, left, limited to breakdown of skin (HCC) Yes   I do lengthy discussion with the patient regards to his clinical presentation and his x-ray findings approximately 5 weeks ago.  Discussed there was concern for posterior IM osteomyelitis of the distal phalanx of left third digit.  Discussed further imaging such as an MRI for further assessment though is not conclusive.  Discussed bone biopsy versus partial digit amputation in great detail as well.  Patient would like to stick with conservative treatment.  Placed an order for an MRI stat  -Took a wound culture at today's office visit  -Started him on Bactrim at today's office visit  -Dispensed dressing supplies to be formed daily  -Patient to follow-up once MRI results are obtained encourage patient to start taking the antibiotic as soon as possible as well as obtain the MRI which was placed stat.    Patient was instructed to call the office or on-call podiatric physician immediately with any issues or concerns before the next scheduled visit. Patient to follow-up in clinic in 2 to 3 days      Eri Gallardo DPM, CARISA.WALT, FACBHANU  Diplomat, American Board of Foot and Ankle Surgery  Certified in Foot  and Rearfoot/Ankle Reconstruction  Fellow of the American College of Foot and Ankle Surgeons  Fellowship Trained Foot and Ankle Surgeon   [facility]     10/21/2024    6:41 AM         [1]   Allergies  Allergen Reactions    Empagliflozin OTHER (SEE COMMENTS)     Frequent urination, unbearable.    Penicillins UNKNOWN     Patient does not recall reaction    Other UNKNOWN

## 2024-10-22 NOTE — TELEPHONE ENCOUNTER
Called patient today in order to go over his MRI results and treatment recommendations moving forward.  Discussed my concern for possible osteomyelitis of the distal phalanx of his left third digit.  Discussed that his wound culture results have come back positive for strep though they are not finalized at this time.  Discussed it is my recommendation as he is at a chronic open wound for many months with continued pain and swelling that more than likely his MRI results are correct with presence of osteomyelitis in his left third digit.  Is my recommendation at the time that he presented to the emergency room with the following workup:    Please admit patient for management of osteomyelitis and diabetic foot ulceration of the left third digit  Please consult infectious disease for IV recommendations, based on office wound cultures can initiate Zosyn at this time.  Please medically optimize patient for possibility of partial left third digit amputation 10/23/2024.  Please page me upon patient arrival to the emergency department.      Eri Gallardo DPM, CARISA.[email or web address] GODOY  Diplomat, American Board of Foot and Ankle Surgery  Certified in Foot and Rearfoot/Ankle Reconstruction  Fellow of the American College of Foot and Ankle Surgeons  Fellowship Trained Foot and Ankle Surgeon   [facility]

## 2024-10-22 NOTE — ED QUICK NOTES
Orders for admission, patient is aware of plan and ready to go upstairs. Any questions, please call ED RN Ivania at extension [phone].     Patient Covid vaccination status: Fully vaccinated     COVID Test Ordered in ED: None    COVID Suspicion at Admission: N/A    Running Infusions:  None    Mental Status/LOC at time of transport: x4    Other pertinent information:   CIWA score: N/A   NIH score:  N/A

## 2024-10-22 NOTE — PROGRESS NOTES
[facility] Dosing Service      Initial Pharmacokinetic Consult for Vancomycin Dosing     Mason Puri is a 64 year old male who is being initiated on vancomycin therapy for osteomyelitis.  Pharmacy has been asked to dose vancomycin by Dr. Osorio.  The initial treatment and monitoring approach will be steady state AUC strategy.        Weight and Temperature:    Wt Readings from Last 1 Encounters:   10/22/24 (!) 142 kg (313 lb)        Temp Readings from Last 1 Encounters:   10/22/24 97.6 °F (36.4 °C) (Oral)      Labs:   Recent Labs   Lab 10/22/24  1645   CREATSERUM 1.52*      Estimated Creatinine Clearance: 58.6 mL/min (A) (based on SCr of 1.52 mg/dL (H)).     Recent Labs   Lab 10/22/24  1645   WBC 9.1          The Pharmacokinetic Target is:     to 600 mg-h/L and trough <=15 mg/L    Renal Dosing Considerations:    None     Assessment/Plan:   Initial/Loading dose: Has received 2000 mg IV (15 mg/kg, capped at 2250 mg) x 1 initial dose.      Maintenance dose: Pharmacy will dose vancomycin at 1500 mg IV every 24 hours    Monitorin) Plan for vancomycin peak and trough to be obtained at steady state    2) Pharmacy will order SCr as clinically indicated to assess renal function.    3) Pharmacy will monitor for toxicity and efficacy, adjust vancomycin dose and/or frequency, and order vancomycin levels as appropriate per the Pharmacy and Therapeutics Committee approved protocol until discontinuation of the medication.       We appreciate the opportunity to assist in the care of this patient.     Segundo Rapp PharmD  10/22/2024  6:54 PM  [place]  Pharmacy Extension: [phone]

## 2024-10-22 NOTE — ED INITIAL ASSESSMENT (HPI)
Patient here for admission for osteomyelitis and is here for IV abx. Patient states the  Told him he will need an amputation. Denies fevers. Only c/o today that he has pain in his left foot, 3rd toe.

## 2024-10-23 NOTE — OPERATIVE REPORT
[facility]  part of [facility]    Operative Note         Mason Puri Location: OR   [facility] [id number] MRN [id number]   Admission Date 10/22/2024 Operation Date 10/23/2024    Attending Physician Mary Pichardo MD         Patient Name: Mason Puri     Preoperative Diagnosis:   Left third digit diabetic foot ulceration  Left third digit concern for osteomyelitis of the distal phalanx    Postoperative Diagnosis:   Left third digit diabetic foot ulceration  Left third digit concern for osteomyelitis of the distal phalanx    Procedure(s):  Left third digit partial toe amputation  Left third digit bone biopsy    Primary Surgeon: Eri Woodard DPM          Anesthesia: MAC     Specimen:   ID Type Source Tests Collected by Time Destination   1 : 1. Left third distal phalanx bone Tissue Foot,left SURGICAL PATHOLOGY TISSUE Eri Woodard DPM 10/23/2024  5:34 PM    2 : 2. Left third digit clearance fragment bone Tissue Foot,left SURGICAL PATHOLOGY TISSUE Eri Woodard DPM 10/23/2024  5:35 PM    A : 3. Left foot wound cultures (aeroic/anaerobic) Tissue Foot,left ANAEROBIC CULTURE, TISSUE AEROBIC CULTURE Eri Woodard DPM 10/23/2024  5:36 PM         Estimated Blood Loss: less than 5ml  Complications: none neurovascular status intact to the level of the digits 1-5 left     Indications for procedure: A 64-year-old diabetic male presents to the operating room with concern for osteomyelitis of the left third digit distal phalanx, noted with preoperative MRI.    Surgical Findings: No necrotic tissue, purulent drainage, necrotic bone noted       Operative Summary:    Patient was identified and confirmed as Mason Puri, date of birth 2/20/1960 and confirmed operative procedure left third digit partial toe amputation.Patient was brought to the operating room and placed on the operative table spine position patient's left lower extremity was marked in  preoperative area timeouts form this time after induction of MAC anesthesia approximately 10 cc of one-to-one mixture of 0.5% Marcaine plain 1% lidocaine plain was checked in the patient's left foot and digital nerve block fashion.  The left lower extremity was then cleaned prepped and draped in typical aseptic technique.    LEFT THIRD DIGIT PARTIAL TOE AMPUTATION:  Attention was then directed to the distal aspect of left third digit where a fishmouth incision was made over the distal inner phalangeal joint in order to excise the distal phalanx in total through the IPJ joint.  The incision was deepened through subcutaneous tissue using sharp and blunt dissection techniques care was taken to identify and retract all neurovascular structures all bleeders were cauterized necessary.  Next the distal phalanx was subsequently excised in total and sent to pathology for further identification.  Both aerobic and anaerobic wound cultures were subsequently taken of the DIPJ joint and sent to microbiology for further identification.  The remainder of the digit was checked for any sort of remnants of necrotic tissue purulent drainage or necrotic bone which none was found.    LEFT THIRD DIGIT BONE BIOPSY:  Attention was then directed to the remaining middle phalanx in which a bone cutter was subsequent utilized in order to remove the head of the middle phalanx utilizing it as a clearance fragment which was also sent to pathology for further identification.  Once again the remaining of the digit was checked for any sort of remnants of necrotic tissue purulent drainage none were noted.  The incision was then irrigated with copious amounts of sterile saline.  The skin edges were closed and reapproximated utilizing 3-0 Prolene in a simple interrupted suture technique.  A dressing was applied consisting of Telfa 4 x 4 web roll and Ace bandage.  Patient tolerated procedure well and no complications transferred to PACU with vital signs  stable vascular status intact left foot.  Patient will remain in house until his clearance fragment intraoperative cultures are obtained.  X-rays he is already consulted who recommended vancomycin and ceftriaxone based on culture results.    Drains: None     Condition: stable, neurovascular status intact to the level of the digits 1-5 left          Eri Woodard DPM

## 2024-10-23 NOTE — PROGRESS NOTES
[facility]  part of [facility]    Progress Note    Mason Puri Patient Status:  Inpatient    1960 MRN [id number]   Location [facility] 4W/SW/SE Attending Mary Pichardo MD   Hosp Day # 1 PCP Alvino Wallace MD     Chief Complaint: left third toe osteo    SUBJECTIVE:    No acute events overnight.  Patient denies chest pain, sob.  No fevers/chills.  No n/v/abd pain.  Waiting for surgery.     10 ROS completed and otherwise negative.    OBJECTIVE:  Vital signs in last 24 hours:  /66 (BP Location: Right arm)   Pulse 65   Temp 98.4 °F (36.9 °C) (Temporal)   Resp 18   Ht 6' 2\" (1.88 m)   Wt (!) 313 lb (142 kg)   SpO2 94%   BMI 40.19 kg/m²     Intake/Output:    Intake/Output Summary (Last 24 hours) at 10/23/2024 1232  Last data filed at 10/22/2024 1818  Gross per 24 hour   Intake 100 ml   Output --   Net 100 ml       Wt Readings from Last 3 Encounters:   10/22/24 (!) 313 lb (142 kg)   24 (!) 305 lb (138.3 kg)   24 (!) 315 lb (142.9 kg)       Exam     Gen: No acute distress  Pulm: Lungs clear, normal respiratory effort  CV: Heart with regular rate and rhythm  Abd: Abdomen soft, nontender, bowel sounds present  Neuro: No acute focal deficits  MSK: moves extremities  Skin: Warm and dry  Psych: Normal affect  Ext: left third toe with dressing in place    Data Review:     Labs:   Lab Results   Component Value Date    WBC 8.4 10/23/2024    HGB 11.6 10/23/2024    HCT 37.0 10/23/2024    .0 10/23/2024    CREATSERUM 1.45 10/23/2024    BUN 22 10/23/2024     10/23/2024    K 4.3 10/23/2024     10/23/2024    CO2 31.0 10/23/2024     10/23/2024    CA 9.6 10/23/2024    ALB 4.2 10/22/2024    ALKPHO 68 10/22/2024    BILT 0.2 10/22/2024    TP 6.7 10/22/2024    AST 20 10/22/2024    ALT 18 10/22/2024       Imaging: Reviewed    MRI FOOT, LEFT (CPT=73718)    Result Date: 10/22/2024  CONCLUSION:  1. Osteomyelitis involving the mid to distal  shaft and tuft of the 3rd toe distal phalanx.  There is reactive marrow edema throughout the entirety of the 3rd toe distal phalanx. 2. Soft tissue ulceration at the tip of the 3rd toe.  No associated well-defined/drainable soft tissue collection to suggest abscess. 3. Scattered mild-to-moderate intertarsal, tarsometatarsal, and 1st metatarsophalangeal joint osteoarthritis as detailed. 4. Lesser incidental findings as above.   Dictated by (CST): Sumanth Hayward MD on 10/22/2024 at 10:41 AM     Finalized by (CST): Sumanth Hayward MD on 10/22/2024 at 10:47 AM           Meds:   Current Facility-Administered Medications   Medication Dose Route Frequency    sodium chloride 0.9 % IV bolus  83 mL/hr Intravenous Continuous    cefTRIAXone (Rocephin) 2 g in sodium chloride 0.9% 100 mL IVPB-ADDV  2 g Intravenous Q24H    vancomycin (Vancocin) 1.5 g in sodium chloride 0.9% 250mL IVPB premix  15 mg/kg (Adjusted) Intravenous Q24H    glucose (Dex4) 15 GM/59ML oral liquid 15 g  15 g Oral Q15 Min PRN    Or    glucose (Glutose) 40% oral gel 15 g  15 g Oral Q15 Min PRN    Or    glucose-vitamin C (Dex-4) chewable tab 4 tablet  4 tablet Oral Q15 Min PRN    Or    dextrose 50% injection 50 mL  50 mL Intravenous Q15 Min PRN    Or    glucose (Dex4) 15 GM/59ML oral liquid 30 g  30 g Oral Q15 Min PRN    Or    glucose (Glutose) 40% oral gel 30 g  30 g Oral Q15 Min PRN    Or    glucose-vitamin C (Dex-4) chewable tab 8 tablet  8 tablet Oral Q15 Min PRN    insulin aspart (NovoLOG) 100 Units/mL FlexPen 1-7 Units  1-7 Units Subcutaneous TID CC    enoxaparin (Lovenox) 40 MG/0.4ML SUBQ injection 40 mg  40 mg Subcutaneous Once    acetaminophen (Tylenol Extra Strength) tab 500 mg  500 mg Oral Q4H PRN    ondansetron (Zofran) 4 MG/2ML injection 4 mg  4 mg Intravenous Q6H PRN    prochlorperazine (Compazine) 10 MG/2ML injection 5 mg  5 mg Intravenous Q8H PRN    temazepam (Restoril) cap 15 mg  15 mg Oral Nightly PRN    acetaminophen (Tylenol) tab 650 mg  650  mg Oral Q4H PRN    Or    HYDROcodone-acetaminophen (Norco) 5-325 MG per tab 1 tablet  1 tablet Oral Q4H PRN    Or    HYDROcodone-acetaminophen (Norco) 5-325 MG per tab 2 tablet  2 tablet Oral Q4H PRN         Assessment  Patient Active Problem List   Diagnosis    Class 2 severe obesity due to excess calories with serious comorbidity and body mass index (BMI) of 38.0 to 38.9 in adult ([facility])    Hypertension associated with type 2 diabetes mellitus ([facility])    Hyperlipidemia associated with type 2 diabetes mellitus ([facility])    Type 2 diabetes mellitus with diabetic peripheral angiopathy without gangrene, without long-term current use of insulin ([facility])    Hypothyroidism    History of right-sided carotid endarterectomy    Detrusor overactivity    Primary osteoarthritis of right knee    Gastroesophageal reflux disease with esophagitis    Varicose veins of left lower extremity with inflammation, with ulcer of ankle limited to breakdown of skin ([facility])    Patellofemoral arthritis    Venous stasis dermatitis of both lower extremities    Non-pressure chronic ulcer of left lower leg, limited to breakdown of skin ([facility])    Major depressive disorder    BPH with obstruction/lower urinary tract symptoms    Transient ischemic attack (TIA)    Left carotid artery stenosis    Cerebellar infarct ([facility])    Abnormal Holter monitor finding    Leg wound, left    S/P drug eluting coronary stent placement    Neurogenic claudication    JUAN A (obstructive sleep apnea)    Other acute osteomyelitis of left foot ([facility])    Toe osteomyelitis, left ([facility])       Plan:     Left third toe osteomyelitis  - podiatry on consult   - to the OR today for partial amputation  - ID consult for abx recommendations  - further recs pending surgery    DM  - accuchecks, ISS, adjust insulin prn    HTN  - stable, monitor vitals and adjust prn    CKD stage 2  - monitor bmp    Morbid obesity, possible JUAN A  - BMI 40  - fu as outpt    CAD   - s/p LAD and RCA stents    Hx of TIA  - fu as  outpt     Prophylaxis:   DVT with lmwh    Dispo: pending clinical course    Global A/P  - reviewed labs and vitals from today  - reviewed notes of the day  - cbc, bmp, mag ordered for tomorrow  - discussed need to stay in the hospital today due to above  - cont IV abx  - discussed with patient/RN and care team    MDM: High complexity- severe exacerbation of chronic illness posing a threat to life. IV meds requiring close inpatient monitoring. I personally spent time on chart/note review, review of labs/imaging, discussion with patient, physical exam, discussion with staff, writing progress note, and discussion of plan of care.      Mary Pichardo MD  10/23/2024  12:32 PM

## 2024-10-23 NOTE — CONSULTS
Consult Note    Patient Name: Mason Puri    YOB: 1960    Date of Admission: 10/22/2024    History of present Illness    Mason Puri is a 64 year old male who was admitted for Other acute osteomyelitis of left foot (HCC) [M86.172]. Patient is a 64-year-old male with a past medical history of CAD, hypertension, hyperlipidemia, non-insulin dependent diabetic with peripheral neuropathy who initially presented to my office for the first time on 10/21/2020 for complaining of a chronic ulceration on the distal aspect of his left third digit.  Radiographs taken a few months prior which showed concern for possible osteomyelitis and an MRI was ordered stat which noted concern for osteomyelitis at the distal tip of the left third digit.  A discussion was had with patient in regards to biopsy versus IV antibiotics versus amputation and patient opted for partial toe amputation of the left third digit.  Patient was subsequently admitted to the hospital on 10/22/2024 for further workup.  Upon presentation to the emergency room patient was afebrile with no leukocytosis of white blood cell count of 9.1.  Patient was started on ceftriaxone and vancomycin per hospitalist recommendations.  Wound culture taken in my office noted group B strep.    Patient evaluated at bedside this evening in the preop area for consultation and plan for partial third toe amputation.    Prescriptions Prior to Admission[1]  Allergies[2]  Past Medical History:    Benign head tremor    BPH (benign prostatic hyperplasia)    Diabetes (HCC)    Diverticulitis    Former smoker    Hyperlipidemia    Hypertension    Neuropathic pain    Non-pressure chronic ulcer of right lower leg, limited to breakdown of skin (HCC)    Obesity    Snoring    Thyroid disease     Past Surgical History:   Procedure Laterality Date    Carotid endarterectomy Right 04/29/2021    Surgeon: Dr. Rajiv Soolrio at [facility]    Neck/chest procedure unlisted       per patient, a blockage was removed from the right side of his neck in 2021    Other surgical history  2017    Small bowel Res.    Other surgical history  2019    Colectomy     Social History     Socioeconomic History    Marital status:      Spouse name: Not on file    Number of children: 4    Years of education: Not on file    Highest education level: Not on file   Occupational History    Not on file   Tobacco Use    Smoking status: Former     Current packs/day: 0.00     Types: Cigarettes     Quit date:      Years since quittin.8     Passive exposure: Past    Smokeless tobacco: Never   Vaping Use    Vaping status: Never Used   Substance and Sexual Activity    Alcohol use: Never    Drug use: Never    Sexual activity: Not on file   Other Topics Concern    Caffeine Concern Yes     Comment: coffee daily    Exercise No    Seat Belt Not Asked    Special Diet Not Asked    Stress Concern Not Asked    Weight Concern Not Asked   Social History Narrative    The patient does use an assistive device..  Brace    The patient does live in a home with stairs.     Social Drivers of Health     Financial Resource Strain: Not on file   Food Insecurity: No Food Insecurity (10/22/2024)    Food Insecurity     Food Insecurity: Never true   Transportation Needs: No Transportation Needs (10/22/2024)    Transportation Needs     Lack of Transportation: No     Car Seat: Not on file   Physical Activity: Not on file   Stress: Not on file   Social Connections: Not on file   Housing Stability: Low Risk  (10/22/2024)    Housing Stability     Housing Instability: No     Housing Instability Emergency: Not on file     Crib or Bassinette: Not on file     Family History   Problem Relation Age of Onset    Cancer Father         Prostate    Heart Disorder Father     Cancer Mother        Review of Systems  Constitutional: negative for chills, fevers and sweats  Gastrointestinal: negative for abdominal pain, diarrhea, nausea and  vomiting  Genitourinary:negative for dysuria and hematuria  Musculoskeletal:negative for arthralgias and muscle weakness  Neurological: negative for paresthesia and weakness  All others reviewed and negative.      Physical Exam  Temp:  [97.6 °F (36.4 °C)-98.3 °F (36.8 °C)] 98.3 °F (36.8 °C)  Pulse:  [57-74] 71  Resp:  [18-22] 18  BP: ()/(50-72) 128/72  SpO2:  [92 %-97 %] 94 %    LE PHYSICAL EXAM  Constitution: Well-developed and well-nourished. Gait appears normal. No apparent distress. Alert and oriented to person, place, and time.  Integument: There are no varicosities. Skin appears moist, warm, and supple with positive hair growth. There are no color changes. No open lesions. No macerations, No Hyperkeratotic lesions.  Vascular examination: Dorsalis pedis and posterior tibial pulses are strong bilaterally with capillary filling time less than 3 seconds to all digits. There is no peripheral edema..  Neurological Sensorium: Grossly intact to sharp/dull. Vibratory: Intact.  Musculoskeletal:   5/5 pedal muscle strength b/l     Ulceration: Distal aspect of left third digit  Measurements: 5 mm x 2 mm  Wound periphery: Hyperkeratotic  Wound base: Granular  Drainage: None    Imaging  MRI FOOT, LEFT (CPT=73718)    Result Date: 10/22/2024  CONCLUSION:  1. Osteomyelitis involving the mid to distal shaft and tuft of the 3rd toe distal phalanx.  There is reactive marrow edema throughout the entirety of the 3rd toe distal phalanx. 2. Soft tissue ulceration at the tip of the 3rd toe.  No associated well-defined/drainable soft tissue collection to suggest abscess. 3. Scattered mild-to-moderate intertarsal, tarsometatarsal, and 1st metatarsophalangeal joint osteoarthritis as detailed. 4. Lesser incidental findings as above.   Dictated by (CST): Sumanth Hayward MD on 10/22/2024 at 10:41 AM     Finalized by (CST): Sumanth Hayward MD on 10/22/2024 at 10:47 AM                   Labs  Lab Results   Component Value Date    WBC  8.4 10/23/2024    HGB 11.6 (L) 10/23/2024    HCT 37.0 (L) 10/23/2024    MCV 93.7 10/23/2024    .0 10/23/2024     No results found for: \"PTT\"  No results found for: \"PROTIME\"  Lab Results   Component Value Date     10/23/2024    K 4.3 10/23/2024    CO2 31.0 10/23/2024     10/23/2024    BUN 22 10/23/2024       Assessment    64 year old male with a past medical history of CAD, hypertension, hyperlipidemia, non-insulin dependent diabetic with peripheral neuropathy who initially presented to my office for the first time on 10/21/2020 for complaining of a chronic ulceration on the distal aspect of his left third digit.  Radiographs taken a few months prior which showed concern for possible osteomyelitis and an MRI was ordered stat which noted concern for osteomyelitis at the distal tip of the left third digit.  A discussion was had with patient in regards to biopsy versus IV antibiotics versus amputation and patient opted for partial toe amputation of the left third digit.  Patient was subsequently admitted to the hospital on 10/22/2024 for further workup.  Upon presentation to the emergency room patient was afebrile with no leukocytosis of white blood cell count of 9.1.  Patient was started on ceftriaxone and vancomycin per hospitalist recommendations.  Wound culture taken in my office noted group B strep.     Plan     Left LE diabetic foot infection/osteomyelitis of the left third digit distal phalanx: Once again discussed MRI results and concern for osteomyelitis of the distal phalanx of the left third digit.  Patient is amenable and we will proceed with the following procedure: Left third digit partial toe amputation.  Patient understood stands that he will need to remain in house until clearance fragments and culture results are obtained.  Patient understands the risk of further surgery delayed healing, infection, more proximal amputation, loss of limb and loss of life with surgical  intervention.  Antibiotics: Currently on ceftriaxone and vancomycin per hospitalist recommendations.  Wound culture taken in the office positive for strep B, Proteus, Enterococcus. Infectious disease consulted who is managing antibiotics.  PT/WB status: Patient can be fully weightbearing as tolerated to left lower extremity in a surgical shoe.   Discharge planning: Pending postoperative course.  Patient will need to wait to ensure clear margins are obtained prior to discharge.        Eri Gallardo DPM, CARISA.WALT FACBHANU  Diplomat, American Board of Foot and Ankle Surgery  Certified in Foot and Rearfoot/Ankle Reconstruction  Fellow of the American College of Foot and Ankle Surgeons  Fellowship Trained Foot and Ankle Surgeon   [facility]          [1]   Facility-Administered Medications Prior to Admission   Medication Dose Route Frequency Provider Last Rate Last Admin    [COMPLETED] bupivacaine (Marcaine) 0.25% injection  3 mL Intramuscular Once Behar, Alex, MD   7.5 mg at 09/26/24 1257    [COMPLETED] lidocaine (Xylocaine) 1 % injection  5 mL Intradermal Once Behar, Alex, MD   5 mL at 09/26/24 1258    [COMPLETED] lidocaine (Xylocaine) 1 % injection  5 mL Intradermal Once Behar, Alex, MD   5 mL at 08/08/24 1522    [COMPLETED] bupivacaine (Marcaine) 0.25% injection  3 mL Intramuscular Once Behar, Alex, MD   7.5 mg at 08/08/24 1519     Medications Prior to Admission   Medication Sig Dispense Refill Last Dose/Taking    Multiple Vitamins-Minerals (MULTIVITAMIN MEN 50+) Oral Tab Take 1 tablet by mouth daily.   10/22/2024 at  7:30 AM    sulfamethoxazole-trimethoprim -160 MG Oral Tab per tablet Take 1 tablet by mouth 2 (two) times daily. 30 tablet 0 10/22/2024 at  7:30 AM    pantoprazole 40 MG Oral Tab EC Take 1 tablet (40 mg total) by mouth every morning before breakfast. 90 tablet 3 10/22/2024 at  7:30 AM    metoprolol tartrate 25 MG Oral Tab Take 1 tablet (25 mg total) by mouth 2 (two) times  daily. FOR HEART. 30 tablet 0 10/22/2024 at  7:30 AM    rosuvastatin 40 MG Oral Tab Take 1 tablet (40 mg total) by mouth nightly. FOR CHOLESTEROL. 90 tablet 9 10/21/2024 at 11:00 PM    Tirzepatide (MOUNJARO) 15 MG/0.5ML Subcutaneous Solution Pen-injector Inject 15 mg into the skin once a week. MAXIMUM DOSE. STOP OZEMPIC IF YOU START THIS. 6 mL 9 10/16/2024    valsartan 160 MG Oral Tab Take 1 tablet (160 mg total) by mouth daily. FOR BLOOD PRESSURE. 90 tablet 9 10/22/2024 at  7:30 AM    levothyroxine 125 MCG Oral Tab Take 1 tablet (125 mcg total) by mouth at bedtime. 90 tablet 1 10/22/2024 at  2:30 PM    glipiZIDE 5 MG Oral Tab Take 1 tablet (5 mg total) by mouth every morning before breakfast. FOR DIABETES. STOP/DO NOT TAKE IF FASTING SUGAR IS LESS THAN 100. 90 tablet 2 10/22/2024 at  7:30 AM    metFORMIN HCl 1000 MG Oral Tab Take 1 tablet (1,000 mg total) by mouth 2 (two) times daily with meals. 180 tablet 0 10/22/2024 at  7:30 AM    pregabalin 100 MG Oral Cap Take 1 capsule (100 mg total) by mouth 2 (two) times daily. FOR NERVE PAIN. Dose reduction due to lethargy 180 capsule 3 10/22/2024 at  7:30 AM    aspirin (ASPIRIN 81) 81 MG Oral Tab EC Take 1 tablet (81 mg total) by mouth daily. FOR HEART. 90 tablet 9 10/22/2024 at  7:30 AM    DULoxetine 30 MG Oral Cap DR Particles Take 1 capsule (30 mg total) by mouth daily. TAKE 1 CAPSULE IN THE MORNING FOR ANXIETY/DEPRESSION/ARTHRITIS PAIN 90 capsule 9 10/22/2024 at  7:30 AM    finasteride 5 MG Oral Tab Take 1 tablet (5 mg total) by mouth daily. FOR PROSTATE. 90 tablet 9 10/22/2024 at  7:30 AM    tamsulosin 0.4 MG Oral Cap Take 2 capsules (0.8 mg total) by mouth daily. FOR PROSTATE. 180 capsule 9 Past Week    ticagrelor 90 MG Oral Tab Take 1 tablet (90 mg total) by mouth every 12 (twelve) hours. FOR HEART STENTS. 180 tablet 9 10/22/2024 at  7:30 AM    mupirocin 2 % External Ointment Apply 1 Application topically 2 (two) times daily. (Patient not taking: Reported on  10/22/2024) 30 g 1 Not Taking   [2]   Allergies  Allergen Reactions    Empagliflozin OTHER (SEE COMMENTS)     Frequent urination, unbearable.    Penicillins UNKNOWN     Patient does not recall reaction    Other UNKNOWN

## 2024-10-23 NOTE — PLAN OF CARE
Problem: Patient Centered Care  Goal: Patient preferences are identified and integrated in the patient's plan of care  Description: Interventions:  - What would you like us to know as we care for you?   - Provide timely, complete, and accurate information to patient/family  - Incorporate patient and family knowledge, values, beliefs, and cultural backgrounds into the planning and delivery of care  - Encourage patient/family to participate in care and decision-making at the level they choose  - Honor patient and family perspectives and choices  Outcome: Progressing     Problem: Diabetes/Glucose Control  Goal: Glucose maintained within prescribed range  Description: INTERVENTIONS:  - Monitor Blood Glucose as ordered  - Assess for signs and symptoms of hyperglycemia and hypoglycemia  - Administer ordered medications to maintain glucose within target range  - Assess barriers to adequate nutritional intake and initiate nutrition consult as needed  - Instruct patient on self management of diabetes  Outcome: Progressing     Problem: Patient/Family Goals  Goal: Patient/Family Long Term Goal  Description: Patient's Long Term Goal:     Interventions:  -   - See additional Care Plan goals for specific interventions  Outcome: Progressing  Goal: Patient/Family Short Term Goal  Description: Patient's Short Term Goal:     Interventions:   -   - See additional Care Plan goals for specific interventions  Outcome: Progressing

## 2024-10-23 NOTE — ANESTHESIA PREPROCEDURE EVALUATION
Anesthesia PreOp Note    HPI:     Mason Puri is a 64 year old male who presents for preoperative consultation requested by: Eri Woodard DPM    Date of Surgery: 10/22/2024 - 10/23/2024    Procedure(s):  partial left 3rd toe amputation  Indication: osteomyelitis of left foot    Relevant Problems   No relevant active problems       NPO:  Last Liquid Consumption Date: 10/23/24  Last Liquid Consumption Time: 0000  Last Solid Consumption Date: 10/23/24  Last Solid Consumption Time: 0000  Last Liquid Consumption Date: 10/23/24          History Review:  Patient Active Problem List    Diagnosis Date Noted    Other acute osteomyelitis of left foot (Formerly [facility]) 10/22/2024    Toe osteomyelitis, left (Formerly [facility]) 10/22/2024    JUAN A (obstructive sleep apnea) 08/14/2024    S/P drug eluting coronary stent placement 12/27/2023    Neurogenic claudication 12/27/2023    Leg wound, left 06/29/2023    Abnormal Holter monitor finding 12/20/2022    Left carotid artery stenosis 10/27/2022    Cerebellar infarct (Formerly [facility]) 10/27/2022    Transient ischemic attack (TIA) 10/19/2022    BPH with obstruction/lower urinary tract symptoms 09/20/2022    Major depressive disorder 02/22/2022    Venous stasis dermatitis of both lower extremities 12/07/2021    Non-pressure chronic ulcer of left lower leg, limited to breakdown of skin ([facility]) 12/07/2021    Patellofemoral arthritis 11/03/2021    Varicose veins of left lower extremity with inflammation, with ulcer of ankle limited to breakdown of skin ([facility]) 08/25/2021    Hypertension associated with type 2 diabetes mellitus ([facility]) 08/10/2021    Hyperlipidemia associated with type 2 diabetes mellitus ([facility]) 08/10/2021    Type 2 diabetes mellitus with diabetic peripheral angiopathy without gangrene, without long-term current use of insulin ([facility]ly [facility]) 08/10/2021    Hypothyroidism 08/10/2021    History of right-sided carotid endarterectomy 08/10/2021    Detrusor overactivity 08/10/2021    Primary osteoarthritis of  right knee 08/10/2021    Gastroesophageal reflux disease with esophagitis 08/10/2021    Class 2 severe obesity due to excess calories with serious comorbidity and body mass index (BMI) of 38.0 to 38.9 in adult ([facility]) 2019       Past Medical History:    Benign head tremor    BPH (benign prostatic hyperplasia)    Diabetes (HCC)    Diverticulitis    Former smoker    Hyperlipidemia    Hypertension    Neuropathic pain    Non-pressure chronic ulcer of right lower leg, limited to breakdown of skin (HCC)    Obesity    Snoring    Thyroid disease       Past Surgical History:   Procedure Laterality Date    Carotid endarterectomy Right 2021    Surgeon: Dr. Rajiv Solorio at [facility]    Neck/chest procedure unlisted      per patient, a blockage was removed from the right side of his neck in 2021    Other surgical history  2017    Small bowel Res.    Other surgical history  2019    Colectomy       Prescriptions Prior to Admission[1]  Current Medications and Prescriptions Ordered in Epic[2]    Allergies[3]    Family History   Problem Relation Age of Onset    Cancer Father         Prostate    Heart Disorder Father     Cancer Mother      Social History     Socioeconomic History    Marital status:     Number of children: 4   Tobacco Use    Smoking status: Former     Current packs/day: 0.00     Types: Cigarettes     Quit date:      Years since quittin.8     Passive exposure: Past    Smokeless tobacco: Never   Vaping Use    Vaping status: Never Used   Substance and Sexual Activity    Alcohol use: Never    Drug use: Never   Other Topics Concern    Caffeine Concern Yes     Comment: coffee daily    Exercise No       Available pre-op labs reviewed.  Lab Results   Component Value Date    WBC 8.4 10/23/2024    RBC 3.95 (L) 10/23/2024    HGB 11.6 (L) 10/23/2024    HCT 37.0 (L) 10/23/2024    MCV 93.7 10/23/2024    MCH 29.4 10/23/2024    MCHC 31.4 10/23/2024    RDW 15.0 10/23/2024    .0 10/23/2024     Lab  Results   Component Value Date     10/23/2024    K 4.3 10/23/2024     10/23/2024    CO2 31.0 10/23/2024    BUN 22 10/23/2024    CREATSERUM 1.45 (H) 10/23/2024     (H) 10/23/2024    PGLU 104 (H) 10/23/2024    CA 9.6 10/23/2024          Vital Signs:  Body mass index is 40.19 kg/m².   height is 1.88 m (6' 2\") and weight is 142 kg (313 lb) (abnormal). His oral temperature is 97.7 °F (36.5 °C). His blood pressure is 160/72 and his pulse is 68. His respiration is 18 and oxygen saturation is 95%.   Vitals:    10/23/24 0417 10/23/24 0720 10/23/24 1143 10/23/24 1628   BP: 128/72 126/65 132/66 160/72   Pulse: 71 77 65 68   Resp: 18 18 18 18   Temp: 98.3 °F (36.8 °C) 98.4 °F (36.9 °C)  97.7 °F (36.5 °C)   TempSrc: Oral Temporal  Oral   SpO2: 94% 94% 94% 95%   Weight:       Height:            Anesthesia Evaluation     Patient summary reviewed    No history of anesthetic complications   Airway   Mallampati: II  TM distance: >3 FB  Neck ROM: full  Dental    (+) lower dentures and upper dentures    Pulmonary - normal exam   (+) sleep apnea  (-) asthma, shortness of breath, recent URI  Cardiovascular   (+) hypertension, CAD, CABG/stent  (-) BEDOYA    ECG reviewed  Rhythm: regular  Rate: normal  ROS comment: EKG (10/01/2023)  Sinus tachycardia with frequent Premature ventricular complexes   Otherwise normal ECG   When compared with ECG of 19-MAY-2023 18:24,   Premature ventricular complexes are now Present   Confirmed by JEANETTE MEDLEY, DELORES ([id number]) on 10/1/2023 5:01:19 PM     ----------------------------------------------------------------------------  ECHO (10/20/2022)  Study Conclusions   1. Left ventricle: The cavity size was mildly dilated. Wall      thickness was increased in a pattern of mild LVH. Systolic      function was normal. The estimated ejection fraction was 55-60%,      by visual assessment. Wall motion was normal; there were no      regional wall motion abnormalities. Left ventricular diastolic       function parameters were normal.   2. Left atrium: The atrium was mildly dilated.   3. Right ventricle: The cavity size was dilated. Wall thickness was      normal. Systolic function was mildly reduced.   4. Atrial septum: Bubble study non-diagnostic due to body habitus      and technical difficulty of apical images. No clear shunting             Neuro/Psych    (+)  neuromuscular disease, TIA,  depression      GI/Hepatic/Renal    (+) GERD  (-) liver disease, renal disease    Endo/Other    (+) diabetes mellitus type 2, arthritis  Abdominal  - normal exam                 Anesthesia Plan:   ASA:  3  Plan:   MAC  Post-op Pain Management: IV analgesics and Oral pain medication  Informed Consent Plan and Risks Discussed With:  Patient and spouse  Discussed plan with:  Surgeon      I have informed Mason Puri and/or legal guardian or family member of the nature of the anesthetic plan, benefits, risks including possible dental damage if relevant, major complications, and any alternative forms of anesthetic management.   All of the patient's questions were answered to the best of my ability. The patient desires the anesthetic management as planned.  Emelina Sharif DO  10/23/2024 5:29 PM  Present on Admission:  **None**           [1]   Facility-Administered Medications Prior to Admission   Medication Dose Route Frequency Provider Last Rate Last Admin    [COMPLETED] bupivacaine (Marcaine) 0.25% injection  3 mL Intramuscular Once Behar, Alex, MD   7.5 mg at 09/26/24 1257    [COMPLETED] lidocaine (Xylocaine) 1 % injection  5 mL Intradermal Once Behar, Alex, MD   5 mL at 09/26/24 1258    [COMPLETED] lidocaine (Xylocaine) 1 % injection  5 mL Intradermal Once Behar, Alex, MD   5 mL at 08/08/24 1522    [COMPLETED] bupivacaine (Marcaine) 0.25% injection  3 mL Intramuscular Once Behar, Alex, MD   7.5 mg at 08/08/24 1519     Medications Prior to Admission   Medication Sig Dispense Refill Last Dose/Taking    Multiple  Vitamins-Minerals (MULTIVITAMIN MEN 50+) Oral Tab Take 1 tablet by mouth daily.   10/22/2024 at  7:30 AM    sulfamethoxazole-trimethoprim -160 MG Oral Tab per tablet Take 1 tablet by mouth 2 (two) times daily. 30 tablet 0 10/22/2024 at  7:30 AM    pantoprazole 40 MG Oral Tab EC Take 1 tablet (40 mg total) by mouth every morning before breakfast. 90 tablet 3 10/22/2024 at  7:30 AM    metoprolol tartrate 25 MG Oral Tab Take 1 tablet (25 mg total) by mouth 2 (two) times daily. FOR HEART. 30 tablet 0 10/22/2024 at  7:30 AM    rosuvastatin 40 MG Oral Tab Take 1 tablet (40 mg total) by mouth nightly. FOR CHOLESTEROL. 90 tablet 9 10/21/2024 at 11:00 PM    Tirzepatide (MOUNJARO) 15 MG/0.5ML Subcutaneous Solution Pen-injector Inject 15 mg into the skin once a week. MAXIMUM DOSE. STOP OZEMPIC IF YOU START THIS. 6 mL 9 10/16/2024    valsartan 160 MG Oral Tab Take 1 tablet (160 mg total) by mouth daily. FOR BLOOD PRESSURE. 90 tablet 9 10/22/2024 at  7:30 AM    levothyroxine 125 MCG Oral Tab Take 1 tablet (125 mcg total) by mouth at bedtime. 90 tablet 1 10/22/2024 at  2:30 PM    glipiZIDE 5 MG Oral Tab Take 1 tablet (5 mg total) by mouth every morning before breakfast. FOR DIABETES. STOP/DO NOT TAKE IF FASTING SUGAR IS LESS THAN 100. 90 tablet 2 10/22/2024 at  7:30 AM    metFORMIN HCl 1000 MG Oral Tab Take 1 tablet (1,000 mg total) by mouth 2 (two) times daily with meals. 180 tablet 0 10/22/2024 at  7:30 AM    pregabalin 100 MG Oral Cap Take 1 capsule (100 mg total) by mouth 2 (two) times daily. FOR NERVE PAIN. Dose reduction due to lethargy 180 capsule 3 10/22/2024 at  7:30 AM    aspirin (ASPIRIN 81) 81 MG Oral Tab EC Take 1 tablet (81 mg total) by mouth daily. FOR HEART. 90 tablet 9 10/22/2024 at  7:30 AM    DULoxetine 30 MG Oral Cap DR Particles Take 1 capsule (30 mg total) by mouth daily. TAKE 1 CAPSULE IN THE MORNING FOR ANXIETY/DEPRESSION/ARTHRITIS PAIN 90 capsule 9 10/22/2024 at  7:30 AM    finasteride 5 MG Oral Tab  Take 1 tablet (5 mg total) by mouth daily. FOR PROSTATE. 90 tablet 9 10/22/2024 at  7:30 AM    tamsulosin 0.4 MG Oral Cap Take 2 capsules (0.8 mg total) by mouth daily. FOR PROSTATE. 180 capsule 9 Past Week    ticagrelor 90 MG Oral Tab Take 1 tablet (90 mg total) by mouth every 12 (twelve) hours. FOR HEART STENTS. 180 tablet 9 10/22/2024 at  7:30 AM    mupirocin 2 % External Ointment Apply 1 Application topically 2 (two) times daily. (Patient not taking: Reported on 10/22/2024) 30 g 1 Not Taking   [2]   Current Facility-Administered Medications Ordered in Epic   Medication Dose Route Frequency Provider Last Rate Last Admin    sodium chloride 0.9 % IV bolus  83 mL/hr Intravenous Continuous Devaughn-Mary Smith MD 83 mL/hr at 10/23/24 0952 83 mL/hr at 10/23/24 0952    cefTRIAXone (Rocephin) 2 g in sodium chloride 0.9% 100 mL IVPB-ADDV  2 g Intravenous Q24H Clifford Osorio  mL/hr at 10/23/24 1632 2 g at 10/23/24 1632    vancomycin (Vancocin) 1.5 g in sodium chloride 0.9% 250mL IVPB premix  15 mg/kg (Adjusted) Intravenous Q24H Clifford Osorio MD        [Transfer Hold] glucose (Dex4) 15 GM/59ML oral liquid 15 g  15 g Oral Q15 Min PRN Clifford Osorio MD        Or    [Transfer Hold] glucose (Glutose) 40% oral gel 15 g  15 g Oral Q15 Min PRN Clifford Osorio MD        Or    [Transfer Hold] glucose-vitamin C (Dex-4) chewable tab 4 tablet  4 tablet Oral Q15 Min PRN Clifford Osorio MD        Or    [Transfer Hold] dextrose 50% injection 50 mL  50 mL Intravenous Q15 Min PRN Clifford Osorio MD        Or    [Transfer Hold] glucose (Dex4) 15 GM/59ML oral liquid 30 g  30 g Oral Q15 Min PRN Clifford Osorio MD        Or    [Transfer Hold] glucose (Glutose) 40% oral gel 30 g  30 g Oral Q15 Min PRN Clifford Osorio MD        Or    [Transfer Hold] glucose-vitamin C (Dex-4) chewable tab 8 tablet  8 tablet Oral Q15 Min PRN Clifford Osorio MD        [Transfer Hold] insulin aspart (NovoLOG) 100 Units/mL FlexPen 1-7 Units  1-7  Units Subcutaneous TID CC Clifford Osorio MD        [Transfer Hold] enoxaparin (Lovenox) 40 MG/0.4ML SUBQ injection 40 mg  40 mg Subcutaneous Once Clifford Osorio MD        [Transfer Hold] acetaminophen (Tylenol Extra Strength) tab 500 mg  500 mg Oral Q4H PRN Clifford Osorio MD        [Transfer Hold] ondansetron (Zofran) 4 MG/2ML injection 4 mg  4 mg Intravenous Q6H PRN Clifford Osorio MD        [Transfer Hold] prochlorperazine (Compazine) 10 MG/2ML injection 5 mg  5 mg Intravenous Q8H PRN Clifford Osorio MD        [Transfer Hold] temazepam (Restoril) cap 15 mg  15 mg Oral Nightly PRN Clifford Osorio MD        [Transfer Hold] acetaminophen (Tylenol) tab 650 mg  650 mg Oral Q4H PRN Clifford Osorio MD        Or    [Transfer Hold] HYDROcodone-acetaminophen (Norco) 5-325 MG per tab 1 tablet  1 tablet Oral Q4H PRN Clifford Osorio MD        Or    [Transfer Hold] HYDROcodone-acetaminophen (Norco) 5-325 MG per tab 2 tablet  2 tablet Oral Q4H PRN Clifford Osorio MD         No current Epic-ordered outpatient medications on file.   [3]   Allergies  Allergen Reactions    Empagliflozin OTHER (SEE COMMENTS)     Frequent urination, unbearable.    Penicillins UNKNOWN     Patient does not recall reaction    Other UNKNOWN

## 2024-10-23 NOTE — ED PROVIDER NOTES
Patient Seen in: [facility] Emergency Department    History     Chief Complaint   Patient presents with    Other       HPI    The patient presents to the ED after being instructed to come to the ED for admission by his podiatrist.  He states that he has an ulcer to his left third toe and was diagnosed with osteomyelitis today based on outpatient MRI.  He denies any fevers but does have pain in the left foot.  Denies other complaints.    History reviewed.   Past Medical History:    Benign head tremor    BPH (benign prostatic hyperplasia)    Diabetes (HCC)    Diverticulitis    Former smoker    Hyperlipidemia    Hypertension    Neuropathic pain    Non-pressure chronic ulcer of right lower leg, limited to breakdown of skin (HCC)    Obesity    Snoring    Thyroid disease       History reviewed.   Past Surgical History:   Procedure Laterality Date    Carotid endarterectomy Right 2021    Surgeon: Dr. Rajiv Solorio at [facility]    Neck/chest procedure unlisted      per patient, a blockage was removed from the right side of his neck in 2021    Other surgical history  2017    Small bowel Res.    Other surgical history  2019    Colectomy         Medications :  Prescriptions Prior to Admission[1]     Family History   Problem Relation Age of Onset    Cancer Father         Prostate    Heart Disorder Father     Cancer Mother        Smoking Status:   Social History     Socioeconomic History    Marital status:     Number of children: 4   Tobacco Use    Smoking status: Former     Current packs/day: 0.00     Types: Cigarettes     Quit date:      Years since quittin.8     Passive exposure: Past    Smokeless tobacco: Never   Vaping Use    Vaping status: Never Used   Substance and Sexual Activity    Alcohol use: Never    Drug use: Never   Other Topics Concern    Caffeine Concern Yes     Comment: coffee daily    Exercise No       Constitutional and vital signs reviewed.      Social History and Family History  elements reviewed from today, pertinent positives to the presenting problem noted.    Physical Exam     ED Triage Vitals [10/22/24 1632]   /64   Pulse 74   Resp 22   Temp 98 °F (36.7 °C)   Temp src Oral   SpO2 97 %   O2 Device None (Room air)       All measures to prevent infection transmission during my interaction with the patient were taken. Handwashing was performed prior to and after the exam.  Stethoscope and any equipment used during my examination was cleaned with super sani-cloth germicidal wipes following the exam.     Physical Exam  Vitals and nursing note reviewed.   Constitutional:       General: He is not in acute distress.     Appearance: He is well-developed. He is obese. He is not ill-appearing or toxic-appearing.   HENT:      Head: Normocephalic and atraumatic.   Eyes:      General:         Right eye: No discharge.         Left eye: No discharge.      Conjunctiva/sclera: Conjunctivae normal.   Neck:      Trachea: No tracheal deviation.   Cardiovascular:      Rate and Rhythm: Normal rate.   Pulmonary:      Effort: Pulmonary effort is normal. No respiratory distress.      Breath sounds: No stridor.   Abdominal:      General: There is no distension.      Palpations: Abdomen is soft.   Musculoskeletal:         General: No deformity.      Comments: Diabetic ulcer to the tip of the left third toe.  Mild swelling of the toe in general.  No erythema of the foot.   Skin:     General: Skin is warm and dry.   Neurological:      Mental Status: He is alert and oriented to person, place, and time.   Psychiatric:         Mood and Affect: Mood normal.         Behavior: Behavior normal.         ED Course        Labs Reviewed   CBC WITH DIFFERENTIAL WITH PLATELET - Abnormal; Notable for the following components:       Result Value    RBC 4.05 (*)     HGB 11.7 (*)     HCT 37.2 (*)     RDW-SD 50.1 (*)     All other components within normal limits   COMP METABOLIC PANEL (14) - Abnormal; Notable for the following  components:    Glucose 123 (*)     Creatinine 1.52 (*)     Calculated Osmolality 301 (*)     eGFR-Cr 51 (*)     All other components within normal limits   POCT GLUCOSE - Abnormal; Notable for the following components:    POC Glucose  107 (*)     All other components within normal limits   ED/MRSA SCREEN BY PCR-CC - Normal   BASIC METABOLIC PANEL (8)   CBC WITH DIFFERENTIAL WITH PLATELET   TYPE AND SCREEN   ABORH CONFIRMATION       As Interpreted by me    Imaging Results Available and Reviewed while in ED: MRI FOOT, LEFT (CPT=73718)    Result Date: 10/22/2024  CONCLUSION:  1. Osteomyelitis involving the mid to distal shaft and tuft of the 3rd toe distal phalanx.  There is reactive marrow edema throughout the entirety of the 3rd toe distal phalanx. 2. Soft tissue ulceration at the tip of the 3rd toe.  No associated well-defined/drainable soft tissue collection to suggest abscess. 3. Scattered mild-to-moderate intertarsal, tarsometatarsal, and 1st metatarsophalangeal joint osteoarthritis as detailed. 4. Lesser incidental findings as above.   Dictated by (CST): Sumanth Hayward MD on 10/22/2024 at 10:41 AM     Finalized by (CST): Sumanth Hayward MD on 10/22/2024 at 10:47 AM         ED Medications Administered:   Medications   cefTRIAXone (Rocephin) 2 g in sodium chloride 0.9% 100 mL IVPB-ADDV (has no administration in time range)   vancomycin (Vancocin) 1.5 g in sodium chloride 0.9% 250mL IVPB premix (has no administration in time range)   glucose (Dex4) 15 GM/59ML oral liquid 15 g (has no administration in time range)     Or   glucose (Glutose) 40% oral gel 15 g (has no administration in time range)     Or   glucose-vitamin C (Dex-4) chewable tab 4 tablet (has no administration in time range)     Or   dextrose 50% injection 50 mL (has no administration in time range)     Or   glucose (Dex4) 15 GM/59ML oral liquid 30 g (has no administration in time range)     Or   glucose (Glutose) 40% oral gel 30 g (has no  administration in time range)     Or   glucose-vitamin C (Dex-4) chewable tab 8 tablet (has no administration in time range)   insulin aspart (NovoLOG) 100 Units/mL FlexPen 1-7 Units ( Subcutaneous Canceled Entry 10/22/24 1845)   enoxaparin (Lovenox) 40 MG/0.4ML SUBQ injection 40 mg (40 mg Subcutaneous Not Given 10/22/24 1845)   acetaminophen (Tylenol Extra Strength) tab 500 mg (has no administration in time range)   ondansetron (Zofran) 4 MG/2ML injection 4 mg (has no administration in time range)   prochlorperazine (Compazine) 10 MG/2ML injection 5 mg (has no administration in time range)   temazepam (Restoril) cap 15 mg (has no administration in time range)   acetaminophen (Tylenol) tab 650 mg (has no administration in time range)     Or   HYDROcodone-acetaminophen (Norco) 5-325 MG per tab 1 tablet (has no administration in time range)     Or   HYDROcodone-acetaminophen (Norco) 5-325 MG per tab 2 tablet (has no administration in time range)   vancomycin (Vancocin) 2 g in sodium chloride 0.9% 500mL IVPB premix (2,000 mg Intravenous Handoff 10/22/24 1826)   cefTRIAXone (Rocephin) 2 g in sodium chloride 0.9% 100 mL IVPB-ADDV (0 g Intravenous Stopped 10/22/24 1818)         MDM     Vitals:    10/22/24 1820 10/22/24 1840 10/22/24 1857 10/22/24 1929   BP: 99/50 115/59  136/63   BP Location:  Right arm     Pulse: 61 58  57   Resp: 18 18  18   Temp:  97.6 °F (36.4 °C)  98 °F (36.7 °C)   TempSrc:  Oral     SpO2: 93% 92%  94%   Weight:  (!) 142 kg (!) 142 kg    Height:         *I personally reviewed and interpreted all ED vitals.    Pulse Ox: 94%, Room air, Normal     Differential Diagnosis/ Diagnostic Considerations: Toe osteomyelitis, diabetic foot infection, other    Complicating Factors: The patient already has does not have any pertinent problems on file. to contribute to the complexity of this ED evaluation.    Medical Decision Making  The patient presents to the ED with a left third toe diabetic ulcer and associated  osteomyelitis.  Sent for admission by his podiatrist.  I discussed with Dr. Bronson for admission and with Dr. Woodard for podiatry consultation.  Patient was started on IV antibiotics in the ED.  N.p.o. status after midnight.    Problems Addressed:  Other acute osteomyelitis of left foot (HCC): acute illness or injury    Amount and/or Complexity of Data Reviewed  Labs: ordered. Decision-making details documented in ED Course.  Discussion of management or test interpretation with external provider(s): I discussed with Dr. Bronson for admission and with Dr. Woodard for podiatry consultation.         Condition upon leaving the department: Stable    Disposition and Plan     Clinical Impression:  1. Other acute osteomyelitis of left foot (HCC)        Disposition:  Admit    Follow-up:  No follow-up provider specified.    Medications Prescribed:  Current Discharge Medication List          Hospital Problems       Present on Admission  Date Reviewed: 10/21/2024            ICD-10-CM Noted POA    * (Principal) Other acute osteomyelitis of left foot (HCC) M86.172 10/22/2024 Unknown    Toe osteomyelitis, left (HCC) M86.9 10/22/2024 Unknown                       [1]   Facility-Administered Medications Prior to Admission   Medication Dose Route Frequency Provider Last Rate Last Admin    [COMPLETED] bupivacaine (Marcaine) 0.25% injection  3 mL Intramuscular Once Behar, Alex, MD   7.5 mg at 09/26/24 1257    [COMPLETED] lidocaine (Xylocaine) 1 % injection  5 mL Intradermal Once Behar, Alex, MD   5 mL at 09/26/24 1258    [COMPLETED] lidocaine (Xylocaine) 1 % injection  5 mL Intradermal Once Behar, Alex, MD   5 mL at 08/08/24 1522    [COMPLETED] bupivacaine (Marcaine) 0.25% injection  3 mL Intramuscular Once Behar, Alex, MD   7.5 mg at 08/08/24 1519     Medications Prior to Admission   Medication Sig Dispense Refill Last Dose/Taking    Multiple Vitamins-Minerals (MULTIVITAMIN MEN 50+) Oral Tab Take 1 tablet by mouth daily.    10/22/2024 at  7:30 AM    sulfamethoxazole-trimethoprim -160 MG Oral Tab per tablet Take 1 tablet by mouth 2 (two) times daily. 30 tablet 0 10/22/2024 at  7:30 AM    pantoprazole 40 MG Oral Tab EC Take 1 tablet (40 mg total) by mouth every morning before breakfast. 90 tablet 3 10/22/2024 at  7:30 AM    metoprolol tartrate 25 MG Oral Tab Take 1 tablet (25 mg total) by mouth 2 (two) times daily. FOR HEART. 30 tablet 0 10/22/2024 at  7:30 AM    rosuvastatin 40 MG Oral Tab Take 1 tablet (40 mg total) by mouth nightly. FOR CHOLESTEROL. 90 tablet 9 10/21/2024 at 11:00 PM    Tirzepatide (MOUNJARO) 15 MG/0.5ML Subcutaneous Solution Pen-injector Inject 15 mg into the skin once a week. MAXIMUM DOSE. STOP OZEMPIC IF YOU START THIS. 6 mL 9 10/16/2024    valsartan 160 MG Oral Tab Take 1 tablet (160 mg total) by mouth daily. FOR BLOOD PRESSURE. 90 tablet 9 10/22/2024 at  7:30 AM    levothyroxine 125 MCG Oral Tab Take 1 tablet (125 mcg total) by mouth at bedtime. 90 tablet 1 10/22/2024 at  2:30 PM    glipiZIDE 5 MG Oral Tab Take 1 tablet (5 mg total) by mouth every morning before breakfast. FOR DIABETES. STOP/DO NOT TAKE IF FASTING SUGAR IS LESS THAN 100. 90 tablet 2 10/22/2024 at  7:30 AM    metFORMIN HCl 1000 MG Oral Tab Take 1 tablet (1,000 mg total) by mouth 2 (two) times daily with meals. 180 tablet 0 10/22/2024 at  7:30 AM    pregabalin 100 MG Oral Cap Take 1 capsule (100 mg total) by mouth 2 (two) times daily. FOR NERVE PAIN. Dose reduction due to lethargy 180 capsule 3 10/22/2024 at  7:30 AM    aspirin (ASPIRIN 81) 81 MG Oral Tab EC Take 1 tablet (81 mg total) by mouth daily. FOR HEART. 90 tablet 9 10/22/2024 at  7:30 AM    DULoxetine 30 MG Oral Cap DR Particles Take 1 capsule (30 mg total) by mouth daily. TAKE 1 CAPSULE IN THE MORNING FOR ANXIETY/DEPRESSION/ARTHRITIS PAIN 90 capsule 9 10/22/2024 at  7:30 AM    finasteride 5 MG Oral Tab Take 1 tablet (5 mg total) by mouth daily. FOR PROSTATE. 90 tablet 9 10/22/2024  at  7:30 AM    tamsulosin 0.4 MG Oral Cap Take 2 capsules (0.8 mg total) by mouth daily. FOR PROSTATE. 180 capsule 9 Past Week    ticagrelor 90 MG Oral Tab Take 1 tablet (90 mg total) by mouth every 12 (twelve) hours. FOR HEART STENTS. 180 tablet 9 10/22/2024 at  7:30 AM    mupirocin 2 % External Ointment Apply 1 Application topically 2 (two) times daily. (Patient not taking: Reported on 10/22/2024) 30 g 1 Not Taking

## 2024-10-23 NOTE — ANESTHESIA POSTPROCEDURE EVALUATION
Patient: Mason Puri    Procedure Summary       Date: 10/23/24 Room / Location: [facility] MAIN OR  / [facility] MAIN OR    Anesthesia Start: 1736 Anesthesia Stop: 1815    Procedure: partial left 3rd toe amputation (Left: Foot) Diagnosis: (osteomyelitis of left foot)    Surgeons: Eri Woodard DPM Anesthesiologist: Emelina Sharif DO    Anesthesia Type: MAC ASA Status: 3            Anesthesia Type: MAC    Vitals Value Taken Time   /57 10/23/24 1816   Temp 97.0 10/23/24 1820   Pulse 71 10/23/24 1820   Resp 14 10/23/24 1820   SpO2 96 % 10/23/24 1820   Vitals shown include unfiled device data.    [facility] AN Post Evaluation:   Patient Evaluated in PACU  Patient Participation: complete - patient participated  Level of Consciousness: awake  Pain Score: 0  Pain Management: adequate  Airway Patency:patent  Dental exam unchanged from preop  Yes    Nausea/Vomiting: none  Cardiovascular Status: hemodynamically stable  Respiratory Status: spontaneous ventilation, nasal cannula, unassisted and nonlabored ventilation  Postoperative Hydration stable      Emelina Sharif DO  10/23/2024 6:20 PM

## 2024-10-23 NOTE — BRIEF OP NOTE
Pre-Operative Diagnosis:   1.  Left third digit diabetic foot ulceration  2.  Left third digit concern for osteomyelitis of distal phalanx     Post-Operative Diagnosis:   1.  Left third digit diabetic foot ulceration  2.  Left third digit concern for osteomyelitis of distal phalanx     Procedure Performed:   Left third digit partial toe amputation  Left third digit bone biopsy    Surgeons and Role:     * Eri Woodard DPM - Primary     Surgical Findings: No necrotic tissue, purulent drainage, fluctuance     Specimen: Distal phalanx, clearance fragment, aerobic and anaerobic culture     Estimated Blood Loss: Less than 5 cc        Eri Woodard DPM  10/23/2024  6:14 PM

## 2024-10-23 NOTE — RESPIRATORY THERAPY NOTE
JUAN A ASSESSMENT:    Pt does have a previous diagnosis of JUAN A. Pt does routinely use a CPAP device at home.     CPAP INITIATION:    Pt going to use home cpap machine.

## 2024-10-23 NOTE — PROGRESS NOTES
10/23/24 0801   Wound 10/22/24 Toe (Comment which one) Dorsal;Left   Date First Assessed/Time First Assessed: 10/22/24 2124   Present on Original Admission: Yes  Primary Wound Type: Diabetic Ulcer  Location: Toe (Comment which one)  Wound Location Orientation: (c) Dorsal;Left  Wound Description (Comments): third toe on...   Wound Image    Site Assessment Fragile;Pink;Dry;Painful   Drainage Amount None   Treatments Cleansed   Dressing Open to air   Wound Length (cm) 0.8 cm   Wound Width (cm) 0.7 cm   Wound Surface Area (cm^2) 0.56 cm^2   Wound Depth (cm) 0.1 cm   Wound Volume (cm^3) 0.056 cm^3   Non-staged Wound Description Full thickness   Peggy-wound Assessment Intact;Dry;Hematoma;Callous   Wound Granulation Tissue Pink   Wound Bed Granulation (%) 100 %   State of Healing Non-healing   Wound Odor None   Tunneling? No   Undermining? No   Sinus Tracts? No   Wound Follow Up   Follow up needed No     Wound Care Services  Nurse consult to see the pt's left 3rd toe, the pt. is awake, BLE dry intact, lotion applied to dry intact skin to BLE and feet. See the wound assessment. Dr Woodard is taking the pt. to the OR today for left 3rd toe partial amputation. Recommend to offload, elevate LLE, dressings per Dr. Woodard.

## 2024-10-23 NOTE — CONSULTS
[facility]  part of [facility] ID CONSULT NOTE    Mason Puri Patient Status:  Inpatient    1960 MRN [id number]   Location [facility] 4W/SW/SE Attending Mary Pichardo MD   Hosp Day # 1 PCP Alvino Wallace MD       Reason for Consultation:  L 3rd toe OM    ASSESSMENT:    Antibiotics: Ceftriaxone and vancomycin    # Left third toe osteomyelitis  # Polymicrobial superficial wound culture   -Proteus mirabilis, group B strep, Enterococcus faecalis  # Diabetes        PLAN:    -Continue ceftriaxone and vancomycin pending intraoperative cultures  -Will discuss with podiatry after procedure to determine need for IV versus PO antibiotics and duration.    -Follow fever curve, wbc  -Reviewed labs, micro, imaging reports, available old records  -Discussed with primary attending and RN    History of Present Illness:  Mason Puri is a a(n) 64 year old male with history of diabetes, peripheral neuropathy, hypertension, hyperlipidemia, and coronary artery disease who was sent in at the recommendation of his podiatrist after an outpatient MRI demonstrated osteomyelitis at the distal tip of his left third digit.  Patient presented to the emergency department on 10/22.  He was afebrile and hemodynamically stable.  Normal white count.  Plan for partial amputation of the left third digit with podiatry.  Infectious disease consulted for assistance with antibiotics    History:  Past Medical History:    Benign head tremor    BPH (benign prostatic hyperplasia)    Diabetes (HCC)    Diverticulitis    Former smoker    Hyperlipidemia    Hypertension    Neuropathic pain    Non-pressure chronic ulcer of right lower leg, limited to breakdown of skin (HCC)    Obesity    Snoring    Thyroid disease     Past Surgical History:   Procedure Laterality Date    Carotid endarterectomy Right 2021    Surgeon: Dr. Rajiv Solorio at [facility]    Neck/chest procedure unlisted      per  patient, a blockage was removed from the right side of his neck in 8/2021    Other surgical history  2017    Small bowel Res.    Other surgical history  11/12/2019    Colectomy     Family History   Problem Relation Age of Onset    Cancer Father         Prostate    Heart Disorder Father     Cancer Mother       reports that he quit smoking about 28 years ago. His smoking use included cigarettes. He has been exposed to tobacco smoke. He has never used smokeless tobacco. He reports that he does not drink alcohol and does not use drugs.    Allergies:  Allergies[1]    Medications:    Current Facility-Administered Medications:     sodium chloride 0.9 % IV bolus, 83 mL/hr, Intravenous, Continuous    cefTRIAXone (Rocephin) 2 g in sodium chloride 0.9% 100 mL IVPB-ADDV, 2 g, Intravenous, Q24H    vancomycin (Vancocin) 1.5 g in sodium chloride 0.9% 250mL IVPB premix, 15 mg/kg (Adjusted), Intravenous, Q24H    glucose (Dex4) 15 GM/59ML oral liquid 15 g, 15 g, Oral, Q15 Min PRN **OR** glucose (Glutose) 40% oral gel 15 g, 15 g, Oral, Q15 Min PRN **OR** glucose-vitamin C (Dex-4) chewable tab 4 tablet, 4 tablet, Oral, Q15 Min PRN **OR** dextrose 50% injection 50 mL, 50 mL, Intravenous, Q15 Min PRN **OR** glucose (Dex4) 15 GM/59ML oral liquid 30 g, 30 g, Oral, Q15 Min PRN **OR** glucose (Glutose) 40% oral gel 30 g, 30 g, Oral, Q15 Min PRN **OR** glucose-vitamin C (Dex-4) chewable tab 8 tablet, 8 tablet, Oral, Q15 Min PRN    insulin aspart (NovoLOG) 100 Units/mL FlexPen 1-7 Units, 1-7 Units, Subcutaneous, TID CC    enoxaparin (Lovenox) 40 MG/0.4ML SUBQ injection 40 mg, 40 mg, Subcutaneous, Once    acetaminophen (Tylenol Extra Strength) tab 500 mg, 500 mg, Oral, Q4H PRN    ondansetron (Zofran) 4 MG/2ML injection 4 mg, 4 mg, Intravenous, Q6H PRN    prochlorperazine (Compazine) 10 MG/2ML injection 5 mg, 5 mg, Intravenous, Q8H PRN    temazepam (Restoril) cap 15 mg, 15 mg, Oral, Nightly PRN    acetaminophen (Tylenol) tab 650 mg, 650 mg,  Oral, Q4H PRN **OR** HYDROcodone-acetaminophen (Norco) 5-325 MG per tab 1 tablet, 1 tablet, Oral, Q4H PRN **OR** HYDROcodone-acetaminophen (Norco) 5-325 MG per tab 2 tablet, 2 tablet, Oral, Q4H PRN    Review of Systems:  ROS  Per HP    Physical Exam:  Vital signs: Blood pressure 126/65, pulse 77, temperature 98.4 °F (36.9 °C), temperature source Temporal, resp. rate 18, height 188 cm (6' 2\"), weight (!) 313 lb (142 kg), SpO2 94%.    Physical Exam  L 3rd toe with callus under most distal aspect of toe. Erythema and swelling noted.    Laboratory Data: Reviewed in EMR    Microbiology: Reviewed in EMR    Radiology: Reviewed    Thank you for allowing us to participate in the care of this patient. Please do not hesitate to call if you have any questions.     We will continue to follow with you and will make further recommendations based on patient's progress.    George Vance MD   [facility]  [phone]  10/23/2024         [1]   Allergies  Allergen Reactions    Empagliflozin OTHER (SEE COMMENTS)     Frequent urination, unbearable.    Penicillins UNKNOWN     Patient does not recall reaction    Other UNKNOWN

## 2024-10-23 NOTE — H&P
[facility]    PATIENT'S NAME: DM ARANDA   ATTENDING PHYSICIAN: Clifford Osorio MD   PATIENT ACCOUNT#:   [id number]    LOCATION:  [address] [place] [address]  MEDICAL RECORD #:   [id number]       YOB: 1960  ADMISSION DATE:       10/22/2024    HISTORY AND PHYSICAL EXAMINATION    CHIEF COMPLAINT:  Left third toe distal osteomyelitis.    HISTORY OF PRESENT ILLNESS:  The patient is a 64-year-old  male who had callus formation at the tip of his left third toe that was removed by his podiatrist and ever since he had been having an ulcer that is not healing.  Cultures done recently showed group B streptococcus.  Today, he had an MRI scan of his left foot which showed osteomyelitis involving the mid to distal shaft and tuft of the third toe distal phalanx.  Reactive marrow edema throughout the entirety of the third toe distal phalanx.  Sent to the emergency department for evaluation.  CBC and chemistry unremarkable.  GFR is 51 which is at baseline.  Started on IV Rocephin and vancomycin.  He will be admitted to the hospital for further management.    PAST MEDICAL HISTORY:  Coronary artery disease status post LAD and RCA PCI stents.  LAD stent is compressed despite shockwave treatment.  He had history of transient ischemic attack, carotid atherosclerosis, diabetes mellitus type 2, diverticulitis, nonsustained ventricular tachycardia, morbid obesity, obstructive sleep apnea, hypertension, hyperlipidemia, chronic kidney disease stage 2, hypothyroidism, peripheral neuropathy.    PAST SURGICAL HISTORY:  Right carotid endarterectomy, left lower extremity varicose vein ablation, bowel resection.    MEDICATIONS:  Please see medication reconciliation list.     ALLERGIES:  Questionable allergy for penicillin.    FAMILY HISTORY:  Father had prostate cancer and heart disease.    SOCIAL HISTORY:  Ex-tobacco user.  No current tobacco, alcohol, or drug use.  Sedentary lifestyle.  Usually independent in  his basic activities of daily living.     REVIEW OF SYSTEMS:  Patient said for last 6 to 8 weeks he has been having this persistent ulceration and open wound at the tip of his left third toe, been draining on and off with increased swelling of the left distal toe recently.  He denies any fever or chills. Other 12-point review of systems is negative.      PHYSICAL EXAMINATION:    GENERAL:  Alert and oriented to time, place, and person.  No acute distress.  VITAL SIGNS:  Temperature 98.0, pulse 74, respiratory rate 22, blood pressure 114/64, pulse ox 97% on room air.   HEENT:  Atraumatic.  Oropharynx clear.  Moist mucous membranes.  Ears, nose normal.  Eyes:  Anicteric sclerae.  NECK:  Supple.  No lymphadenopathy.  Trachea midline.  Full range of motion.  LUNGS:  Clear to auscultation bilaterally.  Normal respiratory effort.   HEART:  Regular rate, rhythm.  S1 and S2 auscultated.  No murmur.  ABDOMEN:  Soft, nondistended.  No tenderness.  Obese.  Positive bowel sounds.    EXTREMITIES:  No peripheral edema, clubbing, or cyanosis.  Dorsalis pedis pulses palpated bilaterally.  Left foot third toe with soft tissue swelling and open punctate ulcer at the tip of the left third toe with minimal drainage.  Tenderness to palpation in the area.  NEUROLOGIC:  Decreased sensation to light touch both feet.  Otherwise, no focal findings.    ASSESSMENT:    1.   Left third toe distal phalangeal osteomyelitis.  2.   Diabetes mellitus type 2.  3.   Essential hypertension.  4.   Chronic coronary artery disease.  5.   Chronic kidney disease stage 2.    PLAN:  Patient will be admitted to general medical floor.  IV Rocephin and vancomycin, n.p.o. after midnight.  Monitor Accu-Cheks.  Pain control.  Podiatry consult for left third toe partial amputation.  Further recommendations to follow.     Dictated By Clifford Osorio MD  d: 10/22/2024 17:55:39  t: 10/22/2024 20:27:18  Job [id number]  /

## 2024-10-23 NOTE — CM/SW NOTE
CM self ref to case for dc planning    Pt admitted from home w/ sp  +osteo of toe  Plan: Podiatry consult for left third toe partial amputation.      Cm rec'd call from [facility] w/ [facility]-If IV abx will be needed pt is covered 100% for [employer],home.    Plan  Pending post op needs    / to remain available for support and/or discharge planning.     Lyndsey Dwyer RN    Ext [phone]

## 2024-10-24 NOTE — PROGRESS NOTES
Consult Note    Patient Name: Mason Puri    YOB: 1960    Date of Admission: 10/22/2024    History of present Illness    Mason Puri is a 64 year old male who was admitted for Other acute osteomyelitis of left foot (HCC) [M86.172]. Patient is a 64-year-old male with a past medical history of CAD, hypertension, hyperlipidemia, non-insulin dependent diabetic with peripheral neuropathy who initially presented to my office for the first time on 10/21/2020 for complaining of a chronic ulceration on the distal aspect of his left third digit.  Radiographs taken a few months prior which showed concern for possible osteomyelitis and an MRI was ordered stat which noted concern for osteomyelitis at the distal tip of the left third digit.  A discussion was had with patient in regards to biopsy versus IV antibiotics versus amputation and patient opted for partial toe amputation of the left third digit.  Patient was subsequently admitted to the hospital on 10/22/2024 for further workup.  Upon presentation to the emergency room patient was afebrile with no leukocytosis of white blood cell count of 9.1.  Patient was started on ceftriaxone and vancomycin per hospitalist recommendations.  Wound culture taken in my office noted group B strep.    Patient status post left third digit partial toe amputation, POD#1 (DOS: 10/24/24). Patient evaluated at bedside this AM resting comfortably. Patient denies any pain. Patient denies any nausea, vomiting, fever, chills, SOB, or CP.     Prescriptions Prior to Admission[1]  Allergies[2]  Past Medical History:    Benign head tremor    BPH (benign prostatic hyperplasia)    Diabetes (HCC)    Diverticulitis    Former smoker    Hyperlipidemia    Hypertension    Neuropathic pain    Non-pressure chronic ulcer of right lower leg, limited to breakdown of skin (HCC)    Obesity    Snoring    Thyroid disease     Past Surgical History:   Procedure Laterality Date     Carotid endarterectomy Right 2021    Surgeon: Dr. Rajiv Solorio at [facility]    Neck/chest procedure unlisted      per patient, a blockage was removed from the right side of his neck in 2021    Other surgical history  2017    Small bowel Res.    Other surgical history  2019    Colectomy     Social History     Socioeconomic History    Marital status:      Spouse name: Not on file    Number of children: 4    Years of education: Not on file    Highest education level: Not on file   Occupational History    Not on file   Tobacco Use    Smoking status: Former     Current packs/day: 0.00     Types: Cigarettes     Quit date:      Years since quittin.8     Passive exposure: Past    Smokeless tobacco: Never   Vaping Use    Vaping status: Never Used   Substance and Sexual Activity    Alcohol use: Never    Drug use: Never    Sexual activity: Not on file   Other Topics Concern    Caffeine Concern Yes     Comment: coffee daily    Exercise No    Seat Belt Not Asked    Special Diet Not Asked    Stress Concern Not Asked    Weight Concern Not Asked   Social History Narrative    The patient does use an assistive device..  Brace    The patient does live in a home with stairs.     Social Drivers of Health     Financial Resource Strain: Not on file   Food Insecurity: No Food Insecurity (10/22/2024)    Food Insecurity     Food Insecurity: Never true   Transportation Needs: No Transportation Needs (10/22/2024)    Transportation Needs     Lack of Transportation: No     Car Seat: Not on file   Physical Activity: Not on file   Stress: Not on file   Social Connections: Not on file   Housing Stability: Low Risk  (10/22/2024)    Housing Stability     Housing Instability: No     Housing Instability Emergency: Not on file     Crib or Bassinette: Not on file     Family History   Problem Relation Age of Onset    Cancer Father         Prostate    Heart Disorder Father     Cancer Mother        Review of Systems  Constitutional:  negative for chills, fevers and sweats  Gastrointestinal: negative for abdominal pain, diarrhea, nausea and vomiting  Genitourinary:negative for dysuria and hematuria  Musculoskeletal:negative for arthralgias and muscle weakness  Neurological: negative for paresthesia and weakness  All others reviewed and negative.      Physical Exam  Temp:  [97.6 °F (36.4 °C)-99 °F (37.2 °C)] 98.3 °F (36.8 °C)  Pulse:  [53-71] 71  Resp:  [13-18] 18  BP: (108-160)/(52-85) 108/52  SpO2:  [93 %-100 %] 94 %    LE PHYSICAL EXAM  Constitution: Well-developed and well-nourished. Gait appears normal. No apparent distress. Alert and oriented to person, place, and time.  Integument: There are no varicosities. Skin appears moist, warm, and supple with positive hair growth. There are no color changes. No open lesions. No macerations, No Hyperkeratotic lesions.  Vascular examination: Dorsalis pedis and posterior tibial pulses are strong bilaterally with capillary filling time less than 3 seconds to all digits. There is no peripheral edema..  Neurological Sensorium: Grossly intact to sharp/dull. Vibratory: Intact.  Musculoskeletal:   5/5 pedal muscle strength b/l           Incision well adhered with sutures intact. No fluctuance, drainage, ascending cellulitis, SOI noted.    Imaging  MRI FOOT, LEFT (CPT=73718)    Result Date: 10/22/2024  CONCLUSION:  1. Osteomyelitis involving the mid to distal shaft and tuft of the 3rd toe distal phalanx.  There is reactive marrow edema throughout the entirety of the 3rd toe distal phalanx. 2. Soft tissue ulceration at the tip of the 3rd toe.  No associated well-defined/drainable soft tissue collection to suggest abscess. 3. Scattered mild-to-moderate intertarsal, tarsometatarsal, and 1st metatarsophalangeal joint osteoarthritis as detailed. 4. Lesser incidental findings as above.   Dictated by (CST): Sumanth Hayward MD on 10/22/2024 at 10:41 AM     Finalized by (CST): Sumanth Hayward MD on 10/22/2024 at 10:47  AM                   Labs  Lab Results   Component Value Date    WBC 7.4 10/24/2024    HGB 12.1 (L) 10/24/2024    HCT 37.3 (L) 10/24/2024    MCV 92.8 10/24/2024    .0 10/24/2024     No results found for: \"PTT\"  No results found for: \"PROTIME\"  Lab Results   Component Value Date     10/24/2024    K 4.5 10/24/2024    CO2 30.0 10/24/2024     10/24/2024    BUN 19 10/24/2024       Assessment    64 year old male with a past medical history of CAD, hypertension, hyperlipidemia, non-insulin dependent diabetic with peripheral neuropathy who initially presented to my office for the first time on 10/21/2020 for complaining of a chronic ulceration on the distal aspect of his left third digit.  Radiographs taken a few months prior which showed concern for possible osteomyelitis and an MRI was ordered stat which noted concern for osteomyelitis at the distal tip of the left third digit.  A discussion was had with patient in regards to biopsy versus IV antibiotics versus amputation and patient opted for partial toe amputation of the left third digit.  Patient was subsequently admitted to the hospital on 10/22/2024 for further workup.  Upon presentation to the emergency room patient was afebrile with no leukocytosis of white blood cell count of 9.1.  Patient was started on ceftriaxone and vancomycin per hospitalist recommendations.  Wound culture taken in my office noted group B strep.     Plan     Left LE diabetic foot infection/osteomyelitis of the left third digit distal phalanx:   status post left third digit partial toe amputation, POD#1 (DOS: 10/24/24).  Performed dressing change at bedside this AM. No SOI noted. Patient to continue daily dressing changes of betadine, non-adherent, 4x4 gauze, and prudence.  Antibiotics: Currently on ceftriaxone and vancomycin per hospitalist recommendations.  Wound culture taken in the office positive for strep B, Proteus, Enterococcus. Infectious disease consulted who is  managing antibiotics.  PT/WB status: Patient can be fully weightbearing as tolerated to left lower extremity in a surgical shoe.   Discharge planning: Patient cleared for discharge per podiatric standpoint once clearance margins are obtained. If clearance margin (-), recommend PO antibiotics but will defer to ID for final recommendations.        Eri Gallardo DPM, [email or web address] GODOY  Diplomat, American Board of Foot and Ankle Surgery  Certified in Foot and Rearfoot/Ankle Reconstruction  Fellow of the American College of Foot and Ankle Surgeons  Fellowship Trained Foot and Ankle Surgeon   [facility]          [1]   Facility-Administered Medications Prior to Admission   Medication Dose Route Frequency Provider Last Rate Last Admin    [COMPLETED] bupivacaine (Marcaine) 0.25% injection  3 mL Intramuscular Once Behar, Alex, MD   7.5 mg at 09/26/24 1257    [COMPLETED] lidocaine (Xylocaine) 1 % injection  5 mL Intradermal Once Behar, Alex, MD   5 mL at 09/26/24 1258    [COMPLETED] lidocaine (Xylocaine) 1 % injection  5 mL Intradermal Once Behar, Alex, MD   5 mL at 08/08/24 1522    [COMPLETED] bupivacaine (Marcaine) 0.25% injection  3 mL Intramuscular Once Behar, Alex, MD   7.5 mg at 08/08/24 1519     Medications Prior to Admission   Medication Sig Dispense Refill Last Dose/Taking    Multiple Vitamins-Minerals (MULTIVITAMIN MEN 50+) Oral Tab Take 1 tablet by mouth daily.   10/22/2024 at  7:30 AM    sulfamethoxazole-trimethoprim -160 MG Oral Tab per tablet Take 1 tablet by mouth 2 (two) times daily. 30 tablet 0 10/22/2024 at  7:30 AM    pantoprazole 40 MG Oral Tab EC Take 1 tablet (40 mg total) by mouth every morning before breakfast. 90 tablet 3 10/22/2024 at  7:30 AM    metoprolol tartrate 25 MG Oral Tab Take 1 tablet (25 mg total) by mouth 2 (two) times daily. FOR HEART. 30 tablet 0 10/22/2024 at  7:30 AM    rosuvastatin 40 MG Oral Tab Take 1 tablet (40 mg total) by mouth nightly. FOR CHOLESTEROL.  90 tablet 9 10/21/2024 at 11:00 PM    Tirzepatide (MOUNJARO) 15 MG/0.5ML Subcutaneous Solution Pen-injector Inject 15 mg into the skin once a week. MAXIMUM DOSE. STOP OZEMPIC IF YOU START THIS. 6 mL 9 10/16/2024    valsartan 160 MG Oral Tab Take 1 tablet (160 mg total) by mouth daily. FOR BLOOD PRESSURE. 90 tablet 9 10/22/2024 at  7:30 AM    levothyroxine 125 MCG Oral Tab Take 1 tablet (125 mcg total) by mouth at bedtime. 90 tablet 1 10/22/2024 at  2:30 PM    glipiZIDE 5 MG Oral Tab Take 1 tablet (5 mg total) by mouth every morning before breakfast. FOR DIABETES. STOP/DO NOT TAKE IF FASTING SUGAR IS LESS THAN 100. 90 tablet 2 10/22/2024 at  7:30 AM    metFORMIN HCl 1000 MG Oral Tab Take 1 tablet (1,000 mg total) by mouth 2 (two) times daily with meals. 180 tablet 0 10/22/2024 at  7:30 AM    pregabalin 100 MG Oral Cap Take 1 capsule (100 mg total) by mouth 2 (two) times daily. FOR NERVE PAIN. Dose reduction due to lethargy 180 capsule 3 10/22/2024 at  7:30 AM    aspirin (ASPIRIN 81) 81 MG Oral Tab EC Take 1 tablet (81 mg total) by mouth daily. FOR HEART. 90 tablet 9 10/22/2024 at  7:30 AM    DULoxetine 30 MG Oral Cap DR Particles Take 1 capsule (30 mg total) by mouth daily. TAKE 1 CAPSULE IN THE MORNING FOR ANXIETY/DEPRESSION/ARTHRITIS PAIN 90 capsule 9 10/22/2024 at  7:30 AM    finasteride 5 MG Oral Tab Take 1 tablet (5 mg total) by mouth daily. FOR PROSTATE. 90 tablet 9 10/22/2024 at  7:30 AM    tamsulosin 0.4 MG Oral Cap Take 2 capsules (0.8 mg total) by mouth daily. FOR PROSTATE. 180 capsule 9 Past Week    ticagrelor 90 MG Oral Tab Take 1 tablet (90 mg total) by mouth every 12 (twelve) hours. FOR HEART STENTS. 180 tablet 9 10/22/2024 at  7:30 AM    mupirocin 2 % External Ointment Apply 1 Application topically 2 (two) times daily. (Patient not taking: Reported on 10/22/2024) 30 g 1 Not Taking   [2]   Allergies  Allergen Reactions    Empagliflozin OTHER (SEE COMMENTS)     Frequent urination, unbearable.     Penicillins UNKNOWN     Patient does not recall reaction    Other UNKNOWN

## 2024-10-24 NOTE — PLAN OF CARE
POD 1. Dressing changed this AM. Pain controlled. Ambulating with RW. Alert, RA, voiding. Tele. SCDs for DVT prophylaxis. Tolerating diet. ACHS. Rocephin and vanco. Cultures pending. Plan for home once cultures results and cleared by all services. Call light within reach. Bed locked and in lowest position. Concerns addressed and questions answered.     Problem: Patient Centered Care  Goal: Patient preferences are identified and integrated in the patient's plan of care  Description: Interventions:  - What would you like us to know as we care for you?   - Provide timely, complete, and accurate information to patient/family  - Incorporate patient and family knowledge, values, beliefs, and cultural backgrounds into the planning and delivery of care  - Encourage patient/family to participate in care and decision-making at the level they choose  - Honor patient and family perspectives and choices  Outcome: Progressing     Problem: Diabetes/Glucose Control  Goal: Glucose maintained within prescribed range  Description: INTERVENTIONS:  - Monitor Blood Glucose as ordered  - Assess for signs and symptoms of hyperglycemia and hypoglycemia  - Administer ordered medications to maintain glucose within target range  - Assess barriers to adequate nutritional intake and initiate nutrition consult as needed  - Instruct patient on self management of diabetes  Outcome: Progressing     Problem: Patient/Family Goals  Goal: Patient/Family Long Term Goal  Description: Patient's Long Term Goal:     Interventions:  -   - See additional Care Plan goals for specific interventions  Outcome: Progressing  Goal: Patient/Family Short Term Goal  Description: Patient's Short Term Goal:     Interventions:   -   - See additional Care Plan goals for specific interventions  Outcome: Progressing

## 2024-10-24 NOTE — PROGRESS NOTES
[facility]  part of [facility] ID PROGRESS NOTE    Mason Puri Patient Status:  Inpatient    1960 MRN [id number]   Location [facility] 4W/SW/SE Attending Mary Pichardo MD   Hosp Day # 2 PCP Alvino Wallace MD       Reason for Consultation:  L 3rd toe OM    Interval Events:  The patient feels good and has no complaints.     ASSESSMENT:    Antibiotics: Ceftriaxone and vancomycin    # Left third toe osteomyelitis  # Polymicrobial superficial wound culture   -Proteus mirabilis, group B strep, Enterococcus faecalis  # Diabetes        PLAN:    -Continue ceftriaxone and vancomycin pending intraoperative cultures  -Will plan on sending home on PO antibiotics tomorrow     -Follow fever curve, wbc  -Reviewed labs, micro, imaging reports, available old records  -Discussed with primary attending and RN    History of Present Illness:  Mason Puri is a a(n) 64 year old male with history of diabetes, peripheral neuropathy, hypertension, hyperlipidemia, and coronary artery disease who was sent in at the recommendation of his podiatrist after an outpatient MRI demonstrated osteomyelitis at the distal tip of his left third digit.  Patient presented to the emergency department on 10/22.  He was afebrile and hemodynamically stable.  Normal white count.  Plan for partial amputation of the left third digit with podiatry.  Infectious disease consulted for assistance with antibiotics    History:  Past Medical History:    Benign head tremor    BPH (benign prostatic hyperplasia)    Diabetes (HCC)    Diverticulitis    Former smoker    Hyperlipidemia    Hypertension    Neuropathic pain    Non-pressure chronic ulcer of right lower leg, limited to breakdown of skin (HCC)    Obesity    Snoring    Thyroid disease     Past Surgical History:   Procedure Laterality Date    Carotid endarterectomy Right 2021    Surgeon: Dr. Rajiv Solorio at [facility]    Neck/chest procedure  unlisted      per patient, a blockage was removed from the right side of his neck in 8/2021    Other surgical history  2017    Small bowel Res.    Other surgical history  11/12/2019    Colectomy     Family History   Problem Relation Age of Onset    Cancer Father         Prostate    Heart Disorder Father     Cancer Mother       reports that he quit smoking about 28 years ago. His smoking use included cigarettes. He has been exposed to tobacco smoke. He has never used smokeless tobacco. He reports that he does not drink alcohol and does not use drugs.    Allergies:  Allergies[1]    Medications:    Current Facility-Administered Medications:     sodium chloride 0.9 % IV bolus, 83 mL/hr, Intravenous, Continuous    cefTRIAXone (Rocephin) 2 g in sodium chloride 0.9% 100 mL IVPB-ADDV, 2 g, Intravenous, Q24H    vancomycin (Vancocin) 1.5 g in sodium chloride 0.9% 250mL IVPB premix, 15 mg/kg (Adjusted), Intravenous, Q24H    glucose (Dex4) 15 GM/59ML oral liquid 15 g, 15 g, Oral, Q15 Min PRN **OR** glucose (Glutose) 40% oral gel 15 g, 15 g, Oral, Q15 Min PRN **OR** glucose-vitamin C (Dex-4) chewable tab 4 tablet, 4 tablet, Oral, Q15 Min PRN **OR** dextrose 50% injection 50 mL, 50 mL, Intravenous, Q15 Min PRN **OR** glucose (Dex4) 15 GM/59ML oral liquid 30 g, 30 g, Oral, Q15 Min PRN **OR** glucose (Glutose) 40% oral gel 30 g, 30 g, Oral, Q15 Min PRN **OR** glucose-vitamin C (Dex-4) chewable tab 8 tablet, 8 tablet, Oral, Q15 Min PRN    insulin aspart (NovoLOG) 100 Units/mL FlexPen 1-7 Units, 1-7 Units, Subcutaneous, TID CC    enoxaparin (Lovenox) 40 MG/0.4ML SUBQ injection 40 mg, 40 mg, Subcutaneous, Once    acetaminophen (Tylenol Extra Strength) tab 500 mg, 500 mg, Oral, Q4H PRN    ondansetron (Zofran) 4 MG/2ML injection 4 mg, 4 mg, Intravenous, Q6H PRN    prochlorperazine (Compazine) 10 MG/2ML injection 5 mg, 5 mg, Intravenous, Q8H PRN    temazepam (Restoril) cap 15 mg, 15 mg, Oral, Nightly PRN    acetaminophen (Tylenol) tab  650 mg, 650 mg, Oral, Q4H PRN **OR** HYDROcodone-acetaminophen (Norco) 5-325 MG per tab 1 tablet, 1 tablet, Oral, Q4H PRN **OR** HYDROcodone-acetaminophen (Norco) 5-325 MG per tab 2 tablet, 2 tablet, Oral, Q4H PRN    Review of Systems:  ROS  Per HP    Physical Exam:  Vital signs: Blood pressure 108/52, pulse 71, temperature 98.3 °F (36.8 °C), temperature source Oral, resp. rate 18, height 188 cm (6' 2\"), weight (!) 313 lb (142 kg), SpO2 94%.    Physical Exam  L 3rd toe wrapped. Picture from podiatry appreciated    Laboratory Data: Reviewed in EMR    Microbiology: Reviewed in EMR    Radiology: Reviewed    Thank you for allowing us to participate in the care of this patient. Please do not hesitate to call if you have any questions.     We will continue to follow with you and will make further recommendations based on patient's progress.    George Vance MD   [facility], [facility]  [phone]  10/23/2024       [1]   Allergies  Allergen Reactions    Empagliflozin OTHER (SEE COMMENTS)     Frequent urination, unbearable.    Penicillins UNKNOWN     Patient does not recall reaction    Other UNKNOWN

## 2024-10-24 NOTE — PROGRESS NOTES
[facility]  part of [facility]    Progress Note    Mason Puri Patient Status:  Inpatient    1960 MRN [id number]   Location [facility] 4W/SW/SE Attending Mary Pichardo MD   Hosp Day # 2 PCP Alvino Wallace MD     Chief Complaint: left third toe osteo    SUBJECTIVE:    No acute events overnight.  Patient denies chest pain, sob.  No fevers/chills.  No n/v/abd pain.  Tolerated surgery well.  Having expected post op pain.     10 ROS completed and otherwise negative.    OBJECTIVE:  Vital signs in last 24 hours:  /52 (BP Location: Right arm)   Pulse 71   Temp 98.3 °F (36.8 °C) (Oral)   Resp 18   Ht 6' 2\" (1.88 m)   Wt (!) 313 lb (142 kg)   SpO2 94%   BMI 40.19 kg/m²     Intake/Output:    Intake/Output Summary (Last 24 hours) at 10/24/2024 1040  Last data filed at 10/24/2024 1002  Gross per 24 hour   Intake 1411.87 ml   Output --   Net 1411.87 ml       Wt Readings from Last 3 Encounters:   10/22/24 (!) 313 lb (142 kg)   24 (!) 305 lb (138.3 kg)   24 (!) 315 lb (142.9 kg)       Exam     Gen: No acute distress  Pulm: Lungs clear, normal respiratory effort  CV: Heart with regular rate and rhythm  Abd: Abdomen soft, nontender, bowel sounds present  Neuro: No acute focal deficits  MSK: moves extremities  Skin: Warm and dry  Psych: Normal affect  Ext: left third toe with dressing in place    Data Review:     Labs:   Lab Results   Component Value Date    WBC 7.4 10/24/2024    HGB 12.1 10/24/2024    HCT 37.3 10/24/2024    .0 10/24/2024    CREATSERUM 1.30 10/24/2024    BUN 19 10/24/2024     10/24/2024    K 4.5 10/24/2024     10/24/2024    CO2 30.0 10/24/2024     10/24/2024    CA 9.2 10/24/2024    MG 1.8 10/24/2024       Imaging: Reviewed    MRI FOOT, LEFT (CPT=73718)    Result Date: 10/22/2024  CONCLUSION:  1. Osteomyelitis involving the mid to distal shaft and tuft of the 3rd toe distal phalanx.  There is reactive marrow  edema throughout the entirety of the 3rd toe distal phalanx. 2. Soft tissue ulceration at the tip of the 3rd toe.  No associated well-defined/drainable soft tissue collection to suggest abscess. 3. Scattered mild-to-moderate intertarsal, tarsometatarsal, and 1st metatarsophalangeal joint osteoarthritis as detailed. 4. Lesser incidental findings as above.   Dictated by (CST): Sumanth Hayward MD on 10/22/2024 at 10:41 AM     Finalized by (CST): Sumanth Hayward MD on 10/22/2024 at 10:47 AM           Meds:   Current Facility-Administered Medications   Medication Dose Route Frequency    sodium chloride 0.9 % IV bolus  83 mL/hr Intravenous Continuous    cefTRIAXone (Rocephin) 2 g in sodium chloride 0.9% 100 mL IVPB-ADDV  2 g Intravenous Q24H    vancomycin (Vancocin) 1.5 g in sodium chloride 0.9% 250mL IVPB premix  15 mg/kg (Adjusted) Intravenous Q24H    glucose (Dex4) 15 GM/59ML oral liquid 15 g  15 g Oral Q15 Min PRN    Or    glucose (Glutose) 40% oral gel 15 g  15 g Oral Q15 Min PRN    Or    glucose-vitamin C (Dex-4) chewable tab 4 tablet  4 tablet Oral Q15 Min PRN    Or    dextrose 50% injection 50 mL  50 mL Intravenous Q15 Min PRN    Or    glucose (Dex4) 15 GM/59ML oral liquid 30 g  30 g Oral Q15 Min PRN    Or    glucose (Glutose) 40% oral gel 30 g  30 g Oral Q15 Min PRN    Or    glucose-vitamin C (Dex-4) chewable tab 8 tablet  8 tablet Oral Q15 Min PRN    insulin aspart (NovoLOG) 100 Units/mL FlexPen 1-7 Units  1-7 Units Subcutaneous TID CC    enoxaparin (Lovenox) 40 MG/0.4ML SUBQ injection 40 mg  40 mg Subcutaneous Once    acetaminophen (Tylenol Extra Strength) tab 500 mg  500 mg Oral Q4H PRN    ondansetron (Zofran) 4 MG/2ML injection 4 mg  4 mg Intravenous Q6H PRN    prochlorperazine (Compazine) 10 MG/2ML injection 5 mg  5 mg Intravenous Q8H PRN    temazepam (Restoril) cap 15 mg  15 mg Oral Nightly PRN    acetaminophen (Tylenol) tab 650 mg  650 mg Oral Q4H PRN    Or    HYDROcodone-acetaminophen (Norco) 5-325 MG per  tab 1 tablet  1 tablet Oral Q4H PRN    Or    HYDROcodone-acetaminophen (Norco) 5-325 MG per tab 2 tablet  2 tablet Oral Q4H PRN         Assessment  Patient Active Problem List   Diagnosis    Class 2 severe obesity due to excess calories with serious comorbidity and body mass index (BMI) of 38.0 to 38.9 in adult ([facility])    Hypertension associated with type 2 diabetes mellitus ([facility])    Hyperlipidemia associated with type 2 diabetes mellitus ([facility])    Type 2 diabetes mellitus with diabetic peripheral angiopathy without gangrene, without long-term current use of insulin ([facility])    Hypothyroidism    History of right-sided carotid endarterectomy    Detrusor overactivity    Primary osteoarthritis of right knee    Gastroesophageal reflux disease with esophagitis    Varicose veins of left lower extremity with inflammation, with ulcer of ankle limited to breakdown of skin ([facility])    Patellofemoral arthritis    Venous stasis dermatitis of both lower extremities    Non-pressure chronic ulcer of left lower leg, limited to breakdown of skin ([facility])    Major depressive disorder    BPH with obstruction/lower urinary tract symptoms    Transient ischemic attack (TIA)    Left carotid artery stenosis    Cerebellar infarct ([facility])    Abnormal Holter monitor finding    Leg wound, left    S/P drug eluting coronary stent placement    Neurogenic claudication    JUAN A (obstructive sleep apnea)    Other acute osteomyelitis of left foot ([facility])    Toe osteomyelitis, left ([facility])       Plan:     Left third toe osteomyelitis  - podiatry on consult   - ID consult for abx recommendations  - s/p left third digit partial toe amputation 10/23/24  - dressing changed by podiatry today    DM  - accuchecks, ISS, adjust insulin prn    HTN  - stable, monitor vitals and adjust prn    CKD stage 2  - monitor bmp    Morbid obesity, possible JUAN A  - BMI 40  - fu as outpt    CAD   - s/p LAD and RCA stents    Hx of TIA  - fu as outpt     Prophylaxis:   DVT with lmwh    Dispo:  pending clinical course    Global A/P  - reviewed labs and vitals from today  - reviewed notes of the day  - cbc, bmp, mag ordered for tomorrow  - discussed need to stay in the hospital today due to above  - cont IV abx  - discussed with patient/RN and care team    MDM: High complexity- severe exacerbation of chronic illness posing a threat to life. IV meds requiring close inpatient monitoring. I personally spent time on chart/note review, review of labs/imaging, discussion with patient, physical exam, discussion with staff, writing progress note, and discussion of plan of care.

## 2024-10-25 NOTE — DISCHARGE SUMMARY
[facility]  part of [facility]    Discharge Summary    Mason Puri Patient Status:  Inpatient    1960 MRN [id number]   Location [facility] 4W/SW/SE Attending Antonio Watkins MD   Hosp Day # 3 PCP Alvino Wallace MD     Date of Admission: 10/22/2024 Disposition: Home or Self Care     Date of Discharge: 10/25/2024    Admitting Diagnosis: Other acute osteomyelitis of left foot (HCC) [M86.172]    Hospital Discharge Diagnoses:   Left third toe osteomyelitis     DM    HTN    CKD stage 2    Morbid obesity, possible JUAN A    CAD     Hx of TIA      Lace+ Score: 67  59-90 High Risk  29-58 Medium Risk  0-28   Low Risk.    TCM Follow-Up Recommendation:  LACE > 58: High Risk of readmission after discharge from the hospital.      Problem List:   Patient Active Problem List   Diagnosis    Class 2 severe obesity due to excess calories with serious comorbidity and body mass index (BMI) of 38.0 to 38.9 in adult (HCC)    Hypertension associated with type 2 diabetes mellitus (HCC)    Hyperlipidemia associated with type 2 diabetes mellitus (HCC)    Type 2 diabetes mellitus with diabetic peripheral angiopathy without gangrene, without long-term current use of insulin (HCC)    Hypothyroidism    History of right-sided carotid endarterectomy    Detrusor overactivity    Primary osteoarthritis of right knee    Gastroesophageal reflux disease with esophagitis    Varicose veins of left lower extremity with inflammation, with ulcer of ankle limited to breakdown of skin (HCC)    Patellofemoral arthritis    Venous stasis dermatitis of both lower extremities    Non-pressure chronic ulcer of left lower leg, limited to breakdown of skin (HCC)    Major depressive disorder    BPH with obstruction/lower urinary tract symptoms    Transient ischemic attack (TIA)    Left carotid artery stenosis    Cerebellar infarct (HCC)    Abnormal Holter monitor finding    Leg wound, left    S/P drug eluting coronary  stent placement    Neurogenic claudication    JUAN A (obstructive sleep apnea)       Reason for Admission: left third toe distal osteo    Physical Exam:   General appearance: alert, appears stated age and cooperative  Pulmonary:  clear to auscultation bilaterally  Cardiovascular: S1, S2 normal, no murmur, click, rub or gallop, regular rate and rhythm  Abdominal: soft, non-tender; bowel sounds normal; no masses,  no organomegaly  Extremities: extremities normal, atraumatic, no cyanosis or edema  Psychiatric: calm      History of Present Illness: The patient is a 64-year-old  male who had callus formation at the tip of his left third toe that was removed by his podiatrist and ever since he had been having an ulcer that is not healing. Cultures done recently showed group B streptococcus. Today, he had an MRI scan of his left foot which showed osteomyelitis involving the mid to distal shaft and tuft of the third toe distal phalanx. Reactive marrow edema throughout the entirety of the third toe distal phalanx. Sent to the emergency department for evaluation. CBC and chemistry unremarkable. GFR is 51 which is at baseline. Started on IV Rocephin and vancomycin. He will be admitted to the hospital for further management.     Hospital Course: Left third toe osteomyelitis  - podiatry on consult   - ID consult for abx recommendations  - s/p left third digit partial toe amputation 10/23/24  - dressing changed by podiatry today     DM  - accuchecks, ISS, adjust insulin prn     HTN  - stable, monitor vitals and adjust prn     CKD stage 2  - monitor bmp     Morbid obesity, possible JUAN A  - BMI 40  - fu as outpt     CAD   - s/p LAD and RCA stents     Hx of TIA  - fu as outpt               Consultations: ID    Procedures: left third digit partial toe amputation on 10/23    Complications: none    Discharge Condition: Good    Discharge Medications:      Discharge Medications        START taking these medications         Instructions Prescription details   HYDROcodone-acetaminophen 5-325 MG Tabs  Commonly known as: Norco      Take 1 tablet by mouth every 4 (four) hours as needed.   Quantity: 24 tablet  Refills: 0            CONTINUE taking these medications        Instructions Prescription details   aspirin 81 MG Tbec  Commonly known as: Aspirin 81      Take 1 tablet (81 mg total) by mouth daily. FOR HEART.   Quantity: 90 tablet  Refills: 9     DULoxetine 30 MG Cpep  Commonly known as: Cymbalta      Take 1 capsule (30 mg total) by mouth daily. TAKE 1 CAPSULE IN THE MORNING FOR ANXIETY/DEPRESSION/ARTHRITIS PAIN   Quantity: 90 capsule  Refills: 9     finasteride 5 MG Tabs  Commonly known as: Proscar      Take 1 tablet (5 mg total) by mouth daily. FOR PROSTATE.   Quantity: 90 tablet  Refills: 9     glipiZIDE 5 MG Tabs  Commonly known as: Glucotrol      Take 1 tablet (5 mg total) by mouth every morning before breakfast. FOR DIABETES. STOP/DO NOT TAKE IF FASTING SUGAR IS LESS THAN 100.   Quantity: 90 tablet  Refills: 2     levothyroxine 125 MCG Tabs  Commonly known as: Synthroid      Take 1 tablet (125 mcg total) by mouth at bedtime.   Quantity: 90 tablet  Refills: 1     metFORMIN HCl 1000 MG Tabs  Commonly known as: GLUCOPHAGE      Take 1 tablet (1,000 mg total) by mouth 2 (two) times daily with meals.   Quantity: 180 tablet  Refills: 0     metoprolol tartrate 25 MG Tabs  Commonly known as: Lopressor      Take 1 tablet (25 mg total) by mouth 2 (two) times daily. FOR HEART.   Quantity: 30 tablet  Refills: 0     Mounjaro 15 MG/0.5ML Sopn  Generic drug: Tirzepatide      Inject 15 mg into the skin once a week. MAXIMUM DOSE. STOP OZEMPIC IF YOU START THIS.   Quantity: 6 mL  Refills: 9     Multivitamin Men 50+ Tabs      Take 1 tablet by mouth daily.   Refills: 0     pantoprazole 40 MG Tbec  Commonly known as: Protonix      Take 1 tablet (40 mg total) by mouth every morning before breakfast.   Quantity: 90 tablet  Refills: 3     pregabalin 100  MG Caps  Commonly known as: Lyrica      Take 1 capsule (100 mg total) by mouth 2 (two) times daily. FOR NERVE PAIN. Dose reduction due to lethargy   Quantity: 180 capsule  Refills: 3     rosuvastatin 40 MG Tabs  Commonly known as: Crestor      Take 1 tablet (40 mg total) by mouth nightly. FOR CHOLESTEROL.   Quantity: 90 tablet  Refills: 9     sulfamethoxazole-trimethoprim -160 MG Tabs per tablet  Commonly known as: Bactrim DS      Take 1 tablet by mouth 2 (two) times daily for 7 days.   Stop taking on: November 1, 2024  Quantity: 14 tablet  Refills: 0     tamsulosin 0.4 MG Caps  Commonly known as: Flomax      Take 2 capsules (0.8 mg total) by mouth daily. FOR PROSTATE.   Quantity: 180 capsule  Refills: 9     ticagrelor 90 MG Tabs  Commonly known as: Brilinta      Take 1 tablet (90 mg total) by mouth every 12 (twelve) hours. FOR HEART STENTS.   Quantity: 180 tablet  Refills: 9     valsartan 160 MG Tabs  Commonly known as: Diovan      Take 1 tablet (160 mg total) by mouth daily. FOR BLOOD PRESSURE.   Quantity: 90 tablet  Refills: 9            STOP taking these medications      mupirocin 2 % Oint  Commonly known as: Bactroban                  Where to Get Your Medications        These medications were sent to [id number] - [place], [place] - [address] AT [facility] [address], [phone], [phone]  [address], [facility] [address]      Phone: [phone]   HYDROcodone-acetaminophen 5-325 MG Tabs  sulfamethoxazole-trimethoprim -160 MG Tabs per tablet         Follow up Visits: Follow-up with  in 1 week    Follow up Labs:      Other Discharge Instructions:     Antonio Watkins MD  10/25/2024  2:06 PM    > 35 min

## 2024-10-25 NOTE — DISCHARGE INSTRUCTIONS
PT/WB status: Patient can be fully weightbearing as tolerated to left lower extremity in a surgical shoe.  Elevate affected foot in pillows above heart level to prevent swelling when laying down.  Monitor affected foot or incision for any signs of infection such as severe redness, inflammation or presence of pus like drainage coming out of your incision or fever more than a 100F.  Follow with your surgeon as recommended.

## 2024-10-25 NOTE — PLAN OF CARE
Patient alert and oriented x4. On room air. VSS. ID cleared for discharge home on PO ABX. [place] for pain management. Mag replaced this morning. Discharge instructions provided at bedside. IV and tele removed. Escorted to private vehicle via wheelchair.     Problem: Patient Centered Care  Goal: Patient preferences are identified and integrated in the patient's plan of care  Description: Interventions:  - What would you like us to know as we care for you?   - Provide timely, complete, and accurate information to patient/family  - Incorporate patient and family knowledge, values, beliefs, and cultural backgrounds into the planning and delivery of care  - Encourage patient/family to participate in care and decision-making at the level they choose  - Honor patient and family perspectives and choices  10/25/2024 1432 by Brielle Juarez, RN  Outcome: Completed     Problem: Diabetes/Glucose Control  Goal: Glucose maintained within prescribed range  Description: INTERVENTIONS:  - Monitor Blood Glucose as ordered  - Assess for signs and symptoms of hyperglycemia and hypoglycemia  - Administer ordered medications to maintain glucose within target range  - Assess barriers to adequate nutritional intake and initiate nutrition consult as needed  - Instruct patient on self management of diabetes  10/25/2024 1432 by Brielle Juarez, RN  Outcome: Completed

## 2024-10-25 NOTE — PROGRESS NOTES
[facility]  part of [facility] ID PROGRESS NOTE    Mason Puri Patient Status:  Inpatient    1960 MRN [id number]   Location [facility] 4W/SW/SE Attending Mary Pichardo MD   Hosp Day # 3 PCP Alvino Wallace MD       Reason for Consultation:  L 3rd toe OM    Interval Events:  The patient feels good and has no complaints.     ASSESSMENT:    Antibiotics: Ceftriaxone and vancomycin    # Left third toe osteomyelitis  # Polymicrobial superficial wound culture   -Proteus mirabilis, group B strep, Enterococcus faecalis  # Diabetes        PLAN:    -Will send patient with a short course of Bactrim. Margins clear.   -Follow fever curve, wbc  -Reviewed labs, micro, imaging reports, available old records  -Discussed with primary attending, podiatry, and RN    History of Present Illness:  Mason Puri is a a(n) 64 year old male with history of diabetes, peripheral neuropathy, hypertension, hyperlipidemia, and coronary artery disease who was sent in at the recommendation of his podiatrist after an outpatient MRI demonstrated osteomyelitis at the distal tip of his left third digit.  Patient presented to the emergency department on 10/22.  He was afebrile and hemodynamically stable.  Normal white count.  Plan for partial amputation of the left third digit with podiatry.  Infectious disease consulted for assistance with antibiotics    History:  Past Medical History:    Benign head tremor    BPH (benign prostatic hyperplasia)    Diabetes (HCC)    Diverticulitis    Former smoker    Hyperlipidemia    Hypertension    Neuropathic pain    Non-pressure chronic ulcer of right lower leg, limited to breakdown of skin (HCC)    Obesity    Snoring    Thyroid disease     Past Surgical History:   Procedure Laterality Date    Carotid endarterectomy Right 2021    Surgeon: Dr. Rajiv Solorio at [facility]    Neck/chest procedure unlisted      per patient, a blockage was removed  from the right side of his neck in 8/2021    Other surgical history  2017    Small bowel Res.    Other surgical history  11/12/2019    Colectomy     Family History   Problem Relation Age of Onset    Cancer Father         Prostate    Heart Disorder Father     Cancer Mother       reports that he quit smoking about 28 years ago. His smoking use included cigarettes. He has been exposed to tobacco smoke. He has never used smokeless tobacco. He reports that he does not drink alcohol and does not use drugs.    Allergies:  Allergies[1]    Medications:    Current Facility-Administered Medications:     sodium chloride 0.9 % IV bolus, 83 mL/hr, Intravenous, Continuous    cefTRIAXone (Rocephin) 2 g in sodium chloride 0.9% 100 mL IVPB-ADDV, 2 g, Intravenous, Q24H    vancomycin (Vancocin) 1.5 g in sodium chloride 0.9% 250mL IVPB premix, 15 mg/kg (Adjusted), Intravenous, Q24H    glucose (Dex4) 15 GM/59ML oral liquid 15 g, 15 g, Oral, Q15 Min PRN **OR** glucose (Glutose) 40% oral gel 15 g, 15 g, Oral, Q15 Min PRN **OR** glucose-vitamin C (Dex-4) chewable tab 4 tablet, 4 tablet, Oral, Q15 Min PRN **OR** dextrose 50% injection 50 mL, 50 mL, Intravenous, Q15 Min PRN **OR** glucose (Dex4) 15 GM/59ML oral liquid 30 g, 30 g, Oral, Q15 Min PRN **OR** glucose (Glutose) 40% oral gel 30 g, 30 g, Oral, Q15 Min PRN **OR** glucose-vitamin C (Dex-4) chewable tab 8 tablet, 8 tablet, Oral, Q15 Min PRN    insulin aspart (NovoLOG) 100 Units/mL FlexPen 1-7 Units, 1-7 Units, Subcutaneous, TID CC    enoxaparin (Lovenox) 40 MG/0.4ML SUBQ injection 40 mg, 40 mg, Subcutaneous, Once    acetaminophen (Tylenol Extra Strength) tab 500 mg, 500 mg, Oral, Q4H PRN    ondansetron (Zofran) 4 MG/2ML injection 4 mg, 4 mg, Intravenous, Q6H PRN    prochlorperazine (Compazine) 10 MG/2ML injection 5 mg, 5 mg, Intravenous, Q8H PRN    temazepam (Restoril) cap 15 mg, 15 mg, Oral, Nightly PRN    acetaminophen (Tylenol) tab 650 mg, 650 mg, Oral, Q4H PRN **OR**  HYDROcodone-acetaminophen (Norco) 5-325 MG per tab 1 tablet, 1 tablet, Oral, Q4H PRN **OR** HYDROcodone-acetaminophen (Norco) 5-325 MG per tab 2 tablet, 2 tablet, Oral, Q4H PRN    Review of Systems:  ROS  Per HP    Physical Exam:  Vital signs: Blood pressure 124/56, pulse 63, temperature 97.7 °F (36.5 °C), temperature source Oral, resp. rate 18, height 188 cm (6' 2\"), weight (!) 313 lb (142 kg), SpO2 97%.    Physical Exam  L 3rd toe wrapped. Picture from podiatry appreciated    Laboratory Data: Reviewed in EMR    Microbiology: Reviewed in EMR    Radiology: Reviewed    Thank you for allowing us to participate in the care of this patient. Please do not hesitate to call if you have any questions.     We will continue to follow with you and will make further recommendations based on patient's progress.    George Vance MD   [facility]  [phone]  10/23/2024       [1]   Allergies  Allergen Reactions    Empagliflozin OTHER (SEE COMMENTS)     Frequent urination, unbearable.    Penicillins UNKNOWN     Patient does not recall reaction    Other UNKNOWN

## 2024-10-28 NOTE — PROGRESS NOTES
Transitional Care Management   Discharge Date: 10/25/24  Contact Date: 10/28/2024    Assessment:      TCM Initial Assessment    General:  Assessment completed with: Patient  Patient Subjective: Spoke with patient who reports he has been doing good. Patient states he has had minimal pain. Dressing intact and is clean and dry to the foot.  The patient denies chest pain, shortness of breath, fever, chills, nausea, vomiting, diarrhea. No concern for constipation. Ambulating with his postop shoe. He reports his foot does not look swollen today. The patient reports he has not checked his blood sugar since his discharge home from the hospital. Patient states he was unable to  his antibiotic because the pharmacy told him it was too soon. (See RN intervention below)  Chief Complaint: 1.       Left third toe distal phalangeal osteomyelitis.  2.       Diabetes mellitus type 2.  3.       Essential hypertension.  4.       Chronic coronary artery disease.  5.       Chronic kidney disease stage 2.  Verify patient name and  with patient/ caregiver: Yes    Hospital Stay/Discharge:  Tell me what you understand of why you were in the hospital or emergency department: patient reports infection in his toe  Prior to leaving the hospital were your Discharge Instructions reviewed with you?: Yes  Did you receive a copy of your written Discharge Instructions?: Yes  What questions do you have about your Discharge Instructions?: wound care  Do you feel better or worse since you left the hospital or emergency department?: Better    Follow - Up Appointment:  Do you have a follow-up appointment?: No  Are there any barriers to getting to your follow-up appointment?: No    Home Health/DME:  Prior to leaving the hospital was Home Health (HH) arranged for you?: N/A  Are HH needs identified by staff during the assessment?: No     Prior to leaving the hospital or emergency department was Durable Medical Equipment (DME), medical supplies, or  infusions arranged for you?: N/A  Are DME/medical supply/infusions needs identified by staff during this assessment?: No     Medications/Diet:  Did any of your medications change, during or after your hospital stay or ED visit?: Yes  Do you have your new or updated medications?: No  Do you understand what your medications are for and possible side effects?: Yes  Are there any reasons that keep you from taking your medication as prescribed?: No  Any concerns about medication refills?: No    Were you given a different diet per your Discharge Instructions?: No     Questions/Concerns:  Do you have any questions or concerns that have not been discussed?: Yes         Nursing Interventions:    Patient states he has not been performing daily wound dressing changes and states he was advised to keep the dressing on until his follow up appointment however per Eri Woodard's inpatient note 10/24/2024-- patient to perform daily dressing changes with betadine, non-adherent, 4X4 gauze, and prudence. I confirmed with him he does not have any supplies at home to be able to perform daily dressing changes and he also states he is not sure how to do this. This RN to clarify with foot/ankle surgeon.     Patient reports he was unable to  the prescription antibiotic and was told it was too soon by the pharmacy.  NC called [facility]'s pharmacy and spoke with pharmacist Tyson-- Tyson advised me that the patient picked up this medication because a prescription was sent over by Eri Woodard on 10/21/2024 for a 15 day supply. NC called the patient back and confirmed the patient has this prescription at home but the pills were in a pill box that his wife has set up for him. Patient states in total he has 24 tablets left of this prescription and he has been taking them. NC advised patient to ensure he continues and completes full course of antibiotic therapy.     I called Eri Woodard's office and spoke with Kimmie regarding the  patient's wound care questions and antibiotic. Kimmie had me hold for a few minutes to speak with an RN but no RN available so she is sending  message to clinical staff. I provided her my direct contact information to call me with any questions or concerns. The patient is aware he will be contacted directly with further direction. He is also awaiting a return call for an appointment.     NCM provided education on signs and symptoms of infection and blood clots. Reviewed activity restrictions and to keep affected foot elevated above heart level to prevent swelling when laying down. NCM advised patient the importance of checking his blood sugars at home and adequate glucose to optimize healing. The patient verbalized understanding.     TCM/HFU scheduled for 10/30/2024     All d/c instructions reviewed with pt.  Reviewed when to call MD vs when to go to ER/call 911.  Educated pt on the importance of taking all meds as prescribed as well as close f/u with PCP/specialists.  Pt verbalized understanding and will contact office with any further questions or concerns.       Medication Review:   Current Outpatient Medications   Medication Sig Dispense Refill    sulfamethoxazole-trimethoprim -160 MG Oral Tab per tablet Take 1 tablet by mouth 2 (two) times daily for 7 days. 14 tablet 0    HYDROcodone-acetaminophen 5-325 MG Oral Tab Take 1 tablet by mouth every 4 (four) hours as needed. 24 tablet 0    Multiple Vitamins-Minerals (MULTIVITAMIN MEN 50+) Oral Tab Take 1 tablet by mouth daily.      pantoprazole 40 MG Oral Tab EC Take 1 tablet (40 mg total) by mouth every morning before breakfast. 90 tablet 3    metoprolol tartrate 25 MG Oral Tab Take 1 tablet (25 mg total) by mouth 2 (two) times daily. FOR HEART. 30 tablet 0    rosuvastatin 40 MG Oral Tab Take 1 tablet (40 mg total) by mouth nightly. FOR CHOLESTEROL. 90 tablet 9    Tirzepatide (MOUNJARO) 15 MG/0.5ML Subcutaneous Solution Pen-injector Inject 15 mg into the skin  once a week. MAXIMUM DOSE. STOP OZEMPIC IF YOU START THIS. 6 mL 9    valsartan 160 MG Oral Tab Take 1 tablet (160 mg total) by mouth daily. FOR BLOOD PRESSURE. 90 tablet 9    levothyroxine 125 MCG Oral Tab Take 1 tablet (125 mcg total) by mouth at bedtime. 90 tablet 1    glipiZIDE 5 MG Oral Tab Take 1 tablet (5 mg total) by mouth every morning before breakfast. FOR DIABETES. STOP/DO NOT TAKE IF FASTING SUGAR IS LESS THAN 100. 90 tablet 2    metFORMIN HCl 1000 MG Oral Tab Take 1 tablet (1,000 mg total) by mouth 2 (two) times daily with meals. 180 tablet 0    pregabalin 100 MG Oral Cap Take 1 capsule (100 mg total) by mouth 2 (two) times daily. FOR NERVE PAIN. Dose reduction due to lethargy 180 capsule 3    aspirin (ASPIRIN 81) 81 MG Oral Tab EC Take 1 tablet (81 mg total) by mouth daily. FOR HEART. 90 tablet 9    DULoxetine 30 MG Oral Cap DR Particles Take 1 capsule (30 mg total) by mouth daily. TAKE 1 CAPSULE IN THE MORNING FOR ANXIETY/DEPRESSION/ARTHRITIS PAIN 90 capsule 9    finasteride 5 MG Oral Tab Take 1 tablet (5 mg total) by mouth daily. FOR PROSTATE. 90 tablet 9    tamsulosin 0.4 MG Oral Cap Take 2 capsules (0.8 mg total) by mouth daily. FOR PROSTATE. 180 capsule 9    ticagrelor 90 MG Oral Tab Take 1 tablet (90 mg total) by mouth every 12 (twelve) hours. FOR HEART STENTS. 180 tablet 9     Did patient review medications using current pill bottles and not just a medication list?  No    sulfamethoxazole-trimethoprim -160 MG Oral Tab per tablet 14 tablet 0 10/25/2024 11/1/2024    Sig - Route: Take 1 tablet by mouth 2 (two) times daily for 7 days. - Oral    Sent to pharmacy as: Sulfamethoxazole-Trimethoprim 800-160 MG Oral Tablet (Bactrim DS)    E-Prescribing Status: Receipt confirmed by pharmacy (10/25/2024  1:36 PM CDT)        Discharge medications reviewed/discussed/and reconciled against outpatient medications with patient.  Any changes or updates to medications sent to primary care provider.  Patient  Acknowledged      Diagnosis specifics:      PT/WB status: Patient can be fully weightbearing as tolerated to left lower extremity in a surgical shoe. Elevate affected foot in pillows above heart level to prevent swelling when laying down. Monitor affected foot or incision for any signs of infection such as severe redness, inflammation or presence of pus like drainage coming out of your incision or fever more than a 100F. Follow with your surgeon as recommended.        Follow-up Appointments:  Your appointments       Date & Time Appointment Department ([place])    Nov 15, 2024 10:45 AM CST Injection with Behar, Alex, MD [facility] ([facility])        Nov 21, 2024 9:45 AM CST Follow Up Visit with Brittany Shah MD [facility] ([facility])        Dec 09, 2024 10:00 AM CST Physical - Established with Alvino Wallace MD [facility] ([facility])              [address] [address]  [phone] [place] [id number] [facility] [id number] [phone] [address] [id number] [phone]            Transitional Care Clinic  Was TCC Ordered: No      Primary Care Provider (If no TCC appointment)  Does patient already have a PCP appointment scheduled? Yes  Nurse Care Manager Scheduled PCP office TCM appointment with patient     Specialist  Does the patient have any other follow-up appointment(s) that need to be scheduled? Yes-- Patient has called to schedule and is awaiting a call back   Follow up With Specialties Details Why Contact Love Woodard  Eri Grimes DPM Surgery, Foot & Ankle, PODIATRIST Schedule an appointment as soon as possible for a visit in 2 week(s) For wound re-check; surgical follow up [address] [facility]  [address]  [facility] [id number] [phone]        -If yes: Nurse Care Manager reviewed upcoming specialist appointments with patient: Yes   -Does the patient need assistance scheduling appointment(s): No    CCM referral placed:  No

## 2024-10-28 NOTE — TELEPHONE ENCOUNTER
Pt was advised to make an appt in 1wk however per AVS it states 2 weeks for wound check.    Pt can be reached at [phone]

## 2024-10-28 NOTE — TELEPHONE ENCOUNTER
Eri beltran nurse care mgr from [facility] ([phone]) calling to verify if pt is suppose to change bandages on his wound for himself or should he come into the office and have the staff do this. 2nd concern is that pt has a prescription of bactrim antibiotic at  and doesn't need to use the one that dr nicole sent to the pharmacy. Pt has no transportation to p/u meds.

## 2024-10-28 NOTE — PAYOR COMM NOTE
--------------  DISCHARGE REVIEW    Payor: [employer]  Subscriber #:  [id number]  Authorization Number: [id number]    Admit date: 10/22/24  Admit time:   6:38 PM  Discharge Date: 10/25/2024  3:01 PM     Admitting Physician: Clifford Osorio MD  Attending Physician:  No att. providers found  Primary Care Physician: Alvino Wallace MD

## 2024-10-28 NOTE — TELEPHONE ENCOUNTER
Patient called for refill/90 day supply for [facility]:       Current Outpatient Medications:       Tirzepatide (MOUNJARO) 15 MG/0.5ML Subcutaneous Solution Pen-injector, Inject 15 mg into the skin once a week. MAXIMUM DOSE. STOP OZEMPIC IF YOU START THIS., Disp: 6 mL, Rfl: 9

## 2024-10-28 NOTE — TELEPHONE ENCOUNTER
Endocrine Refill protocol for oral and injectable diabetic medications    Protocol Criteria:  PASSED  Reason: N/A    If all below requirements are met, send a 90-day supply with 1 refill per provider protocol.    Verify appointment with Endocrinology completed in the last 6 months or scheduled in the next 3 months.  Verify A1C has been completed within the last 6 months and is below 8.5%     Last completed office visit: 8/20/2024 Brittany Shah MD   Next scheduled Follow up:   Future Appointments   Date Time Provider Department Center   10/30/2024 10:00 AM Alvino Wallace MD [facility] [place]    11/15/2024 10:45 AM Behar, Alex, MD [facility]   11/21/2024  9:45 AM Brittany Shah MD [facility] [place] [facility]   12/9/2024 10:00 AM Alvino Wallace MD [facility]      Last A1c result: Last A1c value was 7.3% done 8/20/2024.    90 day + 1 refill       See message below, pt would like [facility], no longer to [facility]

## 2024-10-28 NOTE — TELEPHONE ENCOUNTER
Consulted with provider and patient scheduled post op on 10/29/24 at 5:15 pm. Location given, patient familiar with location

## 2024-10-29 NOTE — PROGRESS NOTES
Reason for Visit      Mason Puri is a 64 year old male presents today complaining of left third digit ulceration.     History of Present Illness     Patient presents to clinic today after last being seen by podiatry on 9/12/2024 for management of the left third digit ulceration.  At that time an x-ray was ordered to rule out osteomyelitis and patient was encouraged to follow-up in 4 weeks.  Radiographs noted questionable subtle age-indeterminate erosive changes of the distal third phalangeal tuft.  It was suggested at this time that could not rule out osteomyelitis and an MRI was recommended.  Patient contacted the office last week requesting a follow-up appointment.      Patient status post left third digit partial toe amputation,  (DOS: 10/24/24). Patient was subsequently discharged from the hospital on 10/25/2024 on Bactrim antibiotic.  Patient's pathology came back osteomyelitis with a negative clearance margin.  Wound cultures came back positive for normal skin bruno.  Patient has left the dressing on since his surgery and being discharged from the hospital on the 25th.    The following portions of the patient's history were reviewed and updated as appropriate: allergies, current medications, past family history, past medical history, past social history, past surgical history and problem list.    Allergies[1]      Current Outpatient Medications:     Tirzepatide (MOUNJARO) 15 MG/0.5ML Subcutaneous Solution Auto-injector, Inject 15 mg into the skin once a week., Disp: 6 mL, Rfl: 1    sulfamethoxazole-trimethoprim -160 MG Oral Tab per tablet, Take 1 tablet by mouth 2 (two) times daily for 7 days., Disp: 14 tablet, Rfl: 0    HYDROcodone-acetaminophen 5-325 MG Oral Tab, Take 1 tablet by mouth every 4 (four) hours as needed. (Patient not taking: Reported on 10/28/2024), Disp: 24 tablet, Rfl: 0    Multiple Vitamins-Minerals (MULTIVITAMIN MEN 50+) Oral Tab, Take 1 tablet by mouth daily., Disp: ,  Rfl:     pantoprazole 40 MG Oral Tab EC, Take 1 tablet (40 mg total) by mouth every morning before breakfast., Disp: 90 tablet, Rfl: 3    metoprolol tartrate 25 MG Oral Tab, Take 1 tablet (25 mg total) by mouth 2 (two) times daily. FOR HEART., Disp: 30 tablet, Rfl: 0    rosuvastatin 40 MG Oral Tab, Take 1 tablet (40 mg total) by mouth nightly. FOR CHOLESTEROL., Disp: 90 tablet, Rfl: 9    Tirzepatide (MOUNJARO) 15 MG/0.5ML Subcutaneous Solution Pen-injector, Inject 15 mg into the skin once a week. MAXIMUM DOSE. STOP OZEMPIC IF YOU START THIS., Disp: 6 mL, Rfl: 9    valsartan 160 MG Oral Tab, Take 1 tablet (160 mg total) by mouth daily. FOR BLOOD PRESSURE., Disp: 90 tablet, Rfl: 9    levothyroxine 125 MCG Oral Tab, Take 1 tablet (125 mcg total) by mouth at bedtime., Disp: 90 tablet, Rfl: 1    glipiZIDE 5 MG Oral Tab, Take 1 tablet (5 mg total) by mouth every morning before breakfast. FOR DIABETES. STOP/DO NOT TAKE IF FASTING SUGAR IS LESS THAN 100., Disp: 90 tablet, Rfl: 2    metFORMIN HCl 1000 MG Oral Tab, Take 1 tablet (1,000 mg total) by mouth 2 (two) times daily with meals., Disp: 180 tablet, Rfl: 0    pregabalin 100 MG Oral Cap, Take 1 capsule (100 mg total) by mouth 2 (two) times daily. FOR NERVE PAIN. Dose reduction due to lethargy, Disp: 180 capsule, Rfl: 3    aspirin (ASPIRIN 81) 81 MG Oral Tab EC, Take 1 tablet (81 mg total) by mouth daily. FOR HEART., Disp: 90 tablet, Rfl: 9    DULoxetine 30 MG Oral Cap DR Particles, Take 1 capsule (30 mg total) by mouth daily. TAKE 1 CAPSULE IN THE MORNING FOR ANXIETY/DEPRESSION/ARTHRITIS PAIN, Disp: 90 capsule, Rfl: 9    finasteride 5 MG Oral Tab, Take 1 tablet (5 mg total) by mouth daily. FOR PROSTATE., Disp: 90 tablet, Rfl: 9    tamsulosin 0.4 MG Oral Cap, Take 2 capsules (0.8 mg total) by mouth daily. FOR PROSTATE., Disp: 180 capsule, Rfl: 9    ticagrelor 90 MG Oral Tab, Take 1 tablet (90 mg total) by mouth every 12 (twelve) hours. FOR HEART STENTS., Disp: 180 tablet,  Rfl: 9    There are no discontinued medications.    Patient Active Problem List   Diagnosis    Class 2 severe obesity due to excess calories with serious comorbidity and body mass index (BMI) of 38.0 to 38.9 in adult (HCC)    Hypertension associated with type 2 diabetes mellitus (HCC)    Hyperlipidemia associated with type 2 diabetes mellitus (HCC)    Type 2 diabetes mellitus with diabetic peripheral angiopathy without gangrene, without long-term current use of insulin (HCC)    Hypothyroidism    History of right-sided carotid endarterectomy    Detrusor overactivity    Primary osteoarthritis of right knee    Gastroesophageal reflux disease with esophagitis    Varicose veins of left lower extremity with inflammation, with ulcer of ankle limited to breakdown of skin (HCC)    Patellofemoral arthritis    Venous stasis dermatitis of both lower extremities    Non-pressure chronic ulcer of left lower leg, limited to breakdown of skin (HCC)    Major depressive disorder    BPH with obstruction/lower urinary tract symptoms    Transient ischemic attack (TIA)    Left carotid artery stenosis    Cerebellar infarct (HCC)    Abnormal Holter monitor finding    Leg wound, left    S/P drug eluting coronary stent placement    Neurogenic claudication    JUAN A (obstructive sleep apnea)       Past Medical History:    Benign head tremor    BPH (benign prostatic hyperplasia)    Diabetes (HCC)    Diverticulitis    Former smoker    Hyperlipidemia    Hypertension    Neuropathic pain    Non-pressure chronic ulcer of right lower leg, limited to breakdown of skin (HCC)    Obesity    Snoring    Thyroid disease       Past Surgical History:   Procedure Laterality Date    Carotid endarterectomy Right 04/29/2021    Surgeon: Dr. Rajiv Solorio at [facility]    Neck/chest procedure unlisted      per patient, a blockage was removed from the right side of his neck in 8/2021    Other surgical history  2017    Small bowel Res.    Other surgical history  11/12/2019     Colectomy       Family History   Problem Relation Age of Onset    Cancer Father         Prostate    Heart Disorder Father     Cancer Mother        Social History     Occupational History    Not on file   Tobacco Use    Smoking status: Former     Current packs/day: 0.00     Types: Cigarettes     Quit date:      Years since quittin.8     Passive exposure: Past    Smokeless tobacco: Never   Vaping Use    Vaping status: Never Used   Substance and Sexual Activity    Alcohol use: Never    Drug use: Never    Sexual activity: Not on file       ROS      Constitutional: negative for chills, fevers and sweats  Gastrointestinal: negative for abdominal pain, diarrhea, nausea and vomiting  Genitourinary:negative for dysuria and hematuria  Musculoskeletal:negative for arthralgias and muscle weakness  Neurological: negative for paresthesia and weakness  All others reviewed and negative.      Physical Exam     LE PHYSICAL EXAM  Constitution: Well-developed and well-nourished. Gait appears normal. No apparent distress. Alert and oriented to person, place, and time.  Integument: There are no varicosities. Skin appears moist, warm, and supple with positive hair growth. There are no color changes. No open lesions. No macerations, No Hyperkeratotic lesions.  Vascular examination: Dorsalis pedis and posterior tibial pulses are strong bilaterally with capillary filling time less than 3 seconds to all digits. There is no peripheral edema..  Neurological Sensorium: Grossly intact to sharp/dull. Vibratory: Intact.  Musculoskeletal:   5/5 pedal muscle strength b/l     Incision well adhered with sutures intact.  No fluctuance drainage or ascending cellulitis noted mild edema noted.  No ascending cellulitis signs of infection noted.  Assessment and Plan     status post left third digit partial toe amputation,  (DOS: 10/24/24). Patient was subsequently discharged from the hospital on 10/25/2024 on Bactrim antibiotic.  Patient's pathology  came back osteomyelitis with a negative clearance margin.  Wound cultures came back positive for normal skin bruno.  Patient has left the dressing on since his surgery and being discharged from the hospital on the 25th.    -Performed a dressing change at today's office visit.  Patient to keep clean dry and intact until his follow-up appointment next week patient was given a prescription for a walking boot which was fitted today as he does not like the surgical shoe.  Patient to complete course of Bactrim that was discharged with him on his hospital mission on the 25th.  Patient to keep clean dry and intact cover when showering.  Patient to follow-up in 1 week for suture removal.    Patient was instructed to call the office or on-call podiatric physician immediately with any issues or concerns before the next scheduled visit. Patient to follow-up in clinic in 2 to 3 days      Eri Gallardo DPM, CARISA.WALT, FACBHANU  Diplomat, American Board of Foot and Ankle Surgery  Certified in Foot and Rearfoot/Ankle Reconstruction  Fellow of the American College of Foot and Ankle Surgeons  Fellowship Trained Foot and Ankle Surgeon   [facility]     10/21/2024    6:41 AM         [1]   Allergies  Allergen Reactions    Empagliflozin OTHER (SEE COMMENTS)     Frequent urination, unbearable.    Penicillins UNKNOWN     Patient does not recall reaction    Other UNKNOWN

## 2024-10-30 NOTE — TELEPHONE ENCOUNTER
Pt came in and was wondering if there was any update on his lab results for his thyroid.     Please Advise.

## 2024-10-30 NOTE — TELEPHONE ENCOUNTER
Patient called and says he needs stiches taken out with Dr Woodard and was told to schedule appt on 11/8. Please advise what time patient can be seen at.

## 2024-10-30 NOTE — TELEPHONE ENCOUNTER
Eri Woodard, LUIS  You49 minutes ago (8:37 AM)     JK  If we can double book at the 3:30 since that is a 30 min visit already

## 2024-10-30 NOTE — PROGRESS NOTES
Subjective:   Mason Puri is a 64 year old male who presents for hospital follow up.   He was discharged from [facility] to Home or Self Care  Admission Date: 10/22/24   Discharge Date: 10/25/24  Hospital Discharge Diagnosis: Other acute osteomyelitis of left foot (HCC) [M86.172]    Interactive contact within 2 business days post discharge first initiated on Date: 10/28/2024    During the visit, the following was completed:  Obtained and reviewed discharge summary, continuity of care documents, Hospitalist notes, and Surgery Notes  Reviewed Labs (CBC, CMP), Labs (Cardiac markers), Labs (Pathology), Labs (Microbiology), CT radiology results, X-Ray radiology results, EKG, Echocardiogram, and Operative reports: Surgery    HPI  64-year-old gentleman presents for hospital follow-up.  He has type 2 diabetes, he was being followed by podiatry, he had a superficial ulcer on his left third toe, this was treated however this continued to worsen, he was admitted and found of osteomyelitis, treated with IV antibiotics, had a partial amputation of his left third toe.  He followed up with podiatry yesterday, notes reviewed and appreciated the wound was redressed.  Today he is doing well, no complaints, we reviewed his medications which were unchanged.  He is currently on Bactrim and having no side effects.    Discharge Summary  Antonio Watkins MD (Physician)  Hospitalist  [facility]  part of [facility]     Discharge Summary           Mason Puri Patient Status:  Inpatient    1960 MRN [id number]   Location [facility] 4W/SW/SE Attending Antonio Watkins MD   Hosp Day # 3 PCP Alvino Wallace MD      Date of Admission: 10/22/2024 Disposition: Home or Self Care      Date of Discharge: 10/25/2024     Admitting Diagnosis: Other acute osteomyelitis of left foot (HCC) [M86.172]     Hospital Discharge Diagnoses:   Left third toe osteomyelitis     DM     HTN     CKD  stage 2     Morbid obesity, possible JUAN A     CAD      Hx of TIA        Lace+ Score: 67  59-90 High Risk  29-58 Medium Risk  0-28   Low Risk.     TCM Follow-Up Recommendation:  LACE > 58: High Risk of readmission after discharge from the hospital.        Problem List:       Patient Active Problem List   Diagnosis    Class 2 severe obesity due to excess calories with serious comorbidity and body mass index (BMI) of 38.0 to 38.9 in adult ([facility])    Hypertension associated with type 2 diabetes mellitus ([facility])    Hyperlipidemia associated with type 2 diabetes mellitus ([facility])    Type 2 diabetes mellitus with diabetic peripheral angiopathy without gangrene, without long-term current use of insulin ([facility])    Hypothyroidism    History of right-sided carotid endarterectomy    Detrusor overactivity    Primary osteoarthritis of right knee    Gastroesophageal reflux disease with esophagitis    Varicose veins of left lower extremity with inflammation, with ulcer of ankle limited to breakdown of skin ([facility])    Patellofemoral arthritis    Venous stasis dermatitis of both lower extremities    Non-pressure chronic ulcer of left lower leg, limited to breakdown of skin ([facility])    Major depressive disorder    BPH with obstruction/lower urinary tract symptoms    Transient ischemic attack (TIA)    Left carotid artery stenosis    Cerebellar infarct ([facility])    Abnormal Holter monitor finding    Leg wound, left    S/P drug eluting coronary stent placement    Neurogenic claudication    JUAN A (obstructive sleep apnea)         Reason for Admission: left third toe distal osteo     Physical Exam:   General appearance: alert, appears stated age and cooperative  Pulmonary:  clear to auscultation bilaterally  Cardiovascular: S1, S2 normal, no murmur, click, rub or gallop, regular rate and rhythm  Abdominal: soft, non-tender; bowel sounds normal; no masses,  no organomegaly  Extremities: extremities normal, atraumatic, no cyanosis or edema  Psychiatric: calm         History of Present Illness: The patient is a 64-year-old  male who had callus formation at the tip of his left third toe that was removed by his podiatrist and ever since he had been having an ulcer that is not healing. Cultures done recently showed group B streptococcus. Today, he had an MRI scan of his left foot which showed osteomyelitis involving the mid to distal shaft and tuft of the third toe distal phalanx. Reactive marrow edema throughout the entirety of the third toe distal phalanx. Sent to the emergency department for evaluation. CBC and chemistry unremarkable. GFR is 51 which is at baseline. Started on IV Rocephin and vancomycin. He will be admitted to the hospital for further management.      Hospital Course: Left third toe osteomyelitis  - podiatry on consult   - ID consult for abx recommendations  - s/p left third digit partial toe amputation 10/23/24  - dressing changed by podiatry today     DM  - accuchecks, ISS, adjust insulin prn     HTN  - stable, monitor vitals and adjust prn     CKD stage 2  - monitor bmp     Morbid obesity, possible JUAN A  - BMI 40  - fu as outpt     CAD   - s/p LAD and RCA stents     Hx of TIA  - fu as outpt                Consultations: ID     Procedures: left third digit partial toe amputation on 10/23     Complications: none     Discharge Condition: Good     Discharge Medications:       Discharge Medications          START taking these medications         Instructions Prescription details   HYDROcodone-acetaminophen 5-325 MG Tabs  Commonly known as: Norco       Take 1 tablet by mouth every 4 (four) hours as needed.    Quantity: 24 tablet  Refills: 0                CONTINUE taking these medications         Instructions Prescription details   aspirin 81 MG Tbec  Commonly known as: Aspirin 81       Take 1 tablet (81 mg total) by mouth daily. FOR HEART.    Quantity: 90 tablet  Refills: 9      DULoxetine 30 MG Cpep  Commonly known as: Cymbalta       Take 1 capsule  (30 mg total) by mouth daily. TAKE 1 CAPSULE IN THE MORNING FOR ANXIETY/DEPRESSION/ARTHRITIS PAIN    Quantity: 90 capsule  Refills: 9      finasteride 5 MG Tabs  Commonly known as: Proscar       Take 1 tablet (5 mg total) by mouth daily. FOR PROSTATE.    Quantity: 90 tablet  Refills: 9      glipiZIDE 5 MG Tabs  Commonly known as: Glucotrol       Take 1 tablet (5 mg total) by mouth every morning before breakfast. FOR DIABETES. STOP/DO NOT TAKE IF FASTING SUGAR IS LESS THAN 100.    Quantity: 90 tablet  Refills: 2      levothyroxine 125 MCG Tabs  Commonly known as: Synthroid       Take 1 tablet (125 mcg total) by mouth at bedtime.    Quantity: 90 tablet  Refills: 1      metFORMIN HCl 1000 MG Tabs  Commonly known as: GLUCOPHAGE       Take 1 tablet (1,000 mg total) by mouth 2 (two) times daily with meals.    Quantity: 180 tablet  Refills: 0      metoprolol tartrate 25 MG Tabs  Commonly known as: Lopressor       Take 1 tablet (25 mg total) by mouth 2 (two) times daily. FOR HEART.    Quantity: 30 tablet  Refills: 0      Mounjaro 15 MG/0.5ML Sopn  Generic drug: Tirzepatide       Inject 15 mg into the skin once a week. MAXIMUM DOSE. STOP OZEMPIC IF YOU START THIS.    Quantity: 6 mL  Refills: 9      Multivitamin Men 50+ Tabs       Take 1 tablet by mouth daily.    Refills: 0      pantoprazole 40 MG Tbec  Commonly known as: Protonix       Take 1 tablet (40 mg total) by mouth every morning before breakfast.    Quantity: 90 tablet  Refills: 3      pregabalin 100 MG Caps  Commonly known as: Lyrica       Take 1 capsule (100 mg total) by mouth 2 (two) times daily. FOR NERVE PAIN. Dose reduction due to lethargy    Quantity: 180 capsule  Refills: 3      rosuvastatin 40 MG Tabs  Commonly known as: Crestor       Take 1 tablet (40 mg total) by mouth nightly. FOR CHOLESTEROL.    Quantity: 90 tablet  Refills: 9      sulfamethoxazole-trimethoprim -160 MG Tabs per tablet  Commonly known as: Bactrim DS       Take 1 tablet by mouth 2  (two) times daily for 7 days.    Stop taking on: November 1, 2024  Quantity: 14 tablet  Refills: 0      tamsulosin 0.4 MG Caps  Commonly known as: Flomax       Take 2 capsules (0.8 mg total) by mouth daily. FOR PROSTATE.    Quantity: 180 capsule  Refills: 9      ticagrelor 90 MG Tabs  Commonly known as: Brilinta       Take 1 tablet (90 mg total) by mouth every 12 (twelve) hours. FOR HEART STENTS.    Quantity: 180 tablet  Refills: 9      valsartan 160 MG Tabs  Commonly known as: Diovan       Take 1 tablet (160 mg total) by mouth daily. FOR BLOOD PRESSURE.    Quantity: 90 tablet  Refills: 9                STOP taking these medications       mupirocin 2 % Oint  Commonly known as: Bactroban                       Where to Get Your Medications          These medications were sent to [id number] - [place], [place] - [address] AT [facility] & [address], [phone], [phone]  [address], [facility] [address]        Phone: [phone]   HYDROcodone-acetaminophen 5-325 MG Tabs  sulfamethoxazole-trimethoprim -160 MG Tabs per tablet            Follow up Visits: Follow-up with  in 1 week     Follow up Labs:       Other Discharge Instructions:      Antonio Watkins MD  10/25/2024  2:06 PM     > 35 min           Other Notes  All notes      H&P from Clifford Osorio MD (Hospitalist)           Consults from George Vance MD (Infectious Disease)           Consults from Eri Woodard DPM (Podiatry)        Additional Orders and Documentation      Results           Meds           Orders           Flowsheets      Encounter Info: History,     Allergies,     Education,     Care Plan,     Detailed Report     Communications    View All Conversations on this Encounter    Letter sent to Mason Puri    Sent 10/23/2024 by Dahiana Medina RN       Letter sent to Mason Puri    Sent 10/23/2024 by Dahiana Medina, [facility]  Media  From this encounter  Scan on  10/22/2024   Scan on 10/22/2024   Scan on 10/22/2024   Scan on 10/22/2024   Scan on 10/22/2024   Scan on 10/24/2024 8:52 AM by Eri Woodard DPM   Scan on 10/24/2024 8:52 AM by Eri Woodard DPM   Scan on 10/23/2024 8:04 AM by Marci Strange RN: Wound 10/22/24 Toe (Comment which one) Dorsal;Left   Electronic signature on 10/22/2024 5:20 PM - E-signed   Electronic signature on 10/22/2024 5:20 PM - E-signed       History/Other:   Current Medications:  Medication Reconciliation:  I am aware of an inpatient discharge within the last 30 days.  The discharge medication list has been reconciled with the patient's current medication list and reviewed by me.  See medication list for additions of new medication, and changes to current doses of medications and discontinued medications.  Outpatient Medications Marked as Taking for the 10/30/24 encounter (Office Visit) with Alvino Wallace MD   Medication Sig    Tirzepatide (MOUNJARO) 15 MG/0.5ML Subcutaneous Solution Auto-injector Inject 15 mg into the skin once a week. FOR DIABETES. Continuation of therapy.    sulfamethoxazole-trimethoprim -160 MG Oral Tab per tablet Take 1 tablet by mouth 2 (two) times daily for 7 days.    Multiple Vitamins-Minerals (MULTIVITAMIN MEN 50+) Oral Tab Take 1 tablet by mouth daily.    pantoprazole 40 MG Oral Tab EC Take 1 tablet (40 mg total) by mouth every morning before breakfast.    metoprolol tartrate 25 MG Oral Tab Take 1 tablet (25 mg total) by mouth 2 (two) times daily. FOR HEART.    rosuvastatin 40 MG Oral Tab Take 1 tablet (40 mg total) by mouth nightly. FOR CHOLESTEROL.    valsartan 160 MG Oral Tab Take 1 tablet (160 mg total) by mouth daily. FOR BLOOD PRESSURE.    levothyroxine 125 MCG Oral Tab Take 1 tablet (125 mcg total) by mouth at bedtime.    glipiZIDE 5 MG Oral Tab Take 1 tablet (5 mg total) by mouth every morning before breakfast. FOR DIABETES. STOP/DO NOT TAKE IF FASTING SUGAR IS LESS THAN 100.    metFORMIN  HCl 1000 MG Oral Tab Take 1 tablet (1,000 mg total) by mouth 2 (two) times daily with meals.    pregabalin 100 MG Oral Cap Take 1 capsule (100 mg total) by mouth 2 (two) times daily. FOR NERVE PAIN. Dose reduction due to lethargy    aspirin (ASPIRIN 81) 81 MG Oral Tab EC Take 1 tablet (81 mg total) by mouth daily. FOR HEART.    DULoxetine 30 MG Oral Cap DR Particles Take 1 capsule (30 mg total) by mouth daily. TAKE 1 CAPSULE IN THE MORNING FOR ANXIETY/DEPRESSION/ARTHRITIS PAIN    finasteride 5 MG Oral Tab Take 1 tablet (5 mg total) by mouth daily. FOR PROSTATE.    tamsulosin 0.4 MG Oral Cap Take 2 capsules (0.8 mg total) by mouth daily. FOR PROSTATE.    ticagrelor 90 MG Oral Tab Take 1 tablet (90 mg total) by mouth every 12 (twelve) hours. FOR HEART STENTS.       Review of Systems      Objective:   Physical Exam  Vitals and nursing note reviewed.   Constitutional:       General: He is not in acute distress.     Appearance: Normal appearance.   HENT:      Head: Normocephalic.      Right Ear: External ear normal.      Left Ear: External ear normal.   Eyes:      Extraocular Movements: Extraocular movements intact.      Conjunctiva/sclera: Conjunctivae normal.   Pulmonary:      Effort: Pulmonary effort is normal.   Musculoskeletal:         General: Normal range of motion.      Cervical back: Normal range of motion and neck supple.   Skin:     Coloration: Skin is not jaundiced.      Findings: Lesion (He is currently in a boot, the dressing was not removed as this was evaluated last evening by podiatry services.) present.   Neurological:      General: No focal deficit present.      Mental Status: He is alert and oriented to person, place, and time. Mental status is at baseline.   Psychiatric:         Mood and Affect: Mood normal.         Behavior: Behavior normal.          /73   Pulse 73   Ht 6' 2\" (1.88 m)   Wt (!) 313 lb (142 kg)   SpO2 95%   BMI 40.19 kg/m²  Estimated body mass index is 40.19 kg/m² as  calculated from the following:    Height as of this encounter: 6' 2\" (1.88 m).    Weight as of this encounter: 313 lb (142 kg).    Assessment & Plan:   1. Hospital discharge follow-up (Primary)  -     Podiatry Referral  2. History of partial amputation of toe of left foot (HCC)  -     Podiatry Referral  3. Type 2 diabetes mellitus with diabetic peripheral angiopathy without gangrene, without long-term current use of insulin (HCC)  -     Podiatry Referral  -     Mounjaro; Inject 15 mg into the skin once a week. FOR DIABETES. Continuation of therapy.  Dispense: 6 mL; Refill: 9  -     Hemoglobin A1C  4. Hypothyroidism, unspecified type  -     TSH W Reflex To Free T4  5. Osteomyelitis of third toe of left foot (HCC)  6. Hypertension associated with type 2 diabetes mellitus ([facility])  7. Hyperlipidemia associated with type 2 diabetes mellitus ([facility])  Plan  Overall doing well since his discharge, he will maintain glycemic control, continue follow-up with podiatry, will refill his Mounjaro and check labs today.  Apparently has to go to [facility] now not [facility].  Further recommendation pending on progress.          Return in about 6 weeks (around 12/11/2024) for MEDICARE ANNUAL. ARRIVE 15 MINUTES EARLY..

## 2024-10-31 NOTE — TELEPHONE ENCOUNTER
Dr. Shah -- pt wants to know recent thyroid lab results, pt had [facility]. Results printed and placed in your folder to review.     Pt notified that we will be routing to provider for review.

## 2024-11-01 NOTE — TELEPHONE ENCOUNTER
Please call pt with work up result     10/9/2024   Ft4 1.2  Tsh 0.39    Will repeat TSH before next visit   Will discuss more next visit   Thanks

## 2024-11-04 NOTE — TELEPHONE ENCOUNTER
Called pt and provided the below result. Pt verbalized understanding and stated he will get lab done prior to appt 11/21.

## 2024-11-08 NOTE — PROGRESS NOTES
Reason for Visit      Mason Puri is a 64 year old male presents today complaining of left third digit ulceration.     History of Present Illness     Patient presents to clinic today after last being seen by podiatry on 9/12/2024 for management of the left third digit ulceration.  At that time an x-ray was ordered to rule out osteomyelitis and patient was encouraged to follow-up in 4 weeks.  Radiographs noted questionable subtle age-indeterminate erosive changes of the distal third phalangeal tuft.  It was suggested at this time that could not rule out osteomyelitis and an MRI was recommended.  Patient contacted the office last week requesting a follow-up appointment.      Patient status post left third digit partial toe amputation,  (DOS: 10/24/24). Patient was subsequently discharged from the hospital on 10/25/2024 on Bactrim antibiotic.  Patient's pathology came back osteomyelitis with a negative clearance margin.  Wound cultures came back positive for normal skin bruno.  Patient has left the dressing on since his surgery and being discharged from the hospital on the 25th.    Patient returns to clinic today for his 2-week postop appointment.  Patient currently not on any antibiotics.  Patient scheduled to have a nerve ablation into his right knee next week.    The following portions of the patient's history were reviewed and updated as appropriate: allergies, current medications, past family history, past medical history, past social history, past surgical history and problem list.    Allergies[1]      Current Outpatient Medications:     Tirzepatide (MOUNJARO) 15 MG/0.5ML Subcutaneous Solution Auto-injector, Inject 15 mg into the skin once a week. FOR DIABETES. Continuation of therapy., Disp: 6 mL, Rfl: 9    HYDROcodone-acetaminophen 5-325 MG Oral Tab, Take 1 tablet by mouth every 4 (four) hours as needed., Disp: 24 tablet, Rfl: 0    Multiple Vitamins-Minerals (MULTIVITAMIN MEN 50+) Oral Tab, Take 1  tablet by mouth daily., Disp: , Rfl:     pantoprazole 40 MG Oral Tab EC, Take 1 tablet (40 mg total) by mouth every morning before breakfast., Disp: 90 tablet, Rfl: 3    metoprolol tartrate 25 MG Oral Tab, Take 1 tablet (25 mg total) by mouth 2 (two) times daily. FOR HEART., Disp: 30 tablet, Rfl: 0    rosuvastatin 40 MG Oral Tab, Take 1 tablet (40 mg total) by mouth nightly. FOR CHOLESTEROL., Disp: 90 tablet, Rfl: 9    valsartan 160 MG Oral Tab, Take 1 tablet (160 mg total) by mouth daily. FOR BLOOD PRESSURE., Disp: 90 tablet, Rfl: 9    levothyroxine 125 MCG Oral Tab, Take 1 tablet (125 mcg total) by mouth at bedtime., Disp: 90 tablet, Rfl: 1    glipiZIDE 5 MG Oral Tab, Take 1 tablet (5 mg total) by mouth every morning before breakfast. FOR DIABETES. STOP/DO NOT TAKE IF FASTING SUGAR IS LESS THAN 100., Disp: 90 tablet, Rfl: 2    metFORMIN HCl 1000 MG Oral Tab, Take 1 tablet (1,000 mg total) by mouth 2 (two) times daily with meals., Disp: 180 tablet, Rfl: 0    pregabalin 100 MG Oral Cap, Take 1 capsule (100 mg total) by mouth 2 (two) times daily. FOR NERVE PAIN. Dose reduction due to lethargy, Disp: 180 capsule, Rfl: 3    aspirin (ASPIRIN 81) 81 MG Oral Tab EC, Take 1 tablet (81 mg total) by mouth daily. FOR HEART., Disp: 90 tablet, Rfl: 9    DULoxetine 30 MG Oral Cap DR Particles, Take 1 capsule (30 mg total) by mouth daily. TAKE 1 CAPSULE IN THE MORNING FOR ANXIETY/DEPRESSION/ARTHRITIS PAIN, Disp: 90 capsule, Rfl: 9    finasteride 5 MG Oral Tab, Take 1 tablet (5 mg total) by mouth daily. FOR PROSTATE., Disp: 90 tablet, Rfl: 9    tamsulosin 0.4 MG Oral Cap, Take 2 capsules (0.8 mg total) by mouth daily. FOR PROSTATE., Disp: 180 capsule, Rfl: 9    ticagrelor 90 MG Oral Tab, Take 1 tablet (90 mg total) by mouth every 12 (twelve) hours. FOR HEART STENTS., Disp: 180 tablet, Rfl: 9    There are no discontinued medications.    Patient Active Problem List   Diagnosis    Class 2 severe obesity due to excess calories with  serious comorbidity and body mass index (BMI) of 38.0 to 38.9 in adult (HCC)    Hypertension associated with type 2 diabetes mellitus (HCC)    Hyperlipidemia associated with type 2 diabetes mellitus (HCC)    Type 2 diabetes mellitus with diabetic peripheral angiopathy without gangrene, without long-term current use of insulin (HCC)    Hypothyroidism    History of right-sided carotid endarterectomy    Detrusor overactivity    Primary osteoarthritis of right knee    Gastroesophageal reflux disease with esophagitis    Varicose veins of left lower extremity with inflammation, with ulcer of ankle limited to breakdown of skin (HCC)    Patellofemoral arthritis    Venous stasis dermatitis of both lower extremities    Major depressive disorder    BPH with obstruction/lower urinary tract symptoms    Transient ischemic attack (TIA)    Left carotid artery stenosis    Cerebellar infarct ([facility])    Abnormal Holter monitor finding    S/P drug eluting coronary stent placement    Neurogenic claudication    JUAN A (obstructive sleep apnea)    Osteomyelitis of third toe of left foot ([facility])       Past Medical History:    Benign head tremor    BPH (benign prostatic hyperplasia)    Diabetes (HCC)    Diverticulitis    Former smoker    Hyperlipidemia    Hypertension    Neuropathic pain    Non-pressure chronic ulcer of right lower leg, limited to breakdown of skin (HCC)    Obesity    Snoring    Thyroid disease       Past Surgical History:   Procedure Laterality Date    Carotid endarterectomy Right 04/29/2021    Surgeon: Dr. Rajiv Sol[facility] at [facility]    Neck/chest procedure unlisted      per patient, a blockage was removed from the right side of his neck in 8/2021    Other surgical history  2017    Small bowel Res.    Other surgical history  11/12/2019    Colectomy       Family History   Problem Relation Age of Onset    Cancer Father         Prostate    Heart Disorder Father     Cancer Mother        Social History     Occupational History    Not on file    Tobacco Use    Smoking status: Former     Current packs/day: 0.00     Types: Cigarettes     Quit date:      Years since quittin.8     Passive exposure: Past    Smokeless tobacco: Never   Vaping Use    Vaping status: Never Used   Substance and Sexual Activity    Alcohol use: Never    Drug use: Never    Sexual activity: Not on file       ROS      Constitutional: negative for chills, fevers and sweats  Gastrointestinal: negative for abdominal pain, diarrhea, nausea and vomiting  Genitourinary:negative for dysuria and hematuria  Musculoskeletal:negative for arthralgias and muscle weakness  Neurological: negative for paresthesia and weakness  All others reviewed and negative.      Physical Exam     LE PHYSICAL EXAM  Constitution: Well-developed and well-nourished. Gait appears normal. No apparent distress. Alert and oriented to person, place, and time.  Integument: There are no varicosities. Skin appears moist, warm, and supple with positive hair growth. There are no color changes. No open lesions. No macerations, No Hyperkeratotic lesions.  Vascular examination: Dorsalis pedis and posterior tibial pulses are strong bilaterally with capillary filling time less than 3 seconds to all digits. There is no peripheral edema..  Neurological Sensorium: Grossly intact to sharp/dull. Vibratory: Intact.  Musculoskeletal:   5/5 pedal muscle strength b/l     Incision well adhered with sutures intact.  No fluctuance drainage or ascending cellulitis noted mild edema noted.  No ascending cellulitis signs of infection noted.  Assessment and Plan     status post left third digit partial toe amputation,  (DOS: 10/24/24). Patient was subsequently discharged from the hospital on 10/25/2024 on Bactrim antibiotic.  Patient's pathology came back osteomyelitis with a negative clearance margin.  Wound cultures came back positive for normal skin bruno.  Patient has left the dressing on since his surgery and being discharged from the  hospital on the 25th.    -Performed a dressing change at today's office visit.  Patient to keep clean dry and intact until his follow-up appointment next week patient was given a prescription for a walking boot which was fitted today as he does not like the surgical shoe.  Patient to complete course of Bactrim that was discharged with him on his hospital mission on the 25th.  Patient to keep clean dry and intact cover when showering.  Patient to follow-up in 1 week for suture removal.    Patient still cannot get the lower extremity wet at this time.  No further antibiotics needed.  Patient to follow-up in 1 week for suture removal.    Patient was instructed to call the office or on-call podiatric physician immediately with any issues or concerns before the next scheduled visit. Patient to follow-up in clinic in 2 to 3 days      Eri Gallardo DPM, CARISA.WALT, FACFAS  Diplomat, American Board of Foot and Ankle Surgery  Certified in Foot and Rearfoot/Ankle Reconstruction  Fellow of the American College of Foot and Ankle Surgeons  Fellowship Trained Foot and Ankle Surgeon   [facility]     10/21/2024    6:41 AM         [1]   Allergies  Allergen Reactions    Empagliflozin OTHER (SEE COMMENTS)     Frequent urination, unbearable.    Penicillins UNKNOWN     Patient does not recall reaction    Other UNKNOWN

## 2024-11-14 NOTE — H&P
Reason for Visit      Mason Puri is a 64 year old male presents today complaining of left third digit ulceration.     History of Present Illness     Patient presents to clinic today after last being seen by podiatry on 9/12/2024 for management of the left third digit ulceration.  At that time an x-ray was ordered to rule out osteomyelitis and patient was encouraged to follow-up in 4 weeks.  Radiographs noted questionable subtle age-indeterminate erosive changes of the distal third phalangeal tuft.  It was suggested at this time that could not rule out osteomyelitis and an MRI was recommended.  Patient contacted the office last week requesting a follow-up appointment.      Patient status post left third digit partial toe amputation,  (DOS: 10/24/24). Patient was subsequently discharged from the hospital on 10/25/2024 on Bactrim antibiotic.  Patient's pathology came back osteomyelitis with a negative clearance margin.  Wound cultures came back positive for normal skin bruno.  Patient has left the dressing on since his surgery and being discharged from the hospital on the 25th.    Patient returns to clinic today for his 3-week postop appointment.  Patient currently not on any antibiotics.  Patient scheduled to have a nerve ablation into his right knee this week.    The following portions of the patient's history were reviewed and updated as appropriate: allergies, current medications, past family history, past medical history, past social history, past surgical history and problem list.    Allergies[1]      Current Outpatient Medications:     Tirzepatide (MOUNJARO) 15 MG/0.5ML Subcutaneous Solution Auto-injector, Inject 15 mg into the skin once a week. FOR DIABETES. Continuation of therapy., Disp: 6 mL, Rfl: 9    Multiple Vitamins-Minerals (MULTIVITAMIN MEN 50+) Oral Tab, Take 1 tablet by mouth daily., Disp: , Rfl:     pantoprazole 40 MG Oral Tab EC, Take 1 tablet (40 mg total) by mouth every morning  before breakfast., Disp: 90 tablet, Rfl: 3    metoprolol tartrate 25 MG Oral Tab, Take 1 tablet (25 mg total) by mouth 2 (two) times daily. FOR HEART., Disp: 30 tablet, Rfl: 0    rosuvastatin 40 MG Oral Tab, Take 1 tablet (40 mg total) by mouth nightly. FOR CHOLESTEROL., Disp: 90 tablet, Rfl: 9    valsartan 160 MG Oral Tab, Take 1 tablet (160 mg total) by mouth daily. FOR BLOOD PRESSURE., Disp: 90 tablet, Rfl: 9    levothyroxine 125 MCG Oral Tab, Take 1 tablet (125 mcg total) by mouth at bedtime., Disp: 90 tablet, Rfl: 1    glipiZIDE 5 MG Oral Tab, Take 1 tablet (5 mg total) by mouth every morning before breakfast. FOR DIABETES. STOP/DO NOT TAKE IF FASTING SUGAR IS LESS THAN 100., Disp: 90 tablet, Rfl: 2    metFORMIN HCl 1000 MG Oral Tab, Take 1 tablet (1,000 mg total) by mouth 2 (two) times daily with meals., Disp: 180 tablet, Rfl: 0    pregabalin 100 MG Oral Cap, Take 1 capsule (100 mg total) by mouth 2 (two) times daily. FOR NERVE PAIN. Dose reduction due to lethargy, Disp: 180 capsule, Rfl: 3    aspirin (ASPIRIN 81) 81 MG Oral Tab EC, Take 1 tablet (81 mg total) by mouth daily. FOR HEART., Disp: 90 tablet, Rfl: 9    DULoxetine 30 MG Oral Cap DR Particles, Take 1 capsule (30 mg total) by mouth daily. TAKE 1 CAPSULE IN THE MORNING FOR ANXIETY/DEPRESSION/ARTHRITIS PAIN, Disp: 90 capsule, Rfl: 9    finasteride 5 MG Oral Tab, Take 1 tablet (5 mg total) by mouth daily. FOR PROSTATE., Disp: 90 tablet, Rfl: 9    tamsulosin 0.4 MG Oral Cap, Take 2 capsules (0.8 mg total) by mouth daily. FOR PROSTATE., Disp: 180 capsule, Rfl: 9    ticagrelor 90 MG Oral Tab, Take 1 tablet (90 mg total) by mouth every 12 (twelve) hours. FOR HEART STENTS., Disp: 180 tablet, Rfl: 9    Medications Discontinued During This Encounter   Medication Reason    HYDROcodone-acetaminophen 5-325 MG Oral Tab Therapy completed       Patient Active Problem List   Diagnosis    Class 2 severe obesity due to excess calories with serious comorbidity and body  mass index (BMI) of 38.0 to 38.9 in adult (HCC)    Hypertension associated with type 2 diabetes mellitus (HCC)    Hyperlipidemia associated with type 2 diabetes mellitus (HCC)    Type 2 diabetes mellitus with diabetic peripheral angiopathy without gangrene, without long-term current use of insulin (HCC)    Hypothyroidism    History of right-sided carotid endarterectomy    Detrusor overactivity    Primary osteoarthritis of right knee    Gastroesophageal reflux disease with esophagitis    Varicose veins of left lower extremity with inflammation, with ulcer of ankle limited to breakdown of skin (HCC)    Patellofemoral arthritis    Venous stasis dermatitis of both lower extremities    Major depressive disorder    BPH with obstruction/lower urinary tract symptoms    Transient ischemic attack (TIA)    Left carotid artery stenosis    Cerebellar infarct ([facility])    Abnormal Holter monitor finding    S/P drug eluting coronary stent placement    Neurogenic claudication    JUAN A (obstructive sleep apnea)    Osteomyelitis of third toe of left foot ([facility])       Past Medical History:    Benign head tremor    BPH (benign prostatic hyperplasia)    Diabetes ([facility])    Disorder of thyroid    Diverticulitis    Former smoker    High blood pressure    High cholesterol    Hyperlipidemia    Hypertension    Neuropathic pain    Non-pressure chronic ulcer of right lower leg, limited to breakdown of skin (HCC)    Obesity    Sleep apnea    Snoring    Stroke ([facility])    Thyroid disease       Past Surgical History:   Procedure Laterality Date    Carotid endarterectomy Right 04/29/2021    Surgeon: Dr. Rajiv Solorio at [facility]    Neck/chest procedure unlisted      per patient, a blockage was removed from the right side of his neck in 8/2021    Other surgical history  2017    Small bowel Res.    Other surgical history  11/12/2019    Colectomy       Family History   Problem Relation Age of Onset    Cancer Father         Prostate    Heart Disorder Father     Cancer  Mother        Social History     Occupational History    Not on file   Tobacco Use    Smoking status: Former     Current packs/day: 0.00     Types: Cigarettes     Quit date:      Years since quittin.8     Passive exposure: Past    Smokeless tobacco: Never   Vaping Use    Vaping status: Never Used   Substance and Sexual Activity    Alcohol use: Never    Drug use: Never    Sexual activity: Not on file       ROS      Constitutional: negative for chills, fevers and sweats  Gastrointestinal: negative for abdominal pain, diarrhea, nausea and vomiting  Genitourinary:negative for dysuria and hematuria  Musculoskeletal:negative for arthralgias and muscle weakness  Neurological: negative for paresthesia and weakness  All others reviewed and negative.      Physical Exam     LE PHYSICAL EXAM  Constitution: Well-developed and well-nourished. Gait appears normal. No apparent distress. Alert and oriented to person, place, and time.  Integument: There are no varicosities. Skin appears moist, warm, and supple with positive hair growth. There are no color changes. No open lesions. No macerations, No Hyperkeratotic lesions.  Vascular examination: Dorsalis pedis and posterior tibial pulses are strong bilaterally with capillary filling time less than 3 seconds to all digits. There is no peripheral edema..  Neurological Sensorium: Grossly intact to sharp/dull. Vibratory: Intact.  Musculoskeletal:   5/5 pedal muscle strength b/l     Incision well adhered with sutures intact.  No fluctuance drainage or ascending cellulitis noted mild edema noted.  No ascending cellulitis signs of infection noted.  Assessment and Plan     status post left third digit partial toe amputation,  (DOS: 10/24/24). Patient was subsequently discharged from the hospital on 10/25/2024 on Bactrim antibiotic.  Patient's pathology came back osteomyelitis with a negative clearance margin.  Wound cultures came back positive for normal skin bruno.  Patient has  left the dressing on since his surgery and being discharged from the hospital on the 25th.    -Performed a dressing change at today's office visit.  Patient to keep clean dry and intact until his follow-up appointment next week patient was given a prescription for a walking boot which was fitted today as he does not like the surgical shoe.  Patient to complete course of Bactrim that was discharged with him on his hospital mission on the 25th.      Removed sutures at today's office visit.  Patient can start getting the foot wet as long as there is no drainage.  Patient released to return to work on Monday.  No signs of infection or antibiotic needed at this time.      Patient was instructed to call the office or on-call podiatric physician immediately with any issues or concerns before the next scheduled visit.     Eri Galladro DPM, D.SUKI GODOY  Diplomat, American Board of Foot and Ankle Surgery  Certified in Foot and Rearfoot/Ankle Reconstruction  Fellow of the American College of Foot and Ankle Surgeons  Fellowship Trained Foot and Ankle Surgeon   [facility]     10/21/2024    6:41 AM         [1]   Allergies  Allergen Reactions    Empagliflozin OTHER (SEE COMMENTS)     Frequent urination, unbearable.    Penicillins UNKNOWN     Patient does not recall reaction    Other UNKNOWN

## 2024-11-15 NOTE — DISCHARGE INSTRUCTIONS
[facility] ENDOSCOPY [facility] SUITES  [address]  [facility] [address]  Dept: [phone]  Loc: [phone]    No name on file       Post Injection/Procedure Instructions    PAIN:  Your usual pain should gradually decrease in 2-3 days, but relief may sometimes take up to two weeks after your procedure.  A temporary flare-up of pain for 1-2 days after a procedure is also normal.  Please take your usual pain medicines if this happens.      ACTIVITY LEVELS:  Day of Procedure:  Take it easy.  We recommend no new activities or heavy work.  Avoid sitting or standing for long periods of time and change your position as needed.  Day 2:  You may return to normal activities within reason.  If your physician gives you specific instructions, please follow these.  You may return to physical therapy.      DIET:  Return to your normal diet as tolerated.    MEDICATIONS:  Aspirin and Blood-thinners:  Follow specific instructions your doctor gave you.  Otherwise, refrain from taking aspirin and other blood-thinning medications for 6 hours after your injection.  Then resume your next scheduled dose.    Resume your other medications the day of the injection.    BANDAGE:  Remove after 24 hours.    SHOWERING:  You may shower the following day.  No bathing, swimming, or hot tub for 2 days after the procedure to reduce the risk of infection.    COLD PACKS:  You may use ice compresses over the injection site - 20 minutes on, then 40 minutes off as needed.  To avoid skin injury, place a thin cloth between cold pack and the skin.  Do not apply cold packs to numb areas following injection.    Contact [facility] immediately if you experience any of the following:  Fever (over 100 degrees F), chills, drainage, excessive swelling or redness from the injection site, problems with bowel or bladder control, or new weakness or new severe pain.  If you cannot reach your physician, please go to the nearest  emergency room.      COMMON SIDE EFFECTS:  Numbness for 4 to 8 hours due to the local anesthetic.  Minor pain at the injection site.  A small amount of bleeding at the injection site.  If a steroid medication was used during the procedure, possible side effects include redness of the face, headache, trouble sleeping, indigestion, increased appetite and/or a low grade fever (less that 100 degrees F).  All side effects should disappear within 1 to 3 days after the procedure.    POST-PROCEDURAL HEADACHE:  Mild headaches may be a normal reaction after a steroid injection.  Lie down as much as possible for the first 24 hours.  You can take Tylenol up to 3 grams per day in doses of 1 gram every 8 hours.  Drink plenty of caffeinated beverages.  If you continue to have headaches after 24 hours following the procedure, or experience severe headaches, please contact our office.

## 2024-11-15 NOTE — H&P
[facility]  part of [facility]    History & Physical    Mason Puri Patient Status:  Hospital Outpatient Surgery    1960 MRN [id number]   Location [facility] ENDOSCOPY LAB SUITES Attending Behar, Alex, MD   Hosp Day # 0 PCP Alvino Wallace MD     Date of Admission:  (Not on file)    History of Present Illness:  Mason Puri is a(n) 64-year-old male who presents with right knee pain due to severe osteoarthritis refractory to hyaluronic acid and corticosteroid injections.  He has had 2 genicular nerve blocks which have both provided greater than 80% improvement in his symptoms.    History:  Past Medical History:    Benign head tremor    BPH (benign prostatic hyperplasia)    Diabetes (HCC)    Disorder of thyroid    Diverticulitis    Former smoker    High blood pressure    High cholesterol    Hyperlipidemia    Hypertension    Neuropathic pain    Non-pressure chronic ulcer of right lower leg, limited to breakdown of skin (HCC)    Obesity    Sleep apnea    Snoring    Stroke (HCC)    Thyroid disease     Past Surgical History:   Procedure Laterality Date    Carotid endarterectomy Right 2021    Surgeon: Dr. Rajiv Solorio at [facility]    Neck/chest procedure unlisted      per patient, a blockage was removed from the right side of his neck in 2021    Other surgical history  2017    Small bowel Res.    Other surgical history  2019    Colectomy     Family History   Problem Relation Age of Onset    Cancer Father         Prostate    Heart Disorder Father     Cancer Mother       reports that he quit smoking about 28 years ago. His smoking use included cigarettes. He has been exposed to tobacco smoke. He has never used smokeless tobacco. He reports that he does not drink alcohol and does not use drugs.    Allergies:  Allergies[1]    Home Medications:  Prescriptions Prior to Admission[2]    Physical Exam:   General: Alert, orientated x3.  Cooperative.  No apparent  distress.  Vital Signs:  Height 72\", weight (!) 315 lb (142.9 kg).  HEENT: Exam is unremarkable.  Pupils are equal and round.    Lungs: no labored breathing.  Cardiac: Regular rate and rhythm.  Abdomen:  Soft, non-distended  Extremities:  No lower extremity edema noted.    Skin: Normal texture and turgor.  Neurologic:     Impression and Plan:  Patient Active Problem List   Diagnosis    Class 2 severe obesity due to excess calories with serious comorbidity and body mass index (BMI) of 38.0 to 38.9 in adult ([facility])    Hypertension associated with type 2 diabetes mellitus ([facility])    Hyperlipidemia associated with type 2 diabetes mellitus ([facility])    Type 2 diabetes mellitus with diabetic peripheral angiopathy without gangrene, without long-term current use of insulin ([facility])    Hypothyroidism    History of right-sided carotid endarterectomy    Detrusor overactivity    Primary osteoarthritis of right knee    Gastroesophageal reflux disease with esophagitis    Varicose veins of left lower extremity with inflammation, with ulcer of ankle limited to breakdown of skin ([facility])    Patellofemoral arthritis    Venous stasis dermatitis of both lower extremities    Major depressive disorder    BPH with obstruction/lower urinary tract symptoms    Transient ischemic attack (TIA)    Left carotid artery stenosis    Cerebellar infarct ([facility])    Abnormal Holter monitor finding    S/P drug eluting coronary stent placement    Neurogenic claudication    JUAN A (obstructive sleep apnea)    Osteomyelitis of third toe of left foot ([facility])       Okay to proceed with right knee genicular nerve ablation    Alex Behar, MD  11/15/2024  8:23 AM       [1]   Allergies  Allergen Reactions    Empagliflozin OTHER (SEE COMMENTS)     Frequent urination, unbearable.    Penicillins UNKNOWN     Patient does not recall reaction    Other UNKNOWN   [2]   No medications prior to admission.

## 2024-11-15 NOTE — DISCHARGE SUMMARY
Outpatient Surgery Brief Discharge Summary         Patient ID:  Mason Puri  [id number]  64 year old  2/20/1960    Discharge Diagnoses: Primary osteoarthritis of right knee and right knee pain    Procedures: RIGHT knee genicular nerve ablation with coolief    Discharged Condition: stable    Disposition: home    Patient Instructions: Follow-up with Alex Behar, MD in 1-2 weeks.    Diet: regular diet  Activity: As tolerated    Alex B. Behar MD, [facility]  Physical Medicine and Rehabilitation/Sports Medicine  [facility]

## 2024-11-15 NOTE — OPERATIVE REPORT
Knee genicular nerve RFA  NAME:  Mason Puri      MR #:    [id number] :  1960       PHYSICIAN: Behar        Operative Report     DATE OF PROCEDURE: 11/15/2024    PREOPERATIVE DIAGNOSES: Knee pain   POSTOPERATIVE DIAGNOSES:   Same   PROCEDURES: Right knee   1. Superolateral genicular branch from the vastus lateralis  2. Superomedial genicular branch from the vastus medialis  3. Inferomedial genicular branch from the saphenous nerve  4.Terminal branch of the nerve vastus intermedius   SURGEON: Alex Behar, MD    ANESTHESIA: Conscious Sedation    Time of sedation: 36 Minutes with 1 mg of Versed and 100 mcg of fentanyl      OPERATIVE PROCEDURE: A 17g 50mm radiofrequency introducer needle with a 4mm active tip was placed overlying the Right knee joint and using fluoroscopic guidance the needle was advanced to a bony endpoint on the superiolateral portion of the femoral condyle of the Right knee. A second needle was advanced to a bony endpoint on the superiomedial portion of the femoral condyle. A third needle was then placed over the inferiomedial portion of the tibial condyle until a bony endpoint was met. Attempted aspiration yielded no blood. Lateral x-ray views showed all the needles at 50% depth of the femur and tibia. Motor stimulation was tested ad 2.0 volts with no leg movement. Images were saved in AP and lateral. A mixture consisting of 1 cc of 2% lidocaine was slowly injected. Then a radiofrequency ablation of each of the geniculate nerves were done at 80 degrees Celsius for 2 minutes and 30 seconds each. The needles were withdrawn    Alex B. Behar MD, [facility]  Physical Medicine and Rehabilitation/Sports Medicine  [facility]

## 2024-11-18 NOTE — TELEPHONE ENCOUNTER
Patient called, verified Name and . He is requesting for refill of pregabalin. Chart reviewed. Prescription sent on  showed one year refill.     Also calling for refill of metformin and glipizide. Advised to call Endo for refill. Call transferred.

## 2024-11-18 NOTE — TELEPHONE ENCOUNTER
Called [facility], patient not in their system. Followed up with [facility], on hold for 10 minutes. Will try calling again.

## 2024-11-18 NOTE — TELEPHONE ENCOUNTER
Patient is requesting a refill to be sent to [facility].  He states that Glipizide needs to be changed to twice a day.        Current Outpatient Medications:       glipiZIDE 5 MG Oral Tab, Take 1 tablet (5 mg total) by mouth every morning before breakfast. FOR DIABETES. STOP/DO NOT TAKE IF FASTING SUGAR IS LESS THAN 100., Disp: 90 tablet, Rfl: 2      metFORMIN HCl 1000 MG Oral Tab, Take 1 tablet (1,000 mg total) by mouth 2 (two) times daily with meals., Disp: 180 tablet, Rfl: 0       appropriate for situation

## 2024-11-20 NOTE — TELEPHONE ENCOUNTER
Endocrine Refill protocol for metformin    Protocol Criteria:  PASSED  Reason: N/A    If all below requirements are met, send a 90-day supply with 1 refill per provider protocol.     Verify appointment with Endocrinology completed in the last 6 months or scheduled in the next 3 months.  Verify A1C has been completed within the last 6 months and is below 8.5%  Verify last GFR is greater than or equal to 40 in the past 12 months    Last completed office visit:8/20/2024 Brittany Shah MD   Next scheduled Follow up:   Future Appointments   Date Time Provider Department Center   1/2/2025 10:15 AM Brittany Shah MD [facility]       Last GFR result:    Lab Results   Component Value Date    EGFRCR 62 10/25/2024     Last A1c result: Last A1C result: 7.3% done 8/20/2024.

## 2024-11-25 NOTE — TELEPHONE ENCOUNTER
Spoke to patient. He said that he is totally out of Lyrica. Rn relayed the message below. He is going to call [facility] and see if they can provide him a refill. He will call back if he needs it sent to [facility] instead. He said the pill bottle in front of him is from August that he received from [facility] so he is not sure why they are saying he is not in their system.

## 2024-11-25 NOTE — TELEPHONE ENCOUNTER
Please review.  Protocol failed / Has no protocol.    Marked High Priority, patient states out of medication  One prescription set for local pharmacy to fill now.  Addl set for mailorder    Pregabalin 100mg Recent fill 8/6/24 # 270  Last prescription written: 4/11/24  #270 +1R  Last office visit: 10/30/24    Future Appointments  Date Type Provider Dept   12/09/24 Appointment Alvino Wallace MD [facility]        Requested Prescriptions   Pending Prescriptions Disp Refills    pregabalin 100 MG Oral Cap 60 capsule 0     Sig: Take 1 capsule (100 mg total) by mouth 2 (two) times daily.       Controlled Substance Medication Failed - 11/25/2024 12:05 PM        Failed - This medication is a controlled substance - forward to provider to refill       Neurology Medications Passed - 11/25/2024 12:05 PM        Passed - In person appointment or virtual visit in the past 6 mos or appointment in next 3 mos     Recent Outpatient Visits              1 week ago Status post left foot surgery    [facility] [address], [place] [facility] DPM    Office Visit    2 weeks ago Status post left foot surgery    [facility] Eri Woodard DPM    Office Visit    3 weeks ago Hospital discharge follow-up    [facility] Alvino Wallace MD    Office Visit    3 weeks ago Status post left foot surgery    [facility] [address], [place] Eri Woodard DPM    Office Visit    1 month ago Chronic toe ulcer, left, limited to breakdown of skin (HCC)    [facility] Eri Woodard DPM    Office Visit          Future Appointments         Provider Department Appt Notes    In 2 days Behar, Alex, MD [facility] Post Inj F/U    In 2 weeks Alvino Wallace MD [facility],  [place] px    In 2 weeks Eri Woodard DPM [facility] [address], [place] POV sx 10/23/24 left 3rd toe partial amp    In 1 month Brittany Shah MD [facility] 3 MONTH F/UP/policy advised                      pregabalin 100 MG Oral Cap 180 capsule 3     Sig: Take 1 capsule (100 mg total) by mouth 2 (two) times daily. FOR NERVE PAIN. Dose reduction due to lethargy       Controlled Substance Medication Failed - 11/25/2024 12:05 PM        Failed - This medication is a controlled substance - forward to provider to refill       Neurology Medications Passed - 11/25/2024 12:05 PM        Passed - In person appointment or virtual visit in the past 6 mos or appointment in next 3 mos     Recent Outpatient Visits              1 week ago Status post left foot surgery    [facility] [address], [place] Eri Woodard DPM    Office Visit    2 weeks [facility]    [facility] Visit    3 weeks ago Hospital discharge follow-up    [facility] Alvino Wallace MD    Office Visit    3 weeks ago Status post left foot surgery    [facility] [address], [place] Eri Woodard DPM    Office Visit    1 month ago Chronic toe ulcer, left, limited to breakdown of skin (HCC)    [facility] Eri Woodard DPM    Office Visit          Future Appointments         Provider Department Appt Notes    In 2 days Behar, Alex, MD [facility] Post Inj F/U    In 2 weeks Alvino Wallace MD [facility] px    In 2 weeks Eri Woodard DPM [facility], [address], [place] POV sx 10/23/24 left 3rd toe partial  amp    In 1 month Brittany Shah MD [facility] 3 MONTH F/UP/policy advised                       Future Appointments         Provider Department Appt Notes    In 2 days Behar, Alex, [facility] F/U    In 2 weeks Alvino Wallace MD [facility] px    In 2 weeks Eri Woodard DPM [facility] [address], [place] POV sx 10/23/24 left 3rd toe partial amp    In 1 month Brittany Shah MD [facility] 3 MONTH F/UP/policy advised          Recent Outpatient Visits              1 week ago Status post left foot surgery    [facility] [address], [place] Eri Woodard DPM    Office Visit    2 weeks ago Status post left foot surgery    [facility] [place] [facility]    Office Visit    3 weeks ago Hospital discharge follow-up    [facility] Alvino Wallace MD    Office Visit    3 weeks ago [facility] foot [facility]    Office Visit    1 month ago Chronic toe ulcer, left, limited to breakdown of skin (HCC)    [facility]    Office Visit

## 2024-11-25 NOTE — TELEPHONE ENCOUNTER
Patient calling ( name and date of birth of patient verified ) requesting a refill for Pregabalin   ( current RX is at [facility] )  to go [facility], he does not use [facility] pharmacy  but will need a 30  day refill to go to local [facility]     Patient has no medication left      [facility]   [facility] supply with refills p[facility] to [facility]       Medication pended to run thru Protocol

## 2024-11-27 NOTE — PROGRESS NOTES
[facility]  Progress Note    CHIEF COMPLAINT:    Chief Complaint   Patient presents with    Follow - Up     Lov 11/15/24 following up after Right knee Genicular Radiofrequency Neurotomy 40% improvement.Patient states is feeling buckling and is giving out more then usual.Pain 0/10.No T/N. Advil with relief.         History of Present Illness:  The patient is a 64 year old male with significant past medical history of diabetes, obesity, thyroid disease presents for follow-up of his right knee pain.  He has completed his last  hyaluronic acid and corticosteroid injection in March 2024 which provided some relief.  We then performed genicular nerve blocks followed by an ablation as he had appropriate results from the genicular nerve block.  He overall has improved by about 40 to 50% following the genicular nerve ablation although he is complaining of weakness and buckling in the right knee.  His pain has improved.  He continues to ambulate with a cane.  He is taking Advil as needed for pain.      PAST MEDICAL HISTORY:  Past Medical History:    Benign head tremor    BPH (benign prostatic hyperplasia)    Diabetes (HCC)    Disorder of thyroid    Diverticulitis    Former smoker    High blood pressure    High cholesterol    Hyperlipidemia    Hypertension    Neuropathic pain    Non-pressure chronic ulcer of right lower leg, limited to breakdown of skin (HCC)    Obesity    Sleep apnea    Snoring    Stroke (HCC)    Thyroid disease       SURGICAL HISTORY:  Past Surgical History:   Procedure Laterality Date    Carotid endarterectomy Right 04/29/2021    Surgeon: Dr. Rajiv Solorio at [facility]    Neck/chest procedure unlisted      per patient, a blockage was removed from the right side of his neck in 8/2021    Other surgical history  2017    Small bowel Res.    Other surgical history  11/12/2019    Colectomy    Pain management N/A 11/15/2024    Dr.Behar; Right knee Genicular Radiofrequency  Neurotomy       SOCIAL HISTORY:   Social History     Occupational History    Not on file   Tobacco Use    Smoking status: Former     Current packs/day: 0.00     Types: Cigarettes     Quit date:      Years since quittin.9     Passive exposure: Past    Smokeless tobacco: Never   Vaping Use    Vaping status: Never Used   Substance and Sexual Activity    Alcohol use: Never    Drug use: Never    Sexual activity: Not on file       FAMILY HISTORY:   Family History   Problem Relation Age of Onset    Cancer Father         Prostate    Heart Disorder Father     Cancer Mother        CURRENT MEDICATIONS:   Current Outpatient Medications   Medication Sig Dispense Refill    pregabalin 100 MG Oral Cap Take 1 capsule (100 mg total) by mouth 2 (two) times daily. FOR NERVE PAIN. Dose reduction due to lethargy 180 capsule 1    metFORMIN HCl 1000 MG Oral Tab Take 1 tablet (1,000 mg total) by mouth 2 (two) times daily with meals. 180 tablet 1    glipiZIDE 5 MG Oral Tab Take 1 tablet (5 mg total) by mouth 2 (two) times daily before meals. STOP/DO NOT TAKE IF FASTING SUGAR IS LESS THAN 100. 180 tablet 1    Tirzepatide (MOUNJARO) 15 MG/0.5ML Subcutaneous Solution Auto-injector Inject 15 mg into the skin once a week. FOR DIABETES. Continuation of therapy. 6 mL 9    Multiple Vitamins-Minerals (MULTIVITAMIN MEN 50+) Oral Tab Take 1 tablet by mouth daily.      pantoprazole 40 MG Oral Tab EC Take 1 tablet (40 mg total) by mouth every morning before breakfast. 90 tablet 3    metoprolol tartrate 25 MG Oral Tab Take 1 tablet (25 mg total) by mouth 2 (two) times daily. FOR HEART. 30 tablet 0    rosuvastatin 40 MG Oral Tab Take 1 tablet (40 mg total) by mouth nightly. FOR CHOLESTEROL. 90 tablet 9    valsartan 160 MG Oral Tab Take 1 tablet (160 mg total) by mouth daily. FOR BLOOD PRESSURE. 90 tablet 9    levothyroxine 125 MCG Oral Tab Take 1 tablet (125 mcg total) by mouth at bedtime. 90 tablet 1    aspirin (ASPIRIN 81) 81 MG Oral Tab EC Take  1 tablet (81 mg total) by mouth daily. FOR HEART. 90 tablet 9    DULoxetine 30 MG Oral Cap DR Particles Take 1 capsule (30 mg total) by mouth daily. TAKE 1 CAPSULE IN THE MORNING FOR ANXIETY/DEPRESSION/ARTHRITIS PAIN 90 capsule 9    finasteride 5 MG Oral Tab Take 1 tablet (5 mg total) by mouth daily. FOR PROSTATE. 90 tablet 9    tamsulosin 0.4 MG Oral Cap Take 2 capsules (0.8 mg total) by mouth daily. FOR PROSTATE. 180 capsule 9    ticagrelor 90 MG Oral Tab Take 1 tablet (90 mg total) by mouth every 12 (twelve) hours. FOR HEART STENTS. 180 tablet 9       ALLERGIES:   Allergies[1]    REVIEW OF SYSTEMS:   Review of Systems   Constitutional: Negative.    HENT: Negative.    Eyes: Negative.    Respiratory: Negative.    Cardiovascular: Negative.    Gastrointestinal: Negative.    Genitourinary: Negative.    Musculoskeletal: As per HPI  Skin: Negative.    Neurological: As per HPI  Endo/Heme/Allergies: Negative.    Psychiatric/Behavioral: Negative.      All other systems reviewed and are negative. Pertinent positives and negatives noted in the HPI.        PHYSICAL EXAM:   Wt (!) 315 lb (142.9 kg)   BMI 42.72 kg/m²     Body mass index is 42.72 kg/m².      General: No immediate distress  Head: Normocephalic/ Atraumatic  Eyes: Extra-occular movements intact.   Ears: No auricular hematoma or deformities  Mouth: No lesions or ulcerations  Heart: peripheral pulses intact. Normal capillary refill.   Lungs: Non-labored respirations  Abdomen: No abdominal guarding  Extremities: No lower extremity edema bilaterally   Skin: No lesions noted.   Cognition: alert & oriented x 3, attentive, able to follow 2 step commands, comprehention intact, spontaneous speech intact  Motor:    Musculoskeletal:        Data  Admission on 11/15/2024, Discharged on 11/15/2024   Component Date Value Ref Range Status    POC Glucose  11/15/2024 139 (H)  70 - 99 mg/dL Final   Admission on 10/22/2024, Discharged on 10/25/2024   Component Date Value Ref Range  Status    WBC 10/22/2024 9.1  4.0 - 11.0 x10(3) uL Final    RBC 10/22/2024 4.05 (L)  4.30 - 5.70 x10(6)uL Final    HGB 10/22/2024 11.7 (L)  13.0 - 17.5 g/dL Final    HCT 10/22/2024 37.2 (L)  39.0 - 53.0 % Final    MCV 10/22/2024 91.9  80.0 - 100.0 fL Final    MCH 10/22/2024 28.9  26.0 - 34.0 pg Final    MCHC 10/22/2024 31.5  31.0 - 37.0 g/dL Final    RDW-SD 10/22/2024 50.1 (H)  35.1 - 46.3 fL Final    RDW 10/22/2024 14.7  11.0 - 15.0 % Final    PLT 10/22/2024 221.0  150.0 - 450.0 10(3)uL Final    Neutrophil Absolute Prelim 10/22/2024 5.63  1.50 - 7.70 x10 (3) uL Final    Neutrophil Absolute 10/22/2024 5.63  1.50 - 7.70 x10(3) uL Final    Lymphocyte Absolute 10/22/2024 2.29  1.00 - 4.00 x10(3) uL Final    Monocyte Absolute 10/22/2024 0.89  0.10 - 1.00 x10(3) uL Final    Eosinophil Absolute 10/22/2024 0.18  0.00 - 0.70 x10(3) uL Final    Basophil Absolute 10/22/2024 0.05  0.00 - 0.20 x10(3) uL Final    Immature Granulocyte Absolute 10/22/2024 0.02  0.00 - 1.00 x10(3) uL Final    Neutrophil % 10/22/2024 62.1  % Final    Lymphocyte % 10/22/2024 25.3  % Final    Monocyte % 10/22/2024 9.8  % Final    Eosinophil % 10/22/2024 2.0  % Final    Basophil % 10/22/2024 0.6  % Final    Immature Granulocyte % 10/22/2024 0.2  % Final    Glucose 10/22/2024 123 (H)  70 - 99 mg/dL Final    Sodium 10/22/2024 143  136 - 145 mmol/L Final    Potassium 10/22/2024 4.4  3.5 - 5.1 mmol/L Final    Chloride 10/22/2024 109  98 - 112 mmol/L Final    CO2 10/22/2024 28.0  21.0 - 32.0 mmol/L Final    Anion Gap 10/22/2024 6  0 - 18 mmol/L Final    BUN 10/22/2024 23  9 - 23 mg/dL Final    Creatinine 10/22/2024 1.52 (H)  0.70 - 1.30 mg/dL Final    BUN/CREA Ratio 10/22/2024 15.1  10.0 - 20.0 Final    Calcium, Total 10/22/2024 9.4  8.7 - 10.4 mg/dL Final    Calculated Osmolality 10/22/2024 301 (H)  275 - 295 mOsm/kg Final    eGFR-Cr 10/22/2024 51 (L)  >=60 mL/min/1.73m2 Final    ALT 10/22/2024 18  10 - 49 U/L Final    AST 10/22/2024 20  <34 U/L Final     Alkaline Phosphatase 10/22/2024 68  45 - 117 U/L Final    Bilirubin, Total 10/22/2024 0.2  0.2 - 1.1 mg/dL Final    Total Protein 10/22/2024 6.7  5.7 - 8.2 g/dL Final    Albumin 10/22/2024 4.2  3.2 - 4.8 g/dL Final    Globulin  10/22/2024 2.5  2.0 - 3.5 g/dL Final    A/G Ratio 10/22/2024 1.7  1.0 - 2.0 Final    MRSA Screen By PCR 10/22/2024 Negative  Negative Final    POC Glucose  10/22/2024 107 (H)  70 - 99 mg/dL Final    Glucose 10/23/2024 107 (H)  70 - 99 mg/dL Final    Sodium 10/23/2024 145  136 - 145 mmol/L Final    Potassium 10/23/2024 4.3  3.5 - 5.1 mmol/L Final    Chloride 10/23/2024 110  98 - 112 mmol/L Final    CO2 10/23/2024 31.0  21.0 - 32.0 mmol/L Final    Anion Gap 10/23/2024 4  0 - 18 mmol/L Final    BUN 10/23/2024 22  9 - 23 mg/dL Final    Creatinine 10/23/2024 1.45 (H)  0.70 - 1.30 mg/dL Final    BUN/CREA Ratio 10/23/2024 15.2  10.0 - 20.0 Final    Calcium, Total 10/23/2024 9.6  8.7 - 10.4 mg/dL Final    Calculated Osmolality 10/23/2024 304 (H)  275 - 295 mOsm/kg Final    eGFR-Cr 10/23/2024 54 (L)  >=60 mL/min/1.73m2 Final    WBC 10/23/2024 8.4  4.0 - 11.0 x10(3) uL Final    RBC 10/23/2024 3.95 (L)  4.30 - 5.70 x10(6)uL Final    HGB 10/23/2024 11.6 (L)  13.0 - 17.5 g/dL Final    HCT 10/23/2024 37.0 (L)  39.0 - 53.0 % Final    MCV 10/23/2024 93.7  80.0 - 100.0 fL Final    MCH 10/23/2024 29.4  26.0 - 34.0 pg Final    MCHC 10/23/2024 31.4  31.0 - 37.0 g/dL Final    RDW-SD 10/23/2024 52.2 (H)  35.1 - 46.3 fL Final    RDW 10/23/2024 15.0  11.0 - 15.0 % Final    PLT 10/23/2024 213.0  150.0 - 450.0 10(3)uL Final    Neutrophil Absolute Prelim 10/23/2024 5.08  1.50 - 7.70 x10 (3) uL Final    Neutrophil Absolute 10/23/2024 5.08  1.50 - 7.70 x10(3) uL Final    Lymphocyte Absolute 10/23/2024 2.23  1.00 - 4.00 x10(3) uL Final    Monocyte Absolute 10/23/2024 0.86  0.10 - 1.00 x10(3) uL Final    Eosinophil Absolute 10/23/2024 0.17  0.00 - 0.70 x10(3) uL Final    Basophil Absolute 10/23/2024 0.06  0.00 - 0.20  x10(3) uL Final    Immature Granulocyte Absolute 10/23/2024 0.02  0.00 - 1.00 x10(3) uL Final    Neutrophil % 10/23/2024 60.4  % Final    Lymphocyte % 10/23/2024 26.5  % Final    Monocyte % 10/23/2024 10.2  % Final    Eosinophil % 10/23/2024 2.0  % Final    Basophil % 10/23/2024 0.7  % Final    Immature Granulocyte % 10/23/2024 0.2  % Final    ABO BLOOD TYPE 10/22/2024 A   Final    RH BLOOD TYPE 10/22/2024 Positive   Final    ABO BLOOD TYPE 10/23/2024 A   Final    RH BLOOD TYPE 10/23/2024 Positive   Final    Antibody Screen 10/23/2024 Negative   Final    POC Glucose  10/23/2024 105 (H)  70 - 99 mg/dL Final    POC Glucose  10/23/2024 104 (H)  70 - 99 mg/dL Final    Anaerobic Culture 10/23/2024 No Anaerobes isolated   Final    Tissue Culture Result 10/23/2024 Mixture of organisms suggestive of normal skin bruno   Final    Tissue Culture Result 10/23/2024 No Staph aureus, Beta Strep or Pseudo aeruginosa isolated   Final    Tissue Smear 10/23/2024 No WBCs seen   Final    Tissue Smear 10/23/2024 No organisms seen   Final    Case Report 10/23/2024    Final                    Value:Surgical Pathology                                Case: [id number]                                  Authorizing Provider:  Eri Woodard DPM Collected:           10/23/2024 05:34 PM          Ordering Location:     [facility]          Received:            10/24/2024 07:55 AM                                 Operating Room                                                               Pathologist:           Dale Brewer MD                                                           Specimens:   A) - Foot,left, 1. Left third distal phalanx bone                                                   B) - Foot,left, 2. Left third digit clearance fragment bone                                Final Diagnosis: 10/23/2024    Final                    Value:  A. Third toe (distal phalanx), left foot; partial amputation:  Skin and soft tissue  components of amputated digit demonstrate a small focus of superficial ulceration characterized by moderate to severe necrotic acute inflammatory infiltrates/exudates, underlying areas of congestive neovascularization with granulation tissue formation, mild perivascular chronic inflammatory infiltrates of the dermal vasculature, and surrounding dense fibrosis with accompanying mild reactive fibroblastic cellular reaction.  Bone of amputated digit in close proximity to ulcerated area demonstrates focal mild acute and chronic osteomyelitis, adjacent foci of increased osteoblastic and focal osteoclastic activity, and accompanying marrow space fibrosis and edema.  Inked skin, soft tissue, articular cartilage, bone, and bone marrow margins are grossly and microscopically viable.    B.  Third toe, left foot (clearance fragment); partial amputation:  Portions of skin, soft tissue, articular cartilage, bone,                           and bone marrow show no significant pathologic abnormalities.    Comment:  Recommend correlation with associated microbiology studies to further characterize the above histologic findings.        Clinical Information 10/23/2024    Final                    Value:Osteomyelitis Of Left Foot.        Gross Description 10/23/2024    Final                    Value:Specimen A is submitted in formalin labeled “Khushi, left third distal phalanx bone” and consists of a digit amputation (distal phalanx) with a disarticulated bone margin measuring 1.8 cm in length, 1.7 cm from medial to lateral, 1.6 cm from dorsal to plantar. The proximal half is devoid of skin. The skin presents a distal, healed ulceration (1.3 x 1.1 cm). This lesion is 0.3 cm from the plantar skin and soft tissue margin, and 1.5 cm from the bone margin. The remaining skin is tan and wrinkled with no other ulcerations or lesions grossly identified. The nail is white-tan, thin and short. The skin, soft tissue and bone margins are inked  black. The specimen is submitted entirely as follows: A1 - en face skin and soft tissue margin from dorsal to plantar aspects; A2 - en face bone margin for decalcification; A3-A4 - remainder of digits, full thickness full length sections for decalcification.     Specimen B is submitted in formalin labeled “Khushi, left third digit, clearance                           fragment bone” and consists of multiple pieces of bone and soft tissue measuring in aggregate 1.9 x 1.6 x 0.4 cm. The specimen is submitted entirely in cassette B1 for decalcification. (jq)    Dale Brewer M.D./[facility] [place]      Interpretation 10/23/2024 Abnormal (A)    Final    POC Glucose  10/23/2024 76  70 - 99 mg/dL Final    POC Glucose  10/23/2024 83  70 - 99 mg/dL Final    Glucose 10/24/2024 106 (H)  70 - 99 mg/dL Final    Sodium 10/24/2024 145  136 - 145 mmol/L Final    Potassium 10/24/2024 4.5  3.5 - 5.1 mmol/L Final    Chloride 10/24/2024 111  98 - 112 mmol/L Final    CO2 10/24/2024 30.0  21.0 - 32.0 mmol/L Final    Anion Gap 10/24/2024 4  0 - 18 mmol/L Final    BUN 10/24/2024 19  9 - 23 mg/dL Final    Creatinine 10/24/2024 1.30  0.70 - 1.30 mg/dL Final    BUN/CREA Ratio 10/24/2024 14.6  10.0 - 20.0 Final    Calcium, Total 10/24/2024 9.2  8.7 - 10.4 mg/dL Final    Calculated Osmolality 10/24/2024 303 (H)  275 - 295 mOsm/kg Final    eGFR-Cr 10/24/2024 62  >=60 mL/min/1.73m2 Final    WBC 10/24/2024 7.4  4.0 - 11.0 x10(3) uL Final    RBC 10/24/2024 4.02 (L)  4.30 - 5.70 x10(6)uL Final    HGB 10/24/2024 12.1 (L)  13.0 - 17.5 g/dL Final    HCT 10/24/2024 37.3 (L)  39.0 - 53.0 % Final    MCV 10/24/2024 92.8  80.0 - 100.0 fL Final    MCH 10/24/2024 30.1  26.0 - 34.0 pg Final    MCHC 10/24/2024 32.4  31.0 - 37.0 g/dL Final    RDW-SD 10/24/2024 50.7 (H)  35.1 - 46.3 fL Final    RDW 10/24/2024 14.8  11.0 - 15.0 % Final    PLT 10/24/2024 207.0  150.0 - 450.0 10(3)uL Final    Neutrophil Absolute Prelim 10/24/2024 4.78  1.50 - 7.70 x10 (3) uL Final     Neutrophil Absolute 10/24/2024 4.78  1.50 - 7.70 x10(3) uL Final    Lymphocyte Absolute 10/24/2024 1.64  1.00 - 4.00 x10(3) uL Final    Monocyte Absolute 10/24/2024 0.83  0.10 - 1.00 x10(3) uL Final    Eosinophil Absolute 10/24/2024 0.13  0.00 - 0.70 x10(3) uL Final    Basophil Absolute 10/24/2024 0.05  0.00 - 0.20 x10(3) uL Final    Immature Granulocyte Absolute 10/24/2024 0.01  0.00 - 1.00 x10(3) uL Final    Neutrophil % 10/24/2024 64.3  % Final    Lymphocyte % 10/24/2024 22.0  % Final    Monocyte % 10/24/2024 11.2  % Final    Eosinophil % 10/24/2024 1.7  % Final    Basophil % 10/24/2024 0.7  % Final    Immature Granulocyte % 10/24/2024 0.1  % Final    Magnesium 10/24/2024 1.8  1.6 - 2.6 mg/dL Final    POC Glucose  10/24/2024 117 (H)  70 - 99 mg/dL Final    POC Glucose  10/24/2024 106 (H)  70 - 99 mg/dL Final    POC Glucose  10/24/2024 86  70 - 99 mg/dL Final    POC Glucose  10/24/2024 123 (H)  70 - 99 mg/dL Final    Magnesium 10/25/2024 1.8  1.6 - 2.6 mg/dL Final    Glucose 10/25/2024 112 (H)  70 - 99 mg/dL Final    Sodium 10/25/2024 143  136 - 145 mmol/L Final    Potassium 10/25/2024 4.1  3.5 - 5.1 mmol/L Final    Chloride 10/25/2024 107  98 - 112 mmol/L Final    CO2 10/25/2024 31.0  21.0 - 32.0 mmol/L Final    Anion Gap 10/25/2024 5  0 - 18 mmol/L Final    BUN 10/25/2024 18  9 - 23 mg/dL Final    Creatinine 10/25/2024 1.31 (H)  0.70 - 1.30 mg/dL Final    BUN/CREA Ratio 10/25/2024 13.7  10.0 - 20.0 Final    Calcium, Total 10/25/2024 9.6  8.7 - 10.4 mg/dL Final    Calculated Osmolality 10/25/2024 299 (H)  275 - 295 mOsm/kg Final    eGFR-Cr 10/25/2024 62  >=60 mL/min/1.73m2 Final    WBC 10/25/2024 8.1  4.0 - 11.0 x10(3) uL Final    RBC 10/25/2024 4.21 (L)  4.30 - 5.70 x10(6)uL Final    HGB 10/25/2024 12.1 (L)  13.0 - 17.5 g/dL Final    HCT 10/25/2024 37.8 (L)  39.0 - 53.0 % Final    MCV 10/25/2024 89.8  80.0 - 100.0 fL Final    MCH 10/25/2024 28.7  26.0 - 34.0 pg Final    MCHC 10/25/2024 32.0  31.0 - 37.0 g/dL  Final    RDW 10/25/2024 14.8  11.0 - 15.0 % Final    RDW-SD 10/25/2024 48.4 (H)  35.1 - 46.3 fL Final    PLT 10/25/2024 210.0  150.0 - 450.0 10(3)uL Final    POC Glucose  10/25/2024 108 (H)  70 - 99 mg/dL Final   Office Visit on 10/21/2024   Component Date Value Ref Range Status    Anaerobic Culture 10/21/2024 3+ growth Peptoniphilus asaccharolyticus (A)   Final    Aerobic Culture Result 10/21/2024 3+ growth Proteus mirabilis (A)   Final    Aerobic Culture Result 10/21/2024 3+ growth Streptococcus agalactiae (Group B beta strep) (A)   Final    Aerobic Culture Result 10/21/2024 3+ growth Enterococcus faecalis NOT VRE (A)   Final    Aerobic Smear 10/21/2024 No WBCs seen   Final    Aerobic Smear 10/21/2024 No organisms seen   Final   Office Visit on 08/20/2024   Component Date Value Ref Range Status    GLUCOSE BLOOD 08/20/2024 143   Final    Test Strip Lot # 08/20/2024 [id number]  Numeric Final    Test Strip Expiration Date 08/20/2024 10/19/24  Date Final    HEMOGLOBIN A1C 08/20/2024 7.3 (A)  4.3 - 5.6 % Final    Cartridge Lot# 08/20/2024 [id number]  Numeric Final    Cartridge Expiration Date 08/20/2024 2/26/26  Date Final   [facility] Lab Encounter on 06/25/2024   Component Date Value Ref Range Status    MD Blood Smear Consult 06/25/2024    Final                    Value:Evaluation of the peripheral blood smear demonstrates mild normochromic normocytic anemia. Red blood cells are remarkable for anisopoikilocytosis with some microcytes and occasional ovalocytes.      Overall, the main differential causes for an anemia of this type may include anemia of chronic disease, iron deficiency (most often due to GI or /GYNE blood loss), some hemoglobinopathies (most commonly thalassemia minor, others) and sideroblastic anemias.      Clinical correlation and correlation with serum iron studies is recommended if clinically indicated.    Reviewed by KEVIN Loo M.D.    Vitamin B12 06/25/2024 542  211 - 911 pg/mL Final    HOLD MMA  06/25/2024 Auto Resulted   Final   Office Visit on 06/25/2024   Component Date Value Ref Range Status    Urine Color 06/25/2024 Yellow  Yellow Final    Clarity Urine 06/25/2024 Ex.Turbid (A)  Clear Final    Spec Gravity 06/25/2024 1.026  1.005 - 1.030 Final    Glucose Urine 06/25/2024 Normal  Normal mg/dL Final    Bilirubin Urine 06/25/2024 Negative  Negative Final    Ketones Urine 06/25/2024 Negative  Negative mg/dL Final    Blood Urine 06/25/2024 Trace (A)  Negative Final    pH Urine 06/25/2024 5.5  5.0 - 8.0 Final    Protein Urine 06/25/2024 50 (A)  Negative mg/dL Final    Urobilinogen Urine 06/25/2024 Normal  Normal Final    Nitrite Urine 06/25/2024 Negative  Negative Final    Leukocyte Esterase Urine 06/25/2024 Negative  Negative Final    WBC Urine 06/25/2024 None Seen  0 - 5 /HPF Final    RBC Urine 06/25/2024 None Seen  0 - 2 /HPF Final    Bacteria Urine 06/25/2024 None Seen  None Seen /HPF Final    Squamous Epi. Cells 06/25/2024 None Seen  None Seen /HPF Final    Renal Tubular Epithelial Cells 06/25/2024 None Seen  None Seen /HPF Final    Transitional Cells 06/25/2024 None Seen  None Seen /HPF Final    Yeast Urine 06/25/2024 None Seen  None Seen /HPF Final    Urine Culture 06/25/2024 No Growth at 18-24 hrs.   Final    Glucose 06/25/2024 116 (H)  70 - 99 mg/dL Final    Sodium 06/25/2024 142  136 - 145 mmol/L Final    Potassium 06/25/2024 4.4  3.5 - 5.1 mmol/L Final    Chloride 06/25/2024 109  98 - 112 mmol/L Final    CO2 06/25/2024 27.0  21.0 - 32.0 mmol/L Final    Anion Gap 06/25/2024 6  0 - 18 mmol/L Final    BUN 06/25/2024 26 (H)  9 - 23 mg/dL Final    Creatinine 06/25/2024 1.51 (H)  0.70 - 1.30 mg/dL Final    BUN/CREA Ratio 06/25/2024 17.2  10.0 - 20.0 Final    Calcium, Total 06/25/2024 9.7  8.7 - 10.4 mg/dL Final    Calculated Osmolality 06/25/2024 300 (H)  275 - 295 mOsm/kg Final    eGFR-Cr 06/25/2024 52 (L)  >=60 mL/min/1.73m2 Final    ALT 06/25/2024 31  10 - 49 U/L Final    AST 06/25/2024 40 (H)  <=34  U/L Final    Alkaline Phosphatase 06/25/2024 92  45 - 117 U/L Final    Bilirubin, Total 06/25/2024 0.3  0.2 - 1.1 mg/dL Final    Total Protein 06/25/2024 7.1  5.7 - 8.2 g/dL Final    Albumin 06/25/2024 4.3  3.2 - 4.8 g/dL Final    Globulin  06/25/2024 2.8  2.0 - 3.5 g/dL Final    A/G Ratio 06/25/2024 1.5  1.0 - 2.0 Final    Patient Fasting for CMP? 06/25/2024 Yes   Final    TSH 06/25/2024 18.669 (H)  0.550 - 4.780 mIU/mL Final    Vitamin B12 06/25/2024 542  211 - 911 pg/mL Final    Ferritin 06/25/2024 32.7  30.0 - 530.0 ng/mL Final    Retic% 06/25/2024 1.4  0.5 - 2.5 % Final    Retic Absolute 06/25/2024 62.3  22.5 - 147.5 x10(3) uL Final    Retic IRF 06/25/2024 0.157  0.100 - 0.300 Ratio Final    Reticulocyte Hemoglobin Equivalent 06/25/2024 33.5  28.2 - 36.6 pg Final    Iron 06/25/2024 75  65 - 175 ug/dL Final    Transferrin 06/25/2024 241  215 - 365 mg/dL Final    Total Iron Binding Capacity 06/25/2024 359  250 - 425 ug/dL Final    % Saturation 06/25/2024 21  20 - 50 % Final    WBC 06/25/2024 8.4  4.0 - 11.0 x10(3) uL Final    RBC 06/25/2024 4.39  4.30 - 5.70 x10(6)uL Final    HGB 06/25/2024 12.8 (L)  13.0 - 17.5 g/dL Final    HCT 06/25/2024 41.0  39.0 - 53.0 % Final    MCV 06/25/2024 93.4  80.0 - 100.0 fL Final    MCH 06/25/2024 29.2  26.0 - 34.0 pg Final    MCHC 06/25/2024 31.2  31.0 - 37.0 g/dL Final    RDW-SD 06/25/2024 50.7 (H)  35.1 - 46.3 fL Final    RDW 06/25/2024 14.6  11.0 - 15.0 % Final    PLT 06/25/2024 190.0  150.0 - 450.0 10(3)uL Final    Neutrophil Absolute Prelim 06/25/2024 5.44  1.50 - 7.70 x10 (3) uL Final    Neutrophil Absolute 06/25/2024 5.44  1.50 - 7.70 x10(3) uL Final    Lymphocyte Absolute 06/25/2024 1.96  1.00 - 4.00 x10(3) uL Final    Monocyte Absolute 06/25/2024 0.74  0.10 - 1.00 x10(3) uL Final    Eosinophil Absolute 06/25/2024 0.18  0.00 - 0.70 x10(3) uL Final    Basophil Absolute 06/25/2024 0.08  0.00 - 0.20 x10(3) uL Final    Immature Granulocyte Absolute 06/25/2024 0.03  0.00 - 1.00  x10(3) uL Final    Neutrophil % 06/25/2024 64.5  % Final    Lymphocyte % 06/25/2024 23.3  % Final    Monocyte % 06/25/2024 8.8  % Final    Eosinophil % 06/25/2024 2.1  % Final    Basophil % 06/25/2024 0.9  % Final    Immature Granulocyte % 06/25/2024 0.4  % Final    Free T4 06/25/2024 0.8  0.8 - 1.7 ng/dL Final   Office Visit on 06/21/2024   Component Date Value Ref Range Status    HEMOGLOBIN A1C 06/21/2024 10.5 (A)  4.3 - 5.6 % Final    Cartridge Lot# 06/21/2024 [id number]  Numeric Final    Cartridge Expiration Date 06/21/2024 02/27/2026  Date Final    GLUCOSE BLOOD 06/21/2024 267   Final    Test Strip Lot # 06/21/2024 [id number]  Numeric Final    Test Strip Expiration Date 06/21/2024 12/06/2024  Date Final   ]      Radiology Imaging:  I reviewed with the patient his X-ray of the knees right from 6/17/2024  PROCEDURE: XR KNEE, COMPLETE (4 OR MORE VIEWS), RIGHT (CPT=73564)      COMPARISON: None.      INDICATIONS: Chronic diffused right knee pain. No known recent injury.      TECHNIQUE: Four-views   Findings and impression:      Severe narrowing in the medial compartment with bone on bone apposition.  Small marginal spurs throughout all 3 compartments.  Normal alignment with no fracture.  Moderate joint effusion            Dictated by (CST): Shine Katz MD on 6/17/2024 at 1:39 PM       Finalized by (CST): Shine Katz MD on 6/17/2024 at 1:40 PM            ASSESSMENT AND PLAN:  Mando is a pleasant 64-year-old male presents for follow-up of his right knee pain due to severe osteoarthritis and patellofemoral syndrome.  He is status post right knee genicular nerve ablation with about 50% improvement in his symptoms.  Right now, I am recommending he start formal physical therapy strengthening his right knee and leg to provide support.  I am also recommending he continue working on a weight loss program with I believe the ultimate goal is for him to have a knee replacement.  He will follow-up with me in about 2 months.  We  can consider repeating the knee hyaluronic acid and corticosteroid injection versus orthopedic consultation.       RTC in 2 months  Discharge Instructions were provided as documented in AVS summary.  The patient was in agreement with the assessment and plan.  All questions were answered.  There were no barriers to learning.         1. Primary osteoarthritis of right knee    2. Patellofemoral pain syndrome, unspecified laterality    3. Chronic pain of right knee    4. Limited mobility    5. Diabetic polyneuropathy associated with type 2 diabetes mellitus ([facility])    6. Class 3 severe obesity due to excess calories with serious comorbidity and body mass index (BMI) of 40.0 to 44.9 in adult ([facility])    7. Hypothyroidism, unspecified type        Alex B. Behar MD  Physical Medicine and Rehabilitation/Sports Medicine  [facility]         [1]   Allergies  Allergen Reactions    Empagliflozin OTHER (SEE COMMENTS)     Frequent urination, unbearable.    Penicillins UNKNOWN     Patient does not recall reaction    Other UNKNOWN

## 2024-11-27 NOTE — PATIENT INSTRUCTIONS
1) Touch base with your endocrinologist for continued weight loss management  2) Please begin physical therapy as soon as possible. This will focus on the knee  3) Lets touch base in about end of January. We can consider repeating the knee steroid injection or orthopedic consultation for knee replacement   4) Tylenol 500-1000 mg every 6-8 hours as needed for pain.  No more than 3000 mg daily.

## 2024-12-09 NOTE — PROGRESS NOTES
INTERNAL MEDICINE ANNUAL EXAM NOTE     Patient ID: Mason Puri is a 64 year old male.  Chief Complaint: Physical      Mason Puri is a pleasant 64 year old male who presents for annual physical exam. Mason Puri is doing well today.  1.\ right knee bone on bone, discuss with ortho.   2. Reviewed medications.   3. Excess skin, discussed removal.       Health Maintenance  - All care gaps addressed with patient.     Review of Systems  Review of Systems   Constitutional:  Negative for unexpected weight change.   HENT:  Negative for hearing loss.    Eyes:  Negative for pain and visual disturbance.   Respiratory:  Negative for shortness of breath.    Cardiovascular:  Negative for chest pain, palpitations and leg swelling.   Gastrointestinal:  Negative for abdominal pain and blood in stool.   Genitourinary:  Negative for difficulty urinating and hematuria.   Neurological:  Negative for tremors and syncope.   Psychiatric/Behavioral: Negative.         Physical Exam  Vitals:    12/09/24 1002   BP: 138/78   Pulse: 105   SpO2: 97%   Weight: (!) 305 lb (138.3 kg)   Height: 6' 2\" (1.88 m)     Body mass index is 39.16 kg/m².  BP Readings from Last 3 Encounters:   12/09/24 138/78   11/15/24 104/49   10/30/24 132/73     Physical Exam  Vitals and nursing note reviewed.   Constitutional:       General: He is not in acute distress.     Appearance: Normal appearance.   HENT:      Head: Normocephalic.      Right Ear: External ear normal.      Left Ear: External ear normal.   Eyes:      Extraocular Movements: Extraocular movements intact.      Conjunctiva/sclera: Conjunctivae normal.   Cardiovascular:      Rate and Rhythm: Normal rate and regular rhythm.      Pulses: Normal pulses.      Heart sounds: Normal heart sounds.   Pulmonary:      Effort: Pulmonary effort is normal. No respiratory distress.      Breath sounds: Normal breath sounds. No wheezing.   Abdominal:      General: Abdomen is flat.  Bowel sounds are normal.      Tenderness: There is no abdominal tenderness.   Musculoskeletal:         General: Normal range of motion.      Cervical back: Normal range of motion and neck supple.   Skin:     Coloration: Skin is not jaundiced.   Neurological:      General: No focal deficit present.      Mental Status: He is alert and oriented to person, place, and time. Mental status is at baseline.   Psychiatric:         Mood and Affect: Mood normal.         Behavior: Behavior normal.           Labs & Imaging  Pertinent labs and imaging reviewed.   Lab Results   Component Value Date     (H) 10/25/2024    BUN 18 10/25/2024    BUNCREA 13.7 10/25/2024    CREATSERUM 1.31 (H) 10/25/2024    ANIONGAP 5 10/25/2024    GFRNAA 93 07/03/2022    GFRAA 107 07/03/2022    CA 9.6 10/25/2024    OSMOCALC 299 (H) 10/25/2024    ALKPHO 68 10/22/2024    AST 20 10/22/2024    ALT 18 10/22/2024    BILT 0.2 10/22/2024    TP 6.7 10/22/2024    ALB 4.2 10/22/2024    GLOBULIN 2.5 10/22/2024     10/25/2024    K 4.1 10/25/2024     10/25/2024    CO2 31.0 10/25/2024     Lab Results   Component Value Date     (H) 12/20/2022    A1C 7.3 (A) 08/20/2024     Lab Results   Component Value Date    WBC 8.1 10/25/2024    RBC 4.21 (L) 10/25/2024    HGB 12.1 (L) 10/25/2024    HCT 37.8 (L) 10/25/2024    MCV 89.8 10/25/2024    MCH 28.7 10/25/2024    MCHC 32.0 10/25/2024    RDW 14.8 10/25/2024    .0 10/25/2024     Lab Results   Component Value Date    CHOLEST 137 12/20/2022    TRIG 144 12/20/2022    HDL 44 12/20/2022    LDL 68 12/20/2022    VLDL 22 12/20/2022    NONHDLC 93 12/20/2022     The 10-year ASCVD risk score (Roland DK, et al., 2019) is: 19.6%    Values used to calculate the score:      Age: 64 years      Sex: Male      Is Non- : No      Diabetic: Yes      Tobacco smoker: No      Systolic Blood Pressure: 138 mmHg      Is BP treated: Yes      HDL Cholesterol: 44 mg/dL      Total Cholesterol: 137  mg/dL    Medical History    Reviewed allergies:  Allergies[1]     Reviewed:  Patient Active Problem List    Diagnosis    PVC (premature ventricular contraction)    Osteomyelitis of third toe of left foot ([facility])    JUAN A (obstructive sleep apnea)    S/P drug eluting coronary stent placement    Neurogenic claudication    Abnormal Holter monitor finding    Left carotid artery stenosis    Cerebellar infarct ([facility])    Transient ischemic attack (TIA)    BPH with obstruction/lower urinary tract symptoms    Major depressive disorder    Venous stasis dermatitis of both lower extremities    Patellofemoral arthritis    Varicose veins of left lower extremity with inflammation, with ulcer of ankle limited to breakdown of skin (HCC)    Hypertension associated with type 2 diabetes mellitus ([facility])    Hyperlipidemia associated with type 2 diabetes mellitus ([facility])    Type 2 diabetes mellitus with diabetic peripheral angiopathy without gangrene, without long-term current use of insulin ([facility])    Hypothyroidism    History of right-sided carotid endarterectomy    Detrusor overactivity    Primary osteoarthritis of right knee    Gastroesophageal reflux disease with esophagitis    Class 2 severe obesity due to excess calories with serious comorbidity and body mass index (BMI) of 38.0 to 38.9 in adult ([facility])      Reviewed:  Past Medical History:    Benign head tremor    BPH (benign prostatic hyperplasia)    Diabetes ([facility])    Disorder of thyroid    Diverticulitis    Former smoker    High blood pressure    High cholesterol    Hyperlipidemia    Hypertension    Neuropathic pain    Non-pressure chronic ulcer of right lower leg, limited to breakdown of skin ([facility])    Obesity    Sleep apnea    Snoring    Stroke ([facility])    Thyroid disease      Reviewed:  Family History   Problem Relation Age of Onset    Cancer Father         Prostate    Heart Disorder Father     Cancer Mother        Reviewed:  Past Surgical History:   Procedure Laterality Date    Carotid  endarterectomy Right 2021    Surgeon: [facility]. Rajiv [facility]    Neck/chest procedure unlisted      per patient, a blockage was removed from the right side of his neck in 2021    Other surgical history  2017    Small bowel Res.    Other surgical history  2019    Colectomy    Pain management N/A 11/15/2024    Dr.Behar; Right knee Genicular Radiofrequency Neurotomy      Reviewed:  Social History     Socioeconomic History    Marital status:     Number of children: 4   Tobacco Use    Smoking status: Former     Current packs/day: 0.00     Types: Cigarettes     Quit date:      Years since quittin.9     Passive exposure: Past    Smokeless tobacco: Never   Vaping Use    Vaping status: Never Used   Substance and Sexual Activity    Alcohol use: Never    Drug use: Never   Other Topics Concern    Caffeine Concern Yes     Comment: coffee daily    Exercise No   Social History Narrative    The patient does use an assistive device..  Brace    The patient does live in a home with stairs.     Social Drivers of Health     Food Insecurity: No Food Insecurity (10/22/2024)    Food Insecurity     Food Insecurity: Never true   Transportation Needs: No Transportation Needs (10/22/2024)    Transportation Needs     Lack of Transportation: No   Housing Stability: Low Risk  (10/22/2024)    Housing Stability     Housing Instability: No      Reviewed:  Current Outpatient Medications   Medication Sig Dispense Refill    aspirin (ASPIRIN 81) 81 MG Oral Tab EC Take 1 tablet (81 mg total) by mouth daily. FOR HEART. 90 tablet 9    rosuvastatin 40 MG Oral Tab Take 1 tablet (40 mg total) by mouth nightly. FOR CHOLESTEROL. 90 tablet 9    valsartan 160 MG Oral Tab Take 1 tablet (160 mg total) by mouth daily. FOR BLOOD PRESSURE. 90 tablet 9    pregabalin 100 MG Oral Cap Take 1 capsule (100 mg total) by mouth every 8 (eight) hours. FOR NERVE PAIN. 270 capsule 6    DULoxetine 30 MG Oral Cap DR Particles Take 1 capsule (30 mg  total) by mouth daily. TAKE 1 CAPSULE IN THE MORNING FOR ANXIETY/DEPRESSION/ARTHRITIS PAIN 90 capsule 9    pantoprazole 40 MG Oral Tab EC Take 1 tablet (40 mg total) by mouth every morning before breakfast. 90 tablet 9    finasteride 5 MG Oral Tab Take 1 tablet (5 mg total) by mouth daily. FOR PROSTATE. 90 tablet 9    tamsulosin 0.4 MG Oral Cap Take 2 capsules (0.8 mg total) by mouth daily. FOR PROSTATE. 180 capsule 9    ticagrelor 90 MG Oral Tab Take 1 tablet (90 mg total) by mouth every 12 (twelve) hours. FOR HEART STENTS. 180 tablet 9    metFORMIN HCl 1000 MG Oral Tab Take 1 tablet (1,000 mg total) by mouth 2 (two) times daily with meals. 180 tablet 1    glipiZIDE 5 MG Oral Tab Take 1 tablet (5 mg total) by mouth 2 (two) times daily before meals. STOP/DO NOT TAKE IF FASTING SUGAR IS LESS THAN 100. 180 tablet 1    Tirzepatide (MOUNJARO) 15 MG/0.5ML Subcutaneous Solution Auto-injector Inject 15 mg into the skin once a week. FOR DIABETES. Continuation of therapy. 6 mL 9    Multiple Vitamins-Minerals (MULTIVITAMIN MEN 50+) Oral Tab Take 1 tablet by mouth daily.      metoprolol tartrate 25 MG Oral Tab Take 1 tablet (25 mg total) by mouth 2 (two) times daily. FOR HEART. 30 tablet 0    levothyroxine 125 MCG Oral Tab Take 1 tablet (125 mcg total) by mouth at bedtime. 90 tablet 1          Assessment & Plan    1. Screening for endocrine, nutritional, metabolic and immunity disorder  - Comp Metabolic Panel (14)  - Hemoglobin A1C  - Lipid Panel  - TSH W Reflex To Free T4  - Vitamin D, 25-Hydroxy  - CBC, Platelet; No Differential    2. Screening for malignant neoplasm of prostate  - PSA, TOTAL WITH REFLEX TO PSA, FREE    3. Primary osteoarthritis of right knee  - Ortho Referral - In Network    4. PVC (premature ventricular contraction)    5. Annual physical exam    6. Hypertension associated with type 2 diabetes mellitus (HCC)  - aspirin (ASPIRIN 81) 81 MG Oral Tab EC; Take 1 tablet (81 mg total) by mouth daily. FOR HEART.   Dispense: 90 tablet; Refill: 9  - valsartan 160 MG Oral Tab; Take 1 tablet (160 mg total) by mouth daily. FOR BLOOD PRESSURE.  Dispense: 90 tablet; Refill: 9    7. History of carotid endarterectomy  - aspirin (ASPIRIN 81) 81 MG Oral Tab EC; Take 1 tablet (81 mg total) by mouth daily. FOR HEART.  Dispense: 90 tablet; Refill: 9  - rosuvastatin 40 MG Oral Tab; Take 1 tablet (40 mg total) by mouth nightly. FOR CHOLESTEROL.  Dispense: 90 tablet; Refill: 9    8. Hyperlipidemia associated with type 2 diabetes mellitus (HCC)  - aspirin (ASPIRIN 81) 81 MG Oral Tab EC; Take 1 tablet (81 mg total) by mouth daily. FOR HEART.  Dispense: 90 tablet; Refill: 9  - rosuvastatin 40 MG Oral Tab; Take 1 tablet (40 mg total) by mouth nightly. FOR CHOLESTEROL.  Dispense: 90 tablet; Refill: 9    9. S/P drug eluting coronary stent placement  - aspirin (ASPIRIN 81) 81 MG Oral Tab EC; Take 1 tablet (81 mg total) by mouth daily. FOR HEART.  Dispense: 90 tablet; Refill: 9  - rosuvastatin 40 MG Oral Tab; Take 1 tablet (40 mg total) by mouth nightly. FOR CHOLESTEROL.  Dispense: 90 tablet; Refill: 9  - valsartan 160 MG Oral Tab; Take 1 tablet (160 mg total) by mouth daily. FOR BLOOD PRESSURE.  Dispense: 90 tablet; Refill: 9  - ticagrelor 90 MG Oral Tab; Take 1 tablet (90 mg total) by mouth every 12 (twelve) hours. FOR HEART STENTS.  Dispense: 180 tablet; Refill: 9    10. Left carotid artery stenosis  - rosuvastatin 40 MG Oral Tab; Take 1 tablet (40 mg total) by mouth nightly. FOR CHOLESTEROL.  Dispense: 90 tablet; Refill: 9    11. Hypothyroidism, unspecified type    12. Transient ischemic attack (TIA)    13. Hyperglycemia due to diabetes mellitus (HCC)    14. Type 2 diabetes mellitus with diabetic peripheral angiopathy without gangrene, without long-term current use of insulin (HCC)  - rosuvastatin 40 MG Oral Tab; Take 1 tablet (40 mg total) by mouth nightly. FOR CHOLESTEROL.  Dispense: 90 tablet; Refill: 9  - valsartan 160 MG Oral Tab; Take 1  tablet (160 mg total) by mouth daily. FOR BLOOD PRESSURE.  Dispense: 90 tablet; Refill: 9    15. Vitamin D deficiency    16. Low vitamin D level    17. Neurogenic claudication  - pregabalin 100 MG Oral Cap; Take 1 capsule (100 mg total) by mouth every 8 (eight) hours. FOR NERVE PAIN.  Dispense: 270 capsule; Refill: 6    18. Arthritis  - DULoxetine 30 MG Oral Cap DR Particles; Take 1 capsule (30 mg total) by mouth daily. TAKE 1 CAPSULE IN THE MORNING FOR ANXIETY/DEPRESSION/ARTHRITIS PAIN  Dispense: 90 capsule; Refill: 9    19. Gastroesophageal reflux disease with esophagitis without hemorrhage  - pantoprazole 40 MG Oral Tab EC; Take 1 tablet (40 mg total) by mouth every morning before breakfast.  Dispense: 90 tablet; Refill: 9    20. BPH with obstruction/lower urinary tract symptoms  - finasteride 5 MG Oral Tab; Take 1 tablet (5 mg total) by mouth daily. FOR PROSTATE.  Dispense: 90 tablet; Refill: 9  - tamsulosin 0.4 MG Oral Cap; Take 2 capsules (0.8 mg total) by mouth daily. FOR PROSTATE.  Dispense: 180 capsule; Refill: 9    21. Excess skin  - Plastic Surgery Referral - In Network  Plan  Overall doing well today. Screening labs, preventive imaging/procedure, and health care gaps addressed as per USPSTF guidelines, orders available electronically via [employer] and in AVS.  Vaccines discussed and administered depending on availability and per patient preference; patient to return for any outstanding vaccines once available.  Patient brought to  to help schedule care gaps and follow up. Further recommendations depending on lab results.  Continue with current medications.         Follow Up:   Return for DEPENDING ON RESULTS.      Alvino Wallace MD  Internal Medicine      Patient asked to sign release of information for outside records if not already requested, make future office/imaging appointments at the  prior to leaving, and to sign up for [employer] if not already active.  Preventive measures and  further education discussed with patient as per after visit summary. Potential medication side effects discussed. All questions answered to best of ability.   Call office with any questions. Seek emergency care if necessary.   Patient understands and agrees to follow directions and advice.      ----------------------------------------- PATIENT INSTRUCTIONS-----------------------------------------     There are no Patient Instructions on file for this visit.           [1]   Allergies  Allergen Reactions    Empagliflozin OTHER (SEE COMMENTS)     Frequent urination, unbearable.    Penicillins UNKNOWN     Patient does not recall reaction    Other UNKNOWN

## 2024-12-12 NOTE — PROGRESS NOTES
Reason for Visit      Mason Puri is a 64 year old male presents today complaining of left third digit ulceration.     History of Present Illness     Patient presents to clinic today after last being seen by podiatry on 9/12/2024 for management of the left third digit ulceration.  At that time an x-ray was ordered to rule out osteomyelitis and patient was encouraged to follow-up in 4 weeks.  Radiographs noted questionable subtle age-indeterminate erosive changes of the distal third phalangeal tuft.  It was suggested at this time that could not rule out osteomyelitis and an MRI was recommended.  Patient contacted the office last week requesting a follow-up appointment.      Patient status post left third digit partial toe amputation,  (DOS: 10/24/24). Patient was subsequently discharged from the hospital on 10/25/2024 on Bactrim antibiotic.  Patient's pathology came back osteomyelitis with a negative clearance margin.  Wound cultures came back positive for normal skin bruno.  Patient has left the dressing on since his surgery and being discharged from the hospital on the 25th.    Patient returns to clinic today for his 4-week postop appointment.  Patient currently not on any antibiotics.  Patient is status post nerve ablation of his knee and 11/15/2024.  Patient states he has no issues however when he presented today there is a blister on his left second digit.    The following portions of the patient's history were reviewed and updated as appropriate: allergies, current medications, past family history, past medical history, past social history, past surgical history and problem list.    Allergies[1]      Current Outpatient Medications:     aspirin (ASPIRIN 81) 81 MG Oral Tab EC, Take 1 tablet (81 mg total) by mouth daily. FOR HEART., Disp: 90 tablet, Rfl: 9    rosuvastatin 40 MG Oral Tab, Take 1 tablet (40 mg total) by mouth nightly. FOR CHOLESTEROL., Disp: 90 tablet, Rfl: 9    valsartan 160 MG Oral  Tab, Take 1 tablet (160 mg total) by mouth daily. FOR BLOOD PRESSURE., Disp: 90 tablet, Rfl: 9    pregabalin 100 MG Oral Cap, Take 1 capsule (100 mg total) by mouth every 8 (eight) hours. FOR NERVE PAIN., Disp: 270 capsule, Rfl: 6    DULoxetine 30 MG Oral Cap DR Particles, Take 1 capsule (30 mg total) by mouth daily. TAKE 1 CAPSULE IN THE MORNING FOR ANXIETY/DEPRESSION/ARTHRITIS PAIN, Disp: 90 capsule, Rfl: 9    pantoprazole 40 MG Oral Tab EC, Take 1 tablet (40 mg total) by mouth every morning before breakfast., Disp: 90 tablet, Rfl: 9    finasteride 5 MG Oral Tab, Take 1 tablet (5 mg total) by mouth daily. FOR PROSTATE., Disp: 90 tablet, Rfl: 9    tamsulosin 0.4 MG Oral Cap, Take 2 capsules (0.8 mg total) by mouth daily. FOR PROSTATE., Disp: 180 capsule, Rfl: 9    ticagrelor 90 MG Oral Tab, Take 1 tablet (90 mg total) by mouth every 12 (twelve) hours. FOR HEART STENTS., Disp: 180 tablet, Rfl: 9    metFORMIN HCl 1000 MG Oral Tab, Take 1 tablet (1,000 mg total) by mouth 2 (two) times daily with meals., Disp: 180 tablet, Rfl: 1    glipiZIDE 5 MG Oral Tab, Take 1 tablet (5 mg total) by mouth 2 (two) times daily before meals. STOP/DO NOT TAKE IF FASTING SUGAR IS LESS THAN 100., Disp: 180 tablet, Rfl: 1    Tirzepatide (MOUNJARO) 15 MG/0.5ML Subcutaneous Solution Auto-injector, Inject 15 mg into the skin once a week. FOR DIABETES. Continuation of therapy., Disp: 6 mL, Rfl: 9    Multiple Vitamins-Minerals (MULTIVITAMIN MEN 50+) Oral Tab, Take 1 tablet by mouth daily., Disp: , Rfl:     metoprolol tartrate 25 MG Oral Tab, Take 1 tablet (25 mg total) by mouth 2 (two) times daily. FOR HEART., Disp: 30 tablet, Rfl: 0    levothyroxine 125 MCG Oral Tab, Take 1 tablet (125 mcg total) by mouth at bedtime., Disp: 90 tablet, Rfl: 1    There are no discontinued medications.      Patient Active Problem List   Diagnosis    Class 2 severe obesity due to excess calories with serious comorbidity and body mass index (BMI) of 38.0 to 38.9 in  adult (HCC)    Hypertension associated with type 2 diabetes mellitus (HCC)    Hyperlipidemia associated with type 2 diabetes mellitus (HCC)    Type 2 diabetes mellitus with diabetic peripheral angiopathy without gangrene, without long-term current use of insulin (HCC)    Hypothyroidism    History of right-sided carotid endarterectomy    Detrusor overactivity    Primary osteoarthritis of right knee    Gastroesophageal reflux disease with esophagitis    Varicose veins of left lower extremity with inflammation, with ulcer of ankle limited to breakdown of skin (HCC)    Patellofemoral arthritis    Venous stasis dermatitis of both lower extremities    Major depressive disorder    BPH with obstruction/lower urinary tract symptoms    Transient ischemic attack (TIA)    Left carotid artery stenosis    Cerebellar infarct (HCC)    Abnormal Holter monitor finding    S/P drug eluting coronary stent placement    Neurogenic claudication    JUAN A (obstructive sleep apnea)    Osteomyelitis of third toe of left foot (HCC)    PVC (premature ventricular contraction)       Past Medical History:    Benign head tremor    BPH (benign prostatic hyperplasia)    Diabetes (HCC)    Disorder of thyroid    Diverticulitis    Former smoker    High blood pressure    High cholesterol    Hyperlipidemia    Hypertension    Neuropathic pain    Non-pressure chronic ulcer of right lower leg, limited to breakdown of skin (HCC)    Obesity    Sleep apnea    Snoring    Stroke (HCC)    Thyroid disease       Past Surgical History:   Procedure Laterality Date    Carotid endarterectomy Right 04/29/2021    Surgeon: [facility]. Rajiv [facility] at [facility]    Neck/chest procedure unlisted      per patient, a blockage was removed from the right side of his neck in 8/2021    Other surgical history  2017    Small bowel Res.    Other surgical history  11/12/2019    Colectomy    Pain management N/A 11/15/2024    Dr.Behar; Right knee Genicular Radiofrequency Neurotomy       Family History    Problem Relation Age of Onset    Cancer Father         Prostate    Heart Disorder Father     Cancer Mother        Social History     Occupational History    Not on file   Tobacco Use    Smoking status: Former     Current packs/day: 0.00     Types: Cigarettes     Quit date:      Years since quittin.9     Passive exposure: Past    Smokeless tobacco: Never   Vaping Use    Vaping status: Never Used   Substance and Sexual Activity    Alcohol use: Never    Drug use: Never    Sexual activity: Not on file       ROS      Constitutional: negative for chills, fevers and sweats  Gastrointestinal: negative for abdominal pain, diarrhea, nausea and vomiting  Genitourinary:negative for dysuria and hematuria  Musculoskeletal:negative for arthralgias and muscle weakness  Neurological: negative for paresthesia and weakness  All others reviewed and negative.      Physical Exam     LE PHYSICAL EXAM  Constitution: Well-developed and well-nourished. Gait appears normal. No apparent distress. Alert and oriented to person, place, and time.  Integument: There are no varicosities. Skin appears moist, warm, and supple with positive hair growth. There are no color changes. No open lesions. No macerations, No Hyperkeratotic lesions.  Vascular examination: Dorsalis pedis and posterior tibial pulses are strong bilaterally with capillary filling time less than 3 seconds to all digits. There is no peripheral edema..  Neurological Sensorium: Grossly intact to sharp/dull. Vibratory: Intact.  Musculoskeletal:   5/5 pedal muscle strength b/l     Incision well adhered on the third digit with no signs of infection or open lesions.  However on the second digit there is a blister on the distal aspect of the second digit with some mild erythema.  Status post lancing the blister some purulent drainage was expressed and was cultured.  No ascending cellulitis, no open lesions noted at this time.  Assessment and Plan     status post left third digit  partial toe amputation,  (DOS: 10/24/24). Patient was subsequently discharged from the hospital on 10/25/2024 on Bactrim antibiotic.  Patient's pathology came back osteomyelitis with a negative clearance margin.  Wound cultures came back positive for normal skin bruno.  Patient has left the dressing on since his surgery and being discharged from the hospital on the 25th.    -Performed a dressing change at today's office visit.  Patient to keep clean dry and intact until his follow-up appointment next week patient was given a prescription for a walking boot which was fitted today as he does not like the surgical shoe.  Patient to complete course of Bactrim that was discharged with him on his hospital mission on the 25th.      Third digit in which the surgery was performed as well adhered with no evidence of open lesions.  However second digit has a blister which appears to have some drainage and cellulitis.  Lanced the blister and cultured the fluid.  Started patient on Bactrim.  Patient to continue daily dressing changes and to follow-up in 1 week or sooner if problems arise.    Patient was instructed to call the office or on-call podiatric physician immediately with any issues or concerns before the next scheduled visit.     Eri Gallardo DPM, CARISA.WALT, FACBHANU  Diplomat, American Board of Foot and Ankle Surgery  Certified in Foot and Rearfoot/Ankle Reconstruction  Fellow of the American College of Foot and Ankle Surgeons  Fellowship Trained Foot and Ankle Surgeon   [facility]     10/21/2024    6:41 AM         [1]   Allergies  Allergen Reactions    Empagliflozin OTHER (SEE COMMENTS)     Frequent urination, unbearable.    Penicillins UNKNOWN     Patient does not recall reaction    Other UNKNOWN      No

## 2024-12-16 NOTE — PROGRESS NOTES
Reason for Visit      Mason Puri is a 64 year old male presents today complaining of left third digit ulceration.     History of Present Illness     Patient presents to clinic today after last being seen by podiatry on 9/12/2024 for management of the left third digit ulceration.  At that time an x-ray was ordered to rule out osteomyelitis and patient was encouraged to follow-up in 4 weeks.  Radiographs noted questionable subtle age-indeterminate erosive changes of the distal third phalangeal tuft.  It was suggested at this time that could not rule out osteomyelitis and an MRI was recommended.  Patient contacted the office last week requesting a follow-up appointment.      Patient status post left third digit partial toe amputation,  (DOS: 10/24/24). Patient was subsequently discharged from the hospital on 10/25/2024 on Bactrim antibiotic.  Patient's pathology came back osteomyelitis with a negative clearance margin.  Wound cultures came back positive for normal skin bruno.  Patient has left the dressing on since his surgery and being discharged from the hospital on the 25th.    Patient returns to clinic today for his 4-week postop appointment.  Patient currently not on any antibiotics.  Patient is status post nerve ablation of his knee and 11/15/2024.      Patient returns to clinic today for 1 week follow-up of a blister that developed on his left second digit at his postop appointment next week.  Blister was drained at that time and a culture was taken which is still preliminary at this time.  Patient states the pain and swelling has significantly improved since his last office visit.    The following portions of the patient's history were reviewed and updated as appropriate: allergies, current medications, past family history, past medical history, past social history, past surgical history and problem list.    Allergies[1]      Current Outpatient Medications:     sulfamethoxazole-trimethoprim DS  800-160 MG Oral Tab per tablet, Take 1 tablet by mouth 2 (two) times daily., Disp: 30 tablet, Rfl: 0    aspirin (ASPIRIN 81) 81 MG Oral Tab EC, Take 1 tablet (81 mg total) by mouth daily. FOR HEART., Disp: 90 tablet, Rfl: 9    rosuvastatin 40 MG Oral Tab, Take 1 tablet (40 mg total) by mouth nightly. FOR CHOLESTEROL., Disp: 90 tablet, Rfl: 9    valsartan 160 MG Oral Tab, Take 1 tablet (160 mg total) by mouth daily. FOR BLOOD PRESSURE., Disp: 90 tablet, Rfl: 9    pregabalin 100 MG Oral Cap, Take 1 capsule (100 mg total) by mouth every 8 (eight) hours. FOR NERVE PAIN., Disp: 270 capsule, Rfl: 6    DULoxetine 30 MG Oral Cap DR Particles, Take 1 capsule (30 mg total) by mouth daily. TAKE 1 CAPSULE IN THE MORNING FOR ANXIETY/DEPRESSION/ARTHRITIS PAIN, Disp: 90 capsule, Rfl: 9    pantoprazole 40 MG Oral Tab EC, Take 1 tablet (40 mg total) by mouth every morning before breakfast., Disp: 90 tablet, Rfl: 9    finasteride 5 MG Oral Tab, Take 1 tablet (5 mg total) by mouth daily. FOR PROSTATE., Disp: 90 tablet, Rfl: 9    tamsulosin 0.4 MG Oral Cap, Take 2 capsules (0.8 mg total) by mouth daily. FOR PROSTATE., Disp: 180 capsule, Rfl: 9    ticagrelor 90 MG Oral Tab, Take 1 tablet (90 mg total) by mouth every 12 (twelve) hours. FOR HEART STENTS., Disp: 180 tablet, Rfl: 9    metFORMIN HCl 1000 MG Oral Tab, Take 1 tablet (1,000 mg total) by mouth 2 (two) times daily with meals., Disp: 180 tablet, Rfl: 1    glipiZIDE 5 MG Oral Tab, Take 1 tablet (5 mg total) by mouth 2 (two) times daily before meals. STOP/DO NOT TAKE IF FASTING SUGAR IS LESS THAN 100., Disp: 180 tablet, Rfl: 1    Tirzepatide (MOUNJARO) 15 MG/0.5ML Subcutaneous Solution Auto-injector, Inject 15 mg into the skin once a week. FOR DIABETES. Continuation of therapy., Disp: 6 mL, Rfl: 9    Multiple Vitamins-Minerals (MULTIVITAMIN MEN 50+) Oral Tab, Take 1 tablet by mouth daily., Disp: , Rfl:     metoprolol tartrate 25 MG Oral Tab, Take 1 tablet (25 mg total) by mouth 2  (two) times daily. FOR HEART., Disp: 30 tablet, Rfl: 0    levothyroxine 125 MCG Oral Tab, Take 1 tablet (125 mcg total) by mouth at bedtime., Disp: 90 tablet, Rfl: 1    There are no discontinued medications.      Patient Active Problem List   Diagnosis    Class 2 severe obesity due to excess calories with serious comorbidity and body mass index (BMI) of 38.0 to 38.9 in adult ([facility])    Hypertension associated with type 2 diabetes mellitus ([facility])    Hyperlipidemia associated with type 2 diabetes mellitus ([facility])    Type 2 diabetes mellitus with diabetic peripheral angiopathy without gangrene, without long-term current use of insulin ([facility])    Hypothyroidism    History of right-sided carotid endarterectomy    Detrusor overactivity    Primary osteoarthritis of right knee    Gastroesophageal reflux disease with esophagitis    Varicose veins of left lower extremity with inflammation, with ulcer of ankle limited to breakdown of skin ([facility])    Patellofemoral arthritis    Venous stasis dermatitis of both lower extremities    Major depressive disorder    BPH with obstruction/lower urinary tract symptoms    Transient ischemic attack (TIA)    Left carotid artery stenosis    Cerebellar infarct ([facility])    Abnormal Holter monitor finding    S/P drug eluting coronary stent placement    Neurogenic claudication    JUAN A (obstructive sleep apnea)    Osteomyelitis of third toe of left foot ([facility])    PVC (premature ventricular contraction)       Past Medical History:    Benign head tremor    BPH (benign prostatic hyperplasia)    Diabetes ([facility])    Disorder of thyroid    Diverticulitis    Former smoker    High blood pressure    High cholesterol    Hyperlipidemia    Hypertension    Neuropathic pain    Non-pressure chronic ulcer of right lower leg, limited to breakdown of skin ([facility])    Obesity    Sleep apnea    Snoring    Stroke ([facility])    Thyroid disease       Past Surgical History:   Procedure Laterality Date    Carotid endarterectomy Right 04/29/2021     Surgeon: Dr. Rajiv Solorio at [facility]    Neck/chest procedure unlisted      per patient, a blockage was removed from the right side of his neck in 2021    Other surgical history  2017    Small bowel Res.    Other surgical history  2019    Colectomy    Pain management N/A 11/15/2024    Dr.Behar; Right knee Genicular Radiofrequency Neurotomy       Family History   Problem Relation Age of Onset    Cancer Father         Prostate    Heart Disorder Father     Cancer Mother        Social History     Occupational History    Not on file   Tobacco Use    Smoking status: Former     Current packs/day: 0.00     Types: Cigarettes     Quit date:      Years since quittin.9     Passive exposure: Past    Smokeless tobacco: Never   Vaping Use    Vaping status: Never Used   Substance and Sexual Activity    Alcohol use: Never    Drug use: Never    Sexual activity: Not on file       ROS      Constitutional: negative for chills, fevers and sweats  Gastrointestinal: negative for abdominal pain, diarrhea, nausea and vomiting  Genitourinary:negative for dysuria and hematuria  Musculoskeletal:negative for arthralgias and muscle weakness  Neurological: negative for paresthesia and weakness  All others reviewed and negative.      Physical Exam     LE PHYSICAL EXAM  Constitution: Well-developed and well-nourished. Gait appears normal. No apparent distress. Alert and oriented to person, place, and time.  Integument: There are no varicosities. Skin appears moist, warm, and supple with positive hair growth. There are no color changes. No open lesions. No macerations, No Hyperkeratotic lesions.  Vascular examination: Dorsalis pedis and posterior tibial pulses are strong bilaterally with capillary filling time less than 3 seconds to all digits. There is no peripheral edema..  Neurological Sensorium: Grossly intact to sharp/dull. Vibratory: Intact.  Musculoskeletal:   5/5 pedal muscle strength b/l     Incision well adhered on the third  digit with no signs of infection or open lesions.  Blister has dried out at this time but still some mild erythema and warmth.  No open lesions fluctuance noted at this time.  Assessment and Plan     status post left third digit partial toe amputation,  (DOS: 10/24/24). Patient was subsequently discharged from the hospital on 10/25/2024 on Bactrim antibiotic.  Patient's pathology came back osteomyelitis with a negative clearance margin.  Wound cultures came back positive for normal skin bruno.  Patient has left the dressing on since his surgery and being discharged from the hospital on the 25th.    -Performed a dressing change at today's office visit.  Patient to keep clean dry and intact until his follow-up appointment next week patient was given a prescription for a walking boot which was fitted today as he does not like the surgical shoe.  Patient to complete course of Bactrim that was discharged with him on his hospital mission on the 25th.      Third digit in which the surgery was performed as well adhered with no evidence of open lesions.  However second digit has a blister which appears to have some drainage and cellulitis.  Lanced the blister and cultured the fluid.-Results pending    Started patient on Bactrim.-Patient still taking  -Appearance of the second digit significantly improved though still some mild erythema.  Discussed its only been 4 days.  Patient concerned given the fact that he had amputation of his third digit.  Placed an order for an MRI to be done in 2 weeks.  Discussed him coming back before the MRI and we will assess whether or not it is needed.  Patient advised of signs of infection and call the office immediately.      Patient to continue daily dressing changes and to follow-up in 1 week or sooner if problems arise.    Patient was instructed to call the office or on-call podiatric physician immediately with any issues or concerns before the next scheduled visit.     Eri Gallardo,  CARISA SMITH.SUKI GODOY  Diplomat, American Board of Foot and Ankle Surgery  Certified in Foot and Rearfoot/Ankle Reconstruction  Fellow of the American College of Foot and Ankle Surgeons  Fellowship Trained Foot and Ankle Surgeon   [facility]     10/21/2024    6:41 AM         [1]   Allergies  Allergen Reactions    Empagliflozin OTHER (SEE COMMENTS)     Frequent urination, unbearable.    Penicillins UNKNOWN     Patient does not recall reaction    Other UNKNOWN

## 2024-12-18 NOTE — TELEPHONE ENCOUNTER
Action Requested: Summary for Provider     []  Critical Lab, Recommendations Needed  [] Need Additional Advice  []   FYI    []   Need Orders  [] Need Medications Sent to Pharmacy  []  Other     SUMMARY: Office visit    Future Appointments   Date Time Provider Department Center   12/19/2024  9:00 AM Radha Reddy APRN ECDGIM [facility] [place]   1/2/2025 10:15 AM Brittany Shah MD EMMGDGENDO [facility]   1/2/2025  1:30 PM Eri Woodard DPM ECLMBPOD [facility] [place]   1/7/2025 10:00 AM Yen Singh, PT ADO PT EM [place]   1/10/2025  9:15 AM Yen Singh, PT ADO PT [facility] [place]   1/14/2025 10:00 AM Yen Singh, PT ADO PT EM [place]   1/16/2025 10:00 AM Yen Singh, PT ADO PT [facility] [place]   1/23/2025 10:00 AM Yen Singh PT ADO PT [facility] [place]   1/26/2025  2:15 PM [facility] MRI RM1 (1.5T WIDE) [facility] MRI [facility] [place]     Reason for call: Dizziness  Onset: Data Unavailable    Patient has been dizzy and light headed for the last two days. The dizziness is coming and going. He is currently at work and plans to go home early.     Reason for Disposition   MODERATE dizziness (e.g., interferes with normal activities)  (Exception: Dizziness caused by heat exposure, sudden standing, or poor fluid intake.)    Protocols used: Dizziness-A-OH

## 2024-12-19 NOTE — PATIENT INSTRUCTIONS
1.  Lets switch your levothyroxine to daytime and you should not take any medications within 1 hour of this pill, once I see your lab results I will let you know if anything further needs to be done  2.  Because you are feeling lightheaded and dizzy until those symptoms resolve do not take the valsartan until your blood pressure reaches 130 over 80s at home.  3.  I am going to check some blood testing which will include a blood sugar along with your iron studies and some hemoglobin, do not take the glipizide until I confirm your blood sugar.  It is possible the glipizide has lowered your blood sugar, you should only take the glipizide if your blood sugar is greater than 100.    All of the above is subject to change depending on your lab results and how you progress throughout the rest of the day.  Eat what ever you would like for now and keep your phone on.

## 2024-12-19 NOTE — ED QUICK NOTES
Patient c/o dizziness and  feeling lightheaded without visual disturbance. Patient denies n/v. Patient is resting comfortably with  call light within reach.

## 2024-12-19 NOTE — DISCHARGE INSTRUCTIONS
Increase fluid intake for dehydration    Please hold your valsartan for 3 days. Your blood pressure is low and which could be causing her lightheadedness

## 2024-12-19 NOTE — PROGRESS NOTES
INTERNAL MEDICINE OFFICE NOTE     Patient ID: Mason Puri is a 64 year old male.  Today's Date: 12/19/24  Chief Complaint: Dizziness (Patient states that the dizzy ness comes and goes )    Dizziness      Pleasant 62 [place] presents for the above.  He states he feels off and that something is wrong.  He states he is not having intermittent headaches.  He feels dizzy.  Does not have any pain anywhere else.  There is no bleeding.  His stools are brown.  He has been taking his thyroid medication at night but is taking with the rest of his meds, his last TSH came up to about 18, he is due for repeat testing.  He is diabetic, his blood sugar was 73 this morning and he did take the glipizide, he did eat breakfast, we attempted to do point-of-care glucose test in the office but despite being on dual anticoagulation was not enough blood to get a sample with her for venous was drawn.  Blood pressure is technically normal but on the lower end of normal as his blood pressures typically in the 130s over 80s.        Vitals:    12/19/24 0854   BP: 111/68   Pulse: 79   SpO2: 95%   Weight: (!) 305 lb (138.3 kg)   Height: 6' 2\" (1.88 m)     body mass index is 39.16 kg/m².  BP Readings from Last 3 Encounters:   12/19/24 111/68   12/09/24 138/78   11/15/24 104/49     The 10-year ASCVD risk score (Tej GARNICA, et al., 2019) is: 13.7%    Values used to calculate the score:      Age: 64 years      Sex: Male      Is Non- : No      Diabetic: Yes      Tobacco smoker: No      Systolic Blood Pressure: 111 mmHg      Is BP treated: Yes      HDL Cholesterol: 44 mg/dL      Total Cholesterol: 137 mg/dL  Medications reviewed:  Current Outpatient Medications   Medication Sig Dispense Refill    sulfamethoxazole-trimethoprim -160 MG Oral Tab per tablet Take 1 tablet by mouth 2 (two) times daily. 30 tablet 0    aspirin (ASPIRIN 81) 81 MG Oral Tab EC Take 1 tablet (81 mg total) by mouth daily. FOR  HEART. 90 tablet 9    rosuvastatin 40 MG Oral Tab Take 1 tablet (40 mg total) by mouth nightly. FOR CHOLESTEROL. 90 tablet 9    valsartan 160 MG Oral Tab Take 1 tablet (160 mg total) by mouth daily. FOR BLOOD PRESSURE. 90 tablet 9    pregabalin 100 MG Oral Cap Take 1 capsule (100 mg total) by mouth every 8 (eight) hours. FOR NERVE PAIN. 270 capsule 6    DULoxetine 30 MG Oral Cap DR Particles Take 1 capsule (30 mg total) by mouth daily. TAKE 1 CAPSULE IN THE MORNING FOR ANXIETY/DEPRESSION/ARTHRITIS PAIN 90 capsule 9    pantoprazole 40 MG Oral Tab EC Take 1 tablet (40 mg total) by mouth every morning before breakfast. 90 tablet 9    finasteride 5 MG Oral Tab Take 1 tablet (5 mg total) by mouth daily. FOR PROSTATE. 90 tablet 9    tamsulosin 0.4 MG Oral Cap Take 2 capsules (0.8 mg total) by mouth daily. FOR PROSTATE. 180 capsule 9    ticagrelor 90 MG Oral Tab Take 1 tablet (90 mg total) by mouth every 12 (twelve) hours. FOR HEART STENTS. 180 tablet 9    metFORMIN HCl 1000 MG Oral Tab Take 1 tablet (1,000 mg total) by mouth 2 (two) times daily with meals. 180 tablet 1    glipiZIDE 5 MG Oral Tab Take 1 tablet (5 mg total) by mouth 2 (two) times daily before meals. STOP/DO NOT TAKE IF FASTING SUGAR IS LESS THAN 100. 180 tablet 1    Tirzepatide (MOUNJARO) 15 MG/0.5ML Subcutaneous Solution Auto-injector Inject 15 mg into the skin once a week. FOR DIABETES. Continuation of therapy. 6 mL 9    Multiple Vitamins-Minerals (MULTIVITAMIN MEN 50+) Oral Tab Take 1 tablet by mouth daily.      metoprolol tartrate 25 MG Oral Tab Take 1 tablet (25 mg total) by mouth 2 (two) times daily. FOR HEART. 30 tablet 0    levothyroxine 125 MCG Oral Tab Take 1 tablet (125 mcg total) by mouth at bedtime. 90 tablet 1         Assessment & Plan    1. Dizziness (Primary)  -     CBC With Differential With Platelet  -     Ferritin  -     Iron And Tibc  -     EKG 12 Lead; Future; Expected date: 12/19/2024  2. Hypertension associated with type 2 diabetes  mellitus (HCC)  3. Left carotid artery stenosis  4. S/P drug eluting coronary stent placement  5. Hypothyroidism, unspecified type  -     TSH W Reflex To Free T4  -     TSH and Free T4  6. Hyperglycemia due to diabetes mellitus (HCC)  7. Type 2 diabetes mellitus with diabetic peripheral angiopathy without gangrene, without long-term current use of insulin (HCC)  -     POC HemoCue Glucose 201 (Finger stick glucose)  -     Hemoglobin A1C  8. History of carotid endarterectomy  9. Hyperlipidemia associated with type 2 diabetes mellitus ([facility])  10. Primary hypertension  11. Vitamin D deficiency  12. Low vitamin D level  13. Hypoglycemia  -     Comp Metabolic Panel (14)  14. Urinary frequency  -     Urinalysis, Routine  -     Urine Culture, Routine  Plan  Unclear etiology for his dizziness, it is multifactorial, this could be blood pressure related or possibly anemia possibly related to his blood sugars, or even his thyroid.  Will switch the levothyroxine to daily and take it with no medications.  No more glipizide until we see the blood sugars.  Stop the valsartan for now until blood pressure reaches 130s over 80s or we see lab results.  Check urine to ensure he is not having osmotic diuresis.  Close follow-up recommended.  If his symptoms worsen he is to go to the ER.  Patient is supposed to go to [facility] however these are stat labs need to be done urgently as patient is high risk for admission to the hospital/complications therefore to avoid delay in his care lab testing is performed as above in the office.    Follow Up: Return for DEPENDING ON RESULTS..         Objective: Results:   Physical Exam  Vitals and nursing note reviewed.   Constitutional:       General: He is not in acute distress.     Appearance: Normal appearance.   HENT:      Head: Normocephalic.      Right Ear: External ear normal.      Left Ear: External ear normal.   Eyes:      Extraocular Movements: Extraocular movements intact.      Conjunctiva/sclera:  Conjunctivae normal.   Cardiovascular:      Rate and Rhythm: Normal rate and regular rhythm.      Pulses: Normal pulses.      Heart sounds: Normal heart sounds.   Pulmonary:      Effort: Pulmonary effort is normal. No respiratory distress.      Breath sounds: Normal breath sounds. No wheezing.   Abdominal:      General: Abdomen is flat. Bowel sounds are normal.      Tenderness: There is no abdominal tenderness.   Musculoskeletal:         General: Normal range of motion.      Cervical back: Normal range of motion and neck supple.   Skin:     Coloration: Skin is not jaundiced.   Neurological:      General: No focal deficit present.      Mental Status: He is alert and oriented to person, place, and time. Mental status is at baseline.   Psychiatric:         Mood and Affect: Mood normal.         Behavior: Behavior normal.        Reviewed:    Patient Active Problem List    Diagnosis    PVC (premature ventricular contraction)    Osteomyelitis of third toe of left foot ([facility])    JUAN A (obstructive sleep apnea)    S/P drug eluting coronary stent placement    Neurogenic claudication    Abnormal Holter monitor finding    Left carotid artery stenosis    Cerebellar infarct ([facility])    Transient ischemic attack (TIA)    BPH with obstruction/lower urinary tract symptoms    Major depressive disorder    Venous stasis dermatitis of both lower extremities    Patellofemoral arthritis    Varicose veins of left lower extremity with inflammation, with ulcer of ankle limited to breakdown of skin ([facility])    Hypertension associated with type 2 diabetes mellitus (HCC)    Hyperlipidemia associated with type 2 diabetes mellitus ([facility])    Type 2 diabetes mellitus with diabetic peripheral angiopathy without gangrene, without long-term current use of insulin ([facility])    Hypothyroidism    History of right-sided carotid endarterectomy    Detrusor overactivity    Primary osteoarthritis of right knee    Gastroesophageal reflux disease with esophagitis    Class 2  severe obesity due to excess calories with serious comorbidity and body mass index (BMI) of 38.0 to 38.9 in adult (HCC)      Allergies[1]     Social History     Socioeconomic History    Marital status:     Number of children: 4   Tobacco Use    Smoking status: Former     Current packs/day: 0.00     Types: Cigarettes     Quit date:      Years since quittin.9     Passive exposure: Past    Smokeless tobacco: Never   Vaping Use    Vaping status: Never Used   Substance and Sexual Activity    Alcohol use: Never    Drug use: Never   Other Topics Concern    Caffeine Concern Yes     Comment: coffee daily    Exercise No   Social History Narrative    The patient does use an assistive device..  Brace    The patient does live in a home with stairs.     Social Drivers of Health     Food Insecurity: No Food Insecurity (10/22/2024)    Food Insecurity     Food Insecurity: Never true   Transportation Needs: No Transportation Needs (10/22/2024)    Transportation Needs     Lack of Transportation: No   Housing Stability: Low Risk  (10/22/2024)    Housing Stability     Housing Instability: No      Review of Systems   Neurological:  Positive for dizziness.     All other systems negative unless otherwise stated in ROS or HPI above.       Alvino Wallace MD  Internal Medicine       Call office with any questions or seek emergency care if necessary.   Patient understands and agrees to follow directions and advice.      ----------------------------------------- PATIENT INSTRUCTIONS-----------------------------------------   .    Patient Instructions   1.  Lets switch your levothyroxine to daytime and you should not take any medications within 1 hour of this pill, once I see your lab results I will let you know if anything further needs to be done  2.  Because you are feeling lightheaded and dizzy until those symptoms resolve do not take the valsartan until your blood pressure reaches 130 over 80s at home.  3.  I am going to  check some blood testing which will include a blood sugar along with your iron studies and some hemoglobin, do not take the glipizide until I confirm your blood sugar.  It is possible the glipizide has lowered your blood sugar, you should only take the glipizide if your blood sugar is greater than 100.    All of the above is subject to change depending on your lab results and how you progress throughout the rest of the day.  Eat what ever you would like for now and keep your phone on.             [1]   Allergies  Allergen Reactions    Empagliflozin OTHER (SEE COMMENTS)     Frequent urination, unbearable.    Penicillins UNKNOWN     Patient does not recall reaction    Other UNKNOWN

## 2024-12-19 NOTE — ED PROVIDER NOTES
Patient Seen in: [facility] Emergency Department    History     Chief Complaint   Patient presents with    Abnormal Labs    Dizziness       HPI    Patient presents the ER from his primary care doctor's office.  Patient states he been feeling off for the last few days.  Patient states that intermittent headaches and felt dizzy.  Patient with normal stools.  Patient had routine blood work drawn and showed that he had elevated BUN/creatinine.  Patient was sent in for further evaluation as well as dehydration.  Patient denies any fevers or chills.    History reviewed.   Past Medical History:    Benign head tremor    BPH (benign prostatic hyperplasia)    Diabetes (HCC)    Disorder of thyroid    Diverticulitis    Former smoker    High blood pressure    High cholesterol    Hyperlipidemia    Hypertension    Neuropathic pain    Non-pressure chronic ulcer of right lower leg, limited to breakdown of skin (HCC)    Obesity    Sleep apnea    Snoring    Stroke (HCC)    Thyroid disease       History reviewed.   Past Surgical History:   Procedure Laterality Date    Carotid endarterectomy Right 2021    Surgeon: [facility]. Rajiv [facility]    Neck/chest procedure unlisted      per patient, a blockage was removed from the right side of his neck in 2021    Other surgical history  2017    Small bowel Res.    Other surgical history  2019    Colectomy    Pain management N/A 11/15/2024    Dr.Behar; Right knee Genicular Radiofrequency Neurotomy         Medications :  Prescriptions Prior to Admission[1]     Family History   Problem Relation Age of Onset    Cancer Father         Prostate    Heart Disorder Father     Cancer Mother        Smoking Status:   Social History     Socioeconomic History    Marital status:     Number of children: 4   Tobacco Use    Smoking status: Former     Current packs/day: 0.00     Types: Cigarettes     Quit date:      Years since quittin.9     Passive exposure: Past    Smokeless  tobacco: Never   Vaping Use    Vaping status: Never Used   Substance and Sexual Activity    Alcohol use: Never    Drug use: Never   Other Topics Concern    Caffeine Concern Yes     Comment: coffee daily    Exercise No       ROS  All pertinent positives for the review of systems are mentioned in the HPI  All other organ systems are reviewed and are negative.    Constitutional and vital signs reviewed.      Social History and Family History elements reviewed from today, pertinent positives to the presenting problem noted.    Physical Exam     ED Triage Vitals [12/19/24 1302]   /77   Pulse 76   Resp 20   Temp 97.4 °F (36.3 °C)   Temp src Oral   SpO2 94 %   O2 Device None (Room air)       All measures to prevent infection transmission during my interaction with the patient were taken. The patient was already wearing a droplet mask on my arrival to the room. Personal protective equipment including droplet mask, eye protection, and gloves were worn throughout the duration of the exam.  Handwashing was performed prior to and after the exam.  Stethoscope and any equipment used during my examination was cleaned with super sani-cloth germicidal wipes following the exam.     Physical Exam  Vitals and nursing note reviewed.   Constitutional:       Appearance: He is well-developed.   HENT:      Head: Normocephalic and atraumatic.      Right Ear: External ear normal.      Left Ear: External ear normal.      Nose: Nose normal.   Eyes:      Conjunctiva/sclera: Conjunctivae normal.      Pupils: Pupils are equal, round, and reactive to light.   Cardiovascular:      Rate and Rhythm: Normal rate and regular rhythm.      Heart sounds: Normal heart sounds.   Pulmonary:      Effort: Pulmonary effort is normal.      Breath sounds: Normal breath sounds.   Abdominal:      General: Bowel sounds are normal.      Palpations: Abdomen is soft.   Musculoskeletal:         General: Normal range of motion.      Cervical back: Normal range of  motion and neck supple.   Skin:     General: Skin is warm and dry.   Neurological:      Mental Status: He is alert and oriented to person, place, and time.      Deep Tendon Reflexes: Reflexes are normal and symmetric.   Psychiatric:         Behavior: Behavior normal.         Thought Content: Thought content normal.         Judgment: Judgment normal.         ED Course        Labs Reviewed   BASIC METABOLIC PANEL (8) - Abnormal; Notable for the following components:       Result Value    BUN 30 (*)     Creatinine 2.31 (*)     Calculated Osmolality 296 (*)     eGFR-Cr 31 (*)     All other components within normal limits   CBC WITH DIFFERENTIAL WITH PLATELET - Abnormal; Notable for the following components:    RBC 4.01 (*)     HGB 11.6 (*)     HCT 36.2 (*)     RDW-SD 49.2 (*)     All other components within normal limits   TSH W REFLEX TO FREE T4 - Abnormal; Notable for the following components:    TSH 0.301 (*)     All other components within normal limits   FREE T3 (TRIIODOTHYRONINE) - Abnormal; Notable for the following components:    T3 Free 2.37 (*)     All other components within normal limits   HEPATIC FUNCTION PANEL (7) - Normal   MAGNESIUM - Normal   T4, FREE (S) - Normal   SARS-COV-2/FLU A AND B/RSV BY PCR (GENEXPERT) - Normal    Narrative:     This test is intended for the qualitative detection and differentiation of SARS-CoV-2, influenza A, influenza B, and respiratory syncytial virus (RSV) viral RNA in nasopharyngeal or nares swabs from individuals suspected of respiratory viral infection consistent with COVID-19 by their healthcare provider. Signs and symptoms of respiratory viral infection due to SARS-CoV-2, influenza, and RSV can be similar.                                    Test performed using the Xpert Xpress SARS-CoV-2/FLU/RSV (real time RT-PCR)  assay on the [employer] instrument, [place], [place] [address].                   This test is being used under the Food and Drug Administration's  Emergency Use Authorization.                                    The authorized Fact Sheet for Healthcare Providers for this assay is available upon request from the laboratory.         Imaging Results Available and Reviewed while in ED: No results found.  ED Medications Administered:   Medications   sodium chloride 0.9 % IV bolus 1,000 mL (1,000 mL Intravenous New Bag 12/19/24 1521)         MDM     Vitals:    12/19/24 1302 12/19/24 1530 12/19/24 1550 12/19/24 1600   BP: 144/77 102/55 106/61 103/51   Pulse: 76 66 69 67   Resp: 20 13 13 15   Temp: 97.4 °F (36.3 °C)      TempSrc: Oral      SpO2: 94% 92% 92% 91%   Weight: (!) 138.3 kg      Height: 188 cm (6' 2\")        *I personally reviewed and interpreted all ED vitals.  I also personally reviewed all labs and imaging if ordered    Pulse Ox: 94%, Room air, Normal     Monitor Interpretation:   normal sinus rhythm    Differential Diagnosis/ Diagnostic Considerations: Viral syndrome, influenza, JENNIFER, dehydration, thyroid dysfunction    Medical Record Review: I personally reviewed available prior medical records for any recent pertinent discharge summaries, testing, and procedures and reviewed those reports.    Complicating Factors: The patient already has does not have any pertinent problems on file. to contribute to the complexity of this ED evaluation.    Medical Decision Making  64-year-old male presents ER for abnormal labs.  Patient was sent in for elevated kidney function.  Patient given a liter of IV fluids.  Patient instructed to increase fluid intake and follow-up with primary care physician 1 week for repeat blood work.  Patient also states he complains of lightheadedness.  Patient instructed to hold his valsartan for the next 3 days and discuss with his primary care physician.  Patient is hypotensive on arrival to the ER which could be causing his lightheadedness.    Problems Addressed:  Hypotension due to drugs: acute illness or injury  Renal insufficiency:  acute illness or injury    Amount and/or Complexity of Data Reviewed  External Data Reviewed: labs and notes.     Details: Outpatient notes and labs reviewed.  Patient saw his primary care physician earlier today and had blood work that showed he had worsening renal function and was sent to the ER for possible dehydration.  Labs: ordered. Decision-making details documented in ED Course.        Condition upon leaving the department: Stable    Disposition and Plan     Clinical Impression:  1. Renal insufficiency    2. Hypotension due to drugs        Disposition:  Discharge    Follow-up:  Alvino Wallace MD  [address] [facility] [address]  [phone]    Go in 1 week(s)  for repeat blood work      Medications Prescribed:  Current Discharge Medication List                   [1] (Not in a hospital admission)

## 2024-12-19 NOTE — ED INITIAL ASSESSMENT (HPI)
Pt reports \"I feel weird like I got no ambition. The doctor called me because my labs came back and they think I might be dehydrated and they are concerned my kidneys might be failing\". Creatine - 2.32; GFR 31, and BUN 30. Pt c/o right sided flank pain.

## 2024-12-23 NOTE — PROGRESS NOTES
INTERNAL MEDICINE OFFICE NOTE     Patient ID: Mason Puri is a 64 year old male.  Today's Date: 12/23/24  Chief Complaint: Hospital F/U    HPI  1.  Patient was seen in clinic last week for dizziness and sent to ER for intermittent headaches, dizziness, and possible dehydration. In ER was given IV fluids and labs repeated, improved hypotension and was told to hold valsartan until follow up in office. Has been feeling ok, but still feeling light headed since being home. Here for follow up and repeat labs.  Blood sugar in morning/fasting - Sunday 141, otherwise has not checked at home  Took glipizide yesterday and today since being home, light headness today.  Holding valsartan since ER visit, has not restarted yet.  Not checking blood pressure at home, needs new machine.   Tolerated fluids at home well, and voiding without problems.  2.  Patient reports having some increased pain to his lower back that radiates down his legs.  He has been taking increased amounts of ibuprofen at home to relieve his pain.  He continues to take pregabalin as previously prescribed, however on some days he takes 2 tablets in the morning instead of 1 tablet as ordered.    Vitals:    12/23/24 0944   BP: 111/64   Pulse: 76   Resp: 20   SpO2: 95%   Height: 6' 2\" (1.88 m)     body mass index is 39.16 kg/m².  BP Readings from Last 3 Encounters:   12/23/24 111/64   12/19/24 118/52   12/19/24 111/68     The ASCVD Risk score (Tej DK, et al., 2019) failed to calculate for the following reasons:    The valid total cholesterol range is 130 to 320 mg/dL  Medications reviewed:  Current Outpatient Medications   Medication Sig Dispense Refill    Blood Pressure Monitoring ([facility] BLOOD PRESSURE MONITOR) Does not apply Misc Monitor blood pressure twice a day. 1 each 0    aspirin (ASPIRIN 81) 81 MG Oral Tab EC Take 1 tablet (81 mg total) by mouth daily. FOR HEART. 90 tablet 9    rosuvastatin 40 MG Oral Tab Take 1 tablet (40 mg  total) by mouth nightly. FOR CHOLESTEROL. 90 tablet 9    pregabalin 100 MG Oral Cap Take 1 capsule (100 mg total) by mouth every 8 (eight) hours. FOR NERVE PAIN. 270 capsule 6    DULoxetine 30 MG Oral Cap DR Particles Take 1 capsule (30 mg total) by mouth daily. TAKE 1 CAPSULE IN THE MORNING FOR ANXIETY/DEPRESSION/ARTHRITIS PAIN 90 capsule 9    pantoprazole 40 MG Oral Tab EC Take 1 tablet (40 mg total) by mouth every morning before breakfast. 90 tablet 9    finasteride 5 MG Oral Tab Take 1 tablet (5 mg total) by mouth daily. FOR PROSTATE. 90 tablet 9    tamsulosin 0.4 MG Oral Cap Take 2 capsules (0.8 mg total) by mouth daily. FOR PROSTATE. 180 capsule 9    ticagrelor 90 MG Oral Tab Take 1 tablet (90 mg total) by mouth every 12 (twelve) hours. FOR HEART STENTS. 180 tablet 9    metFORMIN HCl 1000 MG Oral Tab Take 1 tablet (1,000 mg total) by mouth 2 (two) times daily with meals. 180 tablet 1    glipiZIDE 5 MG Oral Tab Take 1 tablet (5 mg total) by mouth 2 (two) times daily before meals. STOP/DO NOT TAKE IF FASTING SUGAR IS LESS THAN 100. 180 tablet 1    Tirzepatide (MOUNJARO) 15 MG/0.5ML Subcutaneous Solution Auto-injector Inject 15 mg into the skin once a week. FOR DIABETES. Continuation of therapy. 6 mL 9    Multiple Vitamins-Minerals (MULTIVITAMIN MEN 50+) Oral Tab Take 1 tablet by mouth daily.      metoprolol tartrate 25 MG Oral Tab Take 1 tablet (25 mg total) by mouth 2 (two) times daily. FOR HEART. 30 tablet 0    levothyroxine 125 MCG Oral Tab Take 1 tablet (125 mcg total) by mouth at bedtime. 90 tablet 1    sulfamethoxazole-trimethoprim -160 MG Oral Tab per tablet Take 1 tablet by mouth 2 (two) times daily. 30 tablet 0    valsartan 160 MG Oral Tab Take 1 tablet (160 mg total) by mouth daily. FOR BLOOD PRESSURE. (Patient not taking: Reported on 12/23/2024) 90 tablet 9         Assessment & Plan    1. Dizziness (Primary)  -     Comp Metabolic Panel (14)  -     Cancel: Hemoglobin A1C  -     TSH W Reflex To  Free T4  -     CBC, Platelet; No Differential  -     MyChart Blood Pressure Flowsheet  2. Screening for endocrine, nutritional, metabolic and immunity disorder  -     Comp Metabolic Panel (14)  -     Cancel: Hemoglobin A1C  -     TSH W Reflex To Free T4  -     CBC, Platelet; No Differential  3. Hypertension, unspecified type  -     Comp Metabolic Panel (14)  -     Cancel: Hemoglobin A1C  -     TSH W Reflex To Free T4  -     CBC, Platelet; No Differential  -     MyChart Blood Pressure Flowsheet  -     Discontinue: [facility] Blood Pressure Monitor; Monitor blood pressure twice a day.  Dispense: 1 each; Refill: 0  -     [facility] Blood Pressure Monitor; Monitor blood pressure twice a day.  Dispense: 1 each; Refill: 0  4. Hypotension due to drugs  -     Discontinue: [facility] Blood Pressure Monitor; Monitor blood pressure twice a day.  Dispense: 1 each; Refill: 0  -     [facility] Blood Pressure Monitor; Monitor blood pressure twice a day.  Dispense: 1 each; Refill: 0  5. Hypothyroidism, unspecified type  6. Neurogenic claudication        Plan  1. Patient was seen last week in office and ER for dizziness. He continues to have some lightheadedness mostly today and intermittent headaches. Blood pressure in office 111/64 still on lower side. He is drinking fluids well and voiding without problem. He has been holding his valsartan as instructed from ER visit. I suspect his hypotension and dizziness/lightheadedness could be due to his valsartan. We will continue to hold this medication and will have him check his blood pressures at home for the next 2 weeks. He states his blood pressure monitor at home is broken and needs replacement, we talked about how to get another machine at pharmacy or even [employer]. I have placed an order for blood pressure monitor to see if insurance will cover some of cost. Secondly, his symptoms may be due to lower blood sugars. Patient is on meformin, mounjaro, and glipizide with his last A1C at 6.4, we discussed stopping  his glipizide and checking his blood sugars at home. Plan to resume if fasting blood sugars are over 150.      2. Patient has history of back pain with neurogenic claudication with increased pain and radiation to legs. He takes pregablin as prescribed and sometimes an extra 100mg in morning. Recently he has been taking increased amount of ibuprofen otc for relief of pain. We discussed his renal insufficiency and instructed him to avoid NSAIDS. Will recheck CMP and kidney function today. Will have him take pregablin 100mg in morning, 100mg in afternoon, and 200mg in evening to see if this provides better relief. Patient verbalized understanding and agrees with plan.     Follow Up: Return for AS NEEDED/IF SYMPTOMS WORSEN, DEPENDING ON RESULTS..         Objective: Results:   Physical Exam  Constitutional:       General: He is not in acute distress.     Appearance: Normal appearance. He is well-developed.   HENT:      Head: Normocephalic and atraumatic.      Nose: Nose normal.   Eyes:      Extraocular Movements: Extraocular movements intact.      Conjunctiva/sclera: Conjunctivae normal.      Pupils: Pupils are equal, round, and reactive to light.   Cardiovascular:      Rate and Rhythm: Normal rate and regular rhythm.      Heart sounds: Normal heart sounds.   Pulmonary:      Effort: Pulmonary effort is normal.      Breath sounds: Normal breath sounds.   Abdominal:      General: Bowel sounds are normal.      Palpations: Abdomen is soft.   Musculoskeletal:      Comments: Pain to lumbar back, radiates down legs, mostly right side   Skin:     General: Skin is warm and dry.   Neurological:      Mental Status: He is alert and oriented to person, place, and time.      Deep Tendon Reflexes: Reflexes are normal and symmetric.        Reviewed:    Patient Active Problem List    Diagnosis    PVC (premature ventricular contraction)    Osteomyelitis of third toe of left foot (HCC)    JUAN A (obstructive sleep apnea)    S/P drug eluting  coronary stent placement    Neurogenic claudication    Abnormal Holter monitor finding    Left carotid artery stenosis    Cerebellar infarct (HCC)    Transient ischemic attack (TIA)    BPH with obstruction/lower urinary tract symptoms    Major depressive disorder    Venous stasis dermatitis of both lower extremities    Patellofemoral arthritis    Varicose veins of left lower extremity with inflammation, with ulcer of ankle limited to breakdown of skin (HCC)    Hypertension associated with type 2 diabetes mellitus (HCC)    Hyperlipidemia associated with type 2 diabetes mellitus ([facility])    Type 2 diabetes mellitus with diabetic peripheral angiopathy without gangrene, without long-term current use of insulin ([facility])    Hypothyroidism    History of right-sided carotid endarterectomy    Detrusor overactivity    Primary osteoarthritis of right knee    Gastroesophageal reflux disease with esophagitis    Class 2 severe obesity due to excess calories with serious comorbidity and body mass index (BMI) of 38.0 to 38.9 in adult ([facility])      Allergies[1]     Social History     Socioeconomic History    Marital status:     Number of children: 4   Tobacco Use    Smoking status: Former     Current packs/day: 0.00     Types: Cigarettes     Quit date:      Years since quittin.9     Passive exposure: Past    Smokeless tobacco: Never   Vaping Use    Vaping status: Never Used   Substance and Sexual Activity    Alcohol use: Never    Drug use: Never   Other Topics Concern    Caffeine Concern Yes     Comment: coffee daily    Exercise No   Social History Narrative    The patient does use an assistive device..  Brace    The patient does live in a home with stairs.     Social Drivers of Health     Food Insecurity: No Food Insecurity (10/22/2024)    Food Insecurity     Food Insecurity: Never true   Transportation Needs: No Transportation Needs (10/22/2024)    Transportation Needs     Lack of Transportation: No   Housing Stability: Low  Risk  (10/22/2024)    Housing Stability     Housing Instability: No      Review of Systems   Constitutional: Negative.    HENT: Negative.     Respiratory: Negative.     Cardiovascular: Negative.    Genitourinary:  Negative for difficulty urinating and dysuria.   Skin: Negative.    Neurological:  Positive for light-headedness. Negative for syncope and weakness.     All other systems negative unless otherwise stated in ROS or HPI above.       RAHEEL Cummings  Internal Medicine       Call office with any questions or seek emergency care if necessary.   Patient understands and agrees to follow directions and advice.      ----------------------------------------- PATIENT INSTRUCTIONS-----------------------------------------   .    Patient Instructions   Check fasting blood sugar in mornings and if below 100 do not take glipizide for the entire day.  Hold valsartan for now and check blood pressure at home twice a day. Or check it if feeling unwell.  Send me blood pressure readings through [employer].  For blood pressure readings consistently over 130/90 then I would like you to start your valsartan again and continue to monitor your blood pressure at home.  Continue staying hydrated, plenty of fluids.  We will re-check labs today and I will call you will results later when I see them.   Avoid taking ibuprofen or motrin as these can affect your kidney function.  Take pregabaline 100mg in morning, 100mg in afternoon, and 200mg in evening to see if this improves your pain.              [1]   Allergies  Allergen Reactions    Empagliflozin OTHER (SEE COMMENTS)     Frequent urination, unbearable.    Penicillins UNKNOWN     Patient does not recall reaction    Other UNKNOWN

## 2024-12-23 NOTE — PATIENT INSTRUCTIONS
Check fasting blood sugar in mornings and if below 100 do not take glipizide for the entire day.  Hold valsartan for now and check blood pressure at home twice a day. Or check it if feeling unwell.  Send me blood pressure readings through [employer].  For blood pressure readings consistently over 130/90 then I would like you to start your valsartan again and continue to monitor your blood pressure at home.  Continue staying hydrated, plenty of fluids.  We will re-check labs today and I will call you will results later when I see them.   Avoid taking ibuprofen or motrin as these can affect your kidney function.  Take pregabaline 100mg in morning, 100mg in afternoon, and 200mg in evening to see if this improves your pain.

## 2025-01-02 NOTE — PATIENT INSTRUCTIONS
Stopped glipizide and valsartan   Check BP and BG if getting headaches   Check BG 2 hr after largest meal of the day. If BG is high >180, resume Glipizide 2.5 mg/with largest meal of the day   If kidney function gets lower, will stop metformin   Will start lisinopril low dose for kidney protection   Metformin 1000 mg twice a day   Mounjaro 15 mg /week   Glipizide 2.5 mg once a a day with larger dinner if needed     Thyroid medication dose Levothyroxine 125 mcg  at bedtime. Take you medication on empty stomach, not with any other medication or food. Wait 60 minutes before eating. Wait 4 hours before taking Vitamins, Calcium or iron.  Wait 60 minutes before eating. Do not take the medication on the morning of the lab test. Do not take Biotin/Turmeric 1 week before the test.      Continue Rosuvastatin  40 mg      If you have low blood sugar <70, take 15 grams of carb (8 oz juice or regular soda) and recheck in 15 minutes.    Follow up with podiatry and eye doctor annually.   Patients need to wear covered shoes all the time and check feet daily.   Bring your meter/BG log to the next visit.

## 2025-01-02 NOTE — PROGRESS NOTES
Reason for Visit      Mason Puri is a 64 year old male presents today complaining of left third digit ulceration.     History of Present Illness     Patient presents to clinic today after last being seen by podiatry on 9/12/2024 for management of the left third digit ulceration.  At that time an x-ray was ordered to rule out osteomyelitis and patient was encouraged to follow-up in 4 weeks.  Radiographs noted questionable subtle age-indeterminate erosive changes of the distal third phalangeal tuft.  It was suggested at this time that could not rule out osteomyelitis and an MRI was recommended.  Patient contacted the office last week requesting a follow-up appointment.      Patient status post left third digit partial toe amputation,  (DOS: 10/24/24). Patient was subsequently discharged from the hospital on 10/25/2024 on Bactrim antibiotic.  Patient's pathology came back osteomyelitis with a negative clearance margin.  Wound cultures came back positive for normal skin bruno.  Patient has left the dressing on since his surgery and being discharged from the hospital on the 25th.    Patient returns to clinic today for his 4-week postop appointment.  Patient currently not on any antibiotics.  Patient is status post nerve ablation of his knee and 11/15/2024.      Patient returns to clinic today for 1 week follow-up of a blister that developed on his left second digit at his postop appointment next week.  Blister was drained at that time and a culture was taken which is still preliminary at this time.  Patient states the pain and swelling has significantly improved since his last office visit.    Patient returns to clinic today for 2-week follow-up of his left lower extremity.  Patient is scheduled for an MRI 1/26/2025.  Patient currently not on any antibiotics as he presented to the emergency room for acute kidney injury which was thought to be secondary to his Bactrim use.    The following portions of the  patient's history were reviewed and updated as appropriate: allergies, current medications, past family history, past medical history, past social history, past surgical history and problem list.    Allergies[1]      Current Outpatient Medications:     lisinopril 5 MG Oral Tab, Take 1 tablet (5 mg total) by mouth daily., Disp: 90 tablet, Rfl: 0    Blood Pressure Monitoring ([employer] BLOOD PRESSURE MONITOR) Does not apply Misc, Monitor blood pressure twice a day., Disp: 1 each, Rfl: 0    sulfamethoxazole-trimethoprim -160 MG Oral Tab per tablet, Take 1 tablet by mouth 2 (two) times daily., Disp: 30 tablet, Rfl: 0    aspirin (ASPIRIN 81) 81 MG Oral Tab EC, Take 1 tablet (81 mg total) by mouth daily. FOR HEART., Disp: 90 tablet, Rfl: 9    rosuvastatin 40 MG Oral Tab, Take 1 tablet (40 mg total) by mouth nightly. FOR CHOLESTEROL., Disp: 90 tablet, Rfl: 9    pregabalin 100 MG Oral Cap, Take 1 capsule (100 mg total) by mouth every 8 (eight) hours. FOR NERVE PAIN., Disp: 270 capsule, Rfl: 6    DULoxetine 30 MG Oral Cap DR Particles, Take 1 capsule (30 mg total) by mouth daily. TAKE 1 CAPSULE IN THE MORNING FOR ANXIETY/DEPRESSION/ARTHRITIS PAIN, Disp: 90 capsule, Rfl: 9    pantoprazole 40 MG Oral Tab EC, Take 1 tablet (40 mg total) by mouth every morning before breakfast., Disp: 90 tablet, Rfl: 9    finasteride 5 MG Oral Tab, Take 1 tablet (5 mg total) by mouth daily. FOR PROSTATE., Disp: 90 tablet, Rfl: 9    tamsulosin 0.4 MG Oral Cap, Take 2 capsules (0.8 mg total) by mouth daily. FOR PROSTATE., Disp: 180 capsule, Rfl: 9    ticagrelor 90 MG Oral Tab, Take 1 tablet (90 mg total) by mouth every 12 (twelve) hours. FOR HEART STENTS., Disp: 180 tablet, Rfl: 9    metFORMIN HCl 1000 MG Oral Tab, Take 1 tablet (1,000 mg total) by mouth 2 (two) times daily with meals., Disp: 180 tablet, Rfl: 1    Tirzepatide (MOUNJARO) 15 MG/0.5ML Subcutaneous Solution Auto-injector, Inject 15 mg into the skin once a week. FOR DIABETES.  Continuation of therapy., Disp: 6 mL, Rfl: 9    Multiple Vitamins-Minerals (MULTIVITAMIN MEN 50+) Oral Tab, Take 1 tablet by mouth daily., Disp: , Rfl:     metoprolol tartrate 25 MG Oral Tab, Take 1 tablet (25 mg total) by mouth 2 (two) times daily. FOR HEART., Disp: 30 tablet, Rfl: 0    levothyroxine 125 MCG Oral Tab, Take 1 tablet (125 mcg total) by mouth at bedtime., Disp: 90 tablet, Rfl: 1    There are no discontinued medications.      Patient Active Problem List   Diagnosis    Class 2 severe obesity due to excess calories with serious comorbidity and body mass index (BMI) of 38.0 to 38.9 in adult ([facility])    Hypertension associated with type 2 diabetes mellitus ([facility])    Hyperlipidemia associated with type 2 diabetes mellitus ([facility])    Type 2 diabetes mellitus with diabetic peripheral angiopathy without gangrene, without long-term current use of insulin ([facility])    Hypothyroidism    History of right-sided carotid endarterectomy    Detrusor overactivity    Primary osteoarthritis of right knee    Gastroesophageal reflux disease with esophagitis    Varicose veins of left lower extremity with inflammation, with ulcer of ankle limited to breakdown of skin ([facility])    Patellofemoral arthritis    Venous stasis dermatitis of both lower extremities    Major depressive disorder    BPH with obstruction/lower urinary tract symptoms    Transient ischemic attack (TIA)    Left carotid artery stenosis    Cerebellar infarct ([facility])    Abnormal Holter monitor finding    S/P drug eluting coronary stent placement    Neurogenic claudication    JUAN A (obstructive sleep apnea)    Osteomyelitis of third toe of left foot ([facility])    PVC (premature ventricular contraction)       Past Medical History:    Benign head tremor    BPH (benign prostatic hyperplasia)    Diabetes ([facility])    Disorder of thyroid    Diverticulitis    Former smoker    High blood pressure    High cholesterol    Hyperlipidemia    Hypertension    Neuropathic pain    Non-pressure chronic  ulcer of right lower leg, limited to breakdown of skin (HCC)    Obesity    Sleep apnea    Snoring    Stroke (HCC)    Thyroid disease       Past Surgical History:   Procedure Laterality Date    Carotid endarterectomy Right 2021    Surgeon: Dr. Rajiv [facility] at [facility]    Neck/chest procedure unlisted      per patient, a blockage was removed from the right side of his neck in 2021    Other surgical history  2017    Small bowel Res.    Other surgical history  2019    Colectomy    Pain management N/A 11/15/2024    Dr.Behar; Right knee Genicular Radiofrequency Neurotomy       Family History   Problem Relation Age of Onset    Cancer Father         Prostate    Heart Disorder Father     Cancer Mother        Social History     Occupational History    Not on file   Tobacco Use    Smoking status: Former     Current packs/day: 0.00     Types: Cigarettes     Quit date:      Years since quittin.0     Passive exposure: Past    Smokeless tobacco: Never   Vaping Use    Vaping status: Never Used   Substance and Sexual Activity    Alcohol use: Never    Drug use: Never    Sexual activity: Not on file       ROS      Constitutional: negative for chills, fevers and sweats  Gastrointestinal: negative for abdominal pain, diarrhea, nausea and vomiting  Genitourinary:negative for dysuria and hematuria  Musculoskeletal:negative for arthralgias and muscle weakness  Neurological: negative for paresthesia and weakness  All others reviewed and negative.      Physical Exam     LE PHYSICAL EXAM  Constitution: Well-developed and well-nourished. Gait appears normal. No apparent distress. Alert and oriented to person, place, and time.  Integument: There are no varicosities. Skin appears moist, warm, and supple with positive hair growth. There are no color changes. No open lesions. No macerations, No Hyperkeratotic lesions.  Vascular examination: Dorsalis pedis and posterior tibial pulses are strong bilaterally with capillary filling  time less than 3 seconds to all digits. There is no peripheral edema..  Neurological Sensorium: Grossly intact to sharp/dull. Vibratory: Intact.  Musculoskeletal:   5/5 pedal muscle strength b/l     Incision well adhered on the third digit with no signs of infection or open lesions.  Blister has dried out at this time but still some mild erythema and warmth.  No open lesions fluctuance noted at this time.  Assessment and Plan     status post left third digit partial toe amputation,  (DOS: 10/24/24). Patient was subsequently discharged from the hospital on 10/25/2024 on Bactrim antibiotic.  Patient's pathology came back osteomyelitis with a negative clearance margin.  Wound cultures came back positive for normal skin bruno.  Patient has left the dressing on since his surgery and being discharged from the hospital on the 25th.    -Performed a dressing change at today's office visit.  Patient to keep clean dry and intact until his follow-up appointment next week patient was given a prescription for a walking boot which was fitted today as he does not like the surgical shoe.  Patient to complete course of Bactrim that was discharged with him on his hospital mission on the 25th.      Third digit in which the surgery was performed as well adhered with no evidence of open lesions.  However second digit has a blister which appears to have some drainage and cellulitis.  Lanced the blister and cultured the fluid.-Results pending    Started patient on Bactrim.-Patient still taking  -Appearance of the second digit significantly improved though still some mild erythema.  Discussed its only been 4 days.  Patient concerned given the fact that he had amputation of his third digit.  Placed an order for an MRI to be done in 2 weeks.  Discussed him coming back before the MRI and we will assess whether or not it is needed.  Patient advised of signs of infection and call the office immediately.    Since his last office visit patient was  seen in emergency room for acute kidney injury more than likely secondary to the Bactrim.  -Patient has an MRI scheduled to 26.  No acute signs of infection noted at this time.  No open lesions noted at this time.  Patient to continue current care and will follow-up once MRI is obtained    Patient was instructed to call the office or on-call podiatric physician immediately with any issues or concerns before the next scheduled visit.     Eri Gallardo DPM, [email or web address].[email or web address] GODOY  Diplomat, American Board of Foot and Ankle Surgery  Certified in Foot and Rearfoot/Ankle Reconstruction  Fellow of the American College of Foot and Ankle Surgeons  Fellowship Trained Foot and Ankle Surgeon   [facility]     10/21/2024    6:41 AM         [1]   Allergies  Allergen Reactions    Empagliflozin OTHER (SEE COMMENTS)     Frequent urination, unbearable.    Penicillins UNKNOWN     Patient does not recall reaction    Other UNKNOWN

## 2025-01-02 NOTE — PROGRESS NOTES
Follow up Visit:  DM.  Requesting Physician:   Macario Wallace MD      CHIEF COMPLAINT:    Chief Complaint   Patient presents with    Diabetes     F/u        HISTORY OF PRESENT ILLNESS:   Mando Puri is a 64 year old male who presents with complicated DM, CAD s/p 2 stents, carotid stenosis  post surgery, obese, HTN, dlp , PAD s/p partial toe amputation and hypothyroid     Stopped glipizide and valsartan   Was hospitalized in Dec   Getting headaches now   Dinner is largest meal   Reports FBG is 150 now   Gained wt     2025    10/9/2024 Ft4 1.2. TSH 0.39    Repeated thyroid labs normal        DM Dx   On metformin 1000 mg bid   Mounjaro 15 mg/week     Stopped Glipizide  2024     Tried Jardiance but stopped d/t polyuria no uti or yeast infection  Stopped Farxiga d/t polyuria        W/u showed 2024 Last A1c value was 6.4% done 2024. BG is 267 high   Has polyuria from SGLT2i and possible increase coffee intake. Decreased his coffee intake to 1 cup a day     DLP on Crestor 40     Hypothyroid Levothryoxine 125 mcg  at night as rec'       24 11:05   TSH   0.646       HTN on  BP Meds: lisinopril Tabs - 5 MG; metoprolol tartrate Tabs - 25 MG       Lab Results   Component Value Date    A1C 6.4 (H) 2024    A1C 7.3 (A) 2024    A1C 10.5 (A) 2024    A1C 8.1 (H) 2024    A1C 6.8 (A) 2024      DM quality measures:  A1C/Blood pressure: as reported above   Nephropathy screenin2024 , continue ace /arb rx.   LIPID screenin2024  . On statin rx.   Last dilated eye exam: Last Dilated Eye Exam: 24     Exam shows retinopathy? Eye Exam shows Diabetic Retinopathy?: No   no  Last diabetic foot exam: No data recorded 2025   Dentist : recommend every 6m  Neuropathy: yes in LE   CAD/ASCVD/PAD/CVA: 2 stents, carotid stenosis s/p procedure,   Cholesterol Meds: rosuvastatin Tabs - 40 MG   Cholesterol: 103, done on 2024.  HDL Cholesterol: 39, done on  12/19/2024.  LDL Cholesterol: 43, done on 12/19/2024.  TriGlycerides 113, done on 12/19/2024.         SSx     Door dash  No smoking  Etoh no   No drugs    Lost wt   Wt Readings from Last 6 Encounters:   01/02/25 (!) 313 lb (142 kg)   12/19/24 (!) 305 lb (138.3 kg)   12/19/24 (!) 305 lb (138.3 kg)   12/09/24 (!) 305 lb (138.3 kg)   11/27/24 (!) 315 lb (142.9 kg)   11/13/24 (!) 315 lb (142.9 kg)     Micro Albumen/Creatinine:    Lab Results   Component Value Date    MICROALBCREA 42.2 (H) 06/16/2022    MICROALBCREA 18.4 10/28/2021    MICROALBCREA 20.1 08/10/2021       ASSESSMENT AND PLAN:  64 year old man with complicated DM, CAD s/p 2 stents, carotid stenosis  post surgery, obese, HTN, dlp , PAD s/p partial toe amputation and hypothyroid   He does not have HF but has CAD so will benefit from SGLT2i and GLP-1. He did not tolearate SGLT2i x2     1/2/2025      Plan  Urine labs today   Stopped glipizide and valsartan   Check BP and BG if getting headaches   Check BG 2 hr after largest meal of the day. If BG is high >180, resume Glipizide 2.5 mg/with largest meal of the day   If kidney function gets lower, will stop metformin   Will start lisinopril low dose for kidney protection   Metformin 1000 mg twice a day   Mounjaro 15 mg /week   Glipizide 2.5 mg once a a day with larger dinner if needed     Thyroid medication dose Levothyroxine 125 mcg  at bedtime. Take you medication on empty stomach, not with any other medication or food. Wait 60 minutes before eating. Wait 4 hours before taking Vitamins, Calcium or iron.  Wait 60 minutes before eating. Do not take the medication on the morning of the lab test. Do not take Biotin/Turmeric 1 week before the test.      Continue Rosuvastatin  40 mg    If you have low blood sugar <70, take 15 grams of carb (8 oz juice or regular soda) and recheck in 15 minutes.    Follow up with podiatry and eye doctor annually.   Patients need to wear covered shoes all the time  and check feet daily.   Bring your meter/BG log to the next visit.      PAST MEDICAL HISTORY:   Past Medical History:    Benign head tremor    BPH (benign prostatic hyperplasia)    Diabetes (HCC)    Disorder of thyroid    Diverticulitis    Former smoker    High blood pressure    High cholesterol    Hyperlipidemia    Hypertension    Neuropathic pain    Non-pressure chronic ulcer of right lower leg, limited to breakdown of skin (HCC)    Obesity    Sleep apnea    Snoring    Stroke (HCC)    Thyroid disease       PAST SURGICAL HISTORY:   Past Surgical History:   Procedure Laterality Date    Carotid endarterectomy Right 04/29/2021    Surgeon: Dr. Rajiv Solorio at [facility]    Neck/chest procedure unlisted      per patient, a blockage was removed from the right side of his neck in 8/2021    Other surgical history  2017    Small bowel Res.    Other surgical history  11/12/2019    Colectomy    Pain management N/A 11/15/2024    Dr.Behar; Right knee Genicular Radiofrequency Neurotomy        CURRENT MEDICATIONS:    Current Outpatient Medications   Medication Sig Dispense Refill    lisinopril 5 MG Oral Tab Take 1 tablet (5 mg total) by mouth daily. 90 tablet 0    aspirin (ASPIRIN 81) 81 MG Oral Tab EC Take 1 tablet (81 mg total) by mouth daily. FOR HEART. 90 tablet 9    rosuvastatin 40 MG Oral Tab Take 1 tablet (40 mg total) by mouth nightly. FOR CHOLESTEROL. 90 tablet 9    pregabalin 100 MG Oral Cap Take 1 capsule (100 mg total) by mouth every 8 (eight) hours. FOR NERVE PAIN. 270 capsule 6    DULoxetine 30 MG Oral Cap DR Particles Take 1 capsule (30 mg total) by mouth daily. TAKE 1 CAPSULE IN THE MORNING FOR ANXIETY/DEPRESSION/ARTHRITIS PAIN 90 capsule 9    pantoprazole 40 MG Oral Tab EC Take 1 tablet (40 mg total) by mouth every morning before breakfast. 90 tablet 9    finasteride 5 MG Oral Tab Take 1 tablet (5 mg total) by mouth daily. FOR PROSTATE. 90 tablet 9    metFORMIN HCl 1000 MG Oral Tab Take 1 tablet (1,000 mg total) by  mouth 2 (two) times daily with meals. 180 tablet 1    Tirzepatide (MOUNJARO) 15 MG/0.5ML Subcutaneous Solution Auto-injector Inject 15 mg into the skin once a week. FOR DIABETES. Continuation of therapy. 6 mL 9    metoprolol tartrate 25 MG Oral Tab Take 1 tablet (25 mg total) by mouth 2 (two) times daily. FOR HEART. 30 tablet 0    Blood Pressure Monitoring ([employer] BLOOD PRESSURE MONITOR) Does not apply Misc Monitor blood pressure twice a day. 1 each 0    sulfamethoxazole-trimethoprim -160 MG Oral Tab per tablet Take 1 tablet by mouth 2 (two) times daily. 30 tablet 0    tamsulosin 0.4 MG Oral Cap Take 2 capsules (0.8 mg total) by mouth daily. FOR PROSTATE. 180 capsule 9    ticagrelor 90 MG Oral Tab Take 1 tablet (90 mg total) by mouth every 12 (twelve) hours. FOR HEART STENTS. 180 tablet 9    Multiple Vitamins-Minerals (MULTIVITAMIN MEN 50+) Oral Tab Take 1 tablet by mouth daily.      levothyroxine 125 MCG Oral Tab Take 1 tablet (125 mcg total) by mouth at bedtime. 90 tablet 1       ALLERGIES:  Allergies   Allergen Reactions    Empagliflozin OTHER (SEE COMMENTS)     Frequent urination, unbearable.    Penicillins UNKNOWN     Patient does not recall reaction    Other UNKNOWN       SOCIAL HISTORY:    Social History     Socioeconomic History    Marital status:     Number of children: 4   Tobacco Use    Smoking status: Former     Current packs/day: 0.00     Types: Cigarettes     Quit date:      Years since quittin.0     Passive exposure: Past    Smokeless tobacco: Never   Vaping Use    Vaping status: Never Used   Substance and Sexual Activity    Alcohol use: Never    Drug use: Never   Other Topics Concern    Caffeine Concern Yes     Comment: coffee daily    Exercise No       FAMILY HISTORY:   Family History   Problem Relation Age of Onset    Cancer Father         Prostate    Heart Disorder Father     Cancer Mother         PHYSICAL EXAM:   Height: 6' 2\" (188 cm) ( 1005)  Weight: 313 lb (142 kg)  (01/02 1005)  BSA (Calculated - sq m): 2.63 sq meters (01/02 1005)  Pulse: 79 (01/02 1005)  BP: 124/76 (01/02 1005)  Temp: --  Do Not Use - Resp Rate: --  SpO2: --    Body mass index is 40.19 kg/m².  Obese   Uses cane   Scar on the neck from carotid surgery       DATA:     Pertinent data reviewed      Latest Reference Range & Units 05/06/24 10:36   TSH 0.40 - 4.50 mIU/L 3.82      05/10/24 09:42 06/21/24 09:25   GLUCOSE BLOOD 302 267      08/19/24 14:01   CT BRAIN OR HEAD (CPT=70450) Rpt      Latest Reference Range & Units 06/25/24 10:16   T4,Free (Direct) 0.8 - 1.7 ng/dL 0.8   TSH 0.550 - 4.780 mIU/mL 18.669 (H)   (H): Data is abnormally high     Latest Reference Range & Units 06/25/24 10:16   BUN 9 - 23 mg/dL 26 (H)   CREATININE 0.70 - 1.30 mg/dL 1.51 (H)   (H): Data is abnormally high     Latest Reference Range & Units 06/21/24 09:26 06/25/24 10:16   Glucose 70 - 99 mg/dL  116 (H)   HEMOGLOBIN A1C 4.3 - 5.6 % 10.5 !    (H): Data is abnormally high  !: Data is abnormal          Orders Placed This Encounter   Procedures    POC HemoCue Glucose 201 (Finger stick glucose)    Microalb/Creat Ratio, Random Urine     Orders Placed This Encounter    POC HemoCue Glucose 201 (Finger stick glucose)     Order Specific Question:   Release to patient     Answer:   Immediate    Microalb/Creat Ratio, Random Urine     Order Specific Question:   Release to patient     Answer:   Immediate    lisinopril 5 MG Oral Tab     Sig: Take 1 tablet (5 mg total) by mouth daily.     Dispense:  90 tablet     Refill:  0          This is a specialized patient consultation in endocrinology and required comprehensive review of prior records, as well as current evaluation, with time required for consideration of complex endocrine issues and consultation. For this visit, I personally interviewed the patient, and family member if accompanied, performed the pertinent parts of the history and physical examination. ROS included screening for appropriate  endocrine conditions.   Today's diagnosis and plan were reviewed in detail with the patient who states understanding and agrees with plan. I discussed with the patient possible diagnosis, differential diagnosis, need for work up , treatment options, alternatives and side effects.     Please see note for details about time spent which includes:   · pre-visit preparation  · reviewing records  · face to face time with the patient   · timely documentation of the encounter  · ordering medications/tests  · communication with care team  · care coordination    I appreciate the opportunity to be part of your patient's medical care and will keep you, as the referring and primary physicians, informed about the care of your patient, including possible future surgery and pathology findings. Please feel free to contact me should you have any questions.      Brittany Shah MD

## 2025-01-07 NOTE — PROGRESS NOTES
P.T. EVALUATION:   Referring Physician: Dr. Behar  Diagnosis: Primary osteoarthritis of right knee (M17.11)  Patellofemoral pain syndrome, unspecified laterality (M22.2X9)  Chronic pain of right knee (M25.561,G89.29)  Limited mobility (Z74.09)  Diabetic polyneuropathy associated with type 2 diabetes mellitus ([facility]) (E11.42)  Class 3 severe obesity due to excess calories with serious comorbidity and body mass index (BMI) of 40.0 to 44.9 in adult ([facility]) (E66.813,E66.01,Z68.41)  Hypothyroidism, unspecified type (E03.9)      Date of Onset: October 2024 Date of Service: 1/7/2025     PATIENT SUMMARY   Patient verbalized consent for Physical Therapy evaluation and treatment.  Mason Puri is a 64 year old y/o male who presents to therapy today with complaints of right knee pain  Pt describes pain level 8-9/10.   History of current condition: Patient reports his knee has been bothering him for many years.  He has had an injection, but reports this did not help.  Pt states he might be having a total knee replacement int the future.  Current functional limitations include walking, standing, bending, squatting, stair negotiation  Mason describes prior level of function independent. Pt goals include pain relief  Past medical history was reviewed with Mason. Patient has a past medical history of Benign head tremor, BPH (benign prostatic hyperplasia), Diabetes ([facility]), Disorder of thyroid, Diverticulitis (10/2019), Former smoker, High blood pressure, High cholesterol, Hyperlipidemia, Hypertension, Neuropathic pain, Non-pressure chronic ulcer of right lower leg, limited to breakdown of skin ([facility]) (12/07/2021), Obesity, Sleep apnea, Snoring, Stroke ([facility]), and Thyroid disease. 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  He is working full time.  He utilizes a walking a stick.      ASSESSMENT:   Patient presents to PT clinic due to right knee pain.  Patient zeb  decreased R knee ROM, decreased RLE strength, altered gait, decreased RLE flexibility, and pain.  These impairments are functionally limiting pt's ability to walk, stand, bend, squat, and negotiate stairs.  Mason would benefit from skilled Physical Therapy to address the above impairments to relieve pian.     Precautions:  none   OBJECTIVE:   Observation: pt ambulates into clinic independently    AROM:   Knee ROM:  Flx: R 102 deg, L 111 deg  Ext: R -10 deg, L -3 deg    Accessory motion:   Patellar mobility: not tested today    Flexibility:   HS: R min loss, L mod loss  Quads: not tested     Strength/MMT:   Hip flx: R 4+/5, L 4+/5  Hip abd: R 4+/5, L 5/5  Hip ext: R 4+/5, L 5/5  Knee ext: R 4+/5, L 4+/5  Knee flx: R 4/5, L 4+/5  Ankle df: R 5/5, L 5/5    Balance:   SLS (30 second trial): not tested today    Gait: pt ambulates independently with and without walking stick;  pt demos notable limp with decreased stance time on RLE; decreased push off RLE    Functional Outcome Measure: LEFS Score  LEFS Score: (Patient-Rptd) 53.75 % (1/7/2025 10:04 AM)      Today’s Treatment and Response:  Patient education provided on PT eval findings, treatment plan, goals, HEP  Patient received there ex, HEP  Charges: PT Eval, TE 2      Total Timed Treatment: 40 min     Total Treatment Time: 40 min      PT Eval Low Complexity     PLAN OF CARE:    Goals:    1- Pt will be I with maintenance and progression of HEP  2- Pt will demo increase in R knee ROM to equal L to ease ability for pt to bend and squat  3- Pt will demos increase in RLE strength to 5/5 to ease ability for pt to negotiate stairs  4- Pt will report decrease in pain to 3/10 or less with community ambulation    Frequency / Duration: Patient will be seen for 2x/week or a total of 8 visits over a 90 day period. Treatment will include: Modalities prn, manual therapy, therapeutic exercises, therapeutic activity, and instructions on a home program.     Education or treatment  limitation: severity of arthritis  Rehab Potential:fair    Patient was advised of these findings, precautions, and treatment options and has agreed to actively participate in planning and for this course of care.    Thank you for your referral. Please co-sign or sign and return this letter via fax as soon as possible to [phone]. If you have any questions, please contact me at Dept: [phone]    Sincerely,  Electronically signed by therapist: Yen Singh PT, DPT    Physician's certification required: Yes  I certify the need for these services furnished under this plan of treatment and while under my care.    X___________________________________________________ Date____________________    Certification From: 1/7/2025  To:4/7/2025

## 2025-01-10 NOTE — PROGRESS NOTES
Diagnosis: Primary osteoarthritis of right knee (M17.11)  Patellofemoral pain syndrome, unspecified laterality (M22.2X9)  Chronic pain of right knee (M25.561,G89.29)  Limited mobility (Z74.09)  Diabetic polyneuropathy associated with type 2 diabetes mellitus ([facility]) (E11.42)  Class 3 severe obesity due to excess calories with serious comorbidity and body mass index (BMI) of 40.0 to 44.9 in adult ([facility]) (E66.813,E66.01,Z68.41)  Hypothyroidism, unspecified type (E03.9)                Next MD visit: none scheduled    Fall Risk: standard         Precautions: n/a          Medication Changes since last visit?: No    Subjective:  Patient reports his knee is sore.  He reports 8/10 knee pain.        Objective:    1/10/2025:  Knee ROM (R):  Ext: -5 deg     Date: 1/10/2025  Visit #: 2 ([employer]) no limit   HEP   -   Therapeutic Exercise   X 50 minutes  - NuStep level 4 (UEs and LEs) x 5 minutes  - standing B gastroc stretch on slant board level 3 3 x 30 sec holds  - supine R/L SLR 10x ea  - hooklying R/L hip abd/ER with Blue Tband 10x ea  - supine R heel slides 10x  - supine R QS 10x 5 sec holds  - seated R LAQs 2 x 10 5 sec holds  - standing B heel raises 2 x 10  - standing R hip abduction/hip flexion with RTB at ankles 10x ea   Manual Therapy   -    Therapeutic Activity   -   Modalities   -               Assessment: Patient demos improved R Knee ROM with reassessment today.      Plan: continue PT    Charges:   Ex 3     Total Timed Treatment: 50 min  Total Treatment Time: 50 min

## 2025-01-14 NOTE — PROGRESS NOTES
Diagnosis: Primary osteoarthritis of right knee (M17.11)  Patellofemoral pain syndrome, unspecified laterality (M22.2X9)  Chronic pain of right knee (M25.561,G89.29)  Limited mobility (Z74.09)  Diabetic polyneuropathy associated with type 2 diabetes mellitus ([facility]) (E11.42)  Class 3 severe obesity due to excess calories with serious comorbidity and body mass index (BMI) of 40.0 to 44.9 in adult ([facility]) (E66.813,E66.01,Z68.41)  Hypothyroidism, unspecified type (E03.9)                Next MD visit: none scheduled    Fall Risk: standard         Precautions: n/a          Medication Changes since last visit?: No    Subjective:  Patient reports his knee is doing about the same.  He reports continued pain and rates this as 8/10.        Objective:    1/10/2025:  Knee ROM (R):  Ext: -5 deg     Date: 1/14/2025  Visit #: 3 ([employer]) no limit Date: 1/10/2025  Visit #: 2 ([employer]) no limit   HEP   - YTB provided for home use -   Therapeutic Exercise   X 45 minutes  - NuStep level 5 (UEs and LEs) x 5 minutes  - standing B gastroc stretch on slant board level 3 3 x 30 sec holds  - supine R/L SLR 15x ea  - supine R/L SLR with YTB above knees 2 x 5 ea  - supine B knee flexion AAROM with SB 2 x 10  - hooklying R/L hip abd/ER with Blue Tband 1 x 12 ea  - standing B heel raises 2 x 10  - standing R hip abduction with RTB at ankles 10x ea X 50 minutes  - NuStep level 4 (UEs and LEs) x 5 minutes  - standing B gastroc stretch on slant board level 3 3 x 30 sec holds  - supine R/L SLR 10x ea  - hooklying R/L hip abd/ER with Blue Tband 10x ea  - supine R heel slides 10x  - supine R QS 10x 5 sec holds  - seated R LAQs 2 x 10 5 sec holds  - standing B heel raises 2 x 10  - standing R hip abduction/hip flexion with RTB at ankles 10x ea   Manual Therapy    -    Therapeutic Activity    -   Modalities    -                Assessment:  Progressed reps of current therapeutic exercises.      Plan: continue PT    Charges:   Ex 3     Total Timed Treatment: 45  min  Total Treatment Time: 45 min

## 2025-01-16 NOTE — PROGRESS NOTES
Diagnosis: Primary osteoarthritis of right knee (M17.11)  Patellofemoral pain syndrome, unspecified laterality (M22.2X9)  Chronic pain of right knee (M25.561,G89.29)  Limited mobility (Z74.09)  Diabetic polyneuropathy associated with type 2 diabetes mellitus ([facility]) (E11.42)  Class 3 severe obesity due to excess calories with serious comorbidity and body mass index (BMI) of 40.0 to 44.9 in adult ([facility]) (E66.813,E66.01,Z68.41)  Hypothyroidism, unspecified type (E03.9)                Next MD visit: none scheduled    Fall Risk: standard         Precautions: n/a          Medication Changes since last visit?: No    Subjective:  Patient reports his knee is doing okay today.  He reports 6-7/10 knee pain.      Objective:    1/10/2025:  Knee ROM (R):  Ext: -5 deg     Date: 1/16/2025  Visit #: 4 ([employer]) no limit Date: 1/14/2025  Visit #: 3 ([employer]) no limit Date: 1/10/2025  Visit #: 2 ([employer]) no limit   HEP    - YTB provided for home use -   Therapeutic Exercise   X 45 minutes  - NuStep level 5 (UEs and LEs) x 6 minutes  - standing B gastroc stretch on slant board level 3 3 x 30 sec holds  - supine R/L SLR with YTB above knees 2 x 5 ea  - supine B knee flexion AAROM with SB 25x  - sidelying R/L hip abduction 10x ea  - hooklying R/L hip abd/ER with Blue Tband 1 x 12 ea  - standing B heel raises 2 x 10  - standing R/L hip abduction with RTB at ankles 15x ea  - standing R/L hip extension with RTB at ankles 15x ea X 45 minutes  - NuStep level 5 (UEs and LEs) x 5 minutes  - standing B gastroc stretch on slant board level 3 3 x 30 sec holds  - supine R/L SLR 15x ea  - supine R/L SLR with YTB above knees 2 x 5 ea  - supine B knee flexion AAROM with SB 2 x 10  - hooklying R/L hip abd/ER with Blue Tband 1 x 12 ea  - standing B heel raises 2 x 10  - standing R hip abduction with RTB at ankles 10x ea X 50 minutes  - NuStep level 4 (UEs and LEs) x 5 minutes  - standing B gastroc stretch on slant board level 3 3 x 30 sec holds  - supine R/L  SLR 10x ea  - hooklying R/L hip abd/ER with Blue Tband 10x ea  - supine R heel slides 10x  - supine R QS 10x 5 sec holds  - seated R LAQs 2 x 10 5 sec holds  - standing B heel raises 2 x 10  - standing R hip abduction/hip flexion with RTB at ankles 10x ea   Manual Therapy     -    Therapeutic Activity     -   Modalities     -                 Assessment:  Advanced reps of various exercises and initiated additional hip strengthening interventions this session.        Plan: continue PT - transition to home program next session    Charges:   Ex 3     Total Timed Treatment: 45 min  Total Treatment Time: 45 min

## 2025-01-29 NOTE — TELEPHONE ENCOUNTER
Called patient Left message to call back. If patient calls back please offer follow up appointment to discuss MRI results on 2/3/25 at 340pm at [facility] if slot not taken.

## 2025-01-29 NOTE — TELEPHONE ENCOUNTER
Patient called in and stated that he had questions about his test results.    Patient is requesting to speak a nurse.    Please advise.

## 2025-01-30 NOTE — TELEPHONE ENCOUNTER
Attempted to call mother, no answer, left message stating we were calling to see how taco was doing today, if she should give us a call back and ask for urgent care today that would be nice.    Pt called back to say he can't do the appt Monday 2/3 @ 3;40 because he''s working. He asked if he could do a video call Monday for noon if possible. Please advise and contact pt [phone]  Future Appointments   Date Time Provider Department Center   2/13/2025 11:30 AM Yen Singh, PT ADO PT [facility] [place]   4/1/2025 10:00 AM Knight, Jessica Marie, DPM [facility] [place]   4/3/2025 10:00 AM Brittany Shah MD [facility] [place]

## 2025-02-03 NOTE — TELEPHONE ENCOUNTER
Called patient and he cannot do appointment today due to work. Offered in person visit on 2/4 at 920am at [facility]. He had no further concerns.

## 2025-02-04 NOTE — PROGRESS NOTES
Reason for Visit      Mason Puri is a 64 year old male presents today complaining of left third digit ulceration.     History of Present Illness       Patient status post left third digit partial toe amputation,  (DOS: 10/24/24). Patient was subsequently discharged from the hospital on 10/25/2024 on Bactrim antibiotic.  Patient's pathology came back osteomyelitis with a negative clearance margin.  Wound cultures came back positive for normal skin bruno.  Patient has left the dressing on since his surgery and being discharged from the hospital on the 25th.  Patient is status post nerve ablation of his knee and 11/15/2024.          Patient returns to clinic today for 2-week follow-up of his left lower extremity as well as to review his MRI.  Patient currently not on any antibiotics as he presented to the emergency room for acute kidney injury which was thought to be secondary to his Bactrim use.  Patient denies any open lesions at this time.        The following portions of the patient's history were reviewed and updated as appropriate: allergies, current medications, past family history, past medical history, past social history, past surgical history and problem list.    Allergies[1]      Current Outpatient Medications:     lisinopril 5 MG Oral Tab, Take 1 tablet (5 mg total) by mouth daily., Disp: 90 tablet, Rfl: 0    Blood Pressure Monitoring ([employer] BLOOD PRESSURE MONITOR) Does not apply Misc, Monitor blood pressure twice a day., Disp: 1 each, Rfl: 0    sulfamethoxazole-trimethoprim -160 MG Oral Tab per tablet, Take 1 tablet by mouth 2 (two) times daily., Disp: 30 tablet, Rfl: 0    aspirin (ASPIRIN 81) 81 MG Oral Tab EC, Take 1 tablet (81 mg total) by mouth daily. FOR HEART., Disp: 90 tablet, Rfl: 9    rosuvastatin 40 MG Oral Tab, Take 1 tablet (40 mg total) by mouth nightly. FOR CHOLESTEROL., Disp: 90 tablet, Rfl: 9    pregabalin 100 MG Oral Cap, Take 1 capsule (100 mg total) by mouth every 8  (eight) hours. FOR NERVE PAIN., Disp: 270 capsule, Rfl: 6    DULoxetine 30 MG Oral Cap DR Particles, Take 1 capsule (30 mg total) by mouth daily. TAKE 1 CAPSULE IN THE MORNING FOR ANXIETY/DEPRESSION/ARTHRITIS PAIN, Disp: 90 capsule, Rfl: 9    pantoprazole 40 MG Oral Tab EC, Take 1 tablet (40 mg total) by mouth every morning before breakfast., Disp: 90 tablet, Rfl: 9    finasteride 5 MG Oral Tab, Take 1 tablet (5 mg total) by mouth daily. FOR PROSTATE., Disp: 90 tablet, Rfl: 9    tamsulosin 0.4 MG Oral Cap, Take 2 capsules (0.8 mg total) by mouth daily. FOR PROSTATE., Disp: 180 capsule, Rfl: 9    ticagrelor 90 MG Oral Tab, Take 1 tablet (90 mg total) by mouth every 12 (twelve) hours. FOR HEART STENTS., Disp: 180 tablet, Rfl: 9    metFORMIN HCl 1000 MG Oral Tab, Take 1 tablet (1,000 mg total) by mouth 2 (two) times daily with meals., Disp: 180 tablet, Rfl: 1    Tirzepatide (MOUNJARO) 15 MG/0.5ML Subcutaneous Solution Auto-injector, Inject 15 mg into the skin once a week. FOR DIABETES. Continuation of therapy., Disp: 6 mL, Rfl: 9    Multiple Vitamins-Minerals (MULTIVITAMIN MEN 50+) Oral Tab, Take 1 tablet by mouth daily., Disp: , Rfl:     metoprolol tartrate 25 MG Oral Tab, Take 1 tablet (25 mg total) by mouth 2 (two) times daily. FOR HEART., Disp: 30 tablet, Rfl: 0    levothyroxine 125 MCG Oral Tab, Take 1 tablet (125 mcg total) by mouth at bedtime., Disp: 90 tablet, Rfl: 1    There are no discontinued medications.      Patient Active Problem List   Diagnosis    Class 2 severe obesity due to excess calories with serious comorbidity and body mass index (BMI) of 38.0 to 38.9 in adult ([facility])    Hypertension associated with type 2 diabetes mellitus ([facility])    Hyperlipidemia associated with type 2 diabetes mellitus ([facility])    Type 2 diabetes mellitus with diabetic peripheral angiopathy without gangrene, without long-term current use of insulin (HCC)    Hypothyroidism    History of right-sided carotid endarterectomy    Detrusor  overactivity    Primary osteoarthritis of right knee    Gastroesophageal reflux disease with esophagitis    Varicose veins of left lower extremity with inflammation, with ulcer of ankle limited to breakdown of skin (HCC)    Patellofemoral arthritis    Venous stasis dermatitis of both lower extremities    Major depressive disorder    BPH with obstruction/lower urinary tract symptoms    Transient ischemic attack (TIA)    Left carotid artery stenosis    Cerebellar infarct (HCC)    Abnormal Holter monitor finding    S/P drug eluting coronary stent placement    Neurogenic claudication    JUAN A (obstructive sleep apnea)    Osteomyelitis of third toe of left foot (HCC)    PVC (premature ventricular contraction)       Past Medical History:    Benign head tremor    BPH (benign prostatic hyperplasia)    Diabetes (HCC)    Disorder of thyroid    Diverticulitis    Former smoker    High blood pressure    High cholesterol    Hyperlipidemia    Hypertension    Neuropathic pain    Non-pressure chronic ulcer of right lower leg, limited to breakdown of skin (HCC)    Obesity    Sleep apnea    Snoring    Stroke (HCC)    Thyroid disease       Past Surgical History:   Procedure Laterality Date    Carotid endarterectomy Right 2021    Surgeon: Dr. Rajiv [facility]    Neck/chest procedure unlisted      per patient, a blockage was removed from the right side of his neck in 2021    Other surgical history  2017    Small bowel Res.    Other surgical history  2019    Colectomy    Pain management N/A 11/15/2024    Dr.Behar; Right knee Genicular Radiofrequency Neurotomy       Family History   Problem Relation Age of Onset    Cancer Father         Prostate    Heart Disorder Father     Cancer Mother        Social History     Occupational History    Not on file   Tobacco Use    Smoking status: Former     Current packs/day: 0.00     Types: Cigarettes     Quit date:      Years since quittin.1     Passive exposure: Past     Smokeless tobacco: Never   Vaping Use    Vaping status: Never Used   Substance and Sexual Activity    Alcohol use: Never    Drug use: Never    Sexual activity: Not on file       ROS      Constitutional: negative for chills, fevers and sweats  Gastrointestinal: negative for abdominal pain, diarrhea, nausea and vomiting  Genitourinary:negative for dysuria and hematuria  Musculoskeletal:negative for arthralgias and muscle weakness  Neurological: negative for paresthesia and weakness  All others reviewed and negative.      Physical Exam     LE PHYSICAL EXAM  Constitution: Well-developed and well-nourished. Gait appears normal. No apparent distress. Alert and oriented to person, place, and time.  Integument: There are no varicosities. Skin appears moist, warm, and supple with positive hair growth. There are no color changes. No open lesions. No macerations, No Hyperkeratotic lesions.  Vascular examination: Dorsalis pedis and posterior tibial pulses are strong bilaterally with capillary filling time less than 3 seconds to all digits. There is no peripheral edema..  Neurological Sensorium: Grossly intact to sharp/dull. Vibratory: Intact.  Musculoskeletal:   5/5 pedal muscle strength b/l     Incision well adhered on the third digit with no signs of infection or open lesions.  Blister has dried out at this time but still some mild erythema and warmth.  No open lesions fluctuance noted at this time.      Assessment and Plan     status post left third digit partial toe amputation,  (DOS: 10/24/24). Patient was subsequently discharged from the hospital on 10/25/2024 on Bactrim antibiotic.  Patient's pathology came back osteomyelitis with a negative clearance margin.  Wound cultures came back positive for normal skin bruno.  Patient has left the dressing on since his surgery and being discharged from the hospital on the 25th.        Since his last office visit patient was seen in emergency room for acute kidney injury more than  likely secondary to the Bactrim.  -Patient has an MRI scheduled to 26.  No acute signs of infection noted at this time.  No open lesions noted at this time.     -Reviewed MRI in great detail with patient which noted changes in the left third digit which would be consistent with previous surgery which she just had done.  Discussed osteomyelitis and the left second and left big toe with no acute signs of infection or clinical.  Discussed that I cannot rule out bone infection with the MRI and that only a bone biopsy would diagnose it however there are no acute signs of infection at this time and no obvious signs of bone infection.  Discussed continue to watch closely with the risk of infection spreading versus bone biopsy.  Apprehensive to initiate any sort of antibiotics at this time given his recent acute kidney injury.  Patient amenable to just close observation at this time and will reach out immediately with any concerns.    Patient was instructed to call the office or on-call podiatric physician immediately with any issues or concerns before the next scheduled visit.     Eri Gallardo, LUIS, D.SUKI GODOY  Diplomat, American Board of Foot and Ankle Surgery  Certified in Foot and Rearfoot/Ankle Reconstruction  Fellow of the American College of Foot and Ankle Surgeons  Fellowship Trained Foot and Ankle Surgeon   [facility]     10/21/2024    6:41 AM         [1]   Allergies  Allergen Reactions    Empagliflozin OTHER (SEE COMMENTS)     Frequent urination, unbearable.    Penicillins UNKNOWN     Patient does not recall reaction    Other UNKNOWN

## 2025-02-04 NOTE — PATIENT INSTRUCTIONS
Take antibiotic as prescribed, you may take this medication with food or snack to avoid stomach upset.  Take benzonatate, this is your cough medicine.  This medication may be taken up to 3 times a day as needed for cough.  Use the albuterol inhaler as needed as prescribed, for any bronchospasm like coughs or wheezing.  Drink plenty of fluids and stay hydrated.  Monitor your symptoms at home, if any symptoms become severe he will need to be evaluated in the ER.  Follow-up with Dr. Wallace as needed if symptoms persist.

## 2025-02-04 NOTE — TELEPHONE ENCOUNTER
Action Requested: Summary for Provider     []  Critical Lab, Recommendations Needed  [] Need Additional Advice  []   FYI    []   Need Orders  [] Need Medications Sent to Pharmacy  []  Other     SUMMARY: Patient reports cough for 2 weeks, wanted antibiotic.  Advised would need appointment.  Cough is keeping his wife up at night.  Reports mild wheeze which he says is due to \"bronchitis.\"  Reports coughing up green phlegm  Per protocol, appointment made.  Patient was advised to don face mask upon entry to clinic.  He agreed to plan.  Future Appointments   Date Time Provider Department Center   2/4/2025 10:30 AM Radha Reddy APRN [facility] [place] [facility]   2/13/2025 11:30 AM Yen Singh, PT ADO PT [facility] [place]   3/5/2025 10:20 AM Eri Woodard DPM [facility] [place]   4/1/2025 10:00 AM Eri Woodard DPM [facility] [place]   4/3/2025 10:00 AM Brittany Shah MD [facility] [place] [facility]       Reason for call: Acute cough, mild wheeze  Onset: 2 weeks    Spoke with patient, no audible wheeze or dyspnea noted.    Reason for Disposition   Wheezing is present    Protocols used: Cough-A-OH

## 2025-02-13 NOTE — PROGRESS NOTES
Physical Therapy Discharge Summary    Diagnosis: Primary osteoarthritis of right knee (M17.11)  Patellofemoral pain syndrome, unspecified laterality (M22.2X9)  Chronic pain of right knee (M25.561,G89.29)  Limited mobility (Z74.09)  Diabetic polyneuropathy associated with type 2 diabetes mellitus (HCC) (E11.42)  Class 3 severe obesity due to excess calories with serious comorbidity and body mass index (BMI) of 40.0 to 44.9 in adult (HCC) (E66.813,E66.01,Z68.41)  Hypothyroidism, unspecified type (E03.9)                Next MD visit: none scheduled    Fall Risk: standard         Precautions: n/a          Medication Changes since last visit?: No  Assessment:  Patient seen for PT services due to right knee OA.  Patient demos improved R knee ROM, demos improved RLE strength, improved gait pattern, and reduced pain.  He reports reduced difficulty with functional limitations at this time and was provided with updated HEP to continue.  He will be discharged from PT and in agreement with plan.  Subjective:  Patient reports his knee is doing really pretty well.     Pain level: did not rate      Objective:    MMT:  Hip flx: R 5/5, L 5/5  Hip abd: not tested  Knee ext: R 4+/5, L 4/5  Knee flx: R 5/5, L 5/5  Ankle df: R 5/5, L 5/5    2/13/2025:  Ext: R -3 deg, L 0 deg  Flx: R 110 deg, L 111 deg     Date: 2/13/2025  Visit #: 5 ([employer]) no limit Date: 1/16/2025  Visit #: 4 ([employer]) no limit Date: 1/14/2025  Visit #: 3 ([employer]) no limit   HEP   - GTB provided for home use  - YTB provided for home use   Therapeutic Exercise   - X 45 minutes  - reassessment  - NuStep level 5 (UEs and LEs) x 7 minutes  - standing B gastroc stretch on slant board level 3 3 x 30 sec holds  - supine R/L SLR with RTB above knees 2 x 10 ea  - supine B knee flexion AAROM with SB 30x  - supine bridges 15x  - sidelying R/L hip abduction 2 x 10 ea  - standing R/L hip abduction with RTB at ankles 2 x 10 ea  - standing R/L hip extension with RTB at ankles 2 x 10  ea  - shuttle machine B knee ext/flx 5 bands 3 x 10 X 45 minutes  - NuStep level 5 (UEs and LEs) x 6 minutes  - standing B gastroc stretch on slant board level 3 3 x 30 sec holds  - supine R/L SLR with YTB above knees 2 x 5 ea  - supine B knee flexion AAROM with SB 25x  - sidelying R/L hip abduction 10x ea  - hooklying R/L hip abd/ER with Blue Tband 1 x 12 ea  - standing B heel raises 2 x 10  - standing R/L hip abduction with RTB at ankles 15x ea  - standing R/L hip extension with RTB at ankles 15x ea X 45 minutes  - NuStep level 5 (UEs and LEs) x 5 minutes  - standing B gastroc stretch on slant board level 3 3 x 30 sec holds  - supine R/L SLR 15x ea  - supine R/L SLR with YTB above knees 2 x 5 ea  - supine B knee flexion AAROM with SB 2 x 10  - hooklying R/L hip abd/ER with Blue Tband 1 x 12 ea  - standing B heel raises 2 x 10  - standing R hip abduction with RTB at ankles 10x ea   Manual Therapy        Therapeutic Activity        Modalities                    Goals:  1- Pt will be I with maintenance and progression of HEP - MET  2- Pt will demo increase in R knee ROM to equal L to ease ability for pt to bend and squat - NEARLY MET  3- Pt will demos increase in RLE strength to 5/5 to ease ability for pt to negotiate stairs - NEARLY MET  4- Pt will report decrease in pain to 3/10 or less with community ambulation - NT    Plan: Discharge PT     Charges:   Ex 3     Total Timed Treatment: 45 min  Total Treatment Time: 45 min

## 2025-02-21 NOTE — ED INITIAL ASSESSMENT (HPI)
Pt to the ed for cough with green sputum, fatigue, and dyspnea  Was treated for bacteria bronchitis 2/4 and completed course of doxycycline and albuterol inhaler  Reports that symptoms have not improved  Denies fevers  Was advised to come to ed for further evaluation

## 2025-02-21 NOTE — TELEPHONE ENCOUNTER
Radha Reddy,APRN ===FYI     Action Requested: Summary for Provider     []  Critical Lab, Recommendations Needed  [] Need Additional Advice  [x]   FYI    []   Need Orders  [] Need Medications Sent to Pharmacy  []  Other     SUMMARY: Extreme sleepiness since last night. Blood sugar level is 284. He has been sick for 3 weeks and finished his antibiotic 2 weeks ago. His symptoms are worsening, experiencing difficulty breathing even at rest and coughing up greenish phlegm.   Instructed to use the albuterol inhaler 2 puffs every 4-6 hrs as needed.Advised to go to the emergency department. He will go to [place] ED now.      Reason for call: Altered Mental Status  Onset: 1 day          Reason for Disposition   Patient sounds very sick or weak to the triager    Protocols used: Neurologic Deficit-A-OH

## 2025-02-21 NOTE — DISCHARGE INSTRUCTIONS
Start taking the medications as prescribed.  You should alternate every 3 hours between the ibuprofen and Tylenol.  Do this for 3 days and then you can wean down to as needed.  If your cough is not improving in 1 week, follow-up with your primary care doctor for further evaluation.

## 2025-02-21 NOTE — ED PROVIDER NOTES
Patient Seen in: [facility] Emergency Department      History     Chief Complaint   Patient presents with    Cough/URI     Stated Complaint: Pneumonia    Subjective:   HPI          Objective:     Past Medical History:    Benign head tremor    BPH (benign prostatic hyperplasia)    Diabetes (HCC)    Disorder of thyroid    Diverticulitis    Former smoker    High blood pressure    High cholesterol    Hyperlipidemia    Hypertension    Neuropathic pain    Non-pressure chronic ulcer of right lower leg, limited to breakdown of skin (HCC)    Obesity    Sleep apnea    Snoring    Stroke (HCC)    Thyroid disease              Past Surgical History:   Procedure Laterality Date    Carotid endarterectomy Right 2021    Surgeon: Dr. Rajiv Solorio at [facility]    Neck/chest procedure unlisted      per patient, a blockage was removed from the right side of his neck in 2021    Other surgical history  2017    Small bowel Res.    Other surgical history  2019    Colectomy    Pain management N/A 11/15/2024    Dr.Behar; Right knee Genicular Radiofrequency Neurotomy                Social History     Socioeconomic History    Marital status:     Number of children: 4   Tobacco Use    Smoking status: Former     Current packs/day: 0.00     Types: Cigarettes     Quit date:      Years since quittin.1     Passive exposure: Past    Smokeless tobacco: Never   Vaping Use    Vaping status: Never Used   Substance and Sexual Activity    Alcohol use: Never    Drug use: Never   Other Topics Concern    Caffeine Concern Yes     Comment: coffee daily    Exercise No   Social History Narrative    The patient does use an assistive device..  Brace    The patient does live in a home with stairs.     Social Drivers of Health     Food Insecurity: No Food Insecurity (2025)    NCSS - Food Insecurity     Worried About Running Out of Food in the Last Year: No     Ran Out of Food in the Last Year: No   Transportation Needs: No  Transportation Needs (2/4/2025)    NCSS - Transportation     Lack of Transportation: No   Housing Stability: Not At Risk (2/4/2025)    NCSS - Housing/Utilities     Has Housing: Yes     Worried About Losing Housing: No     Unable to Get Utilities: No                  Physical Exam     ED Triage Vitals [02/21/25 1440]   /72   Pulse 68   Resp 22   Temp 97.4 °F (36.3 °C)   Temp src Oral   SpO2 96 %   O2 Device None (Room air)       Current Vitals:   Vital Signs  BP: 148/72  Pulse: 68  Resp: 22  Temp: 97.4 °F (36.3 °C)  Temp src: Oral    Oxygen Therapy  SpO2: 96 %  O2 Device: None (Room air)        Physical Exam        ED Course     Labs Reviewed   BASIC METABOLIC PANEL (8) - Abnormal; Notable for the following components:       Result Value    Glucose 136 (*)     Creatinine 1.51 (*)     eGFR-Cr 52 (*)     All other components within normal limits   CBC WITH DIFFERENTIAL WITH PLATELET - Abnormal; Notable for the following components:    RBC 4.14 (*)     HGB 11.9 (*)     HCT 37.8 (*)     RDW-SD 49.1 (*)     All other components within normal limits   PRO BETA NATRIURETIC PEPTIDE - Abnormal; Notable for the following components:    Pro-Beta Natriuretic Peptide 129 (*)     All other components within normal limits   SARS-COV-2/FLU A AND B/RSV BY PCR ([employer]) - Normal    Narrative:     This test is intended for the qualitative detection and differentiation of SARS-CoV-2, influenza A, influenza B, and respiratory syncytial virus (RSV) viral RNA in nasopharyngeal or nares swabs from individuals suspected of respiratory viral infection consistent with COVID-19 by their healthcare provider. Signs and symptoms of respiratory viral infection due to SARS-CoV-2, influenza, and RSV can be similar.    Test performed using the Xpert Xpress SARS-CoV-2/FLU/RSV (real time RT-PCR)  assay on the [employer] instrument, [employer], [place] [address].   This test is being used under the Food and Drug Administration's Emergency Use  Authorization.    The authorized Fact Sheet for Healthcare Providers for this assay is available upon request from the laboratory.   REDRAW BMP                   MDM      65-year-old male with a history of diabetes, hypertension, JUAN A, CAD status post stent presents with cough and shortness of breath.  Patient states that he has been having some cough with some green sputum production for almost 3 weeks.  He saw his doctor earlier in the month and was prescribed doxycycline and albuterol which did not improve symptoms.  Since then, he is also developed some fatigue and some worsening shortness of breath.  He also reports a small amount of increased lower extremity swelling.  Denies fevers, chest pain, or other symptoms.    On exam, vitals normal, well-appearing, clear lungs, obese and nontender abdomen, mild pitting edema in the bilateral shins.    Differential: Bronchitis, atypical pneumonia, new onset CHF    Plan to eval with labs including proBNP and chest x-ray while giving antitussives.    Labs reassuring.  I independently reviewed the patient's chest x-ray. No clear evidence of consolidation or pneumothorax.    On reexamination, patient well-appearing with normal vitals.  I will prescribe a regimen of antitussives for bronchitis and recommend PMD follow-up as needed with careful return precautions.    Medical Decision Making      Disposition and Plan     Clinical Impression:  1. Bronchitis         Disposition:  Discharge  2/21/2025  5:12 pm    Follow-up:  Alvino Wallace MD  [address] [facility] [address]  [phone]    Follow up in 1 week(s)  As needed          Medications Prescribed:  Current Discharge Medication List        START taking these medications    Details   dextromethorphan-guaiFENesin  MG/5ML Oral Liquid Take 5 mL by mouth every 12 (twelve) hours for 7 days.  Qty: 70 mL, Refills: 0      acetaminophen 500 MG Oral Tab Take 2 tablets (1,000 mg total) by mouth every 6 (six) hours  as needed for Pain.  Qty: 56 tablet, Refills: 0      ibuprofen 200 MG Oral Tab Take 2 tablets (400 mg total) by mouth every 6 (six) hours as needed for Pain.  Qty: 56 tablet, Refills: 0                 Supplementary Documentation:

## 2025-02-25 NOTE — TELEPHONE ENCOUNTER
=please assist for ED follow up appointment,     Future Appointments   Date Time Provider Department Center   3/5/2025 10:20 AM Eri Woodard DPM [facility] [place]   4/1/2025 10:00 AM Eri Woodard DPM [facility] [place]   4/3/2025 10:00 AM Brittnay Shah MD [facility]         2/21/25 ED note;  Clinical Impressions      Bronchitis  Disposition      Discharge      AVS    Covid Results (Printed 2/21/2025)  ED/IC AVS (Printed 2/21/2025)      Follow-Ups: Follow up with Alvino Wallace MD (Internal Medicine) in 1 week (2/21/2025); As needed

## 2025-02-25 NOTE — PATIENT INSTRUCTIONS
Follow-up in 2 to 4 weeks if cough persists.  He may follow-up with me or with Dr. Alcantara as discussed.  Keep scheduled follow up with Dr Woodard and Dr Shah as discussed.

## 2025-02-25 NOTE — PROGRESS NOTES
INTERNAL MEDICINE OFFICE NOTE     Patient ID: Mason Puri is a 65 year old male.  Today's Date: 02/25/25  Chief Complaint: ER F/U (Patient states that they do feel improvement since being seen in the ER)    KEMI Gilmore is a pleasant 65-year-old male with history of hypertension, hyperlipidemia, type 2 diabetes, presents today for ER follow-up for cough, bronchitis.  Patient was seen and treated in office by me on February 4, he was given doxycycline which she completed.  He had not seen much improvement and went to the ER on 2/21.  ER visit notes were reviewed and appreciated.  Chest x-ray was negative at that time.  He was given dextromethorphan guaifenesin cough syrup for home, which she has been using with good results.  He states his cough is improving and has not needed the cough medicine last night or this morning.  Denies any fevers. Denies any shortness of breath or chest pain.  He has been tolerating p.o. fluids well.  No other treatments prior to arrival.    Vitals:    02/25/25 1134   BP: 117/75   Pulse: 71   Resp: 18   SpO2: 94%   Height: 6' 2\" (1.88 m)     body mass index is 38.52 kg/m².  BP Readings from Last 3 Encounters:   02/25/25 117/75   02/21/25 108/59   02/04/25 101/58     The ASCVD Risk score (Tej GARNICA, et al., 2019) failed to calculate for the following reasons:    Risk score cannot be calculated because patient has a medical history suggesting prior/existing ASCVD  Medications reviewed:  Current Outpatient Medications   Medication Sig Dispense Refill    dextromethorphan-guaiFENesin  MG/5ML Oral Liquid Take 5 mL by mouth every 12 (twelve) hours for 7 days. 70 mL 0    acetaminophen 500 MG Oral Tab Take 2 tablets (1,000 mg total) by mouth every 6 (six) hours as needed for Pain. 56 tablet 0    ibuprofen 200 MG Oral Tab Take 2 tablets (400 mg total) by mouth every 6 (six) hours as needed for Pain. 56 tablet 0    albuterol 108 (90 Base) MCG/ACT Inhalation Aero Soln  Inhale 2 puffs into the lungs every 4 to 6 hours as needed for Wheezing or Shortness of Breath (cough, asthma, chest tightness). FOR ASTHMA. Pharmacist, please switch to formulary alternative per insurance coverage, ok to replace with proair, ventolin, proventil, or any other albuterol inhaler. -john (Patient not taking: Reported on 2/25/2025) 3 each 9    lisinopril 5 MG Oral Tab Take 1 tablet (5 mg total) by mouth daily. 90 tablet 0    Blood Pressure Monitoring ([employer] BLOOD PRESSURE MONITOR) Does not apply Misc Monitor blood pressure twice a day. 1 each 0    aspirin (ASPIRIN 81) 81 MG Oral Tab EC Take 1 tablet (81 mg total) by mouth daily. FOR HEART. 90 tablet 9    rosuvastatin 40 MG Oral Tab Take 1 tablet (40 mg total) by mouth nightly. FOR CHOLESTEROL. 90 tablet 9    pregabalin 100 MG Oral Cap Take 1 capsule (100 mg total) by mouth every 8 (eight) hours. FOR NERVE PAIN. 270 capsule 6    DULoxetine 30 MG Oral Cap DR Particles Take 1 capsule (30 mg total) by mouth daily. TAKE 1 CAPSULE IN THE MORNING FOR ANXIETY/DEPRESSION/ARTHRITIS PAIN 90 capsule 9    pantoprazole 40 MG Oral Tab EC Take 1 tablet (40 mg total) by mouth every morning before breakfast. 90 tablet 9    finasteride 5 MG Oral Tab Take 1 tablet (5 mg total) by mouth daily. FOR PROSTATE. 90 tablet 9    tamsulosin 0.4 MG Oral Cap Take 2 capsules (0.8 mg total) by mouth daily. FOR PROSTATE. 180 capsule 9    ticagrelor 90 MG Oral Tab Take 1 tablet (90 mg total) by mouth every 12 (twelve) hours. FOR HEART STENTS. 180 tablet 9    metFORMIN HCl 1000 MG Oral Tab Take 1 tablet (1,000 mg total) by mouth 2 (two) times daily with meals. 180 tablet 1    Tirzepatide (MOUNJARO) 15 MG/0.5ML Subcutaneous Solution Auto-injector Inject 15 mg into the skin once a week. FOR DIABETES. Continuation of therapy. 6 mL 9    Multiple Vitamins-Minerals (MULTIVITAMIN MEN 50+) Oral Tab Take 1 tablet by mouth daily.      metoprolol tartrate 25 MG Oral Tab Take 1 tablet (25 mg total) by  mouth 2 (two) times daily. FOR HEART. 30 tablet 0    levothyroxine 125 MCG Oral Tab Take 1 tablet (125 mcg total) by mouth at bedtime. 90 tablet 1         Assessment & Plan    1. Hypertension associated with type 2 diabetes mellitus (HCC) (Primary)  2. Type 2 diabetes mellitus with diabetic peripheral angiopathy without gangrene, without long-term current use of insulin (HCC)  3. Cough, unspecified type      Plan  Patient with history of hypertension, type 2 diabetes presents today with ER follow-up for cough and resolving bronchitis.  Patient had been seen in office and then in ER for persistent symptoms.  Patient has finished course of doxycycline as prescribed, and is currently taking cough medicine prescribed from the ER with good results.  Patient reports cough is improving is less frequent, less productive.  He denies any chest pain, shortness of breath, or fevers.  He has not needed his albuterol inhaler.  Discussed with patient possibility that persistent cough that becomes more chronic and dry could be caused by his lisinopril medication.  Patient was instructed to follow-up in 2 weeks if cough persists.  Patient currently takes lisinopril 5 mg daily, will not make any changes to his medications at this time.  Patient will continue to monitor his symptoms.  He will follow-up in office with either me or Dr. Alcantara.  Patient verbalized understanding agrees with plan.      Follow Up: Return for AS NEEDED/IF SYMPTOMS WORSEN..         Objective: Results:   Physical Exam  Constitutional:       General: He is not in acute distress.     Appearance: Normal appearance.   HENT:      Head: Normocephalic and atraumatic.      Nose: Congestion present.   Eyes:      Extraocular Movements: Extraocular movements intact.      Pupils: Pupils are equal, round, and reactive to light.   Cardiovascular:      Rate and Rhythm: Normal rate and regular rhythm.      Heart sounds: Normal heart sounds.   Pulmonary:      Effort: Pulmonary  effort is normal. No respiratory distress.      Breath sounds: Normal breath sounds. No wheezing or rales.   Skin:     General: Skin is warm and dry.   Neurological:      Mental Status: He is alert.        Reviewed:    Patient Active Problem List    Diagnosis    PVC (premature ventricular contraction)    Osteomyelitis of third toe of left foot ([facility])    JUAN A (obstructive sleep apnea)    S/P drug eluting coronary stent placement    Neurogenic claudication    Abnormal Holter monitor finding    Left carotid artery stenosis    Cerebellar infarct ([facility])    Transient ischemic attack (TIA)    BPH with obstruction/lower urinary tract symptoms    Major depressive disorder    Venous stasis dermatitis of both lower extremities    Patellofemoral arthritis    Varicose veins of left lower extremity with inflammation, with ulcer of ankle limited to breakdown of skin ([facility])    Hypertension associated with type 2 diabetes mellitus ([facility])    Hyperlipidemia associated with type 2 diabetes mellitus ([facility])    Type 2 diabetes mellitus with diabetic peripheral angiopathy without gangrene, without long-term current use of insulin ([facility])    Hypothyroidism    History of right-sided carotid endarterectomy    Detrusor overactivity    Primary osteoarthritis of right knee    Gastroesophageal reflux disease with esophagitis    Class 2 severe obesity due to excess calories with serious comorbidity and body mass index (BMI) of 38.0 to 38.9 in adult ([facility])      Allergies[1]     Social History     Socioeconomic History    Marital status:     Number of children: 4   Tobacco Use    Smoking status: Former     Current packs/day: 0.00     Types: Cigarettes     Quit date:      Years since quittin.1     Passive exposure: Past    Smokeless tobacco: Never   Vaping Use    Vaping status: Never Used   Substance and Sexual Activity    Alcohol use: Never    Drug use: Never   Other Topics Concern    Caffeine Concern Yes     Comment: coffee daily    Exercise  No   Social History Narrative    The patient does use an assistive device..  Brace    The patient does live in a home with stairs.     Social Drivers of Health     Food Insecurity: No Food Insecurity (2/4/2025)    NCSS - Food Insecurity     Worried About Running Out of Food in the Last Year: No     Ran Out of Food in the Last Year: No   Transportation Needs: No Transportation Needs (2/4/2025)    NCSS - Transportation     Lack of Transportation: No   Housing Stability: Not At Risk (2/4/2025)    NCSS - Housing/Utilities     Has Housing: Yes     Worried About Losing Housing: No     Unable to Get Utilities: No      Review of Systems   Constitutional:  Negative for fever.   HENT:  Positive for congestion and postnasal drip.    Respiratory:  Positive for cough. Negative for shortness of breath and wheezing.    Cardiovascular: Negative.    Neurological: Negative.      All other systems negative unless otherwise stated in ROS or HPI above.       RAHEEL Cummings  Internal Medicine       Call office with any questions or seek emergency care if necessary.   Patient understands and agrees to follow directions and advice.      ----------------------------------------- PATIENT INSTRUCTIONS-----------------------------------------   .    Patient Instructions   Follow-up in 2 to 4 weeks if cough persists.  He may follow-up with me or with Dr. Alcantara as discussed.  Keep scheduled follow up with Dr Woodard and Dr Shah as discussed.             [1]   Allergies  Allergen Reactions    Empagliflozin OTHER (SEE COMMENTS)     Frequent urination, unbearable.    Penicillins UNKNOWN     Patient does not recall reaction    Other UNKNOWN

## 2025-02-25 NOTE — TELEPHONE ENCOUNTER
Patient called, verified name/.  Scheduled ED follow up for today.  Future Appointments   Date Time Provider Department Center   2025 11:30 AM Radha Reddy APRN [facility] [place]   3/5/2025 10:20 AM Eri Woodard DPM [facility] [place]   2025 10:00 AM Eri Woodard DPM [facility] [place]   4/3/2025 10:00 AM Brittany Shah MD [facility] [place]

## 2025-03-04 NOTE — LETTER
2025    RE: Mason Puri     : 1960    Dear Dr. Alcantara,    This letter is to inform you that your patient has been under our care at [facility].    This patient requires diagnostic imaging, due to this patient's claustrophobia the patient has requested imaging under sedation.  Anesthesia protocol requires all  patients who will be undergoing sedation to receive medical clearance indicating they are medically clear to undergo anesthesia.   For this reason we have asked the patient to contact your office to schedule a medical clearance exam/visit.     Diagnosis: Hyperreflexia with positive Oglesby's sign  Imaging:MRI cervical spine under IV sedation   Scheduled: Not yet    A History & Physical is needed for medical clearance. Clearance must be within 30 days of scheduled imaging.    Please address patient's active problems (i.e high blood pressure, breathing issues etc.)  Labs for clearance are at PCP discretion, unless indicated otherwise by anesthesia dept.     Please fax clearance letter/office visit note to Radiology Nursing staff at [phone]. or to the pre-admission testing department at [facility] [phone].  Your office note must clearly indicate if the patient is medically cleared to undergo anesthesia.    If you have any questions about testing needed or clarification please contact the Pre-Admissions department at [phone].    Thank you,    RAHEEL Chandler/Alex B. Behar MD, [facility]  Physical Medicine and Rehabilitation/Sports Medicine  [facility]       Time-based billing (NON-critical care)

## 2025-03-05 NOTE — PROGRESS NOTES
Reason for Visit      Mason Puri is a 65 year old male presents today complaining of left third digit ulceration.     History of Present Illness       Patient status post left third digit partial toe amputation,  (DOS: 10/24/24). Patient was subsequently discharged from the hospital on 10/25/2024 on Bactrim antibiotic.  Patient's pathology came back osteomyelitis with a negative clearance margin.  Wound cultures came back positive for normal skin bruno.  Patient has left the dressing on since his surgery and being discharged from the hospital on the 25th.  Patient is status post nerve ablation of his knee and 11/15/2024.          Patient returns to clinic today for 2-week follow-up of his left lower extremity as well as to review his MRI.  Patient currently not on any antibiotics as he presented to the emergency room for acute kidney injury which was thought to be secondary to his Bactrim use.  Patient denies any open lesions at this time.      Patient returns to clinic today after last being seen 4 weeks ago for management of his left second digit.  At that time there is no acute signs of infection.  Patient was recently seen in the emergency room on 2/21/2025 for bronchitis.  Patient's only complaint at this time is a porokeratosis to his left heel    The following portions of the patient's history were reviewed and updated as appropriate: allergies, current medications, past family history, past medical history, past social history, past surgical history and problem list.    Allergies[1]      Current Outpatient Medications:     acetaminophen 500 MG Oral Tab, Take 2 tablets (1,000 mg total) by mouth every 6 (six) hours as needed for Pain., Disp: 56 tablet, Rfl: 0    ibuprofen 200 MG Oral Tab, Take 2 tablets (400 mg total) by mouth every 6 (six) hours as needed for Pain., Disp: 56 tablet, Rfl: 0    albuterol 108 (90 Base) MCG/ACT Inhalation Aero Soln, Inhale 2 puffs into the lungs every 4 to 6 hours as  needed for Wheezing or Shortness of Breath (cough, asthma, chest tightness). FOR ASTHMA. Pharmacist, please switch to formulary alternative per insurance coverage, ok to replace with proair, ventolin, proventil, or any other albuterol inhaler. -john (Patient not taking: Reported on 2/25/2025), Disp: 3 each, Rfl: 9    lisinopril 5 MG Oral Tab, Take 1 tablet (5 mg total) by mouth daily., Disp: 90 tablet, Rfl: 0    Blood Pressure Monitoring ([employer] BLOOD PRESSURE MONITOR) Does not apply Misc, Monitor blood pressure twice a day., Disp: 1 each, Rfl: 0    aspirin (ASPIRIN 81) 81 MG Oral Tab EC, Take 1 tablet (81 mg total) by mouth daily. FOR HEART., Disp: 90 tablet, Rfl: 9    rosuvastatin 40 MG Oral Tab, Take 1 tablet (40 mg total) by mouth nightly. FOR CHOLESTEROL., Disp: 90 tablet, Rfl: 9    pregabalin 100 MG Oral Cap, Take 1 capsule (100 mg total) by mouth every 8 (eight) hours. FOR NERVE PAIN., Disp: 270 capsule, Rfl: 6    DULoxetine 30 MG Oral Cap DR Particles, Take 1 capsule (30 mg total) by mouth daily. TAKE 1 CAPSULE IN THE MORNING FOR ANXIETY/DEPRESSION/ARTHRITIS PAIN, Disp: 90 capsule, Rfl: 9    pantoprazole 40 MG Oral Tab EC, Take 1 tablet (40 mg total) by mouth every morning before breakfast., Disp: 90 tablet, Rfl: 9    finasteride 5 MG Oral Tab, Take 1 tablet (5 mg total) by mouth daily. FOR PROSTATE., Disp: 90 tablet, Rfl: 9    tamsulosin 0.4 MG Oral Cap, Take 2 capsules (0.8 mg total) by mouth daily. FOR PROSTATE., Disp: 180 capsule, Rfl: 9    ticagrelor 90 MG Oral Tab, Take 1 tablet (90 mg total) by mouth every 12 (twelve) hours. FOR HEART STENTS., Disp: 180 tablet, Rfl: 9    metFORMIN HCl 1000 MG Oral Tab, Take 1 tablet (1,000 mg total) by mouth 2 (two) times daily with meals., Disp: 180 tablet, Rfl: 1    Tirzepatide (MOUNJARO) 15 MG/0.5ML Subcutaneous Solution Auto-injector, Inject 15 mg into the skin once a week. FOR DIABETES. Continuation of therapy., Disp: 6 mL, Rfl: 9    Multiple Vitamins-Minerals  (MULTIVITAMIN MEN 50+) Oral Tab, Take 1 tablet by mouth daily., Disp: , Rfl:     metoprolol tartrate 25 MG Oral Tab, Take 1 tablet (25 mg total) by mouth 2 (two) times daily. FOR HEART., Disp: 30 tablet, Rfl: 0    levothyroxine 125 MCG Oral Tab, Take 1 tablet (125 mcg total) by mouth at bedtime., Disp: 90 tablet, Rfl: 1    There are no discontinued medications.      Patient Active Problem List   Diagnosis    Class 2 severe obesity due to excess calories with serious comorbidity and body mass index (BMI) of 38.0 to 38.9 in adult ([facility])    Hypertension associated with type 2 diabetes mellitus ([facility])    Hyperlipidemia associated with type 2 diabetes mellitus ([facility])    Type 2 diabetes mellitus with diabetic peripheral angiopathy without gangrene, without long-term current use of insulin ([facility])    Hypothyroidism    History of right-sided carotid endarterectomy    Detrusor overactivity    Primary osteoarthritis of right knee    Gastroesophageal reflux disease with esophagitis    Varicose veins of left lower extremity with inflammation, with ulcer of ankle limited to breakdown of skin ([facility])    Patellofemoral arthritis    Venous stasis dermatitis of both lower extremities    Major depressive disorder    BPH with obstruction/lower urinary tract symptoms    Transient ischemic attack (TIA)    Left carotid artery stenosis    Cerebellar infarct ([facility])    Abnormal Holter monitor finding    S/P drug eluting coronary stent placement    Neurogenic claudication    JUAN A (obstructive sleep apnea)    Osteomyelitis of third toe of left foot ([facility])    PVC (premature ventricular contraction)       Past Medical History:    Benign head tremor    BPH (benign prostatic hyperplasia)    Diabetes ([facility])    Disorder of thyroid    Diverticulitis    Former smoker    High blood pressure    High cholesterol    Hyperlipidemia    Hypertension    Neuropathic pain    Non-pressure chronic ulcer of right lower leg, limited to breakdown of skin ([facility])    Obesity     Sleep apnea    Snoring    Stroke ([facility]CC)    Thyroid disease       Past Surgical History:   Procedure Laterality Date    Carotid endarterectomy Right 2021    Surgeon: Dr. Rajiv Sol[facility] at [facility]    Neck/chest procedure unlisted      per patient, a blockage was removed from the right side of his neck in 2021    Other surgical history  2017    Small bowel Res.    Other surgical history  2019    Colectomy    Pain management N/A 11/15/2024    Dr.Behar; Right knee Genicular Radiofrequency Neurotomy       Family History   Problem Relation Age of Onset    Cancer Father         Prostate    Heart Disorder Father     Cancer Mother        Social History     Occupational History    Not on file   Tobacco Use    Smoking status: Former     Current packs/day: 0.00     Types: Cigarettes     Quit date:      Years since quittin.     Passive exposure: Past    Smokeless tobacco: Never   Vaping Use    Vaping status: Never Used   Substance and Sexual Activity    Alcohol use: Never    Drug use: Never    Sexual activity: Not on file       ROS      Constitutional: negative for chills, fevers and sweats  Gastrointestinal: negative for abdominal pain, diarrhea, nausea and vomiting  Genitourinary:negative for dysuria and hematuria  Musculoskeletal:negative for arthralgias and muscle weakness  Neurological: negative for paresthesia and weakness  All others reviewed and negative.      Physical Exam     LE PHYSICAL EXAM  Constitution: Well-developed and well-nourished. Gait appears normal. No apparent distress. Alert and oriented to person, place, and time.  Integument: There are no varicosities. Skin appears moist, warm, and supple with positive hair growth. There are no color changes. No open lesions. No macerations, No Hyperkeratotic lesions.  Vascular examination: Dorsalis pedis and posterior tibial pulses are strong bilaterally with capillary filling time less than 3 seconds to all digits. There is no peripheral  edema..  Neurological Sensorium: Grossly intact to sharp/dull. Vibratory: Intact.  Musculoskeletal:   5/5 pedal muscle strength b/l     Incision well adhered on the third digit with no signs of infection or open lesions.  Blister has dried out at this time but still some mild erythema and warmth.  No open lesions fluctuance noted at this time.      Assessment and Plan     status post left third digit partial toe amputation,  (DOS: 10/24/24). Patient was subsequently discharged from the hospital on 10/25/2024 on Bactrim antibiotic.  Patient's pathology came back osteomyelitis with a negative clearance margin.  Wound cultures came back positive for normal skin bruno.  Patient has left the dressing on since his surgery and being discharged from the hospital on the 25th.        Since his last office visit patient was seen in emergency room for acute kidney injury more than likely secondary to the Bactrim.  -Patient has an MRI scheduled to 26.  No acute signs of infection noted at this time.  No open lesions noted at this time.     -Reviewed MRI in great detail with patient which noted changes in the left third digit which would be consistent with previous surgery which she just had done.  Discussed osteomyelitis and the left second and left big toe with no acute signs of infection or clinical.  Discussed that I cannot rule out bone infection with the MRI and that only a bone biopsy would diagnose it however there are no acute signs of infection at this time and no obvious signs of bone infection.  Discussed continue to watch closely with the risk of infection spreading versus bone biopsy.  Apprehensive to initiate any sort of antibiotics at this time given his recent acute kidney injury.  Patient amenable to just close observation at this time and will reach out immediately with any concerns.    Patient was instructed to call the office or on-call podiatric physician immediately with any issues or concerns before the  next scheduled visit.     Eri Gallardo DPM, CARISA.SUKI GODOY  Diplomat, American Board of Foot and Ankle Surgery  Certified in Foot and Rearfoot/Ankle Reconstruction  Fellow of the American College of Foot and Ankle Surgeons  Fellowship Trained Foot and Ankle Surgeon   [facility]     10/21/2024    6:41 AM         [1]   Allergies  Allergen Reactions    Empagliflozin OTHER (SEE COMMENTS)     Frequent urination, unbearable.    Penicillins UNKNOWN     Patient does not recall reaction    Other UNKNOWN

## 2025-03-31 NOTE — TELEPHONE ENCOUNTER
Refill passed per [facility] protocols.    Send to local and mailorder.    Requested Prescriptions   Pending Prescriptions Disp Refills    metoprolol tartrate 25 MG Oral Tab [Pharmacy Med Name: METOPROLOL TARTRATE TABS 25MG] 180 tablet 0     Sig: Take 1 tablet (25 mg total) by mouth 2 (two) times daily. FOR HEART.       Hypertension Medications Protocol Passed - 3/31/2025 11:44 AM        Passed - CMP or BMP in past 12 months        Passed - Last BP reading less than 140/90        Passed - In person appointment or virtual visit in the past 12 mos or appointment in next 3 mos        Passed - EGFRCR or GFRNAA > 50        Passed - Medication is active on med list          metoprolol tartrate 25 MG Oral Tab 60 tablet 0     Sig: Take 1 tablet (25 mg total) by mouth 2 (two) times daily. FOR HEART.       Hypertension Medications Protocol Passed - 3/31/2025 11:44 AM        Passed - CMP or BMP in past 12 months        Passed - Last BP reading less than 140/90        Passed - In person appointment or virtual visit in the past 12 mos or appointment in next 3 mos        Passed - EGFRCR or GFRNAA > 50        Passed - Medication is active on med list

## 2025-03-31 NOTE — TELEPHONE ENCOUNTER
Patient called out of meds. [facility] and [facility] to [facility] [place]. Transferred to call room to scheduled a follow up appointment.

## 2025-03-31 NOTE — TELEPHONE ENCOUNTER
Endocrine refill protocol for medications for hypothyroidism and hyperthyroidism    Protocol Criteria:  PASSED Reason: N/A    If all below requirements are met, send a 90-day supply with 1 refill per provider protocol.    Verify appointment with Endocrinology completed in the last 12 months or scheduled in the next 6 months.    Normal TSH result in the past 12 months   Review recent telephone encounters and mychart communications with patient to ensure a dose change has not occurred since last office visit that was not updated in the medication history list     Last completed office visit:1/2/2025 Brittany Shah MD   Next scheduled Follow up:   Future Appointments   Date Time Provider Department Center          4/3/2025 10:00 AM Brittany Shah MD [facility]      Last TSH result:   TSH   Date Value Ref Range Status   12/23/2024 0.646 0.550 - 4.780 uIU/mL Final

## 2025-04-01 NOTE — PROGRESS NOTES
Reason for Visit      Mason Puri is a 65 year old male presents today complaining of left third digit ulceration.     History of Present Illness       Patient status post left third digit partial toe amputation,  (DOS: 10/24/24). Patient was subsequently discharged from the hospital on 10/25/2024 on Bactrim antibiotic.  Patient's pathology came back osteomyelitis with a negative clearance margin.  Wound cultures came back positive for normal skin bruno.  Patient has left the dressing on since his surgery and being discharged from the hospital on the 25th.  Patient is status post nerve ablation of his knee and 11/15/2024.              Patient returns to clinic today after last being seen 4 weeks ago for management of his left second digit.  At that time there is no acute signs of infection.  Patient was recently seen in the emergency room on 2/21/2025 for bronchitis.  Patient has no open lesions at this time.  Patient's only complaint is pain in the ball of his left foot after walking a significant mount.  Patient recently just got new Hoka's and states that they significantly help with his pain but has only been wearing them for 2 weeks.    The following portions of the patient's history were reviewed and updated as appropriate: allergies, current medications, past family history, past medical history, past social history, past surgical history and problem list.    Allergies[1]      Current Outpatient Medications:     LEVOTHYROXINE 125 MCG Oral Tab, TAKE 1 TABLET AT BEDTIME, Disp: 90 tablet, Rfl: 1    metoprolol tartrate 25 MG Oral Tab, Take 1 tablet (25 mg total) by mouth 2 (two) times daily. FOR HEART., Disp: 180 tablet, Rfl: 0    albuterol 108 (90 Base) MCG/ACT Inhalation Aero Soln, Inhale 2 puffs into the lungs every 4 to 6 hours as needed for Wheezing or Shortness of Breath (cough, asthma, chest tightness). FOR ASTHMA. Pharmacist, please switch to formulary alternative per insurance coverage, ok to  replace with proair, ventolin, proventil, or any other albuterol inhaler. -drkp, Disp: 3 each, Rfl: 9    lisinopril 5 MG Oral Tab, Take 1 tablet (5 mg total) by mouth daily., Disp: 90 tablet, Rfl: 0    Blood Pressure Monitoring ([employer] BLOOD PRESSURE MONITOR) Does not apply Misc, Monitor blood pressure twice a day., Disp: 1 each, Rfl: 0    aspirin (ASPIRIN 81) 81 MG Oral Tab EC, Take 1 tablet (81 mg total) by mouth daily. FOR HEART., Disp: 90 tablet, Rfl: 9    rosuvastatin 40 MG Oral Tab, Take 1 tablet (40 mg total) by mouth nightly. FOR CHOLESTEROL., Disp: 90 tablet, Rfl: 9    pregabalin 100 MG Oral Cap, Take 1 capsule (100 mg total) by mouth every 8 (eight) hours. FOR NERVE PAIN., Disp: 270 capsule, Rfl: 6    DULoxetine 30 MG Oral Cap DR Particles, Take 1 capsule (30 mg total) by mouth daily. TAKE 1 CAPSULE IN THE MORNING FOR ANXIETY/DEPRESSION/ARTHRITIS PAIN, Disp: 90 capsule, Rfl: 9    pantoprazole 40 MG Oral Tab EC, Take 1 tablet (40 mg total) by mouth every morning before breakfast., Disp: 90 tablet, Rfl: 9    finasteride 5 MG Oral Tab, Take 1 tablet (5 mg total) by mouth daily. FOR PROSTATE., Disp: 90 tablet, Rfl: 9    tamsulosin 0.4 MG Oral Cap, Take 2 capsules (0.8 mg total) by mouth daily. FOR PROSTATE., Disp: 180 capsule, Rfl: 9    ticagrelor 90 MG Oral Tab, Take 1 tablet (90 mg total) by mouth every 12 (twelve) hours. FOR HEART STENTS., Disp: 180 tablet, Rfl: 9    metFORMIN HCl 1000 MG Oral Tab, Take 1 tablet (1,000 mg total) by mouth 2 (two) times daily with meals., Disp: 180 tablet, Rfl: 1    Tirzepatide (MOUNJARO) 15 MG/0.5ML Subcutaneous Solution Auto-injector, Inject 15 mg into the skin once a week. FOR DIABETES. Continuation of therapy., Disp: 6 mL, Rfl: 9    Multiple Vitamins-Minerals (MULTIVITAMIN MEN 50+) Oral Tab, Take 1 tablet by mouth daily., Disp: , Rfl:     metoprolol tartrate 25 MG Oral Tab, Take 1 tablet (25 mg total) by mouth 2 (two) times daily. FOR HEART., Disp: 60 tablet, Rfl: 0    There  are no discontinued medications.      Patient Active Problem List   Diagnosis    Class 2 severe obesity due to excess calories with serious comorbidity and body mass index (BMI) of 38.0 to 38.9 in adult ([facility])    Hypertension associated with type 2 diabetes mellitus ([facility])    Hyperlipidemia associated with type 2 diabetes mellitus ([facility])    Type 2 diabetes mellitus with diabetic peripheral angiopathy without gangrene, without long-term current use of insulin ([facility])    Hypothyroidism    History of right-sided carotid endarterectomy    Detrusor overactivity    Primary osteoarthritis of right knee    Gastroesophageal reflux disease with esophagitis    Varicose veins of left lower extremity with inflammation, with ulcer of ankle limited to breakdown of skin ([facility])    Patellofemoral arthritis    Venous stasis dermatitis of both lower extremities    Major depressive disorder    BPH with obstruction/lower urinary tract symptoms    Transient ischemic attack (TIA)    Left carotid artery stenosis    Cerebellar infarct ([facility])    Abnormal Holter monitor finding    S/P drug eluting coronary stent placement    Neurogenic claudication    JUAN A (obstructive sleep apnea)    Osteomyelitis of third toe of left foot ([facility])    PVC (premature ventricular contraction)       Past Medical History:    Benign head tremor    BPH (benign prostatic hyperplasia)    Diabetes ([facility])    Disorder of thyroid    Diverticulitis    Former smoker    High blood pressure    High cholesterol    Hyperlipidemia    Hypertension    Neuropathic pain    Non-pressure chronic ulcer of right lower leg, limited to breakdown of skin (HCC)    Obesity    Sleep apnea    Snoring    Stroke ([facility])    Thyroid disease       Past Surgical History:   Procedure Laterality Date    Carotid endarterectomy Right 04/29/2021    Surgeon: Dr. Rajiv Solorio at [facility]    Neck/chest procedure unlisted      per patient, a blockage was removed from the right side of his neck in 8/2021    Other surgical  history  2017    Small bowel Res.    Other surgical history  2019    Colectomy    Pain management N/A 11/15/2024    Dr.Behar; Right knee Genicular Radiofrequency Neurotomy       Family History   Problem Relation Age of Onset    Cancer Father         Prostate    Heart Disorder Father     Cancer Mother        Social History     Occupational History    Not on file   Tobacco Use    Smoking status: Former     Current packs/day: 0.00     Types: Cigarettes     Quit date:      Years since quittin.2     Passive exposure: Past    Smokeless tobacco: Never   Vaping Use    Vaping status: Never Used   Substance and Sexual Activity    Alcohol use: Never    Drug use: Never    Sexual activity: Not on file       ROS      Constitutional: negative for chills, fevers and sweats  Gastrointestinal: negative for abdominal pain, diarrhea, nausea and vomiting  Genitourinary:negative for dysuria and hematuria  Musculoskeletal:negative for arthralgias and muscle weakness  Neurological: negative for paresthesia and weakness  All others reviewed and negative.      Physical Exam     LE PHYSICAL EXAM  Constitution: Well-developed and well-nourished. Gait appears normal. No apparent distress. Alert and oriented to person, place, and time.  Integument: There are no varicosities. Skin appears moist, warm, and supple with positive hair growth. There are no color changes. No open lesions. No macerations, No Hyperkeratotic lesions.  Vascular examination: Dorsalis pedis and posterior tibial pulses are strong bilaterally with capillary filling time less than 3 seconds to all digits. There is no peripheral edema..  Neurological Sensorium: Grossly intact to sharp/dull. Vibratory: Intact.  Musculoskeletal:   5/5 pedal muscle strength b/l     Incision well adhered on the third digit with no signs of infection or open lesions.      Pain to palpation to the second third metatarsal head of the left foot though no open lesions signs of infection  noted.  No erythema or ascending cellulitis noted.      Assessment and Plan     status post left third digit partial toe amputation,  (DOS: 10/24/24). Patient was subsequently discharged from the hospital on 10/25/2024 on Bactrim antibiotic.  Patient's pathology came back osteomyelitis with a negative clearance margin.  Wound cultures came back positive for normal skin bruno.  Patient has left the dressing on since his surgery and being discharged from the hospital on the 25th.    Patient has no open lesions at this time and has not been on antibiotic.  Believe that this is more of a stress fracture than a underlying infection but we will order an MRI just to ensure.  Patient advised to avoid going barefoot and wears good supportive Hoka shoes at all times even around the house.    Patient was instructed to call the office or on-call podiatric physician immediately with any issues or concerns before the next scheduled visit.     Eri Gallardo DPM, D.SUIK GODOY  Diplomat, American Board of Foot and Ankle Surgery  Certified in Foot and Rearfoot/Ankle Reconstruction  Fellow of the American College of Foot and Ankle Surgeons  Fellowship Trained Foot and Ankle Surgeon   [facility]     10/21/2024    6:41 AM         [1]   Allergies  Allergen Reactions    Empagliflozin OTHER (SEE COMMENTS)     Frequent urination, unbearable.    Penicillins UNKNOWN     Patient does not recall reaction    Other UNKNOWN

## 2025-04-07 NOTE — TELEPHONE ENCOUNTER
Endocrine Refill protocol for oral antihypertensive medications    Protocol Criteria:  FAILED  Reason: Abnormal labs     If all below requirements are met, send a 90-day supply with 1 refill per provider protocol.    Verify appointment with Endocrinology completed in the last 6 months or scheduled in the next 3 months.  Verify BMP or CMP completed in the last 12 months   Verify last GFR result is greater than or equal to 50     Last completed office visit:1/2/2025 Brittany Shah MD   Next scheduled Follow up:   Future Appointments   Date Time Provider Department Center   4/21/2025 10:30 AM Pollo Alcantara MD [facility]   4/25/2025  8:45 AM [facility] MRI RM1 (1.5T WIDE) [facility] MRI [facility] [place]   5/14/2025 10:30 AM Brittany Shah MD [facility]      Last BMP or CMP completion date:  Lab Results   Component Value Date    GFRAA 107 07/03/2022    GFRNAA 93 07/03/2022    EGFRCR 52 (L) 02/21/2025

## 2025-04-21 NOTE — PROGRESS NOTES
OFFICE NOTE       The following individual(s) verbally consented to be recorded using ambient AI listening technology and understand that they can each withdraw their consent to this listening technology at any point by asking the clinician to turn off or pause the recording:    Patient name: Mason Puri  Additional names:           Patient ID: Mason Puri is a 65 year old male.  Today's Date: 04/21/25  Chief Complaint: Establish Care    History of Present Illness  Mason Puri is a 65-year-old male who presents to establish care with a new internist.    He has a history of type 2 diabetes mellitus, managed with metformin and glipizide. His last A1c was 6.4, indicating controlled diabetes, and he is followed by an endocrinologist.    Hypertension is managed with metoprolol tartrate 25 mg twice daily, and his blood pressure is reportedly well-controlled.    He has obstructive sleep apnea and uses a CPAP machine, prescribed approximately six months ago. He does not currently see a pulmonologist for regular follow-up.    He has coronary artery disease and underwent a stent placement in 2023. He is on dual antiplatelet therapy and takes rosuvastatin 40 mg for hyperlipidemia. He is followed by a cardiologist.    He reports chronic osteomyelitis of the left foot, previously diagnosed with cellulitis and chronic osteomyelitis of the left third toe. He recently had an MRI to evaluate ongoing foot pain and potential stress-related issues.    He experiences knee arthritis and is under the care of a specialist for this condition.    He is on duloxetine for anxiety, depression, insomnia, and nerve pain. He also takes pregabalin 100 mg every eight hours for nerve pain, with a modified dosing schedule.    He is on finasteride 5 mg for prostate issues and levothyroxine 125 mcg for thyroid management.    No issues with urination and breathing is fairly decent. Ongoing pain in the foot  and knee.       Vitals:    04/21/25 1039   BP: 101/61   Pulse: 72   Weight: (!) 308 lb (139.7 kg)   Height: 6' 2\" (1.88 m)     body mass index is 39.54 kg/m².  BP Readings from Last 3 Encounters:   04/21/25 101/61   02/25/25 117/75   02/21/25 108/59     The ASCVD Risk score (Tej GARNICA, et al., 2019) failed to calculate for the following reasons:    Risk score cannot be calculated because patient has a medical history suggesting prior/existing ASCVD  Results  LABS  A1c: 6.4%    RADIOLOGY  Foot MRI: Cellulitis of the left foot, chronic osteomyelitis of the left third digit (01/29/2025)       Medications reviewed:  Current Medications[1]      Assessment & Plan    1. JUAN A on CPAP (Primary)  -     Pulmonary Referral - In Network  2. Uncontrolled type 2 diabetes mellitus with hyperglycemia (HCC)  -     Lisinopril; Take 1 tablet (5 mg total) by mouth daily.  Dispense: 90 tablet; Refill: 1  3. Left carotid artery stenosis  -     Lisinopril; Take 1 tablet (5 mg total) by mouth daily.  Dispense: 90 tablet; Refill: 1  -     Rosuvastatin Calcium; Take 1 tablet (40 mg total) by mouth nightly. FOR CHOLESTEROL.  Dispense: 90 tablet; Refill: 1  4. Hypothyroidism, unspecified type  -     Lisinopril; Take 1 tablet (5 mg total) by mouth daily.  Dispense: 90 tablet; Refill: 1  5. Low vitamin D level  -     Lisinopril; Take 1 tablet (5 mg total) by mouth daily.  Dispense: 90 tablet; Refill: 1  6. Hyperlipidemia associated with type 2 diabetes mellitus (HCC)  -     Lisinopril; Take 1 tablet (5 mg total) by mouth daily.  Dispense: 90 tablet; Refill: 1  -     Rosuvastatin Calcium; Take 1 tablet (40 mg total) by mouth nightly. FOR CHOLESTEROL.  Dispense: 90 tablet; Refill: 1  7. History of carotid endarterectomy  -     Lisinopril; Take 1 tablet (5 mg total) by mouth daily.  Dispense: 90 tablet; Refill: 1  -     Metoprolol Tartrate; Take 1 tablet (25 mg total) by mouth 2 (two) times daily. FOR HEART.  Dispense: 180 tablet; Refill: 1  -      Rosuvastatin Calcium; Take 1 tablet (40 mg total) by mouth nightly. FOR CHOLESTEROL.  Dispense: 90 tablet; Refill: 1  8. Type 2 diabetes mellitus with diabetic peripheral angiopathy without gangrene, without long-term current use of insulin (HCC)  -     Lisinopril; Take 1 tablet (5 mg total) by mouth daily.  Dispense: 90 tablet; Refill: 1  -     Rosuvastatin Calcium; Take 1 tablet (40 mg total) by mouth nightly. FOR CHOLESTEROL.  Dispense: 90 tablet; Refill: 1  -     Mounjaro; Inject 15 mg into the skin once a week. FOR DIABETES. Continuation of therapy.  Dispense: 6 mL; Refill: 9  9. Hyperglycemia due to diabetes mellitus (HCC)  -     Lisinopril; Take 1 tablet (5 mg total) by mouth daily.  Dispense: 90 tablet; Refill: 1  10. Transient ischemic attack (TIA)  -     Lisinopril; Take 1 tablet (5 mg total) by mouth daily.  Dispense: 90 tablet; Refill: 1  11. S/P drug eluting coronary stent placement  -     Lisinopril; Take 1 tablet (5 mg total) by mouth daily.  Dispense: 90 tablet; Refill: 1  -     Rosuvastatin Calcium; Take 1 tablet (40 mg total) by mouth nightly. FOR CHOLESTEROL.  Dispense: 90 tablet; Refill: 1  -     Ticagrelor; Take 1 tablet (90 mg total) by mouth every 12 (twelve) hours. FOR HEART STENTS.  Dispense: 180 tablet; Refill: 9  12. Hypertension associated with type 2 diabetes mellitus (HCC)  -     Lisinopril; Take 1 tablet (5 mg total) by mouth daily.  Dispense: 90 tablet; Refill: 1  -     Metoprolol Tartrate; Take 1 tablet (25 mg total) by mouth 2 (two) times daily. FOR HEART.  Dispense: 180 tablet; Refill: 1  13. High glucose level  -     Lisinopril; Take 1 tablet (5 mg total) by mouth daily.  Dispense: 90 tablet; Refill: 1  14. Arthritis  -     DULoxetine HCl; Take 1 capsule (30 mg total) by mouth daily. TAKE 1 CAPSULE IN THE MORNING FOR ANXIETY/DEPRESSION/ARTHRITIS PAIN  Dispense: 90 capsule; Refill: 1  15. BPH with obstruction/lower urinary tract symptoms  -     Finasteride; Take 1 tablet (5 mg  total) by mouth daily. FOR PROSTATE.  Dispense: 90 tablet; Refill: 1  -     Tamsulosin HCl; Take 2 capsules (0.8 mg total) by mouth daily. FOR PROSTATE.  Dispense: 180 capsule; Refill: 9  16. Gastroesophageal reflux disease with esophagitis without hemorrhage  -     Pantoprazole Sodium; Take 1 tablet (40 mg total) by mouth every morning before breakfast.  Dispense: 90 tablet; Refill: 1  17. Neurogenic claudication  -     Pregabalin; Take 1 capsule (100 mg total) by mouth every 8 (eight) hours. FOR NERVE PAIN.  Dispense: 270 capsule; Refill: 1  18. Chronic osteomyelitis of foot (HCC)    Assessment & Plan  Chronic Osteomyelitis of the Left Foot  Chronic osteomyelitis with ongoing foot pain. MRI ordered to differentiate infection from post-surgical changes.  - Review MRI results with Dr. Eri Woodard.  - Follow up with Dr. Woodard for foot pain management.    Type 2 Diabetes Mellitus  Type 2 diabetes well-controlled with A1c of 6.4. Managed by endocrinologist Dr. Branden Cruz.  - Continue metformin and glipizide.  - Regular follow-up with Dr. Branden Cruz.    Hypertension  Hypertension well-controlled with metoprolol tartrate.  - Continue metoprolol tartrate 25 mg twice daily.    Coronary Artery Disease (CAD)  CAD post LAD PCI in 2023. On Brilinta with EF 55%. Regular cardiology follow-up advised.  - Continue Brilinta.  - Regular follow-up with Dr. Au.    Hyperlipidemia  Hyperlipidemia managed with rosuvastatin 40 mg.  - Continue rosuvastatin 40 mg.  - Monitor lipid levels.    Obstructive Sleep Apnea (JUAN A)  JUAN A managed with CPAP. No recent follow-up or calibration.  - Refer to Dr. Josh Chun for CPAP evaluation.    General Health Maintenance  Medications managed through [facility]. Pregabalin requires six-monthly visits.  - Refill medications through [facility].  - Provide six-month supply of medications.  - Schedule six-monthly visit for pregabalin.    Follow-up  Regular follow-up needed for chronic care and  medication monitoring.  - Schedule follow-up in four months.  - Ensure specialist follow-ups.       Follow Up: As needed/if symptoms worsen or Return in about 4 months (around 8/21/2025) for chronic care management..     Objective/ Results:   Physical Exam  Constitutional:       Appearance: He is well-developed.   Cardiovascular:      Rate and Rhythm: Normal rate and regular rhythm.      Heart sounds: Normal heart sounds.   Pulmonary:      Effort: Pulmonary effort is normal.      Breath sounds: Normal breath sounds.   Abdominal:      General: Bowel sounds are normal.      Palpations: Abdomen is soft.   Musculoskeletal:      Lumbar back: Spasms and tenderness present.      Right knee: Tenderness present.      Left knee: Tenderness present.   Skin:     General: Skin is warm and dry.   Neurological:      Mental Status: He is alert and oriented to person, place, and time.      Deep Tendon Reflexes: Reflexes are normal and symmetric.        Physical Exam         Reviewed:    Patient Active Problem List    Diagnosis    Chronic osteomyelitis of foot ([facility])    PVC (premature ventricular contraction)    Osteomyelitis of third toe of left foot ([facility])    JUAN A (obstructive sleep apnea)    S/P drug eluting coronary stent placement    Neurogenic claudication    Abnormal Holter monitor finding    Left carotid artery stenosis    Cerebellar infarct ([facility])    Transient ischemic attack (TIA)    BPH with obstruction/lower urinary tract symptoms    Major depressive disorder    Venous stasis dermatitis of both lower extremities    Patellofemoral arthritis    Varicose veins of left lower extremity with inflammation, with ulcer of ankle limited to breakdown of skin (HCC)    Hypertension associated with type 2 diabetes mellitus (HCC)    Hyperlipidemia associated with type 2 diabetes mellitus (HCC)    Type 2 diabetes mellitus with diabetic peripheral angiopathy without gangrene, without long-term current use of insulin (HCC)    Hypothyroidism     History of right-sided carotid endarterectomy    Detrusor overactivity    Primary osteoarthritis of right knee    Gastroesophageal reflux disease with esophagitis    Class 2 severe obesity due to excess calories with serious comorbidity and body mass index (BMI) of 38.0 to 38.9 in adult (HCC)      Allergies[2]   Short Social Hx on File[3]   Review of Systems   Musculoskeletal:  Positive for arthralgias, back pain and joint swelling.       All other systems negative unless otherwise stated in ROS or HPI above.       Pollo Alcantara MD  Internal Medicine       Call office with any questions or seek emergency care if necessary.   Patient understands and agrees to follow directions and advice.      ----------------------------------------- PATIENT INSTRUCTIONS-----------------------------------------     There are no Patient Instructions on file for this visit.        The 21st Century Cures Act makes medical notes available to patients in the interest of transparency.  However, please be advised that this is a medical document.  It is intended as a peer to peer communication.  It is written in medical language and may contain abbreviations or verbiage that are technical and unfamiliar.  It may appear blunt or direct.  Medical documents are intended to carry relevant information, facts as evident, and the clinical opinion of the practitioner.          [1]   Current Outpatient Medications   Medication Sig Dispense Refill    lisinopril 5 MG Oral Tab Take 1 tablet (5 mg total) by mouth daily. 90 tablet 1    DULoxetine 30 MG Oral Cap DR Particles Take 1 capsule (30 mg total) by mouth daily. TAKE 1 CAPSULE IN THE MORNING FOR ANXIETY/DEPRESSION/ARTHRITIS PAIN 90 capsule 1    finasteride 5 MG Oral Tab Take 1 tablet (5 mg total) by mouth daily. FOR PROSTATE. 90 tablet 1    metoprolol tartrate 25 MG Oral Tab Take 1 tablet (25 mg total) by mouth 2 (two) times daily. FOR HEART. 180 tablet 1    pantoprazole 40 MG Oral Tab EC Take 1  tablet (40 mg total) by mouth every morning before breakfast. 90 tablet 1    rosuvastatin 40 MG Oral Tab Take 1 tablet (40 mg total) by mouth nightly. FOR CHOLESTEROL. 90 tablet 1    ticagrelor 90 MG Oral Tab Take 1 tablet (90 mg total) by mouth every 12 (twelve) hours. FOR HEART STENTS. 180 tablet 9    Tirzepatide (MOUNJARO) 15 MG/0.5ML Subcutaneous Solution Auto-injector Inject 15 mg into the skin once a week. FOR DIABETES. Continuation of therapy. 6 mL 9    tamsulosin 0.4 MG Oral Cap Take 2 capsules (0.8 mg total) by mouth daily. FOR PROSTATE. 180 capsule 9    pregabalin 100 MG Oral Cap Take 1 capsule (100 mg total) by mouth every 8 (eight) hours. FOR NERVE PAIN. 270 capsule 1    LEVOTHYROXINE 125 MCG Oral Tab TAKE 1 TABLET AT BEDTIME 90 tablet 1    albuterol 108 (90 Base) MCG/ACT Inhalation Aero Soln Inhale 2 puffs into the lungs every 4 to 6 hours as needed for Wheezing or Shortness of Breath (cough, asthma, chest tightness). FOR ASTHMA. Pharmacist, please switch to formulary alternative per insurance coverage, ok to replace with proair, ventolin, proventil, or any other albuterol inhaler. -john 3 each 9    Blood Pressure Monitoring ([employer] BLOOD PRESSURE MONITOR) Does not apply Misc Monitor blood pressure twice a day. 1 each 0    aspirin (ASPIRIN 81) 81 MG Oral Tab EC Take 1 tablet (81 mg total) by mouth daily. FOR HEART. 90 tablet 9    metFORMIN HCl 1000 MG Oral Tab Take 1 tablet (1,000 mg total) by mouth 2 (two) times daily with meals. 180 tablet 1    Multiple Vitamins-Minerals (MULTIVITAMIN MEN 50+) Oral Tab Take 1 tablet by mouth daily.     [2]   Allergies  Allergen Reactions    Empagliflozin OTHER (SEE COMMENTS)     Frequent urination, unbearable.    Penicillins UNKNOWN     Patient does not recall reaction    Other UNKNOWN   [3]   Social History  Socioeconomic History    Marital status:     Number of children: 4   Tobacco Use    Smoking status: Former     Current packs/day: 0.00     Types: Cigarettes      Quit date:      Years since quittin.3     Passive exposure: Past    Smokeless tobacco: Never   Vaping Use    Vaping status: Never Used   Substance and Sexual Activity    Alcohol use: Never    Drug use: Never   Other Topics Concern    Caffeine Concern Yes     Comment: coffee daily    Exercise No   Social History Narrative    The patient does use an assistive device..  Brace    The patient does live in a home with stairs.     Social Drivers of Health     Food Insecurity: No Food Insecurity (2025)    NCSS - Food Insecurity     Worried About Running Out of Food in the Last Year: No     Ran Out of Food in the Last Year: No   Transportation Needs: No Transportation Needs (2025)    NCSS - Transportation     Lack of Transportation: No   Housing Stability: Not At Risk (2025)    NCSS - Housing/Utilities     Has Housing: Yes     Worried About Losing Housing: No     Unable to Get Utilities: No

## 2025-04-22 NOTE — TELEPHONE ENCOUNTER
Please advise pt has imaging disc and he would like to schedule an appt. He says that his pain is severe and he would like an appt asap. Please advise and f/u with pt [phone]  Future Appointments   Date Time Provider Department Center   5/14/2025 10:30 AM Brittany Shah MD [facility]   8/18/2025  9:45 AM Pollo Alcantara MD [facility]

## 2025-04-22 NOTE — TELEPHONE ENCOUNTER
Spoke with patient and he states he had the MRI done at [facility] in [place] over the weekend and has the disc. Scheduled appointment on 4/23 at 10:50am with Dr nicole at [facility] and advised to bring disc to appointment.

## 2025-04-23 NOTE — PROGRESS NOTES
Reason for Visit      Mason Puri is a 65 year old male presents today complaining of left third digit ulceration.     History of Present Illness       Patient status post left third digit partial toe amputation,  (DOS: 10/24/24). Patient was subsequently discharged from the hospital on 10/25/2024 on Bactrim antibiotic.  Patient's pathology came back osteomyelitis with a negative clearance margin.  Wound cultures came back positive for normal skin bruno.  Patient has left the dressing on since his surgery and being discharged from the hospital on the 25th.  Patient is status post nerve ablation of his knee and 11/15/2024.              Patient returns to clinic today after last being seen 4 weeks ago for management of his left second digit.  At that time there is no acute signs of infection.  Patient was recently seen in the emergency room on 2/21/2025 for bronchitis.  Patient has no open lesions at this time.  Patient's only complaint is pain in the ball of his left foot after walking a significant mount.  Patient recently just got new Hoka's and states that they significantly help with his pain but has only been wearing them for 2 weeks.    Patient returns to clinic today to review the MRI results and discuss treatment moving forward.  Patient states that he still working every single day and standing on his feet a significant amount.  Patient states that his pain is still present.  Patient denies any nausea vomiting fever chills    The following portions of the patient's history were reviewed and updated as appropriate: allergies, current medications, past family history, past medical history, past social history, past surgical history and problem list.    Allergies[1]      Current Outpatient Medications:     lisinopril 5 MG Oral Tab, Take 1 tablet (5 mg total) by mouth daily., Disp: 90 tablet, Rfl: 1    DULoxetine 30 MG Oral Cap DR Particles, Take 1 capsule (30 mg total) by mouth daily. TAKE 1 CAPSULE  IN THE MORNING FOR ANXIETY/DEPRESSION/ARTHRITIS PAIN, Disp: 90 capsule, Rfl: 1    finasteride 5 MG Oral Tab, Take 1 tablet (5 mg total) by mouth daily. FOR PROSTATE., Disp: 90 tablet, Rfl: 1    metoprolol tartrate 25 MG Oral Tab, Take 1 tablet (25 mg total) by mouth 2 (two) times daily. FOR HEART., Disp: 180 tablet, Rfl: 1    pantoprazole 40 MG Oral Tab EC, Take 1 tablet (40 mg total) by mouth every morning before breakfast., Disp: 90 tablet, Rfl: 1    rosuvastatin 40 MG Oral Tab, Take 1 tablet (40 mg total) by mouth nightly. FOR CHOLESTEROL., Disp: 90 tablet, Rfl: 1    ticagrelor 90 MG Oral Tab, Take 1 tablet (90 mg total) by mouth every 12 (twelve) hours. FOR HEART STENTS., Disp: 180 tablet, Rfl: 9    Tirzepatide (MOUNJARO) 15 MG/0.5ML Subcutaneous Solution Auto-injector, Inject 15 mg into the skin once a week. FOR DIABETES. Continuation of therapy., Disp: 6 mL, Rfl: 9    tamsulosin 0.4 MG Oral Cap, Take 2 capsules (0.8 mg total) by mouth daily. FOR PROSTATE., Disp: 180 capsule, Rfl: 9    pregabalin 100 MG Oral Cap, Take 1 capsule (100 mg total) by mouth every 8 (eight) hours. FOR NERVE PAIN., Disp: 270 capsule, Rfl: 1    LEVOTHYROXINE 125 MCG Oral Tab, TAKE 1 TABLET AT BEDTIME, Disp: 90 tablet, Rfl: 1    albuterol 108 (90 Base) MCG/ACT Inhalation Aero Soln, Inhale 2 puffs into the lungs every 4 to 6 hours as needed for Wheezing or Shortness of Breath (cough, asthma, chest tightness). FOR ASTHMA. Pharmacist, please switch to formulary alternative per insurance coverage, ok to replace with proair, ventolin, proventil, or any other albuterol inhaler. -john, Disp: 3 each, Rfl: 9    Blood Pressure Monitoring ([employer] BLOOD PRESSURE MONITOR) Does not apply Misc, Monitor blood pressure twice a day., Disp: 1 each, Rfl: 0    aspirin (ASPIRIN 81) 81 MG Oral Tab EC, Take 1 tablet (81 mg total) by mouth daily. FOR HEART., Disp: 90 tablet, Rfl: 9    metFORMIN HCl 1000 MG Oral Tab, Take 1 tablet (1,000 mg total) by mouth 2 (two)  times daily with meals., Disp: 180 tablet, Rfl: 1    Multiple Vitamins-Minerals (MULTIVITAMIN MEN 50+) Oral Tab, Take 1 tablet by mouth daily., Disp: , Rfl:     There are no discontinued medications.      Patient Active Problem List   Diagnosis    Class 2 severe obesity due to excess calories with serious comorbidity and body mass index (BMI) of 38.0 to 38.9 in adult ([facility])    Hypertension associated with type 2 diabetes mellitus ([facility])    Hyperlipidemia associated with type 2 diabetes mellitus ([facility])    Type 2 diabetes mellitus with diabetic peripheral angiopathy without gangrene, without long-term current use of insulin ([facility])    Hypothyroidism    History of right-sided carotid endarterectomy    Detrusor overactivity    Primary osteoarthritis of right knee    Gastroesophageal reflux disease with esophagitis    Varicose veins of left lower extremity with inflammation, with ulcer of ankle limited to breakdown of skin ([facility])    Patellofemoral arthritis    Venous stasis dermatitis of both lower extremities    Major depressive disorder    BPH with obstruction/lower urinary tract symptoms    Transient ischemic attack (TIA)    Left carotid artery stenosis    Cerebellar infarct ([facility])    Abnormal Holter monitor finding    S/P drug eluting coronary stent placement    Neurogenic claudication    JUAN A (obstructive sleep apnea)    Osteomyelitis of third toe of left foot ([facility])    PVC (premature ventricular contraction)    Chronic osteomyelitis of foot ([facility])       Past Medical History:    Benign head tremor    BPH (benign prostatic hyperplasia)    Diabetes ([facility])    Disorder of thyroid    Diverticulitis    Former smoker    High blood pressure    High cholesterol    Hyperlipidemia    Hypertension    Neuropathic pain    Non-pressure chronic ulcer of right lower leg, limited to breakdown of skin ([facility])    Obesity    Sleep apnea    Snoring    Stroke ([facility])    Thyroid disease       Past Surgical History:   Procedure Laterality Date    Carotid  endarterectomy Right 2021    Surgeon: Dr. Rajiv Solorio at [facility]    Neck/chest procedure unlisted      per patient, a blockage was removed from the right side of his neck in 2021    Other surgical history  2017    Small bowel Res.    Other surgical history  2019    Colectomy    Pain management N/A 11/15/2024    Dr.Behar; Right knee Genicular Radiofrequency Neurotomy       Family History   Problem Relation Age of Onset    Cancer Father         Prostate    Heart Disorder Father     Cancer Mother        Social History     Occupational History    Not on file   Tobacco Use    Smoking status: Former     Current packs/day: 0.00     Types: Cigarettes     Quit date:      Years since quittin.3     Passive exposure: Past    Smokeless tobacco: Never   Vaping Use    Vaping status: Never Used   Substance and Sexual Activity    Alcohol use: Never    Drug use: Never    Sexual activity: Not on file       ROS      Constitutional: negative for chills, fevers and sweats  Gastrointestinal: negative for abdominal pain, diarrhea, nausea and vomiting  Genitourinary:negative for dysuria and hematuria  Musculoskeletal:negative for arthralgias and muscle weakness  Neurological: negative for paresthesia and weakness  All others reviewed and negative.      Physical Exam     LE PHYSICAL EXAM  Constitution: Well-developed and well-nourished. Gait appears normal. No apparent distress. Alert and oriented to person, place, and time.  Integument: There are no varicosities. Skin appears moist, warm, and supple with positive hair growth. There are no color changes. No open lesions. No macerations, No Hyperkeratotic lesions.  Vascular examination: Dorsalis pedis and posterior tibial pulses are strong bilaterally with capillary filling time less than 3 seconds to all digits. There is no peripheral edema..  Neurological Sensorium: Grossly intact to sharp/dull. Vibratory: Intact.  Musculoskeletal:   5/5 pedal muscle strength b/l      Incision well adhered on the third digit with no signs of infection or open lesions.      Pain to palpation to the second third metatarsal head of the left foot though no open lesions signs of infection noted.  No erythema or ascending cellulitis noted.      Assessment and Plan     status post left third digit partial toe amputation,  (DOS: 10/24/24). Patient was subsequently discharged from the hospital on 10/25/2024 on Bactrim antibiotic.  Patient's pathology came back osteomyelitis with a negative clearance margin.  Wound cultures came back positive for normal skin bruno.  Patient has left the dressing on since his surgery and being discharged from the hospital on the 25th.    Patient has no open lesions at this time and has not been on antibiotic.  Believe that this is more of a stress fracture than a underlying infection but we will order an MRI just to ensure.  Patient advised to avoid going barefoot and wears good supportive Hoka shoes at all times even around the house.    Reviewed MRI which noted no signs of infection at that time only intramuscular edema bursitis as well as arthritis.  Advised taking time off work in order to allow it to heal.  Note written for him to stop one of his jobs and to wear good supportive shoes at all times.  Patient is a follow-up in 2 weeks or sooner if problems arise    Patient was instructed to call the office or on-call podiatric physician immediately with any issues or concerns before the next scheduled visit.     Eri Gallardo DPM, CARISA.WALT FACBHANU  Diplomat, American Board of Foot and Ankle Surgery  Certified in Foot and Rearfoot/Ankle Reconstruction  Fellow of the American College of Foot and Ankle Surgeons  Fellowship Trained Foot and Ankle Surgeon   [facility]     10/21/2024    6:41 AM         [1]   Allergies  Allergen Reactions    Empagliflozin OTHER (SEE COMMENTS)     Frequent urination, unbearable.    Penicillins UNKNOWN     Patient does  not recall reaction    Other UNKNOWN

## 2025-05-02 NOTE — TELEPHONE ENCOUNTER
Patient states he would like Dr Shannon to fill out disability forms.  Advised office visit and patient agrees to plan. OV scheduled 5/8/25

## 2025-05-05 NOTE — TELEPHONE ENCOUNTER
Patient came to provide Disability forms to be completed informed that forms will be forwarded to the FORMS department for completion via Email & internal in office mail. He is requesting a call back to confirm if there is a fee for this.

## 2025-05-07 NOTE — ED INITIAL ASSESSMENT (HPI)
Patient seen by podiatrist this AM and told to come to ED to r/o cellulitis vs DVT in LLE. Patient states he has exudate coming from second toe. Redness and swelling noted to LLE.

## 2025-05-07 NOTE — ED QUICK NOTES
Orders for admission, patient is aware of plan and ready to go upstairs. Any questions, please call ED RN Abbi at extension [phone].     Patient Covid vaccination status: Fully vaccinated     COVID Test Ordered in ED: None    COVID Suspicion at Admission: N/A    Running Infusions: Medication Infusions[1] None    Mental Status/LOC at time of transport: a/ox3    Other pertinent information:   CIWA score: N/A   NIH score:  N/A             [1]

## 2025-05-07 NOTE — PROGRESS NOTES
[facility] Dosing Service      Initial Pharmacokinetic Consult for Vancomycin Dosing     Mason Puri is a 65 year old male who is being initiated on vancomycin therapy for LLE cellulitis and diabetic foot.  Pharmacy has been asked to dose vancomycin by Clifford Osorio MD.  The initial treatment and monitoring approach will be steady state AUC strategy.        Weight and Temperature:    Wt Readings from Last 1 Encounters:   25 (!) 140.7 kg (310 lb 3.2 oz)        Temp Readings from Last 1 Encounters:   25 98.4 °F (36.9 °C) (Oral)      Labs:   Recent Labs   Lab 25  1235   CREATSERUM 1.52*      Estimated Creatinine Clearance: 57.8 mL/min (A) (based on SCr of 1.52 mg/dL (H)).     Recent Labs   Lab 25  1235   WBC 11.9*          The Pharmacokinetic Target is:     to 600 mg-h/L and trough <=15 mg/L    Renal Dosing Considerations:    None     Assessment/Plan:   Initial/Loading dose: Has received 1500 mg IV (15 mg/kg, capped at 2250 mg) x 1 initial dose.      Maintenance dose: Dose changed to 140.7 kg. Pharmacy will dose vancomycin at 2000 mg IV every 24 hours.    Monitorin) Plan for vancomycin peak and trough to be obtained at steady state. IF renal function worsened, will consider switching to non-auc strategy.     2) Pharmacy will order SCr as clinically indicated to assess renal function.    3) Pharmacy will monitor for toxicity and efficacy, adjust vancomycin dose and/or frequency, and order vancomycin levels as appropriate per the Pharmacy and Therapeutics Committee approved protocol until discontinuation of the medication.       We appreciate the opportunity to assist in the care of this patient.     Anup Rogers RPH  2025  3:50 PM  [facility] Extension: [phone]

## 2025-05-07 NOTE — H&P
[facility]    PATIENT'S NAME: DM ARANDA   ATTENDING PHYSICIAN: Cherelle Constantino MD   PATIENT ACCOUNT#:   [id number]    LOCATION:  [address]  MEDICAL RECORD #:   [id number]       YOB: 1960  ADMISSION DATE:       05/07/2025    HISTORY AND PHYSICAL EXAMINATION    CHIEF COMPLAINT:  Left foot and leg cellulitis.     HISTORY OF PRESENT ILLNESS:  Patient is a 65-year-old  male who has been having discomfort and erythema of his left leg for the last week and some erythema of his left second toe.  He had an MRI as an outpatient on April 19, which showed no evidence of osteomyelitis.  His symptoms have gotten worse over the last 2 to 3 days.  Today, he was sent to the emergency department for evaluation.  CBC showed white blood cell count of 11.9 with left shift.  Chemistry, C-reactive protein, sedimentation rate are still pending.  Venous Doppler study to rule out DVT and MRI of the foot with and without contrast both ordered.  Started on IV cefepime and vancomycin, and he will be admitted to the hospital for further management.    PAST MEDICAL HISTORY:  Coronary artery disease, status post LAD and RCA PCI stent; history of transient ischemic attack; carotid atherosclerosis; diabetes mellitus type 2; diverticulitis; nonsustained ventricular tachycardia; morbid obesity; obstructive sleep apnea; hypertension; hyperlipidemia; chronic kidney disease stage 2 to 3; hypothyroidism; peripheral neuropathy.     PAST SURGICAL HISTORY:  Left third toe partial amputation for osteomyelitis; right carotid endarterectomy; left lower extremity varicose vein ablation; bowel resection.    MEDICATIONS:  Please see medication reconciliation list.      ALLERGIES:  Questionable allergy to penicillin.    FAMILY HISTORY:  Father had prostate cancer and heart disease.     SOCIAL HISTORY:  Ex-tobacco user.  No current tobacco, alcohol, or drug use.  Sedentary lifestyle.  Usually independent in his  basic activities of daily living.     REVIEW OF SYSTEMS:  Patient said symptoms started as discomfort and mild erythema on his left second toe around a week ago, then started developing erythema on his left leg.  As mentioned above, he had an MRI scan of the left foot on April 19, which showed no evidence of osteomyelitis, noncontrast study.  Patient continued to have erythema, discomfort in his left leg and increased dusky discoloration of his left second toe.  Denies any fever or chills.  Other 12-point review of systems is negative.        PHYSICAL EXAMINATION:    GENERAL:  Alert, oriented to time, place, and person.  Anxious.  Mild distress.   VITAL SIGNS:  Temperature 99.5, pulse 90, respiratory rate 20, blood pressure 156/67, pulse ox 94% on room air.    HEENT:  Atraumatic.  Oropharynx clear.  Moist mucous membranes.  Ears, nose normal.  Eyes:  Anicteric sclerae.    NECK:  Supple.  No lymphadenopathy.    LUNGS:  Clear to auscultation bilaterally.  Normal respiratory effort.   HEART:  Regular rate and rhythm.  S1, S2 auscultated.  No murmur.    ABDOMEN:  Soft, nondistended.  Obese.  Positive bowel sounds.    EXTREMITIES:  Left leg mild erythema and lateral aspect of the left leg with some tenderness.  Second toe with erythema.  Hammertoe deformities with abrasion at the tip of the left second toe.  Foot is warm to touch.   NEUROLOGIC:  No focal defect.  Decreased sensation to light touch, both feet.    ASSESSMENT:    1.   Left second toe and leg cellulitis, rule out underlying osteomyelitis with low-grade fever and leukocytosis, monitor temperature curve and rule out early sepsis.  2.   Diabetes mellitus type 2.  3.   Chronic kidney disease stage 3.  4.   Hypertension.    PLAN:  Patient will be admitted to general medical floor.  IV cefepime and vancomycin.  ID and podiatry consults.  MRI scan of the left foot with and without contrast.  Venous Doppler study of left lower extremity, rule out DVT.  Pain control.   Further recommendations to follow.    Dictated By Clifford Osorio MD  d: 05/07/2025 12:58:12  t: 05/07/2025 13:11:33  [facility] [id number]  FB/    cc: Cherelle Constantino MD

## 2025-05-07 NOTE — PROGRESS NOTES
Reason for Visit      Mason Puri is a 65 year old male presents today complaining of left third digit ulceration.     History of Present Illness       Patient status post left third digit partial toe amputation,  (DOS: 10/24/24). Patient was subsequently discharged from the hospital on 10/25/2024 on Bactrim antibiotic.  Patient's pathology came back osteomyelitis with a negative clearance margin.  Wound cultures came back positive for normal skin bruno.  Patient has left the dressing on since his surgery and being discharged from the hospital on the 25th.  Patient is status post nerve ablation of his knee and 11/15/2024.                Patient returns to clinic today status post review of his MRI 4 weeks ago which noted bursitis and muscle bruising of his left foot.  At that time patient was advised to take it easy from work to see if his pain and swelling had improved.      Patient returns to clinic today with increased pain, redness and swelling to the left lower extremity.  Patient states this started a few days ago.  Patient denies any nausea vomiting fever chills    The following portions of the patient's history were reviewed and updated as appropriate: allergies, current medications, past family history, past medical history, past social history, past surgical history and problem list.    Allergies[1]      Current Outpatient Medications:     lisinopril 5 MG Oral Tab, Take 1 tablet (5 mg total) by mouth daily., Disp: 90 tablet, Rfl: 1    DULoxetine 30 MG Oral Cap DR Particles, Take 1 capsule (30 mg total) by mouth daily. TAKE 1 CAPSULE IN THE MORNING FOR ANXIETY/DEPRESSION/ARTHRITIS PAIN, Disp: 90 capsule, Rfl: 1    finasteride 5 MG Oral Tab, Take 1 tablet (5 mg total) by mouth daily. FOR PROSTATE., Disp: 90 tablet, Rfl: 1    metoprolol tartrate 25 MG Oral Tab, Take 1 tablet (25 mg total) by mouth 2 (two) times daily. FOR HEART., Disp: 180 tablet, Rfl: 1    pantoprazole 40 MG Oral Tab EC, Take 1  tablet (40 mg total) by mouth every morning before breakfast., Disp: 90 tablet, Rfl: 1    rosuvastatin 40 MG Oral Tab, Take 1 tablet (40 mg total) by mouth nightly. FOR CHOLESTEROL., Disp: 90 tablet, Rfl: 1    ticagrelor 90 MG Oral Tab, Take 1 tablet (90 mg total) by mouth every 12 (twelve) hours. FOR HEART STENTS., Disp: 180 tablet, Rfl: 9    Tirzepatide (MOUNJARO) 15 MG/0.5ML Subcutaneous Solution Auto-injector, Inject 15 mg into the skin once a week. FOR DIABETES. Continuation of therapy., Disp: 6 mL, Rfl: 9    tamsulosin 0.4 MG Oral Cap, Take 2 capsules (0.8 mg total) by mouth daily. FOR PROSTATE., Disp: 180 capsule, Rfl: 9    pregabalin 100 MG Oral Cap, Take 1 capsule (100 mg total) by mouth every 8 (eight) hours. FOR NERVE PAIN., Disp: 270 capsule, Rfl: 1    LEVOTHYROXINE 125 MCG Oral Tab, TAKE 1 TABLET AT BEDTIME, Disp: 90 tablet, Rfl: 1    albuterol 108 (90 Base) MCG/ACT Inhalation Aero Soln, Inhale 2 puffs into the lungs every 4 to 6 hours as needed for Wheezing or Shortness of Breath (cough, asthma, chest tightness). FOR ASTHMA. Pharmacist, please switch to formulary alternative per insurance coverage, ok to replace with proair, ventolin, proventil, or any other albuterol inhaler. -john, Disp: 3 each, Rfl: 9    Blood Pressure Monitoring ([employer] BLOOD PRESSURE MONITOR) Does not apply Misc, Monitor blood pressure twice a day., Disp: 1 each, Rfl: 0    aspirin (ASPIRIN 81) 81 MG Oral Tab EC, Take 1 tablet (81 mg total) by mouth daily. FOR HEART., Disp: 90 tablet, Rfl: 9    metFORMIN HCl 1000 MG Oral Tab, Take 1 tablet (1,000 mg total) by mouth 2 (two) times daily with meals., Disp: 180 tablet, Rfl: 1    Multiple Vitamins-Minerals (MULTIVITAMIN MEN 50+) Oral Tab, Take 1 tablet by mouth daily., Disp: , Rfl:     There are no discontinued medications.      Patient Active Problem List   Diagnosis    Class 2 severe obesity due to excess calories with serious comorbidity and body mass index (BMI) of 38.0 to 38.9 in  adult (HCC)    Hypertension associated with type 2 diabetes mellitus (HCC)    Hyperlipidemia associated with type 2 diabetes mellitus (HCC)    Type 2 diabetes mellitus with diabetic peripheral angiopathy without gangrene, without long-term current use of insulin (HCC)    Hypothyroidism    History of right-sided carotid endarterectomy    Detrusor overactivity    Primary osteoarthritis of right knee    Gastroesophageal reflux disease with esophagitis    Varicose veins of left lower extremity with inflammation, with ulcer of ankle limited to breakdown of skin (HCC)    Patellofemoral arthritis    Venous stasis dermatitis of both lower extremities    Major depressive disorder    BPH with obstruction/lower urinary tract symptoms    Transient ischemic attack (TIA)    Left carotid artery stenosis    Cerebellar infarct (HCC)    Abnormal Holter monitor finding    S/P drug eluting coronary stent placement    Neurogenic claudication    JUAN A (obstructive sleep apnea)    Osteomyelitis of third toe of left foot (HCC)    PVC (premature ventricular contraction)    Chronic osteomyelitis of foot (HCC)       Past Medical History:    Benign head tremor    BPH (benign prostatic hyperplasia)    Diabetes (HCC)    Disorder of thyroid    Diverticulitis    Former smoker    High blood pressure    High cholesterol    Hyperlipidemia    Hypertension    Neuropathic pain    Non-pressure chronic ulcer of right lower leg, limited to breakdown of skin (HCC)    Obesity    Sleep apnea    Snoring    Stroke (HCC)    Thyroid disease       Past Surgical History:   Procedure Laterality Date    Carotid endarterectomy Right 04/29/2021    Surgeon: Dr. Rajiv Sol[facility] at [facility]    Neck/chest procedure unlisted      per patient, a blockage was removed from the right side of his neck in 8/2021    Other surgical history  2017    Small bowel Res.    Other surgical history  11/12/2019    Colectomy    Pain management N/A 11/15/2024    Dr.Behar; Right knee Genicular  Radiofrequency Neurotomy       Family History   Problem Relation Age of Onset    Cancer Father         Prostate    Heart Disorder Father     Cancer Mother        Social History     Occupational History    Not on file   Tobacco Use    Smoking status: Former     Current packs/day: 0.00     Types: Cigarettes     Quit date:      Years since quittin.3     Passive exposure: Past    Smokeless tobacco: Never   Vaping Use    Vaping status: Never Used   Substance and Sexual Activity    Alcohol use: Never    Drug use: Never    Sexual activity: Not on file       ROS      Constitutional: negative for chills, fevers and sweats  Gastrointestinal: negative for abdominal pain, diarrhea, nausea and vomiting  Genitourinary:negative for dysuria and hematuria  Musculoskeletal:negative for arthralgias and muscle weakness  Neurological: negative for paresthesia and weakness  All others reviewed and negative.      Physical Exam     LE PHYSICAL EXAM  Constitution: Well-developed and well-nourished. Gait appears normal. No apparent distress. Alert and oriented to person, place, and time.  Integument: There are no varicosities. Skin appears moist, warm, and supple with positive hair growth. There are no color changes. No open lesions. No macerations, No Hyperkeratotic lesions.  Vascular examination: Dorsalis pedis and posterior tibial pulses are strong bilaterally with capillary filling time less than 3 seconds to all digits. There is no peripheral edema..  Neurological Sensorium: Grossly intact to sharp/dull. Vibratory: Intact.  Musculoskeletal:   5/5 pedal muscle strength b/l               Purulent drainage expressed from the left second digit.  Erythema edema to the second digit extending proximally up the leg with warm to the anterior shin.  Pain with compression of the calf of the left lower extremity.  No open lesions on the calf.        Follow LMX anesthesia, utilizing #15 blade sharp selective debridement was performed to  wound on patient's left second digit to remove fibrous and devitalized tissue without incident. Post debridement measurements unchanged.  A total of 4 sq cm were debrided. Pt tolerated procedure well. Applied iodine and DSD.         Assessment and Plan     status post left third digit partial toe amputation,  (DOS: 10/24/24). Patient was subsequently discharged from the hospital on 10/25/2024 on Bactrim antibiotic.  Patient's pathology came back osteomyelitis with a negative clearance margin.  Wound cultures came back positive for normal skin bruno.  Patient has left the dressing on since his surgery and being discharged from the hospital on the 25th.    Concerned with appearance of the left lower extremity.  Advised patient to go to the emergency room immediately with the following instructions:      MRI of the left foot to rule out osteomyelitis.  Wound culture was taken of purulent drainage of the left second digit  Venous duplex to rule out a DVT as he has calf pain and swelling  Initiation of IV antibiotics, admission and consult to podiatry and infectious disease.    Patient was instructed to call the office or on-call podiatric physician immediately with any issues or concerns before the next scheduled visit.     Eri Gallardo DPM, [email or web address], FACFAS  Diplomat, American Board of Foot and Ankle Surgery  Certified in Foot and Rearfoot/Ankle Reconstruction  Fellow of the American College of Foot and Ankle Surgeons  Fellowship Trained Foot and Ankle Surgeon   [facility]     10/21/2024    6:41 AM         [1]   Allergies  Allergen Reactions    Empagliflozin OTHER (SEE COMMENTS)     Frequent urination, unbearable.    Penicillins UNKNOWN     Patient does not recall reaction    Other UNKNOWN

## 2025-05-07 NOTE — ED PROVIDER NOTES
[facility] Emergency Department Note  Patient: Mason Puri Age: 65 year old Sex: male      MRN: [id number]  : 1960    Patient Seen in: [facility] 4w/sw/se    History     Chief Complaint   Patient presents with    Cellulitis     Stated Complaint: Leg and toe swelling    History obtained from: Patient    Patient is a 65-year-old male with a past medical history of hypertension, hyperlipidemia, diabetes, BPH, thyroid disease, CVA, former tobacco use presenting today for evaluation of left second digit wound infection.  He states that he was being seen by his podiatrist today as he developed pain and redness over the wound at the tip of the left second digit.  He states that the areas been causing worsening pain over the past couple weeks.  He states that he has a history of osteomyelitis of the left third toe that required surgical debridement and partial amputation.  States that he is being seen by his podiatrist, Dr. Woodard, today and was sent to the emergency department for admission for IV antibiotics, MRI to evaluate for osteomyelitis, and a left lower extremity DVT scan to rule out underlying DVT.  He denies any associate chest pain or shortness of breath.  Denies any known fevers.    Review of Systems:  Review of Systems  Positive for stated complaint: Leg and toe swelling. Constitutional and vital signs reviewed. All other systems reviewed and negative except as noted above.    Patient History:  Past Medical History[1]    Past Surgical History[2]     Family History[3]    Specific Social Determinants of Health:   Short Social Hx on File[4]        PSFH elements reviewed from today and agreed except as otherwise stated in HPI.    Physical Exam     ED Triage Vitals [25 1135]   /67   Pulse 90   Resp 20   Temp 99.5 °F (37.5 °C)   Temp src Oral   SpO2 94 %   O2 Device None (Room air)       Current:/52 (BP Location: Right arm)   Pulse 81   Temp 98.4 °F (36.9 °C) (Oral)    Resp 16   Ht 188 cm (6' 2\")   Wt (!) 140.7 kg   SpO2 93%   BMI 39.83 kg/m²         Physical Exam  Constitutional:       Appearance: He is well-developed.   HENT:      Head: Normocephalic and atraumatic.      Right Ear: External ear normal.      Left Ear: External ear normal.      Nose: Nose normal.   Eyes:      Conjunctiva/sclera: Conjunctivae normal.      Pupils: Pupils are equal, round, and reactive to light.   Cardiovascular:      Rate and Rhythm: Normal rate and regular rhythm.      Heart sounds: Normal heart sounds.   Pulmonary:      Effort: Pulmonary effort is normal.      Breath sounds: Normal breath sounds.   Abdominal:      General: Bowel sounds are normal.      Palpations: Abdomen is soft.      Tenderness: There is no abdominal tenderness.   Musculoskeletal:         General: Normal range of motion.      Cervical back: Normal range of motion and neck supple.   Skin:     General: Skin is warm and dry.      Findings: No rash.      Comments: Ulcerative wound at the tip of the left second digit of the foot with purulent foul-smelling drainage, mild soft tissue tenderness and surrounding erythema.  Erythema extends up the dorsal aspect of the left foot and into the left anterior shin   Neurological:      General: No focal deficit present.      Mental Status: He is alert and oriented to person, place, and time.      Deep Tendon Reflexes: Reflexes are normal and symmetric.   Psychiatric:         Mood and Affect: Mood normal.         Behavior: Behavior normal.         ED Course   Labs:   Labs Reviewed   CBC WITH DIFFERENTIAL WITH PLATELET - Abnormal; Notable for the following components:       Result Value    WBC 11.9 (*)     RBC 3.91 (*)     HGB 11.1 (*)     HCT 35.5 (*)     RDW-SD 48.9 (*)     Neutrophil Absolute Prelim 9.62 (*)     Neutrophil Absolute 9.62 (*)     Monocyte Absolute 1.03 (*)     All other components within normal limits   BASIC METABOLIC PANEL (8) - Abnormal; Notable for the following  components:    Glucose 181 (*)     Creatinine 1.52 (*)     Calcium, Total 8.6 (*)     eGFR-Cr 51 (*)     All other components within normal limits   SED RATE, WESTERGREN (AUTOMATED) - Abnormal; Notable for the following components:    Sed Rate 115 (*)     All other components within normal limits   C-REACTIVE PROTEIN - Abnormal; Notable for the following components:    C-Reactive Protein 7.10 (*)     All other components within normal limits   POCT GLUCOSE - Abnormal; Notable for the following components:    POC Glucose  128 (*)     All other components within normal limits   HEMOGLOBIN A1C     Radiology findings:  I personally reviewed the images.   US VENOUS DOPPLER LEG LEFT - DIAG IMG (CPT=93971)  Result Date: 5/7/2025  CONCLUSION:  1. Left greater saphenous vein superficial thrombus.  Otherwise no left lower extremity DVT.    Dictated by (CST): Ismael Hogue MD on 5/07/2025 at 1:13 PM     Finalized by (CST): Ismael Hogue MD on 5/07/2025 at 1:14 PM              Cardiac Monitor: Interpreted by me.   Pulse Readings from Last 1 Encounters:   05/07/25 81   , sinus,     External non-ED records reviewed independently by me: MRI left foot from 1/26/2025 reviewed showing chronic osteomyelitis with destruction of the left third digit distal phalanx and reactive osteitis of the distal phalanx of the second digit and great toe distal phalanx as well as cellulitis throughout the dorsal and plantar foot and in digits 2 and 3    MDM   65-year-old male with a past medical history of hypertension, hyperlipidemia, diabetes, BPH, thyroid disease, CVA, former tobacco use, osteomyelitis status post left third toe partial amputation presenting today for evaluation of left second toe nonhealing wound.  Sent in by podiatry for admission for IV antibiotics, MRI and left lower extremity venous duplex to rule out underlying osteomyelitis and DVT respectively.    Differential diagnoses considered includes, but is not limited to:  Diabetic foot infection, osteomyelitis, DVT, cellulitis    Will obtain the following tests: CBC, BMP, ESR, CRP, left lower extremity venous duplex, MRI left foot with and without contrast  Please see ED course for my independent review of these tests/imaging results.    Initial Medications/Therapeutics administered: Vancomycin, IV cefepime    Chronic conditions affecting care: Hypertension, hyperlipidemia, diabetes, BPH, thyroid disease, CVA, former tobacco use    Workup and medications considered but not ordered: Considered Zosyn however patient has a penicillin allergy of unknown reaction and has never been challenged with Zosyn.    Social Determinants of Health that impacted care: None     ED course: Laboratory workup with elevated inflammatory markers suspicious for underlying osteomyelitis.  Leukocytosis only of 11.9.  I discussed patient's case with the amorous hospitalist, Dr. Osorio, who accepted the patient for admission.  I also discussed case with podiatry who was made aware of new consult.  I discussed the case with Dr. Rollins who was agreed to evaluate patient and agrees with initial plan for vancomycin and cefepime at this time.        Disposition and Plan     Clinical Impression:  1. Diabetic foot infection (HCC)        Disposition:  Admit    Follow-up:  No follow-up provider specified.    Medications Prescribed:  Current Discharge Medication List          Hospital Problems       Present on Admission  Date Reviewed: 5/7/2025          ICD-10-CM Noted POA    * (Principal) Diabetic foot infection (HCC) E11.628, L08.9 5/7/2025 Unknown    Cellulitis of left foot L03.116 5/7/2025 Unknown        This note may have been created using voice dictation technology and may include inadvertent errors.      Cherelle Constantino MD  Emergency Medicine             [1]   Past Medical History:   Benign head tremor    BPH (benign prostatic hyperplasia)    Diabetes (HCC)    Disorder of thyroid    Diverticulitis    Former smoker     High blood pressure    High cholesterol    Hyperlipidemia    Hypertension    Neuropathic pain    Non-pressure chronic ulcer of right lower leg, limited to breakdown of skin (HCC)    Obesity    Sleep apnea    Snoring    Stroke (HCC)    Thyroid disease   [2]   Past Surgical History:  Procedure Laterality Date    Carotid endarterectomy Right 2021    Surgeon: Dr. Rajiv Solorio at [facility]    Neck/chest procedure unlisted      per patient, a blockage was removed from the right side of his neck in 2021    Other surgical history  2017    Small bowel Res.    Other surgical history  2019    Colectomy    Pain management N/A 11/15/2024    Dr.Behar; Right knee Genicular Radiofrequency Neurotomy   [3]   Family History  Problem Relation Age of Onset    Cancer Father         Prostate    Heart Disorder Father     Cancer Mother    [4]   Social History  Socioeconomic History    Marital status:     Number of children: 4   Tobacco Use    Smoking status: Former     Current packs/day: 0.00     Types: Cigarettes     Quit date:      Years since quittin.3     Passive exposure: Past    Smokeless tobacco: Never   Vaping Use    Vaping status: Never Used   Substance and Sexual Activity    Alcohol use: Never    Drug use: Never   Other Topics Concern    Caffeine Concern Yes     Comment: coffee daily    Exercise No   Social History Narrative    The patient does use an assistive device..  Brace    The patient does live in a home with stairs.     Social Drivers of Health     Food Insecurity: No Food Insecurity (2025)    NCSS - Food Insecurity     Worried About Running Out of Food in the Last Year: No     Ran Out of Food in the Last Year: No   Transportation Needs: No Transportation Needs (2025)    NCSS - Transportation     Lack of Transportation: No   Housing Stability: Not At Risk (2025)    NCSS - Housing/Utilities     Has Housing: Yes     Worried About Losing Housing: No     Unable to Get Utilities: No

## 2025-05-07 NOTE — PLAN OF CARE
Problem: Patient Centered Care  Goal: Patient preferences are identified and integrated in the patient's plan of care  Description: Interventions:- What would you like us to know as we care for you? I have had similar wounds in the past- Provide timely, complete, and accurate information to patient/family- Incorporate patient and family knowledge, values, beliefs, and cultural backgrounds into the planning and delivery of care- Encourage patient/family to participate in care and decision-making at the level they choose- Honor patient and family perspectives and choices  Outcome: Progressing     Problem: Diabetes/Glucose Control  Goal: Glucose maintained within prescribed range  Description: INTERVENTIONS:- Monitor Blood Glucose as ordered- Assess for signs and symptoms of hyperglycemia and hypoglycemia- Administer ordered medications to maintain glucose within target range- Assess barriers to adequate nutritional intake and initiate nutrition consult as needed- Instruct patient on self management of diabetes  Outcome: Progressing     Problem: Patient/Family Goals  Goal: Patient/Family Long Term Goal  Description: Patient's Long Term Goal: to be free of infection Interventions:- iv antibiotics administered- See additional Care Plan goals for specific interventions  Outcome: Progressing  Goal: Patient/Family Short Term Goal  Description: Patient's Short Term Goal: to have a clear plan for after the hospital stay. Interventions: - case management- See additional Care Plan goals for specific interventions  Outcome: Progressing     Problem: PAIN - ADULT  Goal: Verbalizes/displays adequate comfort level or patient's stated pain goal  Description: INTERVENTIONS:- Encourage pt to monitor pain and request assistance- Assess pain using appropriate pain scale- Administer analgesics based on type and severity of pain and evaluate response- Implement non-pharmacological measures as appropriate and evaluate response- Consider  cultural and social influences on pain and pain management- Manage/alleviate anxiety- Utilize distraction and/or relaxation techniques- Monitor for opioid side effects- Notify MD/LIP if interventions unsuccessful or patient reports new pain- Anticipate increased pain with activity and pre-medicate as appropriate  Outcome: Progressing     Problem: RISK FOR INFECTION - ADULT  Goal: Absence of fever/infection during anticipated neutropenic period  Description: INTERVENTIONS- Monitor WBC- Administer growth factors as ordered- Implement neutropenic guidelines  Outcome: Progressing     Problem: SAFETY ADULT - FALL  Goal: Free from fall injury  Description: INTERVENTIONS:- Assess pt frequently for physical needs- Identify cognitive and physical deficits and behaviors that affect risk of falls.- [place] fall precautions as indicated by assessment.- Educate pt/family on patient safety including physical limitations- Instruct pt to call for assistance with activity based on assessment- Modify environment to reduce risk of injury- Provide assistive devices as appropriate- Consider OT/PT consult to assist with strengthening/mobility- Encourage toileting schedule  Outcome: Progressing     Problem: DISCHARGE PLANNING  Goal: Discharge to home or other facility with appropriate resources  Description: INTERVENTIONS:- Identify barriers to discharge w/pt and caregiver- Include patient/family/discharge partner in discharge planning- Arrange for needed discharge resources and transportation as appropriate- Identify discharge learning needs (meds, wound care, etc)- Arrange for interpreters to assist at discharge as needed- Consider post-discharge preferences of patient/family/discharge partner- Complete POLST form as appropriate- Assess patient's ability to be responsible for managing their own health- Refer to Case Management Department for coordinating discharge planning if the patient needs post-hospital services based on  physician/LIP order or complex needs related to functional status, cognitive ability or social support system  Outcome: Progressing   Admitted and MD orders followed. Tolerated oral intake well. IV antibiotics administered.

## 2025-05-08 NOTE — CONSULTS
[facility]  part of [facility] ID CONSULT NOTE    Mason Puri Patient Status:  Inpatient    1960 MRN [id number]   Location [facility] 4W/SW/SE Attending Clifford Osorio MD   Hosp Day # 0 PCP Alvino Wallace MD       Reason for Consultation:  LLE cellulitis    ASSESSMENT:    Antibiotics: IV vancomycin, cefepime    # L 2nd toe cellulitis with proximal tracking; good DP pulse - cx pending; r/o OM    # Left third toe osteomyelitis 10/2024 s/p distal L 3rd toe amp 10/23/24  - Polymicrobial superficial wound culture - Proteus mirabilis, group B strep, Enterococcus faecalis  # IDDM with peripheral neuropathy and CKD  # PCN allergy - childhood      PLAN:    -  start IV vancomycin, cefepime  -  f/up MRI  -  f/up cx  -  BG control  -  Follow fever curve, wbc  -  Reviewed labs, micro, imaging reports, available old records    History of Present Illness:  Mason Puri is a a(n) 65 year old male. Patient is a 65 yoM with a h/o IDDM with peripheral neuropathy, HTN, HLD, CAD who was last seen 10/2024 for L 3rd distal toe OM with swab cx showing P. Mirabilis, GBS, E. Faecalis. Was taken to OR 10/23/24 for L distal 3rd toe partial amp. Margin negative. Discharged on bactrim. Has been following with Podiatry outpatient since then. Had recent MRI L foot w/o contrast 25 showing intramuscular edema w/o OM. Patient was maintained off work since it involves standing all day. No abx at home. No f/c. Now seen in clinic and noted to have increased L 2nd toe erythema, warmth, swelling, pain with proximal tracking w/o f/c. Repeat MRI pending. IV vancomycin and cefepime started.    History:  Past Medical History[1]  Past Surgical History[2]  Family History[3]   reports that he quit smoking about 29 years ago. His smoking use included cigarettes. He has been exposed to tobacco smoke. He has never used smokeless tobacco. He reports that he does not drink alcohol and does  not use drugs.    Allergies:  Allergies[4]    Medications:  Current Hospital Medications[5]    Review of Systems:  Per HPI    Physical Exam:  Vital signs: Blood pressure 104/55, pulse 69, temperature 98.3 °F (36.8 °C), temperature source Oral, resp. rate 16, height 188 cm (6' 2\"), weight (!) 310 lb 3.2 oz (140.7 kg), SpO2 93%.    General: Alert, oriented, NAD  HEENT: Moist mucous membranes. EOMI  Neck: No lymphadenopathy.  Supple.  Cardiovascular: No chest wall tenderness  Respiratory: Symmetric expansion  Abdomen: Soft, nontender, nondistended.   Musculoskeletal: +LLE edema with +DP pulse  Integument: L distal 3rd toe amp site c/d/I; L 2nd toe with edema, erytham, +ttp with proximal erthema up let to below knee    Laboratory Data: Reviewed in EMR    Microbiology: Reviewed in EMR    Radiology: Reviewed    Thank you for allowing us to participate in the care of this patient. Please do not hesitate to call if you have any questions.     We will continue to follow with you and will make further recommendations based on his progress.    Jude Rollins MD   [facility]  [phone]  5/7/2025         [1]   Past Medical History:   Benign head tremor    BPH (benign prostatic hyperplasia)    Diabetes (HCC)    Disorder of thyroid    Diverticulitis    Former smoker    High blood pressure    High cholesterol    Hyperlipidemia    Hypertension    Neuropathic pain    Non-pressure chronic ulcer of right lower leg, limited to breakdown of skin (HCC)    Obesity    Sleep apnea    Snoring    Stroke (HCC)    Thyroid disease   [2]   Past Surgical History:  Procedure Laterality Date    Carotid endarterectomy Right 04/29/2021    Surgeon: Dr. Rajiv Solorio at [facility]    Neck/chest procedure unlisted      per patient, a blockage was removed from the right side of his neck in 8/2021    Other surgical history  2017    Small bowel Res.    Other surgical history  11/12/2019    Colectomy    Pain management N/A  11/15/2024    Dr.Behar; Right knee Genicular Radiofrequency Neurotomy   [3]   Family History  Problem Relation Age of Onset    Cancer Father         Prostate    Heart Disorder Father     Cancer Mother    [4]   Allergies  Allergen Reactions    Empagliflozin OTHER (SEE COMMENTS)     Frequent urination, unbearable.    Penicillins UNKNOWN     Patient does not recall reaction    Other UNKNOWN   [5]   Current Facility-Administered Medications:     ceFEPIme (Maxipime) 1 g in sodium chloride 0.9% 100mL IVPB-RAMON, 1 g, Intravenous, Q8H    [START ON 5/8/2025] vancomycin (Vancocin) 2 g in sodium chloride 0.9% 500mL IVPB premix, 15 mg/kg, Intravenous, Q24H    glucose (Dex4) 15 GM/59ML oral liquid 15 g, 15 g, Oral, Q15 Min PRN **OR** glucose (Glutose) 40% oral gel 15 g, 15 g, Oral, Q15 Min PRN **OR** glucose-vitamin C (Dex-4) chewable tab 4 tablet, 4 tablet, Oral, Q15 Min PRN **OR** dextrose 50% injection 50 mL, 50 mL, Intravenous, Q15 Min PRN **OR** glucose (Dex4) 15 GM/59ML oral liquid 30 g, 30 g, Oral, Q15 Min PRN **OR** glucose (Glutose) 40% oral gel 30 g, 30 g, Oral, Q15 Min PRN **OR** glucose-vitamin C (Dex-4) chewable tab 8 tablet, 8 tablet, Oral, Q15 Min PRN    insulin aspart (NovoLOG) 100 Units/mL FlexPen 1-7 Units, 1-7 Units, Subcutaneous, TID CC and HS    heparin (Porcine) 5000 UNIT/ML injection 5,000 Units, 5,000 Units, Subcutaneous, 2 times per day    acetaminophen (Tylenol Extra Strength) tab 500 mg, 500 mg, Oral, Q4H PRN    ondansetron (Zofran) 4 MG/2ML injection 4 mg, 4 mg, Intravenous, Q6H PRN    prochlorperazine (Compazine) 10 MG/2ML injection 5 mg, 5 mg, Intravenous, Q8H PRN    temazepam (Restoril) cap 15 mg, 15 mg, Oral, Nightly PRN    HYDROcodone-acetaminophen (Norco) 5-325 MG per tab 1 tablet, 1 tablet, Oral, Q6H PRN    [START ON 5/8/2025] DULoxetine (Cymbalta) DR cap 30 mg, 30 mg, Oral, Daily    [START ON 5/8/2025] finasteride (Proscar) tab 5 mg, 5 mg, Oral, Daily    [START ON 5/8/2025] levothyroxine  (Synthroid) tab 125 mcg, 125 mcg, Oral, Before breakfast    [START ON 5/8/2025] lisinopril (Prinivil; Zestril) tab 5 mg, 5 mg, Oral, Daily    metFORMIN (Glucophage) tab 1,000 mg, 1,000 mg, Oral, BID with meals    metoprolol tartrate (Lopressor) tab 25 mg, 25 mg, Oral, BID    [START ON 5/8/2025] pantoprazole (Protonix) DR tab 40 mg, 40 mg, Oral, QAM AC    pregabalin (Lyrica) cap 100 mg, 100 mg, Oral, TID    rosuvastatin (Crestor) tab 40 mg, 40 mg, Oral, Nightly    [START ON 5/8/2025] tamsulosin (Flomax) cap 0.8 mg, 0.8 mg, Oral, Daily

## 2025-05-08 NOTE — PROGRESS NOTES
INFECTIOUS DISEASE PROGRESS NOTE  [facility]  part of [facility] ID PROGRESS NOTE    Mason Puri Patient Status:  Inpatient    1960 MRN [id number]   Location [facility] 4W/SW/SE Attending Zhanna Hogue MD   Hosp Day # 1 PCP Alvino Wallace MD     Subjective:  ROS reviewed. Pain controlled. Feels like toe looks less red but leg still red    ASSESSMENT:    Antibiotics: IV vancomycin, cefepime     # L 2nd toe cellulitis with proximal tracking up LLE; good DP pulse - cx pending; r/o OM   -Cx GPC     # Left third toe osteomyelitis 10/2024 s/p distal L 3rd toe amp 10/23/24  - Polymicrobial superficial wound culture - Proteus mirabilis, group B strep, Enterococcus faecalis  # IDDM with peripheral neuropathy and CKD  # PCN allergy - childhood        PLAN:  -  Continue on IV vancomycin, cefepime. FU MRI and podiatry recommendations.  -  BG control.  -  Follow fever curve, wbc.  -  Reviewed labs, micro, imaging reports, available old records.  -  Case d/w patient, RN.     History of Present Illness:  65 yoM with a h/o IDDM with peripheral neuropathy, HTN, HLD, CAD who was last seen 10/2024 for L 3rd distal toe OM with swab cx showing P. Mirabilis, GBS, E. Faecalis. Was taken to OR 10/23/24 for L distal 3rd toe partial amp. Margin negative. Discharged on bactrim. Has been following with Podiatry outpatient since then. Had recent MRI L foot w/o contrast 25 showing intramuscular edema w/o OM. Patient was maintained off work since it involves standing all day. No abx at home. No f/c. Now seen in clinic and noted to have increased L 2nd toe erythema, warmth, swelling, pain with proximal tracking w/o f/c. Repeat MRI pending. IV vancomycin and cefepime started.    Physical Exam:  /64 (BP Location: Right arm)   Pulse 72   Temp 98 °F (36.7 °C) (Oral)   Resp 18   Ht 6' 2\" (1.88 m)   Wt (!) 310 lb 3.2 oz (140.7 kg)   SpO2 93%   BMI 39.83 kg/m²     Gen:    Awake, in bed  HEENT:  EOMI, neck supple  CV/lungs:  RRR, CTAB  Abdom:  Soft, no TTP  Skin/extrem:  L leg with warmth, erythema, second toe with edema  Lines:  PIV+    Laboratory Data: Reviewed    Microbiology: Reviewed    Radiology: Reviewed      [facility]  [phone]  5/8/2025

## 2025-05-08 NOTE — PROGRESS NOTES
WOUND CARE NOTE    History:  Past Medical History[1]  Past Surgical History[2]   Short Social Hx on File[3]    Lower Extremity Assessment:  Right foot is intact, bilateral feet are warm with palpable pulses    PLAN   Recommendations:  Dr. Woodard and ID are  following the pt.    Elevate BLE  Offload left toe wound  Turn schedules  To prevent sliding: decrease head of bed and elevate foot of bed as medical condition tolerates  Glucose control to help promote wound healing    Wound(s)  Location: Left 2nd toe  Cleansing  Saline   Topical Betadine swab  Dressings 2 x 2 gauze, paper tape or Srinivas wrap  Frequency daily       OBJECTIVE   MD Consult new left toe      ASSESSMENT   Jacob Score:  Jacob Scale Score: 18    Chart Reviewed: yes    Wound(s):  The pt. I sitting up in bed, no complaints at this time. MD order to see the pt's left 2nd toe. Left 3rd toe partial amp site is intact, noted erythema from the dorsal foot up his leg, left lateral leg with a lump noted. See the wound assessment, wound was cleansed and dressed. Discussed nutrition, wound care, dressings, and glucose control. The pt's MRI is pending results. Spoke with the nurse, The pt. has no questions. Call light in reach.         Allergies: Empagliflozin, Penicillins, and Other    Labs:   Lab Results   Component Value Date    WBC 6.7 05/08/2025    HGB 11.5 (L) 05/08/2025    HCT 36.8 (L) 05/08/2025    .0 05/08/2025    CREATSERUM 1.45 (H) 05/08/2025    BUN 17 05/08/2025     05/08/2025    K 4.1 05/08/2025     05/08/2025    CO2 30.0 05/08/2025     (H) 05/08/2025    CA 8.9 05/08/2025    ALB 4.3 12/23/2024    ALKPHO 67 12/23/2024    BILT 0.2 12/23/2024    TP 6.6 12/23/2024    AST 22 12/23/2024    ALT 20 12/23/2024    MG 2.3 12/19/2024     No results found for: \"PREALBUMIN\"      Time Spent 20 Minutes.    Marci Strange RN  [facility]  [facility]  [phone]     05/08/25 0906   Wound 05/07/25 Toe (Comment which  one) Dorsal;Left   Date First Assessed: 25   Present on Original Admission: Yes  Primary Wound Type: Diabetic Ulcer  Location: (c) Toe (Comment which one)  Wound Location Orientation: (c) Dorsal;Left  Wound Description (Comments): 2nd toe   Wound Image    Site Assessment Dry;Fragile;Pink   Closure Not approximated   Drainage Amount None   Treatments Cleansed;Saline;Betadine   Dressing 2x2s;Tape   Dressing Changed New   Dressing Status Clean;Dry;Intact   Wound Length (cm)   (traced on admit by RN)   Wound Depth (cm) 0.1 cm   Margins Attached edges   Non-staged Wound Description Full thickness   Peggy-wound Assessment Dry;Fragile;Callous;Red;Edema   State of Healing Non-healing   Wound Odor None   Tunneling? No   Undermining? No   Sinus Tracts? No   Miguel Scale Grade 1   Wound Follow Up   Follow up needed Yes            [1]   Past Medical History:   Benign head tremor    BPH (benign prostatic hyperplasia)    Diabetes (HCC)    Disorder of thyroid    Diverticulitis    Former smoker    High blood pressure    High cholesterol    Hyperlipidemia    Hypertension    Neuropathic pain    Non-pressure chronic ulcer of right lower leg, limited to breakdown of skin (HCC)    Obesity    Sleep apnea    Snoring    Stroke (HCC)    Thyroid disease   [2]   Past Surgical History:  Procedure Laterality Date    Carotid endarterectomy Right 2021    Surgeon: Dr. Rajiv Sol[facility] at [facility]    Neck/chest procedure unlisted      per patient, a blockage was removed from the right side of his neck in 2021    Other surgical history  2017    Small bowel Res.    Other surgical history  2019    Colectomy    Pain management N/A 11/15/2024    Dr.Behar; Right knee Genicular Radiofrequency Neurotomy   [3]   Social History  Socioeconomic History    Marital status:     Number of children: 4   Tobacco Use    Smoking status: Former     Current packs/day: 0.00     Types: Cigarettes     Quit date:      Years since quittin.3      Passive exposure: Past    Smokeless tobacco: Never   Vaping Use    Vaping status: Never Used   Substance and Sexual Activity    Alcohol use: Never    Drug use: Never   Other Topics Concern    Caffeine Concern Yes     Comment: coffee daily    Exercise No   Social History Narrative    The patient does use an assistive device..  Brace    The patient does live in a home with stairs.     Social Drivers of Health     Food Insecurity: No Food Insecurity (5/7/2025)    NCSS - Food Insecurity     Worried About Running Out of Food in the Last Year: No     Ran Out of Food in the Last Year: No   Transportation Needs: No Transportation Needs (5/7/2025)    NCSS - Transportation     Lack of Transportation: No   Housing Stability: Not At Risk (5/7/2025)    NCSS - Housing/Utilities     Has Housing: Yes     Worried About Losing Housing: No     Unable to Get Utilities: No

## 2025-05-08 NOTE — PROGRESS NOTES
[facility]  part of [facility]    Hospitalist Progress Note     Mason Puri Patient Status:  Inpatient    1960  65 year old CSN [id number]   Location 425/425-A Attending Zhanna Hogue MD   Hosp Day # 1 PCP Alvino Wallace MD       Subjective:   ----------------------------------  Pt seen resting in bed in NAD. C/o LLE erythema with mild pain. No fever, chills, N/V.       Objective:   Chief Complaint:   Chief Complaint   Patient presents with    Cellulitis     ----------------------------------  Temp:  [98 °F (36.7 °C)-98.4 °F (36.9 °C)] 98 °F (36.7 °C)  Pulse:  [66-81] 72  Resp:  [16-18] 18  BP: (104-124)/(52-64) 124/64  SpO2:  [91 %-93 %] 93 %  Gen: A+Ox3.  No distress.   HEENT: NCAT, neck supple, no carotid bruit.  CV: RRR, S1S2, and intact distal pulses. No gallop, rub, murmur.  Pulm: Effort and breath sounds normal. No distress, wheezes, rales, rhonchi.  Abd: Soft, NTND, BS normal, no mass, no HSM, no rebound/guarding.   Neuro: Normal reflexes, CN. Sensory/motor exams grossly normal deficit.   MS: No joint effusions.  No peripheral edema.  Skin: Skin is warm and dry. No rashes, +erythema of LLE, +left 2nd toe ulcer   Psych: Normal mood and affect. Calm, cooperative    Labs:  Lab Results   Component Value Date    HGB 11.5 (L) 2025    WBC 6.7 2025    .0 2025     2025    K 4.1 2025    CREATSERUM 1.45 (H) 2025    INR 1.00 2023    AST 22 2024    ALT 20 2024           Scheduled Medications[1]  PRN Medications[2]      Other problems      Supplementary Documentation:   DVT Mechanical Prophylaxis:        DVT Pharmacologic Prophylaxis   Medication    heparin (Porcine) 5000 UNIT/ML injection 5,000 Units                Code Status: Full Code  Sparrow: No urinary catheter in place  [place]row Duration (in days):   Central line:    [place]: 5/10/2025         I personally reviewed the available laboratories, imaging including. I  discussed/will discuss the case with consultants. I ordered laboratories and/or radiographic studies. I adjusted medications as detailed above.  Medical decision making high, risk is high.    Assessment & Plan:   ----------------------------------    Left second toe and leg cellulitis,   rule out underlying osteomyelitis  low-grade fever and leukocytosis,   monitor temperature curve   Cont IV vanco, cefepime  ID consulted  Podiatry consult pending  MRI pending  Venous doppler neg  Pain control  Hold ASA, Brilinta for possible surgery      Diabetes mellitus type 2.  Cont SSI  A1c 7.3      Chronic kidney disease stage 3.  Cr 1.4  At baseline  Avoid nephrotoxins      Hypertension.  Controlled  Cont lisinopril    FULL CODE  DVT px: hold AC for possible surgery      Zhanna Hogue MD  5/8/2025         [1]    vancomycin  10 mg/kg Intravenous Q24H    cefepime  1 g Intravenous Q8H    insulin aspart  1-7 Units Subcutaneous TID CC and HS    heparin  5,000 Units Subcutaneous 2 times per day    DULoxetine  30 mg Oral Daily    finasteride  5 mg Oral Daily    levothyroxine  125 mcg Oral Before breakfast    lisinopril  5 mg Oral Daily    metFORMIN HCl  1,000 mg Oral BID with meals    metoprolol tartrate  25 mg Oral BID    pantoprazole  40 mg Oral QAM AC    pregabalin  100 mg Oral TID    rosuvastatin  40 mg Oral Nightly    tamsulosin  0.8 mg Oral Daily   [2]   glucose **OR** glucose **OR** glucose-vitamin C **OR** dextrose **OR** glucose **OR** glucose **OR** glucose-vitamin C    acetaminophen    ondansetron    prochlorperazine    temazepam    HYDROcodone-acetaminophen

## 2025-05-08 NOTE — PLAN OF CARE
Patient is A&Ox4. RA. Cefepime and Vanco continued. Cardiac diet. Remote tele. Cpap overnight. Heparin and scds for dvt prophylaxis. ACHS. Voiding freely. Up by self. Call light within reach, frequent rounding. Safety measures in place. MRI completed overnight.     Problem: Patient Centered Care  Goal: Patient preferences are identified and integrated in the patient's plan of care  Description: Interventions:- What would you like us to know as we care for you- Provide timely, complete, and accurate information to patient/family- Incorporate patient and family knowledge, values, beliefs, and cultural backgrounds into the planning and delivery of care- Encourage patient/family to participate in care and decision-making at the level they choose- Honor patient and family perspectives and choices  Outcome: Progressing     Problem: Diabetes/Glucose Control  Goal: Glucose maintained within prescribed range  Description: INTERVENTIONS:- Monitor Blood Glucose as ordered- Assess for signs and symptoms of hyperglycemia and hypoglycemia- Administer ordered medications to maintain glucose within target range- Assess barriers to adequate nutritional intake and initiate nutrition consult as needed- Instruct patient on self management of diabetes  Outcome: Progressing     Problem: Patient/Family Goals  Goal: Patient/Family Long Term Goal  Description: Patient's Long Term Goal: to be free of infection Interventions:- iv antibiotics administered- See additional Care Plan goals for specific interventions  Outcome: Progressing  Goal: Patient/Family Short Term Goal  Description: Patient's Short Term Goal: to have a clear plan for after the hospital stay. Interventions: - case management- See additional Care Plan goals for specific interventions  Outcome: Progressing     Problem: PAIN - ADULT  Goal: Verbalizes/displays adequate comfort level or patient's stated pain goal  Description: INTERVENTIONS:- Encourage pt to monitor pain and  request assistance- Assess pain using appropriate pain scale- Administer analgesics based on type and severity of pain and evaluate response- Implement non-pharmacological measures as appropriate and evaluate response- Consider cultural and social influences on pain and pain management- Manage/alleviate anxiety- Utilize distraction and/or relaxation techniques- Monitor for opioid side effects- Notify MD/LIP if interventions unsuccessful or patient reports new pain- Anticipate increased pain with activity and pre-medicate as appropriate  Outcome: Progressing     Problem: RISK FOR INFECTION - ADULT  Goal: Absence of fever/infection during anticipated neutropenic period  Description: INTERVENTIONS- Monitor WBC- Administer growth factors as ordered- Implement neutropenic guidelines  Outcome: Progressing     Problem: SAFETY ADULT - FALL  Goal: Free from fall injury  Description: INTERVENTIONS:- Assess pt frequently for physical needs- Identify cognitive and physical deficits and behaviors that affect risk of falls.- [place] fall precautions as indicated by assessment.- Educate pt/family on patient safety including physical limitations- Instruct pt to call for assistance with activity based on assessment- Modify environment to reduce risk of injury- Provide assistive devices as appropriate- Consider OT/PT consult to assist with strengthening/mobility- Encourage toileting schedule  Outcome: Progressing     Problem: DISCHARGE PLANNING  Goal: Discharge to home or other facility with appropriate resources  Description: INTERVENTIONS:- Identify barriers to discharge w/pt and caregiver- Include patient/family/discharge partner in discharge planning- Arrange for needed discharge resources and transportation as appropriate- Identify discharge learning needs (meds, wound care, etc)- Arrange for interpreters to assist at discharge as needed- Consider post-discharge preferences of patient/family/discharge partner- Complete POLST  form as appropriate- Assess patient's ability to be responsible for managing their own health- Refer to Case Management Department for coordinating discharge planning if the patient needs post-hospital services based on physician/LIP order or complex needs related to functional status, cognitive ability or social support system  Outcome: Progressing

## 2025-05-08 NOTE — SPIRITUAL CARE NOTE
Spiritual Care Visit Note    Patient Name: Mason Puri Date of Spiritual Care Visit: 25   : 1960 Primary Dx: Diabetic foot infection (HCC)       Referred By: Referral From: Nurse  Spiritual Care Taxonomy:  Intended Effects: Promote a sense of peace  Methods: Collaborate with care team member, Offer support  Interventions: Acknowledge current situation, Active listening, Ask guided questions, Assist someone with Advance Directives, Share words of hope and inspiration  Visit Type/Summary:   - PoA: New PoAH Created: Visited patient in response to PoA for Healthcare consult/request. Created a new PoAH for Healthcare document that, per the patient, accurately reflects their wishes. Gave the patient the original PoAH document along with copies. Confirmed that the PoAH primary agent name and contact information have been entered into the Epic, Advance Directives section. A copy of the new PoAH document has been placed on the patient's paper chart.  remains available for follow up.  Spiritual Care support can be requested via an Epic consult. For urgent/immediate needs, please contact the On Call  at: [place]: ext [phone]       Rev. Christine Turpin

## 2025-05-08 NOTE — PAYOR COMM NOTE
--------------  ADMISSION REVIEW     Payor: [employer]  Subscriber #:  [id number]  Authorization Number: [id number]    Admit date: 5/7/25  Admit time:  2:52 PM       REVIEW DOCUMENTATION:     ED Provider Notes          Patient is a 65-year-old male with a past medical history of hypertension, hyperlipidemia, diabetes, BPH, thyroid disease, CVA, former tobacco use presenting today for evaluation of left second digit wound infection.  He states that he was being seen by his podiatrist today as he developed pain and redness over the wound at the tip of the left second digit.  He states that the areas been causing worsening pain over the past couple weeks.  He states that he has a history of osteomyelitis of the left third toe that required surgical debridement and partial amputation.  States that he is being seen by his podiatrist, Dr. Woodard, today and was sent to the emergency department for admission for IV antibiotics, MRI to evaluate for osteomyelitis, and a left lower extremity DVT scan to rule out underlying DVT.  He denies any associate chest pain or shortness of breath.  Denies any known fevers.      Physical Exam     ED Triage Vitals [05/07/25 1135]   /67   Pulse 90   Resp 20   Temp 99.5 °F (37.5 °C)   Temp src Oral   SpO2 94 %   O2 Device None (Room air)         Physical Exam  Constitutional:       Appearance: He is well-developed.   HENT:      Head: Normocephalic and atraumatic.      Right Ear: External ear normal.      Left Ear: External ear normal.      Nose: Nose normal.   Eyes:      Conjunctiva/sclera: Conjunctivae normal.      Pupils: Pupils are equal, round, and reactive to light.   Cardiovascular:      Rate and Rhythm: Normal rate and regular rhythm.      Heart sounds: Normal heart sounds.   Pulmonary:      Effort: Pulmonary effort is normal.      Breath sounds: Normal breath sounds.   Abdominal:      General: Bowel sounds are normal.      Palpations: Abdomen is soft.      Tenderness: There is  no abdominal tenderness.   Musculoskeletal:         General: Normal range of motion.      Cervical back: Normal range of motion and neck supple.   Skin:     General: Skin is warm and dry.      Findings: No rash.      Comments: Ulcerative wound at the tip of the left second digit of the foot with purulent foul-smelling drainage, mild soft tissue tenderness and surrounding erythema.  Erythema extends up the dorsal aspect of the left foot and into the left anterior shin   Neurological:      General: No focal deficit present.      Mental Status: He is alert and oriented to person, place, and time.      Deep Tendon Reflexes: Reflexes are normal and symmetric.   Psychiatric:         Mood and Affect: Mood normal.         Behavior: Behavior normal.         ED Course   Labs:   Labs Reviewed   CBC WITH DIFFERENTIAL WITH PLATELET - Abnormal; Notable for the following components:       Result Value    WBC 11.9 (*)     RBC 3.91 (*)     HGB 11.1 (*)     HCT 35.5 (*)     RDW-SD 48.9 (*)     Neutrophil Absolute Prelim 9.62 (*)     Neutrophil Absolute 9.62 (*)     Monocyte Absolute 1.03 (*)     All other components within normal limits   BASIC METABOLIC PANEL (8) - Abnormal; Notable for the following components:    Glucose 181 (*)     Creatinine 1.52 (*)     Calcium, Total 8.6 (*)     eGFR-Cr 51 (*)     All other components within normal limits   SED RATE, WESTERGREN (AUTOMATED) - Abnormal; Notable for the following components:    Sed Rate 115 (*)     All other components within normal limits   C-REACTIVE PROTEIN - Abnormal; Notable for the following components:    C-Reactive Protein 7.10 (*)     All other components within normal limits   POCT GLUCOSE - Abnormal; Notable for the following components:    POC Glucose  128 (*)     All other components within normal limits   HEMOGLOBIN A1C     Radiology findings:  I personally reviewed the images.   US VENOUS DOPPLER LEG LEFT - DIAG IMG (CPT=93971)  Result Date: 5/7/2025  CONCLUSION:   1. Left greater saphenous vein superficial thrombus.  Otherwise no left lower extremity DVT.    Dictated by (CST): Ismael Hogue MD on 5/07/2025 at 1:13 PM     Finalized by (CST): Ismael Hogue MD on 5/07/2025 at 1:14 PM              External non-ED records reviewed independently by me: MRI left foot from 1/26/2025 reviewed showing chronic osteomyelitis with destruction of the left third digit distal phalanx and reactive osteitis of the distal phalanx of the second digit and great toe distal phalanx as well as cellulitis throughout the dorsal and plantar foot and in digits 2 and 3    MDM   65-year-old male with a past medical history of hypertension, hyperlipidemia, diabetes, BPH, thyroid disease, CVA, former tobacco use, osteomyelitis status post left third toe partial amputation presenting today for evaluation of left second toe nonhealing wound.  Sent in by podiatry for admission for IV antibiotics, MRI and left lower extremity venous duplex to rule out underlying osteomyelitis and DVT respectively.    Differential diagnoses considered includes, but is not limited to: Diabetic foot infection, osteomyelitis, DVT, cellulitis    Will obtain the following tests: CBC, BMP, ESR, CRP, left lower extremity venous duplex, MRI left foot with and without contrast  Please see ED course for my independent review of these tests/imaging results.    Initial Medications/Therapeutics administered: Vancomycin, IV cefepime    Chronic conditions affecting care: Hypertension, hyperlipidemia, diabetes, BPH, thyroid disease, CVA, former tobacco use    Workup and medications considered but not ordered: Considered Zosyn however patient has a penicillin allergy of unknown reaction and has never been challenged with Zosyn.    Social Determinants of Health that impacted care: None     ED course: Laboratory workup with elevated inflammatory markers suspicious for underlying osteomyelitis.  Leukocytosis only of 11.9.  I discussed  patient's case with the amorous hospitalist, Dr. Osorio, who accepted the patient for admission.  I also discussed case with podiatry who was made aware of new consult.  I discussed the case with Dr. Rollins who was agreed to evaluate patient and agrees with initial plan for vancomycin and cefepime at this time.        Disposition and Plan     Clinical Impression:  1. Diabetic foot infection (HCC)        Disposition:  Admit         [facility]     PATIENT'S NAME: DM ARANDA   ATTENDING PHYSICIAN: Cherelle Constantino MD   PATIENT ACCOUNT#:   [id number]    LOCATION:  [address]  MEDICAL RECORD #:   [id number]       YOB: 1960  ADMISSION DATE:       05/07/2025     HISTORY AND PHYSICAL EXAMINATION     CHIEF COMPLAINT:  Left foot and leg cellulitis.      HISTORY OF PRESENT ILLNESS:  Patient is a 65-year-old  male who has been having discomfort and erythema of his left leg for the last week and some erythema of his left second toe.  He had an MRI as an outpatient on April 19, which showed no evidence of osteomyelitis.  His symptoms have gotten worse over the last 2 to 3 days.  Today, he was sent to the emergency department for evaluation.  CBC showed white blood cell count of 11.9 with left shift.  Chemistry, C-reactive protein, sedimentation rate are still pending.  Venous Doppler study to rule out DVT and MRI of the foot with and without contrast both ordered.  Started on IV cefepime and vancomycin, and he will be admitted to the hospital for further management.     PAST MEDICAL HISTORY:  Coronary artery disease, status post LAD and RCA PCI stent; history of transient ischemic attack; carotid atherosclerosis; diabetes mellitus type 2; diverticulitis; nonsustained ventricular tachycardia; morbid obesity; obstructive sleep apnea; hypertension; hyperlipidemia; chronic kidney disease stage 2 to 3; hypothyroidism; peripheral neuropathy.        ASSESSMENT:    1.       Left second  toe and leg cellulitis, rule out underlying osteomyelitis with low-grade fever and leukocytosis, monitor temperature curve and rule out early sepsis.  2.       Diabetes mellitus type 2.  3.       Chronic kidney disease stage 3.  4.       Hypertension.     PLAN:  Patient will be admitted to general medical floor.  IV cefepime and vancomycin.  ID and podiatry consults.  MRI scan of the left foot with and without contrast.  Venous Doppler study of left lower extremity, rule out DVT.  Pain control.  Further recommendations to follow.     Dictated By Clifford Osorio MD      5/7 ID:       Reason for Consultation:  LLE cellulitis     ASSESSMENT:     Antibiotics: IV vancomycin, cefepime     # L 2nd toe cellulitis with proximal tracking; good DP pulse - cx pending; r/o OM     # Left third toe osteomyelitis 10/2024 s/p distal L 3rd toe amp 10/23/24  - Polymicrobial superficial wound culture - Proteus mirabilis, group B strep, Enterococcus faecalis  # IDDM with peripheral neuropathy and CKD  # PCN allergy - childhood        PLAN:     -  start IV vancomycin, cefepime  -  f/up MRI  -  f/up cx  -  BG control  -  Follow fever curve, wbc  -  Reviewed labs, micro, imaging reports, available old records     History of Present Illness:  Mason Puri is a a(n) 65 year old male. Patient is a 65 yoM with a h/o IDDM with peripheral neuropathy, HTN, HLD, CAD who was last seen 10/2024 for L 3rd distal toe OM with swab cx showing P. Mirabilis, GBS, E. Faecalis. Was taken to OR 10/23/24 for L distal 3rd toe partial amp. Margin negative. Discharged on bactrim. Has been following with Podiatry outpatient since then. Had recent MRI L foot w/o contrast 4/21/25 showing intramuscular edema w/o OM. Patient was maintained off work since it involves standing all day. No abx at home. No f/c. Now seen in clinic and noted to have increased L 2nd toe erythema, warmth, swelling, pain with proximal tracking w/o f/c. Repeat MRI pending. IV  vancomycin and cefepime started.         Physical Exam:  Vital signs: Blood pressure 104/55, pulse 69, temperature 98.3 °F (36.8 °C), temperature source Oral, resp. rate 16, height 188 cm (6' 2\"), weight (!) 310 lb 3.2 oz (140.7 kg), SpO2 93%.     General: Alert, oriented, NAD  HEENT: Moist mucous membranes. EOMI  Neck: No lymphadenopathy.  Supple.  Cardiovascular: No chest wall tenderness  Respiratory: Symmetric expansion  Abdomen: Soft, nontender, nondistended.   Musculoskeletal: +LLE edema with +DP pulse  Integument: L distal 3rd toe amp site c/d/I; L 2nd toe with edema, erytham, +ttp with proximal erthema up let to below knee     Laboratory Data: Reviewed in EMR     Microbiology: Reviewed in EMR     Radiology: Reviewed     Thank you for allowing us to participate in the care of this patient. Please do not hesitate to call if you have any questions.      We will continue to follow with you and will make further recommendations based on his progress.     Jude Rollins MD   [facility]  [phone]  5/7/2025 5/8 ID:    Subjective:  ROS reviewed. Pain controlled. Feels like toe looks less red but leg still red     ASSESSMENT:     Antibiotics: IV vancomycin, cefepime     # L 2nd toe cellulitis with proximal tracking up LLE; good DP pulse - cx pending; r/o OM               -Cx GPC     # Left third toe osteomyelitis 10/2024 s/p distal L 3rd toe amp 10/23/24  - Polymicrobial superficial wound culture - Proteus mirabilis, group B strep, Enterococcus faecalis  # IDDM with peripheral neuropathy and CKD  # PCN allergy - childhood        PLAN:  -  Continue on IV vancomycin, cefepime. FU MRI and podiatry recommendations.  -  BG control.  -  Follow fever curve, wbc.  -  Reviewed labs, micro, imaging reports, available old records.  -  Case d/w patient, RN.     History of Present Illness:  65 yoM with a h/o IDDM with peripheral neuropathy, HTN, HLD, CAD who was last seen 10/2024 for L 3rd  distal toe OM with swab cx showing P. Mirabilis, GBS, E. Faecalis. Was taken to OR 10/23/24 for L distal 3rd toe partial amp. Margin negative. Discharged on bactrim. Has been following with Podiatry outpatient since then. Had recent MRI L foot w/o contrast 4/21/25 showing intramuscular edema w/o OM. Patient was maintained off work since it involves standing all day. No abx at home. No f/c. Now seen in clinic and noted to have increased L 2nd toe erythema, warmth, swelling, pain with proximal tracking w/o f/c. Repeat MRI pending. IV vancomycin and cefepime started.     Physical Exam:  /64 (BP Location: Right arm)   Pulse 72   Temp 98 °F (36.7 °C) (Oral)   Resp 18   Ht 6' 2\" (1.88 m)   Wt (!) 310 lb 3.2 oz (140.7 kg)   SpO2 93%   BMI 39.83 kg/m²      Gen:                   Awake, in bed  HEENT:             EOMI, neck supple  CV/lungs:           RRR, CTAB  Abdom:              Soft, no TTP  Skin/extrem:      L leg with warmth, erythema, second toe with edema  Lines:                 PIV+     Laboratory Data: Reviewed     Microbiology: Reviewed     Radiology: Reviewed        [facility]  [phone]  5/8/2025     MRI 5/8:          MEDICATIONS ADMINISTERED IN LAST 1 DAY:  ceFEPIme (Maxipime) 1 g in sodium chloride 0.9% 100mL IVPB-RAMON       Date Action Dose Route User    5/8/2025 0520 New Bag 1 g Intravenous Terri Landis RN    5/7/2025 2030 New Bag 1 g Intravenous Terri Landis RN          ceFEPIme (Maxipime) 2 g in sodium chloride 0.9% 100mL IVPB-RAMON       Date Action Dose Route User    5/7/2025 1246 New Bag 2 g Intravenous Abbi Walton RN          DULoxetine (Cymbalta) DR cap 30 mg       Date Action Dose Route User    5/8/2025 0905 Given 30 mg Oral Rashmi Shankar RN          finasteride (Proscar) tab 5 mg       Date Action Dose Route User    5/8/2025 0905 Given 5 mg Oral Chakkalakel, Rashmi, RN          gadoterate meglumine (Dotarem) 10  MMOL/20ML injection 20 mL       Date Action Dose Route User    5/8/2025 0138 Given 20 mL Intravenous Ankush Hardenn          heparin (Porcine) 5000 UNIT/ML injection 5,000 Units       Date Action Dose Route User    5/8/2025 0910 Given 5,000 Units Subcutaneous (Right Lower Abdomen) Rashmi Shankar RN    5/7/2025 2029 Given 5,000 Units Subcutaneous (Right Lower Arm) Terri Landis RN          levothyroxine (Synthroid) tab 125 mcg       Date Action Dose Route User    5/8/2025 0520 Given 125 mcg Oral Treri Landis RN          lisinopril (Prinivil; Zestril) tab 5 mg       Date Action Dose Route User    5/8/2025 0905 Given 5 mg Oral Rashmi Shankar RN          metFORMIN (Glucophage) tab 1,000 mg       Date Action Dose Route User    5/8/2025 0905 Given 1,000 mg Oral Rashmi Shankar RN    5/7/2025 1617 Given 1,000 mg Oral Victoriano Banerjee RN          metoprolol tartrate (Lopressor) tab 25 mg       Date Action Dose Route User    5/8/2025 0904 Given 25 mg Oral Rashmi Shankar RN    5/7/2025 2030 Given 25 mg Oral Terri Landis RN          pantoprazole (Protonix) DR tab 40 mg       Date Action Dose Route User    5/8/2025 0520 Given 40 mg Oral Terri Landis RN          pregabalin (Lyrica) cap 100 mg       Date Action Dose Route User    5/8/2025 0904 Given 100 mg Oral Rashmi Shankar RN    5/7/2025 2030 Given 100 mg Oral Terri Landis RN    5/7/2025 1617 Given 100 mg Oral Victoriano Banerjee RN          rosuvastatin (Crestor) tab 40 mg       Date Action Dose Route User    5/7/2025 2030 Given 40 mg Oral Terri Landis RN          tamsulosin (Flomax) cap 0.8 mg       Date Action Dose Route User    5/8/2025 0905 Given 0.8 mg Oral Rashmi Shankar RN          vancomycin (Vancocin) 1.5 g in sodium chloride 0.9% 250mL IVPB premix       Date Action Dose Route User    5/7/2025 1413 New Bag 1,500 mg Intravenous Abbi Walton, RN            Vitals (last day)        Date/Time Temp Pulse Resp BP SpO2 Weight O2 Device O2 Flow Rate (L/min) Who    05/08/25 0728 98 °F (36.7 °C) 72 18 124/64 93 % -- None (Room air) -- LF    05/08/25 0519 98.3 °F (36.8 °C) 70 16 121/60 91 % -- CPAP -- AS    05/07/25 2026 98.3 °F (36.8 °C) 69 16 104/55 93 % -- CPAP -- AS    05/07/25 1700 -- 73 -- -- -- -- -- -- IG    05/07/25 1606 -- 81 -- -- -- -- -- -- AM    05/07/25 1453 98.4 °F (36.9 °C) 66 16 119/52 93 % 310 lb 3.2 oz (140.7 kg) None (Room air) -- CL    05/07/25 1135 99.5 °F (37.5 °C) 90 20 156/67 94 % 305 lb (138.3 kg) None (Room air) -- SL

## 2025-05-08 NOTE — PLAN OF CARE
Problem: Diabetes/Glucose Control  Goal: Glucose maintained within prescribed range  Description: INTERVENTIONS:- Monitor Blood Glucose as ordered- Assess for signs and symptoms of hyperglycemia and hypoglycemia- Administer ordered medications to maintain glucose within target range- Assess barriers to adequate nutritional intake and initiate nutrition consult as needed- Instruct patient on self management of diabetes  Outcome: Progressing     Problem: PAIN - ADULT  Goal: Verbalizes/displays adequate comfort level or patient's stated pain goal  Description: INTERVENTIONS:- Encourage pt to monitor pain and request assistance- Assess pain using appropriate pain scale- Administer analgesics based on type and severity of pain and evaluate response- Implement non-pharmacological measures as appropriate and evaluate response- Consider cultural and social influences on pain and pain management- Manage/alleviate anxiety- Utilize distraction and/or relaxation techniques- Monitor for opioid side effects- Notify MD/LIP if interventions unsuccessful or patient reports new pain- Anticipate increased pain with activity and pre-medicate as appropriate  Outcome: Progressing     Problem: RISK FOR INFECTION - ADULT  Goal: Absence of fever/infection during anticipated neutropenic period  Description: INTERVENTIONS- Monitor WBC- Administer growth factors as ordered- Implement neutropenic guidelines  Outcome: Progressing     Problem: SAFETY ADULT - FALL  Goal: Free from fall injury  Description: INTERVENTIONS:- Assess pt frequently for physical needs- Identify cognitive and physical deficits and behaviors that affect risk of falls.- [place] fall precautions as indicated by assessment.- Educate pt/family on patient safety including physical limitations- Instruct pt to call for assistance with activity based on assessment- Modify environment to reduce risk of injury- Provide assistive devices as appropriate- Consider OT/PT consult to  assist with strengthening/mobility- Encourage toileting schedule  Outcome: Progressing     Problem: DISCHARGE PLANNING  Goal: Discharge to home or other facility with appropriate resources  Description: INTERVENTIONS:- Identify barriers to discharge w/pt and caregiver- Include patient/family/discharge partner in discharge planning- Arrange for needed discharge resources and transportation as appropriate- Identify discharge learning needs (meds, wound care, etc)- Arrange for interpreters to assist at discharge as needed- Consider post-discharge preferences of patient/family/discharge partner- Complete POLST form as appropriate- Assess patient's ability to be responsible for managing their own health- Refer to Case Management Department for coordinating discharge planning if the patient needs post-hospital services based on physician/LIP order or complex needs related to functional status, cognitive ability or social support system  Outcome: Progressing   Pt alert and awake, up to bath room with assist, c/o burning pain on his Lt foot , iv antibiotic continuing as ordered. Wound care person came and checked and dry dressing change daily ordered. No new complaints and not in any kind of distress. Blood sugar well controlled at this time. Call light with in reach and instructed to call for assistance.

## 2025-05-09 NOTE — TELEPHONE ENCOUNTER
Patient return call, long term disability form required for multiple providers.  Patient requesting long term disability from Dr Behar for Primary osteoarthritis of Rt knee, patellofemoral pain syndrome, limited mobility.  Provider tasked.    Release of Information completed for RD Timmons at [phone].  Confirmed with patient that completed form should be faxed to HR, not [employer].

## 2025-05-09 NOTE — PLAN OF CARE
Patient alert x4. C/o toe & leg pain but declining pain medication. Denies nausea. Tolerating diet, accuchecks ACHS. Voiding freely. Tele box in place. IV antibiotics continued. Safety precautions in place and call light within reach. Plan for surgery tomorrow.    Problem: Patient Centered Care  Goal: Patient preferences are identified and integrated in the patient's plan of care  Description: Interventions:- What would you like us to know as we care for you? - Provide timely, complete, and accurate information to patient/family- Incorporate patient and family knowledge, values, beliefs, and cultural backgrounds into the planning and delivery of care- Encourage patient/family to participate in care and decision-making at the level they choose- Honor patient and family perspectives and choices  Outcome: Progressing     Problem: Diabetes/Glucose Control  Goal: Glucose maintained within prescribed range  Description: INTERVENTIONS:- Monitor Blood Glucose as ordered- Assess for signs and symptoms of hyperglycemia and hypoglycemia- Administer ordered medications to maintain glucose within target range- Assess barriers to adequate nutritional intake and initiate nutrition consult as needed- Instruct patient on self management of diabetes  Outcome: Progressing     Problem: PAIN - ADULT  Goal: Verbalizes/displays adequate comfort level or patient's stated pain goal  Description: INTERVENTIONS:- Encourage pt to monitor pain and request assistance- Assess pain using appropriate pain scale- Administer analgesics based on type and severity of pain and evaluate response- Implement non-pharmacological measures as appropriate and evaluate response- Consider cultural and social influences on pain and pain management- Manage/alleviate anxiety- Utilize distraction and/or relaxation techniques- Monitor for opioid side effects- Notify MD/LIP if interventions unsuccessful or patient reports new pain- Anticipate increased pain with  activity and pre-medicate as appropriate  Outcome: Progressing     Problem: RISK FOR INFECTION - ADULT  Goal: Absence of fever/infection during anticipated neutropenic period  Description: INTERVENTIONS- Monitor WBC- Administer growth factors as ordered- Implement neutropenic guidelines  Outcome: Progressing     Problem: SAFETY ADULT - FALL  Goal: Free from fall injury  Description: INTERVENTIONS:- Assess pt frequently for physical needs- Identify cognitive and physical deficits and behaviors that affect risk of falls.- [place] fall precautions as indicated by assessment.- Educate pt/family on patient safety including physical limitations- Instruct pt to call for assistance with activity based on assessment- Modify environment to reduce risk of injury- Provide assistive devices as appropriate- Consider OT/PT consult to assist with strengthening/mobility- Encourage toileting schedule  Outcome: Progressing     Problem: DISCHARGE PLANNING  Goal: Discharge to home or other facility with appropriate resources  Description: INTERVENTIONS:- Identify barriers to discharge w/pt and caregiver- Include patient/family/discharge partner in discharge planning- Arrange for needed discharge resources and transportation as appropriate- Identify discharge learning needs (meds, wound care, etc)- Arrange for interpreters to assist at discharge as needed- Consider post-discharge preferences of patient/family/discharge partner- Complete POLST form as appropriate- Assess patient's ability to be responsible for managing their own health- Refer to Case Management Department for coordinating discharge planning if the patient needs post-hospital services based on physician/LIP order or complex needs related to functional status, cognitive ability or social support system  Outcome: Progressing

## 2025-05-09 NOTE — CONSULTS
[facility]  part of [facility]    Report of Consultation    Mason Puri Patient Status:  Inpatient    1960 MRN [id number]   Location [facility] 4W/SW/SE Attending Preeti Sellers MD   Hosp Day # 2 PCP Alvino Wallace MD     Date of Admission:  2025  Date of Consult:  25    Consulted by Dr. Cherelle Constantino    Reason for Consultation:    Osteomyelitis right 2nd digit    History of Present Illness:  Mason Puri is a a(n) 65 year old male with PMH of CAD, DM, HTN, HLD, CKD, neuropathy and history of left 3rd amputation. Pt admits to a progressively worsening of left 3rd digit wound.  After in clinic podiatry evaluation by Dr. Woodard on 25 pt was prompted to report to the ED for and MRI of left foot and IV abx.     History:  Past Medical History[1]  Past Surgical History[2]  Family History[3]   reports that he quit smoking about 29 years ago. His smoking use included cigarettes. He has been exposed to tobacco smoke. He has never used smokeless tobacco. He reports that he does not drink alcohol and does not use drugs.    Allergies:  Allergies[4]    Medications:  Current Hospital Medications[5]    Review of Systems:    Comprehensive ROS reviewed and negative except for what's stated above.  Including negative for chest pain, shortness of breath, syncope.   Physical Exam:    Derm: flexion contracture of left 2nd digit with wound to distal tuft measuring 0.8 x 0.5 x 0.5 cm with probing to distal tuft and HPK tissue. Erythema to left 2nd digit.    Vasc: Palpable DP/PT of LLE    Neuro: absent protective sensation to digits    MSK: no pain with palpation to left 2nd digit. Partial amputation to left 3rd digit.       Imaging:  MRI FOOT (W+WO), LEFT (CPT=73720)  Result Date: 2025  CONCLUSION:   Soft tissue and skin ulceration within the 2nd digit.  Mild early osteomyelitis of the 2nd digit distal phalanx which is new since 2025.  Mild forefoot  osteoarthritis.  Dorsal foot and 2nd digit cellulitis.  No abscess.    Dictated by (CST): Cam Anguiano MD on 5/08/2025 at 12:57 PM     Finalized by (CST): Cam Anguiano MD on 5/08/2025 at 1:00 PM          US VENOUS DOPPLER LEG LEFT - DIAG IMG (CPT=93971)  Result Date: 5/7/2025  CONCLUSION:  1. Left greater saphenous vein superficial thrombus.  Otherwise no left lower extremity DVT.    Dictated by (CST): Ismael Hogue MD on 5/07/2025 at 1:13 PM     Finalized by (CST): Ismael Hogue MD on 5/07/2025 at 1:14 PM             Laboratory Data:  Recent Labs   Lab 05/09/25  0643   RBC 4.03*   HGB 11.6*   HCT 37.6*   MCV 93.3   MCH 28.8   MCHC 30.9*   RDW 14.6   NEPRELIM 3.78   WBC 6.8   .0       Impression and Plan:  Problem List[6]    Pt seen and examined. Findings discussed with pt.  MRI positive for OM of distal phalanx of left 2nd digit  Discussed both conservative and surgical treatment options in detail. Discussed IV abx with wound care vs partial left second digit amputation. Given the flexion contracture of the 2nd digit it will be hard to heal the wound with no skin breakdown. No guarantees were given or implied. Benefits and risk of both discussed. Pt wishes to proceed with partial left 2nd digit amputation.  Continue IV abx per ID recs.  Plan for tomorrow AM for partial left 2nd digit amputation.  Npo at midnight.      The patient has been advised of the approximate disability involved for these procedures. In addition, the patient has been advised as to the alternatives of care, including continued conservative care as well as surgical procedures. The patient has failed all conservative treatment at this point. The patient understands that if surgical procedures are performed, there are risks and complications that could occur, including but not limited to: hematoma formation, damage to the nervous system, seroma formation, development of a DVT or phlebitis, infection, painful scar tissue formation,  stiffness, limited motion, delayed-union, non-union, mal-union, impaired healing, increased chance of swelling, swelling, reaction to implanted biomaterials, over-correction, under-correction with recurrence of the deformities, continued pain, loss of limb, loss of life, and the possibility that future surgery may need to be performed. The patient was given the opportunity to ask questions which were answered. The patient voiced no concerns and will consider all these options. Patient desires surgical intervention. No guarantees were given or implied. Pt consented freely to surgical intervention.          Thank you for allowing me to participate in the care of your patient.    Lurdes Baxter DPM   5/9/2025  2:53 PM         [1]   Past Medical History:   Benign head tremor    BPH (benign prostatic hyperplasia)    Diabetes (HCC)    Disorder of thyroid    Diverticulitis    Former smoker    High blood pressure    High cholesterol    Hyperlipidemia    Hypertension    Neuropathic pain    Non-pressure chronic ulcer of right lower leg, limited to breakdown of skin (HCC)    Obesity    Sleep apnea    Snoring    Stroke (HCC)    Thyroid disease   [2]   Past Surgical History:  Procedure Laterality Date    Carotid endarterectomy Right 04/29/2021    Surgeon: [facility]. Rajiv [facility]    Neck/chest procedure unlisted      per patient, a blockage was removed from the right side of his neck in 8/2021    Other surgical history  2017    Small bowel Res.    Other surgical history  11/12/2019    Colectomy    Pain management N/A 11/15/2024    Dr.Behar; Right knee Genicular Radiofrequency Neurotomy   [3]   Family History  Problem Relation Age of Onset    Cancer Father         Prostate    Heart Disorder Father     Cancer Mother    [4]   Allergies  Allergen Reactions    Empagliflozin OTHER (SEE COMMENTS)     Frequent urination, unbearable.    Penicillins UNKNOWN     Patient does not recall reaction    Other UNKNOWN   [5]   Current  Facility-Administered Medications:     ceFAZolin (Ancef) 2g in 10mL IV syringe premix, 2 g, Intravenous, Q8H    glucose (Dex4) 15 GM/59ML oral liquid 15 g, 15 g, Oral, Q15 Min PRN **OR** glucose (Glutose) 40% oral gel 15 g, 15 g, Oral, Q15 Min PRN **OR** glucose-vitamin C (Dex-4) chewable tab 4 tablet, 4 tablet, Oral, Q15 Min PRN **OR** dextrose 50% injection 50 mL, 50 mL, Intravenous, Q15 Min PRN **OR** glucose (Dex4) 15 GM/59ML oral liquid 30 g, 30 g, Oral, Q15 Min PRN **OR** glucose (Glutose) 40% oral gel 30 g, 30 g, Oral, Q15 Min PRN **OR** glucose-vitamin C (Dex-4) chewable tab 8 tablet, 8 tablet, Oral, Q15 Min PRN    insulin aspart (NovoLOG) 100 Units/mL FlexPen 1-7 Units, 1-7 Units, Subcutaneous, TID CC and HS    heparin (Porcine) 5000 UNIT/ML injection 5,000 Units, 5,000 Units, Subcutaneous, 2 times per day    acetaminophen (Tylenol Extra Strength) tab 500 mg, 500 mg, Oral, Q4H PRN    ondansetron (Zofran) 4 MG/2ML injection 4 mg, 4 mg, Intravenous, Q6H PRN    prochlorperazine (Compazine) 10 MG/2ML injection 5 mg, 5 mg, Intravenous, Q8H PRN    temazepam (Restoril) cap 15 mg, 15 mg, Oral, Nightly PRN    HYDROcodone-acetaminophen (Norco) 5-325 MG per tab 1 tablet, 1 tablet, Oral, Q6H PRN    DULoxetine (Cymbalta) DR cap 30 mg, 30 mg, Oral, Daily    finasteride (Proscar) tab 5 mg, 5 mg, Oral, Daily    levothyroxine (Synthroid) tab 125 mcg, 125 mcg, Oral, Before breakfast    lisinopril (Prinivil; Zestril) tab 5 mg, 5 mg, Oral, Daily    metFORMIN (Glucophage) tab 1,000 mg, 1,000 mg, Oral, BID with meals    metoprolol tartrate (Lopressor) tab 25 mg, 25 mg, Oral, BID    pantoprazole (Protonix) DR tab 40 mg, 40 mg, Oral, QAM AC    pregabalin (Lyrica) cap 100 mg, 100 mg, Oral, TID    rosuvastatin (Crestor) tab 40 mg, 40 mg, Oral, Nightly    tamsulosin (Flomax) cap 0.8 mg, 0.8 mg, Oral, Daily  [6]   Patient Active Problem List  Diagnosis    Class 2 severe obesity due to excess calories with serious comorbidity and body  mass index (BMI) of 38.0 to 38.9 in adult ([facility])    Hypertension associated with type 2 diabetes mellitus ([facility])    Hyperlipidemia associated with type 2 diabetes mellitus ([facility])    Type 2 diabetes mellitus with diabetic peripheral angiopathy without gangrene, without long-term current use of insulin ([facility])    Hypothyroidism    History of right-sided carotid endarterectomy    Detrusor overactivity    Primary osteoarthritis of right knee    Gastroesophageal reflux disease with esophagitis    Varicose veins of left lower extremity with inflammation, with ulcer of ankle limited to breakdown of skin ([facility])    Patellofemoral arthritis    Venous stasis dermatitis of both lower extremities    Major depressive disorder    BPH with obstruction/lower urinary tract symptoms    Transient ischemic attack (TIA)    Left carotid artery stenosis    Cerebellar infarct ([facility])    Abnormal Holter monitor finding    S/P drug eluting coronary stent placement    Neurogenic claudication    JUAN A (obstructive sleep apnea)    Osteomyelitis of third toe of left foot ([facility])    PVC (premature ventricular contraction)    Chronic osteomyelitis of foot ([facility])    Diabetic foot infection ([facility])    Cellulitis of left foot

## 2025-05-09 NOTE — PROGRESS NOTES
INFECTIOUS DISEASE PROGRESS NOTE  [facility] [place]  part of [facility] ID PROGRESS NOTE    Mason Puri Patient Status:  Inpatient    1960 MRN [id number]   Location [facility] 4W/SW/SE Attending Zhanna oHgue MD   Hosp Day # 2 PCP Alvino Wallace MD     Subjective:  ROS reviewed. Pain controlled.    ASSESSMENT:    Antibiotics: Ancef  IV vancomycin, cefepime     # L 2nd toe early osteomyelitis seen on MRI    -Cx GBS     # Left third toe osteomyelitis 10/2024 s/p distal L 3rd toe amp 10/23/24  - Polymicrobial superficial wound culture - Proteus mirabilis, group B strep, Enterococcus faecalis  # IDDM with peripheral neuropathy and CKD  # PCN allergy - childhood        PLAN:  -  Stop cefepime and start ancef. Awaiting podiatry recommendations.  -  BG control.  -  Follow fever curve, wbc.  -  Reviewed labs, micro, imaging reports, available old records.  -  Case d/w patient, RN.     History of Present Illness:  65 yoM with a h/o IDDM with peripheral neuropathy, HTN, HLD, CAD who was last seen 10/2024 for L 3rd distal toe OM with swab cx showing P. Mirabilis, GBS, E. Faecalis. Was taken to OR 10/23/24 for L distal 3rd toe partial amp. Margin negative. Discharged on bactrim. Has been following with Podiatry outpatient since then. Had recent MRI L foot w/o contrast 25 showing intramuscular edema w/o OM. Patient was maintained off work since it involves standing all day. No abx at home. No f/c. Now seen in clinic and noted to have increased L 2nd toe erythema, warmth, swelling, pain with proximal tracking w/o f/c. Repeat MRI pending. IV vancomycin and cefepime started.    Physical Exam:  /71 (BP Location: Right arm)   Pulse 77   Temp 97.6 °F (36.4 °C) (Oral)   Resp 16   Ht 6' 2\" (1.88 m)   Wt (!) 310 lb 3.2 oz (140.7 kg)   SpO2 93%   BMI 39.83 kg/m²     Gen:   Awake, in chair  HEENT:  EOMI, neck supple  CV/lungs:  Regular rate and rhythm, CTAB  Abdom:   Soft, no TTP  Skin/extrem:  L leg with improved erythema, second toe with small dry ulcer  Lines:  PIV+    Laboratory Data: Reviewed    Microbiology: Reviewed    Radiology: Reviewed      [facility]  [phone]  5/9/2025

## 2025-05-09 NOTE — PLAN OF CARE
Pt alert and oriented x4 on room air with CPAP overnight. Dressing in place to L 2nd toe. Remote tele, no calls this shift. Waiting for podiatry eval. IV abx given as ordered. Call light within reach.    Problem: Patient Centered Care  Goal: Patient preferences are identified and integrated in the patient's plan of care  Description: Interventions:- What would you like us to know as we care for you? - Provide timely, complete, and accurate information to patient/family- Incorporate patient and family knowledge, values, beliefs, and cultural backgrounds into the planning and delivery of care- Encourage patient/family to participate in care and decision-making at the level they choose- Honor patient and family perspectives and choices  Outcome: Progressing     Problem: Diabetes/Glucose Control  Goal: Glucose maintained within prescribed range  Description: INTERVENTIONS:- Monitor Blood Glucose as ordered- Assess for signs and symptoms of hyperglycemia and hypoglycemia- Administer ordered medications to maintain glucose within target range- Assess barriers to adequate nutritional intake and initiate nutrition consult as needed- Instruct patient on self management of diabetes  Outcome: Progressing     Problem: Patient/Family Goals  Goal: Patient/Family Long Term Goal  Description: Patient's Long Term Goal: to be free of infection Interventions:- iv antibiotics administered- See additional Care Plan goals for specific interventions  Outcome: Progressing  Goal: Patient/Family Short Term Goal  Description: Patient's Short Term Goal: to have a clear plan for after the hospital stay. Interventions: - case management- See additional Care Plan goals for specific interventions  Outcome: Progressing     Problem: PAIN - ADULT  Goal: Verbalizes/displays adequate comfort level or patient's stated pain goal  Description: INTERVENTIONS:- Encourage pt to monitor pain and request assistance- Assess pain using appropriate pain scale-  Administer analgesics based on type and severity of pain and evaluate response- Implement non-pharmacological measures as appropriate and evaluate response- Consider cultural and social influences on pain and pain management- Manage/alleviate anxiety- Utilize distraction and/or relaxation techniques- Monitor for opioid side effects- Notify MD/LIP if interventions unsuccessful or patient reports new pain- Anticipate increased pain with activity and pre-medicate as appropriate  Outcome: Progressing     Problem: RISK FOR INFECTION - ADULT  Goal: Absence of fever/infection during anticipated neutropenic period  Description: INTERVENTIONS- Monitor WBC- Administer growth factors as ordered- Implement neutropenic guidelines  Outcome: Progressing     Problem: SAFETY ADULT - FALL  Goal: Free from fall injury  Description: INTERVENTIONS:- Assess pt frequently for physical needs- Identify cognitive and physical deficits and behaviors that affect risk of falls.- Allen fall precautions as indicated by assessment.- Educate pt/family on patient safety including physical limitations- Instruct pt to call for assistance with activity based on assessment- Modify environment to reduce risk of injury- Provide assistive devices as appropriate- Consider OT/PT consult to assist with strengthening/mobility- Encourage toileting schedule  Outcome: Progressing     Problem: DISCHARGE PLANNING  Goal: Discharge to home or other facility with appropriate resources  Description: INTERVENTIONS:- Identify barriers to discharge w/pt and caregiver- Include patient/family/discharge partner in discharge planning- Arrange for needed discharge resources and transportation as appropriate- Identify discharge learning needs (meds, wound care, etc)- Arrange for interpreters to assist at discharge as needed- Consider post-discharge preferences of patient/family/discharge partner- Complete POLST form as appropriate- Assess patient's ability to be responsible  for managing their own health- Refer to Case Management Department for coordinating discharge planning if the patient needs post-hospital services based on physician/LIP order or complex needs related to functional status, cognitive ability or social support system  Outcome: Progressing

## 2025-05-09 NOTE — TELEPHONE ENCOUNTER
Dr Behar,    Patient is requesting long term disability due to Primary osteoarthritis of Rt knee, Patellofemoral pain syndrome, limited mobility.    Do you support?    Thank you for your time,      Claudine

## 2025-05-09 NOTE — TELEPHONE ENCOUNTER
Received disability 1 of 2 via email 5/5/25 and disability 2 of 2 on 5/7/25 via email, invalid authorization, Stop Being Watched message sent for release of information, logged for processing.

## 2025-05-09 NOTE — TELEPHONE ENCOUNTER
Called patient, left message to give us a call back with details regarding Long Term Disability forms. Need to confirm which provider will be completing the forms. Please task provider if needed.

## 2025-05-09 NOTE — PROGRESS NOTES
Progress Note     Mason Puri Patient Status:  Inpatient    1960 MRN [id number]   Location [facility] 4W/SW/SE Attending Preeti Sellers MD   Hosp Day # 2 PCP Alvino Wallace MD     Subjective:   S: Seen this morning, denied acute complaints, just hungry waiting for breakfast.     Review of Systems:   10 point ROS completed and was negative, except for pertinent positive and negatives stated in subjective.    Objective:   Vital signs:  Temp:  [97.5 °F (36.4 °C)-98.1 °F (36.7 °C)] 97.6 °F (36.4 °C)  Pulse:  [70-77] 77  Resp:  [16-17] 16  BP: ()/(64-71) 112/71  SpO2:  [91 %-94 %] 93 %    Wt Readings from Last 6 Encounters:   25 (!) 310 lb 3.2 oz (140.7 kg)   25 (!) 308 lb (139.7 kg)   25 300 lb (136.1 kg)   25 (!) 303 lb 6.4 oz (137.6 kg)   25 (!) 313 lb (142 kg)   24 (!) 305 lb (138.3 kg)         Physical Exam:    General: No acute distress. Alert ,         Respiratory: Clear to auscultation bilaterally. No wheezes. No rhonchi.  Cardiovascular: S1, S2. Regular rate and rhythm. No murmurs, rubs or gallops.   Abdomen: Soft, nontender, nondistended.  Positive bowel sounds. No rebound or guarding.  Neurologic: No focal neurological deficits.   Musculoskeletal: Moves all extremities. Left foot 2nd toe dressed.   Extremities: No edema.    Results:   Diagnostic Data:      Labs:    Labs Last 24 Hours:   BMP     CBC    Other     Na 141 Cl 106 BUN 17 Glu 101   Hb 11.6   PTT - Procal -   K 4.1 CO2 29.0 Cr 1.32   WBC 6.8 >< .0  INR - CRP -   Renal Lytes Endo    Hct 37.6   Trop - D dim -   eGFR - Ca 8.8 POC Gluc  98    LFT   pBNP - Lactic -   eGFR AA - PO4 - A1c -   AST - APk - Prot -  LDL -     Mg - TSH -   ALT - T jessica - Alb -        COVID-19 Lab Results    COVID-19  Lab Results   Component Value Date    COVID19 Not Detected 2025    COVID19 Not Detected 2024    COVID19 Not Detected 10/01/2023       Pro-Calcitonin  No results for input(s):  \"PCT\" in the last 168 hours.    Cardiac  No results for input(s): \"TROP\", \"PBNP\" in the last 168 hours.    Creatinine Kinase  No results for input(s): \"CK\" in the last 168 hours.    Inflammatory Markers  Recent Labs   Lab 05/07/25  1235   CRP 7.10*       Imaging: Imaging data reviewed in Epic.    Medications: Scheduled Medications[1]    Assessment & Plan:   ASSESSMENT / PLAN:     Left second toe and leg cellulitis, early osteomyelitis   - presented with low-grade fever and leukocytosis,   - ID consulted  - wound culture +proteus mirabilis, GBS, Enterococcus faecalis   - started on IV vanco, cefepime --> now Ancef   - MRI revealed early osteomyelitis of the 2nd digit distal phalanx   - Podiatry consulted, awaiting surgical recs   - Venous doppler neg  - Pain control  - Hold ASA, Brilinta for possible surgery       Diabetes mellitus type 2.  Cont SSI  A1c 7.3       Chronic kidney disease stage 3.  Cr 1.4 on admission   At baseline  Avoid nephrotoxins       Hypertension.  Controlled  Cont lisinopril     FULL CODE  DVT px: hold AC for possible surgery         MDM: High   I personally spent time on chart/note review, review of labs/imaging, discussion with patient, physical exam, discussion with staff, consultants, coordinating care, writing progress note, and discussion of plan of care.     Preeti Sellers MD    Supplementary Documentation:                       [1]    ceFAZolin  2 g Intravenous Q8H    insulin aspart  1-7 Units Subcutaneous TID CC and HS    heparin  5,000 Units Subcutaneous 2 times per day    DULoxetine  30 mg Oral Daily    finasteride  5 mg Oral Daily    levothyroxine  125 mcg Oral Before breakfast    lisinopril  5 mg Oral Daily    metFORMIN HCl  1,000 mg Oral BID with meals    metoprolol tartrate  25 mg Oral BID    pantoprazole  40 mg Oral QAM AC    pregabalin  100 mg Oral TID    rosuvastatin  40 mg Oral Nightly    tamsulosin  0.8 mg Oral Daily

## 2025-05-10 NOTE — ANESTHESIA POSTPROCEDURE EVALUATION
Patient: Mason Puri    Procedure Summary       Date: 05/10/25 Room / Location: [facility] MAIN OR 04 / [facility] MAIN OR    Anesthesia Start: 1009 Anesthesia Stop: 1051    Procedure: LEFT 2ND DIGIT PARTIAL TOE AMPUTATION (Left: Foot) Diagnosis: (Oestomylitis Left 2nd Toe)    Surgeons: Lurdes Baxter DPM Anesthesiologist: Marychuy Spain MD    Anesthesia Type: MAC ASA Status: 3            Anesthesia Type: MAC    Vitals Value Taken Time   /66 05/10/25 11:12   Temp 97.8 °F (36.6 °C) 05/10/25 10:52   Pulse 65 05/10/25 11:16   Resp 14 05/10/25 11:16   SpO2 93 % 05/10/25 11:16   Vitals shown include unfiled device data.    [facility] AN Post Evaluation:   Patient Evaluated in PACU  Patient Participation: complete - patient participated  Level of Consciousness: awake  Pain Score: 0  Pain Management: adequate  Airway Patency:patent  Dental exam unchanged from preop  Yes    Cardiovascular Status: acceptable  Respiratory Status: acceptable  Postoperative Hydration acceptable      Marychuy Spain MD  5/10/2025 11:17 AM

## 2025-05-10 NOTE — BRIEF OP NOTE
Pre-Operative Diagnosis: Oestomylitis Left 2nd Toe     Post-Operative Diagnosis: Oestomylitis Left 2nd Toe      Procedure Performed:   LEFT 2ND DIGIT PARTIAL TOE AMPUTATION    Surgeons and Role:     * Lurdes Baxter DPM - Primary    Assistant(s):    none     Surgical Findings: consistent with pre-operative findings     Specimen: left second digit  and left second digit clearance fragment     Estimated Blood Loss: 10 ml     Dictation Number:  see nic Baxter DPM  5/10/2025  10:45 AM

## 2025-05-10 NOTE — PLAN OF CARE
Problem: Patient Centered Care  Goal: Patient preferences are identified and integrated in the patient's plan of care  Description: Interventions:- What would you like us to know as we care for you? I have had similar infections before- Provide timely, complete, and accurate information to patient/family- Incorporate patient and family knowledge, values, beliefs, and cultural backgrounds into the planning and delivery of care- Encourage patient/family to participate in care and decision-making at the level they choose- Honor patient and family perspectives and choices  Outcome: Progressing     Problem: Diabetes/Glucose Control  Goal: Glucose maintained within prescribed range  Description: INTERVENTIONS:- Monitor Blood Glucose as ordered- Assess for signs and symptoms of hyperglycemia and hypoglycemia- Administer ordered medications to maintain glucose within target range- Assess barriers to adequate nutritional intake and initiate nutrition consult as needed- Instruct patient on self management of diabetes  Outcome: Progressing     Problem: Patient/Family Goals  Goal: Patient/Family Long Term Goal  Description: Patient's Long Term Goal: to be free of infection Interventions:- iv antibiotics administered- See additional Care Plan goals for specific interventions  Outcome: Progressing  Goal: Patient/Family Short Term Goal  Description: Patient's Short Term Goal: to have a clear plan for after the hospital stay. Interventions: - case management- See additional Care Plan goals for specific interventions  Outcome: Progressing     Problem: PAIN - ADULT  Goal: Verbalizes/displays adequate comfort level or patient's stated pain goal  Description: INTERVENTIONS:- Encourage pt to monitor pain and request assistance- Assess pain using appropriate pain scale- Administer analgesics based on type and severity of pain and evaluate response- Implement non-pharmacological measures as appropriate and evaluate response- Consider  cultural and social influences on pain and pain management- Manage/alleviate anxiety- Utilize distraction and/or relaxation techniques- Monitor for opioid side effects- Notify MD/LIP if interventions unsuccessful or patient reports new pain- Anticipate increased pain with activity and pre-medicate as appropriate  Outcome: Progressing     Problem: RISK FOR INFECTION - ADULT  Goal: Absence of fever/infection during anticipated neutropenic period  Description: INTERVENTIONS- Monitor WBC- Administer growth factors as ordered- Implement neutropenic guidelines  Outcome: Progressing     Problem: SAFETY ADULT - FALL  Goal: Free from fall injury  Description: INTERVENTIONS:- Assess pt frequently for physical needs- Identify cognitive and physical deficits and behaviors that affect risk of falls.- [place] fall precautions as indicated by assessment.- Educate pt/family on patient safety including physical limitations- Instruct pt to call for assistance with activity based on assessment- Modify environment to reduce risk of injury- Provide assistive devices as appropriate- Consider OT/PT consult to assist with strengthening/mobility- Encourage toileting schedule  Outcome: Progressing     Problem: DISCHARGE PLANNING  Goal: Discharge to home or other facility with appropriate resources  Description: INTERVENTIONS:- Identify barriers to discharge w/pt and caregiver- Include patient/family/discharge partner in discharge planning- Arrange for needed discharge resources and transportation as appropriate- Identify discharge learning needs (meds, wound care, etc)- Arrange for interpreters to assist at discharge as needed- Consider post-discharge preferences of patient/family/discharge partner- Complete POLST form as appropriate- Assess patient's ability to be responsible for managing their own health- Refer to Case Management Department for coordinating discharge planning if the patient needs post-hospital services based on  physician/LIP order or complex needs related to functional status, cognitive ability or social support system  Outcome: Progressing   Had surgery today. Voided post-op. Denies pain. Tolerated oral intake.

## 2025-05-10 NOTE — PROGRESS NOTES
Progress Note     Mason Puri Patient Status:  Inpatient    1960 MRN [id number]   Location [facility] 4W/SW/SE Attending Preeti Sellers MD   Hosp Day # 3 PCP Alvino Wallace MD     Subjective:   S: Patient went for left 2nd digit partial toe amputation today.      Review of Systems:   10 point ROS completed and was negative, except for pertinent positive and negatives stated in subjective.    Objective:   Vital signs:  Temp:  [97.5 °F (36.4 °C)-98 °F (36.7 °C)] 97.7 °F (36.5 °C)  Pulse:  [61-79] 70  Resp:  [13-20] 16  BP: (110-142)/(54-75) 127/70  SpO2:  [91 %-99 %] 91 %    Wt Readings from Last 6 Encounters:   25 (!) 310 lb 3.2 oz (140.7 kg)   05/10/25 (!) 310 lb (140.6 kg)   25 (!) 308 lb (139.7 kg)   25 300 lb (136.1 kg)   25 (!) 303 lb 6.4 oz (137.6 kg)   25 (!) 313 lb (142 kg)         Physical Exam:    General: No acute distress. Alert ,         Respiratory: Clear to auscultation bilaterally. No wheezes. No rhonchi.  Cardiovascular: S1, S2. Regular rate and rhythm. No murmurs, rubs or gallops.   Abdomen: Soft, nontender, nondistended.  Positive bowel sounds. No rebound or guarding.  Neurologic: No focal neurological deficits.   Musculoskeletal: Moves all extremities. Left foot 2nd toe dressed.   Extremities: No edema.    Results:   Diagnostic Data:      Labs:    Labs Last 24 Hours:   BMP     CBC    Other     Na 141 Cl 104 BUN 20 Glu 143   Hb 11.8   PTT - Procal -   K 4.1 CO2 31.0 Cr 1.37   WBC 6.6 >< .0  INR - CRP -   Renal Lytes Endo    Hct 37.7   Trop - D dim -   eGFR - Ca 9.2 POC Gluc  104    LFT   pBNP - Lactic -   eGFR AA - PO4 - A1c -   AST - APk - Prot -  LDL -     Mg - TSH -   ALT - T jessica - Alb -        COVID-19 Lab Results    COVID-19  Lab Results   Component Value Date    COVID19 Not Detected 2025    COVID19 Not Detected 2024    COVID19 Not Detected 10/01/2023       Pro-Calcitonin  No results for input(s): \"PCT\" in the last  168 hours.    Cardiac  No results for input(s): \"TROP\", \"PBNP\" in the last 168 hours.    Creatinine Kinase  No results for input(s): \"CK\" in the last 168 hours.    Inflammatory Markers  Recent Labs   Lab 05/07/25  1235   CRP 7.10*       Imaging: Imaging data reviewed in Epic.    Medications: Scheduled Medications[1]    Assessment & Plan:   ASSESSMENT / PLAN:     Left second toe and leg cellulitis, early osteomyelitis   - presented with low-grade fever and leukocytosis,   - ID consulted  - wound culture +proteus mirabilis, GBS, Enterococcus faecalis   - started on IV vanco, cefepime --> now Ancef   - MRI revealed early osteomyelitis of the 2nd digit distal phalanx   - Venous doppler neg  - Podiatry consulted, now s/p left 2nd digit partial toe amputation  - Pain control  - Holding ASA, Brilinta for surgery. Restart        Diabetes mellitus type 2.  Cont SSI  A1c 7.3       Chronic kidney disease stage 3.  Cr 1.4 on admission   At baseline  Avoid nephrotoxins       Hypertension.  Controlled  Cont lisinopril       Dispo: anticipate home, pending clinical course         MDM: High   I personally spent time on chart/note review, review of labs/imaging, discussion with patient, physical exam, discussion with staff, consultants, coordinating care, writing progress note, and discussion of plan of care.     Preeti Sellers MD    Supplementary Documentation:                         [1]    ceFAZolin  2 g Intravenous Q8H    insulin aspart  1-7 Units Subcutaneous TID CC and HS    heparin  5,000 Units Subcutaneous 2 times per day    DULoxetine  30 mg Oral Daily    finasteride  5 mg Oral Daily    levothyroxine  125 mcg Oral Before breakfast    lisinopril  5 mg Oral Daily    metFORMIN HCl  1,000 mg Oral BID with meals    metoprolol tartrate  25 mg Oral BID    pantoprazole  40 mg Oral QAM AC    pregabalin  100 mg Oral TID    rosuvastatin  40 mg Oral Nightly    tamsulosin  0.8 mg Oral Daily

## 2025-05-10 NOTE — PLAN OF CARE
Pt alert and oriented x4 on room air with CPAP overnight. Dressing came off, pt showered and dried the area and declined new dressing due surgery scheduled today. NPO since midnight. Call light within reach.    Problem: Patient Centered Care  Goal: Patient preferences are identified and integrated in the patient's plan of care  Description: Interventions:- What would you like us to know as we care for you? - Provide timely, complete, and accurate information to patient/family- Incorporate patient and family knowledge, values, beliefs, and cultural backgrounds into the planning and delivery of care- Encourage patient/family to participate in care and decision-making at the level they choose- Honor patient and family perspectives and choices  Outcome: Progressing     Problem: Diabetes/Glucose Control  Goal: Glucose maintained within prescribed range  Description: INTERVENTIONS:- Monitor Blood Glucose as ordered- Assess for signs and symptoms of hyperglycemia and hypoglycemia- Administer ordered medications to maintain glucose within target range- Assess barriers to adequate nutritional intake and initiate nutrition consult as needed- Instruct patient on self management of diabetes  Outcome: Progressing     Problem: Patient/Family Goals  Goal: Patient/Family Long Term Goal  Description: Patient's Long Term Goal: to be free of infection Interventions:- iv antibiotics administered- See additional Care Plan goals for specific interventions  Outcome: Progressing  Goal: Patient/Family Short Term Goal  Description: Patient's Short Term Goal: to have a clear plan for after the hospital stay. Interventions: - case management- See additional Care Plan goals for specific interventions  Outcome: Progressing     Problem: PAIN - ADULT  Goal: Verbalizes/displays adequate comfort level or patient's stated pain goal  Description: INTERVENTIONS:- Encourage pt to monitor pain and request assistance- Assess pain using appropriate pain  scale- Administer analgesics based on type and severity of pain and evaluate response- Implement non-pharmacological measures as appropriate and evaluate response- Consider cultural and social influences on pain and pain management- Manage/alleviate anxiety- Utilize distraction and/or relaxation techniques- Monitor for opioid side effects- Notify MD/LIP if interventions unsuccessful or patient reports new pain- Anticipate increased pain with activity and pre-medicate as appropriate  Outcome: Progressing     Problem: RISK FOR INFECTION - ADULT  Goal: Absence of fever/infection during anticipated neutropenic period  Description: INTERVENTIONS- Monitor WBC- Administer growth factors as ordered- Implement neutropenic guidelines  Outcome: Progressing     Problem: SAFETY ADULT - FALL  Goal: Free from fall injury  Description: INTERVENTIONS:- Assess pt frequently for physical needs- Identify cognitive and physical deficits and behaviors that affect risk of falls.- [place] fall precautions as indicated by assessment.- Educate pt/family on patient safety including physical limitations- Instruct pt to call for assistance with activity based on assessment- Modify environment to reduce risk of injury- Provide assistive devices as appropriate- Consider OT/PT consult to assist with strengthening/mobility- Encourage toileting schedule  Outcome: Progressing     Problem: DISCHARGE PLANNING  Goal: Discharge to home or other facility with appropriate resources  Description: INTERVENTIONS:- Identify barriers to discharge w/pt and caregiver- Include patient/family/discharge partner in discharge planning- Arrange for needed discharge resources and transportation as appropriate- Identify discharge learning needs (meds, wound care, etc)- Arrange for interpreters to assist at discharge as needed- Consider post-discharge preferences of patient/family/discharge partner- Complete POLST form as appropriate- Assess patient's ability to be  responsible for managing their own health- Refer to Case Management Department for coordinating discharge planning if the patient needs post-hospital services based on physician/LIP order or complex needs related to functional status, cognitive ability or social support system  Outcome: Progressing

## 2025-05-10 NOTE — ANESTHESIA PREPROCEDURE EVALUATION
Anesthesia PreOp Note    HPI:     Mason Puri is a 65 year old male who presents for preoperative consultation requested by: Lurdes Baxter DPM    Date of Surgery: 5/7/2025 - 5/10/2025    Procedure(s):  TOE AMPUTATION  Indication: Oestomylitis Left 2nd Toe    Relevant Problems   No relevant active problems       NPO:  Last Liquid Consumption Date: 05/09/25  Last Liquid Consumption Time: 2200  Last Solid Consumption Date: 05/09/25  Last Solid Consumption Time: 2200  Last Liquid Consumption Date: 05/09/25          History Review:  Patient Active Problem List    Diagnosis Date Noted    Diabetic foot infection (HCC) 05/07/2025    Cellulitis of left foot 05/07/2025    Chronic osteomyelitis of foot ([facility]) 04/21/2025    PVC (premature ventricular contraction) 12/09/2024    Pyogenic inflammation of bone ([facility]) 10/30/2024    JUAN A (obstructive sleep apnea) 08/14/2024    S/P drug eluting coronary stent placement 12/27/2023    Neurogenic claudication 12/27/2023    Abnormal Holter monitor finding 12/20/2022    Left carotid artery stenosis 10/27/2022    Cerebellar infarct ([facility]) 10/27/2022    Transient ischemic attack (TIA) 10/19/2022    BPH with obstruction/lower urinary tract symptoms 09/20/2022    Major depressive disorder 02/22/2022    Venous stasis dermatitis of both lower extremities 12/07/2021    Patellofemoral arthritis 11/03/2021    Varicose veins of left lower extremity with inflammation, with ulcer of ankle limited to breakdown of skin ([facility]) 08/25/2021    Hypertension associated with type 2 diabetes mellitus ([facility]) 08/10/2021    Hyperlipidemia associated with type 2 diabetes mellitus ([facility]) 08/10/2021    Type 2 diabetes mellitus with diabetic peripheral angiopathy without gangrene, without long-term current use of insulin ([facility]) 08/10/2021    Hypothyroidism 08/10/2021    History of right-sided carotid endarterectomy 08/10/2021    Detrusor overactivity 08/10/2021    Primary osteoarthritis of right knee  08/10/2021    Gastroesophageal reflux disease with esophagitis 08/10/2021    Class 2 severe obesity due to excess calories with serious comorbidity and body mass index (BMI) of 38.0 to 38.9 in adult ([facility]) 12/05/2019       Past Medical History[1]    Past Surgical History[2]    Prescriptions Prior to Admission[3]  Current Medications and Prescriptions Ordered in Epic[4]    Allergies[5]    Family History[6]  Social Hx on file[7]    Available pre-op labs reviewed.  Lab Results   Component Value Date    WBC 6.8 05/09/2025    RBC 4.03 (L) 05/09/2025    HGB 11.6 (L) 05/09/2025    HCT 37.6 (L) 05/09/2025    MCV 93.3 05/09/2025    MCH 28.8 05/09/2025    MCHC 30.9 (L) 05/09/2025    RDW 14.6 05/09/2025    .0 05/09/2025     Lab Results   Component Value Date     05/09/2025    K 4.1 05/09/2025     05/09/2025    CO2 29.0 05/09/2025    BUN 17 05/09/2025    CREATSERUM 1.32 (H) 05/09/2025     (H) 05/09/2025    PGLU 126 (H) 05/10/2025    CA 8.8 05/09/2025          Vital Signs:  Body mass index is 39.83 kg/m².   height is 1.88 m (6' 2\") and weight is 140.7 kg (310 lb 3.2 oz) (abnormal). His axillary temperature is 97.5 °F (36.4 °C). His blood pressure is 142/70 and his pulse is 77. His respiration is 20 and oxygen saturation is 99%.   Vitals:    05/09/25 2103 05/10/25 0300 05/10/25 0515 05/10/25 0700   BP: 123/67  142/70    Pulse: 73 77 79 77   Resp: 18  20    Temp: 97.7 °F (36.5 °C)  97.5 °F (36.4 °C)    TempSrc: Oral  Axillary    SpO2: 93%  99%    Weight:       Height:            Anesthesia Evaluation     Patient summary reviewed    Airway   Mallampati: II  TM distance: >3 FB  Neck ROM: full  Dental    (+) lower dentures and upper dentures    Pulmonary - normal exam   (+) sleep apnea  Cardiovascular - normal exam  (+) hypertension, CAD, CABG/stent    ECG reviewed  ROS comment: Echo:  1. Left ventricle: The cavity size was mildly dilated. Wall      thickness was increased in a pattern of mild LVH. Systolic       function was normal. The estimated ejection fraction was 55-60%,      by visual assessment. Wall motion was normal; there were no      regional wall motion abnormalities. Left ventricular diastolic      function parameters were normal.   2. Left atrium: The atrium was mildly dilated.   3. Right ventricle: The cavity size was dilated. Wall thickness was      normal. Systolic function was mildly reduced.       Neuro/Psych    (+)  TIA, CVA,        GI/Hepatic/Renal    (+) chronic renal disease CRI    Endo/Other    (+) diabetes mellitus, arthritis  Abdominal   (+) obese                 Anesthesia Plan:   ASA:  3  Plan:   MAC  Post-op Pain Management: IV analgesics  Informed Consent Plan and Risks Discussed With:  Patient      I have informed Mason Puri and/or legal guardian or family member of the nature of the anesthetic plan, benefits, risks including possible dental damage if relevant, major complications, and any alternative forms of anesthetic management.   All of the patient's questions were answered to the best of my ability. The patient desires the anesthetic management as planned.  Marychuy Spain MD  5/10/2025 8:20 AM  Present on Admission:   Pyogenic inflammation of bone (HCC)           [1]   Past Medical History:   Benign head tremor    BPH (benign prostatic hyperplasia)    Diabetes (HCC)    Disorder of thyroid    Diverticulitis    Former smoker    High blood pressure    High cholesterol    Hyperlipidemia    Hypertension    Neuropathic pain    Non-pressure chronic ulcer of right lower leg, limited to breakdown of skin (HCC)    Obesity    Sleep apnea    Snoring    Stroke (HCC)    Thyroid disease   [2]   Past Surgical History:  Procedure Laterality Date    Carotid endarterectomy Right 04/29/2021    Surgeon: Dr. Rajiv Sol[facility] at [facility]    Neck/chest procedure unlisted      per patient, a blockage was removed from the right side of his neck in 8/2021    Other surgical history  2017    Small bowel Res.     Other surgical history  11/12/2019    Colectomy    Pain management N/A 11/15/2024    Dr.Behar; Right knee Genicular Radiofrequency Neurotomy   [3]   Medications Prior to Admission   Medication Sig Dispense Refill Last Dose/Taking    glipiZIDE 5 MG Oral Tab Take 1 tablet (5 mg total) by mouth every morning before breakfast.   5/7/2025 Morning    lisinopril 5 MG Oral Tab Take 1 tablet (5 mg total) by mouth daily. 90 tablet 1 5/7/2025 Morning    DULoxetine 30 MG Oral Cap DR Particles Take 1 capsule (30 mg total) by mouth daily. TAKE 1 CAPSULE IN THE MORNING FOR ANXIETY/DEPRESSION/ARTHRITIS PAIN 90 capsule 1 5/7/2025 Morning    finasteride 5 MG Oral Tab Take 1 tablet (5 mg total) by mouth daily. FOR PROSTATE. 90 tablet 1 5/7/2025 Morning    metoprolol tartrate 25 MG Oral Tab Take 1 tablet (25 mg total) by mouth 2 (two) times daily. FOR HEART. 180 tablet 1 5/7/2025 Morning    pantoprazole 40 MG Oral Tab EC Take 1 tablet (40 mg total) by mouth every morning before breakfast. 90 tablet 1 5/7/2025 Morning    rosuvastatin 40 MG Oral Tab Take 1 tablet (40 mg total) by mouth nightly. FOR CHOLESTEROL. 90 tablet 1 5/6/2025 Evening    ticagrelor 90 MG Oral Tab Take 1 tablet (90 mg total) by mouth every 12 (twelve) hours. FOR HEART STENTS. 180 tablet 9 5/7/2025 Morning    Tirzepatide (MOUNJARO) 15 MG/0.5ML Subcutaneous Solution Auto-injector Inject 15 mg into the skin once a week. FOR DIABETES. Continuation of therapy. 6 mL 9 5/7/2025 at  3:02 PM    tamsulosin 0.4 MG Oral Cap Take 2 capsules (0.8 mg total) by mouth daily. FOR PROSTATE. 180 capsule 9 5/7/2025 Morning    pregabalin 100 MG Oral Cap Take 1 capsule (100 mg total) by mouth every 8 (eight) hours. FOR NERVE PAIN. 270 capsule 1 5/7/2025 Morning    LEVOTHYROXINE 125 MCG Oral Tab TAKE 1 TABLET AT BEDTIME (Patient taking differently: Take 1 tablet (125 mcg total) by mouth before breakfast. TAKE AT BEDTIME) 90 tablet 1 5/7/2025 Morning    aspirin (ASPIRIN 81) 81 MG Oral Tab  EC Take 1 tablet (81 mg total) by mouth daily. FOR HEART. 90 tablet 9 5/7/2025 Morning    metFORMIN HCl 1000 MG Oral Tab Take 1 tablet (1,000 mg total) by mouth 2 (two) times daily with meals. 180 tablet 1 5/7/2025 Morning    Multiple Vitamins-Minerals (MULTIVITAMIN MEN 50+) Oral Tab Take 1 tablet by mouth in the morning.   5/7/2025 Morning    albuterol 108 (90 Base) MCG/ACT Inhalation Aero Soln Inhale 2 puffs into the lungs every 4 to 6 hours as needed for Wheezing or Shortness of Breath (cough, asthma, chest tightness). FOR ASTHMA. Pharmacist, please switch to formulary alternative per insurance coverage, ok to replace with proair, ventolin, proventil, or any other albuterol inhaler. -john (Patient not taking: Reported on 5/7/2025) 3 each 9 Not Taking   [4]   Current Facility-Administered Medications Ordered in Epic   Medication Dose Route Frequency Provider Last Rate Last Admin    ceFAZolin (Ancef) 2g in 10mL IV syringe premix  2 g Intravenous Q8H Minerva Arredondo PA-C 120 mL/hr at 05/10/25 0332 2 g at 05/10/25 0332    glucose (Dex4) 15 GM/59ML oral liquid 15 g  15 g Oral Q15 Min PRN Clifford Osorio MD        Or    glucose (Glutose) 40% oral gel 15 g  15 g Oral Q15 Min PRN Clifford Osorio MD        Or    glucose-vitamin C (Dex-4) chewable tab 4 tablet  4 tablet Oral Q15 Min PRN Clifford Osorio MD        Or    dextrose 50% injection 50 mL  50 mL Intravenous Q15 Min PRN Clifford Osorio MD        Or    glucose (Dex4) 15 GM/59ML oral liquid 30 g  30 g Oral Q15 Min PRN Clifford Osorio MD        Or    glucose (Glutose) 40% oral gel 30 g  30 g Oral Q15 Min PRN Clifford Osorio MD        Or    glucose-vitamin C (Dex-4) chewable tab 8 tablet  8 tablet Oral Q15 Min PRN Clifford Osorio MD        insulin aspart (NovoLOG) 100 Units/mL FlexPen 1-7 Units  1-7 Units Subcutaneous TID CC and HS Clifford Osorio MD        heparin (Porcine) 5000 UNIT/ML injection 5,000 Units  5,000 Units Subcutaneous 2 times per day Jo,  MD Clifford   5,000 Units at 05/09/25 2112    acetaminophen (Tylenol Extra Strength) tab 500 mg  500 mg Oral Q4H PRN Clifford Osorio MD        ondansetron (Zofran) 4 MG/2ML injection 4 mg  4 mg Intravenous Q6H PRN Clifford Osorio MD        prochlorperazine (Compazine) 10 MG/2ML injection 5 mg  5 mg Intravenous Q8H PRN Clifford Osorio MD        temazepam (Restoril) cap 15 mg  15 mg Oral Nightly PRN Clifford Osorio MD        HYDROcodone-acetaminophen (Norco) 5-325 MG per tab 1 tablet  1 tablet Oral Q6H PRN Clifford Osorio MD        DULoxetine (Cymbalta)  cap 30 mg  30 mg Oral Daily Clifford Osorio MD   30 mg at 05/09/25 0910    finasteride (Proscar) tab 5 mg  5 mg Oral Daily Clifford Osorio MD   5 mg at 05/10/25 0719    levothyroxine (Synthroid) tab 125 mcg  125 mcg Oral Before breakfast Clifford Osorio MD   125 mcg at 05/10/25 0642    lisinopril (Prinivil; Zestril) tab 5 mg  5 mg Oral Daily Clifford Osorio MD   5 mg at 05/09/25 0910    metFORMIN (Glucophage) tab 1,000 mg  1,000 mg Oral BID with meals Clifford Osorio MD   1,000 mg at 05/09/25 1804    metoprolol tartrate (Lopressor) tab 25 mg  25 mg Oral BID Clifford Osorio MD   25 mg at 05/10/25 0720    pantoprazole (Protonix)  tab 40 mg  40 mg Oral QAM AC Clifford Osorio MD   40 mg at 05/10/25 0642    pregabalin (Lyrica) cap 100 mg  100 mg Oral TID Clifford Osorio MD   100 mg at 05/09/25 2112    rosuvastatin (Crestor) tab 40 mg  40 mg Oral Nightly Clifford Osorio MD   40 mg at 05/09/25 2112    tamsulosin (Flomax) cap 0.8 mg  0.8 mg Oral Daily Clifford Osorio MD   0.8 mg at 05/10/25 1101     No current [facility]-ordered outpatient medications on file.   [5]   Allergies  Allergen Reactions    Empagliflozin OTHER (SEE COMMENTS)     Frequent urination, unbearable.    Penicillins UNKNOWN     Patient does not recall reaction    Other UNKNOWN   [6]   Family History  Problem Relation Age of Onset    Cancer Father         Prostate    Heart Disorder Father      Cancer Mother    [7]   Social History  Socioeconomic History    Marital status:     Number of children: 4   Tobacco Use    Smoking status: Former     Current packs/day: 0.00     Types: Cigarettes     Quit date:      Years since quittin.3     Passive exposure: Past    Smokeless tobacco: Never   Vaping Use    Vaping status: Never Used   Substance and Sexual Activity    Alcohol use: Never    Drug use: Never   Other Topics Concern    Caffeine Concern Yes     Comment: coffee daily    Exercise No      (3) walks occasionally

## 2025-05-11 NOTE — PLAN OF CARE
Problem: Patient Centered Care  Goal: Patient preferences are identified and integrated in the patient's plan of care  Description: Interventions:- What would you like us to know as we care for you? He has four sons- Provide timely, complete, and accurate information to patient/family- Incorporate patient and family knowledge, values, beliefs, and cultural backgrounds into the planning and delivery of care- Encourage patient/family to participate in care and decision-making at the level they choose- Honor patient and family perspectives and choices  Outcome: Progressing     Problem: Diabetes/Glucose Control  Goal: Glucose maintained within prescribed range  Description: INTERVENTIONS:- Monitor Blood Glucose as ordered- Assess for signs and symptoms of hyperglycemia and hypoglycemia- Administer ordered medications to maintain glucose within target range- Assess barriers to adequate nutritional intake and initiate nutrition consult as needed- Instruct patient on self management of diabetes  Outcome: Progressing     Problem: Patient/Family Goals  Goal: Patient/Family Long Term Goal  Description: Patient's Long Term Goal: to be free of infection Interventions:- iv antibiotics administered- See additional Care Plan goals for specific interventions  Outcome: Progressing  Goal: Patient/Family Short Term Goal  Description: Patient's Short Term Goal: to have a clear plan for after the hospital stay. Interventions: - case management- See additional Care Plan goals for specific interventions  Outcome: Progressing     Problem: PAIN - ADULT  Goal: Verbalizes/displays adequate comfort level or patient's stated pain goal  Description: INTERVENTIONS:- Encourage pt to monitor pain and request assistance- Assess pain using appropriate pain scale- Administer analgesics based on type and severity of pain and evaluate response- Implement non-pharmacological measures as appropriate and evaluate response- Consider cultural and social  influences on pain and pain management- Manage/alleviate anxiety- Utilize distraction and/or relaxation techniques- Monitor for opioid side effects- Notify MD/LIP if interventions unsuccessful or patient reports new pain- Anticipate increased pain with activity and pre-medicate as appropriate  Outcome: Progressing     Problem: RISK FOR INFECTION - ADULT  Goal: Absence of fever/infection during anticipated neutropenic period  Description: INTERVENTIONS- Monitor WBC- Administer growth factors as ordered- Implement neutropenic guidelines  Outcome: Progressing     Problem: SAFETY ADULT - FALL  Goal: Free from fall injury  Description: INTERVENTIONS:- Assess pt frequently for physical needs- Identify cognitive and physical deficits and behaviors that affect risk of falls.- [place] fall precautions as indicated by assessment.- Educate pt/family on patient safety including physical limitations- Instruct pt to call for assistance with activity based on assessment- Modify environment to reduce risk of injury- Provide assistive devices as appropriate- Consider OT/PT consult to assist with strengthening/mobility- Encourage toileting schedule  Outcome: Progressing     Problem: DISCHARGE PLANNING  Goal: Discharge to home or other facility with appropriate resources  Description: INTERVENTIONS:- Identify barriers to discharge w/pt and caregiver- Include patient/family/discharge partner in discharge planning- Arrange for needed discharge resources and transportation as appropriate- Identify discharge learning needs (meds, wound care, etc)- Arrange for interpreters to assist at discharge as needed- Consider post-discharge preferences of patient/family/discharge partner- Complete POLST form as appropriate- Assess patient's ability to be responsible for managing their own health- Refer to Case Management Department for coordinating discharge planning if the patient needs post-hospital services based on physician/LIP order or  complex needs related to functional status, cognitive ability or social support system  Outcome: Progressing   Pain is under control with oral pain meds. Voids per urinal. Walked in room with a post-op show in place and no weight on front of left foot. Likely going home tomorrow.

## 2025-05-11 NOTE — PLAN OF CARE
Problem: Patient Centered Care  Goal: Patient preferences are identified and integrated in the patient's plan of care  Description: Interventions:- What would you like us to know as we care for you? He has four sons- Provide timely, complete, and accurate information to patient/family- Incorporate patient and family knowledge, values, beliefs, and cultural backgrounds into the planning and delivery of care- Encourage patient/family to participate in care and decision-making at the level they choose- Honor patient and family perspectives and choices  Outcome: Progressing     Problem: Diabetes/Glucose Control  Goal: Glucose maintained within prescribed range  Description: INTERVENTIONS:- Monitor Blood Glucose as ordered- Assess for signs and symptoms of hyperglycemia and hypoglycemia- Administer ordered medications to maintain glucose within target range- Assess barriers to adequate nutritional intake and initiate nutrition consult as needed- Instruct patient on self management of diabetes  Outcome: Progressing     Problem: Patient/Family Goals  Goal: Patient/Family Long Term Goal  Description: Patient's Long Term Goal: to be free of infection Interventions:- iv antibiotics administered- See additional Care Plan goals for specific interventions  Outcome: Progressing  Goal: Patient/Family Short Term Goal  Description: Patient's Short Term Goal: to have a clear plan for after the hospital stay. Interventions: - case management- See additional Care Plan goals for specific interventions  Outcome: Progressing     Problem: PAIN - ADULT  Goal: Verbalizes/displays adequate comfort level or patient's stated pain goal  Description: INTERVENTIONS:- Encourage pt to monitor pain and request assistance- Assess pain using appropriate pain scale- Administer analgesics based on type and severity of pain and evaluate response- Implement non-pharmacological measures as appropriate and evaluate response- Consider cultural and social  influences on pain and pain management- Manage/alleviate anxiety- Utilize distraction and/or relaxation techniques- Monitor for opioid side effects- Notify MD/LIP if interventions unsuccessful or patient reports new pain- Anticipate increased pain with activity and pre-medicate as appropriate  Outcome: Progressing     Problem: RISK FOR INFECTION - ADULT  Goal: Absence of fever/infection during anticipated neutropenic period  Description: INTERVENTIONS- Monitor WBC- Administer growth factors as ordered- Implement neutropenic guidelines  Outcome: Progressing     Problem: SAFETY ADULT - FALL  Goal: Free from fall injury  Description: INTERVENTIONS:- Assess pt frequently for physical needs- Identify cognitive and physical deficits and behaviors that affect risk of falls.- [place] fall precautions as indicated by assessment.- Educate pt/family on patient safety including physical limitations- Instruct pt to call for assistance with activity based on assessment- Modify environment to reduce risk of injury- Provide assistive devices as appropriate- Consider OT/PT consult to assist with strengthening/mobility- Encourage toileting schedule  Outcome: Progressing     Problem: DISCHARGE PLANNING  Goal: Discharge to home or other facility with appropriate resources  Description: INTERVENTIONS:- Identify barriers to discharge w/pt and caregiver- Include patient/family/discharge partner in discharge planning- Arrange for needed discharge resources and transportation as appropriate- Identify discharge learning needs (meds, wound care, etc)- Arrange for interpreters to assist at discharge as needed- Consider post-discharge preferences of patient/family/discharge partner- Complete POLST form as appropriate- Assess patient's ability to be responsible for managing their own health- Refer to Case Management Department for coordinating discharge planning if the patient needs post-hospital services based on physician/LIP order or  complex needs related to functional status, cognitive ability or social support system  Outcome: Progressing

## 2025-05-11 NOTE — PHYSICAL THERAPY NOTE
PHYSICAL THERAPY EVALUATION - INPATIENT     Room Number: 425/425-A  Evaluation Date: 5/11/2025  Type of Evaluation: Initial   Physician Order: PT Eval and Treat    Presenting Problem: s/p L 2nd toe partial amputation     Reason for Therapy: Mobility Dysfunction and Discharge Planning    PHYSICAL THERAPY ASSESSMENT   Patient is a 65 year old male admitted 5/7/2025 for s/p L 2nd toe partial amputation.  Prior to admission, patient's baseline is independent with use of cane as needed.  Patient is currently functioning near baseline with bed mobility, transfers, gait, and stair negotiation.  Patient is requiring contact guard assist as a result of the following impairments: decreased functional strength and impaired standing dynamic balance.  Physical Therapy will continue to follow for duration of hospitalization.    Patient will benefit from continued skilled PT Services at discharge to promote prior level of function.  Anticipate patient will return home with home health PT.    PLAN DURING HOSPITALIZATION  Nursing Mobility Recommendation : 1 Assist  PT Device Recommendation: Rolling walker, Gait belt  PT Treatment Plan: Bed mobility, Body mechanics, Endurance, Energy conservation, Patient education, Gait training, Family education, Strengthening, Stoop training, Stair training, Transfer training, Balance training  Rehab Potential : Good  Frequency (Obs): 5x/week     PHYSICAL THERAPY MEDICAL/SOCIAL HISTORY     Problem List  Principal Problem:    Diabetic foot infection (HCC)  Active Problems:    Pyogenic inflammation of bone (HCC)    Cellulitis of left foot      HOME SITUATION  Type of Home: House  Home Layout: Two level  Stairs to Enter : 1   Railing: No    Stairs to Bedroom: 5    Railing: Yes    Lives With: Spouse        Patient Regularly Uses: Cane     Prior Level of [place]: Independent with use of cane as needed    SUBJECTIVE  \"This isn't my first amputation.\"    PHYSICAL THERAPY EXAMINATION    OBJECTIVE  Precautions: Limb alert - left (L toe amp, heel WB with surgical shoe)  Fall Risk: High fall risk    WEIGHT BEARING RESTRICTION  L Lower Extremity: -- (Heel WB with surgical shoe)      PAIN ASSESSMENT  Ratin  Location: L toe  Management Techniques: Repositioning, Body mechanics    COGNITION  Overall Cognitive Status:  WFL - within functional limits    RANGE OF MOTION AND STRENGTH ASSESSMENT  Upper extremity ROM and strength are within functional limits   Lower extremity ROM is within functional limits   Lower extremity strength with mild functional strength deficits    BALANCE  Static Sitting: Fair +  Dynamic Sitting: Fair  Static Standing: Fair -  Dynamic Standing: Fair -      AM-PAC '6-Clicks' INPATIENT SHORT FORM - BASIC MOBILITY  How much difficulty does the patient currently have...  Patient Difficulty: Turning over in bed (including adjusting bedclothes, sheets and blankets)?: A Little   Patient Difficulty: Sitting down on and standing up from a chair with arms (e.g., wheelchair, bedside commode, etc.): A Little   Patient Difficulty: Moving from lying on back to sitting on the side of the bed?: A Little   How much help from another person does the patient currently need...   Help from Another: Moving to and from a bed to a chair (including a wheelchair)?: A Little   Help from Another: Need to walk in hospital room?: A Little   Help from Another: Climbing 3-5 steps with a railing?: A Little     AM-PAC Score:  Raw Score: 18   Approx Degree of Impairment: 46.58%   Standardized Score (AM-PAC Scale): 43.63   CMS Modifier (G-Code): CK    FUNCTIONAL ABILITY STATUS  Functional Mobility/Gait Assessment  Gait Assistance: Contact guard assist  Distance (ft): 20', 10'  Assistive Device: Rolling walker  Pattern:  (Maintains L heel WB with surgical shoe, slow terrell, step to gait pattern)  Supine to Sit: stand-by assist  Sit to Stand: contact guard assist    Exercise/Education Provided:  Bed mobility  Body  mechanics  Energy conservation  Functional activity tolerated  Gait training  Strengthening  Transfer training  Safety precautions, healing process, importance of WB precautions, role of HH vs OPPT    Skilled Therapy Provided: Pt received supine in bed, agreeable to PT evaluation. Pt requires primarily CGA for functional mobility. Reviewed heel WB and donning/doffing surgical shoe. Pt ambulates to bathroom and around room to chair. Encouraged elevated of feet, AROM to jessica LE/feet, frequent small bouts of movement and discussed HH. Pt ended session up in chair with all needs in reach, alarm set, RN updated.     The patient's Approx Degree of Impairment: 46.58% has been calculated based on documentation in the [facility] '6 clicks' Inpatient Basic Mobility Short Form.  Research supports that patients with this level of impairment may benefit from HH PT.  Final disposition will be made by interdisciplinary medical team.    Patient End of Session: Up in chair, Needs met, Call light within reach, RN aware of session/findings, All patient questions and concerns addressed, Alarm set    CURRENT GOALS  Goals to be met by: 5/25/25  Patient Goal Patient's self-stated goal is: return home   Goal #1 Patient is able to demonstrate supine - sit EOB @ level: modified independent     Goal #1   Current Status    Goal #2 Patient is able to demonstrate transfers Sit to/from Stand at assistance level: modified independent with walker - rolling     Goal #2  Current Status    Goal #3 Patient is able to ambulate 100 feet with assist device: walker - rolling at assistance level: modified independent   Goal #3   Current Status    Goal #4 Patient will negotiate 5 stairs/one curb w/ assistive device and supervision   Goal #4   Current Status    Goal #5 Patient to demonstrate independence with home activity/exercise instructions provided to patient in preparation for discharge.   Goal #5   Current Status    Goal #6    Goal #6  Current Status       Patient Evaluation Complexity Level:  History High - 3 or more personal factors and/or co-morbidities   Examination of body systems High - addressing a total of 4 or more elements   Clinical Presentation  Moderate - Evolving   Clinical Decision Making  Moderate Complexity     Gait Trainin minutes  Therapeutic Activity:  15 minutes  PT Mod complex Eval

## 2025-05-11 NOTE — PROGRESS NOTES
Progress Note     Mason Puri Patient Status:  Inpatient    1960 MRN [id number]   Location [facility] 4W/SW/SE Attending Preeti Sellers MD   Hosp Day # 4 PCP Alvino Wallace MD     Subjective:   S: s/p left 2nd digit partial toe amputation yesterday, POD #1. Doing well today, c/o left foot pain. No fever, chills.       Review of Systems:   10 point ROS completed and was negative, except for pertinent positive and negatives stated in subjective.    Objective:   Vital signs:  Temp:  [97.5 °F (36.4 °C)-98.3 °F (36.8 °C)] 98 °F (36.7 °C)  Pulse:  [61-81] 63  Resp:  [13-18] 17  BP: (113-141)/(49-75) 128/63  SpO2:  [91 %-97 %] 91 %    Wt Readings from Last 6 Encounters:   25 (!) 310 lb 3.2 oz (140.7 kg)   05/10/25 (!) 310 lb (140.6 kg)   25 (!) 308 lb (139.7 kg)   25 300 lb (136.1 kg)   25 (!) 303 lb 6.4 oz (137.6 kg)   25 (!) 313 lb (142 kg)         Physical Exam:    General: No acute distress. Alert ,         Respiratory: Clear to auscultation bilaterally. No wheezes. No rhonchi.  Cardiovascular: S1, S2. Regular rate and rhythm. No murmurs, rubs or gallops.   Abdomen: Soft, nontender, nondistended.  Positive bowel sounds. No rebound or guarding.  Neurologic: No focal neurological deficits.   Musculoskeletal: Moves all extremities. Left foot 2nd toe dressed.   Extremities: No edema.    Results:   Diagnostic Data:      Labs:    Labs Last 24 Hours:   BMP     CBC    Other     Na 141 Cl 104 BUN 18 Glu 163   Hb 11.8   PTT - Procal -   K 3.9 CO2 31.0 Cr 1.41   WBC 8.3 >< .0  INR - CRP -   Renal Lytes Endo    Hct 37.7   Trop - D dim -   eGFR - Ca 8.9 POC Gluc  140    LFT   pBNP - Lactic -   eGFR AA - PO4 - A1c -   AST - APk - Prot -  LDL -     Mg - TSH -   ALT - T jessica - Alb -        COVID-19 Lab Results    COVID-19  Lab Results   Component Value Date    COVID19 Not Detected 2025    COVID19 Not Detected 2024    COVID19 Not Detected 10/01/2023        Pro-Calcitonin  No results for input(s): \"PCT\" in the last 168 hours.    Cardiac  No results for input(s): \"TROP\", \"PBNP\" in the last 168 hours.    Creatinine Kinase  No results for input(s): \"CK\" in the last 168 hours.    Inflammatory Markers  Recent Labs   Lab 05/07/25  1235   CRP 7.10*       Imaging: Imaging data reviewed in Epic.    Medications: Scheduled Medications[1]    Assessment & Plan:   ASSESSMENT / PLAN:     Left second toe and leg cellulitis, early osteomyelitis   - presented with low-grade fever and leukocytosis,   - ID consulted  - wound culture +proteus mirabilis, GBS, Enterococcus faecalis   - started on IV vanco, cefepime --> now Ancef   - MRI revealed early osteomyelitis of the 2nd digit distal phalanx   - Venous doppler neg  - Podiatry consulted,   - s/p left 2nd digit partial toe amputation, POD #1  - Pain control  - resume ASA, Brilinta       Diabetes mellitus type 2.  Cont SSI  A1c 7.3       Chronic kidney disease stage 3.  Cr 1.4 on admission   At baseline  Avoid nephrotoxins       Hypertension.  Controlled  Cont lisinopril       Dispo: anticipate home, pending clinical course      Zhanna Hogue MD  05/11/25       MDM: High   I personally spent time on chart/note review, review of labs/imaging, discussion with patient, physical exam, discussion with staff, consultants, coordinating care, writing progress note, and discussion of plan of care.         Supplementary Documentation:                         [1]    ceFAZolin  2 g Intravenous Q8H    insulin aspart  1-7 Units Subcutaneous TID CC and HS    heparin  5,000 Units Subcutaneous 2 times per day    DULoxetine  30 mg Oral Daily    finasteride  5 mg Oral Daily    levothyroxine  125 mcg Oral Before breakfast    lisinopril  5 mg Oral Daily    metFORMIN HCl  1,000 mg Oral BID with meals    metoprolol tartrate  25 mg Oral BID    pantoprazole  40 mg Oral QAM AC    pregabalin  100 mg Oral TID    rosuvastatin  40 mg Oral Nightly    tamsulosin   0.8 mg Oral Daily

## 2025-05-12 NOTE — DISCHARGE INSTRUCTIONS
Keep dressing to the left foot, clean dry and intact, do not remove it or change it until seen in the clinic for follow up. Take your medication as prescribed, notify MD if fever and drainage, pain or if you have any questions or concerns.

## 2025-05-12 NOTE — CM/SW NOTE
MDO placed for discharge.    Per ID, \"Will plan to DC on PO cefadroxil x 7 days.\"    Anticipated therapy need: Home with Home Healthcare    CM met with patient at bedside to discuss anticipated therapy need above + provide list of accepting HHA.  Patient declining home health arrangement at this time - stating he has a \"new dog that doesn't like people.\" Patient advised to follow up w/ PCP if he would like home healthcare arranged in the future.    CM cancelled pending HH referral via [facility].    Patient cleared for discharge from CM/SW standpoint.    Celestina White RN, BSN  Nurse   [phone]

## 2025-05-12 NOTE — PROGRESS NOTES
INFECTIOUS DISEASE PROGRESS NOTE  [facility]  part of [facility] ID PROGRESS NOTE    Mason Puri Patient Status:  Inpatient    1960 MRN [id number]   Location [facility] 4W/SW/SE Attending Zhanna Hogue MD   Hosp Day # 5 PCP Alvino Wallace MD     Subjective:  ROS reviewed. Pain controlled.    ASSESSMENT:    Antibiotics: Ancef  IV vancomycin, cefepime     # L 2nd toe early osteomyelitis seen on MRI s/p partial L second toe amputation 5/10   -Cx GBS     # Left third toe osteomyelitis 10/2024 s/p distal L 3rd toe amp 10/23/24  - Polymicrobial superficial wound culture - Proteus mirabilis, group B strep, Enterococcus faecalis  # IDDM with peripheral neuropathy and CKD  # PCN allergy - childhood        PLAN:  -  Currently on ancef. Will plan to DC on PO cefadroxil x 7 days.  -  BG control.  -  Follow fever curve, wbc.  -  Reviewed labs, micro, imaging reports, available old records.  -  Case d/w patient, RN.     History of Present Illness:  65 yoM with a h/o IDDM with peripheral neuropathy, HTN, HLD, CAD who was last seen 10/2024 for L 3rd distal toe OM with swab cx showing P. Mirabilis, GBS, E. Faecalis. Was taken to OR 10/23/24 for L distal 3rd toe partial amp. Margin negative. Discharged on bactrim. Has been following with Podiatry outpatient since then. Had recent MRI L foot w/o contrast 25 showing intramuscular edema w/o OM. Patient was maintained off work since it involves standing all day. No abx at home. No f/c. Now seen in clinic and noted to have increased L 2nd toe erythema, warmth, swelling, pain with proximal tracking w/o f/c. Repeat MRI pending. IV vancomycin and cefepime started.    Physical Exam:  /66 (BP Location: Right arm)   Pulse 66   Temp 98.5 °F (36.9 °C) (Temporal)   Resp 16   Ht 6' 2\" (1.88 m)   Wt (!) 310 lb 3.2 oz (140.7 kg)   SpO2 92%   BMI 39.83 kg/m²     Gen:   Awake, in chair  HEENT:  EOMI, neck  Telephone Encounter by Bre aRusch RN at 06/11/18 11:46 AM     Author:  Bre Rausch RN Service:  (none) Author Type:  Registered Nurse     Filed:  06/11/18 11:46 AM Encounter Date:  6/11/2018 Status:  Signed     :  Bre Rausch RN (Registered Nurse)            Reviewed upper and lower GI symptoms/concerns with pt.  Pt denies upper and lower GI symptoms.[JV1.1T]  Msg to KJ.[JV1.1M]  Electronically Signed by:    Bre Rausch RN , 6/11/2018[JV1.1T]        Revision History        User Key Date/Time User Provider Type Action    > JV1.1 06/11/18 11:46 AM Bre Rausch RN Registered Nurse Sign    M - Manual, T - Template             supple  CV/lungs:  RRR, CTAB  Abdom:  Soft, no TTP  Skin/extrem:  L leg with resolving erythema with post-op dressing intact  Lines:  PIV+    Laboratory Data: Reviewed    Microbiology: Reviewed    Radiology: Reviewed      [facility]  [phone]  5/12/2025

## 2025-05-12 NOTE — PLAN OF CARE
Problem: Diabetes/Glucose Control  Goal: Glucose maintained within prescribed range  Description: INTERVENTIONS:- Monitor Blood Glucose as ordered- Assess for signs and symptoms of hyperglycemia and hypoglycemia- Administer ordered medications to maintain glucose within target range- Assess barriers to adequate nutritional intake and initiate nutrition consult as needed- Instruct patient on self management of diabetes  Outcome: Progressing     Problem: PAIN - ADULT  Goal: Verbalizes/displays adequate comfort level or patient's stated pain goal  Description: INTERVENTIONS:- Encourage pt to monitor pain and request assistance- Assess pain using appropriate pain scale- Administer analgesics based on type and severity of pain and evaluate response- Implement non-pharmacological measures as appropriate and evaluate response- Consider cultural and social influences on pain and pain management- Manage/alleviate anxiety- Utilize distraction and/or relaxation techniques- Monitor for opioid side effects- Notify MD/LIP if interventions unsuccessful or patient reports new pain- Anticipate increased pain with activity and pre-medicate as appropriate  Outcome: Progressing     Problem: RISK FOR INFECTION - ADULT  Goal: Absence of fever/infection during anticipated neutropenic period  Description: INTERVENTIONS- Monitor WBC- Administer growth factors as ordered- Implement neutropenic guidelines  Outcome: Progressing     Problem: SAFETY ADULT - FALL  Goal: Free from fall injury  Description: INTERVENTIONS:- Assess pt frequently for physical needs- Identify cognitive and physical deficits and behaviors that affect risk of falls.- [place] fall precautions as indicated by assessment.- Educate pt/family on patient safety including physical limitations- Instruct pt to call for assistance with activity based on assessment- Modify environment to reduce risk of injury- Provide assistive devices as appropriate- Consider OT/PT consult to  assist with strengthening/mobility- Encourage toileting schedule  Outcome: Progressing    Patient cleared by internal medicine, podiatrist, ID, PT/OT, and social work. Going home. IV removed, discharge education provided, patient sent home with all personal belongings, and discharge instructions. Patient to keep dressing on his L foot 2nd toe clean and dry, do not remove until seen at the clinic for follow up. Addressed additional questions.

## 2025-05-12 NOTE — DISCHARGE SUMMARY
[facility]  part of [facility]     DISCHARGE SUMMARY     Mason Puri Patient Status:  Inpatient    1960 MRN [id number]   Location [facility] 4W/SW/SE Attending Zhanna Hogue MD   Hosp Day # 5 PCP Alvino Wallace MD     DATE OF ADMISSION: 2025  DATE OF DISCHARGE:  25  DISPOSITION: home  CONDITION ON DISCHARGE: good    DISCHARGE DIAGNOSES:    Diabetic foot infection (HCC)      Pyogenic inflammation of bone (HCC)      Cellulitis of left foot      HISTORY OF PRESENT ILLNESS (COPIED FROM ADMISSION H&P)  65-year-old  male who has been having discomfort and erythema of his left leg for the last week and some erythema of his left second toe. He had an MRI as an outpatient on , which showed no evidence of osteomyelitis. His symptoms have gotten worse over the last 2 to 3 days. Today, he was sent to the emergency department for evaluation. CBC showed white blood cell count of 11.9 with left shift. Chemistry, C-reactive protein, sedimentation rate are still pending. Venous Doppler study to rule out DVT and MRI of the foot with and without contrast both ordered. Started on IV cefepime and vancomycin, and he will be admitted to the hospital for further management     HOSPITAL COURSE:  Left second toe and leg cellulitis, early osteomyelitis   - presented with low-grade fever and leukocytosis,   - ID consulted  - wound culture +proteus mirabilis, GBS, Enterococcus faecalis   - started on IV vanco, cefepime --> now Ancef   - MRI revealed early osteomyelitis of the 2nd digit distal phalanx   - Venous doppler neg  - Podiatry consulted,   - s/p left 2nd digit partial toe amputation, POD #1  - Pain control  - resume ASA, Brilinta       Diabetes mellitus type 2.  Cont SSI  A1c 7.3       Chronic kidney disease stage 3.  Cr 1.4 on admission   At baseline  Avoid nephrotoxins       Hypertension.  Controlled  Cont lisinopril      Patient understands return to the  emergency room for increased pain, fever, discharge, shortness of breath, chest pain, new neurologic symptoms, other concerning symptoms.    PHYSICAL EXAM:  Temp:  [97.5 °F (36.4 °C)-99 °F (37.2 °C)] 98.5 °F (36.9 °C)  Pulse:  [64-90] 66  Resp:  [16-18] 16  BP: (101-139)/(64-66) 108/66  SpO2:  [90 %-93 %] 92 %  Gen: A+Ox3.  No distress.   HEENT: NCAT, neck supple, no carotid bruit.  CV: RRR, S1S2, and intact distal pulses. No gallop, rub, murmur.  Pulm: Effort and breath sounds normal. No distress, wheezes, rales, rhonchi.  Abd: Soft, NTND, BS normal, no mass, no HSM, no rebound/guarding.   Neuro: Normal reflexes, CN. Sensory/motor exams grossly normal deficit. Coordination  and gait normal.   MS: No joint effusions.  No peripheral edema.  Skin: Skin is warm and dry. No rashes, erythema, diaphoresis.   Psych: Normal mood and affect. Behavior and judgment normal.     DISCHARGE MEDICATIONS     Discharge Medications        CHANGE how you take these medications        Instructions Prescription details   levothyroxine 125 MCG Tabs  Commonly known as: Synthroid  What changed: when to take this      TAKE 1 TABLET AT BEDTIME   Quantity: 90 tablet  Refills: 1            CONTINUE taking these medications        Instructions Prescription details   aspirin 81 MG Tbec  Commonly known as: Aspirin 81      Take 1 tablet (81 mg total) by mouth daily. FOR HEART.   Quantity: 90 tablet  Refills: 9     DULoxetine 30 MG Cpep  Commonly known as: Cymbalta      Take 1 capsule (30 mg total) by mouth daily. TAKE 1 CAPSULE IN THE MORNING FOR ANXIETY/DEPRESSION/ARTHRITIS PAIN   Quantity: 90 capsule  Refills: 1     finasteride 5 MG Tabs  Commonly known as: Proscar      Take 1 tablet (5 mg total) by mouth daily. FOR PROSTATE.   Quantity: 90 tablet  Refills: 1     glipiZIDE 5 MG Tabs  Commonly known as: Glucotrol      Take 1 tablet (5 mg total) by mouth every morning before breakfast.   Refills: 0     lisinopril 5 MG Tabs  Commonly known as:  Prinivil; Zestril      Take 1 tablet (5 mg total) by mouth daily.   Quantity: 90 tablet  Refills: 1     metFORMIN HCl 1000 MG Tabs  Commonly known as: GLUCOPHAGE      Take 1 tablet (1,000 mg total) by mouth 2 (two) times daily with meals.   Quantity: 180 tablet  Refills: 1     metoprolol tartrate 25 MG Tabs  Commonly known as: Lopressor      Take 1 tablet (25 mg total) by mouth 2 (two) times daily. FOR HEART.   Quantity: 180 tablet  Refills: 1     Mounjaro 15 MG/0.5ML Soaj  Generic drug: Tirzepatide      Inject 15 mg into the skin once a week. FOR DIABETES. Continuation of therapy.   Quantity: 6 mL  Refills: 9     Multivitamin Men 50+ Tabs      Take 1 tablet by mouth in the morning.   Refills: 0     pantoprazole 40 MG Tbec  Commonly known as: Protonix      Take 1 tablet (40 mg total) by mouth every morning before breakfast.   Quantity: 90 tablet  Refills: 1     pregabalin 100 MG Caps  Commonly known as: Lyrica      Take 1 capsule (100 mg total) by mouth every 8 (eight) hours. FOR NERVE PAIN.   Quantity: 270 capsule  Refills: 1     rosuvastatin 40 MG Tabs  Commonly known as: Crestor      Take 1 tablet (40 mg total) by mouth nightly. FOR CHOLESTEROL.   Quantity: 90 tablet  Refills: 1     tamsulosin 0.4 MG Caps  Commonly known as: Flomax      Take 2 capsules (0.8 mg total) by mouth daily. FOR PROSTATE.   Quantity: 180 capsule  Refills: 9     ticagrelor 90 MG Tabs  Commonly known as: Brilinta      Take 1 tablet (90 mg total) by mouth every 12 (twelve) hours. FOR HEART STENTS.   Quantity: 180 tablet  Refills: 9            STOP taking these medications      albuterol 108 (90 Base) MCG/ACT Aers  Commonly known as: Ventolin HFA                 CONSULTANTS  Consultants         Provider   Role Specialty     Jued Rollins MD      Consulting Physician INFECTIOUS DISEASES     Lurdes Baxter DPM      Consulting Physician PODIATRIST            FOLLOW UP:  Alvino Wallace MD  [address] [facility]  [id number]  [phone]    Follow up      Lurdes Baxter, LUIS  [address] [address]  [phone]    Schedule an appointment as soon as possible for a visit in 2 week(s)      The above plan and follow-up instructions were reviewed with the patient and they verbalized understanding and agreement.  They understand to return to the emergency room for any concerning signs or symptoms.  Greater than 30 minutes spent on discharge.  -----------------------      Lace+ Score: 83  59-90 High Risk  29-58 Medium Risk  0-28   Low Risk.    TCM Follow-Up Recommendation:  LACE > 58: High Risk of readmission after discharge from the hospital.  Supplementary Documentation:     Zhanna Hogue MD  5/12/2025

## 2025-05-12 NOTE — OPERATIVE REPORT
OPERATIVE REPORT     Mason Puri Patient Status:  Inpatient    1960 MRN [id number]   Location [facility] 4W/SW/SE Attending Zhanna Hogue MD   Hosp Day # 5 PCP Alvino Wallace MD     Date of Surgery:  5/10/2025    Preoperative Diagnosis: Osteomyelitis left second digit      Postoperative Diagnosis: same    Primary Surgeon: Lurdes Baxter DPM    Assistant: None    Procedures: Partial left second digit amputation    Surgical Findings: Consistent with preoperative diagnosis due to having to foot soaks can help    Anesthesia: MAC with 20 cc of one-to-one ratio 1% lidocaine plain and 0.5% Marcaine plain    Complications: none    Implants: None    Specimen: Left second digit and left second digit clearance fragment    Drains: None    Condition: Patient tolerated procedure and anesthesia well.  He was transferred to recovery room with vital signs stable and capillary refill brisk to left lower extremity digits.    Estimated Blood Loss: 10 mL    Preoperative history/indications:  Patient presented to Lurdes Baxter DPM due to osteomyelitis of the left second digit..  All preoperative conservative care have failed to resolve the patient's pain and discomfort.  The patient would like to proceed with surgery.      Mason Puri is a a(n) 65 year old male with PMH of CAD, DM, HTN, HLD, CKD, neuropathy and history of left 3rd amputation. Pt admits to a progressively worsening of qvhl1fh digit wound.  After in clinic podiatry evaluation by Dr. Woodard on 25 pt was prompted to report to the ED for and MRI of left foot and IV abx.  They have left foot showed osteomyelitis with left second digit distal phalanx    Informed Consent:  All treatment options have been discussed with the patient including both conservative and surgical attempts at correction. Potential risks and complications of surgical intervention were discussed at length including but not limited to death,  loss of limb, post op pain, swelling, infection, bleeding, reoccurrence, extended healing,  and the possibility of further and future surgery.  No guarantees have been made to the patient and the informed consent has been signed.    Procedure in detail:  The patient was brought into the operating room and placed on the operating table in the supine position.  A timeout was called in the presence of the anesthesiologist, circulating nurse, scrub tech, and myself to confirm the correct patient, patient's date of birth, procedure, and location of the procedure to be performed.  All present were in agreement.  No tourniquet was used during the procedure.  following IV sedation, a local infiltrative block was administered about the left foot utilizing 10 ccs of a 1:1 mixture of 1% lidocaine plain and 0.5% marcaine plain. The left lower extremity was then scrubbed, prepped, and draped in the usual aseptic manner.    Using a skin marker a fishmouth incision time was drawn to the left second digit.  Next using a #15 blade an incision down to the level of the bone was made resecting all necrotic tissue and necrotic bone.  Next using a sagittal saw a resection was made down to the level of the left second proximal PIPJ.  Specimen of left second digit was sent to pathology as well as a clearance fragment from the left second proximal phalanx.  Next the incision site was irrigated with copious amounts of sterile saline.  Next using 3-0 Vicryl the subcutaneous tissue was reapproximated and using 3-0 nylon the skin edges were reapproximated in a simple suture fashion.    The incision was dressed with Xeroform and covered with sterile compressive dressings consisting of gauze, kerlix, and a mildly compressive ACE wrap.The patient tolerated the procedure and anesthesia well.  he was transferred to the recovery room with VSS and vascular status intact.  Following a period of postoperative monitoring, the patient will be transferred  back to the medical floor while he will be monitored.  Patient to be heel weightbearing in a surgical shoe.  Patient to keep the dressing on the left foot clean dry and intact until I see him in a week in clinic.    Lurdes Baxter DPM   5/12/2025

## 2025-05-12 NOTE — PHYSICAL THERAPY NOTE
PHYSICAL THERAPY TREATMENT NOTE - INPATIENT     Room Number: 425/425-A       Presenting Problem: s/p L 2nd toe partial amputation       Problem List  Principal Problem:    Diabetic foot infection (HCC)  Active Problems:    Pyogenic inflammation of bone (HCC)    Cellulitis of left foot    Acute osteomyelitis of toe, left ([facility])      PHYSICAL THERAPY ASSESSMENT   Patient demonstrates fair progress this session, goals  remain in progress.      Patient is requiring moderate assist as a result of the following impairments: decreased functional strength, decreased endurance/aerobic capacity, pain, and decreased muscular endurance.     Patient continues to function below baseline with bed mobility, transfers, gait, stair negotiation, standing prolonged periods, and performing household tasks.  Next session anticipate patient to progress bed mobility, transfers, gait, stair negotiation, and standing prolonged periods.  Physical Therapy will continue to follow patient for duration of hospitalization.    Patient continues to benefit from continued skilled PT services: at discharge to promote prior level of function.  Anticipate patient will return home with home health PT.    PLAN DURING HOSPITALIZATION  Nursing Mobility Recommendation : 1 Assist  PT Device Recommendation: Rolling walker, Gait belt  PT Treatment Plan: Bed mobility, Body mechanics, Endurance, Energy conservation, Patient education, Gait training, Stair training, Transfer training, Balance training  Frequency (Obs): 5x/week     SUBJECTIVE  Pt agreeable to be seen for today's session.    OBJECTIVE  Precautions: Limb alert - left (L toe amp, heel WB with surgical shoe)    WEIGHT BEARING RESTRICTION  L Lower Extremity: -- (Heel WB with surgical shoe)      PAIN ASSESSMENT   Ratin  Location: L toe  Management Techniques: Activity promotion, Body mechanics, Repositioning    BALANCE  Static Sitting: Good  Dynamic Sitting: Fair +  Static Standing: Fair -  Dynamic  Standing: Fair -    AM-PAC '6-Clicks' INPATIENT SHORT FORM - BASIC MOBILITY  How much difficulty does the patient currently have...  Patient Difficulty: Turning over in bed (including adjusting bedclothes, sheets and blankets)?: A Little   Patient Difficulty: Sitting down on and standing up from a chair with arms (e.g., wheelchair, bedside commode, etc.): A Lot   Patient Difficulty: Moving from lying on back to sitting on the side of the bed?: A Little   How much help from another person does the patient currently need...   Help from Another: Moving to and from a bed to a chair (including a wheelchair)?: A Little   Help from Another: Need to walk in hospital room?: A Little   Help from Another: Climbing 3-5 steps with a railing?: A Little     AM-PAC Score:  Raw Score: 17   Approx Degree of Impairment: 50.57%   Standardized Score (AM-PAC Scale): 42.13   CMS Modifier (G-Code): CK    FUNCTIONAL ABILITY STATUS  Functional Mobility/Gait Assessment  Gait Assistance: Contact guard assist  Distance (ft): 50'  Assistive Device: Rolling walker  Pattern:  (maintains L heel WB)  Stairs: Stairs  How Many Stairs: 4  Device: 2 Rails  Assist: Contact guard assist  Pattern: Ascend and Descend  Ascend and Descend : Step to  Sit to Stand: moderate assist from BSC to RW/ CGA from W/C to RW    Skilled Therapy Provided: Pt received sitting in BSC. Donned gait belt. Pt performed STS to mod A c focus on weight shift forward and pushing from BSC to RW. Pt performed multiple trials of STS's this session progressing to requiring less assistance mod A>CGA. Pt increased gait distance to 50' c CGA to impact ambulation in room and facility. Able to maintain postural musculature during ambulatory activities resulting in safer gait pattern. Pt achieved heel WB on the LE, utilizing a RW for stability. Transported to therapy gym by W/C. Pt return demonstrated ascending and descending 4 stairs 2 HR c on UE as a wall guide hand to CGA. Pt returned back  to room by W/C sitting in BSC. Pt was educated on importance of proper technique on sitting down to impact safer and more efficient transfers. Pt remained in BSC c all needs within reach. Reported pt status to RN.    The patient's Approx Degree of Impairment: 50.57% has been calculated based on documentation in the [facility] '6 clicks' Inpatient Daily Activity Short Form.  Research supports that patients with this level of impairment may benefit from home c HH.  Final disposition will be made by interdisciplinary medical team.    Patient End of Session: Up in chair, Call light within reach, RN aware of session/findings, All patient questions and concerns addressed, Hospital anti-slip socks  Goals to be met by: 5/25/25  Patient Goal Patient's self-stated goal is: return home   Goal #1 Patient is able to demonstrate supine - sit EOB @ level: modified independent     Goal #1   Current Status NT, received in BSC   Goal #2 Patient is able to demonstrate transfers Sit to/from Stand at assistance level: modified independent with walker - rolling     Goal #2  Current Status Mod A from BSC to RW/ CGA from W/C to RW   Goal #3 Patient is able to ambulate 100 feet with assist device: walker - rolling at assistance level: modified independent   Goal #3   Current Status 50' CGA c RW    (Heel WB maintained)    Goal #4 Patient will negotiate 5 stairs/one curb w/ assistive device and supervision   Goal #4   Current Status 4 stairs 2 HR CGA  (Heel WB maintained)    Goal #5 Patient to demonstrate independence with home activity/exercise instructions provided to patient in preparation for discharge.   Goal #5   Current Status In progress   Goal #6    Goal #6  Current Status      Gait Training: 10 minutes  Therapeutic Activity: 17 minutes    Wale Bartlett [facility]  [facility] PTA Program

## 2025-05-13 NOTE — TELEPHONE ENCOUNTER
Patient saw Dr Alcantara today and completed the LTD forms. Patient paid $25 fee and forms were sent to the forms dept.

## 2025-05-13 NOTE — PROGRESS NOTES
OFFICE NOTE       The following individual(s) verbally consented to be recorded using ambient AI listening technology and understand that they can each withdraw their consent to this listening technology at any point by asking the clinician to turn off or pause the recording:    Patient name: Mason Puri  Additional names:            Patient ID: Mason Puri is a 65 year old male.  Today's Date: 05/13/25  Chief Complaint: ER F/U (Er fu L foot 2nd digit partial amputation. Disability forms)      History of Present Illness  Mando Puri is a 65 year old male with type 2 diabetes and coronary artery disease who presents for follow-up after a recent partial toe amputation due to a diabetic foot infection.    He was admitted from May 7th to May 12th for a diabetic foot infection, resulting in a partial amputation of the left second toe on May 12th. He is recovering well with minimal pain, managed by occasional Advil use. He is currently ambulating with a walker.    He has a history of type 2 diabetes, hypertension, obstructive sleep apnea, coronary artery disease with a stent placed in the left anterior descending artery in March 2023, and hyperlipidemia. His medications include metformin, glipizide, CPAP for JUAN A, Brilinta for his heart stent, tamsulosin, rosuvastatin, Lyrica, metoprolol, lisinopril, levothyroxine, finasteride, and duloxetine. He was discharged on tirzepatide and Medrol, and is currently taking cefuroxime 1 gram BID for seven days.    He has a history of non-sustained ventricular tachycardia and has been seen by an electrophysiologist. He reports a decline in functional status, stating he is unable to stand for eight hours as required by his job, and is seeking long-term disability due to chronic osteomyelitis and recent amputations.    He reports a palpable lump on the lateral aspect of his left calf, evaluated by MRI, which showed no discrete soft tissue mass  or fluid collection, but diffuse subcutaneous edema. There is no evidence of active osteomyelitis in the left leg. The lump is tender to touch.    He has a history of allergies to penicillins and is currently on dual antiplatelet therapy, which may contribute to easy bruising.       Vitals:    05/13/25 0949   BP: 107/64   Pulse: 77   Weight: (!) 307 lb (139.3 kg)   Height: 6' 2\" (1.88 m)     body mass index is 39.42 kg/m².  BP Readings from Last 3 Encounters:   05/13/25 107/64   05/12/25 108/66   04/21/25 101/61     The ASCVD Risk score (Tej GARNICA, et al., 2019) failed to calculate for the following reasons:    Risk score cannot be calculated because patient has a medical history suggesting prior/existing ASCVD  Results  RADIOLOGY  MRI of the left lower leg (05/10/2025): No discrete soft tissue mass or fluid collection in the lateral aspect of the left calf at the site of the palpable lump indicated by the patient. Diffuse subcutaneous edema, nonspecific, possibly related to cardiac fluid status. Cannot exclude cellulitis. No evidence of active osteomyelitis of the left leg.    MRI of the left foot (05/08/2025): Soft tissue skin ulceration within the second digit, mild early osteomyelitis of the second digit, mild forefoot osteoarthritis, dorsal foot and second digit cellulitis, no abscess.       Medications reviewed:  Current Medications[1]      Assessment & Plan    1. Chronic osteomyelitis of foot (HCC) (Primary)  -     Cancel: Podiatry Referral  -     Podiatry Referral  2. History of amputation of left third toe (HCC)  -     Cancel: Podiatry Referral  -     Podiatry Referral  3. History of amputation of left second toe (HCC)  -     Cancel: Podiatry Referral  -     Podiatry Referral  4. Walker as ambulation aid  Other orders  -     [facility] PCP or Registry Removal Request    Assessment & Plan  Type 2 diabetes mellitus with complications  Recent hospitalization for diabetic foot infection led to partial amputation of  left second and third toes. No active osteomyelitis on MRI. Manages diabetes with metformin and glipizide. Ambulating with a walker.  - Continue cefuroxime 1 gram BID for 7 days.  - Ensure follow-up with foot specialist, Dr. Lurdes Porter.    Partial toe amputations  Recent partial amputation of left second and third toes due to diabetic foot infection. Deemed unable to return to prior function, making disability a suitable option.  - Assist with disability paperwork.  - Ensure follow-up with foot specialist, Dr. Lurdes Porter.    Chronic osteomyelitis  Recent MRI shows no active osteomyelitis in the left leg. Ongoing management with antibiotics for diabetic foot infection.    Hypertension  Managed with metoprolol, lisinopril, and other medications. No acute issues.    Coronary artery disease with PCI  PCI to LAD with stent placement. On dual antiplatelet therapy with Brilinta. No acute cardiac issues.    Hyperlipidemia  Managed with rosuvastatin. No acute issues.    Obstructive sleep apnea  Managed with CPAP. No acute issues.    Follow-up  Follow-up plans discussed for ongoing care and specialist appointments.  - Schedule follow-up appointment with Dr. Lurdes Porter.  - Next appointment with primary care on August 18, 2025.       Follow Up: As needed/if symptoms worsen or No follow-ups on file..       Objective/ Results:   Physical Exam  Constitutional:       Appearance: He is well-developed.   Cardiovascular:      Rate and Rhythm: Normal rate and regular rhythm.      Heart sounds: Normal heart sounds.   Pulmonary:      Effort: Pulmonary effort is normal.      Breath sounds: Normal breath sounds.   Abdominal:      General: Bowel sounds are normal.      Palpations: Abdomen is soft.   Musculoskeletal:         General: Swelling and tenderness present.      Thoracic back: Tenderness present. Decreased range of motion.      Lumbar back: Spasms and tenderness present. Decreased range of motion. Positive  right straight leg raise test and positive left straight leg raise test.      Right hip: Tenderness present. Decreased range of motion.      Left hip: Tenderness present. Decreased range of motion.      Left foot: Decreased range of motion. Tenderness present.   Skin:     General: Skin is warm and dry.      Findings: Erythema present.   Neurological:      Mental Status: He is alert and oriented to person, place, and time.      Deep Tendon Reflexes: Reflexes are normal and symmetric.        Physical Exam         Reviewed:    Patient Active Problem List    Diagnosis    History of amputation of left second toe ([facility])    Walker as ambulation aid    Acute osteomyelitis of toe, left ([facility])    Diabetic foot infection ([facility])    Cellulitis of left foot    Chronic osteomyelitis of foot ([facility])    PVC (premature ventricular contraction)    Pyogenic inflammation of bone ([facility])    JUAN A (obstructive sleep apnea)    S/P drug eluting coronary stent placement    Neurogenic claudication    Abnormal Holter monitor finding    Left carotid artery stenosis    Cerebellar infarct ([facility])    Transient ischemic attack (TIA)    BPH with obstruction/lower urinary tract symptoms    Major depressive disorder    Venous stasis dermatitis of both lower extremities    Patellofemoral arthritis    Varicose veins of left lower extremity with inflammation, with ulcer of ankle limited to breakdown of skin (HCC)    Hypertension associated with type 2 diabetes mellitus (HCC)    Hyperlipidemia associated with type 2 diabetes mellitus (HCC)    Type 2 diabetes mellitus with diabetic peripheral angiopathy without gangrene, without long-term current use of insulin ([facility])    Hypothyroidism    History of right-sided carotid endarterectomy    Detrusor overactivity    Primary osteoarthritis of right knee    Gastroesophageal reflux disease with esophagitis    Class 2 severe obesity due to excess calories with serious comorbidity and body mass index (BMI) of 38.0 to 38.9 in  adult (HCC)      Allergies[2]   Short Social Hx on File[3]   Review of Systems    All other systems negative unless otherwise stated in ROS or HPI above.       Pollo Alcantara MD  Internal Medicine       Call office with any questions or seek emergency care if necessary.   Patient understands and agrees to follow directions and advice.      ----------------------------------------- PATIENT INSTRUCTIONS-----------------------------------------     There are no Patient Instructions on file for this visit.        The 21st Century Cures Act makes medical notes available to patients in the interest of transparency.  However, please be advised that this is a medical document.  It is intended as a peer to peer communication.  It is written in medical language and may contain abbreviations or verbiage that are technical and unfamiliar.  It may appear blunt or direct.  Medical documents are intended to carry relevant information, facts as evident, and the clinical opinion of the practitioner.          [1]   Current Outpatient Medications   Medication Sig Dispense Refill    cefadroxil 500 MG Oral Cap Take 2 capsules (1,000 mg total) by mouth 2 (two) times daily for 7 days. 28 capsule 0    glipiZIDE 5 MG Oral Tab Take 1 tablet (5 mg total) by mouth every morning before breakfast.      lisinopril 5 MG Oral Tab Take 1 tablet (5 mg total) by mouth daily. 90 tablet 1    DULoxetine 30 MG Oral Cap DR Particles Take 1 capsule (30 mg total) by mouth daily. TAKE 1 CAPSULE IN THE MORNING FOR ANXIETY/DEPRESSION/ARTHRITIS PAIN 90 capsule 1    finasteride 5 MG Oral Tab Take 1 tablet (5 mg total) by mouth daily. FOR PROSTATE. 90 tablet 1    metoprolol tartrate 25 MG Oral Tab Take 1 tablet (25 mg total) by mouth 2 (two) times daily. FOR HEART. 180 tablet 1    pantoprazole 40 MG Oral Tab EC Take 1 tablet (40 mg total) by mouth every morning before breakfast. 90 tablet 1    rosuvastatin 40 MG Oral Tab Take 1 tablet (40 mg total) by mouth nightly.  FOR CHOLESTEROL. 90 tablet 1    ticagrelor 90 MG Oral Tab Take 1 tablet (90 mg total) by mouth every 12 (twelve) hours. FOR HEART STENTS. 180 tablet 9    Tirzepatide (MOUNJARO) 15 MG/0.5ML Subcutaneous Solution Auto-injector Inject 15 mg into the skin once a week. FOR DIABETES. Continuation of therapy. 6 mL 9    tamsulosin 0.4 MG Oral Cap Take 2 capsules (0.8 mg total) by mouth daily. FOR PROSTATE. 180 capsule 9    pregabalin 100 MG Oral Cap Take 1 capsule (100 mg total) by mouth every 8 (eight) hours. FOR NERVE PAIN. 270 capsule 1    LEVOTHYROXINE 125 MCG Oral Tab TAKE 1 TABLET AT BEDTIME (Patient taking differently: Take 1 tablet (125 mcg total) by mouth before breakfast. TAKE AT BEDTIME) 90 tablet 1    aspirin (ASPIRIN 81) 81 MG Oral Tab EC Take 1 tablet (81 mg total) by mouth daily. FOR HEART. 90 tablet 9    metFORMIN HCl 1000 MG Oral Tab Take 1 tablet (1,000 mg total) by mouth 2 (two) times daily with meals. 180 tablet 1    Multiple Vitamins-Minerals (MULTIVITAMIN MEN 50+) Oral Tab Take 1 tablet by mouth in the morning.     [2]   Allergies  Allergen Reactions    Empagliflozin OTHER (SEE COMMENTS)     Frequent urination, unbearable.    Penicillins UNKNOWN     Patient does not recall reaction    Other UNKNOWN   [3]   Social History  Socioeconomic History    Marital status:     Number of children: 4   Tobacco Use    Smoking status: Former     Current packs/day: 0.00     Types: Cigarettes     Quit date:      Years since quittin.3     Passive exposure: Past    Smokeless tobacco: Never   Vaping Use    Vaping status: Never Used   Substance and Sexual Activity    Alcohol use: Never    Drug use: Never   Other Topics Concern    Caffeine Concern Yes     Comment: coffee daily    Exercise No   Social History Narrative    The patient does use an assistive device..  Brace    The patient does live in a home with stairs.     Social Drivers of Health     Food Insecurity: No Food Insecurity (2025)    [facility] -  Food Insecurity     Worried About Running Out of Food in the Last Year: No     Ran Out of Food in the Last Year: No   Transportation Needs: No Transportation Needs (5/7/2025)    [facility] - Transportation     Lack of Transportation: No   Housing Stability: Not At Risk (5/7/2025)    [facility] - Housing/Utilities     Has Housing: Yes     Worried About Losing Housing: No     Unable to Get Utilities: No

## 2025-05-13 NOTE — PATIENT INSTRUCTIONS
5/13/2025    Labs and RTC in 3 mo        Metformin 1000 mg twice a day   Mounjaro 15 mg /week   Glipizide  5 mg once a a day before breakfast      Thyroid medication dose Levothyroxine 125 mcg  at bedtime. Take you medication on empty stomach, not with any other medication or food. Wait 60 minutes before eating. Wait 4 hours before taking Vitamins, Calcium or iron.  Wait 60 minutes before eating. Do not take the medication on the morning of the lab test. Do not take Biotin/Turmeric 1 week before the test.      Continue Rosuvastatin  40 mg    If you have low blood sugar <70, take 15 grams of carb (8 oz juice or regular soda) and recheck in 15 minutes.    Follow up with podiatry and eye doctor annually.   Patients need to wear covered shoes all the time and check feet daily.   Bring your meter/BG log to the next visit.

## 2025-05-13 NOTE — PROGRESS NOTES
Follow up Visit:  DM.  Requesting Physician:   Macario Wallace MD      CHIEF COMPLAINT:    Chief Complaint   Patient presents with    Diabetes     F/u        HISTORY OF PRESENT ILLNESS:   Mando Puri is a 65 year old male who presents with complicated DM, CAD s/p 2 stents, carotid stenosis  post surgery, obese, HTN, dlp , PAD s/p 2 partial toe amputation and hypothyroid     Last amputation was done last week 2025.            DM Dx   On metformin 1000 mg bid   Mounjaro 15 mg/week   Taking  Glipizide at night     Tried Jardiance but stopped d/t polyuria no uti or yeast infection  Stopped Farxiga d/t polyuria       W/u showed 2024 Last A1c value was 7.3% done 2025. BG is 267 high   Has polyuria from SGLT2i and possible increase coffee intake. Decreased his coffee intake to 1 cup a day     DLP Cholesterol Meds: rosuvastatin Tabs - 40 MG      Hypothyroid Levothryoxine 125 mcg  at night as rec'      HTN on  BP Meds: lisinopril Tabs - 5 MG; metoprolol tartrate Tabs - 25 MG       Lab Results   Component Value Date    A1C 7.3 (H) 2025    A1C 6.4 (H) 2024    A1C 7.3 (A) 2024    A1C 10.5 (A) 2024    A1C 8.1 (H) 2024      DM quality measures:  A1C/Blood pressure: as reported above   Nephropathy screenin2024 , continue ace /arb rx.   LIPID screenin2024  . On statin rx.   Last dilated eye exam: Last Dilated Eye Exam: 24     Exam shows retinopathy? Eye Exam shows Diabetic Retinopathy?: No   no  Last diabetic foot exam: Last Foot Exam: 25     Dentist : recommend every 6m  Neuropathy: yes in LE   CAD/ASCVD/PAD/CVA: 2 stents, carotid stenosis s/p procedure,   Cholesterol Meds: rosuvastatin Tabs - 40 MG   Cholesterol: 103, done on 2024.  HDL Cholesterol: 39, done on 2024.  LDL Cholesterol: 43, done on 2024.  TriGlycerides 113, done on 2024.         SSx     Door dash  No smoking  Etoh no   No drugs    Lost wt   Wt Readings  from Last 6 Encounters:   05/13/25 (!) 307 lb (139.3 kg)   05/13/25 (!) 307 lb (139.3 kg)   05/07/25 (!) 310 lb 3.2 oz (140.7 kg)   05/10/25 (!) 310 lb (140.6 kg)   04/21/25 (!) 308 lb (139.7 kg)   02/21/25 300 lb (136.1 kg)     Micro Albumen/Creatinine:    Lab Results   Component Value Date    MICROALBCREA 92.4 (H) 01/02/2025    MICROALBCREA 42.2 (H) 06/16/2022    MICROALBCREA 18.4 10/28/2021       ASSESSMENT AND PLAN:  65 year old man with complicated DM, CAD s/p 2 stents, carotid stenosis  post surgery, obese, HTN, dlp , PAD s/p partial toe amputation and hypothyroid   He does not have HF but has CAD so will benefit from SGLT2i and GLP-1. He did not tolearate SGLT2i x2     He had another toe amputation in5/2025   Asked about paperwork for disability   Uses walker     5/13/2025    Plan      Labs and RTC in 3 mo     Metformin 1000 mg twice a day   Mounjaro 15 mg /week   Glipizide  5 mg once a a day before breakfast  . Cut in half if getting low BG    Thyroid medication dose Levothyroxine 125 mcg  at bedtime. Take you medication on empty stomach, not with any other medication or food. Wait 60 minutes before eating. Wait 4 hours before taking Vitamins, Calcium or iron.  Wait 60 minutes before eating. Do not take the medication on the morning of the lab test. Do not take Biotin/Turmeric 1 week before the test.      Cholesterol Meds: rosuvastatin Tabs - 40 MG     BP Meds: lisinopril Tabs - 5 MG; metoprolol tartrate Tabs - 25 MG       If you have low blood sugar <70, take 15 grams of carb (8 oz juice or regular soda) and recheck in 15 minutes.    Follow up with podiatry and eye doctor annually.   Patients need to wear covered shoes all the time and check feet daily.   Bring your meter/BG log to the next visit.      PAST MEDICAL HISTORY:   Past Medical History:    Benign head tremor    BPH (benign prostatic hyperplasia)    Diabetes (HCC)    Disorder of thyroid    Diverticulitis    Former smoker    High blood  pressure    High cholesterol    Hyperlipidemia    Hypertension    Neuropathic pain    Non-pressure chronic ulcer of right lower leg, limited to breakdown of skin (HCC)    Obesity    Sleep apnea    Snoring    Stroke (HCC)    Thyroid disease       PAST SURGICAL HISTORY:   Past Surgical History:   Procedure Laterality Date    Carotid endarterectomy Right 04/29/2021    Surgeon: Dr. Rajiv [facility] at [facility]    Neck/chest procedure unlisted      per patient, a blockage was removed from the right side of his neck in 8/2021    Other surgical history  2017    Small bowel Res.    Other surgical history  11/12/2019    Colectomy    Pain management N/A 11/15/2024    Dr.Behar; Right knee Genicular Radiofrequency Neurotomy        CURRENT MEDICATIONS:    Current Outpatient Medications   Medication Sig Dispense Refill    glipiZIDE 5 MG Oral Tab Take 1 tablet (5 mg total) by mouth every morning before breakfast. 90 tablet 0    rosuvastatin 40 MG Oral Tab Take 1 tablet (40 mg total) by mouth nightly. FOR CHOLESTEROL. 90 tablet 1    Tirzepatide (MOUNJARO) 15 MG/0.5ML Subcutaneous Solution Auto-injector Inject 15 mg into the skin once a week. FOR DIABETES. Continuation of therapy. 6 mL 9    lisinopril 5 MG Oral Tab Take 1 tablet (5 mg total) by mouth daily. 90 tablet 1    levothyroxine 125 MCG Oral Tab Take 1 tablet (125 mcg total) by mouth nightly. TAKE AT BEDTIME 90 tablet 1    metFORMIN HCl 1000 MG Oral Tab Take 1 tablet (1,000 mg total) by mouth 2 (two) times daily with meals. 180 tablet 1    cefadroxil 500 MG Oral Cap Take 2 capsules (1,000 mg total) by mouth 2 (two) times daily for 7 days. 28 capsule 0    DULoxetine 30 MG Oral Cap DR Particles Take 1 capsule (30 mg total) by mouth daily. TAKE 1 CAPSULE IN THE MORNING FOR ANXIETY/DEPRESSION/ARTHRITIS PAIN 90 capsule 1    finasteride 5 MG Oral Tab Take 1 tablet (5 mg total) by mouth daily. FOR PROSTATE. 90 tablet 1    metoprolol tartrate 25 MG Oral Tab Take 1 tablet (25 mg total) by  mouth 2 (two) times daily. FOR HEART. 180 tablet 1    pantoprazole 40 MG Oral Tab EC Take 1 tablet (40 mg total) by mouth every morning before breakfast. 90 tablet 1    ticagrelor 90 MG Oral Tab Take 1 tablet (90 mg total) by mouth every 12 (twelve) hours. FOR HEART STENTS. 180 tablet 9    tamsulosin 0.4 MG Oral Cap Take 2 capsules (0.8 mg total) by mouth daily. FOR PROSTATE. 180 capsule 9    pregabalin 100 MG Oral Cap Take 1 capsule (100 mg total) by mouth every 8 (eight) hours. FOR NERVE PAIN. 270 capsule 1    aspirin (ASPIRIN 81) 81 MG Oral Tab EC Take 1 tablet (81 mg total) by mouth daily. FOR HEART. 90 tablet 9    Multiple Vitamins-Minerals (MULTIVITAMIN MEN 50+) Oral Tab Take 1 tablet by mouth in the morning.         ALLERGIES:  Allergies   Allergen Reactions    Empagliflozin OTHER (SEE COMMENTS)     Frequent urination, unbearable.    Penicillins UNKNOWN     Patient does not recall reaction    Other UNKNOWN       SOCIAL HISTORY:    Social History     Socioeconomic History    Marital status:     Number of children: 4   Tobacco Use    Smoking status: Former     Current packs/day: 0.00     Types: Cigarettes     Quit date:      Years since quittin.3     Passive exposure: Past    Smokeless tobacco: Never   Vaping Use    Vaping status: Never Used   Substance and Sexual Activity    Alcohol use: Never    Drug use: Never   Other Topics Concern    Caffeine Concern Yes     Comment: coffee daily    Exercise No       FAMILY HISTORY:   Family History   Problem Relation Age of Onset    Cancer Father         Prostate    Heart Disorder Father     Cancer Mother         PHYSICAL EXAM:   Height: 6' 2\" (188 cm) (1359)  Weight: 307 lb (139.3 kg) (1359)  BSA (Calculated - sq m): 2.61 sq meters (1359)  Pulse: 84 (1359)  BP: 104/60 (1359)  Temp: --  Do Not Use - Resp Rate: --  SpO2: --    Body mass index is 39.42 kg/m².  Obese   Walker   Lt foot dressing     Scar on the neck from carotid  surgery       DATA:     Pertinent data reviewed      (H): Data is abnormally high  POC HemoCue Glucose 201 (Finger stick glucose)        Component Value Flag Ref Range Units Status    GLUCOSE BLOOD 186        Final    Test Strip Lot # [id number]       Numeric Final    Test Strip Expiration Date 7/26/25       Date Final                             Orders Placed This Encounter   Procedures    POC HemoCue Glucose 201 (Finger stick glucose)    Lipid Panel    TSH W Reflex To Free T4     Orders Placed This Encounter    POC HemoCue Glucose 201 (Finger stick glucose)     Release to patient:   Immediate    Lipid Panel     Standing Status:   Future     Number of Occurrences:   1     Expected Date:   7/29/2025     Expiration Date:   5/13/2026     Release to patient:   Immediate    TSH W Reflex To Free T4     Standing Status:   Future     Number of Occurrences:   1     Expected Date:   7/29/2025     Expiration Date:   5/13/2026     Release to patient:   Immediate    glipiZIDE 5 MG Oral Tab     Sig: Take 1 tablet (5 mg total) by mouth every morning before breakfast.     Dispense:  90 tablet     Refill:  0    rosuvastatin 40 MG Oral Tab     Sig: Take 1 tablet (40 mg total) by mouth nightly. FOR CHOLESTEROL.     Dispense:  90 tablet     Refill:  1    Tirzepatide (MOUNJARO) 15 MG/0.5ML Subcutaneous Solution Auto-injector     Sig: Inject 15 mg into the skin once a week. FOR DIABETES. Continuation of therapy.     Dispense:  6 mL     Refill:  9    lisinopril 5 MG Oral Tab     Sig: Take 1 tablet (5 mg total) by mouth daily.     Dispense:  90 tablet     Refill:  1    levothyroxine 125 MCG Oral Tab     Sig: Take 1 tablet (125 mcg total) by mouth nightly. TAKE AT BEDTIME     Dispense:  90 tablet     Refill:  1          This is a specialized patient consultation in endocrinology and required comprehensive review of prior records, as well as current evaluation, with time required for consideration of complex endocrine issues and  consultation. For this visit, I personally interviewed the patient, and family member if accompanied, performed the pertinent parts of the history and physical examination. ROS included screening for appropriate endocrine conditions.   Today's diagnosis and plan were reviewed in detail with the patient who states understanding and agrees with plan. I discussed with the patient possible diagnosis, differential diagnosis, need for work up , treatment options, alternatives and side effects.     Please see note for details about time spent which includes:   · pre-visit preparation  · reviewing records  · face to face time with the patient   · timely documentation of the encounter  · ordering medications/tests  · communication with care team  · care coordination    I appreciate the opportunity to be part of your patient's medical care and will keep you, as the referring and primary physicians, informed about the care of your patient, including possible future surgery and pathology findings. Please feel free to contact me should you have any questions.      Brittany Shah MD

## 2025-05-14 NOTE — TELEPHONE ENCOUNTER
Future Appointments   Date Time Provider Department [place]   5/15/2025  9:00 AM Lurdes Baxter DPM ECCFHPOD EC [facility]   6/23/2025 11:00 AM Behar, Alex, MD PM&R ELM [facility]   8/14/2025  9:00 AM Brittany Shah MD EMMGDGENDO EC [facility]   8/18/2025  9:45 AM Pollo Alcantara MD ECADOIM EC [facility]

## 2025-05-15 NOTE — PROGRESS NOTES
[facility] Podiatry  Progress Note      Mason Puri is a 65 year old male.   Chief Complaint   Patient presents with    Post-Op     1st POV for Partial left second digit amputation, DOS 05/10/2025. Pain 2/10,  yesterday am.             HPI:     Patient is a pleasant 65-year-old diabetic male that is status post left second digit partial toe amputation on 5/10/25.  Patient has kept the dressing clean dry and intact.  He has been ambulating in a surgical shoe assisted by a walker.  Denies any signs of infection.  Admits to mild pain rating it 2 out of 10.    Allergies: Empagliflozin, Penicillins, and Other    Current Medications[1]   Past Medical History[2]   Past Surgical History[3]   Family History[4]   Social Hx on file[5]        REVIEW OF SYSTEMS:     Denies nause, fever, chills  No calf pain  Denies chest pain or SOB      EXAM:   There were no vitals taken for this visit.  GENERAL: well developed, well nourished, in no apparent distress  EXTREMITIES:   1. Integument: Normal skin temperature and turgor.  Partial left second digit amputation with sutures intact and no signs of dehiscence.  No signs of infection to entire left foot  2. Vascular: Dorsalis pedis two out of four bilateral and posterior tibial pulses two out of   four bilateral, capillary refill normal.   3. Musculoskeletal: All muscle groups are graded 5 out of 5 in the foot and ankle.  Partial left 2nd and 3rd digit amputations   4. Neurological: Normal sharp dull sensation; reflexes normal.      MRI LOWER LEG, LEFT (CPT=73718)  Result Date: 5/10/2025  CONCLUSION:  1. No discrete soft tissue mass or fluid collection in the lateral aspect of the left calf at the site of the palpable lump indicated by the patient. 2. Diffuse subcutaneous edema throughout the visualized aspect of the left calf, which is nonspecific and could relate to the patient's cardiac/fluid status.  However, correlate clinically to exclude cellulitis. 3. No  evidence of active osteomyelitis. 4. Lesser incidental findings as above.    Dictated by (CST): Osmar Valenzuela MD on 5/10/2025 at 12:36 PM     Finalized by (CST): Osmar Valenzuela MD on 5/10/2025 at 12:42 PM          MRI FOOT (W+WO), LEFT (CPT=73720)  Result Date: 5/8/2025  CONCLUSION:   Soft tissue and skin ulceration within the 2nd digit.  Mild early osteomyelitis of the 2nd digit distal phalanx which is new since 1/29/2025.  Mild forefoot osteoarthritis.  Dorsal foot and 2nd digit cellulitis.  No abscess.    Dictated by (CST): Cam Anguiano MD on 5/08/2025 at 12:57 PM     Finalized by (CST): Cam Anguiano MD on 5/08/2025 at 1:00 PM          US VENOUS DOPPLER LEG LEFT - DIAG IMG (CPT=93971)  Result Date: 5/7/2025  CONCLUSION:  1. Left greater saphenous vein superficial thrombus.  Otherwise no left lower extremity DVT.    Dictated by (CST): Ismale Hogue MD on 5/07/2025 at 1:13 PM     Finalized by (CST): Ismael Hogue MD on 5/07/2025 at 1:14 PM             ASSESSMENT AND PLAN:   Diagnoses and all orders for this visit:    Type 2 diabetes mellitus with polyneuropathy (HCC)    History of amputation of left second toe (HCC)        Plan:     Patient seen and examined and findings discussed with patient.  Left foot dressing reapplied with sterile bacitracin, 2 x 2 gauze, Srinivas and mildly compressive Coban.  Advised patient to leave the dressing clean dry and intact until next clinical visit.  Advised patient to keep walking to a minimum.  Use the surgical shoe and a walker for assistance.  Elevate and rest left lower extremity.  Take over-the-counter NSAIDs as needed for pain.  Return to clinic for follow-up in 1 week    The patient indicates understanding of these issues and agrees to the plan.        Lurdes Baxter DPM        [1]   Current Outpatient Medications   Medication Sig Dispense Refill    glipiZIDE 5 MG Oral Tab Take 1 tablet (5 mg total) by mouth every morning before breakfast. 90 tablet  0    rosuvastatin 40 MG Oral Tab Take 1 tablet (40 mg total) by mouth nightly. FOR CHOLESTEROL. 90 tablet 1    Tirzepatide (MOUNJARO) 15 MG/0.5ML Subcutaneous Solution Auto-injector Inject 15 mg into the skin once a week. FOR DIABETES. Continuation of therapy. 6 mL 9    lisinopril 5 MG Oral Tab Take 1 tablet (5 mg total) by mouth daily. 90 tablet 1    levothyroxine 125 MCG Oral Tab Take 1 tablet (125 mcg total) by mouth nightly. TAKE AT BEDTIME 90 tablet 1    cefadroxil 500 MG Oral Cap Take 2 capsules (1,000 mg total) by mouth 2 (two) times daily for 7 days. 28 capsule 0    DULoxetine 30 MG Oral Cap DR Particles Take 1 capsule (30 mg total) by mouth daily. TAKE 1 CAPSULE IN THE MORNING FOR ANXIETY/DEPRESSION/ARTHRITIS PAIN 90 capsule 1    finasteride 5 MG Oral Tab Take 1 tablet (5 mg total) by mouth daily. FOR PROSTATE. 90 tablet 1    metoprolol tartrate 25 MG Oral Tab Take 1 tablet (25 mg total) by mouth 2 (two) times daily. FOR HEART. 180 tablet 1    pantoprazole 40 MG Oral Tab EC Take 1 tablet (40 mg total) by mouth every morning before breakfast. 90 tablet 1    ticagrelor 90 MG Oral Tab Take 1 tablet (90 mg total) by mouth every 12 (twelve) hours. FOR HEART STENTS. 180 tablet 9    tamsulosin 0.4 MG Oral Cap Take 2 capsules (0.8 mg total) by mouth daily. FOR PROSTATE. 180 capsule 9    pregabalin 100 MG Oral Cap Take 1 capsule (100 mg total) by mouth every 8 (eight) hours. FOR NERVE PAIN. 270 capsule 1    aspirin (ASPIRIN 81) 81 MG Oral Tab EC Take 1 tablet (81 mg total) by mouth daily. FOR HEART. 90 tablet 9    metFORMIN HCl 1000 MG Oral Tab Take 1 tablet (1,000 mg total) by mouth 2 (two) times daily with meals. 180 tablet 1    Multiple Vitamins-Minerals (MULTIVITAMIN MEN 50+) Oral Tab Take 1 tablet by mouth in the morning.     [2]   Past Medical History:   Benign head tremor    BPH (benign prostatic hyperplasia)    Diabetes (HCC)    Disorder of thyroid    Diverticulitis    Former smoker    High blood pressure     High cholesterol    Hyperlipidemia    Hypertension    Neuropathic pain    Non-pressure chronic ulcer of right lower leg, limited to breakdown of skin (HCC)    Obesity    Sleep apnea    Snoring    Stroke (HCC)    Thyroid disease   [3]   Past Surgical History:  Procedure Laterality Date    Carotid endarterectomy Right 2021    Surgeon: Dr. Rajiv Sol[facility] at [facility]    Neck/chest procedure unlisted      per patient, a blockage was removed from the right side of his neck in 2021    Other surgical history      Small bowel Res.    Other surgical history  2019    Colectomy    Pain management N/A 11/15/2024    Dr.Behar; Right knee Genicular Radiofrequency Neurotomy   [4]   Family History  Problem Relation Age of Onset    Cancer Father         Prostate    Heart Disorder Father     Cancer Mother    [5]   Social History  Socioeconomic History    Marital status:     Number of children: 4   Tobacco Use    Smoking status: Former     Current packs/day: 0.00     Types: Cigarettes     Quit date:      Years since quittin.3     Passive exposure: Past    Smokeless tobacco: Never   Vaping Use    Vaping status: Never Used   Substance and Sexual Activity    Alcohol use: Never    Drug use: Never   Other Topics Concern    Caffeine Concern Yes     Comment: coffee daily    Exercise No

## 2025-05-16 NOTE — PROCEDURES
Received order requesting to update Primary Care Physician (PCP) to Dr. Pollo Alcantara is Approved and finalized on May 16, 2025.    Removed Alvino Wallace MD as the patient's Primary Care Physician

## 2025-05-19 NOTE — TELEPHONE ENCOUNTER
Long term disability forms with Release of Information that is missing signature. Forms are all for patient to fill out and not for provider. Left message for patient to see if he has other forms to be completed. Long term disability forms have also been completed by other providers.

## 2025-05-21 NOTE — TELEPHONE ENCOUNTER
,    Patient is requesting long term disability starting 4/23/25 due to diabetes.    Do you support?    Thank you,  Raquel

## 2025-05-21 NOTE — TELEPHONE ENCOUNTER
Called patient left voicemail to call back for clarification on Disability for his knee. Tasking provider.

## 2025-05-21 NOTE — TELEPHONE ENCOUNTER
Dr Behar,     Patient is requesting long term disability due to Primary osteoarthritis of Rt knee, Patellofemoral pain syndrome, limited mobility.     Do you support?     Thank you for your time,   Marissa

## 2025-05-21 NOTE — TELEPHONE ENCOUNTER
Patient called back stated he received a call. Informed patient  called him to inform him that the 3 forms for 3 different providers are in the process of being completed but we have to task providers for approval. Long term disability Forms below located and labeled as 3-3.     Type of Leave: long term disability   Reason for Leave: diabetes   Start date of leave: 4/23/25  End date of leave: long term disability   How many flare ups per month/length?:  How many appts per month/length?:   Was Fee and Turnaround info Given?: yes

## 2025-05-22 NOTE — TELEPHONE ENCOUNTER
Dr. Garcia      Please sign off on form if you agree to: FORMS  Type of Leave: long term disability   Reason for Leave: diabetes   (place your signature on the first page only)    -From your Inbasket, Highlight the patient and click Chart   -Double click the 5/13/25 Forms Completion telephone encounter  -Scroll down to the Media section   -Click the blue Hyperlink: DISABILITY DR GARCIA 5/22/25   -Click Acknowledge located in the top right ribbon/menu   -Drag the mouse into the blank space of the document and a + sign will appear. Left click to   electronically sign the document.     Thank you,   Marissa  Forms Dept.

## 2025-05-22 NOTE — PROGRESS NOTES
[facility] Podiatry  Progress Note      Mason Puri is a 65 year old male.   Chief Complaint   Patient presents with    Post-Op     S/p L 2nd digit PA sx on  5/10/2025- rates pain 2/10 most of the time- pt ambulates w/ post op shoe and cane             HPI:     Patient is a pleasant 65-year-old diabetic male that is status post left second digit partial toe amputation on 5/10/25.  Patient has kept the dressing clean dry and intact.  He has been ambulating in a surgical shoe assisted by a walker.  Denies any signs of infection.  Admits to mild pain rating it 2 out of 10.  Patient states that he recently washed his hair and his left foot dressing might have gotten wet.  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    Allergies: Empagliflozin, Penicillins, and Other    Current Medications[1]   Past Medical History[2]   Past Surgical History[3]   Family History[4]   Social Hx on file[5]        REVIEW OF SYSTEMS:     Denies nause, fever, chills  No calf pain  Denies chest pain or SOB      EXAM:   There were no vitals taken for this visit.  GENERAL: well developed, well nourished, in no apparent distress  EXTREMITIES:   1. Integument: Normal skin temperature and turgor.  Partial left second digit amputation with sutures intact and no signs of dehiscence.  No signs of infection to entire left foot  2. Vascular: Dorsalis pedis two out of four bilateral and posterior tibial pulses two out of   four bilateral, capillary refill normal.   3. Musculoskeletal: All muscle groups are graded 5 out of 5 in the foot and ankle.  Partial left 2nd and 3rd digit amputations   4. Neurological: Normal sharp dull sensation; reflexes normal.      MRI LOWER LEG, LEFT (CPT=73718)  Result Date: 5/10/2025  CONCLUSION:  1. No discrete soft tissue mass or fluid collection in the lateral aspect of the left calf at the site of the palpable lump indicated by the patient. 2. Diffuse subcutaneous edema throughout the  visualized aspect of the left calf, which is nonspecific and could relate to the patient's cardiac/fluid status.  However, correlate clinically to exclude cellulitis. 3. No evidence of active osteomyelitis. 4. Lesser incidental findings as above.    Dictated by (CST): Osmar Valenzuela MD on 5/10/2025 at 12:36 PM     Finalized by (CST): Osmar Valenzuela MD on 5/10/2025 at 12:42 PM             ASSESSMENT AND PLAN:   Diagnoses and all orders for this visit:    Type 2 diabetes mellitus with polyneuropathy (HCC)    History of amputation of left second toe (HCC)    Status post left foot surgery        Plan:     Patient seen and examined and findings discussed with patient.  Left foot dressing reapplied with sterile iodine, 2 x 2 gauze, Srinivas and mildly compressive Coban.  Advised patient to leave the dressing clean dry and intact until next clinical visit.  Advised patient to keep walking to a minimum.  Use the surgical shoe and a walker for assistance.  Elevate and rest left lower extremity.  Take over-the-counter NSAIDs as needed for pain. 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  Return to clinic for follow-up in 1 week    The patient indicates understanding of these issues and agrees to the plan.        Lurdes Baxter DPM          [1]   Current Outpatient Medications   Medication Sig Dispense Refill    glipiZIDE 5 MG Oral Tab Take 1 tablet (5 mg total) by mouth every morning before breakfast. 90 tablet 0    rosuvastatin 40 MG Oral Tab Take 1 tablet (40 mg total) by mouth nightly. FOR CHOLESTEROL. 90 tablet 1    Tirzepatide (MOUNJARO) 15 MG/0.5ML Subcutaneous Solution Auto-injector Inject 15 mg into the skin once a week. FOR DIABETES. Continuation of therapy. 6 mL 9    lisinopril 5 MG Oral Tab Take 1 tablet (5 mg total) by mouth daily. 90 tablet 1    levothyroxine 125 MCG Oral Tab Take 1 tablet (125 mcg total) by mouth nightly. TAKE AT BEDTIME 90 tablet 1    DULoxetine 30 MG Oral Cap DR Particles Take 1 capsule  (30 mg total) by mouth daily. TAKE 1 CAPSULE IN THE MORNING FOR ANXIETY/DEPRESSION/ARTHRITIS PAIN 90 capsule 1    finasteride 5 MG Oral Tab Take 1 tablet (5 mg total) by mouth daily. FOR PROSTATE. 90 tablet 1    metoprolol tartrate 25 MG Oral Tab Take 1 tablet (25 mg total) by mouth 2 (two) times daily. FOR HEART. 180 tablet 1    pantoprazole 40 MG Oral Tab EC Take 1 tablet (40 mg total) by mouth every morning before breakfast. 90 tablet 1    ticagrelor 90 MG Oral Tab Take 1 tablet (90 mg total) by mouth every 12 (twelve) hours. FOR HEART STENTS. 180 tablet 9    tamsulosin 0.4 MG Oral Cap Take 2 capsules (0.8 mg total) by mouth daily. FOR PROSTATE. 180 capsule 9    pregabalin 100 MG Oral Cap Take 1 capsule (100 mg total) by mouth every 8 (eight) hours. FOR NERVE PAIN. 270 capsule 1    aspirin (ASPIRIN 81) 81 MG Oral Tab EC Take 1 tablet (81 mg total) by mouth daily. FOR HEART. 90 tablet 9    metFORMIN HCl 1000 MG Oral Tab Take 1 tablet (1,000 mg total) by mouth 2 (two) times daily with meals. 180 tablet 1    Multiple Vitamins-Minerals (MULTIVITAMIN MEN 50+) Oral Tab Take 1 tablet by mouth in the morning.     [2]   Past Medical History:   Benign head tremor    BPH (benign prostatic hyperplasia)    Diabetes (HCC)    Disorder of thyroid    Diverticulitis    Former smoker    High blood pressure    High cholesterol    Hyperlipidemia    Hypertension    Neuropathic pain    Non-pressure chronic ulcer of right lower leg, limited to breakdown of skin (HCC)    Obesity    Sleep apnea    Snoring    Stroke (HCC)    Thyroid disease   [3]   Past Surgical History:  Procedure Laterality Date    Carotid endarterectomy Right 04/29/2021    Surgeon: Dr. Rajiv [facility] at [facility]    Neck/chest procedure unlisted      per patient, a blockage was removed from the right side of his neck in 8/2021    Other surgical history  2017    Small bowel Res.    Other surgical history  11/12/2019    Colectomy    Pain management N/A 11/15/2024    Dr.Behar;  Right knee Genicular Radiofrequency Neurotomy   [4]   Family History  Problem Relation Age of Onset    Cancer Father         Prostate    Heart Disorder Father     Cancer Mother    [5]   Social History  Socioeconomic History    Marital status:     Number of children: 4   Tobacco Use    Smoking status: Former     Current packs/day: 0.00     Types: Cigarettes     Quit date:      Years since quittin.     Passive exposure: Past    Smokeless tobacco: Never   Vaping Use    Vaping status: Never Used   Substance and Sexual Activity    Alcohol use: Never    Drug use: Never   Other Topics Concern    Caffeine Concern Yes     Comment: coffee daily    Exercise No

## 2025-05-22 NOTE — TELEPHONE ENCOUNTER
Dr. Behar      Please sign off on form if you agree to: FORMS  long term disability due to Primary osteoarthritis of Rt knee, Patellofemoral pain syndrome, limited mobility.   (place your signature on the first page only)    -From your Inbasket, Highlight the patient and click Chart   -Double click the 05/05/25 Forms Completion telephone encounter  -Scroll down to the Media section   -Click the blue Hyperlink:Disability Dr Behar 5/22/25   -Click Acknowledge located in the top right ribbon/menu   -Drag the mouse into the blank space of the document and a + sign will appear. Left click to   electronically sign the document.     Thank you,   Marissa

## 2025-05-23 NOTE — TELEPHONE ENCOUNTER
Patient called stating forms are being faxed to telephone number. Checked Release of Information and informed patient forms are being faxed to the number that is written into fax field. Patient states he switched the phone and fax number on accident and wrote \"fax\" next to the \"phone number\". Checked with leadership VV and was given OK to fax to this number.    Informed patient that forms are for [employer], a disability company, and they are the third party requesting paperwork back, however patient insisted these forms be faxed to his HR department. Efaxed both forms to [phone] ATTN ALVARO MAGALLON.

## 2025-05-23 NOTE — TELEPHONE ENCOUNTER
Patient called stating someone from forms dept called him I do not see any documentation that we called informed patient forms completed and faxed waiting on confirmation.

## 2025-05-23 NOTE — TELEPHONE ENCOUNTER
Patient called stating forms are being faxed to telephone number. Checked Release of Information and informed patient forms are being faxed to the number that is written into fax field. Patient states he switched the phone and fax number on accident and wrote \"fax\" next to the \"phone number\". Checked with leadership [employer] and was given OK to fax to this number.    Informed patient that forms are for [employer], a disability company, and they are the third party requesting paperwork back, however patient insisted these forms be faxed to his HR department.

## 2025-05-23 NOTE — TELEPHONE ENCOUNTER
Forms completed and signed. [employer] message sent. Per SANDOR to fax to Renee Cueto [phone], awaiting confirmation

## 2025-05-23 NOTE — TELEPHONE ENCOUNTER
Patient called back to say that his HR did not receive the fax. I E-faxed to Renee Cueto [phone]. I advised him I would keep checking for fax confirmation. Patient verbally understood.

## 2025-05-29 NOTE — PROGRESS NOTES
[facility] Podiatry  Progress Note      Mason Puri is a 65 year old male.   Chief Complaint   Patient presents with    Diabetic Foot Care     S/p L 2nd digit PA sx on  5/10/2025- pain rated 2/10- redness             HPI:     Patient is a pleasant 65-year-old diabetic male that is status post left second digit partial toe amputation on 5/10/25.  Patient has kept the dressing clean dry and intact.  He has been ambulating in a surgical shoe assisted by a walker.  Denies any signs of infection.  Admits to mild pain rating it 2 out of 10.  Patient states that he recently washed his hair and his left foot dressing might have gotten wet.  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    Allergies: Empagliflozin, Penicillins, and Other    Current Medications[1]   Past Medical History[2]   Past Surgical History[3]   Family History[4]   Social Hx on file[5]        REVIEW OF SYSTEMS:     Denies nause, fever, chills  No calf pain  Denies chest pain or SOB      EXAM:   There were no vitals taken for this visit.  GENERAL: well developed, well nourished, in no apparent distress  EXTREMITIES:   1. Integument: Normal skin temperature and turgor.  Partial left second digit amputation with sutures intact and no signs of dehiscence.  No signs of infection to entire left foot  2. Vascular: Dorsalis pedis two out of four bilateral and posterior tibial pulses two out of   four bilateral, capillary refill normal.   3. Musculoskeletal: All muscle groups are graded 5 out of 5 in the foot and ankle.  Partial left 2nd and 3rd digit amputations   4. Neurological: Normal sharp dull sensation; reflexes normal.               ASSESSMENT AND PLAN:   Diagnoses and all orders for this visit:    Type 2 diabetes mellitus with polyneuropathy (HCC)    History of amputation of left second toe (HCC)    Ulcer of toe of left foot, with fat layer exposed (HCC)    Other orders  -     sulfamethoxazole-trimethoprim -160 MG  Oral Tab per tablet; Take 1 tablet by mouth 2 (two) times daily for 7 days.          Plan:     All sutures from amputation site were removed with mild dehiscence noted on the plantar aspect.  Informed patient that there is also some maceration and malodor.  Will place patient on oral Bactrim to be taken as directed.  Perform daily dressing changes with gauze and Betadine.  Follow-up in clinic in 1 week.  Keep weightbearing to minimum.  Remain in the surgical shoe.    The patient indicates understanding of these issues and agrees to the plan.        Lurdes Baxter DPM          [1]   Current Outpatient Medications   Medication Sig Dispense Refill    sulfamethoxazole-trimethoprim -160 MG Oral Tab per tablet Take 1 tablet by mouth 2 (two) times daily for 7 days. 14 tablet 0    glipiZIDE 5 MG Oral Tab Take 1 tablet (5 mg total) by mouth every morning before breakfast. 90 tablet 0    rosuvastatin 40 MG Oral Tab Take 1 tablet (40 mg total) by mouth nightly. FOR CHOLESTEROL. 90 tablet 1    Tirzepatide (MOUNJARO) 15 MG/0.5ML Subcutaneous Solution Auto-injector Inject 15 mg into the skin once a week. FOR DIABETES. Continuation of therapy. 6 mL 9    lisinopril 5 MG Oral Tab Take 1 tablet (5 mg total) by mouth daily. 90 tablet 1    levothyroxine 125 MCG Oral Tab Take 1 tablet (125 mcg total) by mouth nightly. TAKE AT BEDTIME 90 tablet 1    DULoxetine 30 MG Oral Cap DR Particles Take 1 capsule (30 mg total) by mouth daily. TAKE 1 CAPSULE IN THE MORNING FOR ANXIETY/DEPRESSION/ARTHRITIS PAIN 90 capsule 1    finasteride 5 MG Oral Tab Take 1 tablet (5 mg total) by mouth daily. FOR PROSTATE. 90 tablet 1    metoprolol tartrate 25 MG Oral Tab Take 1 tablet (25 mg total) by mouth 2 (two) times daily. FOR HEART. 180 tablet 1    pantoprazole 40 MG Oral Tab EC Take 1 tablet (40 mg total) by mouth every morning before breakfast. 90 tablet 1    ticagrelor 90 MG Oral Tab Take 1 tablet (90 mg total) by mouth every 12 (twelve)  hours. FOR HEART STENTS. 180 tablet 9    tamsulosin 0.4 MG Oral Cap Take 2 capsules (0.8 mg total) by mouth daily. FOR PROSTATE. 180 capsule 9    pregabalin 100 MG Oral Cap Take 1 capsule (100 mg total) by mouth every 8 (eight) hours. FOR NERVE PAIN. 270 capsule 1    aspirin (ASPIRIN 81) 81 MG Oral Tab EC Take 1 tablet (81 mg total) by mouth daily. FOR HEART. 90 tablet 9    metFORMIN HCl 1000 MG Oral Tab Take 1 tablet (1,000 mg total) by mouth 2 (two) times daily with meals. 180 tablet 1    Multiple Vitamins-Minerals (MULTIVITAMIN MEN 50+) Oral Tab Take 1 tablet by mouth in the morning.     [2]   Past Medical History:   Benign head tremor    BPH (benign prostatic hyperplasia)    Diabetes (HCC)    Disorder of thyroid    Diverticulitis    Former smoker    High blood pressure    High cholesterol    Hyperlipidemia    Hypertension    Neuropathic pain    Non-pressure chronic ulcer of right lower leg, limited to breakdown of skin (HCC)    Obesity    Sleep apnea    Snoring    Stroke (HCC)    Thyroid disease   [3]   Past Surgical History:  Procedure Laterality Date    Carotid endarterectomy Right 2021    Surgeon: Dr. Rajiv Solorio at [facility]    Neck/chest procedure unlisted      per patient, a blockage was removed from the right side of his neck in 2021    Other surgical history  2017    Small bowel Res.    Other surgical history  2019    Colectomy    Pain management N/A 11/15/2024    Dr.Behar; Right knee Genicular Radiofrequency Neurotomy   [4]   Family History  Problem Relation Age of Onset    Cancer Father         Prostate    Heart Disorder Father     Cancer Mother    [5]   Social History  Socioeconomic History    Marital status:     Number of children: 4   Tobacco Use    Smoking status: Former     Current packs/day: 0.00     Types: Cigarettes     Quit date:      Years since quittin.4     Passive exposure: Past    Smokeless tobacco: Never   Vaping Use    Vaping status: Never Used   Substance and  Sexual Activity    Alcohol use: Never    Drug use: Never   Other Topics Concern    Caffeine Concern Yes     Comment: coffee daily    Exercise No

## 2025-06-05 NOTE — PROGRESS NOTES
[facility] Podiatry  Progress Note      Mason Puri is a 65 year old male.   Chief Complaint   Patient presents with    Foot Pain     S/p Left 2nd digit PA F/u sx on  5/10/2025- - pain rated 4/10             HPI:     Patient is a pleasant 65-year-old diabetic male that is status post left second digit partial toe amputation on 5/10/25.  Patient has kept the dressing clean dry and intact.  He has been ambulating in a surgical shoe assisted by a walker.  Denies any signs of infection.  Admits to mild pain rating it 2 out of 10.  Patient states that the wound has improved.  Allergies: Empagliflozin, Penicillins, and Other    Current Medications[1]   Past Medical History[2]   Past Surgical History[3]   Family History[4]   Social Hx on file[5]        REVIEW OF SYSTEMS:     Denies nause, fever, chills  No calf pain  Denies chest pain or SOB      EXAM:   There were no vitals taken for this visit.  GENERAL: well developed, well nourished, in no apparent distress  EXTREMITIES:   1. Integument: Normal skin temperature and turgor.  Partial left second digit amputation with small ulceration limited to skin breakdown.  No signs of infection noted.  2. Vascular: Dorsalis pedis two out of four bilateral and posterior tibial pulses two out of   four bilateral, capillary refill normal.   3. Musculoskeletal: All muscle groups are graded 5 out of 5 in the foot and ankle.  Partial left 2nd and 3rd digit amputations   4. Neurological: Normal sharp dull sensation; reflexes normal.               ASSESSMENT AND PLAN:   Diagnoses and all orders for this visit:    Chronic toe ulcer, left, limited to breakdown of skin (HCC)    History of amputation of left second toe (HCC)    Type 2 diabetes mellitus with polyneuropathy (HCC)            Plan:     Patient seen and examined and findings discussed with patient.  Using a sterile #15 blade the hyperkeratotic tissue with a partial left second digit amputation site was  debrided down with a small ulceration noted.  The wound was dressed with Betadine and a Band-Aid.  Advised patient to perform similar dressing changes every day.  Educated patient on signs of infection and instructed him to seek immediate medical attention.  Patient to follow-up with me in clinic for final discharge on June 17.      The patient indicates understanding of these issues and agrees to the plan.        Lurdes Baxter DPM          [1]   Current Outpatient Medications   Medication Sig Dispense Refill    sulfamethoxazole-trimethoprim -160 MG Oral Tab per tablet Take 1 tablet by mouth 2 (two) times daily for 7 days. 14 tablet 0    glipiZIDE 5 MG Oral Tab Take 1 tablet (5 mg total) by mouth every morning before breakfast. 90 tablet 0    rosuvastatin 40 MG Oral Tab Take 1 tablet (40 mg total) by mouth nightly. FOR CHOLESTEROL. 90 tablet 1    Tirzepatide (MOUNJARO) 15 MG/0.5ML Subcutaneous Solution Auto-injector Inject 15 mg into the skin once a week. FOR DIABETES. Continuation of therapy. 6 mL 9    lisinopril 5 MG Oral Tab Take 1 tablet (5 mg total) by mouth daily. 90 tablet 1    levothyroxine 125 MCG Oral Tab Take 1 tablet (125 mcg total) by mouth nightly. TAKE AT BEDTIME 90 tablet 1    DULoxetine 30 MG Oral Cap DR Particles Take 1 capsule (30 mg total) by mouth daily. TAKE 1 CAPSULE IN THE MORNING FOR ANXIETY/DEPRESSION/ARTHRITIS PAIN 90 capsule 1    finasteride 5 MG Oral Tab Take 1 tablet (5 mg total) by mouth daily. FOR PROSTATE. 90 tablet 1    metoprolol tartrate 25 MG Oral Tab Take 1 tablet (25 mg total) by mouth 2 (two) times daily. FOR HEART. 180 tablet 1    pantoprazole 40 MG Oral Tab EC Take 1 tablet (40 mg total) by mouth every morning before breakfast. 90 tablet 1    ticagrelor 90 MG Oral Tab Take 1 tablet (90 mg total) by mouth every 12 (twelve) hours. FOR HEART STENTS. 180 tablet 9    tamsulosin 0.4 MG Oral Cap Take 2 capsules (0.8 mg total) by mouth daily. FOR PROSTATE. 180  capsule 9    pregabalin 100 MG Oral Cap Take 1 capsule (100 mg total) by mouth every 8 (eight) hours. FOR NERVE PAIN. 270 capsule 1    aspirin (ASPIRIN 81) 81 MG Oral Tab EC Take 1 tablet (81 mg total) by mouth daily. FOR HEART. 90 tablet 9    metFORMIN HCl 1000 MG Oral Tab Take 1 tablet (1,000 mg total) by mouth 2 (two) times daily with meals. 180 tablet 1    Multiple Vitamins-Minerals (MULTIVITAMIN MEN 50+) Oral Tab Take 1 tablet by mouth in the morning.     [2]   Past Medical History:   Benign head tremor    BPH (benign prostatic hyperplasia)    Diabetes (HCC)    Disorder of thyroid    Diverticulitis    Former smoker    High blood pressure    High cholesterol    Hyperlipidemia    Hypertension    Neuropathic pain    Non-pressure chronic ulcer of right lower leg, limited to breakdown of skin (HCC)    Obesity    Sleep apnea    Snoring    Stroke (HCC)    Thyroid disease   [3]   Past Surgical History:  Procedure Laterality Date    Carotid endarterectomy Right 2021    Surgeon: Dr. Rajiv Solorio at [facility]    Neck/chest procedure unlisted      per patient, a blockage was removed from the right side of his neck in 2021    Other surgical history  2017    Small bowel Res.    Other surgical history  2019    Colectomy    Pain management N/A 11/15/2024    Dr.Behar; Right knee Genicular Radiofrequency Neurotomy   [4]   Family History  Problem Relation Age of Onset    Cancer Father         Prostate    Heart Disorder Father     Cancer Mother    [5]   Social History  Socioeconomic History    Marital status:     Number of children: 4   Tobacco Use    Smoking status: Former     Current packs/day: 0.00     Types: Cigarettes     Quit date:      Years since quittin.4     Passive exposure: Past    Smokeless tobacco: Never   Vaping Use    Vaping status: Never Used   Substance and Sexual Activity    Alcohol use: Never    Drug use: Never   Other Topics Concern    Caffeine Concern Yes     Comment: coffee daily     Exercise No

## 2025-06-11 NOTE — TELEPHONE ENCOUNTER
Dr Alcantara,    Patient asking for sooner appointment with you.Scheduled to see you 6-16-25. Please advise. Thank you    Action Requested: Summary for Provider     []  Critical Lab, Recommendations Needed  [] Need Additional Advice  [x]   FYI    []   Need Orders  [] Need Medications Sent to Pharmacy  []  Other     SUMMARY: Appointment requested and advised    Reason for call: Fatigue  Onset: 1 month    Patient calling (verified full name and date of birth).  Stated since recent hospitalization and surgery 1 month ago, is still having daily weakness and fatigue  Denied any pain or other symptoms  Appointment scheduled as below, but patients wants sooner appointment with Dr Alcantara    Future Appointments   Date Time Provider Department Center   6/16/2025 10:30 AM Pollo Alcantara MD [facility]   6/17/2025 10:20 AM Lurdes Baxter DPM [facility]   6/23/2025 11:00 [facility]   8/14/2025  9:00 AM [facility]   8/18/2025  9:45 AM [facility]            Reason for Disposition   Patient wants to be seen    Protocols used: Weakness (Generalized) and Fatigue-A-OH

## 2025-06-12 NOTE — TELEPHONE ENCOUNTER
Dr Alcantara,    Please advise. Thank you      You do not have a Res appointment available until 6-16 and patient is already booked for appointment on that day.Do you want to double book a slot for today or tomorrow    6/16/2025 10:30 AM Pollo Alcantara MD [facility]

## 2025-06-12 NOTE — TELEPHONE ENCOUNTER
View All Conversations on this Encounter  Pollo Alcantara MD to Me  Em Rn Triage (Selected Message)      6/12/25  2:36 PM  Can wait till 6/16 since chronic symptoms

## 2025-06-12 NOTE — TELEPHONE ENCOUNTER
Patient called back, verified name and date of birth.   RN informed the patient that Dr. Alcantara recommends keeping the 6/16/25 appointment for evaluation and discussion. Stated understanding.        Future Appointments   Date Time Provider Department Center   6/16/2025 10:30 AM Pollo Alcantara MD [facility]

## 2025-06-16 NOTE — PROGRESS NOTES
OFFICE NOTE       The following individual(s) verbally consented to be recorded using ambient AI listening technology and understand that they can each withdraw their consent to this listening technology at any point by asking the clinician to turn off or pause the recording:    Patient name: Mason Puri  Additional names:            Patient ID: Mason Puri is a 65 year old male.  Today's Date: 06/16/25  Chief Complaint: Fatigue      History of Present Illness  Mando Puri is a 65 year old male with diabetes, hypertension, JUAN A, coronary artery disease, and chronic osteomyelitis who presents with fatigue and weakness following recent surgery.    He has been experiencing daily weakness and fatigue since his recent hospitalization and surgery, which occurred approximately one month ago. He underwent a partial toe amputation on May 10, 2025, and has felt 'tired' since then. Despite having sleep apnea, his sleep is 'great' with the use of a CPAP machine. No feelings of sadness or depression, attributing his lack of ambition to the fatigue.    He has a history of diabetes, with a blood sugar level of 104 mg/dL this morning. His current medications include metformin, glipizide, and Mounjaro 50 mg, which he has been taking for almost two months. He also takes Lyrica (pregabalin) for nerve pain, with a regimen of one pill in the morning and two at night.    His past medical history includes hypertension, hyperlipidemia managed with atorvastatin, and coronary artery disease with a history of LAD PCI and MCI. He is on antiplatelet therapy and has an ejection fraction of 55%.    He also reports chronic osteomyelitis and has undergone a left arm amputation. He is followed by podiatry for his foot issues.    He mentions bilateral hip pain that has been present for a couple of years, exacerbated by walking or standing for long periods. The pain is located around his hip area. He has  not had an MRI of his back due to insurance issues but has had multiple MRIs of his foot. He has a history of severe osteoarthritis of both knees and has undergone steroid injections and nerve ablation procedures in the past.    His TSH level five months ago was 0.646, and his vitamin D level was 36.7. He consumes meat regularly and reports that his weight has remained stable at 307 pounds over the last few months.       Wt Readings from Last 6 Encounters:   06/16/25 (!) 307 lb (139.3 kg)   05/13/25 (!) 307 lb (139.3 kg)   05/13/25 (!) 307 lb (139.3 kg)   05/07/25 (!) 310 lb 3.2 oz (140.7 kg)   05/10/25 (!) 310 lb (140.6 kg)   04/21/25 (!) 308 lb (139.7 kg)        Vitals:    06/16/25 1027   BP: 108/64   Pulse: 80   Weight: (!) 307 lb (139.3 kg)   Height: 6' 2\" (1.88 m)     body mass index is 39.42 kg/m².  BP Readings from Last 3 Encounters:   06/16/25 108/64   05/13/25 104/60   05/13/25 107/64     The ASCVD Risk score (Tej GARNICA, et al., 2019) failed to calculate for the following reasons:    Risk score cannot be calculated because patient has a medical history suggesting prior/existing ASCVD  Results  LABS  TSH: 0.646 (01/16/2025)  Vitamin D: 36.7 (01/16/2025)  Blood Glucose: 104 (06/16/2025)    RADIOLOGY  Knee X-ray: Severe osteoarthritis of both knees (06/2024)       Medications reviewed:  Current Medications[1]      Assessment & Plan    1. Chronic fatigue (Primary)  -     TSH and Free T4  -     Vitamin D, 25-Hydroxy  -     CBC, Platelet; No Differential  -     Iron And Tibc  -     Ferritin  2. Acquired hypothyroidism  -     TSH and Free T4  3. Vitamin D deficiency  -     Ferritin  4. Bilateral hip pain  -     XR HIP W OR WO PELVIS 3 OR 4 VIEWS, BILAT(CPT=73522); Future; Expected date: 06/16/2025  -     XR LUMBAR SPINE (MIN 4 VIEWS) (CPT=72110); Future; Expected date: 06/16/2025  -     Cancel: ORTHOPEDIC - INTERNAL  -     PHYSICAL THERAPY - INTERNAL  -     Physiatry Referral - In Network  5. Chronic bilateral low  back pain with right-sided sciatica  -     XR LUMBAR SPINE (MIN 4 VIEWS) (CPT=72110); Future; Expected date: 2025  -     Cancel: ORTHOPEDIC - INTERNAL  -     PHYSICAL THERAPY - INTERNAL  -     Physiatry Referral - In Network  6. Primary osteoarthritis of right knee  -     PHYSICAL THERAPY - INTERNAL  -     Physiatry Referral - In Network    Assessment & Plan  Fatigue  Persistent fatigue post-hospitalization and toe amputation. Possible thyroid imbalance, vitamin deficiencies, or medication side effects. Current medications include Mounjaro and pregabalin.  - Order TSH and free T4.  - Check vitamin D, folate, vitamin B12, and iron levels.  - Perform complete blood count.  - Review medications for side effects.    Severe Osteoarthritis of Knees  Severe bilateral knee osteoarthritis with increasing pain. Previous interventions include steroid injections and nerve ablation.  - Ensure referral to Dr. Behar for management.    Bilateral Hip Pain  Chronic bilateral hip pain, possibly due to osteoarthritis or degenerative changes. No prior imaging post-fall.  - Order hip x-rays.  - Order lower back x-ray.  - Refer to physical therapy.  - Coordinate care with Dr. Behar.    [facility] for annual ophthalmology exam for diabetes management.  - Advise to schedule ophthalmology appointment with Dr. Mayfield in August.       Follow Up: As needed/if symptoms worsen or No follow-ups on file..     I spent 41 minutes obtaining pertitent medical history, reviewing pertinent imaging/labs and specialists notes, evaluating patient, discussing differential diagnosis' and various treatment options, reinforcing importance of compliance with treatment plan, and completing documentation.     Encounter Times  PreChartin minutes    Reviewing/Obtaining: 15 minutes      Medical Exam: 4 minutes    Plan: 5 minutes      Notes: 5 minutes    Counseling/Education: 6 minutes      Referring/Communicating:   minutes    Ind  Interpretation:   minutes      Care Coordination: 2 minutes       My total time spent caring for the patient on the day of the encounter: 41 minutes.         Objective/ Results:   Physical Exam  Constitutional:       Appearance: He is well-developed.   HENT:      Head: Normocephalic and atraumatic.      Right Ear: External ear normal.      Left Ear: External ear normal.      Nose: Nose normal.   Eyes:      Conjunctiva/sclera: Conjunctivae normal.      Pupils: Pupils are equal, round, and reactive to light.   Cardiovascular:      Rate and Rhythm: Normal rate and regular rhythm.      Heart sounds: Normal heart sounds.   Pulmonary:      Effort: Pulmonary effort is normal.      Breath sounds: Normal breath sounds.   Abdominal:      General: Bowel sounds are normal.      Palpations: Abdomen is soft.   Genitourinary:     Penis: Normal.       Prostate: Normal.      Rectum: Normal.   Musculoskeletal:      Cervical back: Normal range of motion and neck supple.      Lumbar back: Tenderness present. Positive right straight leg raise test.      Right hip: Tenderness present. Decreased range of motion.      Right knee: Decreased range of motion. Tenderness present.      Left knee: Decreased range of motion. Tenderness present.   Skin:     General: Skin is warm and dry.   Neurological:      Mental Status: He is alert and oriented to person, place, and time.      Deep Tendon Reflexes: Reflexes are normal and symmetric.        Physical Exam  MEASUREMENTS: Weight- 307.     Reviewed:    Patient Active Problem List    Diagnosis    History of amputation of left second toe (HCC)    Walker as ambulation aid    Acute osteomyelitis of toe, left (HCC)    Diabetic foot infection (HCC)    Cellulitis of left foot    Chronic osteomyelitis of foot (HCC)    PVC (premature ventricular contraction)    Pyogenic inflammation of bone (HCC)    JUAN A (obstructive sleep apnea)    S/P drug eluting coronary stent placement    Neurogenic claudication     Abnormal Holter monitor finding    Left carotid artery stenosis    Cerebellar infarct (HCC)    Transient ischemic attack (TIA)    BPH with obstruction/lower urinary tract symptoms    Major depressive disorder    Venous stasis dermatitis of both lower extremities    Patellofemoral arthritis    Varicose veins of left lower extremity with inflammation, with ulcer of ankle limited to breakdown of skin (HCC)    Hypertension associated with type 2 diabetes mellitus (HCC)    Hyperlipidemia associated with type 2 diabetes mellitus (HCC)    Type 2 diabetes mellitus with diabetic peripheral angiopathy without gangrene, without long-term current use of insulin (HCC)    Hypothyroidism    History of right-sided carotid endarterectomy    Detrusor overactivity    Primary osteoarthritis of right knee    Gastroesophageal reflux disease with esophagitis    Class 2 severe obesity due to excess calories with serious comorbidity and body mass index (BMI) of 38.0 to 38.9 in adult (HCC)      Allergies[2]   Short Social Hx on File[3]   Review of Systems   Constitutional:  Positive for fatigue.   Respiratory: Negative.     Cardiovascular: Negative.    Gastrointestinal: Negative.    Musculoskeletal:  Positive for arthralgias, back pain and gait problem.   Skin: Negative.        All other systems negative unless otherwise stated in ROS or HPI above.       Pollo Alcantara MD  Internal Medicine       Call office with any questions or seek emergency care if necessary.   Patient understands and agrees to follow directions and advice.      ----------------------------------------- PATIENT INSTRUCTIONS-----------------------------------------     There are no Patient Instructions on file for this visit.        The 21st Century Cures Act makes medical notes available to patients in the interest of transparency.  However, please be advised that this is a medical document.  It is intended as a peer to peer communication.  It is written in medical  language and may contain abbreviations or verbiage that are technical and unfamiliar.  It may appear blunt or direct.  Medical documents are intended to carry relevant information, facts as evident, and the clinical opinion of the practitioner.          [1]   Current Outpatient Medications   Medication Sig Dispense Refill    glipiZIDE 5 MG Oral Tab Take 1 tablet (5 mg total) by mouth every morning before breakfast. 90 tablet 0    rosuvastatin 40 MG Oral Tab Take 1 tablet (40 mg total) by mouth nightly. FOR CHOLESTEROL. 90 tablet 1    Tirzepatide (MOUNJARO) 15 MG/0.5ML Subcutaneous Solution Auto-injector Inject 15 mg into the skin once a week. FOR DIABETES. Continuation of therapy. 6 mL 9    lisinopril 5 MG Oral Tab Take 1 tablet (5 mg total) by mouth daily. 90 tablet 1    levothyroxine 125 MCG Oral Tab Take 1 tablet (125 mcg total) by mouth nightly. TAKE AT BEDTIME 90 tablet 1    DULoxetine 30 MG Oral Cap DR Particles Take 1 capsule (30 mg total) by mouth daily. TAKE 1 CAPSULE IN THE MORNING FOR ANXIETY/DEPRESSION/ARTHRITIS PAIN 90 capsule 1    finasteride 5 MG Oral Tab Take 1 tablet (5 mg total) by mouth daily. FOR PROSTATE. 90 tablet 1    metoprolol tartrate 25 MG Oral Tab Take 1 tablet (25 mg total) by mouth 2 (two) times daily. FOR HEART. 180 tablet 1    pantoprazole 40 MG Oral Tab EC Take 1 tablet (40 mg total) by mouth every morning before breakfast. 90 tablet 1    ticagrelor 90 MG Oral Tab Take 1 tablet (90 mg total) by mouth every 12 (twelve) hours. FOR HEART STENTS. 180 tablet 9    tamsulosin 0.4 MG Oral Cap Take 2 capsules (0.8 mg total) by mouth daily. FOR PROSTATE. 180 capsule 9    pregabalin 100 MG Oral Cap Take 1 capsule (100 mg total) by mouth every 8 (eight) hours. FOR NERVE PAIN. 270 capsule 1    aspirin (ASPIRIN 81) 81 MG Oral Tab EC Take 1 tablet (81 mg total) by mouth daily. FOR HEART. 90 tablet 9    metFORMIN HCl 1000 MG Oral Tab Take 1 tablet (1,000 mg total) by mouth 2 (two) times daily with  n/a meals. 180 tablet 1    Multiple Vitamins-Minerals (MULTIVITAMIN MEN 50+) Oral Tab Take 1 tablet by mouth in the morning.     [2]   Allergies  Allergen Reactions    Empagliflozin OTHER (SEE COMMENTS)     Frequent urination, unbearable.    Penicillins UNKNOWN     Patient does not recall reaction    Other UNKNOWN   [3]   Social History  Socioeconomic History    Marital status:     Number of children: 4   Tobacco Use    Smoking status: Former     Current packs/day: 0.00     Types: Cigarettes     Quit date:      Years since quittin.4     Passive exposure: Past    Smokeless tobacco: Never   Vaping Use    Vaping status: Never Used   Substance and Sexual Activity    Alcohol use: Never    Drug use: Never   Other Topics Concern    Caffeine Concern Yes     Comment: coffee daily    Exercise No   Social History Narrative    The patient does use an assistive device..  Brace    The patient does live in a home with stairs.     Social Drivers of Health     Food Insecurity: No Food Insecurity (2025)    NCSS - Food Insecurity     Worried About Running Out of Food in the Last Year: No     Ran Out of Food in the Last Year: No   Transportation Needs: No Transportation Needs (2025)    NCSS - Transportation     Lack of Transportation: No   Housing Stability: Not At Risk (2025)    NCSS - Housing/Utilities     Has Housing: Yes     Worried About Losing Housing: No     Unable to Get Utilities: No

## 2025-06-17 NOTE — PROGRESS NOTES
Please relay to patient if not read:     Dr Maricruz Patino here,   Your xray of the lower back and hips show mild arthritis, please follow up with Dr. Behar  -Dr miller

## 2025-06-17 NOTE — PROGRESS NOTES
[facility] Podiatry  Progress Note      Mason Puri is a 65 year old male.   Chief Complaint   Patient presents with    Post-Op     S/p L 2nd digit PA sx on  5/10/2025- denies pain              HPI:     Patient is a pleasant 65-year-old diabetic male that is status post left second digit partial toe amputation on 5/10/25.  Patient has kept the dressing clean dry and intact.  He has been ambulating in regular shoes.   Denies any signs of infection.  Denies any pain today.  Patient states that the wound has improved since last time.        Allergies: Empagliflozin, Penicillins, and Other    Current Medications[1]   Past Medical History[2]   Past Surgical History[3]   Family History[4]   Social Hx on file[5]        REVIEW OF SYSTEMS:     Denies nause, fever, chills  No calf pain  Denies chest pain or SOB      EXAM:   There were no vitals taken for this visit.  GENERAL: well developed, well nourished, in no apparent distress  EXTREMITIES:   1. Integument: Normal skin temperature and turgor.  Partial left second digit amputation with small ulceration limited to skin breakdown.  No signs of infection noted.  2. Vascular: Dorsalis pedis two out of four bilateral and posterior tibial pulses two out of   four bilateral, capillary refill normal.   3. Musculoskeletal: All muscle groups are graded 5 out of 5 in the foot and ankle.  Partial left 2nd and 3rd digit amputations   4. Neurological: Normal sharp dull sensation; reflexes normal.      XR LUMBAR SPINE (MIN 4 VIEWS) (CPT=72110)  Result Date: 6/17/2025  CONCLUSION:   Mild-to-moderate lumbar spine degenerative change.    Dictated by (CST): Ismael Hogue MD on 6/17/2025 at 8:33 AM     Finalized by (CST): Ismael Hogue MD on 6/17/2025 at 8:37 AM          XR HIP W OR WO PELVIS 3 OR 4 VIEWS, BILAT(CPT=73522)  Result Date: 6/16/2025  CONCLUSION: Mild DJD of both hip joints.    Dictated by (CST): Faith Wallace MD on 6/16/2025 at 1:34 PM     Finalized  by (CST): Faith Wallace MD on 6/16/2025 at 1:35 PM               ASSESSMENT AND PLAN:   Diagnoses and all orders for this visit:    Chronic toe ulcer, left, limited to breakdown of skin (HCC)              Plan:     Patient seen and examined and findings discussed with patient.  Using a sterile #15 blade the hyperkeratotic tissue with a partial left second digit amputation site was debrided down with a small ulceration noted limited to skin breakdown. .  The wound was dressed with Betadine and a Band-Aid.  Advised patient to perform similar dressing changes every day.  Educated patient on signs of infection and instructed him to seek immediate medical attention.  Patient to follow-up with me in clinic in 4 weeks.       The patient indicates understanding of these issues and agrees to the plan.        Lurdes Baxter DPM          [1]   Current Outpatient Medications   Medication Sig Dispense Refill    glipiZIDE 5 MG Oral Tab Take 1 tablet (5 mg total) by mouth every morning before breakfast. 90 tablet 0    rosuvastatin 40 MG Oral Tab Take 1 tablet (40 mg total) by mouth nightly. FOR CHOLESTEROL. 90 tablet 1    Tirzepatide (MOUNJARO) 15 MG/0.5ML Subcutaneous Solution Auto-injector Inject 15 mg into the skin once a week. FOR DIABETES. Continuation of therapy. 6 mL 9    lisinopril 5 MG Oral Tab Take 1 tablet (5 mg total) by mouth daily. 90 tablet 1    levothyroxine 125 MCG Oral Tab Take 1 tablet (125 mcg total) by mouth nightly. TAKE AT BEDTIME 90 tablet 1    DULoxetine 30 MG Oral Cap DR Particles Take 1 capsule (30 mg total) by mouth daily. TAKE 1 CAPSULE IN THE MORNING FOR ANXIETY/DEPRESSION/ARTHRITIS PAIN 90 capsule 1    finasteride 5 MG Oral Tab Take 1 tablet (5 mg total) by mouth daily. FOR PROSTATE. 90 tablet 1    metoprolol tartrate 25 MG Oral Tab Take 1 tablet (25 mg total) by mouth 2 (two) times daily. FOR HEART. 180 tablet 1    pantoprazole 40 MG Oral Tab EC Take 1 tablet (40 mg total) by mouth every  morning before breakfast. 90 tablet 1    ticagrelor 90 MG Oral Tab Take 1 tablet (90 mg total) by mouth every 12 (twelve) hours. FOR HEART STENTS. 180 tablet 9    tamsulosin 0.4 MG Oral Cap Take 2 capsules (0.8 mg total) by mouth daily. FOR PROSTATE. 180 capsule 9    pregabalin 100 MG Oral Cap Take 1 capsule (100 mg total) by mouth every 8 (eight) hours. FOR NERVE PAIN. 270 capsule 1    aspirin (ASPIRIN 81) 81 MG Oral Tab EC Take 1 tablet (81 mg total) by mouth daily. FOR HEART. 90 tablet 9    metFORMIN HCl 1000 MG Oral Tab Take 1 tablet (1,000 mg total) by mouth 2 (two) times daily with meals. 180 tablet 1    Multiple Vitamins-Minerals (MULTIVITAMIN MEN 50+) Oral Tab Take 1 tablet by mouth in the morning.     [2]   Past Medical History:   Benign head tremor    BPH (benign prostatic hyperplasia)    Diabetes (HCC)    Disorder of thyroid    Diverticulitis    Former smoker    High blood pressure    High cholesterol    Hyperlipidemia    Hypertension    Neuropathic pain    Non-pressure chronic ulcer of right lower leg, limited to breakdown of skin (HCC)    Obesity    Sleep apnea    Snoring    Stroke (HCC)    Thyroid disease   [3]   Past Surgical History:  Procedure Laterality Date    Amputation toe,mt-p jt Left 05/10/2025    patial toe amputation-2nd digit    Carotid endarterectomy Right 04/29/2021    Surgeon: Dr. Rajiv Solorio at [facility]    Neck/chest procedure unlisted      per patient, a blockage was removed from the right side of his neck in 8/2021    Other surgical history  2017    Small bowel Res.    Other surgical history  11/12/2019    Colectomy    Pain management N/A 11/15/2024    Dr.Behar; Right knee Genicular Radiofrequency Neurotomy   [4]   Family History  Problem Relation Age of Onset    Cancer Father         Prostate    Heart Disorder Father     Cancer Mother    [5]   Social History  Socioeconomic History    Marital status:     Number of children: 4   Tobacco Use    Smoking status: Former     Current  packs/day: 0.00     Types: Cigarettes     Quit date:      Years since quittin.4     Passive exposure: Past    Smokeless tobacco: Never   Vaping Use    Vaping status: Never Used   Substance and Sexual Activity    Alcohol use: Never    Drug use: Never   Other Topics Concern    Caffeine Concern Yes     Comment: coffee daily    Exercise No

## 2025-06-17 NOTE — PROGRESS NOTES
Please relay to patient if not read:     Dr Maricruz Patino here  Your Hips show bilateral hip arthritis, follow up with Dr Behar for further evaluation.  -Dr Alcantara

## 2025-06-23 NOTE — PROGRESS NOTES
[facility]  Progress Note    CHIEF COMPLAINT:    Chief Complaint   Patient presents with    Follow - Up     LOV: 24 Patient is present today to follow up on osteoarthritis of Right knee. Denies PT. Admits pain -8/10. Admits follow up w/ PCP regarding arthritis in Right hip. Xray available. Consider knee injection or ortho referral.Current Medication: Tylenol.  Patient gives verbal consent to use Abridge.          History of Present Illness  Mando Puri is a 65 year old male who presents with worsening kidney pain and hip discomfort.    His knee pain has returned after a period of relief following a nerve ablation burning procedure performed in 2024. The pain began approximately one month ago, indicating the relief lasted about six months. He describes the pain as similar to previous episodes and suspects fluid accumulation.    He experiences buttock pain, which was evaluated with x-rays last week and diagnosed as arthritis. The pain is located more towards the buttock area and is associated with back pain. Prolonged standing exacerbates the pain, making ambulation difficult.    He has a history of carotid endarterectomy performed four to five years ago after losing eyesight in his right eye.    He has had two toes partially amputated, with the most recent procedure occurring on May 10th. He uses a cane more frequently to aid in balance but denies any falls or significant changes in balance.    No neck pain, arm pain, numbness, or tingling. No numbness or tingling in the legs or feet.       PAST MEDICAL HISTORY:  Past Medical History[1]    SURGICAL HISTORY:  Past Surgical History[2]    SOCIAL HISTORY:   Social History     Occupational History    Not on file   Tobacco Use    Smoking status: Former     Current packs/day: 0.00     Types: Cigarettes     Quit date:      Years since quittin.4     Passive exposure: Past    Smokeless tobacco:  Never   Vaping Use    Vaping status: Never Used   Substance and Sexual Activity    Alcohol use: Never    Drug use: Never    Sexual activity: Not on file       FAMILY HISTORY:   Family History[3]    CURRENT MEDICATIONS:   Current Medications[4]    ALLERGIES:   Allergies[5]    REVIEW OF SYSTEMS:   Review of Systems   Constitutional: Negative.    HENT: Negative.    Eyes: Negative.    Respiratory: Negative.    Cardiovascular: Negative.    Gastrointestinal: Negative.    Genitourinary: Negative.    Musculoskeletal: As per HPI  Skin: Negative.    Neurological: As per HPI  Endo/Heme/Allergies: Negative.    Psychiatric/Behavioral: Negative.      All other systems reviewed and are negative. Pertinent positives and negatives noted in the HPI.    PHYSICAL EXAM:   /68   Ht 74\"   Wt (!) 307 lb (139.3 kg)   SpO2 92%   BMI 39.42 kg/m²     Body mass index is 39.42 kg/m².      General: No immediate distress  Head: Normocephalic/ Atraumatic  Eyes: Extra-occular movements intact.   Ears: No auricular hematoma or deformities  Mouth: No lesions or ulcerations  Heart: peripheral pulses intact. Normal capillary refill.   Lungs: Non-labored respirations  Abdomen: No abdominal guarding  Extremities: No lower extremity edema bilaterally   Skin: No lesions noted.   Cognition: alert & oriented x 3, attentive, able to follow 2 step commands, comprehension intact, spontaneous speech intact  Motor:    Musculoskeletal:    LUMBAR SPINE:  Inspection: no erythema, swelling, or obvious deformity.  Their iliac crest and shoulder heights are symmetrical.  Postural exam reveals no scoliosis or kyphosis.  Palpation: Palpation over the right buttock and gluteal muscles  ROM: Full range of motion of the lumbar spine with pain during extension  Motor: 5/5 in all myotomes of the BILATERAL lower extremities except 4 out of 5 during left great toe extension (L5).  Of note he has had multiple amputations in the left second toe.  Sensation: Intact to  light touch in all dermatomes of the lower extremities   Reflexes: 3/4 at L4 and S1 with a positive Eda's bilaterally  Facet Loading: Negative  Straight leg raise: negative for radicular pain symptoms  Slump test: negative for pain symptoms or radicular pain symptoms      Data  Office Visit on 06/16/2025   Component Date Value Ref Range Status    TSH 06/19/2025 0.93  0.40 - 4.50 mIU/L Final    T4, FREE 06/19/2025 1.2  0.8 - 1.8 ng/dL Final    VITAMIN D, 25-OH, TOTAL 06/19/2025 44  30 - 100 ng/mL Final    WHITE BLOOD CELL COUNT 06/19/2025 8.0  3.8 - 10.8 Thousand/uL Final    RED BLOOD CELL COUNT 06/19/2025 4.53  4.20 - 5.80 Million/uL Final    HEMOGLOBIN 06/19/2025 12.7 (L)  13.2 - 17.1 g/dL Final    HEMATOCRIT 06/19/2025 41.4  38.5 - 50.0 % Final    MCV 06/19/2025 91.4  80.0 - 100.0 fL Final    MCH 06/19/2025 28.0  27.0 - 33.0 pg Final    MCHC 06/19/2025 30.7 (L)  32.0 - 36.0 g/dL Final    RDW 06/19/2025 13.1  11.0 - 15.0 % Final    PLATELET COUNT 06/19/2025 252  140 - 400 Thousand/uL Final    MPV 06/19/2025 12.0  7.5 - 12.5 fL Final    IRON, TOTAL 06/19/2025 48 (L)  50 - 180 mcg/dL Final    IRON BINDING CAPACITY 06/19/2025 352  250 - 425 mcg/dL (calc) Final    % SATURATION 06/19/2025 14 (L)  20 - 48 % (calc) Final    FERRITIN 06/19/2025 11 (L)  24 - 380 ng/mL Final   Office Visit on 05/13/2025   Component Date Value Ref Range Status    GLUCOSE BLOOD 05/13/2025 186   Final    Test Strip Lot # 05/13/2025 [id number]  Numeric Final    Test Strip Expiration Date 05/13/2025 7/26/25  Date Final   No results displayed because visit has over 200 results.      Office Visit on 05/07/2025   Component Date Value Ref Range Status    Aerobic Culture Result 05/07/2025 4+ growth Streptococcus agalactiae (Group B beta strep) (A)   Final    Aerobic Culture Result 05/07/2025 4+ growth Mixed gram positive bruno   Final    Aerobic Smear 05/07/2025 1+ WBCs seen   Final    Aerobic Smear 05/07/2025 2+ Gram Positive Cocci   Final     Anaerobic Culture 05/07/2025 No Anaerobes isolated   Final   Admission on 02/21/2025, Discharged on 02/21/2025   Component Date Value Ref Range Status    SARS-CoV-2 (COVID-19) - (GeneXpert) 02/21/2025 Not Detected  Not Detected Final    Influenza A by PCR 02/21/2025 Negative  Negative Final    Influenza B by PCR 02/21/2025 Negative  Negative Final    RSV by PCR 02/21/2025 Negative  Negative Final    Glucose 02/21/2025 136 (H)  70 - 99 mg/dL Final    Sodium 02/21/2025 138  136 - 145 mmol/L Final    Potassium 02/21/2025    Final    Chloride 02/21/2025 104  98 - 112 mmol/L Final    CO2 02/21/2025 28.0  21.0 - 32.0 mmol/L Final    Anion Gap 02/21/2025 6  0 - 18 mmol/L Final    BUN 02/21/2025    Final    Creatinine 02/21/2025 1.51 (H)  0.70 - 1.30 mg/dL Final    Calcium, Total 02/21/2025 8.9  8.7 - 10.4 mg/dL Final    eGFR-Cr 02/21/2025 52 (L)  >=60 mL/min/1.73m2 Final    WBC 02/21/2025 8.4  4.0 - 11.0 x10(3) uL Final    RBC 02/21/2025 4.14 (L)  4.30 - 5.70 x10(6)uL Final    HGB 02/21/2025 11.9 (L)  13.0 - 17.5 g/dL Final    HCT 02/21/2025 37.8 (L)  39.0 - 53.0 % Final    MCV 02/21/2025 91.3  80.0 - 100.0 fL Final    MCH 02/21/2025 28.7  26.0 - 34.0 pg Final    MCHC 02/21/2025 31.5  31.0 - 37.0 g/dL Final    RDW-SD 02/21/2025 49.1 (H)  35.1 - 46.3 fL Final    RDW 02/21/2025 14.8  11.0 - 15.0 % Final    PLT 02/21/2025 199.0  150.0 - 450.0 10(3)uL Final    Neutrophil Absolute Prelim 02/21/2025 5.19  1.50 - 7.70 x10 (3) uL Final    Neutrophil Absolute 02/21/2025 5.19  1.50 - 7.70 x10(3) uL Final    Lymphocyte Absolute 02/21/2025 2.15  1.00 - 4.00 x10(3) uL Final    Monocyte Absolute 02/21/2025 0.83  0.10 - 1.00 x10(3) uL Final    Eosinophil Absolute 02/21/2025 0.19  0.00 - 0.70 x10(3) uL Final    Basophil Absolute 02/21/2025 0.05  0.00 - 0.20 x10(3) uL Final    Immature Granulocyte Absolute 02/21/2025 0.02  0.00 - 1.00 x10(3) uL Final    Neutrophil % 02/21/2025 61.6  % Final    Lymphocyte % 02/21/2025 25.5  % Final     Monocyte % 02/21/2025 9.8  % Final    Eosinophil % 02/21/2025 2.3  % Final    Basophil % 02/21/2025 0.6  % Final    Immature Granulocyte % 02/21/2025 0.2  % Final    Pro-Beta Natriuretic Peptide 02/21/2025 129 (H)  <125 pg/mL Final   Office Visit on 01/02/2025   Component Date Value Ref Range Status    GLUCOSE BLOOD 01/02/2025 215   Final    Test Strip Lot # 01/02/2025 [id number]  Numeric Final    Test Strip Expiration Date 01/02/2025 6/10/25  Date Final    Microalbumin, Urine 01/02/2025 10.50  mg/dL Final    Creatinine Ur Random 01/02/2025 113.60  mg/dL Final    Malb/Cre Calc 01/02/2025 92.4 (H)  <=30.0 ug/mg Final   ]      Radiology Imaging:  I reviewed with the patient his X-ray of the lumbar spine   XR LUMBAR SPINE (MIN 4 VIEWS) (CPT=72110)  Narrative: PROCEDURE: XR LUMBAR SPINE (MIN 4 VIEWS) (CPT=72110)     COMPARISON: None.     INDICATIONS: Chronic bilateral low back pain with right-sided sciatica     TECHNIQUE: Lumbar spine radiographs (minimum 4 views)       FINDINGS:      ALIGNMENT:   Normal alignment.    VERTEBRAL BODIES:   No acute fracture or malalignment.  There is multilevel marginal osteophyte formation and facet arthropathy.  DISC SPACES: Disc space narrowing is seen in the visualized lower thoracic spine and T12-L1.  SACROILIAC JOINTS: Grossly intact.  OBLIQUE VIEWS: No pars defect.  OTHER: Atherosclerotic vascular calcification.              Impression: CONCLUSION:      Mild-to-moderate lumbar spine degenerative change.           Dictated by (CST): Ismael Hogue MD on 6/17/2025 at 8:33 AM       Finalized by (CST): Ismeal Hogue MD on 6/17/2025 at 8:37 AM              ASSESSMENT AND PLAN:  Mando is a pleasant 65-year-old male who presents with right-sided buttock pain without further radicular symptoms although positive Eda's bilaterally and hyperreflexia in the lower extremities.  I have reviewed his x-ray of the lumbar spine.  I am concerned more so with the hyperreflexia and have  ordered x-rays as well as an MRI of the cervical spine.  I would like for him to begin formal physical therapy for the lumbar spine as I believe his symptoms are due to a radiculopathy with lumbar spondylosis and facet arthropathy.  As a pertains to his right knee pain, I believe this is due to his severe osteoarthritis.  I recommended a right knee hyaluronic acid and corticosteroid injection under ultrasound guidance.  He will follow-up with me for the procedure and also follow-up with me to review his MRI of the cervical spine.  I will also see him in about 2 months to see how his lumbar spine is doing with physical therapy.  If his symptoms do not improve, we can consider facet joint injections versus an MRI of the lumbar spine.     Assessment & Plan  Chronic pain of right knee  Chronic right knee pain recurred post-genicular radiofrequency neurotomy, likely due to fluid accumulation.  - Perform knee injection with hyaluronic acid and corticosteroid.  - Schedule follow-up to assess injection effectiveness and consider repeating neurotomy after one year if necessary.    Possible spinal cord compression  Hyperreflexia and positive Oglesby's sign suggest potential cervical spine involvement.  - Order MRI of the cervical spine with IV sedation.  - Coordinate with primary care provider for medical clearance for sedation.    Right hip arthritis  Right hip arthritis confirmed by x-rays, pain localized towards buttock suggesting lower back involvement.    Low back pain  Low back pain with radiation to buttock, exacerbated by prolonged standing, suggests lumbar spine or facet joint involvement.  - Initiate physical therapy focusing on core strengthening.  - Schedule follow-up in two months to evaluate symptoms and consider MRI of lumbar spine or facet joint injections if symptoms persist.      RTC in 2 months  Discharge Instructions were provided as documented in AVS summary.  The patient was in agreement with the  assessment and plan.  All questions were answered.  There were no barriers to learning.         1. Primary osteoarthritis of right knee    2. Patellofemoral pain syndrome, unspecified laterality    3. Chronic pain of right knee    4. Limited mobility    5. Diabetic polyneuropathy associated with type 2 diabetes mellitus (HCC)    6. Class 3 severe obesity due to excess calories with serious comorbidity and body mass index (BMI) of 40.0 to 44.9 in adult    7. DDD (degenerative disc disease), cervical    8. Foraminal stenosis of cervical region    9. Cervical radiculopathy    10. Cervical facet syndrome    11. Trigger point of neck    12. Lumbar spondylosis    13. Mechanical low back pain    14. Facet syndrome, lumbar    15. Myalgia    16. Biomechanical lesion    17. Class 2 severe obesity due to excess calories with serious comorbidity and body mass index (BMI) of 39.0 to 39.9 in adult (HCC)    18. Strain of gluteus medius, unspecified laterality, initial encounter    19. Gluteal tendinitis of right buttock    20. Primary osteoarthritis of knee, unspecified laterality    21. Oglesby reflex positive    22. Hyperreflexia        Alex B. Behar MD  Physical Medicine and Rehabilitation/Sports Medicine  [facility]  [employer] Cures Act Notice to Patient: Medical documents like this are made available to patients in the interest of transparency. However, be advised this is a medical document and it is intended as [id number] communication between your medical providers. This medical document may contain abbreviations, assessments, medical data, and results or other terms that are unfamiliar. Medical documents are intended to carry relevant information, facts as evident, and the clinical opinion of the practitioner. As such, this medical document may be written in language that appears blunt or direct. You are encouraged to contact your medical provider and/or [facility] Patient Experience if you  have any questions about this medical document.   Abridge tool was used for dictation purposes only and the patient was not recorded at any point during the visit.              [1]   Past Medical History:   Benign head tremor    BPH (benign prostatic hyperplasia)    Diabetes (HCC)    Disorder of thyroid    Diverticulitis    Former smoker    High blood pressure    High cholesterol    Hyperlipidemia    Hypertension    Neuropathic pain    Non-pressure chronic ulcer of right lower leg, limited to breakdown of skin (HCC)    Obesity    Sleep apnea    Snoring    Stroke (HCC)    Thyroid disease   [2]   Past Surgical History:  Procedure Laterality Date    Amputation toe,mt-p jt Left 05/10/2025    patial toe amputation-2nd digit    Carotid endarterectomy Right 04/29/2021    Surgeon: [facility] Rajiv Sol[facility]    Neck/chest procedure unlisted      per patient, a blockage was removed from the right side of his neck in 8/2021    Other surgical history  2017    Small bowel Res.    Other surgical history  11/12/2019    Colectomy    Pain management N/A 11/15/2024    Dr.Behar; Right knee Genicular Radiofrequency Neurotomy   [3]   Family History  Problem Relation Age of Onset    Cancer Father         Prostate    Heart Disorder Father     Cancer Mother    [4]   Current Outpatient Medications   Medication Sig Dispense Refill    glipiZIDE 5 MG Oral Tab Take 1 tablet (5 mg total) by mouth every morning before breakfast. 90 tablet 0    rosuvastatin 40 MG Oral Tab Take 1 tablet (40 mg total) by mouth nightly. FOR CHOLESTEROL. 90 tablet 1    Tirzepatide (MOUNJARO) 15 MG/0.5ML Subcutaneous Solution Auto-injector Inject 15 mg into the skin once a week. FOR DIABETES. Continuation of therapy. 6 mL 9    lisinopril 5 MG Oral Tab Take 1 tablet (5 mg total) by mouth daily. 90 tablet 1    levothyroxine 125 MCG Oral Tab Take 1 tablet (125 mcg total) by mouth nightly. TAKE AT BEDTIME 90 tablet 1    DULoxetine 30 MG Oral Cap DR Particles Take 1 capsule  (30 mg total) by mouth daily. TAKE 1 CAPSULE IN THE MORNING FOR ANXIETY/DEPRESSION/ARTHRITIS PAIN 90 capsule 1    finasteride 5 MG Oral Tab Take 1 tablet (5 mg total) by mouth daily. FOR PROSTATE. 90 tablet 1    metoprolol tartrate 25 MG Oral Tab Take 1 tablet (25 mg total) by mouth 2 (two) times daily. FOR HEART. 180 tablet 1    pantoprazole 40 MG Oral Tab EC Take 1 tablet (40 mg total) by mouth every morning before breakfast. 90 tablet 1    ticagrelor 90 MG Oral Tab Take 1 tablet (90 mg total) by mouth every 12 (twelve) hours. FOR HEART STENTS. 180 tablet 9    tamsulosin 0.4 MG Oral Cap Take 2 capsules (0.8 mg total) by mouth daily. FOR PROSTATE. 180 capsule 9    pregabalin 100 MG Oral Cap Take 1 capsule (100 mg total) by mouth every 8 (eight) hours. FOR NERVE PAIN. 270 capsule 1    aspirin (ASPIRIN 81) 81 MG Oral Tab EC Take 1 tablet (81 mg total) by mouth daily. FOR HEART. 90 tablet 9    metFORMIN HCl 1000 MG Oral Tab Take 1 tablet (1,000 mg total) by mouth 2 (two) times daily with meals. 180 tablet 1    Multiple Vitamins-Minerals (MULTIVITAMIN MEN 50+) Oral Tab Take 1 tablet by mouth in the morning.     [5]   Allergies  Allergen Reactions    Empagliflozin OTHER (SEE COMMENTS)     Frequent urination, unbearable.    Penicillins UNKNOWN     Patient does not recall reaction    Other UNKNOWN

## 2025-06-23 NOTE — PATIENT INSTRUCTIONS
1) Tylenol 500-1000 mg every 6-8 hours as needed for pain.  No more than 3000 mg daily.  2) Please begin physical therapy as soon as possible.   3) Please call and schedule your MRI of the Cervical spine with sedation at [phone].  Once you have your MRI scheduled, then call my office again to schedule a follow-up visit soon after your MRI so we may review the images together. NEED CLEARANCE FROM PCP  4) Please get X-rays of the CERVICAL SPINE today on your way out.   5) My office will call you to schedule the RIGHT knee Ha and CSI under US once the procedure is approved by your insurance carrier.    6) Follow up with me to review the MRI of the cervical spine and discuss next steps as you have hyperreflexia with a positive zavala's concerning for possible cord compression  7) I want to see you for the low back in about 2 months. If symptoms continue, then will order MRI lumbar spine vs facet joint injections

## 2025-06-23 NOTE — TELEPHONE ENCOUNTER
Action Requested: Summary for Provider     []  Critical Lab, Recommendations Needed  [] Need Additional Advice  [x]   FYI    []   Need Orders  [] Need Medications Sent to Pharmacy  []  Other     SUMMARY: Will go to ED now    Reason for call: Abdominal Pain      Patient calling (verified full name and date of birth).  Stated 3 days ago he started having \" black, diarrhea a few times a day\"  Did not take ant Pepto Bismol or other than his usual medications  Is currently at [facility] after appointment with Dr Behar for his back  Did not tell Dr his current symptoms  Is now having abdominal pain   Stated \" something is not right\"  Will go over to the ED now as he is still at [facility]       Reason for Disposition   Black or tarry bowel movements  (Exception: Chronic-unchanged black-grey BMs AND is taking iron pills or Pepto-Bismol.)    Protocols used: Abdominal Pain - Male-A-[place]

## 2025-06-23 NOTE — TELEPHONE ENCOUNTER
Initiated authorization for RIGHT knee HA and CSI under US. CPT/HCPCS 81947, ,  dx:M17.11 with Avail[employer] portal.    PA will be submitted once clinical notes have been completed.  LOV was 11/2024.

## 2025-06-23 NOTE — DISCHARGE INSTRUCTIONS
See primary care if symptoms or not improving in 2 to 3 days.    Continue home medications.    Return to the ER if you develop worsening symptoms, inability to tolerate fluids, lightheadedness or fainting, or any emergent concerns.

## 2025-06-23 NOTE — ED INITIAL ASSESSMENT (HPI)
Pt arrives ambulatory to ED with cane for c/o Diarrhea x 3 days. Pt states stools are dark/black. +LLQ abdominal pain. Denies NV. Denies urinary symptoms. Aox4, speaking in full sentences.

## 2025-06-23 NOTE — ED PROVIDER NOTES
Patient Seen in: [facility] Emergency Department       The following individual(s) verbally consented to be recorded using ambient AI listening technology and understand that they can each withdraw their consent to this listening technology at any point by asking the clinician to turn off or pause the recording:    Patient name: Mason Puri        History  Chief Complaint   Patient presents with    Diarrhea    GI Bleeding    Abdomen/Flank Pain     Stated Complaint: diarrhea    Subjective:   HPI     Mando Puri is a 65 year old male with coronary artery disease who presents with three days of diarrhea.    He reports he has had dark stool occasionally occurring black.  He has a mild stomachache but no fever, nausea, vomiting, chest pain, lightheadedness, or trouble breathing. He has not traveled recently.    He is currently on Brilinta and has a history of having stents placed in his heart.  He has been using Pepto-Bismol for diarrhea.        Objective:     Past Medical History:    Benign head tremor    BPH (benign prostatic hyperplasia)    Diabetes (HCC)    Disorder of thyroid    Diverticulitis    Former smoker    High blood pressure    High cholesterol    Hyperlipidemia    Hypertension    Neuropathic pain    Non-pressure chronic ulcer of right lower leg, limited to breakdown of skin (HCC)    Obesity    Sleep apnea    Snoring    Stroke (HCC)    Thyroid disease              Past Surgical History:   Procedure Laterality Date    Amputation toe,mt-p jt Left 05/10/2025    patial toe amputation-2nd digit    Carotid endarterectomy Right 04/29/2021    Surgeon: Dr. Rajiv Solorio at [facility]    Neck/chest procedure unlisted      per patient, a blockage was removed from the right side of his neck in 8/2021    Other surgical history  2017    Small bowel Res.    Other surgical history  11/12/2019    Colectomy    Pain management N/A 11/15/2024    Dr.Behar; Right knee Genicular Radiofrequency Neurotomy                 Social History     Socioeconomic History    Marital status:     Number of children: 4   Tobacco Use    Smoking status: Former     Current packs/day: 0.00     Types: Cigarettes     Quit date:      Years since quittin.4     Passive exposure: Past    Smokeless tobacco: Never   Vaping Use    Vaping status: Never Used   Substance and Sexual Activity    Alcohol use: Never    Drug use: Never   Other Topics Concern    Caffeine Concern Yes     Comment: coffee daily    Exercise No   Social History Narrative    The patient does use an assistive device..  Brace    The patient does live in a home with stairs.     Social Drivers of Health     Food Insecurity: No Food Insecurity (2025)    NCSS - Food Insecurity     Worried About Running Out of Food in the Last Year: No     Ran Out of Food in the Last Year: No   Transportation Needs: No Transportation Needs (2025)    NCSS - Transportation     Lack of Transportation: No   Housing Stability: Not At Risk (2025)    NCSS - Housing/Utilities     Has Housing: Yes     Worried About Losing Housing: No     Unable to Get Utilities: No                                Physical Exam    ED Triage Vitals [25 1218]   /64   Pulse 78   Resp 18   Temp 97.2 °F (36.2 °C)   Temp src Oral   SpO2 97 %   O2 Device None (Room air)       Current Vitals:   Vital Signs  BP: 131/69  Pulse: 78  Resp: 12  Temp: 97.2 °F (36.2 °C)  Temp src: Oral  MAP (mmHg): 84    Oxygen Therapy  SpO2: 91 %  O2 Device: None (Room air)            Physical Exam  Vitals and nursing note reviewed.   Constitutional:       General: He is not in acute distress.     Appearance: He is well-developed.   HENT:      Head: Normocephalic and atraumatic.   Eyes:      Conjunctiva/sclera: Conjunctivae normal.   Cardiovascular:      Rate and Rhythm: Normal rate and regular rhythm.      Heart sounds: Normal heart sounds.   Pulmonary:      Effort: Pulmonary effort is normal. No respiratory  distress.      Breath sounds: Normal breath sounds.   Abdominal:      General: Bowel sounds are normal.      Palpations: Abdomen is soft.      Tenderness: There is no abdominal tenderness.   Genitourinary:     Comments: Dark stool, guaiac negative  Musculoskeletal:         General: Normal range of motion.      Cervical back: Normal range of motion and neck supple.   Skin:     General: Skin is warm and dry.      Findings: No rash.   Neurological:      General: No focal deficit present.      Mental Status: He is alert and oriented to person, place, and time.                 ED Course  Labs Reviewed   COMP METABOLIC PANEL (14) - Abnormal; Notable for the following components:       Result Value    Glucose 165 (*)     Creatinine 1.55 (*)     eGFR-Cr 50 (*)     All other components within normal limits   CBC WITH DIFFERENTIAL WITH PLATELET - Abnormal; Notable for the following components:    HGB 12.8 (*)     RDW-SD 48.5 (*)     All other components within normal limits   LIPASE - Abnormal; Notable for the following components:    Lipase 54 (*)     All other components within normal limits   RAINBOW DRAW LAVENDER   RAINBOW DRAW LIGHT GREEN   RAINBOW DRAW BLUE   RAINBOW DRAW GOLD          Imaging Results Available and Reviewed while in ED:   CT ABDOMEN+PELVIS(CONTRAST ONLY)(CPT=74177)   Final Result   PROCEDURE: CT ABDOMEN + PELVIS (CONTRAST ONLY) (CPT=74177)       COMPARISON: None.       INDICATIONS: LLQ pain       TECHNIQUE: Multidetector CT images of the abdomen and pelvis were obtained    with non-ionic intravenous contrast material. Automated exposure control    for dose reduction was used. Adjustment of the mA and/or kV was done based    on the patient's size.    Iterative reconstruction technique for dose reduction was employed.        FINDINGS:   LUNG BASES: The heart is normal in size; partially imaged probable    coronary artery calcification or less likely thin inferior pericardial    calcification.. There is  dependent subsegmental atelectasis bilaterally.   LIVER: No enlargement, atrophy, abnormal density, or significant focal    lesion is identified.     BILIARY: Tiny high density gallstones are suspected.   PANCREAS: No lesion, fluid collection, ductal dilatation, or atrophy.     SPLEEN: No enlargement.     ADRENALS:   No defined mass or abnormal enlargement.     KIDNEYS:   Symmetric enhancement is seen without evidence of    hydronephrosis or underlying solid masses.  Simple-appearing right upper    pole renal cyst.   GI/MESENTERY:  There is no evidence of bowel obstruction.  Tiny    retrocardiac hiatal hernia.  Colonic diverticulosis with evidence of prior    sigmoid colonic anastomosis, please note that some segments of the colon    are incompletely distended and    suboptimally evaluated, mild-to-moderate colonic fecal burden.  Normal    appendix.   URINARY BLADDER: Incompletely distended and suboptimally evaluated.   PELVIC NODES: No lymphadenopathy.      PELVIC ORGANS: No visible mass. Pelvic organs appropriate for patient age.        VASCULATURE:   No aneurysm is detected.  Mild-to-moderate atherosclerosis.   RETROPERITONEUM: No mass or lymphadenopathy is apparent.     BONES:   Osteitis pubis.  Chronic or developmental deformity of the left    iliac wing.  Demineralization.  Multilevel lumbar spine degeneration.   ABDOMINAL WALL: Paraumbilical ventral hernia repair with mesh.  Small    bilateral fat containing inguinal hernias.   OTHER: No free air or fluid is seen in the abdomen or pelvis.                     =====   CONCLUSION:    1. No acute intra-abdominal finding.   2. Colonic diverticulosis.  No CT evidence of acute diverticulitis.     Evidence of prior sigmoid colonic anastomosis.   3. Tiny retrocardiac hiatal hernia.   4. Cholelithiasis.   5. Probable coronary and peripheral atherosclerosis.   6. Paraumbilical hernia repair with mesh; there is no significant hernia    recurrence.  However, small right  lower quadrant fat containing spigelian    and small fat containing bilateral inguinal hernias are seen.  No bowel    loop involvement or bowel    obstruction.   7. Lesser incidental findings as above.           [id number]       Dictated by (CST): Sumanth Hayward MD on 6/23/2025 at 2:57 PM        Finalized by (CST): Sumanth Hayward MD on 6/23/2025 at 3:02 PM                   ED Medications Administered:   Medications   iopamidol 76% ([id number]) injection for power injector (80 mL Intravenous Given 6/23/25 1429)       Vitals:    06/23/25 1218 06/23/25 1315 06/23/25 1415 06/23/25 1500   BP: 149/64 131/69     Pulse: 78 69 81 78   Resp: 18 15 16 12   Temp: 97.2 °F (36.2 °C)      TempSrc: Oral      SpO2: 97% 94% 94% 91%   Weight: (!) 139.3 kg        *I personally reviewed and interpreted all ED vitals.                      MDM             Medical Decision Making  Differential diagnosis includes but is not limited to adverse reaction of medication, GI bleed, AVM, malignancy, gastritis, diverticulitis, foodborne illness, viral illness    Well-appearing patient, vital signs within normal limits, hemoglobin normal and stable.  Dark stool that is guaiac negative, suspect this is related to Pepto-Bismol.  If no acute findings on CT, plan for discharge with plan for close outpatient follow-up as well as strict return precautions.  Discussed with patient who verbalized understanding of and agreement with this plan.    Problems Addressed:  Diarrhea, unspecified type: acute illness or injury    Amount and/or Complexity of Data Reviewed  External Data Reviewed: labs.     Details: CBC stable compared to 6/19/2025 and 5/12/2025 BMP stable compared to 5/12/2025  Labs: ordered.  Radiology: ordered.        Disposition and Plan     Clinical Impression:  1. Diarrhea, unspecified type         Disposition:  Discharge  6/23/2025  3:12 pm    Follow-up:  Pollo Alcantara MD  [address] [place] [id number]  [phone]    Follow  up  As needed          Medications Prescribed:  Discharge Medication List as of 6/23/2025  3:13 PM                Supplementary Documentation:

## 2025-06-25 NOTE — PROGRESS NOTES
OFFICE NOTE       The following individual(s) verbally consented to be recorded using ambient AI listening technology and understand that they can each withdraw their consent to this listening technology at any point by asking the clinician to turn off or pause the recording:    Patient name: Mason Puri  Additional names:            Patient ID: Mason Puri is a 65 year old male.  Today's Date: 06/25/25  Chief Complaint: Pre-Op Exam (7/8/25 - MRI cervical spine, needs clearance for IV sedation )    HPI  History of Present Illness  Mando Puri is a 65 year old male with type two diabetes, hypertension, obstructive sleep apnea, and coronary artery disease who presents for preoperative evaluation for IV sedation with MRI.    He is scheduled for an MRI of the cervical spine under IV sedation on July 8th and requires a preoperative evaluation for medical clearance.    He recently experienced an episode of diarrhea, for which he visited the ER on June 23rd. His gastrointestinal symptoms have since resolved, and he reports that his condition is 'starting to firm up a little bit.'    He has a history of type two diabetes managed with metformin 1000 mg twice daily and glipizide. His glucose levels were noted to be mildly elevated during his recent ER visit.    He has hypertension and is currently taking lisinopril 5 mg daily and metoprolol tartrate 25 mg twice daily.    He has a history of coronary artery disease with PCI stenting to the LAD in March 2023. He is on Brilinta for his heart stents and rosuvastatin 40 mg daily for hyperlipidemia. He also takes aspirin.    He has chronic kidney disease stage 3A with a creatinine level of 1.5, which is near his baseline. His recent labs showed mildly elevated lipase levels.    He has a history of obstructive sleep apnea and uses a CPAP machine regularly.    He has chronic osteomyelitis secondary to partial second and third left toe  amputation.    He has a history of enedelia-umbilical hernia repair with mesh and no recurrence of hernia.    He is currently taking multiple medications including Dulera, exidine, finasteride, levothyroxine 125 mcg, pantoprazole 40 mg, Lyrica 100 mg every 8 hours, tamsulosin, and Mounjaro 50 mg weekly.    No chest pain, palpitations, or shortness of breath.                       Mason Puri presents for preoperative clearance for:   Performing Physician:Dr. Gipson Behar  Date of surgery: 7/8/2025  Elective or emergency: Elective   Pre-operative forms provided: yes    Current complaints, if any:       Active medical problems:   Problem List[1]      Cervical/neck:   - Arthritis: Yes  - Neck pain: No  - Difficulty with ROM: No  - Prior neck injury/surgery: No    Cardiac:  - History of ischemic coronary disease: Yes; comment: PCI LEE  - History of heart failure: No  - Heart attack in past 90 days: No  - Chest pain in last 90 days: No  - History of Arrhythmia:  Yes; comment: PVC  - History of stroke or TIA:   (in past 6-9months?)No  - Diabetes/A1C: Last A1c value was 7.3% done 5/7/2025.      - Anticoagulation/Warfarin/ASA/NOAC: No  - Echo if available:  - Stress test if available:     Pulmonary:   - Smoker: No  - Apnea/CPAP: Yes; comment: on CPAP  - Asthma/COPD:No  - Difficulty laying flat for extended period of time: No  - Dyspnea/exertional: No  - Respiratory Medications: No    Functional Capacity:   Perform ADLs- eating, dress, toilet (1 METs)? Yes, patient can perform.   Walk up flight of steps, hill, walk ground level 3-4mph (4 METs)? Yes, patient can perform.   Perform heavy housework- scrubbing floors, move heavy furniture, climb 2 flights of stairs (4-10 METs)? Yes; comment: 2 flights stairs with cane  Participate in strenuous sports- swimming, singles tennis, football, basketball, ski (+10 METs)? 7 No  \    #Revised Cardiac Risk Index     2 points  RCRI Score  10.1 %  Risk of major cardiac  event      Medically Cleared for IV sedation for MRI Cervical spine within 30 days of dictation of this note        Vitals:    06/25/25 1302   BP: 121/66   Pulse: 73     body mass index is unknown because there is no height or weight on file.  BP Readings from Last 3 Encounters:   06/25/25 121/66   06/23/25 131/69   06/23/25 107/68     The ASCVD Risk score (Tej GARNICA, et al., 2019) failed to calculate for the following reasons:    Risk score cannot be calculated because patient has a medical history suggesting prior/existing ASCVD  Results  LABS  Creatinine: 1.5 (06/23/2025)  GFR: CKD stage 3A (06/23/2025)  Lipase: mildly elevated (06/23/2025)  Hemoglobin: mildly low (06/23/2025)  Ferritin: 11 (06/23/2025)    RADIOLOGY  CT abdomen and pelvis: colonic diverticulosis, no diverticulitis, hiatal hernia, colorectal lithiasis, coronary and peripheral atherosclerosis, no hernia recurrence (06/23/2025)       Medications reviewed:  Current Medications[2]      Assessment & Plan    1. Preop examination (Primary)  2. Other iron deficiency anemia  -     Ferrous Sulfate; Take 1 tablet (325 mg total) by mouth daily with breakfast.  Dispense: 90 tablet; Refill: 3    Assessment & Plan  Preoperative Evaluation for MRI  Cleared for MRI with resolved diarrhea. Advised to hold Mounjaro to prevent anesthesia complications.  - Hold Mounjaro for one week prior to MRI on July 8.  - Continue all other medications.  - Fax clearance letter for MRI.    Type 2 Diabetes Mellitus  Managed with metformin and glipizide. Mildly elevated glucose levels.  - Continue metformin and glipizide.    Chronic Kidney Disease Stage 3A  CKD stage 3A with creatinine at baseline. Recent mild dehydration noted.    Coronary Artery Disease  Coronary artery disease post-PCI with stenting. On Brilinta and aspirin.  - Continue Brilinta and aspirin.    Iron Deficiency Anemia  Iron deficiency anemia with low ferritin and hemoglobin. Requires supplementation.  - Prescribe  ferrous sulfate, one pill daily.  - Send prescription to [facility].    Obstructive Sleep Apnea  Managed with CPAP therapy.  - Continue CPAP therapy.     #Revised Cardiac Risk Index     2 points  RCRI Score  10.1 %  Risk of major cardiac event      Medically Cleared for IV sedation for MRI Cervical spine within 30 days of dictation of this note    Follow Up: As needed/if symptoms worsen or No follow-ups on file..     Objective/ Results:   Physical Exam  Constitutional:       Appearance: He is well-developed. He is obese.   Cardiovascular:      Rate and Rhythm: Normal rate and regular rhythm.      Heart sounds: Normal heart sounds.   Pulmonary:      Effort: Pulmonary effort is normal.      Breath sounds: Normal breath sounds.   Abdominal:      General: Bowel sounds are normal.      Palpations: Abdomen is soft.   Skin:     General: Skin is warm and dry.   Neurological:      Mental Status: He is alert and oriented to person, place, and time.      Deep Tendon Reflexes: Reflexes are normal and symmetric.        Physical Exam       Reviewed:    Patient Active Problem List    Diagnosis    History of amputation of left second toe ([facility])    Walker as ambulation aid    Acute osteomyelitis of toe, left ([facility])    Diabetic foot infection ([facility])    Cellulitis of left foot    Chronic osteomyelitis of foot ([facility])    PVC (premature ventricular contraction)    Pyogenic inflammation of bone ([facility])    JUAN A (obstructive sleep apnea)    S/P drug eluting coronary stent placement    Neurogenic claudication    Abnormal Holter monitor finding    Left carotid artery stenosis    Cerebellar infarct ([facility])    Transient ischemic attack (TIA)    BPH with obstruction/lower urinary tract symptoms    Major depressive disorder    Venous stasis dermatitis of both lower extremities    Patellofemoral arthritis    Varicose veins of left lower extremity with inflammation, with ulcer of ankle limited to breakdown of skin (HCC)    Hypertension associated with  type 2 diabetes mellitus (HCC)    Hyperlipidemia associated with type 2 diabetes mellitus (HCC)    Type 2 diabetes mellitus with diabetic peripheral angiopathy without gangrene, without long-term current use of insulin (HCC)    Hypothyroidism    History of right-sided carotid endarterectomy    Detrusor overactivity    Primary osteoarthritis of right knee    Gastroesophageal reflux disease with esophagitis    Class 2 severe obesity due to excess calories with serious comorbidity and body mass index (BMI) of 38.0 to 38.9 in adult (HCC)      Allergies[3]   Short Social Hx on File[4]   Review of Systems   Constitutional:  Positive for fatigue.   Respiratory: Negative.     Cardiovascular: Negative.    Gastrointestinal:  Positive for diarrhea.   Skin: Negative.    Neurological: Negative.        All other systems negative unless otherwise stated in ROS or HPI above.       Pollo Alcantara MD  Internal Medicine       Call office with any questions or seek emergency care if necessary.   Patient understands and agrees to follow directions and advice.      ----------------------------------------- PATIENT INSTRUCTIONS-----------------------------------------     There are no Patient Instructions on file for this visit.        The 21st Century Cures Act makes medical notes available to patients in the interest of transparency.  However, please be advised that this is a medical document.  It is intended as a peer to peer communication.  It is written in medical language and may contain abbreviations or verbiage that are technical and unfamiliar.  It may appear blunt or direct.  Medical documents are intended to carry relevant information, facts as evident, and the clinical opinion of the practitioner.          [1]   Patient Active Problem List  Diagnosis    Class 2 severe obesity due to excess calories with serious comorbidity and body mass index (BMI) of 38.0 to 38.9 in adult (HCC)    Hypertension associated with type 2 diabetes  mellitus (HCC)    Hyperlipidemia associated with type 2 diabetes mellitus (HCC)    Type 2 diabetes mellitus with diabetic peripheral angiopathy without gangrene, without long-term current use of insulin (HCC)    Hypothyroidism    History of right-sided carotid endarterectomy    Detrusor overactivity    Primary osteoarthritis of right knee    Gastroesophageal reflux disease with esophagitis    Varicose veins of left lower extremity with inflammation, with ulcer of ankle limited to breakdown of skin (HCC)    Patellofemoral arthritis    Venous stasis dermatitis of both lower extremities    Major depressive disorder    BPH with obstruction/lower urinary tract symptoms    Transient ischemic attack (TIA)    Left carotid artery stenosis    Cerebellar infarct (HCC)    Abnormal Holter monitor finding    S/P drug eluting coronary stent placement    Neurogenic claudication    JUAN A (obstructive sleep apnea)    Pyogenic inflammation of bone ([facility])    PVC (premature ventricular contraction)    Chronic osteomyelitis of foot ([facility])    Diabetic foot infection ([facility])    Cellulitis of left foot    Acute osteomyelitis of toe, left ([facility])    History of amputation of left second toe ([facility])    Walker as ambulation aid   [2]   Current Outpatient Medications   Medication Sig Dispense Refill    Ferrous Sulfate 325 (65 Fe) MG Oral Tab Take 1 tablet (325 mg total) by mouth daily with breakfast. 90 tablet 3    glipiZIDE 5 MG Oral Tab Take 1 tablet (5 mg total) by mouth every morning before breakfast. 90 tablet 0    rosuvastatin 40 MG Oral Tab Take 1 tablet (40 mg total) by mouth nightly. FOR CHOLESTEROL. 90 tablet 1    Tirzepatide (MOUNJARO) 15 MG/0.5ML Subcutaneous Solution Auto-injector Inject 15 mg into the skin once a week. FOR DIABETES. Continuation of therapy. 6 mL 9    lisinopril 5 MG Oral Tab Take 1 tablet (5 mg total) by mouth daily. 90 tablet 1    levothyroxine 125 MCG Oral Tab Take 1 tablet (125 mcg total) by mouth nightly. TAKE AT BEDTIME  90 tablet 1    DULoxetine 30 MG Oral Cap DR Particles Take 1 capsule (30 mg total) by mouth daily. TAKE 1 CAPSULE IN THE MORNING FOR ANXIETY/DEPRESSION/ARTHRITIS PAIN 90 capsule 1    finasteride 5 MG Oral Tab Take 1 tablet (5 mg total) by mouth daily. FOR PROSTATE. 90 tablet 1    metoprolol tartrate 25 MG Oral Tab Take 1 tablet (25 mg total) by mouth 2 (two) times daily. FOR HEART. 180 tablet 1    pantoprazole 40 MG Oral Tab EC Take 1 tablet (40 mg total) by mouth every morning before breakfast. 90 tablet 1    ticagrelor 90 MG Oral Tab Take 1 tablet (90 mg total) by mouth every 12 (twelve) hours. FOR HEART STENTS. 180 tablet 9    tamsulosin 0.4 MG Oral Cap Take 2 capsules (0.8 mg total) by mouth daily. FOR PROSTATE. 180 capsule 9    pregabalin 100 MG Oral Cap Take 1 capsule (100 mg total) by mouth every 8 (eight) hours. FOR NERVE PAIN. 270 capsule 1    aspirin (ASPIRIN 81) 81 MG Oral Tab EC Take 1 tablet (81 mg total) by mouth daily. FOR HEART. 90 tablet 9    metFORMIN HCl 1000 MG Oral Tab Take 1 tablet (1,000 mg total) by mouth 2 (two) times daily with meals. 180 tablet 1    Multiple Vitamins-Minerals (MULTIVITAMIN MEN 50+) Oral Tab Take 1 tablet by mouth in the morning.     [3]   Allergies  Allergen Reactions    Empagliflozin OTHER (SEE COMMENTS)     Frequent urination, unbearable.    Penicillins UNKNOWN     Patient does not recall reaction    Other UNKNOWN   [4]   Social History  Socioeconomic History    Marital status:     Number of children: 4   Tobacco Use    Smoking status: Former     Current packs/day: 0.00     Types: Cigarettes     Quit date:      Years since quittin.5     Passive exposure: Past    Smokeless tobacco: Never   Vaping Use    Vaping status: Never Used   Substance and Sexual Activity    Alcohol use: Never    Drug use: Never   Other Topics Concern    Caffeine Concern Yes     Comment: coffee daily    Exercise No   Social History Narrative    The patient does use an assistive  device..  Brace    The patient does live in a home with stairs.     Social Drivers of Health     Food Insecurity: No Food Insecurity (5/7/2025)    NCSS - Food Insecurity     Worried About Running Out of Food in the Last Year: No     Ran Out of Food in the Last Year: No   Transportation Needs: No Transportation Needs (5/7/2025)    NCSS - Transportation     Lack of Transportation: No   Housing Stability: Not At Risk (5/7/2025)    NCSS - Housing/Utilities     Has Housing: Yes     Worried About Losing Housing: No     Unable to Get Utilities: No

## 2025-07-08 NOTE — ANESTHESIA PREPROCEDURE EVALUATION
Anesthesia PreOp Note    HPI:     Mason Puri is a 65 year old male who presents for preoperative consultation requested by: * No surgeons listed *    Date of Surgery: 7/8/2025    * No procedures listed *  Indication: * No pre-op diagnosis entered *    Relevant Problems   No relevant active problems       NPO:  Last Liquid Consumption Date: 07/08/25  Last Liquid Consumption Time: 1130 (sip of water)  Last Solid Consumption Date: 07/07/25  Last Solid Consumption Time: 2200  Last Liquid Consumption Date: 07/08/25          History Review:  Patient Active Problem List    Diagnosis Date Noted    History of amputation of left second toe ([facility]) 05/13/2025    Walker as ambulation aid 05/13/2025    Acute osteomyelitis of toe, left ([facility]) 05/12/2025    Diabetic foot infection ([facility]) 05/07/2025    Cellulitis of left foot 05/07/2025    Chronic osteomyelitis of foot ([facility]) 04/21/2025    PVC (premature ventricular contraction) 12/09/2024    Pyogenic inflammation of bone ([facility]) 10/30/2024    JUAN A (obstructive sleep apnea) 08/14/2024    S/P drug eluting coronary stent placement 12/27/2023    Neurogenic claudication 12/27/2023    Abnormal Holter monitor finding 12/20/2022    Left carotid artery stenosis 10/27/2022    Cerebellar infarct ([facility]) 10/27/2022    Transient ischemic attack (TIA) 10/19/2022    BPH with obstruction/lower urinary tract symptoms 09/20/2022    Major depressive disorder 02/22/2022    Venous stasis dermatitis of both lower extremities 12/07/2021    Patellofemoral arthritis 11/03/2021    Varicose veins of left lower extremity with inflammation, with ulcer of ankle limited to breakdown of skin ([facility]) 08/25/2021    Hypertension associated with type 2 diabetes mellitus ([facility]) 08/10/2021    Hyperlipidemia associated with type 2 diabetes mellitus ([facility]) 08/10/2021    Type 2 diabetes mellitus with diabetic peripheral angiopathy without gangrene, without long-term current use of insulin ([facility]) 08/10/2021     Hypothyroidism 08/10/2021    History of right-sided carotid endarterectomy 08/10/2021    Detrusor overactivity 08/10/2021    Primary osteoarthritis of right knee 08/10/2021    Gastroesophageal reflux disease with esophagitis 08/10/2021    Class 2 severe obesity due to excess calories with serious comorbidity and body mass index (BMI) of 38.0 to 38.9 in adult ([facility]) 12/05/2019       Past Medical History[1]    Past Surgical History[2]    Prescriptions Prior to Admission[3]  Current Medications and Prescriptions Ordered in Epic[4]    Allergies[5]    Family History[6]  Social Hx on file[7]    Available pre-op labs reviewed.  Lab Results   Component Value Date    WBC 10.4 06/23/2025    WBC 8.0 06/19/2025    RBC 4.50 06/23/2025    RBC 4.53 06/19/2025    HGB 12.8 (L) 06/23/2025    HGB 12.7 (L) 06/19/2025    HCT 40.8 06/23/2025    HCT 41.4 06/19/2025    MCV 90.7 06/23/2025    MCV 91.4 06/19/2025    MCH 28.4 06/23/2025    MCH 28.0 06/19/2025    MCHC 31.4 06/23/2025    MCHC 30.7 (L) 06/19/2025    RDW 14.6 06/23/2025    RDW 13.1 06/19/2025    .0 06/23/2025     06/19/2025     Lab Results   Component Value Date     06/23/2025    K 4.4 06/23/2025     06/23/2025    CO2 26.0 06/23/2025    BUN 17 06/23/2025    CREATSERUM 1.55 (H) 06/23/2025     (H) 06/23/2025    PGLU 134 (H) 07/08/2025    CA 9.6 06/23/2025          Vital Signs:  Body mass index is 40.83 kg/m².   height is 1.88 m (6' 2\") and weight is 144.2 kg (318 lb) (abnormal). His blood pressure is 113/92 (abnormal) and his pulse is 70. His oxygen saturation is 94%.   Vitals:    07/03/25 1043 07/08/25 1244   BP:  (!) 113/92   Pulse:  70   TempSrc:  Oral   SpO2:  94%   Weight: (!) 139.3 kg (307 lb) (!) 144.2 kg (318 lb)   Height: 1.88 m (6' 2\") 1.88 m (6' 2\")        Anesthesia Evaluation     Patient summary reviewed and Nursing notes reviewed    Airway   Mallampati: II  TM distance: >3 FB  Neck ROM: full  Dental    (+) lower dentures and upper  dentures    Pulmonary - normal exam   (+) sleep apnea  Cardiovascular - normal exam  (+) hypertension, CAD, CABG/stent    ECG reviewed  ROS comment: Echo:  1. Left ventricle: The cavity size was mildly dilated. Wall      thickness was increased in a pattern of mild LVH. Systolic      function was normal. The estimated ejection fraction was 55-60%,      by visual assessment. Wall motion was normal; there were no      regional wall motion abnormalities. Left ventricular diastolic      function parameters were normal.   2. Left atrium: The atrium was mildly dilated.   3. Right ventricle: The cavity size was dilated. Wall thickness was      normal. Systolic function was mildly reduced.       Neuro/Psych    (+)  TIA, CVA, anxiety/panic attacks (Claustrophobia),        GI/Hepatic/Renal    (+) chronic renal disease CRI    Endo/Other    (+) diabetes mellitus, arthritis  Abdominal   (+) obese                 Anesthesia Plan:   ASA:  3  Plan:   General  Airway:  LMA  Informed Consent Plan and Risks Discussed With:  Patient      I have informed Mason Puri and/or legal guardian or family member of the nature of the anesthetic plan, benefits, risks including possible dental damage if relevant, major complications, and any alternative forms of anesthetic management.   All of the patient's questions were answered to the best of my ability. The patient desires the anesthetic management as planned.  Scotty Dietrich MD  7/8/2025 1:38 PM  Present on Admission:  **None**           [1]   Past Medical History:   Benign head tremor    BPH (benign prostatic hyperplasia)    Diabetes (HCC)    Disorder of thyroid    Diverticulitis    Former smoker    High blood pressure    High cholesterol    Hyperlipidemia    Hypertension    Neuropathic pain    Non-pressure chronic ulcer of right lower leg, limited to breakdown of skin (HCC)    Obesity    Sleep apnea    Snoring    Stroke (HCC)    Thyroid disease   [2]   Past Surgical  History:  Procedure Laterality Date    Amputation toe,mt-p jt Left 05/10/2025    patial toe amputation-2nd digit    Carotid endarterectomy Right 04/29/2021    Surgeon: Dr. Rajiv Solorio at [facility] drug eluting stent      x 2    Neck/chest procedure unlisted      per patient, a blockage was removed from the right side of his neck in 8/2021    Other surgical history  2017    Small bowel Res.    Other surgical history  11/12/2019    Colectomy    Pain management N/A 11/15/2024    Dr.Behar; Right knee Genicular Radiofrequency Neurotomy   [3] (Not in a hospital admission)  [4]   Current Outpatient Medications Ordered in Epic   Medication Sig Dispense Refill    METFORMIN HCL 1000 MG Oral Tab TAKE 1 TABLET TWICE A DAY WITH MEALS 180 tablet 3    Ferrous Sulfate 325 (65 Fe) MG Oral Tab Take 1 tablet (325 mg total) by mouth daily with breakfast. 90 tablet 3    glipiZIDE 5 MG Oral Tab Take 1 tablet (5 mg total) by mouth every morning before breakfast. 90 tablet 0    rosuvastatin 40 MG Oral Tab Take 1 tablet (40 mg total) by mouth nightly. FOR CHOLESTEROL. 90 tablet 1    Tirzepatide (MOUNJARO) 15 MG/0.5ML Subcutaneous Solution Auto-injector Inject 15 mg into the skin once a week. FOR DIABETES. Continuation of therapy. 6 mL 9    levothyroxine 125 MCG Oral Tab Take 1 tablet (125 mcg total) by mouth nightly. TAKE AT BEDTIME 90 tablet 1    DULoxetine 30 MG Oral Cap DR Particles Take 1 capsule (30 mg total) by mouth daily. TAKE 1 CAPSULE IN THE MORNING FOR ANXIETY/DEPRESSION/ARTHRITIS PAIN 90 capsule 1    finasteride 5 MG Oral Tab Take 1 tablet (5 mg total) by mouth daily. FOR PROSTATE. 90 tablet 1    metoprolol tartrate 25 MG Oral Tab Take 1 tablet (25 mg total) by mouth 2 (two) times daily. FOR HEART. 180 tablet 1    pantoprazole 40 MG Oral Tab EC Take 1 tablet (40 mg total) by mouth every morning before breakfast. 90 tablet 1    ticagrelor 90 MG Oral Tab Take 1 tablet (90 mg total) by mouth every 12 (twelve) hours. FOR HEART  STENTS. 180 tablet 9    tamsulosin 0.4 MG Oral Cap Take 2 capsules (0.8 mg total) by mouth daily. FOR PROSTATE. 180 capsule 9    pregabalin 100 MG Oral Cap Take 1 capsule (100 mg total) by mouth every 8 (eight) hours. FOR NERVE PAIN. 270 capsule 1    aspirin (ASPIRIN 81) 81 MG Oral Tab EC Take 1 tablet (81 mg total) by mouth daily. FOR HEART. 90 tablet 9    Multiple Vitamins-Minerals (MULTIVITAMIN MEN 50+) Oral Tab Take 1 tablet by mouth in the morning.      lisinopril 5 MG Oral Tab Take 1 tablet (5 mg total) by mouth daily. 90 tablet 1     Current Facility-Administered Medications Ordered in Epic   Medication Dose Route Frequency Provider Last Rate Last Admin    lactated ringers infusion   Intravenous Continuous Behar, Alex, MD 20 mL/hr at 25 1306 New Bag at 25 1306    metoprolol tartrate (Lopressor) tab 25 mg  25 mg Oral Once PRN Behar, Alex, MD        famotidine (Pepcid) tab 20 mg  20 mg Oral Once Behar, Alex, MD        Or    famotidine (Pepcid) 20 mg/2mL injection 20 mg  20 mg Intravenous Once Behar, Alex, MD        metoclopramide (Reglan) tab 10 mg  10 mg Oral Once Behar, Alex, MD        Or    metoclopramide (Reglan) 5 mg/mL injection 10 mg  10 mg Intravenous Once Behar, Alex, MD       [5]   Allergies  Allergen Reactions    Empagliflozin OTHER (SEE COMMENTS)     Frequent urination, unbearable.    Penicillins UNKNOWN     Patient does not recall reaction  Denies skin peeling, blisters, organ damage    Other UNKNOWN   [6]   Family History  Problem Relation Age of Onset    Cancer Father         Prostate    Heart Disorder Father     Cancer Mother    [7]   Social History  Socioeconomic History    Marital status:     Number of children: 4   Tobacco Use    Smoking status: Former     Current packs/day: 0.00     Types: Cigarettes     Quit date:      Years since quittin.5     Passive exposure: Past    Smokeless tobacco: Never   Vaping Use    Vaping status: Never Used   Substance and Sexual  Activity    Alcohol use: Never    Drug use: Never   Other Topics Concern    Caffeine Concern Yes     Comment: coffee daily    Exercise No

## 2025-07-08 NOTE — ANESTHESIA POSTPROCEDURE EVALUATION
Patient: Mason Puri    Procedure Summary       Date: 07/08/25 Room / Location: [facility] MRI; [facility] Post Anesthesia Care Unit    Anesthesia Start: 1410 Anesthesia Stop: 1510    Procedure: MRI SPINE CERVICAL (CPT=72141) Diagnosis:       Primary osteoarthritis of right knee      Patellofemoral pain syndrome, unspecified laterality      Chronic pain of right knee      Limited mobility      Diabetic polyneuropathy associated with type 2 diabetes mellitus (HCC)      Class 3 severe obesity due to excess calories with serious comorbidity and body mass index (BMI) of 40.0 to 44.9 in adult      DDD (degenerative disc disease), cervical      Foraminal stenosis of cervical region      Cervical radiculopathy      Cervical facet syndrome      Trigger point of neck      Lumbar spondylosis      Mechanical low back pain      Facet syndrome, lumbar      Myalgia      Biomechanical lesion      Class 2 severe obesity due to excess calories with serious comorbidity and body mass index (BMI) of 39.0 to 39.9 in adult (HCC)      Strain of gluteus medius, unspecified laterality, initial encounter      Gluteal tendinitis of right buttock      Primary osteoarthritis of knee, unspecified laterality      Oglesby reflex positive      Hyperreflexia    Scheduled Providers:  Anesthesiologist: Scotty Dietrich MD    Anesthesia Type: general ASA Status: 3            Anesthesia Type: general    Vitals Value Taken Time   /69 07/08/25 15:10   Temp 98.4 07/08/25 15:12   Pulse 70 07/08/25 15:12   Resp 10 07/08/25 15:12   SpO2 91 % 07/08/25 15:12   Vitals shown include unfiled device data.    [facility] AN Post Evaluation:   Patient Evaluated in PACU  Patient Participation: complete - patient participated  Level of Consciousness: awake  Airway Patency:patent  Dental exam unchanged from preop  Yes    Nausea/Vomiting: none  Cardiovascular Status: acceptable  Respiratory Status: acceptable  Postoperative Hydration  acceptable      Scotty Dietrich MD  7/8/2025 3:12 PM

## 2025-07-08 NOTE — TELEPHONE ENCOUNTER
Medication received from [facility]   3  individual 30 mg syringe of orthovisc   For right knee once a week for 3 weeks  Medication placed in med room

## 2025-07-08 NOTE — ANESTHESIA PROCEDURE NOTES
Airway  Date/Time: 7/8/2025 2:23 PM  Reason: Elective    Airway not difficult    General Information and Staff   Patient location during procedure: OR  Anesthesiologist: Scotty Dietrich MD  Performed: anesthesiologist   Performed by: Scotty Dietrich MD  Authorized by: Scotty Dietrich MD        Indications and Patient Condition  Indications for airway management: anesthesia  Sedation level: deep      Preoxygenated: yesPatient position: sniffing    Mask difficulty assessment: 1 - vent by mask    Final Airway Details    Final airway type: supraglottic airway      Successful airway: classic  Size: 4     Number of attempts at approach: 1

## 2025-07-08 NOTE — DISCHARGE INSTRUCTIONS
HOME INSTRUCTIONS  [facility]G HOME CARE INSTRUCTIONS: POST-OP ANESTHESIA  The medication that you received for sedation or general anesthesia can last up to 24 hours. Your judgment and reflexes may be altered, even if you feel like your normal self.      We Recommend:   Do not drive any motor vehicle or bicycle   Avoid mowing the lawn, playing sports, or working with power tools/applicances (power saws, electric knives or mixers)   That you have someone stay with you on your first night home   Do not drink alcohol or take sleeping pills or tranquilizers   Do not sign legal documents within 24 hours of your procedure   If you had a nerve block for your surgery, take extra care not to put any pressure on your arm or hand for 24 hours    It is normal:  For you to have a sore throat if you had a breathing tube during surgery (while you were asleep!). The sore throat should get better within 48 hours. You can gargle with warm salt water (1/2 tsp in 4 oz warm water) or use a throat lozenge for comfort  To feel muscle aches or soreness especially in the abdomen, chest or neck. The achy feeling should go away in the next 24 hours  To feel weak, sleepy or \"wiped out\". Your should start feeling better in the next 24 hours.   To experience mild discomforts such as sore lip or tongue, headache, cramps, gas pains or a bloated feeling in your abdomen.   To experience mild back pain or soreness for a day or two if you had spinal or epidural anesthesia.   If you had laparoscopic surgery, to feel shoulder pain or discomfort on the day of surgery.   For some patients to have nausea after surgery/anesthesia    If you feel nausea or experience vomiting:   Try to move around less.   Eat less than usual or drink only liquids until the next morning   Nausea should resolve in about 24 hours    If you have a problem when you are at home:    Call your surgeons office    98

## 2025-07-11 NOTE — TELEPHONE ENCOUNTER
Patient calling regarding left second toe amputation states he doesn't think the wound is healing right and that there is an odd smell. Patient has an appointment on 7/29/25 with Dr. Baxter but wants to be sooner if possible.

## 2025-07-11 NOTE — PROGRESS NOTES
[facility] Podiatry  Progress Note      Mason Puri is a 65 year old male.   Chief Complaint   Patient presents with    Post-Op     S/p L 2nd digit PA sx on  5/10/2025 - pt feels like he has an infection - stated there is a smell - pain rated 6/10 - sharp pain- 3rd digit swelling - drainage             HPI:     Patient is a 65-year-old male with history of diabetes and history of partial left 2nd and 3rd digit amputation presents to clinic for a possible infection of his left second toe.  Denies any pedal injuries or trauma.  Admits to mild drainage from the left second digit.  Admits to pain to his left foot    Allergies: Empagliflozin, Penicillins, and Other    Current Medications[1]   Past Medical History[2]   Past Surgical History[3]   Family History[4]   Social Hx on file[5]        REVIEW OF SYSTEMS:     Denies nause, fever, chills  No calf pain  Denies chest pain or SOB      EXAM:   There were no vitals taken for this visit.  GENERAL: well developed, well nourished, in no apparent distress  EXTREMITIES:   1. Integument: Normal skin temperature and turgor.  Small ulceration on the plantar aspect of the left second digit measuring 0.2 x 0.1 x 0.1 cm with fibrogranular wound bed.  No probing to bone or purulent drainage noted  2. Vascular: Dorsalis pedis two out of four bilateral and posterior tibial pulses two out of   four bilateral, capillary refill normal.   3. Musculoskeletal: All muscle groups are graded 5 out of 5 in the foot and ankle.  Partial left 2nd and 3rd digit amputation.   4. Neurological: Normal sharp dull sensation; reflexes normal.             ASSESSMENT AND PLAN:   There are no diagnoses linked to this encounter.    Plan:       Discussed in detail the etiology of ulceration.  Discussed the importance of good hygiene and following conservative care instructions.  Discussed the potential for infection and other risks, including osteomyelitis, if non-compliant. Patient verbalized  understanding.  Discussed the potential for loss of limb/life.  Pt instructed on signs and symptoms of infection to watch for including N/F/V/C/SOB/CP, redness, increased drainage, malodor, etc. If any concern patient is to contact our office immediately or go to the Emergency Department. Pt acknowledge understanding.     Procedure Note: Debridement  Debridement, subcutaneous tissue (includes epidermis and dermis, if performed); first 20 square cm or less. (See overall size of wound to calculate debridement size)  Sharp excisional debridement of wound was performed to the level of and including subcutaneous tissue with #15 blade, dermal curette, or moistened gauze as listed below. Pt tolerated debridement well. Granular tissue/wound base was achieved with healthy bleeding noted. Bleeding was controlled with manual pressure and dry, sterile gauze. Non-viable tissue removed from wound bed with debridement.    Postdebridement the wound was dressed with iodine and a Band-Aid.  Informed patient that we will prescribe an antibiotic for prophylaxis.  Advised patient to perform daily dressing changes with iodine and Band-Aid.  Follow-up with me in clinic in 2 weeks        Lurdes Baxter DPM        [1]   Current Outpatient Medications   Medication Sig Dispense Refill    valsartan 160 MG Oral Tab Take 1 tablet (160 mg total) by mouth daily.      metFORMIN HCl 1000 MG Oral Tab Take 1 tablet (1,000 mg total) by mouth 2 (two) times daily with meals. 60 tablet 0    Ferrous Sulfate 325 (65 Fe) MG Oral Tab Take 1 tablet (325 mg total) by mouth daily with breakfast. 90 tablet 3    glipiZIDE 5 MG Oral Tab Take 1 tablet (5 mg total) by mouth every morning before breakfast. 90 tablet 0    rosuvastatin 40 MG Oral Tab Take 1 tablet (40 mg total) by mouth nightly. FOR CHOLESTEROL. 90 tablet 1    Tirzepatide (MOUNJARO) 15 MG/0.5ML Subcutaneous Solution Auto-injector Inject 15 mg into the skin once a week. FOR DIABETES.  Continuation of therapy. 6 mL 9    lisinopril 5 MG Oral Tab Take 1 tablet (5 mg total) by mouth daily. 90 tablet 1    levothyroxine 125 MCG Oral Tab Take 1 tablet (125 mcg total) by mouth nightly. TAKE AT BEDTIME 90 tablet 1    DULoxetine 30 MG Oral Cap DR Particles Take 1 capsule (30 mg total) by mouth daily. TAKE 1 CAPSULE IN THE MORNING FOR ANXIETY/DEPRESSION/ARTHRITIS PAIN 90 capsule 1    finasteride 5 MG Oral Tab Take 1 tablet (5 mg total) by mouth daily. FOR PROSTATE. 90 tablet 1    metoprolol tartrate 25 MG Oral Tab Take 1 tablet (25 mg total) by mouth 2 (two) times daily. FOR HEART. 180 tablet 1    pantoprazole 40 MG Oral Tab EC Take 1 tablet (40 mg total) by mouth every morning before breakfast. 90 tablet 1    ticagrelor 90 MG Oral Tab Take 1 tablet (90 mg total) by mouth every 12 (twelve) hours. FOR HEART STENTS. 180 tablet 9    tamsulosin 0.4 MG Oral Cap Take 2 capsules (0.8 mg total) by mouth daily. FOR PROSTATE. 180 capsule 9    pregabalin 100 MG Oral Cap Take 1 capsule (100 mg total) by mouth every 8 (eight) hours. FOR NERVE PAIN. 270 capsule 1    aspirin (ASPIRIN 81) 81 MG Oral Tab EC Take 1 tablet (81 mg total) by mouth daily. FOR HEART. 90 tablet 9    Multiple Vitamins-Minerals (MULTIVITAMIN MEN 50+) Oral Tab Take 1 tablet by mouth in the morning.     [2]   Past Medical History:   Benign head tremor    BPH (benign prostatic hyperplasia)    Diabetes (HCC)    Disorder of thyroid    Diverticulitis    Former smoker    High blood pressure    High cholesterol    Hyperlipidemia    Hypertension    Neuropathic pain    Non-pressure chronic ulcer of right lower leg, limited to breakdown of skin (HCC)    Obesity    Sleep apnea    Snoring    Stroke (HCC)    Thyroid disease   [3]   Past Surgical History:  Procedure Laterality Date    Amputation toe,mt-p jt Left 05/10/2025    patial toe amputation-2nd digit    Carotid endarterectomy Right 04/29/2021    Surgeon: Dr. Rajiv Solorio at [facility] drug eluting stent       x 2    Neck/chest procedure unlisted      per patient, a blockage was removed from the right side of his neck in 2021    Other surgical history  2017    Small bowel Res.    Other surgical history  2019    Colectomy    Pain management N/A 11/15/2024    Dr.Behar; Right knee Genicular Radiofrequency Neurotomy   [4]   Family History  Problem Relation Age of Onset    Cancer Father         Prostate    Heart Disorder Father     Cancer Mother    [5]   Social History  Socioeconomic History    Marital status:     Number of children: 4   Tobacco Use    Smoking status: Former     Current packs/day: 0.00     Types: Cigarettes     Quit date:      Years since quittin.5     Passive exposure: Past    Smokeless tobacco: Never   Vaping Use    Vaping status: Never Used   Substance and Sexual Activity    Alcohol use: Never    Drug use: Never   Other Topics Concern    Caffeine Concern Yes     Comment: coffee daily    Exercise No

## 2025-07-11 NOTE — TELEPHONE ENCOUNTER
S/w patient- He states that he has concerns for his left foot. He states that he has had odor from the left foot for the last few days. Denies redness or drainage. Endorses mild swelling. Patient states that he has follow-up appointment on 7/29. He states that he would like sooner follow-up appointment. I offered sooner opening today at 12:10. He states that he lives very close to the [facility] and he will be there for appointment today. He had no other questions at this time.    BONNY

## 2025-07-28 NOTE — TELEPHONE ENCOUNTER
Please reply to pool: EM RN TRIAGE  Action Requested: Summary for Provider     []  Critical Lab, Recommendations Needed  [] Need Additional Advice  [x]   FYI    []   Need Orders  [] Need Medications Sent to Pharmacy  []  Other     SUMMARY: Patient contacts clinic reporting diarrhea for the last 3 days.  He began taking an iron supplement and inquires if this could be the cause.  Denies pain, does have some cramping with bowel movement. Denies fever, nausea or vomiting.  Nurse offered clinic visit v home care.  BRAT diet discussed.  Hydration.  Patient will monitor symptoms and call back for any progression.     Reason for call: Diarrhea  Onset: Data Unavailable                       Reason for Disposition   MILD diarrhea (e.g., 1-3 or more stools than normal in past 24 hours) diarrhea without known cause and present > 7 days    Protocols used: Diarrhea-A-OH

## (undated) NOTE — LETTER
AUTHORIZATION FOR SURGICAL OPERATION OR OTHER PROCEDURE    1. I hereby authorize Dr. Alex Behar and the [facility] staff assigned to my case to perform the following operation and/or procedure at the [facility]:    Right knee intra-articular orthovisc (#2/3) under ultrasound guidance     2.  My physician has explained the nature and purpose of the operation or other procedure, possible alternative methods of treatment, the risks involved, and the possibility of complication to me.  I acknowledge that no guarantee has been made as to the result that may be obtained.  3.  I recognize that, during the course of this operation, or other procedure, unforseen conditions may necessitate additional or different procedure than those listed above.  I, therefore, further authorize and request that the above named physician, his/her physician assistants or designees perform such procedures as are, in his/her professional opinion, necessary and desirable.  4.  Any tissue or organs removed in the operation or other procedure may be disposed of by and at the discretion of the [facility] staff and [facility].  5.  I understand that in the event of a medical emergency, I will be transported by local paramedics to [facility] or other hospital emergency department.  6.  I certify that I have read and fully understand the above consent to operation and/or other procedure.    7.  I acknowledge that my physician has explained sedation/analgesia administration to me including the risks and benefits.  I consent to the administration of sedation/analgesia as may be necessary or desirable in the judgement of my physician.    Witness signature: ___________________________________________________ Date:  ______/______/_____                    Time:  ________ A.M.  P.M.       Patient Name:  Mando Puri  2/20/1960  [id number]       Patient signature:   ___________________________________________________                 Statement of Physician  My signature below affirms that prior to the time of the procedure, I have explained to the patient and/or his/her guardian, the risks and benefits involved in the proposed treatment and any reasonable alternative to the proposed treatment.  I have also explained the risks and benefits involved in the refusal of the proposed treatment and have answered the patient's questions.                        Date:  ______/______/_______  Provider                      Signature:  __________________________________________________________       Time:  ___________ ACHELSIE ELENA

## (undated) NOTE — LETTER
11/14/2024          To Whom It May Concern:    Mason Puri is currently under my medical care and may not return to to work on Monday, 11/18/2024 with no restrictions.    If you require additional information please contact our office.        Sincerely,      Eri Gallardo DPM, CARISA.SUKI GODOY  Diplomat, American Board of Foot and Ankle Surgery  Certified in Foot and Rearfoot/Ankle Reconstruction  Fellow of the American College of Foot and Ankle Surgeons  Fellowship Trained Foot and Ankle Surgeon   [facility]

## (undated) NOTE — LETTER
[place] [address], [place], [place]    Authorization for Surgical Operation and Procedure                               I hereby authorize Lurdes Baxter DPM, my physician and his/her assistants (if applicable), which may include medical students, residents, and/or fellows, to perform the following surgical operation/ procedure and administer such anesthesia as may be determined necessary by my physician: Operation/Procedure name (s) Left 2nd Digit PartialTOE AMPUTATION on Mason Puri   2.   I recognize that during the surgical operation/procedure, unforeseen conditions may necessitate additional or different procedures than those listed above.  I, therefore, further authorize and request that the above-named surgeon, assistants, or designees perform such procedures as are, in their judgment, necessary and desirable.    3.   My surgeon/physician has discussed prior to my surgery the potential benefits, risks and side effects of this procedure; the likelihood of achieving goals; and potential problems that might occur during recuperation.  They also discussed reasonable alternatives to the procedure, including risks, benefits, and side effects related to the alternatives and risks related to not receiving this procedure.  I have had all my questions answered and I acknowledge that no guarantee has been made as to the result that may be obtained.    4.   Should the need arise during my operation/procedure, which includes change of level of care prior to discharge, I also consent to the administration of blood and/or blood products.  Further, I understand that despite careful testing and screening of blood or blood products by collecting agencies, I may still be subject to ill effects as a result of receiving a blood transfusion and/or blood products.  The following are some, but not all, of the potential risks that can occur: fever and allergic reactions, hemolytic  reactions, transmission of diseases such as Hepatitis, AIDS and Cytomegalovirus (CMV) and fluid overload.  In the event that I wish to have an autologous transfusion of my own blood, or a directed donor transfusion, I will discuss this with my physician.  Check only if Refusing Blood or Blood Products  I understand refusal of blood or blood products as deemed necessary by my physician may have serious consequences to my condition to include possible death. I hereby assume responsibility for my refusal and release the hospital, its personnel, and my physicians from any responsibility for the consequences of my refusal.    o  Refuse   5.   I authorize the use of any specimen, organs, tissues, body parts or foreign objects that may be removed from my body during the operation/procedure for diagnosis, research or teaching purposes and their subsequent disposal by hospital authorities.  I also authorize the release of specimen test results and/or written reports to my treating physician on the hospital medical staff or other referring or consulting physicians involved in my care, at the discretion of the Pathologist or my treating physician.    6.   I consent to the photographing or videotaping of the operations or procedures to be performed, including appropriate portions of my body for medical, scientific, or educational purposes, provided my identity is not revealed by the pictures or by descriptive texts accompanying them.  If the procedure has been photographed/videotaped, the surgeon will obtain the original picture, image, videotape or CD.  The hospital will not be responsible for storage, release or maintenance of the picture, image, tape or CD.    7.   I consent to the presence of a  or observers in the operating room as deemed necessary by my physician or their designees.    8.   I recognize that in the event my procedure results in extended X-Ray/fluoroscopy time, I may develop a skin  reaction.    9. If I have a Do Not Attempt Resuscitation (DNAR) order in place, that status will be suspended while in the operating room, procedural suite, and during the recovery period unless otherwise explicitly stated by me (or a person authorized to consent on my behalf). The surgeon or my attending physician will determine when the applicable recovery period ends for purposes of reinstating the DNAR order.  10. Patients having a sterilization procedure: I understand that if the procedure is successful the results will be permanent and it will therefore be impossible for me to inseminate, conceive, or bear children.  I also understand that the procedure is intended to result in sterility, although the result has not been guaranteed.   11. I acknowledge that my physician has explained sedation/analgesia administration to me including the risk and benefits I consent to the administration of sedation/analgesia as may be necessary or desirable in the judgment of my physician.    I CERTIFY THAT I HAVE READ AND FULLY UNDERSTAND THE ABOVE CONSENT TO OPERATION and/or OTHER PROCEDURE.     ____________________________________  _________________________________        ______________________________  Signature of Patient    Signature of Responsible Person                Printed Name of Responsible Person                                      ____________________________________  _____________________________                ________________________________  Signature of Witness        Date  Time         Relationship to Patient    STATEMENT OF PHYSICIAN My signature below affirms that prior to the time of the procedure; I have explained to the patient and/or his/her legal representative, the risks and benefits involved in the proposed treatment and any reasonable alternative to the proposed treatment. I have also explained the risks and benefits involved in refusal of the proposed treatment and alternatives to the proposed  treatment and have answered the patient's questions. If I have a significant financial interest in a co-management agreement or a significant financial interest in any product or implant, or other significant relationship used in this procedure/surgery, I have disclosed this and had a discussion with my patient.     _____________________________________________________              _____________________________  (Signature of Physician)                                                                                         (Date)                                   (Time)  Patient Name: Mason Colunga Khushi      : 1960      Printed: 2025     Medical Record #: [id number]                                      Page 1 of 1

## (undated) NOTE — LETTER
AUTHORIZATION FOR SURGICAL OPERATION OR OTHER PROCEDURE    1. I hereby authorize Dr. Neelam Haas and the [facility] staff assigned to my case to perform the following operation and/or procedure at the [facility]:     RIGHT knee Orthovisc 1/3 under ultrasound guidance          2. My physician has explained the nature and purpose of the operation or other procedure, possible alternative methods of treatment, the risks involved, and the possibility of complication to me. I acknowledge that no guarantee has been made as to the result that may be obtained. 3.  I recognize that, during the course of this operation, or other procedure, unforseen conditions may necessitate additional or different procedure than those listed above. I, therefore, further authorize and request that the above named physician, his/her physician assistants or designees perform such procedures as are, in his/her professional opinion, necessary and desirable. 4.  Any tissue or organs removed in the operation or other procedure may be disposed of by and at the discretion of the [facility] staff and [facility]. 5.  I understand that in the event of a medical emergency, I will be transported by local paramedics to [facility] or other [facility] emergency department. 6.  I certify that I have read and fully understand the above consent to operation and/or other procedure. 7.  I acknowledge that my physician has explained sedation/analgesia administration to me including the risks and benefits. I consent to the administration of sedation/analgesia as may be necessary or desirable in the judgement of my physician. Witness signature: ___________________________________________________ Date:  ______/______/_____                    Time:  ________ A. M.  P.M.        Patient Name:  Marlene Stephen  2/20/1960  [id number]]         Patient signature: ___________________________________________________               Statement of Physician  My signature below affirms that prior to the time of the procedure, I have explained to the patient and/or his/her guardian, the risks and benefits involved in the proposed treatment and any reasonable alternative to the proposed treatment. I have also explained the risks and benefits involved in the refusal of the proposed treatment and have answered the patient's questions.                         Date:  ______/______/_______  Provider                      Signature:  __________________________________________________________       Time:  ___________ ACHELSIE ELENA

## (undated) NOTE — LETTER
AUTHORIZATION FOR SURGICAL OPERATION OR OTHER PROCEDURE    1. I hereby authorize Dr. Steffanie Mancini and the [facility] staff assigned to my case to perform the following operation and/or procedure at the [facility] Office:    Left knee CSI    2.   My physician has explaine Parent    Responsible person                          []  Spouse  In case of minor or                    [] Other  _____________   Incompetent name:  __________________________________________________                               (please print)      _____

## (undated) NOTE — LETTER
AUTHORIZATION FOR SURGICAL OPERATION OR OTHER PROCEDURE    1. I hereby authorize Dr. Jeimy Johnson and the [facility] staff assigned to my case to perform the following operation and/or procedure at the [facility]:    Right knee Orthovisc 1/3 ultrasound guidance    2. My physician has explained the nature and purpose of the operation or other procedure, possible alternative methods of treatment, the risks involved, and the possibility of complication to me. I acknowledge that no guarantee has been made as to the result that may be obtained. 3.  I recognize that, during the course of this operation, or other procedure, unforseen conditions may necessitate additional or different procedure than those listed above. I, therefore, further authorize and request that the above named physician, his/her physician assistants or designees perform such procedures as are, in his/her professional opinion, necessary and desirable. 4.  Any tissue or organs removed in the operation or other procedure may be disposed of by and at the discretion of the [facility] staff and [facility]. 5.  I understand that in the event of a medical emergency, I will be transported by local paramedics to [facility] or other hospital emergency department. 6.  I certify that I have read and fully understand the above consent to operation and/or other procedure. 7.  I acknowledge that my physician has explained sedation/analgesia administration to me including the risks and benefits. I consent to the administration of sedation/analgesia as may be necessary or desirable in the judgement of my physician. Witness signature: ___________________________________________________ Date:  ______/______/_____                    Time:  ________ A. M.  P.M.        Patient Name:  Bernarda Mack  2/20/1960  [id number]       Patient signature:  ___________________________________________________        Statement of Physician  My signature below affirms that prior to the time of the procedure, I have explained to the patient and/or his/her guardian, the risks and benefits involved in the proposed treatment and any reasonable alternative to the proposed treatment. I have also explained the risks and benefits involved in the refusal of the proposed treatment and have answered the patient's questions.                         Date:  ______/______/_______  Provider                      Signature:  __________________________________________________________       Time:  ___________ A.M    P.M.

## (undated) NOTE — LETTER
Date & Time: 2/6/2022, 9:06 PM  Patient: Sam Dent  Encounter Provider(s):    Angel Toussaint MD       To Whom It May Concern:    Nataly Mayes was seen and treated in our department on 2/6/2022. He should not return to work until 02/10/2022.     If you have any questions or concerns, please do not hesitate to call.        _____________________________  Physician/APC Signature

## (undated) NOTE — LETTER
Date: 12/7/2021  Patient name: Mayank Blackburn  YOB: 1960  Medical Record Number: [id number]  Coverage: Payor: Mena Stanton / Plan: [employer] POS / Product Type: HMO /   Insurance ID: [id number]  Patient Address: [address] [place] [address] of breath. Cardiovascular: Positive for leg swelling. Negative for chest pain. Gastrointestinal: Negative for constipation and diarrhea. Endocrine: Positive for polyuria. Negative for polydipsia and polyphagia.    Musculoskeletal: Positive for arthral formation. Neurological:      General: No focal deficit present. Mental Status: He is alert and oriented to person, place, and time. Comments: Neuropathy on both feet.    Psychiatric:         Mood and Affect: Mood normal.         Behavior: Beha 9877   Wound Depth (cm) 0.1 cm 12/07/21 0803   Wound Volume (cm^3) 0.004 cm^3 12/07/21 0803   Margins Not attached 12/07/21 0941   Non-staged Wound Description Partial thickness 12/07/21 0941   Peggy-wound Assessment Clean;Dry; Other (Comment) 12/07/21 0055 [facility])    Problem List  Patient Active Problem List:     Class 2 severe obesity due to excess calories with serious comorbidity and body mass index (BMI) of 38.0 to 38.9 in adult [facility])     Hypertension associated with type 2 diabetes mellitus ([facility]     Hype weight management and the effect of the increased weight on the lower extremity edema. Discussed reducing salt intake, food with hidden salt, and management of the body fluid to prevent lower extremity edema.   Discussed purchasing compression stocking/Dandre

## (undated) NOTE — LETTER
AUTHORIZATION FOR SURGICAL OPERATION OR OTHER PROCEDURE    1. I hereby authorize Dr. Alex Behar and the [facility] staff assigned to my case to perform the following operation and/or procedure at the [facility]:    RIGHT knee Orthovisc series of 3 and CSI injection under ultrasound guidance     2.  My physician has explained the nature and purpose of the operation or other procedure, possible alternative methods of treatment, the risks involved, and the possibility of complication to me.  I acknowledge that no guarantee has been made as to the result that may be obtained.  3.  I recognize that, during the course of this operation, or other procedure, unforseen conditions may necessitate additional or different procedure than those listed above.  I, therefore, further authorize and request that the above named physician, his/her physician assistants or designees perform such procedures as are, in his/her professional opinion, necessary and desirable.  4.  Any tissue or organs removed in the operation or other procedure may be disposed of by and at the discretion of the [facility] staff and [facility].  5.  I understand that in the event of a medical emergency, I will be transported by local paramedics to [facility] or other hospital emergency department.  6.  I certify that I have read and fully understand the above consent to operation and/or other procedure.    7.  I acknowledge that my physician has explained sedation/analgesia administration to me including the risks and benefits.  I consent to the administration of sedation/analgesia as may be necessary or desirable in the judgement of my physician.    Witness signature: ___________________________________________________ Date:  ______/______/_____                    Time:  ________ A.M.  P.M.       Patient Name:  Alexandria Puri  2/20/1960  [id number]         Patient signature:   ___________________________________________________                   Statement of Physician  My signature below affirms that prior to the time of the procedure, I have explained to the patient and/or his/her guardian, the risks and benefits involved in the proposed treatment and any reasonable alternative to the proposed treatment.  I have also explained the risks and benefits involved in the refusal of the proposed treatment and have answered the patient's questions.                        Date:  ______/______/_______  Provider                      Signature:  __________________________________________________________       Time:  ___________ ACHELSIE ELENA

## (undated) NOTE — LETTER
Date: 12/16/2021  Patient name: Dione Patel  YOB: 1960  Medical Record Number: [id number]  Coverage: Payor: Nancy Osorio / Plan: Carmelita Hooper POS / Product Type: HMO /   Insurance ID: [id number]  Patient Address: [address] [place] [address] Cardiovascular:      Rate and Rhythm: Normal rate and regular rhythm. Pulses: Normal pulses. Dorsalis pedis pulses are 2+ on the right side and 2+ on the left side.         Posterior tibial pulses are 2+ on the right side and 2+ on the left ;Compression Sleeve 12/07/21 0941   Dressing Xeroform 12/07/21 0941   Dressing Changed Changed 12/07/21 0941   Dressing Status Dressing Changed 12/07/21 0941   Wound Length (cm) 0 cm 12/15/21 0811   Wound Width (cm) 0 cm 12/15/21 0811   Wound Surfac Wound Bed Eschar (%) 0 % 12/07/21 0803   State of Healing Epithelialized 12/15/21 0811   Vital Signs      12/15/21  0808   BP: (!) 168/78   Pulse: 83   Resp: 18   Temp: 97.7 °F (36.5 °C)   Allergies    Jardiance [Empaglif*    OTHER (SEE COMMENTS)    Comm Serum  Recommended leg elevation and regular exercises to manage edema in addition to compression therapy. [employer] will be delivering compression garment. Patient was given compression stockings for Active wear to purchase.     Discussed diabetic f

## (undated) NOTE — LETTER
4/23/2025          To Whom It May Concern:    Mason Puri is currently under my medical care and may not return to work at this time due to a significant orthopedic condition of his lower extremity.  Patient is limited in his ambulation and standing.  Patient will be evaluated in 2 weeks with the plan to return to work.    If you require additional information please contact our office.        Sincerely,      Eri Gallardo DPM, D.SUKI GODOY  Diplomat, American Board of Foot and Ankle Surgery  Certified in Foot and Rearfoot/Ankle Reconstruction  Fellow of the American College of Foot and Ankle Surgeons  Fellowship Trained Foot and Ankle Surgeon   [facility]

## (undated) NOTE — LETTER
8/25/2021          To Whom It May Concern:    Claude Gustin is currently under my medical care and may not return to work until 8/30/21. No restrictions. If you require additional information please contact our office.         Sincerely,    Christy Dia

## (undated) NOTE — LETTER
Date & Time: 10/10/2022, 1:45 PM  Patient: Christina Clark  Encounter Provider(s):    RAHEEL Montague       To Whom It May Concern:    Brissa Mccartney was seen and treated in our department on 10/10/2022. He should not return to work until 10/12/22.     If you have any questions or concerns, please do not hesitate to call.        _____________________________  Physician/APC Signature

## (undated) NOTE — LETTER
12/19/2024          To Whom It May Concern:    Mason Puri is currently under my medical care and may not return to work starting 12/18/24 until 12/20/24, he may return on 12/23/24 without restrictions.     If you require additional information please contact our office.        Sincerely,    Alvino Wallace MD          Document generated by:  Alvino Wallace MD

## (undated) NOTE — LETTER
[facility]  [address], [place], [place]    Authorization for Surgical Operation and Procedure                               I hereby authorize Behar, Alex, MD, my physician and his/her assistants (if applicable), which may include medical students, residents, and/or fellows, to perform the following surgical operation/ procedure and administer such anesthesia as may be determined necessary by my physician: Operation/Procedure name (s) Right knee Genicular Radiofrequency Neurotomy on Mason Puri   2.   I recognize that during the surgical operation/procedure, unforeseen conditions may necessitate additional or different procedures than those listed above.  I, therefore, further authorize and request that the above-named surgeon, assistants, or designees perform such procedures as are, in their judgment, necessary and desirable.    3.   My surgeon/physician has discussed prior to my surgery the potential benefits, risks and side effects of this procedure; the likelihood of achieving goals; and potential problems that might occur during recuperation.  They also discussed reasonable alternatives to the procedure, including risks, benefits, and side effects related to the alternatives and risks related to not receiving this procedure.  I have had all my questions answered and I acknowledge that no guarantee has been made as to the result that may be obtained.    4.   Should the need arise during my operation/procedure, which includes change of level of care prior to discharge, I also consent to the administration of blood and/or blood products.  Further, I understand that despite careful testing and screening of blood or blood products by collecting agencies, I may still be subject to ill effects as a result of receiving a blood transfusion and/or blood products.  The following are some, but not all, of the potential risks that can occur: fever and allergic reactions, hemolytic  reactions, transmission of diseases such as Hepatitis, AIDS and Cytomegalovirus (CMV) and fluid overload.  In the event that I wish to have an autologous transfusion of my own blood, or a directed donor transfusion, I will discuss this with my physician.  Check only if Refusing Blood or Blood Products  I understand refusal of blood or blood products as deemed necessary by my physician may have serious consequences to my condition to include possible death. I hereby assume responsibility for my refusal and release the hospital, its personnel, and my physicians from any responsibility for the consequences of my refusal.    o  Refuse   5.   I authorize the use of any specimen, organs, tissues, body parts or foreign objects that may be removed from my body during the operation/procedure for diagnosis, research or teaching purposes and their subsequent disposal by hospital authorities.  I also authorize the release of specimen test results and/or written reports to my treating physician on the hospital medical staff or other referring or consulting physicians involved in my care, at the discretion of the Pathologist or my treating physician.    6.   I consent to the photographing or videotaping of the operations or procedures to be performed, including appropriate portions of my body for medical, scientific, or educational purposes, provided my identity is not revealed by the pictures or by descriptive texts accompanying them.  If the procedure has been photographed/videotaped, the surgeon will obtain the original picture, image, videotape or CD.  The hospital will not be responsible for storage, release or maintenance of the picture, image, tape or CD.    7.   I consent to the presence of a  or observers in the operating room as deemed necessary by my physician or their designees.    8.   I recognize that in the event my procedure results in extended X-Ray/fluoroscopy time, I may develop a skin  reaction.    9. If I have a Do Not Attempt Resuscitation (DNAR) order in place, that status will be suspended while in the operating room, procedural suite, and during the recovery period unless otherwise explicitly stated by me (or a person authorized to consent on my behalf). The surgeon or my attending physician will determine when the applicable recovery period ends for purposes of reinstating the DNAR order.  10. Patients having a sterilization procedure: I understand that if the procedure is successful the results will be permanent and it will therefore be impossible for me to inseminate, conceive, or bear children.  I also understand that the procedure is intended to result in sterility, although the result has not been guaranteed.   11. I acknowledge that my physician has explained sedation/analgesia administration to me including the risk and benefits I consent to the administration of sedation/analgesia as may be necessary or desirable in the judgment of my physician.    I CERTIFY THAT I HAVE READ AND FULLY UNDERSTAND THE ABOVE CONSENT TO OPERATION and/or OTHER PROCEDURE.     ____________________________________  _________________________________        ______________________________  Signature of Patient    Signature of Responsible Person                Printed Name of Responsible Person                                      ____________________________________  _____________________________                ________________________________  Signature of Witness        Date  Time         Relationship to Patient    STATEMENT OF PHYSICIAN My signature below affirms that prior to the time of the procedure; I have explained to the patient and/or his/her legal representative, the risks and benefits involved in the proposed treatment and any reasonable alternative to the proposed treatment. I have also explained the risks and benefits involved in refusal of the proposed treatment and alternatives to the proposed  treatment and have answered the patient's questions. If I have a significant financial interest in a co-management agreement or a significant financial interest in any product or implant, or other significant relationship used in this procedure/surgery, I have disclosed this and had a discussion with my patient.     _____________________________________________________              _____________________________  (Signature of Physician)                                                                                         (Date)                                   (Time)  Patient Name: Mason Colunga Khushi      : 1960      Printed: 2024     Medical Record #: [id number]                                      Page 1 of 1

## (undated) NOTE — LETTER
7/25/2022  Patient Name: Ej Milan  YOB: 1960      To whom it may concern,  Mr. Emmie Pinto has been in my care since 12/7/2021. {Heis able to work, wearing compression stocking, avoid prolong standing, allow 10 min of walking in 1-2 hours of standing or sitting. Please do not hesitate to call with any question or concerns. Selvin Rinaldi, DNP, APRN, FNP-BC, CWS  Nurse Practitioner  [place] [address] [facility] [address] [facility]  [phone]  [email or web address]

## (undated) NOTE — LETTER
Date: 2024      Patient Name: Mando Puri      : 1960        Thank you for choosing [facility] as your health care provider. Your physician has deemed the following medical service(s) necessary. However, your insurance plan may not pay for all of your health care and costs and may deny payment for this service. The fact that your insurance plan does not pay for an item or service does not mean you should not receive it. The purpose of this form is to help you make an informed decision about whether or not you want to receive this service(s) that may not be paid for by your insurance plan.    CPT Code Description     Cost     RIGHT knee Orthovisc series of 3 and CSI injection under ultrasound guidance     _________ ______________________________ _____________      _________ ______________________________ _____________      I understand that the above mentioned service(s) or supply may not be covered by my insurance company. I agree to be financially responsible for the cost of this service or supply in the event of my insurance denies payment as a non-covered benefit.        ______________________________________________________________________  Signature of Patient or Patient's Representative  Relationship  Date    ______________________________________________________________________  Signature of Witness to signing of form   Printed Name

## (undated) NOTE — Clinical Note
TCM assessment completed.  The patient is scheduled for a TCM/HFU with you on 10/30/2024.  Thank you.

## (undated) NOTE — LETTER
AUTHORIZATION FOR SURGICAL OPERATION OR OTHER PROCEDURE    1. I hereby authorize Dr. Ramo Schmitt and the [facility] staff assigned to my case to perform the following operation and/or procedure at the [facility]:    RIGHT knee Orthovisc and CSI under ultrasound guidance    2. My physician has explained the nature and purpose of the operation or other procedure, possible alternative methods of treatment, the risks involved, and the possibility of complication to me. I acknowledge that no guarantee has been made as to the result that may be obtained. 3.  I recognize that, during the course of this operation, or other procedure, unforseen conditions may necessitate additional or different procedure than those listed above. I, therefore, further authorize and request that the above named physician, his/her physician assistants or designees perform such procedures as are, in his/her professional opinion, necessary and desirable. 4.  Any tissue or organs removed in the operation or other procedure may be disposed of by and at the discretion of the [facility] staff and [facility]. 5.  I understand that in the event of a medical emergency, I will be transported by local paramedics to [facility] or other [facility] emergency department. 6.  I certify that I have read and fully understand the above consent to operation and/or other procedure. 7.  I acknowledge that my physician has explained sedation/analgesia administration to me including the risks and benefits. I consent to the administration of sedation/analgesia as may be necessary or desirable in the judgement of my physician. Witness signature: ___________________________________________________ Date:  ______/______/_____                    Time:  ________ A. M.  P.M.        Patient Name:  Michael Antonio  2/20/1960  [id number]         Patient signature:  ___________________________________________________               Statement of Physician  My signature below affirms that prior to the time of the procedure, I have explained to the patient and/or his/her guardian, the risks and benefits involved in the proposed treatment and any reasonable alternative to the proposed treatment. I have also explained the risks and benefits involved in the refusal of the proposed treatment and have answered the patient's questions.                         Date:  ______/______/_______  Provider                      Signature:  __________________________________________________________       Time:  ___________ A.M    P.M.

## (undated) NOTE — LETTER
Thomas Giovanna, [address],  [address] [facility]       09/29/21        Patient: Linda Coeburn   YOB: 1960   Date of Visit: 9/29/2021       Dear  Dr. Daniel Hernandez MD,      Thank you for referring Linda Coeburn to my indurations. Patient is not currenty taking any medication for this.  He feels this is stable and decides agasint starting long term finasteride buy agrees to start tamsulosin -- please see below.     (E13.40) Other specified diabetes mellitus with diabetic option and I answered patient's questions; patient agrees to consider calling  bariatric weight loss program of [facility]. The nurse provides patient with a number to call.    (X48.690) Former smoker   Patient smoked for 20 years, 2 pack a of [employer] products that you drink every day  Empagliflozin medication, which is found in one of your diabetes medication--causes of very, very large urine output which is also contributing  Your being overweight likely impacts bladder function as well

## (undated) NOTE — LETTER
[address], [place], [place]    Authorization for Surgical Operation and Procedure                               I hereby authorize Eri Woodard DPM, my physician and his/her assistants (if applicable), which may include medical students, residents, and/or fellows, to perform the following surgical operation/ procedure and administer such anesthesia as may be determined necessary by my physician: Operation/Procedure name (s) partial left 3rd toe amputation on Mason Puri   2.   I recognize that during the surgical operation/procedure, unforeseen conditions may necessitate additional or different procedures than those listed above.  I, therefore, further authorize and request that the above-named surgeon, assistants, or designees perform such procedures as are, in their judgment, necessary and desirable.    3.   My surgeon/physician has discussed prior to my surgery the potential benefits, risks and side effects of this procedure; the likelihood of achieving goals; and potential problems that might occur during recuperation.  They also discussed reasonable alternatives to the procedure, including risks, benefits, and side effects related to the alternatives and risks related to not receiving this procedure.  I have had all my questions answered and I acknowledge that no guarantee has been made as to the result that may be obtained.    4.   Should the need arise during my operation/procedure, which includes change of level of care prior to discharge, I also consent to the administration of blood and/or blood products.  Further, I understand that despite careful testing and screening of blood or blood products by collecting agencies, I may still be subject to ill effects as a result of receiving a blood transfusion and/or blood products.  The following are some, but not all, of the potential risks that can occur: fever and allergic reactions, hemolytic reactions,  transmission of diseases such as Hepatitis, AIDS and Cytomegalovirus (CMV) and fluid overload.  In the event that I wish to have an autologous transfusion of my own blood, or a directed donor transfusion, I will discuss this with my physician.  Check only if Refusing Blood or Blood Products  I understand refusal of blood or blood products as deemed necessary by my physician may have serious consequences to my condition to include possible death. I hereby assume responsibility for my refusal and release the hospital, its personnel, and my physicians from any responsibility for the consequences of my refusal.    o  Refuse   5.   I authorize the use of any specimen, organs, tissues, body parts or foreign objects that may be removed from my body during the operation/procedure for diagnosis, research or teaching purposes and their subsequent disposal by hospital authorities.  I also authorize the release of specimen test results and/or written reports to my treating physician on the hospital medical staff or other referring or consulting physicians involved in my care, at the discretion of the Pathologist or my treating physician.    6.   I consent to the photographing or videotaping of the operations or procedures to be performed, including appropriate portions of my body for medical, scientific, or educational purposes, provided my identity is not revealed by the pictures or by descriptive texts accompanying them.  If the procedure has been photographed/videotaped, the surgeon will obtain the original picture, image, videotape or CD.  The hospital will not be responsible for storage, release or maintenance of the picture, image, tape or CD.    7.   I consent to the presence of a  or observers in the operating room as deemed necessary by my physician or their designees.    8.   I recognize that in the event my procedure results in extended X-Ray/fluoroscopy time, I may develop a skin reaction.    9. If  I have a Do Not Attempt Resuscitation (DNAR) order in place, that status will be suspended while in the operating room, procedural suite, and during the recovery period unless otherwise explicitly stated by me (or a person authorized to consent on my behalf). The surgeon or my attending physician will determine when the applicable recovery period ends for purposes of reinstating the DNAR order.  10. Patients having a sterilization procedure: I understand that if the procedure is successful the results will be permanent and it will therefore be impossible for me to inseminate, conceive, or bear children.  I also understand that the procedure is intended to result in sterility, although the result has not been guaranteed.   11. I acknowledge that my physician has explained sedation/analgesia administration to me including the risk and benefits I consent to the administration of sedation/analgesia as may be necessary or desirable in the judgment of my physician.    I CERTIFY THAT I HAVE READ AND FULLY UNDERSTAND THE ABOVE CONSENT TO OPERATION and/or OTHER PROCEDURE.     ____________________________________  _________________________________        ______________________________  Signature of Patient    Signature of Responsible Person                Printed Name of Responsible Person                                      ____________________________________  _____________________________                ________________________________  Signature of Witness        Date  Time         Relationship to Patient    STATEMENT OF PHYSICIAN My signature below affirms that prior to the time of the procedure; I have explained to the patient and/or his/her legal representative, the risks and benefits involved in the proposed treatment and any reasonable alternative to the proposed treatment. I have also explained the risks and benefits involved in refusal of the proposed treatment and alternatives to the proposed treatment and have  answered the patient's questions. If I have a significant financial interest in a co-management agreement or a significant financial interest in any product or implant, or other significant relationship used in this procedure/surgery, I have disclosed this and had a discussion with my patient.     _____________________________________________________              _____________________________  (Signature of Physician)                                                                                         (Date)                                   (Time)  Patient Name: Mason Colunga [place]      : 1960      Printed: 10/23/2024     Medical Record #: [id number]                                      Page 1 of 1

## (undated) NOTE — LETTER
Date: 2022      Patient Name: Micaela Canavan      : 1960        Thank you for choosing [facility] as your health care provider. Your physician has deemed the following medical service(s) necessary. However, your insurance plan may not pay for all of your health care and costs and may deny payment for this service. The fact that your insurance plan does not pay for an item or service does not mean you should not receive it. The purpose of this form is to help you make an informed decision about whether or not you want to receive this service(s) that may not be paid for by your insurance plan. CPT Code Description     Cost     Right knee Orthovisc 3/3 and CSI ultrasound guidance  $2400.00      I understand that the above mentioned service(s) or supply may not be covered by my insurance company.  I agree to be financially responsible for the cost of this service or supply in the event of my insurance denies payment as a non-covered benefit.        ______________________________________________________________________  Signature of Patient or Patient's Representative  Relationship  Date    ______________________________________________________________________  Signature of Witness to signing of form   Printed Name

## (undated) NOTE — LETTER
11/15/24          Mason Puri  :  1960      To Whom It May Concern:    This patient was seen in our office on 11/15/24 .  Work status:  May return to work full-time, no restrictions    May return to work status per above effective 2024.    If this office may be of further assistance, please do not hesitate to contact us.      Sincerely,        Behar, Alex, MD

## (undated) NOTE — LETTER
AUTHORIZATION FOR SURGICAL OPERATION OR OTHER PROCEDURE    1. I hereby authorize Dr. Josiah Saldivar and the [facility] staff assigned to my case to perform the following operation and/or procedure at the [facility]:    Right knee Orthovisc 2/3 under ultrasound guidance    2. My physician has explained the nature and purpose of the operation or other procedure, possible alternative methods of treatment, the risks involved, and the possibility of complication to me. I acknowledge that no guarantee has been made as to the result that may be obtained. 3.  I recognize that, during the course of this operation, or other procedure, unforseen conditions may necessitate additional or different procedure than those listed above. I, therefore, further authorize and request that the above named physician, his/her physician assistants or designees perform such procedures as are, in his/her professional opinion, necessary and desirable. 4.  Any tissue or organs removed in the operation or other procedure may be disposed of by and at the discretion of the [facility] staff and [facility]. 5.  I understand that in the event of a medical emergency, I will be transported by local paramedics to [facility] or other [facility] emergency department. 6.  I certify that I have read and fully understand the above consent to operation and/or other procedure. 7.  I acknowledge that my physician has explained sedation/analgesia administration to me including the risks and benefits. I consent to the administration of sedation/analgesia as may be necessary or desirable in the judgement of my physician. Witness signature: ___________________________________________________ Date:  ______/______/_____                    Time:  ________ A. M.  P.M.        Patient Name:  Evie Sesay  2/20/1960  [id number]         Patient signature:  ___________________________________________________               Statement of Physician  My signature below affirms that prior to the time of the procedure, I have explained to the patient and/or his/her guardian, the risks and benefits involved in the proposed treatment and any reasonable alternative to the proposed treatment. I have also explained the risks and benefits involved in the refusal of the proposed treatment and have answered the patient's questions.                         Date:  ______/______/_______  Provider                      Signature:  __________________________________________________________       Time:  ___________ A.M    P.M.

## (undated) NOTE — LETTER
AUTHORIZATION FOR SURGICAL OPERATION OR OTHER PROCEDURE    1. I hereby authorize Dr. Alex Behar and the [facility] staff assigned to my case to perform the following operation and/or procedure at the [facility]:    RIGHT knee Orthovisc series of 1/3 guidance     2.  My physician has explained the nature and purpose of the operation or other procedure, possible alternative methods of treatment, the risks involved, and the possibility of complication to me.  I acknowledge that no guarantee has been made as to the result that may be obtained.  3.  I recognize that, during the course of this operation, or other procedure, unforseen conditions may necessitate additional or different procedure than those listed above.  I, therefore, further authorize and request that the above named physician, his/her physician assistants or designees perform such procedures as are, in his/her professional opinion, necessary and desirable.  4.  Any tissue or organs removed in the operation or other procedure may be disposed of by and at the discretion of the [facility] staff and [facility].  5.  I understand that in the event of a medical emergency, I will be transported by local paramedics to [facility] or other hospital emergency department.  6.  I certify that I have read and fully understand the above consent to operation and/or other procedure.    7.  I acknowledge that my physician has explained sedation/analgesia administration to me including the risks and benefits.  I consent to the administration of sedation/analgesia as may be necessary or desirable in the judgement of my physician.    Witness signature: ___________________________________________________ Date:  ______/______/_____                    Time:  ________ A.M.  P.M.       Patient Name:  Mando Puri  2/20/1960  [id number]     Patient signature:  ___________________________________________________               Statement of  Physician  My signature below affirms that prior to the time of the procedure, I have explained to the patient and/or his/her guardian, the risks and benefits involved in the proposed treatment and any reasonable alternative to the proposed treatment.  I have also explained the risks and benefits involved in the refusal of the proposed treatment and have answered the patient's questions.                        Date:  ______/______/_______  Provider                      Signature:  __________________________________________________________       Time:  ___________ A.M    P.M.

## (undated) NOTE — LETTER
AUTHORIZATION FOR SURGICAL OPERATION OR OTHER PROCEDURE    1. I hereby authorize Dr. Reynold Mendez and the [facility] staff assigned to my case to perform the following operation and/or procedure at the [facility]:    Right knee CSI under ultrasound guidance    2. Patient:           []  Parent    Responsible person                          []  Spouse  In case of minor or                    [] Other  _____________   Incompetent name:  __________________________________________________                               (p

## (undated) NOTE — LETTER
Date: 2024      Patient Name: Mando Puri      : 1960        Thank you for choosing [facility] as your health care provider. Your physician has deemed the following medical service(s) necessary. However, your insurance plan may not pay for all of your health care and costs and may deny payment for this service. The fact that your insurance plan does not pay for an item or service does not mean you should not receive it. The purpose of this form is to help you make an informed decision about whether or not you want to receive this service(s) that may not be paid for by your insurance plan.    CPT Code Description     Cost     RIGHT knee Orthovisc series of 1/3 guidance   $3000.000      I understand that the above mentioned service(s) or supply may not be covered by my insurance company. I agree to be financially responsible for the cost of this service or supply in the event of my insurance denies payment as a non-covered benefit.        ______________________________________________________________________  Signature of Patient or Patient's Representative  Relationship  Date    ______________________________________________________________________  Signature of Witness to signing of form   Printed Name

## (undated) NOTE — Clinical Note
TCM call completed. A TCM-[facility] appointment is scheduled for 10/27/2022. The patient denies any neurological symptoms since discharge. Thank you.

## (undated) NOTE — LETTER
Date: 2024      Patient Name: Mando Puri      : 1960        Thank you for choosing [facility] as your health care provider. Your physician has deemed the following medical service(s) necessary. However, your insurance plan may not pay for all of your health care and costs and may deny payment for this service. The fact that your insurance plan does not pay for an item or service does not mean you should not receive it. The purpose of this form is to help you make an informed decision about whether or not you want to receive this service(s) that may not be paid for by your insurance plan.    CPT Code Description     Cost     RIGHT knee genicular nerve block under ultrasound       I understand that the above mentioned service(s) or supply may not be covered by my insurance company. I agree to be financially responsible for the cost of this service or supply in the event of my insurance denies payment as a non-covered benefit.        ______________________________________________________________________  Signature of Patient or Patient's Representative  Relationship  Date    ______________________________________________________________________  Signature of Witness to signing of form   Printed Name

## (undated) NOTE — LETTER
6/23/2025  Dear Mando,    This letter is to inform you that we have received your request to undergo your imaging under sedation.    As of a few years ago, [facility] has done away with the ordering provider being able to determine the level of sedation for the imaging order.    Now there will be an anesthesiologist at the bedside who monitors you and determined the level of anesthesia required to properly and safely obtain the imaging.    For this reason, it is an anesthesia protocol that any individual requesting or needing anesthesia for their imaging will need clearance by their primary care provider.     We will fax a letter to your primary care provider, you will need to contact their office for an appointment.     Once completed, your clearance and any results should be faxed to the pre-admission testing department at [facility], Fax #[phone].    If you have any questions about testing needed or clarification please contact the Pre-Admissions department at [phone]      Thank you,      Miranda PM[facility]R Staff

## (undated) NOTE — LETTER
AUTHORIZATION FOR SURGICAL OPERATION OR OTHER PROCEDURE    1. I hereby authorize Dr. Bambi Llanes and the [facility] staff assigned to my case to perform the following operation and/or procedure at the [facility]:     RIGHT knee Orthovisc series 3/3 and CSI injection under ultrasound guidance     2. My physician has explained the nature and purpose of the operation or other procedure, possible alternative methods of treatment, the risks involved, and the possibility of complication to me. I acknowledge that no guarantee has been made as to the result that may be obtained. 3.  I recognize that, during the course of this operation, or other procedure, unforseen conditions may necessitate additional or different procedure than those listed above. I, therefore, further authorize and request that the above named physician, his/her physician assistants or designees perform such procedures as are, in his/her professional opinion, necessary and desirable. 4.  Any tissue or organs removed in the operation or other procedure may be disposed of by and at the discretion of the [facility] staff and [facility] AT [facility]. 5.  I understand that in the event of a medical emergency, I will be transported by local paramedics to [facility] or other hospital emergency department. 6.  I certify that I have read and fully understand the above consent to operation and/or other procedure. 7.  I acknowledge that my physician has explained sedation/analgesia administration to me including the risks and benefits. I consent to the administration of sedation/analgesia as may be necessary or desirable in the judgement of my physician. Witness signature: ___________________________________________________ Date:  ______/______/_____                    Time:  ________ A. M.  P.M.        Patient Name:  Torri Mark  2/20/1960  [id number]         Patient signature: ___________________________________________________               Statement of Physician  My signature below affirms that prior to the time of the procedure, I have explained to the patient and/or his/her guardian, the risks and benefits involved in the proposed treatment and any reasonable alternative to the proposed treatment. I have also explained the risks and benefits involved in the refusal of the proposed treatment and have answered the patient's questions.                         Date:  ______/______/_______  Provider                      Signature:  __________________________________________________________       Time:  ___________ ACHELSIE ELENA

## (undated) NOTE — LETTER
To Whom It May Concern: This certifies that [employer] had a procedure today. Please excuse him from work today. No pushing, pulling, or lifting anything greater than about 8-10 lbs for 1 week. Please provide him with light duty or excuse him from work completely. Do not hesitate to call with any questions or concerns.         Sincerely,    Siomara Villanueva MD  [address] [address] [phone]        Document generated by:  Christophe Chambers RN

## (undated) NOTE — Clinical Note
Can you please call Mando and let him know I placed an order for an x-ray of the right knee as his last x-rays from 2021.  I would like to have this prior to the procedure.

## (undated) NOTE — LETTER
1/7/2022          To Whom It May Concern:    Emelina Isabel is currently under my medical care and may not return to work until 1/14/22. If you require additional information please contact our office.         Sincerely,    Joy Sierra MD          D

## (undated) NOTE — LETTER
2/9/2024          To Whom It May Concern:    Mando Puri is currently under my medical care. Please allow Mando to rest for 10 minutes every hour as need it.    If you require additional information please contact our office.        Sincerely,    Cisco Muse DPM

## (undated) NOTE — LETTER
AUTHORIZATION FOR SURGICAL OPERATION OR OTHER PROCEDURE    1. I hereby authorize Dr. Lul Morgan PM&R Phys:[id number]} and the [facility] staff assigned to my case to perform the following operation and/or procedure at the [facility]:    Right knee csi     2. My physician has explained the nature and purpose of the operation or other procedure, possible alternative methods of treatment, the risks involved, and the possibility of complication to me. I acknowledge that no guarantee has been made as to the result that may be obtained. 3.  I recognize that, during the course of this operation, or other procedure, unforseen conditions may necessitate additional or different procedure than those listed above. I, therefore, further authorize and request that the above named physician, his/her physician assistants or designees perform such procedures as are, in his/her professional opinion, necessary and desirable. 4.  Any tissue or organs removed in the operation or other procedure may be disposed of by and at the discretion of the [facility] staff and [facility]. 5.  I understand that in the event of a medical emergency, I will be transported by local paramedics to [facility] or other hospital emergency department. 6.  I certify that I have read and fully understand the above consent to operation and/or other procedure. 7.  I acknowledge that my physician has explained sedation/analgesia administration to me including the risks and benefits. I consent to the administration of sedation/analgesia as may be necessary or desirable in the judgement of my physician. Witness signature: ___________________________________________________ Date:  ______/______/_____                    Time:  ________ A. M.  P.M.        Patient Name:  ______________________________________________________  (please print)       Patient signature: ___________________________________________________             Relationship to Patient:           []  Parent    Responsible person                          []  Spouse  In case of minor or                    [] Other  _____________   Incompetent name:  __________________________________________________                               (please print)      _____________      Responsible person  In case of minor or  Incompetent signature:  _______________________________________________    Statement of Physician  My signature below affirms that prior to the time of the procedure, I have explained to the patient and/or his/her guardian, the risks and benefits involved in the proposed treatment and any reasonable alternative to the proposed treatment. I have also explained the risks and benefits involved in the refusal of the proposed treatment and have answered the patient's questions.                         Date:  ______/______/_______  Provider                      Signature:  __________________________________________________________       Time:  ___________ A.M    P.M.

## (undated) NOTE — LETTER
Date: 2022      Patient Name: Ole Cheney      : 1960        Thank you for choosing [facility] as your health care provider. Your physician has deemed the following medical service(s) necessary. However, your insurance plan may not pay for all of your health care and costs and may deny payment for this service. The fact that your insurance plan does not pay for an item or service does not mean you should not receive it. The purpose of this form is to help you make an informed decision about whether or not you want to receive this service(s) that may not be paid for by your insurance plan. CPT Code Description     Cost     _________ RIGHT knee CSi under ultrasound guidance _____________      _________ ______________________________ _____________      _________ ______________________________ _____________      I understand that the above mentioned service(s) or supply may not be covered by my insurance company.  I agree to be financially responsible for the cost of this service or supply in the event of my insurance denies payment as a non-covered benefit.        ______________________________________________________________________  Signature of Patient or Patient's Representative  Relationship  Date    ______________________________________________________________________  Signature of Witness to signing of form   Printed Name

## (undated) NOTE — LETTER
3/20/2024          To Whom It May Concern:    Mando Puri is currently under my medical care and may not return to work today 3/20/2024.      If you require additional information please contact our office.        Sincerely,    Cisco Muse DPM

## (undated) NOTE — LETTER
12/15/21          Mando A Khushi  :  1960      To Whom It May Concern: This patient was seen in our office on 21. Work status:  Patient to be off of work on 21-12/15/21    If this office may be of further assistance, please do not hesitate to contact us.       Sincerely,        Jillian Martinez MD  Physiatry   [phone]

## (undated) NOTE — LETTER
AUTHORIZATION FOR SURGICAL OPERATION OR OTHER PROCEDURE    1. I hereby authorize Dr. Alex Behar and the [facility] staff assigned to my case to perform the following operation and/or procedure at the [facility]:    RIGHT knee genicular nerve block under ultrasound     2.  My physician has explained the nature and purpose of the operation or other procedure, possible alternative methods of treatment, the risks involved, and the possibility of complication to me.  I acknowledge that no guarantee has been made as to the result that may be obtained.  3.  I recognize that, during the course of this operation, or other procedure, unforseen conditions may necessitate additional or different procedure than those listed above.  I, therefore, further authorize and request that the above named physician, his/her physician assistants or designees perform such procedures as are, in his/her professional opinion, necessary and desirable.  4.  Any tissue or organs removed in the operation or other procedure may be disposed of by and at the discretion of the [facility] staff and [facility].  5.  I understand that in the event of a medical emergency, I will be transported by local paramedics to [facility] or other hospital emergency department.  6.  I certify that I have read and fully understand the above consent to operation and/or other procedure.    7.  I acknowledge that my physician has explained sedation/analgesia administration to me including the risks and benefits.  I consent to the administration of sedation/analgesia as may be necessary or desirable in the judgement of my physician.    Witness signature: ___________________________________________________ Date:  ______/______/_____                    Time:  ________ A.M.  P.M.       Patient Name:  Mando Puri  2/20/1960  [id number]         Patient signature:   ___________________________________________________               Statement of Physician  My signature below affirms that prior to the time of the procedure, I have explained to the patient and/or his/her guardian, the risks and benefits involved in the proposed treatment and any reasonable alternative to the proposed treatment.  I have also explained the risks and benefits involved in the refusal of the proposed treatment and have answered the patient's questions.                        Date:  ______/______/_______  Provider                      Signature:  __________________________________________________________       Time:  ___________ ACHELSIE ELENA

## (undated) NOTE — LETTER
11/14/2024          To Whom It May Concern:    Mason Puri is currently under my medical care and may return to work on Monday 11/18/2024 with no restrictions.  If you require additional information please contact our office.        Sincerely,        Eri Gallardo DPM, CARISA.WALT FACBHANU  Diplomat, American Board of Foot and Ankle Surgery  Certified in Foot and Rearfoot/Ankle Reconstruction  Fellow of the American College of Foot and Ankle Surgeons  Fellowship Trained Foot and Ankle Surgeon   [facility] [place]

## (undated) NOTE — LETTER
Date & Time: 9/7/2022, 5:40 PM  Patient: Michael Antonio  Encounter Provider(s):    Karyle Britain, PA-C       To Whom It May Concern:    Dandy Driver was seen and treated in our department on 9/7/2022. He can return to work 9/9/2022.     If you have any questions or concerns, please do not hesitate to call.        _____________________________  Physician/APC Signature

## (undated) NOTE — LETTER
Date & Time: 5/13/2022, 7:55 PM  Patient: Margy Mckeon  Encounter Provider(s):    Celia Calero PA-C       To Whom It May Concern:    Marzena Huston was seen and treated in our department on 5/13/2022.      If you have any questions or concerns, please do not hesitate to call.        _____________________________  Physician/APC Signature

## (undated) NOTE — LETTER
Date & Time: 8/24/2021, 10:26 AM  Patient: Jennie Varghese  Encounter Provider(s):    YUAN Aranda       To Whom It May Concern:    Antonio Hart was seen and treated in our department on 8/24/2021. He may return to work on 8/27/2021.     If you

## (undated) NOTE — LETTER
[address], [place], [place]    Authorization for Surgical Operation and Procedure                               I hereby authorize Lurdes Baxter DPM, my physician and his/her assistants (if applicable), which may include medical students, residents, and/or fellows, to perform the following surgical operation/ procedure and administer such anesthesia as may be determined necessary by my physician: Operation/Procedure name (s)  PARTIAL LEFT SECOND DIGIT AMPUTATION on Mason Puri   2.   I recognize that during the surgical operation/procedure, unforeseen conditions may necessitate additional or different procedures than those listed above.  I, therefore, further authorize and request that the above-named surgeon, assistants, or designees perform such procedures as are, in their judgment, necessary and desirable.    3.   My surgeon/physician has discussed prior to my surgery the potential benefits, risks and side effects of this procedure; the likelihood of achieving goals; and potential problems that might occur during recuperation.  They also discussed reasonable alternatives to the procedure, including risks, benefits, and side effects related to the alternatives and risks related to not receiving this procedure.  I have had all my questions answered and I acknowledge that no guarantee has been made as to the result that may be obtained.    4.   Should the need arise during my operation/procedure, which includes change of level of care prior to discharge, I also consent to the administration of blood and/or blood products.  Further, I understand that despite careful testing and screening of blood or blood products by collecting agencies, I may still be subject to ill effects as a result of receiving a blood transfusion and/or blood products.  The following are some, but not all, of the potential risks that can occur: fever and allergic reactions, hemolytic  reactions, transmission of diseases such as Hepatitis, AIDS and Cytomegalovirus (CMV) and fluid overload.  In the event that I wish to have an autologous transfusion of my own blood, or a directed donor transfusion, I will discuss this with my physician.  Check only if Refusing Blood or Blood Products  I understand refusal of blood or blood products as deemed necessary by my physician may have serious consequences to my condition to include possible death. I hereby assume responsibility for my refusal and release the hospital, its personnel, and my physicians from any responsibility for the consequences of my refusal.    o  Refuse   5.   I authorize the use of any specimen, organs, tissues, body parts or foreign objects that may be removed from my body during the operation/procedure for diagnosis, research or teaching purposes and their subsequent disposal by hospital authorities.  I also authorize the release of specimen test results and/or written reports to my treating physician on the hospital medical staff or other referring or consulting physicians involved in my care, at the discretion of the Pathologist or my treating physician.    6.   I consent to the photographing or videotaping of the operations or procedures to be performed, including appropriate portions of my body for medical, scientific, or educational purposes, provided my identity is not revealed by the pictures or by descriptive texts accompanying them.  If the procedure has been photographed/videotaped, the surgeon will obtain the original picture, image, videotape or CD.  The hospital will not be responsible for storage, release or maintenance of the picture, image, tape or CD.    7.   I consent to the presence of a  or observers in the operating room as deemed necessary by my physician or their designees.    8.   I recognize that in the event my procedure results in extended X-Ray/fluoroscopy time, I may develop a skin  reaction.    9. If I have a Do Not Attempt Resuscitation (DNAR) order in place, that status will be suspended while in the operating room, procedural suite, and during the recovery period unless otherwise explicitly stated by me (or a person authorized to consent on my behalf). The surgeon or my attending physician will determine when the applicable recovery period ends for purposes of reinstating the DNAR order.  10. Patients having a sterilization procedure: I understand that if the procedure is successful the results will be permanent and it will therefore be impossible for me to inseminate, conceive, or bear children.  I also understand that the procedure is intended to result in sterility, although the result has not been guaranteed.   11. I acknowledge that my physician has explained sedation/analgesia administration to me including the risk and benefits I consent to the administration of sedation/analgesia as may be necessary or desirable in the judgment of my physician.    I CERTIFY THAT I HAVE READ AND FULLY UNDERSTAND THE ABOVE CONSENT TO OPERATION and/or OTHER PROCEDURE.     ____________________________________  _________________________________        ______________________________  Signature of Patient    Signature of Responsible Person                Printed Name of Responsible Person                                      ____________________________________  _____________________________                ________________________________  Signature of Witness        Date  Time         Relationship to Patient    STATEMENT OF PHYSICIAN My signature below affirms that prior to the time of the procedure; I have explained to the patient and/or his/her legal representative, the risks and benefits involved in the proposed treatment and any reasonable alternative to the proposed treatment. I have also explained the risks and benefits involved in refusal of the proposed treatment and alternatives to the proposed  treatment and have answered the patient's questions. If I have a significant financial interest in a co-management agreement or a significant financial interest in any product or implant, or other significant relationship used in this procedure/surgery, I have disclosed this and had a discussion with my patient.     _____________________________________________________              _____________________________  (Signature of Physician)                                                                                         (Date)                                   (Time)  Patient Name: Mason Colunga Khushi      : 1960      Printed: 5/10/2025     Medical Record #: [id number]                                      Page 1 of 1

## (undated) NOTE — LETTER
Patient Name: Emelina Isabel  : 1960  MRN: [id number]  Patient Address: [address] [place] [address]  [address] [place] [id number]      Coronavirus Disease 2019 (COVID-19)     [facility] is committed to the safety and well-being of our patients, milly carefully. If your symptoms get worse, call your healthcare provider immediately. 3. Get rest and stay hydrated.    4. If you have a medical appointment, call the healthcare provider ahead of time and tell them that you have or may have COVID-19.  5. For m of fever-reducing medications; and  · Improvement in respiratory symptoms (e.g., cough, shortness of breath); and  · At least 10 days have passed since symptoms first appeared OR if asymptomatic patient or date of symptom onset is unclear then use 10 days donors must:    · Have had a confirmed diagnosis of COVID-19  · Be symptom-free for at least 14 days*    *Some people will be required to have a repeat COVID-19 test in order to be eligible to donate.  If you’re instructed by Rosemary that a repeat test is r random. Researchers are trying to identify similarities between people with a Post-COVID condition to better understand if there are risk factors. How do I prevent a Post-COVID condition?   The best way to prevent the long-term symptoms of COVID-19 is

## (undated) NOTE — LETTER
AUTHORIZATION FOR SURGICAL OPERATION OR OTHER PROCEDURE    1. I hereby authorize Dr. Alex Behar and the [facility] staff assigned to my case to perform the following operation and/or procedure at the [facility]:    RIGHT knee genicular nerve block under ultrasound CPT 08607, 60621     2.  My physician has explained the nature and purpose of the operation or other procedure, possible alternative methods of treatment, the risks involved, and the possibility of complication to me.  I acknowledge that no guarantee has been made as to the result that may be obtained.  3.  I recognize that, during the course of this operation, or other procedure, unforseen conditions may necessitate additional or different procedure than those listed above.  I, therefore, further authorize and request that the above named physician, his/her physician assistants or designees perform such procedures as are, in his/her professional opinion, necessary and desirable.  4.  Any tissue or organs removed in the operation or other procedure may be disposed of by and at the discretion of the [facility] staff and [facility].  5.  I understand that in the event of a medical emergency, I will be transported by local paramedics to [facility] or other hospital emergency department.  6.  I certify that I have read and fully understand the above consent to operation and/or other procedure.    7.  I acknowledge that my physician has explained sedation/analgesia administration to me including the risks and benefits.  I consent to the administration of sedation/analgesia as may be necessary or desirable in the judgement of my physician.    Witness signature: ___________________________________________________ Date:  ______/______/_____                    Time:  ________ A.M.  P.M.       Patient Name:    Mando Puri  [id number]  2/20/1960        Patient signature:   ___________________________________________________          Statement of Physician  My signature below affirms that prior to the time of the procedure, I have explained to the patient and/or his/her guardian, the risks and benefits involved in the proposed treatment and any reasonable alternative to the proposed treatment.  I have also explained the risks and benefits involved in the refusal of the proposed treatment and have answered the patient's questions.                        Date:  ______/______/_______  Provider                      Signature:  __________________________________________________________       Time:  ___________ ACHELSIE ELENA

## (undated) NOTE — LETTER
Date: 2022      Patient Name: Micaela Canavan      : 1960        Thank you for choosing  [facility] as your health care provider. Your physician has deemed the following medical service(s) necessary. However, your insurance plan may not pay for all of your health care and costs and may deny payment for this service. The fact that your insurance plan does not pay for an item or service does not mean you should not receive it. The purpose of this form is to help you make an informed decision about whether or not you want to receive this service(s) that may not be paid for by your insurance plan. CPT Code Description     Cost     Right knee Orthovisc 1/3 ultrasound guidance  $2400.00      I understand that the above mentioned service(s) or supply may not be covered by my insurance company.  I agree to be financially responsible for the cost of this service or supply in the event of my insurance denies payment as a non-covered benefit.        ______________________________________________________________________  Signature of Patient or Patient's Representative  Relationship  Date    ______________________________________________________________________  Signature of Witness to signing of form   Printed Name

## (undated) NOTE — LETTER
AUTHORIZATION FOR SURGICAL OPERATION OR OTHER PROCEDURE    1. I hereby authorize Dr. Elissa Toussaint and the [facility] staff assigned to my case to perform the following operation and/or procedure at the [facility]:    RIGHT knee Orthovisc 2/3 under ultrasound guidance    2. My physician has explained the nature and purpose of the operation or other procedure, possible alternative methods of treatment, the risks involved, and the possibility of complication to me. I acknowledge that no guarantee has been made as to the result that may be obtained. 3.  I recognize that, during the course of this operation, or other procedure, unforseen conditions may necessitate additional or different procedure than those listed above. I, therefore, further authorize and request that the above named physician, his/her physician assistants or designees perform such procedures as are, in his/her professional opinion, necessary and desirable. 4.  Any tissue or organs removed in the operation or other procedure may be disposed of by and at the discretion of the [facility] staff and [facility]. 5.  I understand that in the event of a medical emergency, I will be transported by local paramedics to [facility] or other [facility] emergency department. 6.  I certify that I have read and fully understand the above consent to operation and/or other procedure. 7.  I acknowledge that my physician has explained sedation/analgesia administration to me including the risks and benefits. I consent to the administration of sedation/analgesia as may be necessary or desirable in the judgement of my physician. Witness signature: ___________________________________________________ Date:  ______/______/_____                    Time:  ________ A. M.  P.M.        Patient Name:  Usama Armand  2/20/1960  [id number]         Patient signature:  ___________________________________________________                 Statement of Physician  My signature below affirms that prior to the time of the procedure, I have explained to the patient and/or his/her guardian, the risks and benefits involved in the proposed treatment and any reasonable alternative to the proposed treatment. I have also explained the risks and benefits involved in the refusal of the proposed treatment and have answered the patient's questions.                         Date:  ______/______/_______  Provider                      Signature:  __________________________________________________________       Time:  ___________ A.M    P.M.

## (undated) NOTE — LETTER
3/1/2022          To Whom It May Concern:    Harley Moe is currently under my medical care and may return to work as of 2/28/22. No restrictions. If you require additional information please contact our office.         Sincerely,    Chang Metzger MD          Document generated by:  Chang Metzger MD

## (undated) NOTE — LETTER
Date: 2022      Patient Name: Mireya Milan      : 1960        Thank you for choosing  [facility] as your health care provider. Your physician has deemed the following medical service(s) necessary. However, your insurance plan may not pay for all of your health care and costs and may deny payment for this service. The fact that your insurance plan does not pay for an item or service does not mean you should not receive it. The purpose of this form is to help you make an informed decision about whether or not you want to receive this service(s) that may not be paid for by your insurance plan. CPT Code Description     Cost     _________ Right knee Orthovisc 2/3 under ultrasound guidance _____________      _________ ______________________________ _____________      _________ ______________________________ _____________      I understand that the above mentioned service(s) or supply may not be covered by my insurance company.  I agree to be financially responsible for the cost of this service or supply in the event of my insurance denies payment as a non-covered benefit.        ______________________________________________________________________  Signature of Patient or Patient's Representative  Relationship  Date    ______________________________________________________________________  Signature of Witness to signing of form   Printed Name

## (undated) NOTE — LETTER
AUTHORIZATION FOR SURGICAL OPERATION OR OTHER PROCEDURE    1. I hereby authorize Dr. Yudelka Orozco and the [facility] staff assigned to my case to perform the following operation and/or procedure at the [facility]:    Right knee Orthovisc 3/3 and CSI ultrasound guidance  $2400.00    2. My physician has explained the nature and purpose of the operation or other procedure, possible alternative methods of treatment, the risks involved, and the possibility of complication to me. I acknowledge that no guarantee has been made as to the result that may be obtained. 3.  I recognize that, during the course of this operation, or other procedure, unforseen conditions may necessitate additional or different procedure than those listed above. I, therefore, further authorize and request that the above named physician, his/her physician assistants or designees perform such procedures as are, in his/her professional opinion, necessary and desirable. 4.  Any tissue or organs removed in the operation or other procedure may be disposed of by and at the discretion of the [facility] staff and Health system [facility]. 5.  I understand that in the event of a medical emergency, I will be transported by local paramedics to [facility] or other hospital emergency department. 6.  I certify that I have read and fully understand the above consent to operation and/or other procedure. 7.  I acknowledge that my physician has explained sedation/analgesia administration to me including the risks and benefits. I consent to the administration of sedation/analgesia as may be necessary or desirable in the judgement of my physician. Witness signature: ___________________________________________________ Date:  ______/______/_____                    Time:  ________ A. M.  P.M.        Patient Name: Ricco Suazo  2/20/1960  [id number]       Patient signature: ___________________________________________________                   Statement of Physician  My signature below affirms that prior to the time of the procedure, I have explained to the patient and/or his/her guardian, the risks and benefits involved in the proposed treatment and any reasonable alternative to the proposed treatment. I have also explained the risks and benefits involved in the refusal of the proposed treatment and have answered the patient's questions.                         Date:  ______/______/_______  Provider                      Signature:  __________________________________________________________       Time:  ___________ ACHELSIE ELENA

## (undated) NOTE — LETTER
Date: 2024      Patient Name: Mando Puri      : 1960        Thank you for choosing [facility] as your health care provider. Your physician has deemed the following medical service(s) necessary. However, your insurance plan may not pay for all of your health care and costs and may deny payment for this service. The fact that your insurance plan does not pay for an item or service does not mean you should not receive it. The purpose of this form is to help you make an informed decision about whether or not you want to receive this service(s) that may not be paid for by your insurance plan.    CPT Code Description     Cost     I understand that the above mentioned service(s) or supply may not be covered by my insurance company. I agree to be financially responsible for the cost of this service or supply in the event of my insurance denies payment as a non-covered benefit.        RIGHT knee genicular nerve block under ultrasound CPT 58617, 72752         ______________________________________________________________________  Signature of Patient or Patient's Representative  Relationship  Date    ______________________________________________________________________  Signature of Witness to signing of form   Printed Name

## (undated) NOTE — LETTER
AUTHORIZATION FOR SURGICAL OPERATION OR OTHER PROCEDURE    1. I hereby authorize Dr. Brooke Varner and the [facility] staff assigned to my case to perform the following operation and/or procedure at the [facility]:    RIGHT knee CSi under ultrasound guidance    2. My physician has explained the nature and purpose of the operation or other procedure, possible alternative methods of treatment, the risks involved, and the possibility of complication to me. I acknowledge that no guarantee has been made as to the result that may be obtained. 3.  I recognize that, during the course of this operation, or other procedure, unforseen conditions may necessitate additional or different procedure than those listed above. I, therefore, further authorize and request that the above named physician, his/her physician assistants or designees perform such procedures as are, in his/her professional opinion, necessary and desirable. 4.  Any tissue or organs removed in the operation or other procedure may be disposed of by and at the discretion of the [facility] staff and [facility]. 5.  I understand that in the event of a medical emergency, I will be transported by local paramedics to [facility] or other hospital emergency department. 6.  I certify that I have read and fully understand the above consent to operation and/or other procedure. 7.  I acknowledge that my physician has explained sedation/analgesia administration to me including the risks and benefits. I consent to the administration of sedation/analgesia as may be necessary or desirable in the judgement of my physician. Witness signature: ___________________________________________________ Date:  ______/______/_____                    Time:  ________ A. M.  P.M.        Patient Name:  Margy Mckeon  2/20/1960  [id number]         Patient signature:  ___________________________________________________        Statement of Physician  My signature below affirms that prior to the time of the procedure, I have explained to the patient and/or his/her guardian, the risks and benefits involved in the proposed treatment and any reasonable alternative to the proposed treatment. I have also explained the risks and benefits involved in the refusal of the proposed treatment and have answered the patient's questions.                         Date:  ______/______/_______  Provider                      Signature:  __________________________________________________________       Time:  ___________ A.M    P.M.

## (undated) NOTE — LETTER
AUTHORIZATION FOR SURGICAL OPERATION OR OTHER PROCEDURE    1. I hereby authorize Dr. Ish Rouse and the [facility] staff assigned to my case to perform the following operation and/or procedure at the [facility]:    RIGHT knee Orthovisc series 1/3 and CSI injection under ultrasound guidance    2. My physician has explained the nature and purpose of the operation or other procedure, possible alternative methods of treatment, the risks involved, and the possibility of complication to me. I acknowledge that no guarantee has been made as to the result that may be obtained. 3.  I recognize that, during the course of this operation, or other procedure, unforseen conditions may necessitate additional or different procedure than those listed above. I, therefore, further authorize and request that the above named physician, his/her physician assistants or designees perform such procedures as are, in his/her professional opinion, necessary and desirable. 4.  Any tissue or organs removed in the operation or other procedure may be disposed of by and at the discretion of the [facility] staff and [facility] AT [facility]. 5.  I understand that in the event of a medical emergency, I will be transported by local paramedics to [facility] or other hospital emergency department. 6.  I certify that I have read and fully understand the above consent to operation and/or other procedure. 7.  I acknowledge that my physician has explained sedation/analgesia administration to me including the risks and benefits. I consent to the administration of sedation/analgesia as may be necessary or desirable in the judgement of my physician. Witness signature: ___________________________________________________ Date:  ______/______/_____                    Time:  ________ A. M.  P.M.        Patient Name:  Dorinda Ontiveros  2/20/1960  [id number]         Patient signature: ___________________________________________________               Statement of Physician  My signature below affirms that prior to the time of the procedure, I have explained to the patient and/or his/her guardian, the risks and benefits involved in the proposed treatment and any reasonable alternative to the proposed treatment. I have also explained the risks and benefits involved in the refusal of the proposed treatment and have answered the patient's questions.                         Date:  ______/______/_______  Provider                      Signature:  __________________________________________________________       Time:  ___________ ACHELSIE ELENA

## (undated) NOTE — LETTER
Date: 2024      Patient Name: Mando Puri      : 1960        Thank you for choosing [facility] as your health care provider. Your physician has deemed the following medical service(s) necessary. However, your insurance plan may not pay for all of your health care and costs and may deny payment for this service. The fact that your insurance plan does not pay for an item or service does not mean you should not receive it. The purpose of this form is to help you make an informed decision about whether or not you want to receive this service(s) that may not be paid for by your insurance plan.    CPT Code Description     Cost     Right knee intra-articular orthovisc (#2/3) under ultrasound guidance     _________ ______________________________ _____________      _________ ______________________________ _____________      I understand that the above mentioned service(s) or supply may not be covered by my insurance company. I agree to be financially responsible for the cost of this service or supply in the event of my insurance denies payment as a non-covered benefit.        ______________________________________________________________________  Signature of Patient or Patient's Representative  Relationship  Date    ______________________________________________________________________  Signature of Witness to signing of form   Printed Name

## (undated) NOTE — LETTER
[facility]  Administration of Anesthesia  Mason NEWELL agree to be cared for by a physician anesthesiologist alone and/or with a nurse anesthetist, who is specially trained to monitor me and give me medicine to put me to sleep or keep me comfortable during my procedure    I understand that my anesthesiologist and/or anesthetist is not an employee or agent of [facility] or [employer]. He or she works for [facility]    As the patient asking for anesthesia services, I agree to:  Allow the anesthesiologist (anesthesia doctor) to give me medicine and do additional procedures as necessary. Some examples are: Starting or using an “IV” to give me medicine, fluids or blood during my procedure, and having a breathing tube placed to help me breathe when I’m asleep (intubation). In the event that my heart stops working properly, I understand that my anesthesiologist will make every effort to sustain my life, unless otherwise directed by [facility] Do Not Resuscitate documents.  Tell my anesthesia doctor before my procedure:  If I am pregnant.  The last time that I ate or drank.  iii. All of the medicines I take (including prescriptions, herbal supplements, and pills I can buy without a prescription (including street drugs/illegal medications). Failure to inform my anesthesiologist about these medicines may increase my risk of anesthetic complications.  iv.If I am allergic to anything or have had a reaction to anesthesia before.  I understand how the anesthesia medicine will help me (benefits).  I understand that with any type of anesthesia medicine there are risks:  The most common risks are: nausea, vomiting, sore throat, muscle soreness, damage to my eyes, mouth, or teeth (from breathing tube placement).  Rare risks include: remembering what happened during my procedure, allergic reactions to medications, injury to my airway, heart, lungs, vision, nerves,  or muscles and in extremely rare instances death.  My doctor has explained to me other choices available to me for my care (alternatives).  Pregnant Patients (“epidural”):  I understand that the risks of having an epidural (medicine given into my back to help control pain during labor), include itching, low blood pressure, difficulty urinating, headache or slowing of the baby’s heart. Very rare risks include infection, bleeding, seizure, irregular heart rhythms and nerve injury.  Regional Anesthesia (“spinal”, “epidural”, & “nerve blocks”):  I understand that rare but potential complications include headache, bleeding, infection, seizure, irregular heart rhythms, and nerve injury.    _____________________________________________________________________________  Patient (or Representative) Signature/Relationship to Patient  Date   Time    _____________________________________________________________________________   Name (if used)    Language/Organization   Time    _____________________________________________________________________________  Nurse Anesthetist Signature     Date   Time  _____________________________________________________________________________  Anesthesiologist Signature     Date   Time  I have discussed the procedure and information above with the patient (or patient’s representative) and answered their questions. The patient or their representative has agreed to have anesthesia services.    _____________________________________________________________________________  Witness        Date   Time  I have verified that the signature is that of the patient or patient’s representative, and that it was signed before the procedure  Patient Name: Mason Puri     : 1960                 Printed: 5/10/2025 at 3:25 AM    Medical Record #: [id number]                                            Page 1 of 1  ----------ANESTHESIA CONSENT----------

## (undated) NOTE — LETTER
Date & Time: 2/9/2023, 12:44 PM  Patient: Miguel Oseguera  Encounter Provider(s):    Angelito Clark MD       To Whom It May Concern:    Whitley Aguila was seen and treated in our Radiology department on 2/9/2023. He may return to work 2/10/23.     If you have any questions or concerns, please do not hesitate to call.        _____________________________  Physician/Radiology RN Signature

## (undated) NOTE — LETTER
Date: 2023      Patient Name: Vini Meza      : 1960        Thank you for choosing [facility] as your health care provider. Your physician has deemed the following medical service(s) necessary. However, your insurance plan may not pay for all of your health care and costs and may deny payment for this service. The fact that your insurance plan does not pay for an item or service does not mean you should not receive it. The purpose of this form is to help you make an informed decision about whether or not you want to receive this service(s) that may not be paid for by your insurance plan. CPT Code Description     Cost     _________  RIGHT knee Orthovisc series 3/3 and CSI injection under ultrasound guidance     _________ ______________________________ _____________      _________ ______________________________ _____________      I understand that the above mentioned service(s) or supply may not be covered by my insurance company.  I agree to be financially responsible for the cost of this service or supply in the event of my insurance denies payment as a non-covered benefit.        ______________________________________________________________________  Signature of Patient or Patient's Representative  Relationship  Date    ______________________________________________________________________  Signature of Witness to signing of form   Printed Name

## (undated) NOTE — LETTER
AUTHORIZATION FOR SURGICAL OPERATION OR OTHER PROCEDURE    1. I hereby [facility] Dr. Ismael Riddle , and [facility] staff assigned to my case to perform the following operation and/or procedure at the [facility], [facility]:    _______________________________________________________________________________________________    Removal of foreign of Left foot  _______________________________________________________________________________________________    2. My physician has explained the nature and purpose of the operation or other procedure, possible alternative methods of treatment, the risks involved, and the possibility of complication to me. I acknowledge that no guarantee has been made as to the result that may be obtained. 3.  I recognize that, during the course of this operation, or other procedure, unforseen conditions may necessitate additional or different procedure than those listed above. I, therefore, further authorize and request that the above named physician, his/her physician assistants or designees perform such procedures as are, in his/her professional opinion, necessary and desirable. 4.  Any tissue or organs removed in the operation or other procedure may be disposed of by and at the discretion of the [facility], [facility] and [facility] AT [facility]. 5.  I understand that in the event of a medical emergency, I will be transported by local paramedics to [facility] or other hospital emergency department. 6.  I certify that I have read and fully understand the above consent to operation and/or other procedure. 7.  I acknowledge that my physician has explained sedation/analgesia administration to me including the risks and benefits. I consent to the administration of sedation/analgesia as may be necessary or desirable in the judgement of my physician.     Witness signature: ___________________________________________________ Date:  ______/______/_____ Time:  ________ A. M.  P.M. Patient Name:  ______________________________________________________  (please print)      Patient signature:  ___________________________________________________             Relationship to Patient:           []  Parent    Responsible person                          []  Spouse  In case of minor or                    [] Other  _____________   Incompetent name:  __________________________________________________                               (please print)      _____________      Responsible person  In case of minor or  Incompetent signature:  _______________________________________________    Statement of Physician  My signature below affirms that prior to the time of the procedure, I have explained to the patient and/or his/her guardian, the risks and benefits involved in the proposed treatment and any reasonable alternative to the proposed treatment. I have also explained the risks and benefits involved in the refusal of the proposed treatment and have answered the patient's questions.                         Date:  ______/______/_______  Provider                      Signature:  __________________________________________________________       Time:  ___________ A.M    P.M.

## (undated) NOTE — LETTER
Date & Time: 8/19/2024, 2:26 PM  Patient: Mando Puri  Encounter Provider(s):    Rashad Keenan MD       To Whom It May Concern:    Mando Puri was seen and treated in our department on 8/19/2024. He is excused from work today.    If you have any questions or concerns, please do not hesitate to call.        _____________________________  Physician/APC Signature

## (undated) NOTE — LETTER
To Whom It May Concern:    Margy Mckeon has been under our care regarding ongoing medical issues. Because of this, he has been required to restrict his physical activities. He may resume his usual activities, including work, on 10/23/22 with the following restrictions:    [x]  None     []    No heavy lifting (over 15 pounds) for               weeks   []    Part-time (no more than             hours per week) for               week   []  Other:        Please feel free to contact us if there are any questions. Sincerely,      No name on file.   [facility] [address]  [address] [place] [address]  [phone]        Document generated by:  Raimundo Nunez RN

## (undated) NOTE — LETTER
2/9/2022          To Whom It May Concern:    Micaela Canavan is currently under my medical care. Please allow Marian Blanc to perform light duty, ie no lifting over 5 lbs, starting 2/7/22 and he may return on 2/14/22, without restrictions. If his symptoms fail to resolve, he will see me for re-evaluation. If you require additional information please contact our office.         Sincerely,    Erica Euceda MD          Document generated by:  Erica Euceda MD

## (undated) NOTE — LETTER
Date: 2023      Patient Name: Gerhardt Sa      : 1960        Thank you for choosing [facility] [facility] as your health care provider. Your physician has deemed the following medical service(s) necessary. However, your insurance plan may not pay for all of your health care and costs and may deny payment for this service. The fact that your insurance plan does not pay for an item or service does not mean you should not receive it. The purpose of this form is to help you make an informed decision about whether or not you want to receive this service(s) that may not be paid for by your insurance plan. CPT Code Description     Cost     _________ RIGHT knee Orthovisc series 1/3 and CSI injection under ultrasound guidance      _________ ______________________________ _____________      _________ ______________________________ _____________      I understand that the above mentioned service(s) or supply may not be covered by my insurance company.  I agree to be financially responsible for the cost of this service or supply in the event of my insurance denies payment as a non-covered benefit.        ______________________________________________________________________  Signature of Patient or Patient's Representative  Relationship  Date    ______________________________________________________________________  Signature of Witness to signing of form   Printed Name

## (undated) NOTE — LETTER
6/3/2022          To Whom It May Concern:    Maddi Fagan is currently under my medical care and may not return to work until Monday, June 6, 2022. If you require additional information please contact our office.         Sincerely,    Megan Oneal DPM

## (undated) NOTE — LETTER
[facility]  Administration of Anesthesia  Mason NEWELL agree to be cared for by a physician anesthesiologist alone and/or with a nurse anesthetist, who is specially trained to monitor me and give me medicine to put me to sleep or keep me comfortable during my procedure    I understand that my anesthesiologist and/or anesthetist is not an employee or agent of [facility] or [employer]. He or she works for [facility]    As the patient asking for anesthesia services, I agree to:  Allow the anesthesiologist (anesthesia doctor) to give me medicine and do additional procedures as necessary. Some examples are: Starting or using an “IV” to give me medicine, fluids or blood during my procedure, and having a breathing tube placed to help me breathe when I’m asleep (intubation). In the event that my heart stops working properly, I understand that my anesthesiologist will make every effort to sustain my life, unless otherwise directed by [facility] Do Not Resuscitate documents.  Tell my anesthesia doctor before my procedure:  If I am pregnant.  The last time that I ate or drank.  iii. All of the medicines I take (including prescriptions, herbal supplements, and pills I can buy without a prescription (including street drugs/illegal medications). Failure to inform my anesthesiologist about these medicines may increase my risk of anesthetic complications.  iv.If I am allergic to anything or have had a reaction to anesthesia before.  I understand how the anesthesia medicine will help me (benefits).  I understand that with any type of anesthesia medicine there are risks:  The most common risks are: nausea, vomiting, sore throat, muscle soreness, damage to my eyes, mouth, or teeth (from breathing tube placement).  Rare risks include: remembering what happened during my procedure, allergic reactions to medications, injury to my airway, heart, lungs, vision, nerves,  or muscles and in extremely rare instances death.  My doctor has explained to me other choices available to me for my care (alternatives).  Pregnant Patients (“epidural”):  I understand that the risks of having an epidural (medicine given into my back to help control pain during labor), include itching, low blood pressure, difficulty urinating, headache or slowing of the baby’s heart. Very rare risks include infection, bleeding, seizure, irregular heart rhythms and nerve injury.  Regional Anesthesia (“spinal”, “epidural”, & “nerve blocks”):  I understand that rare but potential complications include headache, bleeding, infection, seizure, irregular heart rhythms, and nerve injury.    _____________________________________________________________________________  Patient (or Representative) Signature/Relationship to Patient  Date   Time    _____________________________________________________________________________   Name (if used)    Language/Organization   Time    _____________________________________________________________________________  Nurse Anesthetist Signature     Date   Time  _____________________________________________________________________________  Anesthesiologist Signature     Date   Time  I have discussed the procedure and information above with the patient (or patient’s representative) and answered their questions. The patient or their representative has agreed to have anesthesia services.    _____________________________________________________________________________  Witness        Date   Time  I have verified that the signature is that of the patient or patient’s representative, and that it was signed before the procedure  Patient Name: Mason Puri     : 1960                 Printed: 10/23/2024 at 6:09 AM    Medical Record #: [id number]                                            Page 1 of 1  ----------ANESTHESIA CONSENT----------